# Patient Record
Sex: FEMALE | Race: WHITE | NOT HISPANIC OR LATINO | Employment: OTHER | URBAN - NONMETROPOLITAN AREA
[De-identification: names, ages, dates, MRNs, and addresses within clinical notes are randomized per-mention and may not be internally consistent; named-entity substitution may affect disease eponyms.]

---

## 2017-01-02 ENCOUNTER — APPOINTMENT (EMERGENCY)
Dept: CT IMAGING | Facility: HOSPITAL | Age: 77
DRG: 394 | End: 2017-01-02
Payer: MEDICARE

## 2017-01-02 ENCOUNTER — HOSPITAL ENCOUNTER (INPATIENT)
Facility: HOSPITAL | Age: 77
LOS: 2 days | Discharge: HOME | DRG: 394 | End: 2017-01-04
Attending: EMERGENCY MEDICINE | Admitting: INTERNAL MEDICINE
Payer: MEDICARE

## 2017-01-02 DIAGNOSIS — I25.10 CAD (CORONARY ARTERY DISEASE): ICD-10-CM

## 2017-01-02 DIAGNOSIS — K62.5 RECTAL BLEEDING: ICD-10-CM

## 2017-01-02 DIAGNOSIS — K57.90 DIVERTICULOSIS: ICD-10-CM

## 2017-01-02 DIAGNOSIS — I48.92 ATRIAL FLUTTER (HCC): ICD-10-CM

## 2017-01-02 DIAGNOSIS — K92.2 GI BLEEDING: Primary | ICD-10-CM

## 2017-01-02 DIAGNOSIS — R10.9 RIGHT FLANK PAIN: ICD-10-CM

## 2017-01-02 PROBLEM — R31.29 MICROSCOPIC HEMATURIA: Status: ACTIVE | Noted: 2017-01-02

## 2017-01-02 PROBLEM — I45.10 INCOMPLETE RIGHT BUNDLE BRANCH BLOCK: Status: ACTIVE | Noted: 2017-01-02

## 2017-01-02 PROBLEM — Z95.1 HX OF CABG: Status: ACTIVE | Noted: 2017-01-02

## 2017-01-02 PROBLEM — E78.5 HYPERLIPIDEMIA: Status: ACTIVE | Noted: 2017-01-02

## 2017-01-02 PROBLEM — I10 ESSENTIAL HYPERTENSION: Status: ACTIVE | Noted: 2017-01-02

## 2017-01-02 PROBLEM — R71.8 ELEVATED MCV: Status: ACTIVE | Noted: 2017-01-02

## 2017-01-02 PROBLEM — M54.6 THORACIC BACK PAIN: Status: ACTIVE | Noted: 2017-01-02

## 2017-01-02 PROBLEM — R82.81 PYURIA: Status: ACTIVE | Noted: 2017-01-02

## 2017-01-02 PROBLEM — M54.14 THORACIC RADICULOPATHY: Status: ACTIVE | Noted: 2017-01-02

## 2017-01-02 PROBLEM — E88.09 HYPOALBUMINEMIA: Status: ACTIVE | Noted: 2017-01-02

## 2017-01-02 PROBLEM — Z86.19 HISTORY OF SHINGLES: Status: ACTIVE | Noted: 2017-01-02

## 2017-01-02 LAB
ALBUMIN SERPL BCP-MCNC: 2.9 G/DL (ref 3.5–5)
ALP SERPL-CCNC: 85 U/L (ref 46–116)
ALT SERPL W P-5'-P-CCNC: 19 U/L (ref 12–78)
ANION GAP SERPL CALCULATED.3IONS-SCNC: 8 MMOL/L (ref 4–13)
AST SERPL W P-5'-P-CCNC: 17 U/L (ref 5–45)
BACTERIA UR QL AUTO: ABNORMAL /HPF
BASOPHILS # BLD AUTO: 0.04 THOUSANDS/ΜL (ref 0–0.1)
BASOPHILS NFR BLD AUTO: 1 % (ref 0–1)
BILIRUB SERPL-MCNC: 0.5 MG/DL (ref 0.2–1)
BILIRUB UR QL STRIP: NEGATIVE
BUN SERPL-MCNC: 20 MG/DL (ref 5–25)
CALCIUM SERPL-MCNC: 8.4 MG/DL (ref 8.3–10.1)
CHLORIDE SERPL-SCNC: 107 MMOL/L (ref 100–108)
CLARITY UR: ABNORMAL
CO2 SERPL-SCNC: 26 MMOL/L (ref 21–32)
COLOR UR: YELLOW
CREAT SERPL-MCNC: 0.94 MG/DL (ref 0.6–1.3)
EOSINOPHIL # BLD AUTO: 0.03 THOUSAND/ΜL (ref 0–0.61)
EOSINOPHIL NFR BLD AUTO: 0 % (ref 0–6)
ERYTHROCYTE [DISTWIDTH] IN BLOOD BY AUTOMATED COUNT: 15.9 % (ref 11.6–15.1)
GFR SERPL CREATININE-BSD FRML MDRD: 57.7 ML/MIN/1.73SQ M
GLUCOSE SERPL-MCNC: 103 MG/DL (ref 65–140)
GLUCOSE UR STRIP-MCNC: NEGATIVE MG/DL
HCT VFR BLD AUTO: 38.5 % (ref 34.8–46.1)
HGB BLD-MCNC: 12.4 G/DL (ref 11.5–15.4)
HGB UR QL STRIP.AUTO: ABNORMAL
HOLD SPECIMEN: NORMAL
HOLD SPECIMEN: NORMAL
INR PPP: 1.73 (ref 0.86–1.16)
KETONES UR STRIP-MCNC: NEGATIVE MG/DL
LACTATE SERPL-SCNC: 1.3 MMOL/L (ref 0.5–2)
LEUKOCYTE ESTERASE UR QL STRIP: ABNORMAL
LIPASE SERPL-CCNC: 110 U/L (ref 73–393)
LYMPHOCYTES # BLD AUTO: 1.09 THOUSANDS/ΜL (ref 0.6–4.47)
LYMPHOCYTES NFR BLD AUTO: 16 % (ref 14–44)
MCH RBC QN AUTO: 31.7 PG (ref 26.8–34.3)
MCHC RBC AUTO-ENTMCNC: 32.2 G/DL (ref 31.4–37.4)
MCV RBC AUTO: 99 FL (ref 82–98)
MONOCYTES # BLD AUTO: 0.64 THOUSAND/ΜL (ref 0.17–1.22)
MONOCYTES NFR BLD AUTO: 9 % (ref 4–12)
NEUTROPHILS # BLD AUTO: 5 THOUSANDS/ΜL (ref 1.85–7.62)
NEUTS SEG NFR BLD AUTO: 74 % (ref 43–75)
NITRITE UR QL STRIP: NEGATIVE
NON-SQ EPI CELLS URNS QL MICRO: ABNORMAL /HPF
PH UR STRIP.AUTO: 5.5 [PH] (ref 4.5–8)
PLATELET # BLD AUTO: 210 THOUSANDS/UL (ref 149–390)
PMV BLD AUTO: 8.6 FL (ref 8.9–12.7)
POTASSIUM SERPL-SCNC: 3.9 MMOL/L (ref 3.5–5.3)
PROT SERPL-MCNC: 6.1 G/DL (ref 6.4–8.2)
PROT UR STRIP-MCNC: NEGATIVE MG/DL
PROTHROMBIN TIME: 19.5 SECONDS (ref 12–14.3)
RBC # BLD AUTO: 3.91 MILLION/UL (ref 3.81–5.12)
RBC #/AREA URNS AUTO: ABNORMAL /HPF
SODIUM SERPL-SCNC: 141 MMOL/L (ref 136–145)
SP GR UR STRIP.AUTO: 1.02 (ref 1–1.03)
UROBILINOGEN UR QL STRIP.AUTO: 0.2 E.U./DL
WBC # BLD AUTO: 6.8 THOUSAND/UL (ref 4.31–10.16)
WBC #/AREA URNS AUTO: ABNORMAL /HPF

## 2017-01-02 PROCEDURE — 80053 COMPREHEN METABOLIC PANEL: CPT | Performed by: EMERGENCY MEDICINE

## 2017-01-02 PROCEDURE — 81001 URINALYSIS AUTO W/SCOPE: CPT | Performed by: EMERGENCY MEDICINE

## 2017-01-02 PROCEDURE — 36415 COLL VENOUS BLD VENIPUNCTURE: CPT | Performed by: EMERGENCY MEDICINE

## 2017-01-02 PROCEDURE — 99285 EMERGENCY DEPT VISIT HI MDM: CPT

## 2017-01-02 PROCEDURE — 85025 COMPLETE CBC W/AUTO DIFF WBC: CPT | Performed by: EMERGENCY MEDICINE

## 2017-01-02 PROCEDURE — 74178 CT ABD&PLV WO CNTR FLWD CNTR: CPT

## 2017-01-02 PROCEDURE — 96374 THER/PROPH/DIAG INJ IV PUSH: CPT

## 2017-01-02 PROCEDURE — 93005 ELECTROCARDIOGRAM TRACING: CPT | Performed by: EMERGENCY MEDICINE

## 2017-01-02 PROCEDURE — 96376 TX/PRO/DX INJ SAME DRUG ADON: CPT

## 2017-01-02 PROCEDURE — 96375 TX/PRO/DX INJ NEW DRUG ADDON: CPT

## 2017-01-02 PROCEDURE — 83605 ASSAY OF LACTIC ACID: CPT | Performed by: EMERGENCY MEDICINE

## 2017-01-02 PROCEDURE — 85610 PROTHROMBIN TIME: CPT | Performed by: EMERGENCY MEDICINE

## 2017-01-02 PROCEDURE — 83690 ASSAY OF LIPASE: CPT | Performed by: EMERGENCY MEDICINE

## 2017-01-02 RX ORDER — METOPROLOL TARTRATE 50 MG/1
50 TABLET, FILM COATED ORAL 2 TIMES DAILY
COMMUNITY
End: 2018-11-20 | Stop reason: SDUPTHER

## 2017-01-02 RX ORDER — VIT A/VIT C/VIT E/ZINC/COPPER 4296-226
1 CAPSULE ORAL DAILY
COMMUNITY

## 2017-01-02 RX ORDER — FAMOTIDINE 20 MG/1
20 TABLET, FILM COATED ORAL DAILY
Status: DISCONTINUED | OUTPATIENT
Start: 2017-01-03 | End: 2017-01-04 | Stop reason: HOSPADM

## 2017-01-02 RX ORDER — LIDOCAINE 50 MG/G
1 PATCH TOPICAL ONCE
Status: COMPLETED | OUTPATIENT
Start: 2017-01-02 | End: 2017-01-02

## 2017-01-02 RX ORDER — ACETAMINOPHEN 325 MG/1
650 TABLET ORAL EVERY 6 HOURS PRN
Status: DISCONTINUED | OUTPATIENT
Start: 2017-01-02 | End: 2017-01-04 | Stop reason: HOSPADM

## 2017-01-02 RX ORDER — ALPRAZOLAM 0.25 MG/1
0.25 TABLET ORAL 3 TIMES DAILY PRN
Status: DISCONTINUED | OUTPATIENT
Start: 2017-01-02 | End: 2017-01-04 | Stop reason: HOSPADM

## 2017-01-02 RX ORDER — OXYCODONE HYDROCHLORIDE 5 MG/1
5 TABLET ORAL EVERY 4 HOURS PRN
Status: DISCONTINUED | OUTPATIENT
Start: 2017-01-02 | End: 2017-01-04 | Stop reason: HOSPADM

## 2017-01-02 RX ORDER — FENTANYL CITRATE 50 UG/ML
25 INJECTION, SOLUTION INTRAMUSCULAR; INTRAVENOUS EVERY 4 HOURS PRN
Status: DISCONTINUED | OUTPATIENT
Start: 2017-01-02 | End: 2017-01-03

## 2017-01-02 RX ORDER — FENTANYL CITRATE 50 UG/ML
50 INJECTION, SOLUTION INTRAMUSCULAR; INTRAVENOUS ONCE
Status: COMPLETED | OUTPATIENT
Start: 2017-01-02 | End: 2017-01-02

## 2017-01-02 RX ORDER — POTASSIUM CHLORIDE 20 MEQ/1
40 TABLET, EXTENDED RELEASE ORAL ONCE
Status: COMPLETED | OUTPATIENT
Start: 2017-01-02 | End: 2017-01-02

## 2017-01-02 RX ORDER — SODIUM CHLORIDE 9 MG/ML
75 INJECTION, SOLUTION INTRAVENOUS CONTINUOUS
Status: DISCONTINUED | OUTPATIENT
Start: 2017-01-02 | End: 2017-01-04 | Stop reason: HOSPADM

## 2017-01-02 RX ORDER — ATORVASTATIN CALCIUM 40 MG/1
40 TABLET, FILM COATED ORAL
Status: DISCONTINUED | OUTPATIENT
Start: 2017-01-02 | End: 2017-01-04 | Stop reason: HOSPADM

## 2017-01-02 RX ORDER — LORAZEPAM 2 MG/ML
0.5 INJECTION INTRAMUSCULAR ONCE AS NEEDED
Status: COMPLETED | OUTPATIENT
Start: 2017-01-02 | End: 2017-01-03

## 2017-01-02 RX ORDER — LISINOPRIL 10 MG/1
10 TABLET ORAL DAILY
Status: DISCONTINUED | OUTPATIENT
Start: 2017-01-03 | End: 2017-01-04 | Stop reason: HOSPADM

## 2017-01-02 RX ORDER — METOPROLOL TARTRATE 50 MG/1
50 TABLET, FILM COATED ORAL 2 TIMES DAILY
Status: DISCONTINUED | OUTPATIENT
Start: 2017-01-02 | End: 2017-01-04 | Stop reason: HOSPADM

## 2017-01-02 RX ORDER — ONDANSETRON 2 MG/ML
4 INJECTION INTRAMUSCULAR; INTRAVENOUS EVERY 6 HOURS PRN
Status: DISCONTINUED | OUTPATIENT
Start: 2017-01-02 | End: 2017-01-04 | Stop reason: HOSPADM

## 2017-01-02 RX ORDER — CALCIUM CARBONATE 500(1250)
1 TABLET ORAL 2 TIMES DAILY WITH MEALS
Status: DISCONTINUED | OUTPATIENT
Start: 2017-01-02 | End: 2017-01-04 | Stop reason: HOSPADM

## 2017-01-02 RX ORDER — ATORVASTATIN CALCIUM 10 MG/1
20 TABLET, FILM COATED ORAL
Status: DISCONTINUED | OUTPATIENT
Start: 2017-01-02 | End: 2017-01-02

## 2017-01-02 RX ORDER — FENTANYL CITRATE 50 UG/ML
50 INJECTION, SOLUTION INTRAMUSCULAR; INTRAVENOUS ONCE
Status: DISCONTINUED | OUTPATIENT
Start: 2017-01-02 | End: 2017-01-02

## 2017-01-02 RX ORDER — KETAMINE HYDROCHLORIDE 100 MG/ML
0.2 INJECTION, SOLUTION INTRAMUSCULAR; INTRAVENOUS ONCE
Status: COMPLETED | OUTPATIENT
Start: 2017-01-02 | End: 2017-01-02

## 2017-01-02 RX ADMIN — FENTANYL CITRATE 50 MCG: 50 INJECTION, SOLUTION INTRAMUSCULAR; INTRAVENOUS at 11:07

## 2017-01-02 RX ADMIN — KETAMINE HYDROCHLORIDE 17 MG: 100 INJECTION INTRAMUSCULAR; INTRAVENOUS at 11:14

## 2017-01-02 RX ADMIN — OXYCODONE HYDROCHLORIDE 5 MG: 5 TABLET ORAL at 16:06

## 2017-01-02 RX ADMIN — FENTANYL CITRATE 25 MCG: 50 INJECTION, SOLUTION INTRAMUSCULAR; INTRAVENOUS at 18:34

## 2017-01-02 RX ADMIN — Medication 1 TABLET: at 16:06

## 2017-01-02 RX ADMIN — FENTANYL CITRATE 50 MCG: 50 INJECTION, SOLUTION INTRAMUSCULAR; INTRAVENOUS at 12:32

## 2017-01-02 RX ADMIN — FENTANYL CITRATE 25 MCG: 50 INJECTION, SOLUTION INTRAMUSCULAR; INTRAVENOUS at 22:39

## 2017-01-02 RX ADMIN — METOPROLOL TARTRATE 50 MG: 50 TABLET ORAL at 17:42

## 2017-01-02 RX ADMIN — POTASSIUM CHLORIDE 40 MEQ: 1500 TABLET, EXTENDED RELEASE ORAL at 14:42

## 2017-01-02 RX ADMIN — SODIUM CHLORIDE 75 ML/HR: 0.9 INJECTION, SOLUTION INTRAVENOUS at 14:43

## 2017-01-02 RX ADMIN — IODIXANOL 100 ML: 320 INJECTION, SOLUTION INTRAVASCULAR at 11:28

## 2017-01-02 RX ADMIN — ATORVASTATIN CALCIUM 40 MG: 40 TABLET, FILM COATED ORAL at 16:06

## 2017-01-02 RX ADMIN — LIDOCAINE 1 PATCH: 50 PATCH CUTANEOUS at 11:05

## 2017-01-03 ENCOUNTER — APPOINTMENT (INPATIENT)
Dept: MRI IMAGING | Facility: HOSPITAL | Age: 77
DRG: 394 | End: 2017-01-03
Payer: MEDICARE

## 2017-01-03 LAB
ALBUMIN SERPL BCP-MCNC: 2.8 G/DL (ref 3.5–5)
ALP SERPL-CCNC: 81 U/L (ref 46–116)
ALT SERPL W P-5'-P-CCNC: 16 U/L (ref 12–78)
ANION GAP SERPL CALCULATED.3IONS-SCNC: 7 MMOL/L (ref 4–13)
AST SERPL W P-5'-P-CCNC: 20 U/L (ref 5–45)
ATRIAL RATE: 227 BPM
BACTERIA UR QL AUTO: ABNORMAL /HPF
BASOPHILS # BLD AUTO: 0.02 THOUSANDS/ΜL (ref 0–0.1)
BASOPHILS NFR BLD AUTO: 0 % (ref 0–1)
BILIRUB SERPL-MCNC: 0.6 MG/DL (ref 0.2–1)
BILIRUB UR QL STRIP: NEGATIVE
BUN SERPL-MCNC: 12 MG/DL (ref 5–25)
CALCIUM SERPL-MCNC: 8.5 MG/DL (ref 8.3–10.1)
CHLORIDE SERPL-SCNC: 108 MMOL/L (ref 100–108)
CLARITY UR: CLEAR
CO2 SERPL-SCNC: 26 MMOL/L (ref 21–32)
COLOR UR: YELLOW
CREAT SERPL-MCNC: 0.81 MG/DL (ref 0.6–1.3)
EOSINOPHIL # BLD AUTO: 0.05 THOUSAND/ΜL (ref 0–0.61)
EOSINOPHIL NFR BLD AUTO: 1 % (ref 0–6)
ERYTHROCYTE [DISTWIDTH] IN BLOOD BY AUTOMATED COUNT: 16.3 % (ref 11.6–15.1)
EST. AVERAGE GLUCOSE BLD GHB EST-MCNC: 123 MG/DL
FOLATE SERPL-MCNC: 13.8 NG/ML (ref 3.1–17.5)
GFR SERPL CREATININE-BSD FRML MDRD: >60 ML/MIN/1.73SQ M
GLUCOSE SERPL-MCNC: 89 MG/DL (ref 65–140)
GLUCOSE UR STRIP-MCNC: NEGATIVE MG/DL
HBA1C MFR BLD: 5.9 % (ref 4.2–6.3)
HCT VFR BLD AUTO: 36.8 % (ref 34.8–46.1)
HGB BLD-MCNC: 12 G/DL (ref 11.5–15.4)
HGB UR QL STRIP.AUTO: ABNORMAL
KETONES UR STRIP-MCNC: NEGATIVE MG/DL
LACTATE SERPL-SCNC: 0.7 MMOL/L (ref 0.5–2)
LEUKOCYTE ESTERASE UR QL STRIP: ABNORMAL
LYMPHOCYTES # BLD AUTO: 1.12 THOUSANDS/ΜL (ref 0.6–4.47)
LYMPHOCYTES NFR BLD AUTO: 20 % (ref 14–44)
MAGNESIUM SERPL-MCNC: 1.8 MG/DL (ref 1.6–2.6)
MCH RBC QN AUTO: 32.2 PG (ref 26.8–34.3)
MCHC RBC AUTO-ENTMCNC: 32.6 G/DL (ref 31.4–37.4)
MCV RBC AUTO: 99 FL (ref 82–98)
MONOCYTES # BLD AUTO: 0.58 THOUSAND/ΜL (ref 0.17–1.22)
MONOCYTES NFR BLD AUTO: 10 % (ref 4–12)
MUCOUS THREADS UR QL AUTO: ABNORMAL
NEUTROPHILS # BLD AUTO: 3.97 THOUSANDS/ΜL (ref 1.85–7.62)
NEUTS SEG NFR BLD AUTO: 69 % (ref 43–75)
NITRITE UR QL STRIP: NEGATIVE
NON-SQ EPI CELLS URNS QL MICRO: ABNORMAL /HPF
PH UR STRIP.AUTO: 5.5 [PH] (ref 4.5–8)
PLATELET # BLD AUTO: 183 THOUSANDS/UL (ref 149–390)
PMV BLD AUTO: 8.7 FL (ref 8.9–12.7)
POTASSIUM SERPL-SCNC: 4.5 MMOL/L (ref 3.5–5.3)
PREALB SERPL-MCNC: 24 MG/DL (ref 18–40)
PROT SERPL-MCNC: 5.8 G/DL (ref 6.4–8.2)
PROT UR STRIP-MCNC: NEGATIVE MG/DL
QRS AXIS: 7 DEGREES
QRSD INTERVAL: 100 MS
QT INTERVAL: 396 MS
QTC INTERVAL: 392 MS
RBC # BLD AUTO: 3.73 MILLION/UL (ref 3.81–5.12)
RBC #/AREA URNS AUTO: ABNORMAL /HPF
SODIUM SERPL-SCNC: 141 MMOL/L (ref 136–145)
SP GR UR STRIP.AUTO: 1.02 (ref 1–1.03)
T WAVE AXIS: -7 DEGREES
TSH SERPL DL<=0.05 MIU/L-ACNC: 3.26 UIU/ML (ref 0.36–3.74)
UROBILINOGEN UR QL STRIP.AUTO: 0.2 E.U./DL
VENTRICULAR RATE: 59 BPM
VIT B12 SERPL-MCNC: 228 PG/ML (ref 100–900)
WBC # BLD AUTO: 5.74 THOUSAND/UL (ref 4.31–10.16)
WBC #/AREA URNS AUTO: ABNORMAL /HPF

## 2017-01-03 PROCEDURE — G8979 MOBILITY GOAL STATUS: HCPCS | Performed by: PHYSICAL THERAPIST

## 2017-01-03 PROCEDURE — 81001 URINALYSIS AUTO W/SCOPE: CPT | Performed by: FAMILY MEDICINE

## 2017-01-03 PROCEDURE — 97116 GAIT TRAINING THERAPY: CPT | Performed by: PHYSICAL THERAPIST

## 2017-01-03 PROCEDURE — 85025 COMPLETE CBC W/AUTO DIFF WBC: CPT | Performed by: INTERNAL MEDICINE

## 2017-01-03 PROCEDURE — 83036 HEMOGLOBIN GLYCOSYLATED A1C: CPT | Performed by: INTERNAL MEDICINE

## 2017-01-03 PROCEDURE — 72146 MRI CHEST SPINE W/O DYE: CPT

## 2017-01-03 PROCEDURE — 83735 ASSAY OF MAGNESIUM: CPT | Performed by: INTERNAL MEDICINE

## 2017-01-03 PROCEDURE — 84443 ASSAY THYROID STIM HORMONE: CPT | Performed by: INTERNAL MEDICINE

## 2017-01-03 PROCEDURE — 82746 ASSAY OF FOLIC ACID SERUM: CPT | Performed by: INTERNAL MEDICINE

## 2017-01-03 PROCEDURE — G8978 MOBILITY CURRENT STATUS: HCPCS | Performed by: PHYSICAL THERAPIST

## 2017-01-03 PROCEDURE — 84134 ASSAY OF PREALBUMIN: CPT | Performed by: INTERNAL MEDICINE

## 2017-01-03 PROCEDURE — 87086 URINE CULTURE/COLONY COUNT: CPT | Performed by: FAMILY MEDICINE

## 2017-01-03 PROCEDURE — 83605 ASSAY OF LACTIC ACID: CPT | Performed by: INTERNAL MEDICINE

## 2017-01-03 PROCEDURE — 82607 VITAMIN B-12: CPT | Performed by: INTERNAL MEDICINE

## 2017-01-03 PROCEDURE — 97161 PT EVAL LOW COMPLEX 20 MIN: CPT | Performed by: PHYSICAL THERAPIST

## 2017-01-03 PROCEDURE — 80053 COMPREHEN METABOLIC PANEL: CPT | Performed by: INTERNAL MEDICINE

## 2017-01-03 RX ORDER — GABAPENTIN 100 MG/1
100 CAPSULE ORAL 3 TIMES DAILY
Status: DISCONTINUED | OUTPATIENT
Start: 2017-01-03 | End: 2017-01-04 | Stop reason: HOSPADM

## 2017-01-03 RX ORDER — FENTANYL CITRATE 50 UG/ML
50 INJECTION, SOLUTION INTRAMUSCULAR; INTRAVENOUS EVERY 4 HOURS PRN
Status: DISCONTINUED | OUTPATIENT
Start: 2017-01-03 | End: 2017-01-04 | Stop reason: HOSPADM

## 2017-01-03 RX ADMIN — FENTANYL CITRATE 50 MCG: 50 INJECTION, SOLUTION INTRAMUSCULAR; INTRAVENOUS at 11:43

## 2017-01-03 RX ADMIN — Medication 1 TABLET: at 16:03

## 2017-01-03 RX ADMIN — LORAZEPAM 0.5 MG: 2 INJECTION INTRAMUSCULAR; INTRAVENOUS at 09:49

## 2017-01-03 RX ADMIN — FENTANYL CITRATE 50 MCG: 50 INJECTION, SOLUTION INTRAMUSCULAR; INTRAVENOUS at 03:40

## 2017-01-03 RX ADMIN — GABAPENTIN 100 MG: 100 CAPSULE ORAL at 20:09

## 2017-01-03 RX ADMIN — ATORVASTATIN CALCIUM 40 MG: 40 TABLET, FILM COATED ORAL at 16:03

## 2017-01-03 RX ADMIN — FAMOTIDINE 20 MG: 20 TABLET, FILM COATED ORAL at 08:36

## 2017-01-03 RX ADMIN — Medication 1 TABLET: at 08:37

## 2017-01-03 RX ADMIN — SODIUM CHLORIDE 75 ML/HR: 0.9 INJECTION, SOLUTION INTRAVENOUS at 20:06

## 2017-01-03 RX ADMIN — OXYCODONE HYDROCHLORIDE 5 MG: 5 TABLET ORAL at 08:36

## 2017-01-03 RX ADMIN — GABAPENTIN 100 MG: 100 CAPSULE ORAL at 16:03

## 2017-01-03 RX ADMIN — LISINOPRIL 10 MG: 10 TABLET ORAL at 08:36

## 2017-01-03 RX ADMIN — OXYCODONE HYDROCHLORIDE 5 MG: 5 TABLET ORAL at 16:04

## 2017-01-03 RX ADMIN — METOPROLOL TARTRATE 50 MG: 50 TABLET ORAL at 08:36

## 2017-01-03 RX ADMIN — Medication 1 TABLET: at 08:36

## 2017-01-03 RX ADMIN — METOPROLOL TARTRATE 50 MG: 50 TABLET ORAL at 18:49

## 2017-01-04 VITALS
HEART RATE: 68 BPM | BODY MASS INDEX: 30.75 KG/M2 | SYSTOLIC BLOOD PRESSURE: 132 MMHG | RESPIRATION RATE: 18 BRPM | WEIGHT: 184.57 LBS | DIASTOLIC BLOOD PRESSURE: 70 MMHG | TEMPERATURE: 97.1 F | HEIGHT: 65 IN | OXYGEN SATURATION: 94 %

## 2017-01-04 LAB
ANION GAP SERPL CALCULATED.3IONS-SCNC: 7 MMOL/L (ref 4–13)
BACTERIA UR CULT: NORMAL
BASOPHILS # BLD AUTO: 0.03 THOUSANDS/ΜL (ref 0–0.1)
BASOPHILS NFR BLD AUTO: 1 % (ref 0–1)
BUN SERPL-MCNC: 7 MG/DL (ref 5–25)
CALCIUM SERPL-MCNC: 8.9 MG/DL (ref 8.3–10.1)
CHLORIDE SERPL-SCNC: 107 MMOL/L (ref 100–108)
CO2 SERPL-SCNC: 28 MMOL/L (ref 21–32)
CREAT SERPL-MCNC: 0.85 MG/DL (ref 0.6–1.3)
EOSINOPHIL # BLD AUTO: 0.04 THOUSAND/ΜL (ref 0–0.61)
EOSINOPHIL NFR BLD AUTO: 1 % (ref 0–6)
ERYTHROCYTE [DISTWIDTH] IN BLOOD BY AUTOMATED COUNT: 16.9 % (ref 11.6–15.1)
GFR SERPL CREATININE-BSD FRML MDRD: >60 ML/MIN/1.73SQ M
GLUCOSE SERPL-MCNC: 88 MG/DL (ref 65–140)
HCT VFR BLD AUTO: 40.1 % (ref 34.8–46.1)
HGB BLD-MCNC: 12.8 G/DL (ref 11.5–15.4)
LYMPHOCYTES # BLD AUTO: 1.46 THOUSANDS/ΜL (ref 0.6–4.47)
LYMPHOCYTES NFR BLD AUTO: 22 % (ref 14–44)
MCH RBC QN AUTO: 31.8 PG (ref 26.8–34.3)
MCHC RBC AUTO-ENTMCNC: 31.9 G/DL (ref 31.4–37.4)
MCV RBC AUTO: 100 FL (ref 82–98)
MONOCYTES # BLD AUTO: 0.64 THOUSAND/ΜL (ref 0.17–1.22)
MONOCYTES NFR BLD AUTO: 10 % (ref 4–12)
NEUTROPHILS # BLD AUTO: 4.49 THOUSANDS/ΜL (ref 1.85–7.62)
NEUTS SEG NFR BLD AUTO: 66 % (ref 43–75)
PLATELET # BLD AUTO: 177 THOUSANDS/UL (ref 149–390)
PMV BLD AUTO: 9 FL (ref 8.9–12.7)
POTASSIUM SERPL-SCNC: 4.3 MMOL/L (ref 3.5–5.3)
RBC # BLD AUTO: 4.03 MILLION/UL (ref 3.81–5.12)
SODIUM SERPL-SCNC: 142 MMOL/L (ref 136–145)
WBC # BLD AUTO: 6.66 THOUSAND/UL (ref 4.31–10.16)

## 2017-01-04 PROCEDURE — 97116 GAIT TRAINING THERAPY: CPT

## 2017-01-04 PROCEDURE — 97110 THERAPEUTIC EXERCISES: CPT

## 2017-01-04 PROCEDURE — 85025 COMPLETE CBC W/AUTO DIFF WBC: CPT | Performed by: FAMILY MEDICINE

## 2017-01-04 PROCEDURE — 80048 BASIC METABOLIC PNL TOTAL CA: CPT | Performed by: FAMILY MEDICINE

## 2017-01-04 RX ORDER — GABAPENTIN 100 MG/1
100 CAPSULE ORAL 3 TIMES DAILY
Qty: 90 CAPSULE | Refills: 0 | Status: SHIPPED | OUTPATIENT
Start: 2017-01-04 | End: 2017-01-26

## 2017-01-04 RX ADMIN — OXYCODONE HYDROCHLORIDE 5 MG: 5 TABLET ORAL at 09:21

## 2017-01-04 RX ADMIN — LISINOPRIL 10 MG: 10 TABLET ORAL at 09:21

## 2017-01-04 RX ADMIN — FAMOTIDINE 20 MG: 20 TABLET, FILM COATED ORAL at 09:21

## 2017-01-04 RX ADMIN — METOPROLOL TARTRATE 50 MG: 50 TABLET ORAL at 09:21

## 2017-01-04 RX ADMIN — GABAPENTIN 100 MG: 100 CAPSULE ORAL at 09:21

## 2017-01-04 RX ADMIN — OXYCODONE HYDROCHLORIDE 5 MG: 5 TABLET ORAL at 02:00

## 2017-01-04 RX ADMIN — Medication 1 TABLET: at 09:21

## 2017-01-04 RX ADMIN — SODIUM CHLORIDE 75 ML/HR: 0.9 INJECTION, SOLUTION INTRAVENOUS at 09:30

## 2017-01-13 ENCOUNTER — ALLSCRIPTS OFFICE VISIT (OUTPATIENT)
Dept: OTHER | Facility: OTHER | Age: 77
End: 2017-01-13

## 2017-01-19 DIAGNOSIS — I48.3 TYPICAL ATRIAL FLUTTER (HCC): ICD-10-CM

## 2017-01-26 ENCOUNTER — GENERIC CONVERSION - ENCOUNTER (OUTPATIENT)
Dept: OTHER | Facility: OTHER | Age: 77
End: 2017-01-26

## 2017-01-26 ENCOUNTER — HOSPITAL ENCOUNTER (EMERGENCY)
Facility: HOSPITAL | Age: 77
Discharge: HOME | End: 2017-01-26
Attending: EMERGENCY MEDICINE
Payer: MEDICARE

## 2017-01-26 ENCOUNTER — APPOINTMENT (EMERGENCY)
Dept: RADIOLOGY | Facility: HOSPITAL | Age: 77
End: 2017-01-26
Payer: MEDICARE

## 2017-01-26 ENCOUNTER — APPOINTMENT (OUTPATIENT)
Dept: LAB | Facility: HOSPITAL | Age: 77
End: 2017-01-26
Attending: INTERNAL MEDICINE
Payer: MEDICARE

## 2017-01-26 ENCOUNTER — TRANSCRIBE ORDERS (OUTPATIENT)
Dept: ADMINISTRATIVE | Facility: HOSPITAL | Age: 77
End: 2017-01-26

## 2017-01-26 VITALS
DIASTOLIC BLOOD PRESSURE: 84 MMHG | SYSTOLIC BLOOD PRESSURE: 127 MMHG | HEART RATE: 74 BPM | WEIGHT: 186 LBS | OXYGEN SATURATION: 100 % | RESPIRATION RATE: 18 BRPM | TEMPERATURE: 98 F | BODY MASS INDEX: 30.95 KG/M2

## 2017-01-26 DIAGNOSIS — I48.3 TYPICAL ATRIAL FLUTTER (HCC): ICD-10-CM

## 2017-01-26 DIAGNOSIS — S46.911A: Primary | ICD-10-CM

## 2017-01-26 LAB
INR PPP: 1.47 (ref 0.86–1.16)
PROTHROMBIN TIME: 17.3 SECONDS (ref 12–14.3)

## 2017-01-26 PROCEDURE — 73060 X-RAY EXAM OF HUMERUS: CPT

## 2017-01-26 PROCEDURE — 85610 PROTHROMBIN TIME: CPT

## 2017-01-26 PROCEDURE — 73030 X-RAY EXAM OF SHOULDER: CPT

## 2017-01-26 PROCEDURE — 99283 EMERGENCY DEPT VISIT LOW MDM: CPT

## 2017-01-26 PROCEDURE — 36415 COLL VENOUS BLD VENIPUNCTURE: CPT

## 2017-01-26 RX ORDER — WARFARIN SODIUM 2 MG/1
2 TABLET ORAL
COMMUNITY
End: 2018-03-01 | Stop reason: SDUPTHER

## 2017-01-26 RX ORDER — ACETAMINOPHEN 325 MG/1
650 TABLET ORAL ONCE
Status: COMPLETED | OUTPATIENT
Start: 2017-01-26 | End: 2017-01-26

## 2017-01-26 RX ADMIN — ACETAMINOPHEN 650 MG: 325 TABLET, FILM COATED ORAL at 15:22

## 2017-01-28 ENCOUNTER — GENERIC CONVERSION - ENCOUNTER (OUTPATIENT)
Dept: OTHER | Facility: OTHER | Age: 77
End: 2017-01-28

## 2017-01-30 ENCOUNTER — APPOINTMENT (OUTPATIENT)
Dept: LAB | Facility: HOSPITAL | Age: 77
End: 2017-01-30
Attending: INTERNAL MEDICINE
Payer: MEDICARE

## 2017-01-30 ENCOUNTER — GENERIC CONVERSION - ENCOUNTER (OUTPATIENT)
Dept: OTHER | Facility: OTHER | Age: 77
End: 2017-01-30

## 2017-01-30 ENCOUNTER — TRANSCRIBE ORDERS (OUTPATIENT)
Dept: ADMINISTRATIVE | Facility: HOSPITAL | Age: 77
End: 2017-01-30

## 2017-01-30 DIAGNOSIS — I48.3 TYPICAL ATRIAL FLUTTER (HCC): ICD-10-CM

## 2017-01-30 LAB
INR PPP: 1.46 (ref 0.86–1.16)
PROTHROMBIN TIME: 17.2 SECONDS (ref 12–14.3)

## 2017-01-30 PROCEDURE — 36415 COLL VENOUS BLD VENIPUNCTURE: CPT

## 2017-01-30 PROCEDURE — 85610 PROTHROMBIN TIME: CPT

## 2017-01-31 ENCOUNTER — GENERIC CONVERSION - ENCOUNTER (OUTPATIENT)
Dept: OTHER | Facility: OTHER | Age: 77
End: 2017-01-31

## 2017-02-02 ENCOUNTER — APPOINTMENT (OUTPATIENT)
Dept: LAB | Facility: HOSPITAL | Age: 77
End: 2017-02-02
Attending: INTERNAL MEDICINE
Payer: MEDICARE

## 2017-02-02 ENCOUNTER — TRANSCRIBE ORDERS (OUTPATIENT)
Dept: ADMINISTRATIVE | Facility: HOSPITAL | Age: 77
End: 2017-02-02

## 2017-02-02 ENCOUNTER — GENERIC CONVERSION - ENCOUNTER (OUTPATIENT)
Dept: OTHER | Facility: OTHER | Age: 77
End: 2017-02-02

## 2017-02-02 DIAGNOSIS — I48.3 TYPICAL ATRIAL FLUTTER (HCC): ICD-10-CM

## 2017-02-02 LAB
INR PPP: 1.46 (ref 0.86–1.16)
PROTHROMBIN TIME: 17.2 SECONDS (ref 12–14.3)

## 2017-02-02 PROCEDURE — 36415 COLL VENOUS BLD VENIPUNCTURE: CPT

## 2017-02-02 PROCEDURE — 85610 PROTHROMBIN TIME: CPT

## 2017-02-03 ENCOUNTER — GENERIC CONVERSION - ENCOUNTER (OUTPATIENT)
Dept: OTHER | Facility: OTHER | Age: 77
End: 2017-02-03

## 2017-02-06 ENCOUNTER — GENERIC CONVERSION - ENCOUNTER (OUTPATIENT)
Dept: OTHER | Facility: OTHER | Age: 77
End: 2017-02-06

## 2017-02-06 ENCOUNTER — APPOINTMENT (OUTPATIENT)
Dept: LAB | Facility: HOSPITAL | Age: 77
End: 2017-02-06
Attending: INTERNAL MEDICINE
Payer: MEDICARE

## 2017-02-06 DIAGNOSIS — I48.3 TYPICAL ATRIAL FLUTTER (HCC): ICD-10-CM

## 2017-02-06 LAB
INR PPP: 2.91 (ref 0.86–1.16)
PROTHROMBIN TIME: 29 SECONDS (ref 12–14.3)

## 2017-02-06 PROCEDURE — 85610 PROTHROMBIN TIME: CPT

## 2017-02-06 PROCEDURE — 36415 COLL VENOUS BLD VENIPUNCTURE: CPT

## 2017-02-09 ENCOUNTER — GENERIC CONVERSION - ENCOUNTER (OUTPATIENT)
Dept: OTHER | Facility: OTHER | Age: 77
End: 2017-02-09

## 2017-02-13 ENCOUNTER — APPOINTMENT (OUTPATIENT)
Dept: LAB | Facility: HOSPITAL | Age: 77
End: 2017-02-13
Attending: INTERNAL MEDICINE
Payer: MEDICARE

## 2017-02-13 ENCOUNTER — GENERIC CONVERSION - ENCOUNTER (OUTPATIENT)
Dept: OTHER | Facility: OTHER | Age: 77
End: 2017-02-13

## 2017-02-13 ENCOUNTER — TRANSCRIBE ORDERS (OUTPATIENT)
Dept: ADMINISTRATIVE | Facility: HOSPITAL | Age: 77
End: 2017-02-13

## 2017-02-13 DIAGNOSIS — I48.3 TYPICAL ATRIAL FLUTTER (HCC): ICD-10-CM

## 2017-02-13 LAB
INR PPP: 2.7 (ref 0.86–1.16)
PROTHROMBIN TIME: 27.4 SECONDS (ref 12–14.3)

## 2017-02-13 PROCEDURE — 36415 COLL VENOUS BLD VENIPUNCTURE: CPT

## 2017-02-13 PROCEDURE — 85610 PROTHROMBIN TIME: CPT

## 2017-02-20 ENCOUNTER — APPOINTMENT (OUTPATIENT)
Dept: LAB | Facility: HOSPITAL | Age: 77
End: 2017-02-20
Attending: INTERNAL MEDICINE
Payer: MEDICARE

## 2017-02-20 DIAGNOSIS — I48.3 TYPICAL ATRIAL FLUTTER (HCC): ICD-10-CM

## 2017-02-20 LAB
INR PPP: 3.21 (ref 0.86–1.16)
PROTHROMBIN TIME: 31.2 SECONDS (ref 12–14.3)

## 2017-02-20 PROCEDURE — 85610 PROTHROMBIN TIME: CPT

## 2017-02-20 PROCEDURE — 36415 COLL VENOUS BLD VENIPUNCTURE: CPT

## 2017-02-21 ENCOUNTER — GENERIC CONVERSION - ENCOUNTER (OUTPATIENT)
Dept: OTHER | Facility: OTHER | Age: 77
End: 2017-02-21

## 2017-02-23 DIAGNOSIS — I48.3 TYPICAL ATRIAL FLUTTER (HCC): ICD-10-CM

## 2017-02-27 ENCOUNTER — TRANSCRIBE ORDERS (OUTPATIENT)
Dept: ADMINISTRATIVE | Facility: HOSPITAL | Age: 77
End: 2017-02-27

## 2017-02-27 DIAGNOSIS — Z12.31 VISIT FOR SCREENING MAMMOGRAM: Primary | ICD-10-CM

## 2017-03-01 ENCOUNTER — TRANSCRIBE ORDERS (OUTPATIENT)
Dept: ADMINISTRATIVE | Facility: HOSPITAL | Age: 77
End: 2017-03-01

## 2017-03-01 ENCOUNTER — HOSPITAL ENCOUNTER (OUTPATIENT)
Dept: MAMMOGRAPHY | Facility: HOSPITAL | Age: 77
Discharge: HOME | End: 2017-03-01
Payer: MEDICARE

## 2017-03-01 ENCOUNTER — APPOINTMENT (OUTPATIENT)
Dept: LAB | Facility: HOSPITAL | Age: 77
End: 2017-03-01
Attending: INTERNAL MEDICINE
Payer: MEDICARE

## 2017-03-01 ENCOUNTER — GENERIC CONVERSION - ENCOUNTER (OUTPATIENT)
Dept: OTHER | Facility: OTHER | Age: 77
End: 2017-03-01

## 2017-03-01 DIAGNOSIS — I48.3 TYPICAL ATRIAL FLUTTER (HCC): ICD-10-CM

## 2017-03-01 DIAGNOSIS — Z12.31 VISIT FOR SCREENING MAMMOGRAM: ICD-10-CM

## 2017-03-01 LAB
INR PPP: 2.27 (ref 0.86–1.16)
PROTHROMBIN TIME: 24 SECONDS (ref 12–14.3)

## 2017-03-01 PROCEDURE — 77063 BREAST TOMOSYNTHESIS BI: CPT

## 2017-03-01 PROCEDURE — 36415 COLL VENOUS BLD VENIPUNCTURE: CPT

## 2017-03-01 PROCEDURE — 85610 PROTHROMBIN TIME: CPT

## 2017-03-01 PROCEDURE — G0202 SCR MAMMO BI INCL CAD: HCPCS

## 2017-03-02 ENCOUNTER — GENERIC CONVERSION - ENCOUNTER (OUTPATIENT)
Dept: OTHER | Facility: OTHER | Age: 77
End: 2017-03-02

## 2017-03-20 ENCOUNTER — APPOINTMENT (OUTPATIENT)
Dept: LAB | Facility: HOSPITAL | Age: 77
End: 2017-03-20
Attending: INTERNAL MEDICINE
Payer: MEDICARE

## 2017-03-20 ENCOUNTER — GENERIC CONVERSION - ENCOUNTER (OUTPATIENT)
Dept: OTHER | Facility: OTHER | Age: 77
End: 2017-03-20

## 2017-03-20 ENCOUNTER — TRANSCRIBE ORDERS (OUTPATIENT)
Dept: ADMINISTRATIVE | Facility: HOSPITAL | Age: 77
End: 2017-03-20

## 2017-03-20 DIAGNOSIS — I48.3 TYPICAL ATRIAL FLUTTER (HCC): ICD-10-CM

## 2017-03-20 LAB
INR PPP: 2.07 (ref 0.86–1.16)
PROTHROMBIN TIME: 22.4 SECONDS (ref 12–14.3)

## 2017-03-20 PROCEDURE — 85610 PROTHROMBIN TIME: CPT

## 2017-03-20 PROCEDURE — 36415 COLL VENOUS BLD VENIPUNCTURE: CPT

## 2017-03-30 DIAGNOSIS — I48.3 TYPICAL ATRIAL FLUTTER (HCC): ICD-10-CM

## 2017-04-04 ENCOUNTER — APPOINTMENT (OUTPATIENT)
Dept: LAB | Facility: HOSPITAL | Age: 77
End: 2017-04-04
Attending: INTERNAL MEDICINE
Payer: MEDICARE

## 2017-04-04 ENCOUNTER — TRANSCRIBE ORDERS (OUTPATIENT)
Dept: ADMINISTRATIVE | Facility: HOSPITAL | Age: 77
End: 2017-04-04

## 2017-04-04 ENCOUNTER — GENERIC CONVERSION - ENCOUNTER (OUTPATIENT)
Dept: OTHER | Facility: OTHER | Age: 77
End: 2017-04-04

## 2017-04-04 DIAGNOSIS — I48.3 TYPICAL ATRIAL FLUTTER (HCC): ICD-10-CM

## 2017-04-04 LAB
INR PPP: 1.85 (ref 0.86–1.16)
PROTHROMBIN TIME: 20.6 SECONDS (ref 12–14.3)

## 2017-04-04 PROCEDURE — 36415 COLL VENOUS BLD VENIPUNCTURE: CPT

## 2017-04-04 PROCEDURE — 85610 PROTHROMBIN TIME: CPT

## 2017-04-05 ENCOUNTER — GENERIC CONVERSION - ENCOUNTER (OUTPATIENT)
Dept: OTHER | Facility: OTHER | Age: 77
End: 2017-04-05

## 2017-04-18 ENCOUNTER — GENERIC CONVERSION - ENCOUNTER (OUTPATIENT)
Dept: OTHER | Facility: OTHER | Age: 77
End: 2017-04-18

## 2017-04-18 ENCOUNTER — APPOINTMENT (OUTPATIENT)
Dept: LAB | Facility: HOSPITAL | Age: 77
End: 2017-04-18
Attending: INTERNAL MEDICINE
Payer: MEDICARE

## 2017-04-18 ENCOUNTER — TRANSCRIBE ORDERS (OUTPATIENT)
Dept: ADMINISTRATIVE | Facility: HOSPITAL | Age: 77
End: 2017-04-18

## 2017-04-18 DIAGNOSIS — I48.3 TYPICAL ATRIAL FLUTTER (HCC): ICD-10-CM

## 2017-04-18 LAB
INR PPP: 2.59 (ref 0.86–1.16)
PROTHROMBIN TIME: 26.5 SECONDS (ref 12–14.3)

## 2017-04-18 PROCEDURE — 36415 COLL VENOUS BLD VENIPUNCTURE: CPT

## 2017-04-18 PROCEDURE — 85610 PROTHROMBIN TIME: CPT

## 2017-04-19 ENCOUNTER — GENERIC CONVERSION - ENCOUNTER (OUTPATIENT)
Dept: OTHER | Facility: OTHER | Age: 77
End: 2017-04-19

## 2017-05-03 ENCOUNTER — ALLSCRIPTS OFFICE VISIT (OUTPATIENT)
Dept: OTHER | Facility: OTHER | Age: 77
End: 2017-05-03

## 2017-05-04 DIAGNOSIS — R06.02 SHORTNESS OF BREATH: ICD-10-CM

## 2017-05-04 DIAGNOSIS — I48.3 TYPICAL ATRIAL FLUTTER (HCC): ICD-10-CM

## 2017-05-09 ENCOUNTER — GENERIC CONVERSION - ENCOUNTER (OUTPATIENT)
Dept: OTHER | Facility: OTHER | Age: 77
End: 2017-05-09

## 2017-05-09 ENCOUNTER — APPOINTMENT (OUTPATIENT)
Dept: LAB | Facility: HOSPITAL | Age: 77
End: 2017-05-09
Attending: INTERNAL MEDICINE
Payer: MEDICARE

## 2017-05-09 DIAGNOSIS — I48.3 TYPICAL ATRIAL FLUTTER (HCC): ICD-10-CM

## 2017-05-09 LAB
INR PPP: 2.72 (ref 0.86–1.16)
PROTHROMBIN TIME: 29 SECONDS (ref 12.1–14.4)

## 2017-05-09 PROCEDURE — 36415 COLL VENOUS BLD VENIPUNCTURE: CPT

## 2017-05-09 PROCEDURE — 85610 PROTHROMBIN TIME: CPT

## 2017-05-24 ENCOUNTER — ALLSCRIPTS OFFICE VISIT (OUTPATIENT)
Dept: OTHER | Facility: OTHER | Age: 77
End: 2017-05-24

## 2017-05-30 ENCOUNTER — TRANSCRIBE ORDERS (OUTPATIENT)
Dept: ADMINISTRATIVE | Facility: HOSPITAL | Age: 77
End: 2017-05-30

## 2017-05-30 ENCOUNTER — GENERIC CONVERSION - ENCOUNTER (OUTPATIENT)
Dept: OTHER | Facility: OTHER | Age: 77
End: 2017-05-30

## 2017-05-30 ENCOUNTER — APPOINTMENT (OUTPATIENT)
Dept: LAB | Facility: HOSPITAL | Age: 77
End: 2017-05-30
Attending: INTERNAL MEDICINE
Payer: MEDICARE

## 2017-05-30 DIAGNOSIS — I48.3 TYPICAL ATRIAL FLUTTER (HCC): ICD-10-CM

## 2017-05-30 LAB
INR PPP: 1.77 (ref 0.86–1.16)
PROTHROMBIN TIME: 20.6 SECONDS (ref 12.1–14.4)

## 2017-05-30 PROCEDURE — 85610 PROTHROMBIN TIME: CPT

## 2017-05-30 PROCEDURE — 36415 COLL VENOUS BLD VENIPUNCTURE: CPT

## 2017-06-08 DIAGNOSIS — I48.3 TYPICAL ATRIAL FLUTTER (HCC): ICD-10-CM

## 2017-06-13 ENCOUNTER — APPOINTMENT (OUTPATIENT)
Dept: LAB | Facility: HOSPITAL | Age: 77
End: 2017-06-13
Attending: INTERNAL MEDICINE
Payer: MEDICARE

## 2017-06-13 ENCOUNTER — TRANSCRIBE ORDERS (OUTPATIENT)
Dept: ADMINISTRATIVE | Facility: HOSPITAL | Age: 77
End: 2017-06-13

## 2017-06-13 DIAGNOSIS — I48.3 TYPICAL ATRIAL FLUTTER (HCC): ICD-10-CM

## 2017-06-13 LAB
INR PPP: 2.12 (ref 0.86–1.16)
PROTHROMBIN TIME: 23.8 SECONDS (ref 12.1–14.4)

## 2017-06-13 PROCEDURE — 36415 COLL VENOUS BLD VENIPUNCTURE: CPT

## 2017-06-13 PROCEDURE — 85610 PROTHROMBIN TIME: CPT

## 2017-06-15 ENCOUNTER — GENERIC CONVERSION - ENCOUNTER (OUTPATIENT)
Dept: OTHER | Facility: OTHER | Age: 77
End: 2017-06-15

## 2017-06-27 ENCOUNTER — GENERIC CONVERSION - ENCOUNTER (OUTPATIENT)
Dept: OTHER | Facility: OTHER | Age: 77
End: 2017-06-27

## 2017-06-27 ENCOUNTER — TRANSCRIBE ORDERS (OUTPATIENT)
Dept: ADMINISTRATIVE | Facility: HOSPITAL | Age: 77
End: 2017-06-27

## 2017-06-27 ENCOUNTER — APPOINTMENT (OUTPATIENT)
Dept: LAB | Facility: HOSPITAL | Age: 77
End: 2017-06-27
Attending: INTERNAL MEDICINE
Payer: MEDICARE

## 2017-06-27 DIAGNOSIS — I48.3 TYPICAL ATRIAL FLUTTER (HCC): ICD-10-CM

## 2017-06-27 LAB
INR PPP: 2.35 (ref 0.86–1.16)
PROTHROMBIN TIME: 25.8 SECONDS (ref 12.1–14.4)

## 2017-06-27 PROCEDURE — 36415 COLL VENOUS BLD VENIPUNCTURE: CPT

## 2017-06-27 PROCEDURE — 85610 PROTHROMBIN TIME: CPT

## 2017-07-13 DIAGNOSIS — I48.3 TYPICAL ATRIAL FLUTTER (HCC): ICD-10-CM

## 2017-07-18 ENCOUNTER — GENERIC CONVERSION - ENCOUNTER (OUTPATIENT)
Dept: OTHER | Facility: OTHER | Age: 77
End: 2017-07-18

## 2017-07-18 ENCOUNTER — APPOINTMENT (OUTPATIENT)
Dept: LAB | Facility: HOSPITAL | Age: 77
End: 2017-07-18
Attending: INTERNAL MEDICINE
Payer: MEDICARE

## 2017-07-18 DIAGNOSIS — I48.3 TYPICAL ATRIAL FLUTTER (HCC): ICD-10-CM

## 2017-07-18 LAB
INR PPP: 2.75 (ref 0.86–1.16)
PROTHROMBIN TIME: 29.2 SECONDS (ref 12.1–14.4)

## 2017-07-18 PROCEDURE — 85610 PROTHROMBIN TIME: CPT

## 2017-07-18 PROCEDURE — 36415 COLL VENOUS BLD VENIPUNCTURE: CPT

## 2017-08-08 ENCOUNTER — ALLSCRIPTS OFFICE VISIT (OUTPATIENT)
Dept: OTHER | Facility: OTHER | Age: 77
End: 2017-08-08

## 2017-08-15 ENCOUNTER — TRANSCRIBE ORDERS (OUTPATIENT)
Dept: ADMINISTRATIVE | Facility: HOSPITAL | Age: 77
End: 2017-08-15

## 2017-08-15 ENCOUNTER — APPOINTMENT (OUTPATIENT)
Dept: LAB | Facility: HOSPITAL | Age: 77
End: 2017-08-15
Attending: INTERNAL MEDICINE
Payer: MEDICARE

## 2017-08-15 DIAGNOSIS — I48.3 TYPICAL ATRIAL FLUTTER (HCC): ICD-10-CM

## 2017-08-15 LAB
INR PPP: 2.81 (ref 0.86–1.16)
PROTHROMBIN TIME: 29.8 SECONDS (ref 12.1–14.4)

## 2017-08-15 PROCEDURE — 85610 PROTHROMBIN TIME: CPT

## 2017-08-15 PROCEDURE — 36415 COLL VENOUS BLD VENIPUNCTURE: CPT

## 2017-08-17 DIAGNOSIS — I48.3 TYPICAL ATRIAL FLUTTER (HCC): ICD-10-CM

## 2017-08-20 ENCOUNTER — GENERIC CONVERSION - ENCOUNTER (OUTPATIENT)
Dept: OTHER | Facility: OTHER | Age: 77
End: 2017-08-20

## 2017-08-23 ENCOUNTER — ALLSCRIPTS OFFICE VISIT (OUTPATIENT)
Dept: OTHER | Facility: OTHER | Age: 77
End: 2017-08-23

## 2017-09-12 ENCOUNTER — APPOINTMENT (OUTPATIENT)
Dept: LAB | Facility: HOSPITAL | Age: 77
End: 2017-09-12
Attending: INTERNAL MEDICINE
Payer: MEDICARE

## 2017-09-12 DIAGNOSIS — I48.3 TYPICAL ATRIAL FLUTTER (HCC): ICD-10-CM

## 2017-09-12 LAB
INR PPP: 3.39 (ref 0.86–1.16)
PROTHROMBIN TIME: 34.5 SECONDS (ref 12.1–14.4)

## 2017-09-12 PROCEDURE — 85610 PROTHROMBIN TIME: CPT

## 2017-09-12 PROCEDURE — 36415 COLL VENOUS BLD VENIPUNCTURE: CPT

## 2017-09-13 ENCOUNTER — GENERIC CONVERSION - ENCOUNTER (OUTPATIENT)
Dept: OTHER | Facility: OTHER | Age: 77
End: 2017-09-13

## 2017-09-21 DIAGNOSIS — I48.3 TYPICAL ATRIAL FLUTTER (HCC): ICD-10-CM

## 2017-09-26 ENCOUNTER — TRANSCRIBE ORDERS (OUTPATIENT)
Dept: ADMINISTRATIVE | Facility: HOSPITAL | Age: 77
End: 2017-09-26

## 2017-09-26 ENCOUNTER — APPOINTMENT (OUTPATIENT)
Dept: LAB | Facility: HOSPITAL | Age: 77
End: 2017-09-26
Attending: INTERNAL MEDICINE
Payer: MEDICARE

## 2017-09-26 ENCOUNTER — GENERIC CONVERSION - ENCOUNTER (OUTPATIENT)
Dept: OTHER | Facility: OTHER | Age: 77
End: 2017-09-26

## 2017-09-26 DIAGNOSIS — I48.3 TYPICAL ATRIAL FLUTTER (HCC): ICD-10-CM

## 2017-09-26 LAB
INR PPP: 2.51 (ref 0.86–1.16)
PROTHROMBIN TIME: 27.2 SECONDS (ref 12.1–14.4)

## 2017-09-26 PROCEDURE — 36415 COLL VENOUS BLD VENIPUNCTURE: CPT

## 2017-09-26 PROCEDURE — 85610 PROTHROMBIN TIME: CPT

## 2017-10-17 ENCOUNTER — GENERIC CONVERSION - ENCOUNTER (OUTPATIENT)
Dept: OTHER | Facility: OTHER | Age: 77
End: 2017-10-17

## 2017-10-17 ENCOUNTER — APPOINTMENT (OUTPATIENT)
Dept: LAB | Facility: HOSPITAL | Age: 77
End: 2017-10-17
Attending: INTERNAL MEDICINE
Payer: MEDICARE

## 2017-10-17 DIAGNOSIS — I48.3 TYPICAL ATRIAL FLUTTER (HCC): ICD-10-CM

## 2017-10-17 LAB
INR PPP: 1.89 (ref 0.86–1.16)
PROTHROMBIN TIME: 21.7 SECONDS (ref 12.1–14.4)

## 2017-10-17 PROCEDURE — 85610 PROTHROMBIN TIME: CPT

## 2017-10-17 PROCEDURE — 36415 COLL VENOUS BLD VENIPUNCTURE: CPT

## 2017-10-18 ENCOUNTER — GENERIC CONVERSION - ENCOUNTER (OUTPATIENT)
Dept: OTHER | Facility: OTHER | Age: 77
End: 2017-10-18

## 2017-10-26 DIAGNOSIS — I48.3 TYPICAL ATRIAL FLUTTER (HCC): ICD-10-CM

## 2017-10-31 ENCOUNTER — TRANSCRIBE ORDERS (OUTPATIENT)
Dept: ADMINISTRATIVE | Facility: HOSPITAL | Age: 77
End: 2017-10-31

## 2017-10-31 ENCOUNTER — APPOINTMENT (OUTPATIENT)
Dept: LAB | Facility: HOSPITAL | Age: 77
End: 2017-10-31
Attending: INTERNAL MEDICINE
Payer: MEDICARE

## 2017-10-31 DIAGNOSIS — Z13.820 SCREENING FOR OSTEOPOROSIS: Primary | ICD-10-CM

## 2017-10-31 DIAGNOSIS — I48.3 TYPICAL ATRIAL FLUTTER (HCC): ICD-10-CM

## 2017-10-31 LAB
INR PPP: 2.72 (ref 0.86–1.16)
PROTHROMBIN TIME: 29 SECONDS (ref 12.1–14.4)

## 2017-10-31 PROCEDURE — 85610 PROTHROMBIN TIME: CPT

## 2017-10-31 PROCEDURE — 36415 COLL VENOUS BLD VENIPUNCTURE: CPT

## 2017-11-01 ENCOUNTER — GENERIC CONVERSION - ENCOUNTER (OUTPATIENT)
Dept: OTHER | Facility: OTHER | Age: 77
End: 2017-11-01

## 2017-11-07 ENCOUNTER — HOSPITAL ENCOUNTER (OUTPATIENT)
Dept: BONE DENSITY | Facility: HOSPITAL | Age: 77
Discharge: HOME | End: 2017-11-07
Payer: MEDICARE

## 2017-11-07 DIAGNOSIS — Z13.820 SCREENING FOR OSTEOPOROSIS: ICD-10-CM

## 2017-11-07 PROCEDURE — 77080 DXA BONE DENSITY AXIAL: CPT

## 2017-11-14 ENCOUNTER — APPOINTMENT (OUTPATIENT)
Dept: LAB | Facility: HOSPITAL | Age: 77
End: 2017-11-14
Attending: INTERNAL MEDICINE
Payer: MEDICARE

## 2017-11-14 DIAGNOSIS — I48.3 TYPICAL ATRIAL FLUTTER (HCC): ICD-10-CM

## 2017-11-14 LAB
INR PPP: 2.23 (ref 0.86–1.16)
PROTHROMBIN TIME: 24.8 SECONDS (ref 12.1–14.4)

## 2017-11-14 PROCEDURE — 85610 PROTHROMBIN TIME: CPT

## 2017-11-14 PROCEDURE — 36415 COLL VENOUS BLD VENIPUNCTURE: CPT

## 2017-11-15 ENCOUNTER — GENERIC CONVERSION - ENCOUNTER (OUTPATIENT)
Dept: OTHER | Facility: OTHER | Age: 77
End: 2017-11-15

## 2017-11-30 DIAGNOSIS — I48.3 TYPICAL ATRIAL FLUTTER (HCC): ICD-10-CM

## 2017-12-12 ENCOUNTER — APPOINTMENT (OUTPATIENT)
Dept: LAB | Facility: HOSPITAL | Age: 77
End: 2017-12-12
Attending: INTERNAL MEDICINE
Payer: MEDICARE

## 2017-12-12 ENCOUNTER — GENERIC CONVERSION - ENCOUNTER (OUTPATIENT)
Dept: OTHER | Facility: OTHER | Age: 77
End: 2017-12-12

## 2017-12-12 ENCOUNTER — TRANSCRIBE ORDERS (OUTPATIENT)
Dept: ADMINISTRATIVE | Facility: HOSPITAL | Age: 77
End: 2017-12-12

## 2017-12-12 DIAGNOSIS — I48.3 TYPICAL ATRIAL FLUTTER (HCC): ICD-10-CM

## 2017-12-12 LAB
INR PPP: 2.59 (ref 0.86–1.16)
PROTHROMBIN TIME: 27.9 SECONDS (ref 12.1–14.4)

## 2017-12-12 PROCEDURE — 36415 COLL VENOUS BLD VENIPUNCTURE: CPT

## 2017-12-12 PROCEDURE — 85610 PROTHROMBIN TIME: CPT

## 2017-12-13 ENCOUNTER — GENERIC CONVERSION - ENCOUNTER (OUTPATIENT)
Dept: OTHER | Facility: OTHER | Age: 77
End: 2017-12-13

## 2017-12-19 ENCOUNTER — HOSPITAL ENCOUNTER (EMERGENCY)
Facility: HOSPITAL | Age: 77
Discharge: HOME | End: 2017-12-19
Admitting: EMERGENCY MEDICINE
Payer: MEDICARE

## 2017-12-19 ENCOUNTER — APPOINTMENT (EMERGENCY)
Dept: RADIOLOGY | Facility: HOSPITAL | Age: 77
End: 2017-12-19
Payer: MEDICARE

## 2017-12-19 VITALS
HEART RATE: 115 BPM | WEIGHT: 179.9 LBS | SYSTOLIC BLOOD PRESSURE: 142 MMHG | TEMPERATURE: 97.8 F | DIASTOLIC BLOOD PRESSURE: 87 MMHG | RESPIRATION RATE: 18 BRPM | HEIGHT: 65 IN | BODY MASS INDEX: 29.97 KG/M2 | OXYGEN SATURATION: 95 %

## 2017-12-19 DIAGNOSIS — J06.9 UPPER RESPIRATORY INFECTION WITH COUGH AND CONGESTION: ICD-10-CM

## 2017-12-19 DIAGNOSIS — H66.92 ACUTE OTITIS MEDIA, LEFT: ICD-10-CM

## 2017-12-19 DIAGNOSIS — H66.91 ACUTE OTITIS MEDIA WITH PERFORATED TYMPANIC MEMBRANE, RIGHT: Primary | ICD-10-CM

## 2017-12-19 DIAGNOSIS — H72.91 ACUTE OTITIS MEDIA WITH PERFORATED TYMPANIC MEMBRANE, RIGHT: Primary | ICD-10-CM

## 2017-12-19 LAB
ANION GAP SERPL CALCULATED.3IONS-SCNC: 7 MMOL/L (ref 4–13)
APTT PPP: 71 SECONDS (ref 23–35)
ATRIAL RATE: 228 BPM
BASOPHILS # BLD AUTO: 0.02 THOUSANDS/ΜL (ref 0–0.1)
BASOPHILS NFR BLD AUTO: 0 % (ref 0–1)
BUN SERPL-MCNC: 13 MG/DL (ref 5–25)
CALCIUM SERPL-MCNC: 9 MG/DL (ref 8.3–10.1)
CHLORIDE SERPL-SCNC: 102 MMOL/L (ref 100–108)
CO2 SERPL-SCNC: 28 MMOL/L (ref 21–32)
CREAT SERPL-MCNC: 0.87 MG/DL (ref 0.6–1.3)
EOSINOPHIL # BLD AUTO: 0.01 THOUSAND/ΜL (ref 0–0.61)
EOSINOPHIL NFR BLD AUTO: 0 % (ref 0–6)
ERYTHROCYTE [DISTWIDTH] IN BLOOD BY AUTOMATED COUNT: 14 % (ref 11.6–15.1)
GFR SERPL CREATININE-BSD FRML MDRD: 64 ML/MIN/1.73SQ M
GLUCOSE SERPL-MCNC: 122 MG/DL (ref 65–140)
HCT VFR BLD AUTO: 41.9 % (ref 34.8–46.1)
HGB BLD-MCNC: 13.9 G/DL (ref 11.5–15.4)
INR PPP: 3.17 (ref 0.86–1.16)
LYMPHOCYTES # BLD AUTO: 0.76 THOUSANDS/ΜL (ref 0.6–4.47)
LYMPHOCYTES NFR BLD AUTO: 7 % (ref 14–44)
MCH RBC QN AUTO: 31.7 PG (ref 26.8–34.3)
MCHC RBC AUTO-ENTMCNC: 33.2 G/DL (ref 31.4–37.4)
MCV RBC AUTO: 96 FL (ref 82–98)
MONOCYTES # BLD AUTO: 0.91 THOUSAND/ΜL (ref 0.17–1.22)
MONOCYTES NFR BLD AUTO: 8 % (ref 4–12)
NEUTROPHILS # BLD AUTO: 9.92 THOUSANDS/ΜL (ref 1.85–7.62)
NEUTS SEG NFR BLD AUTO: 85 % (ref 43–75)
P AXIS: 267 DEGREES
PLATELET # BLD AUTO: 177 THOUSANDS/UL (ref 149–390)
PMV BLD AUTO: 9.2 FL (ref 8.9–12.7)
POTASSIUM SERPL-SCNC: 3.7 MMOL/L (ref 3.5–5.3)
PROTHROMBIN TIME: 32.7 SECONDS (ref 12.1–14.4)
QRS AXIS: -2 DEGREES
QRSD INTERVAL: 108 MS
QT INTERVAL: 356 MS
QTC INTERVAL: 490 MS
RBC # BLD AUTO: 4.38 MILLION/UL (ref 3.81–5.12)
SODIUM SERPL-SCNC: 137 MMOL/L (ref 136–145)
T WAVE AXIS: 263 DEGREES
TROPONIN I SERPL-MCNC: <0.02 NG/ML
VENTRICULAR RATE: 114 BPM
WBC # BLD AUTO: 11.62 THOUSAND/UL (ref 4.31–10.16)

## 2017-12-19 PROCEDURE — 84484 ASSAY OF TROPONIN QUANT: CPT | Performed by: PHYSICIAN ASSISTANT

## 2017-12-19 PROCEDURE — 85610 PROTHROMBIN TIME: CPT | Performed by: PHYSICIAN ASSISTANT

## 2017-12-19 PROCEDURE — 99284 EMERGENCY DEPT VISIT MOD MDM: CPT

## 2017-12-19 PROCEDURE — 93005 ELECTROCARDIOGRAM TRACING: CPT | Performed by: PHYSICIAN ASSISTANT

## 2017-12-19 PROCEDURE — 36415 COLL VENOUS BLD VENIPUNCTURE: CPT | Performed by: PHYSICIAN ASSISTANT

## 2017-12-19 PROCEDURE — 80048 BASIC METABOLIC PNL TOTAL CA: CPT | Performed by: PHYSICIAN ASSISTANT

## 2017-12-19 PROCEDURE — 85025 COMPLETE CBC W/AUTO DIFF WBC: CPT | Performed by: PHYSICIAN ASSISTANT

## 2017-12-19 PROCEDURE — 85730 THROMBOPLASTIN TIME PARTIAL: CPT | Performed by: PHYSICIAN ASSISTANT

## 2017-12-19 PROCEDURE — 71020 HB CHEST X-RAY 2VW FRONTAL&LATL: CPT

## 2017-12-19 RX ORDER — ACETAMINOPHEN 325 MG/1
650 TABLET ORAL ONCE
Status: COMPLETED | OUTPATIENT
Start: 2017-12-19 | End: 2017-12-19

## 2017-12-19 RX ORDER — ALBUTEROL SULFATE 90 UG/1
2 AEROSOL, METERED RESPIRATORY (INHALATION) EVERY 6 HOURS PRN
Qty: 1 INHALER | Refills: 0 | Status: SHIPPED | OUTPATIENT
Start: 2017-12-19 | End: 2018-06-16 | Stop reason: ALTCHOICE

## 2017-12-19 RX ORDER — FLUTICASONE PROPIONATE 50 MCG
2 SPRAY, SUSPENSION (ML) NASAL DAILY
Qty: 16 G | Refills: 0 | Status: SHIPPED | OUTPATIENT
Start: 2017-12-19 | End: 2018-06-16 | Stop reason: ALTCHOICE

## 2017-12-19 RX ORDER — IPRATROPIUM BROMIDE AND ALBUTEROL SULFATE 2.5; .5 MG/3ML; MG/3ML
3 SOLUTION RESPIRATORY (INHALATION) ONCE
Status: COMPLETED | OUTPATIENT
Start: 2017-12-19 | End: 2017-12-19

## 2017-12-19 RX ORDER — CEFUROXIME AXETIL 250 MG/1
250 TABLET ORAL EVERY 12 HOURS SCHEDULED
Qty: 20 TABLET | Refills: 0 | Status: SHIPPED | OUTPATIENT
Start: 2017-12-19 | End: 2017-12-29

## 2017-12-19 RX ADMIN — IPRATROPIUM BROMIDE AND ALBUTEROL SULFATE 3 ML: .5; 3 SOLUTION RESPIRATORY (INHALATION) at 12:39

## 2017-12-19 RX ADMIN — ACETAMINOPHEN 650 MG: 325 TABLET, FILM COATED ORAL at 14:18

## 2017-12-19 NOTE — DISCHARGE INSTRUCTIONS
Otitis Media   WHAT YOU NEED TO KNOW:   Otitis media is an ear infection  DISCHARGE INSTRUCTIONS:   Medicines:  · Ibuprofen or acetaminophen  helps decrease your pain and fever  They are available without a doctor's order  Ask your healthcare provider which medicine is right for you  Ask how much to take and how often to take it  These medicines can cause stomach bleeding if not taken correctly  Ibuprofen can cause kidney damage  Do not take ibuprofen if you have kidney disease, an ulcer, or allergies to aspirin  Acetaminophen can cause liver damage  Do not drink alcohol if you take acetaminophen  · Ear drops  help treat your ear pain  · Antibiotics  help treat a bacterial infection that caused your ear infection  · Take your medicine as directed  Contact your healthcare provider if you think your medicine is not helping or if you have side effects  Tell him or her if you are allergic to any medicine  Keep a list of the medicines, vitamins, and herbs you take  Include the amounts, and when and why you take them  Bring the list or the pill bottles to follow-up visits  Carry your medicine list with you in case of an emergency  Heat or ice:   · Heat  may be used to decrease your pain  Place a warm, moist washcloth on your ear  Apply for 15 to 20 minutes, 3 to 4 times a day    · Ice  helps decrease swelling and pain  Use an ice pack or put crushed ice in a plastic bag  Cover the ice pack with a towel and place it on your ear for 15 to 20 minutes, 3 to 4 times a day for 2 days  Prevent otitis media:   · Wash your hands often  Use soap and water  Wash your hands after you use the bathroom, change a child's diapers, or sneeze  Wash your hands before you prepare or eat food  · Stay away from people who are ill  Some germs are easily and quickly spread through contact  Return to work or school: You may return to work or school when your fever is gone     Follow up with your healthcare provider as directed:  Write down your questions so you remember to ask them during your visits  Contact your healthcare provider if:   · Your ear pain gets worse or does not go away, even after treatment  · The outside of your ear is red or swollen  You have vomiting or diarrhea  · You have fluid coming from your ear  · You have questions or concerns about your condition or care  Return to the emergency department if:   · You have a seizure  · You have a fever and a stiff neck  © 2017 [employer]  Information is for End User's use only and may not be sold, redistributed or otherwise used for commercial purposes  All illustrations and images included in CareNotes® are the copyrighted property of [employer]  or [employer]  The above information is an  only  It is not intended as medical advice for individual conditions or treatments  Talk to your doctor, nurse or pharmacist before following any medical regimen to see if it is safe and effective for you  Upper Respiratory Infection   WHAT YOU NEED TO KNOW:   An upper respiratory infection is also called the common cold  It is an infection that can affect your nose, throat, ears, and sinuses  For healthy people, the common cold is usually not serious and does not need special treatment  Cold symptoms are usually worst for the first 3 to 5 days  Most people get better in 7 to 14 days  You may continue to cough for 2 to 3 weeks  Colds are caused by viruses and do not get better with antibiotics  DISCHARGE INSTRUCTIONS:   Return to the emergency department if:   · You have chest pain or trouble breathing  Contact your healthcare provider if:   · You have a fever over 102ºF (39°C)  · Your sore throat gets worse or you see white or yellow spots in your throat  · Your symptoms get worse after 3 to 5 days or your cold is not better in 14 days  · You have a rash anywhere on your skin      · You have large, tender lumps in your neck  · You have thick, green or yellow drainage from your nose  · You cough up thick yellow, green, or bloody mucus  · You have vomiting for more than 24 hours and cannot keep fluids down  · You have a bad earache  · You have questions or concerns about your condition or care  Medicines: You may need any of the following:  · Decongestants  help reduce nasal congestion and help you breathe more easily  If you take decongestant pills, they may make you feel restless or cause problems with your sleep  Do not use decongestant sprays for more than a few days  · Cough suppressants  help reduce coughing  Ask your healthcare provider which type of cough medicine is best for you  · NSAIDs , such as ibuprofen, help decrease swelling, pain, and fever  NSAIDs can cause stomach bleeding or kidney problems in certain people  If you take blood thinner medicine, always ask your healthcare provider if NSAIDs are safe for you  Always read the medicine label and follow directions  · Acetaminophen  decreases pain and fever  It is available without a doctor's order  Ask how much to take and how often to take it  Follow directions  Read the labels of all other medicines you are using to see if they also contain acetaminophen, or ask your doctor or pharmacist  Acetaminophen can cause liver damage if not taken correctly  Do not use more than 4 grams (4,000 milligrams) total of acetaminophen in one day  · Take your medicine as directed  Contact your healthcare provider if you think your medicine is not helping or if you have side effects  Tell him or her if you are allergic to any medicine  Keep a list of the medicines, vitamins, and herbs you take  Include the amounts, and when and why you take them  Bring the list or the pill bottles to follow-up visits  Carry your medicine list with you in case of an emergency    Follow up with your healthcare provider as directed:  Write down your questions so you remember to ask them during your visits  Self-care:   · Rest as much as possible  Slowly start to do more each day  · Drink more liquids as directed  Liquids will help thin and loosen mucus so you can cough it up  Liquids will also help prevent dehydration  Liquids that help prevent dehydration include water, fruit juice, and broth  Do not drink liquids that contain caffeine  Caffeine can increase your risk for dehydration  Ask your healthcare provider how much liquid to drink each day  · Soothe a sore throat  Gargle with warm salt water  This helps your sore throat feel better  Make salt water by dissolving ¼ teaspoon salt in 1 cup warm water  You may also suck on hard candy or throat lozenges  You may use a sore throat spray  · Use a humidifier or vaporizer  Use a cool mist humidifier or a vaporizer to increase air moisture in your home  This may make it easier for you to breathe and help decrease your cough  · Use saline nasal drops as directed  These help relieve congestion  · Apply petroleum-based jelly around the outside of your nostrils  This can decrease irritation from blowing your nose  · Do not smoke  Nicotine and other chemicals in cigarettes and cigars can make your symptoms worse  They can also cause infections such as bronchitis or pneumonia  Ask your healthcare provider for information if you currently smoke and need help to quit  E-cigarettes or smokeless tobacco still contain nicotine  Talk to your healthcare provider before you use these products  Prevent spreading your cold to others:   · Try to stay away from other people during the first 2 to 3 days of your cold when it is more easily spread  · Do not share food or drinks  · Do not share hand towels with household members  · Wash your hands often, especially after you blow your nose  Turn away from other people and cover your mouth and nose with a tissue when you sneeze or cough  © 2017 [address] [facility] Information is for End User's use only and may not be sold, redistributed or otherwise used for commercial purposes  All illustrations and images included in CareNotes® are the copyrighted property of [employer]  or [employer] Kend[employer]  The above information is an  only  It is not intended as medical advice for individual conditions or treatments  Talk to your doctor, nurse or pharmacist before following any medical regimen to see if it is safe and effective for you

## 2017-12-20 NOTE — ED PROVIDER NOTES
History  Chief Complaint   Patient presents with    Earache     Pt reports she had a cough, "sneezing and runny nose"  and 2 days ago she started with bilateral ear pain  Pt reports shortly after they started draining blood  65 yr female with URI sx for nearly a week, now 2 days of bilateral ear pain and bloody drainage from both ears  Sneezing, blowing her nose, and coughing a lot  Did not take any meds this morning because she was on her way here for routine fasting bloodwork  Did not take her metoprolol  Is on warfarin  History provided by:  Patient, medical records and spouse  URI   Presenting symptoms: cough, ear pain and rhinorrhea    Presenting symptoms: no congestion, no facial pain, no fatigue, no fever and no sore throat    Cough:     Cough characteristics:  Productive    Sputum characteristics:  Nondescript    Severity:  Moderate    Duration:  4 days    Timing:  Constant    Progression:  Unchanged    Chronicity:  New  Ear pain:     Location:  Bilateral    Severity:  Moderate    Onset quality:  Gradual    Duration:  2 days    Timing:  Constant    Progression:  Worsening    Chronicity:  New  Rhinorrhea:     Quality:  Clear    Duration:  4 days    Timing:  Constant    Progression:  Unchanged  Severity:  Moderate  Duration:  4 days  Timing:  Constant  Progression:  Unchanged  Chronicity:  New  Relieved by:  Nothing  Worsened by:  Nothing  Ineffective treatments:  None tried  Associated symptoms: sneezing and wheezing    Associated symptoms: no arthralgias, no headaches and no myalgias    Risk factors: chronic cardiac disease and sick contacts        Prior to Admission Medications   Prescriptions Last Dose Informant Patient Reported? Taking? ALPRAZolam (XANAX) 0 25 mg tablet   Yes No   Sig: Take 0 25 mg by mouth 3 (three) times a day as needed for anxiety     CALCIUM PO   Yes No   Sig: Take 600 mg by mouth 2 (two) times a day     Multiple Vitamins-Minerals (PRESERVISION AREDS) CAPS   Yes No Sig: Take 1 capsule by mouth 2 (two) times a day   Omega-3 Fatty Acids (FISH OIL PO)   Yes No   Sig: Take 1,000 mg by mouth daily  Potassium Chloride Maddy CR (KLOR-CON M20 PO)   Yes No   Sig: Take 20 mEq by mouth daily  atorvastatin (LIPITOR) 20 mg tablet   Yes No   Sig: Take 20 mg by mouth daily after dinner  ergocalciferol (ERGOCALCIFEROL) 71709 UNITS capsule   Yes No   Sig: Take 50,000 Units by mouth once a week  Takes on Wednesdays   furosemide (LASIX) 20 mg tablet   Yes No   Sig: Take 20 mg by mouth daily  lisinopril (ZESTRIL) 10 mg tablet   Yes No   Sig: Take 10 mg by mouth daily  metoprolol tartrate (LOPRESSOR) 50 mg tablet   Yes No   Sig: Take 50 mg by mouth 2 (two) times a day   nitroglycerin (NITROSTAT) 0 4 mg SL tablet   Yes No   Sig: Place 0 4 mg under the tongue every 5 (five) minutes as needed for chest pain  ranitidine (ZANTAC) 300 MG capsule   Yes No   Sig: Take 300 mg by mouth daily     warfarin (COUMADIN) 2 mg tablet   Yes No   Sig: Take 2 mg by mouth daily      Facility-Administered Medications: None       Past Medical History:   Diagnosis Date    Cardiac disease     GERD (gastroesophageal reflux disease)     Hyperlipidemia     Hypertension        Past Surgical History:   Procedure Laterality Date    CARDIAC SURGERY      CATARACT EXTRACTION      CHOLECYSTECTOMY      COLONOSCOPY      COLONOSCOPY W/ CONTROL OF HEMORRHAGE      CORONARY ANGIOPLASTY WITH STENT PLACEMENT      HERNIA REPAIR      HYSTERECTOMY         History reviewed  No pertinent family history  I have reviewed and agree with the history as documented  Social History   Substance Use Topics    Smoking status: Former Smoker    Smokeless tobacco: Never Used    Alcohol use No        Review of Systems   Constitutional: Negative for activity change, appetite change, chills, diaphoresis, fatigue, fever and unexpected weight change     HENT: Positive for ear discharge, ear pain, postnasal drip, rhinorrhea and sneezing  Negative for congestion, nosebleeds, sinus pressure, sore throat, tinnitus, trouble swallowing and voice change  Eyes: Negative for pain, discharge and redness  Respiratory: Positive for cough and wheezing  Negative for chest tightness and shortness of breath  Cardiovascular: Negative for chest pain, palpitations and leg swelling  Gastrointestinal: Negative for abdominal pain, constipation, diarrhea, nausea and vomiting  Genitourinary: Negative for difficulty urinating, dysuria, flank pain, frequency, hematuria and urgency  Musculoskeletal: Negative for arthralgias, back pain and myalgias  Skin: Negative for color change, rash and wound  Allergic/Immunologic: Negative for immunocompromised state  Neurological: Negative for dizziness, tremors, syncope, weakness, light-headedness, numbness and headaches  Physical Exam  ED Triage Vitals [12/19/17 1153]   Temperature Pulse Respirations Blood Pressure SpO2   97 8 °F (36 6 °C) (!) 116 18 137/66 97 %      Temp Source Heart Rate Source Patient Position - Orthostatic VS BP Location FiO2 (%)   Temporal Monitor Sitting Left arm --      Pain Score       2           Orthostatic Vital Signs  Vitals:    12/19/17 1153 12/19/17 1300 12/19/17 1345 12/19/17 1449   BP: 137/66  160/70 142/87   Pulse: (!) 116 (!) 114 (!) 117 (!) 115   Patient Position - Orthostatic VS: Sitting   Lying       Physical Exam   Constitutional: She is oriented to person, place, and time  She appears well-developed and well-nourished  No distress  HENT:   Head: Normocephalic and atraumatic  Right Ear: External ear normal  There is drainage (bloody)  No tenderness  Tympanic membrane is injected, perforated and erythematous  Decreased hearing is noted  Left Ear: External ear normal  There is drainage (bloody)  No tenderness  Tympanic membrane is injected, erythematous and bulging  Tympanic membrane is not perforated  A middle ear effusion is present   Decreased hearing is noted    Nose: Mucosal edema and rhinorrhea present  No septal deviation or nasal septal hematoma  No epistaxis  Right sinus exhibits no maxillary sinus tenderness and no frontal sinus tenderness  Left sinus exhibits no maxillary sinus tenderness and no frontal sinus tenderness  Mouth/Throat: Uvula is midline, oropharynx is clear and moist and mucous membranes are normal  No oral lesions  No trismus in the jaw  No uvula swelling  No posterior oropharyngeal erythema  No tonsillar exudate  Eyes: Conjunctivae and EOM are normal  Pupils are equal, round, and reactive to light  Neck: Normal range of motion  Neck supple  No thyromegaly present  Cardiovascular: Normal rate, regular rhythm, normal heart sounds and intact distal pulses  No murmur heard  Pulmonary/Chest: Effort normal  No stridor  No respiratory distress  She has wheezes (scant expiratory coarse wheeze bilateral)  She has no rales  She exhibits no tenderness  Abdominal: Soft  Bowel sounds are normal    Musculoskeletal: She exhibits no edema or tenderness  Lymphadenopathy:     She has no cervical adenopathy  Neurological: She is alert and oriented to person, place, and time  Skin: Skin is warm and dry  Capillary refill takes less than 2 seconds  No rash noted  She is not diaphoretic  No erythema  Psychiatric: She has a normal mood and affect  Nursing note and vitals reviewed        ED Medications  Medications   ipratropium-albuterol (DUO-NEB) 0 5-2 5 mg/mL inhalation solution 3 mL (3 mL Nebulization Given 12/19/17 1239)   acetaminophen (TYLENOL) tablet 650 mg (650 mg Oral Given 12/19/17 1418)       Diagnostic Studies  Results Reviewed     Procedure Component Value Units Date/Time    Troponin I [[id number]]  (Normal) Collected:  12/19/17 1244    Lab Status:  Final result Specimen:  Blood from Arm, Right Updated:  12/19/17 1314     Troponin I <0 02 ng/mL     Narrative:         [employer] Chemistry analyzer 99% cutoff is > 0 04 ng/mL in network labs    o cTnI 99% cutoff is useful only when applied to patients in the clinical setting of myocardial ischemia  o cTnI 99% cutoff should be interpreted in the context of clinical history, ECG findings and possibly cardiac imaging to establish correct diagnosis  o cTnI 99% cutoff may be suggestive but clearly not indicative of a coronary event without the clinical setting of myocardial ischemia  Basic metabolic panel [[id number]] Collected:  12/19/17 1244    Lab Status:  Final result Specimen:  Blood from Arm, Right Updated:  12/19/17 1308     Sodium 137 mmol/L      Potassium 3 7 mmol/L      Chloride 102 mmol/L      CO2 28 mmol/L      Anion Gap 7 mmol/L      BUN 13 mg/dL      Creatinine 0 87 mg/dL      Glucose 122 mg/dL      Calcium 9 0 mg/dL      eGFR 64 ml/min/1 73sq m     Narrative:         National Kidney Disease Education Program recommendations are as follows:  GFR calculation is accurate only with a steady state creatinine  Chronic Kidney disease less than 60 ml/min/1 73 sq  meters  Kidney failure less than 15 ml/min/1 73 sq  meters  Protime-INR [[id number]]  (Abnormal) Collected:  12/19/17 1244    Lab Status:  Final result Specimen:  Blood from Arm, Right Updated:  12/19/17 1304     Protime 32 7 (H) seconds      INR 3 17 (H)    APTT [[id number]]  (Abnormal) Collected:  12/19/17 1244    Lab Status:  Final result Specimen:  Blood from Arm, Right Updated:  12/19/17 1304     PTT 71 (H) seconds     Narrative:          Therapeutic Heparin Range = 60-90 seconds    CBC and differential [[id number]]  (Abnormal) Collected:  12/19/17 1244    Lab Status:  Final result Specimen:  Blood from Arm, Right Updated:  12/19/17 1251     WBC 11 62 (H) Thousand/uL      RBC 4 38 Million/uL      Hemoglobin 13 9 g/dL      Hematocrit 41 9 %      MCV 96 fL      MCH 31 7 pg      MCHC 33 2 g/dL      RDW 14 0 %      MPV 9 2 fL      Platelets 341 Thousands/uL      Neutrophils Relative 85 (H) %      Lymphocytes Relative 7 (L) % Monocytes Relative 8 %      Eosinophils Relative 0 %      Basophils Relative 0 %      Neutrophils Absolute 9 92 (H) Thousands/µL      Lymphocytes Absolute 0 76 Thousands/µL      Monocytes Absolute 0 91 Thousand/µL      Eosinophils Absolute 0 01 Thousand/µL      Basophils Absolute 0 02 Thousands/µL                  XR chest 2 views   Final Result by GIANNA Vincent MD (12/19 6583)      No acute pulmonary disease, status post cardiac surgery  Calcific bursitis/tendinitis calcareous, right shoulder                Workstation performed: [id number]                    Procedures  ECG 12 Lead Documentation  Date/Time: 12/19/2017 12:29 PM  Performed by: Italia Hodgson  Authorized by: Italia Hodgson     Indications / Diagnosis:  Cough  ECG reviewed by me, the ED Provider: yes    Patient location:  ED  Previous ECG:     Previous ECG:  Compared to current    Comparison ECG info: Unchanged compared to previous 2 ekgs this year  warren aflutter and tachy aflutter compared abnormal ST/T segments same as today    Similarity:  No change  Rate:     ECG rate:  114  Rhythm:     Rhythm: atrial flutter    Ectopy:     Ectopy: none    QRS:     QRS axis:  Normal  Conduction:     Conduction: abnormal      Abnormal conduction: incomplete RBBB    ST segments:     ST segments:  Abnormal    Depression:  III, aVF, V6, V5, V4 and V3  T waves:     T waves: inverted      Inverted:  V3, V4, V6, V5, III and aVF           Phone Contacts  ED Phone Contact    ED Course  ED Course as of Dec 21 2001   Tue Dec 19, 2017   1254 WBC: (!) 11 62   1254 Hemoglobin: 13 9   1254 Hematocrit: 41 9   1254 Platelets: 563   6700 Troponin I: <0 02   1333 PTT: (!) 71   1333 Protime: (!) 32 7   1333 INR: (!) 3 17   1333 Sodium: 137   1333 Potassium: 3 7   1333 Chloride: 102   1333 CO2: 28   1333 Anion Gap: 7   1333 eGFR: 64   1333 Creatinine: 0 87   1354 Reviewed with radiologist dr Marcio Isabel- ap view ?  RLL infiltrate is likely breast tissue as lateral view are clear no infiltrate  XR chest 2 views   4047 Reassess pt feeling better since duoneb no wheezing on exam now  Less cough  Ears are starting to hurt requests tylenol  MDM  Number of Diagnoses or Management Options  Acute otitis media with perforated tympanic membrane, right: new and does not require workup  Acute otitis media, left: new and does not require workup  Upper respiratory infection with cough and congestion: new and requires workup  Diagnosis management comments: 65 yr female with URI/congestion treat symptomatically, tx otitis media with perforation PO antibiotics, avoid anything into the ear canal  Pt to have redraw INR tomorrow with the rest of her labs  F/u PCP and ENT  Pt instructed to take her home meds now  Elevated HR did not take her metoprolol         Amount and/or Complexity of Data Reviewed  Clinical lab tests: ordered and reviewed  Tests in the radiology section of CPT®: ordered and reviewed    Patient Progress  Patient progress: stable    CritCare Time    Disposition  Final diagnoses:   Acute otitis media with perforated tympanic membrane, right   Acute otitis media, left   Upper respiratory infection with cough and congestion     Time reflects when diagnosis was documented in both MDM as applicable and the Disposition within this note     Time User Action Codes Description Comment    12/19/2017  2:31 PM Guzman Blair [Q92 39,  H72 91] Acute otitis media with perforated tympanic membrane, right     12/19/2017  2:32 PM Laveta Lines Add [M37 045] Acute suppur left otitis media w/o spontan rupture tympanic membrane     12/19/2017  2:32 PM Laveta Lines Remove [H66 002] Acute suppur left otitis media w/o spontan rupture tympanic membrane     12/19/2017  2:32 PM Laveta Lines Add [H66 92] Acute otitis media, left     12/19/2017  2:32 PM Laveta Lines Add [J06 9] Upper respiratory infection with cough and congestion       ED Disposition     ED Disposition Condition Comment Discharge  Francisco Jesus discharge to home/self care  Condition at discharge: Good        Follow-up Information     Follow up With Specialties Details Why Contact Info    Annalisa Byrd MD Internal Medicine Schedule an appointment as soon as possible for a visit Seen in ER need followup for illness [address] [id number] [address] [facility] [address]      Los Gardner MD Otolaryngology, Plastic Surgery Schedule an appointment as soon as possible for a visit ENT followup [address] [facility] [address]  [phone]          Discharge Medication List as of 12/19/2017  2:36 PM      START taking these medications    Details   cefuroxime (CEFTIN) 250 mg tablet Take 1 tablet by mouth every 12 (twelve) hours for 10 days, Starting Tue 12/19/2017, Until Fri 12/29/2017, Normal      fluticasone (FLONASE) 50 mcg/act nasal spray 2 sprays into each nostril daily, Starting Tue 12/19/2017, Normal         CONTINUE these medications which have NOT CHANGED    Details   ALPRAZolam (XANAX) 0 25 mg tablet Take 0 25 mg by mouth 3 (three) times a day as needed for anxiety  , Until Discontinued, Historical Med      atorvastatin (LIPITOR) 20 mg tablet Take 20 mg by mouth daily after dinner   , Until Discontinued, Historical Med      CALCIUM PO Take 600 mg by mouth 2 (two) times a day  , Until Discontinued, Historical Med      ergocalciferol (ERGOCALCIFEROL) 35617 UNITS capsule Take 50,000 Units by mouth once a week  Takes on Wednesdays, Until Discontinued, Historical Med      furosemide (LASIX) 20 mg tablet Take 20 mg by mouth daily  , Until Discontinued, Historical Med      lisinopril (ZESTRIL) 10 mg tablet Take 10 mg by mouth daily  , Until Discontinued, Historical Med      metoprolol tartrate (LOPRESSOR) 50 mg tablet Take 50 mg by mouth 2 (two) times a day, Until Discontinued, Historical Med      Multiple Vitamins-Minerals (PRESERVISION AREDS) CAPS Take 1 capsule by mouth 2 (two) times a day, Until Discontinued, Historical Med      nitroglycerin (NITROSTAT) 0 4 mg SL tablet Place 0 4 mg under the tongue every 5 (five) minutes as needed for chest pain , Until Discontinued, Historical Med      Omega-3 Fatty Acids (FISH OIL PO) Take 1,000 mg by mouth daily  , Until Discontinued, Historical Med      Potassium Chloride Maddy CR (KLOR-CON M20 PO) Take 20 mEq by mouth daily  , Until Discontinued, Historical Med      ranitidine (ZANTAC) 300 MG capsule Take 300 mg by mouth daily  , Until Discontinued, Historical Med      warfarin (COUMADIN) 2 mg tablet Take 2 mg by mouth daily, Until Discontinued, Historical Med           No discharge procedures on file      ED Provider  Electronically Signed by           Fausto Santos PA-C  12/21/17 2002

## 2017-12-26 ENCOUNTER — APPOINTMENT (OUTPATIENT)
Dept: LAB | Facility: HOSPITAL | Age: 77
End: 2017-12-26
Payer: MEDICARE

## 2017-12-26 ENCOUNTER — TRANSCRIBE ORDERS (OUTPATIENT)
Dept: ADMINISTRATIVE | Facility: HOSPITAL | Age: 77
End: 2017-12-26

## 2017-12-26 DIAGNOSIS — I11.0 BENIGN HYPERTENSIVE HEART DISEASE WITH CONGESTIVE HEART FAILURE (HCC): ICD-10-CM

## 2017-12-26 DIAGNOSIS — I10 ESSENTIAL HYPERTENSION, MALIGNANT: ICD-10-CM

## 2017-12-26 DIAGNOSIS — Z79.899 HIGH RISK MEDICATIONS (NOT ANTICOAGULANTS) LONG-TERM USE: ICD-10-CM

## 2017-12-26 DIAGNOSIS — E55.9 VITAMIN D DEFICIENCY DISEASE: ICD-10-CM

## 2017-12-26 DIAGNOSIS — R74.8 ACID PHOSPHATASE ELEVATED: ICD-10-CM

## 2017-12-26 DIAGNOSIS — I25.10 ASCVD (ARTERIOSCLEROTIC CARDIOVASCULAR DISEASE): ICD-10-CM

## 2017-12-26 DIAGNOSIS — E03.9 HYPOTHYROIDISM, UNSPECIFIED TYPE: ICD-10-CM

## 2017-12-26 DIAGNOSIS — R60.9 EDEMA, UNSPECIFIED TYPE: ICD-10-CM

## 2017-12-26 DIAGNOSIS — Z79.899 NEED FOR PROPHYLACTIC CHEMOTHERAPY: ICD-10-CM

## 2017-12-26 LAB
25(OH)D3 SERPL-MCNC: 42.8 NG/ML (ref 30–100)
ALBUMIN SERPL BCP-MCNC: 3.4 G/DL (ref 3.5–5)
ALP SERPL-CCNC: 141 U/L (ref 46–116)
ALT SERPL W P-5'-P-CCNC: 22 U/L (ref 12–78)
ANION GAP SERPL CALCULATED.3IONS-SCNC: 8 MMOL/L (ref 4–13)
AST SERPL W P-5'-P-CCNC: 22 U/L (ref 5–45)
BILIRUB SERPL-MCNC: 0.6 MG/DL (ref 0.2–1)
BUN SERPL-MCNC: 21 MG/DL (ref 5–25)
CALCIUM SERPL-MCNC: 8.9 MG/DL (ref 8.3–10.1)
CHLORIDE SERPL-SCNC: 102 MMOL/L (ref 100–108)
CHOLEST SERPL-MCNC: 125 MG/DL (ref 50–200)
CO2 SERPL-SCNC: 30 MMOL/L (ref 21–32)
CREAT SERPL-MCNC: 1.05 MG/DL (ref 0.6–1.3)
ERYTHROCYTE [DISTWIDTH] IN BLOOD BY AUTOMATED COUNT: 13.7 % (ref 11.6–15.1)
EST. AVERAGE GLUCOSE BLD GHB EST-MCNC: 126 MG/DL
GFR SERPL CREATININE-BSD FRML MDRD: 51 ML/MIN/1.73SQ M
GLUCOSE P FAST SERPL-MCNC: 113 MG/DL (ref 65–99)
HBA1C MFR BLD: 6 % (ref 4.2–6.3)
HCT VFR BLD AUTO: 41.1 % (ref 34.8–46.1)
HDLC SERPL-MCNC: 54 MG/DL (ref 40–60)
HGB BLD-MCNC: 13.5 G/DL (ref 11.5–15.4)
LDLC SERPL CALC-MCNC: 45 MG/DL (ref 0–100)
MCH RBC QN AUTO: 31.6 PG (ref 26.8–34.3)
MCHC RBC AUTO-ENTMCNC: 32.8 G/DL (ref 31.4–37.4)
MCV RBC AUTO: 96 FL (ref 82–98)
NT-PROBNP SERPL-MCNC: 2579 PG/ML
PLATELET # BLD AUTO: 208 THOUSANDS/UL (ref 149–390)
PMV BLD AUTO: 9 FL (ref 8.9–12.7)
POTASSIUM SERPL-SCNC: 4.1 MMOL/L (ref 3.5–5.3)
PROT SERPL-MCNC: 6.8 G/DL (ref 6.4–8.2)
RBC # BLD AUTO: 4.27 MILLION/UL (ref 3.81–5.12)
SODIUM SERPL-SCNC: 140 MMOL/L (ref 136–145)
TRIGL SERPL-MCNC: 130 MG/DL
TSH SERPL DL<=0.05 MIU/L-ACNC: 2.13 UIU/ML (ref 0.36–3.74)
WBC # BLD AUTO: 6.87 THOUSAND/UL (ref 4.31–10.16)

## 2017-12-26 PROCEDURE — 83880 ASSAY OF NATRIURETIC PEPTIDE: CPT

## 2017-12-26 PROCEDURE — 84443 ASSAY THYROID STIM HORMONE: CPT

## 2017-12-26 PROCEDURE — 82306 VITAMIN D 25 HYDROXY: CPT

## 2017-12-26 PROCEDURE — 85027 COMPLETE CBC AUTOMATED: CPT

## 2017-12-26 PROCEDURE — 83036 HEMOGLOBIN GLYCOSYLATED A1C: CPT

## 2017-12-26 PROCEDURE — 86803 HEPATITIS C AB TEST: CPT

## 2017-12-26 PROCEDURE — 80061 LIPID PANEL: CPT

## 2017-12-26 PROCEDURE — 36415 COLL VENOUS BLD VENIPUNCTURE: CPT

## 2017-12-26 PROCEDURE — 80053 COMPREHEN METABOLIC PANEL: CPT

## 2017-12-27 LAB — HCV AB SER QL: NORMAL

## 2018-01-04 ENCOUNTER — GENERIC CONVERSION - ENCOUNTER (OUTPATIENT)
Dept: OTHER | Facility: OTHER | Age: 78
End: 2018-01-04

## 2018-01-04 ENCOUNTER — APPOINTMENT (OUTPATIENT)
Dept: LAB | Facility: HOSPITAL | Age: 78
End: 2018-01-04
Attending: INTERNAL MEDICINE
Payer: MEDICARE

## 2018-01-04 DIAGNOSIS — I48.3 TYPICAL ATRIAL FLUTTER (HCC): ICD-10-CM

## 2018-01-04 DIAGNOSIS — I50.33 ACUTE ON CHRONIC DIASTOLIC CONGESTIVE HEART FAILURE (HCC): ICD-10-CM

## 2018-01-04 DIAGNOSIS — I25.10 ATHEROSCLEROTIC HEART DISEASE OF NATIVE CORONARY ARTERY WITHOUT ANGINA PECTORIS: ICD-10-CM

## 2018-01-04 DIAGNOSIS — E78.00 PURE HYPERCHOLESTEROLEMIA: ICD-10-CM

## 2018-01-04 LAB
INR PPP: 3.4 (ref 0.86–1.16)
PROTHROMBIN TIME: 34.6 SECONDS (ref 12.1–14.4)

## 2018-01-04 PROCEDURE — 36415 COLL VENOUS BLD VENIPUNCTURE: CPT

## 2018-01-04 PROCEDURE — 85610 PROTHROMBIN TIME: CPT

## 2018-01-05 ENCOUNTER — GENERIC CONVERSION - ENCOUNTER (OUTPATIENT)
Dept: OTHER | Facility: OTHER | Age: 78
End: 2018-01-05

## 2018-01-09 NOTE — PROGRESS NOTES
REPORT NAME: Patient Visit Summary Report   VISIT DATE: 1/30/2017  VISIT TIME: 1:41 PM EST  PATIENT NAME: Yu Nair  MEDICAL RECORD NUMBER: [id number]  SOCIAL SECURITY NUMBER:   YOB: 1940  AGE: 67  REFERRING PHYSICIAN: Eulalio Omalley  SUPERVISING CLINICIAN: Jalyn Arnett CARE PROFESSIONAL: [facility]  PAT[facility], [address] [facility]  PATIENT HOME PHONE: [phone]  DIAGNOSIS 1: Unspecified atrial flutter / [id number]  DIAGNOSIS 2:   DIAGNOSIS 3:   DIAGNOSIS 4:   INR RANGE: 2 - 3  INR GOAL: 2 5  TREATMENT START DATE:   TREATMENT END DATE:   NEXT VISIT: 2/2/2017  [facility]    VISIT RESULTS   ENCOUNTER NUMBER:   TEST LOCATION:   TEST TYPE:   VISIT TYPE:   CURRENT INR: 1 46 PROTIME:   SPECIMEN COL AND RPT DATE: 1/30/2017 1:41 PM  EST    VITAL SIGNS  PULSE:  B/P:  WEIGHT:  HEIGHT:  TEMP:     CURRENT ANTICOAGULANT DOSING SCHEDULE  DOSE SIZE: 2mg    ANTICOAGULANT TYPE: WARFARIN SOD  DOSING REGIMEN  Sun       Mon       Tues      Wed       Thurs     Fri       Sat  Total/Wk  2         4         2         2         2         2         2         16    PATIENT MEDICATION INSTRUCTION: Yes  PATIENT NUTRITIONAL COUNSELING: No  PATIENT BRUISING INSTRUCTION: No      LAST EDUCATION DATE:       PREVIOUS VISIT INFORMATION  VISITDATE   INR Goal  INR   Sun     Mon     Tues    Wed     Thurs   Fri  Sat     Total/wk  1/30/2017   2 5       1 46  2       4       2       2       2       2  2       16  1/26/2017   2 5       1 47  2       2       2       2       2       2  2       14    ADDITIONAL PREVIOUS VISIT INFORMATION  VISITDATE   PRIMARY RX               DOSE      CrCl  1/30/2017   WARFARIN SOD             2mg                 1/26/2017   WARFARIN SOD             2mg                     OTHER CURRENT MEDICATIONS: WARFARIN SOD, WARFARIN SOD      PROGRESS NOTES: gave dosage to pt   retest inr on Thursday, feb 2,    PATIENT INSTRUCTIONS:     TEST LOCATION: Electronically signed by: [facility] on 1/30/2017 at 1:41 PM EST

## 2018-01-09 NOTE — PROGRESS NOTES
REPORT NAME: Progress Notes Report  VISIT DATE: 4/4/2017  VISIT TIME: 3:23 PM EDT  PATIENT NAME: Yu Nair  MEDICAL RECORD NUMBER: [id number]  YOB: 1940  AGE: 67  REFERRING PHYSICIAN: Eulalio Omalley  SUPERVISING CLINICIAN: Jalyn Arnett CARE PROVIDER: Rachael Obregon  PATIENT HOME ADDRESS: [address], [facility] [address] [facility]  PATIENT HOME PHONE: [phone]  SOCIAL SECURITY NUMBER:   DIAGNOSIS 1: Unspecified atrial flutter / [id number]  DIAGNOSIS 2:   INR RANGE: 2 - 3  INR GOAL: 2 5  TREATMENT START DATE:   TREATMENT END DATE:   NEXT VISIT: 4/18/2017  [facility]  VISIT RESULTS  ENCOUNTER NUMBER:   TEST LOCATION:   TEST TYPE:   VISIT TYPE:   CURRENT INR: 1 85 PROTIME:   SPECIMEN COL AND RPT DATE: 4/4/2017 3:23 PM  EDT  VITAL SIGNS  PULSE:  BP: / WEIGHT:  HEIGHT:  TEMP:   CURRENT ANTICOAGULANT DOSING SCHEDULE  DOSE SIZE: 2mg    ANTICOAGULANT TYPE: WARFARIN SOD  DOSING REGIMEN  Sun       Mon Tues Wed Thurs Fri       Sat  Total/Wk  2         4         2         4         2         4         4         22  PATIENT MEDICATION INSTRUCTION: No  PATIENT NUTRITIONAL COUNSELING: No  PATIENT BRUISING INSTRUCTION: No  LAST EDUCATION DATE:   PREVIOUS VISIT INFORMATION  VISITDATE  INRGoal INR   Sun    Mon    Tues   Wed    Thurs  Fri    Sat  Total/wk  4/4/2017    2 5     1 85  2      4      2      4      2      4      4  22  3/20/2017   2 5     2 07  2      4      2      4      2      4      2  20  3/1/2017    2 5     2 27  2      4      2      4      2      4      2  20  2/20/2017   2 5     3 2   2      4      2      2      2      4      2  18  ADDITIONAL PREVIOUS VISIT INFORMATION  VISITDATE   PRIMARY RX               DOSE      CrCl  4/4/2017    WARFARIN SOD             2mg  3/20/2017   WARFARIN SOD             2mg  3/1/2017    WARFARIN SOD             2mg  2/20/2017   WARFARIN SOD             2mg  OTHER CURRENT MEDICATIONS:  WARFARIN SOD, WARFARIN SOD  PROGRESS NOTES: l/m for pt, retest inr in 2 wks    PATIENT INSTRUCTIONS:   TEST LOCATION: , , ,   INBOUND LAB DATA:  Lab       Lab Value Col Date                 Rpt Date                 Lab  Reference Range  Electronically signed byNancy Benitez on 4/4/2017 3:23 PM EDT

## 2018-01-10 NOTE — PROGRESS NOTES
REPORT NAME: Progress Notes Report  VISIT DATE: 3/20/2017  VISIT TIME: 10:52 AM EDT  PATIENT NAME: Niya Escalante  MEDICAL RECORD NUMBER: [id number]  YOB: 1940  AGE: 67  REFERRING PHYSICIAN: Juan A Bennett  SUPERVISING CLINICIAN: Jalyn Arnett CARE PROVIDER: [facility]  PATIENT HOME ADDRESS: [address] [place] [address], [address] [facility]  PATIENT HOME PHONE: [phone]  SOCIAL SECURITY NUMBER:   DIAGNOSIS 1: Unspecified atrial flutter / [id number]  DIAGNOSIS 2:   INR RANGE: 2 - 3  INR GOAL: 2 5  TREATMENT START DATE:   TREATMENT END DATE:   NEXT VISIT: 4/3/2017  [facility]  VISIT RESULTS  ENCOUNTER NUMBER:   TEST LOCATION:   TEST TYPE:   VISIT TYPE:   CURRENT INR: 2 07 PROTIME:   SPECIMEN COL AND RPT DATE: 3/20/2017 10:52 AM  EDT  VITAL SIGNS  PULSE:  BP: / WEIGHT:  HEIGHT:  TEMP:   CURRENT ANTICOAGULANT DOSING SCHEDULE  DOSE SIZE: 2mg    ANTICOAGULANT TYPE: WARFARIN SOD  DOSING REGIMEN  Sun       Mon       Tues      Wed       Thurs     Fri       Sat  Total/Wk  2         4         2         4         2         4         2         20  PATIENT MEDICATION INSTRUCTION: Yes  PATIENT NUTRITIONAL COUNSELING: No  PATIENT BRUISING INSTRUCTION: No  LAST EDUCATION DATE:   PREVIOUS VISIT INFORMATION  VISITDATE  INRGoal INR   Sun    Mon    Tues   Wed    Thurs  Fri    Sat  Total/wk  3/20/2017   2 5     2 07  2      4      2      4      2      4      2  20  3/1/2017    2 5     2 27  2      4      2      4      2      4      2  20  2/20/2017   2 5     3 2   2      4      2      2      2      4      2  18  2/13/2017   2 5     2 7   2      4      2      4      2      4      2  20  ADDITIONAL PREVIOUS VISIT INFORMATION  VISITDATE   PRIMARY RX               DOSE      CrCl  3/20/2017   WARFARIN SOD             2mg  3/1/2017    WARFARIN SOD             2mg  2/20/2017   WARFARIN SOD             2mg  2/13/2017   WARFARIN SOD             2mg  OTHER CURRENT MEDICATIONS:  WARFARIN SOD, WARFARIN SOD  PROGRESS NOTES: gave dosage to pt  retest inr in 2 wks    PATIENT INSTRUCTIONS:   TEST LOCATION: , , ,   INBOUND LAB DATA:  Lab       Lab Value Col Date                 Rpt Date                 Lab  Reference Range  Electronically signed Ema Qureshi on 3/21/2017 10:52 AM EDT

## 2018-01-10 NOTE — PROGRESS NOTES
REPORT NAME: Progress Notes Report  VISIT DATE: 10/17/2017  VISIT TIME: 3:31 PM EDT  PATIENT NAME: Horace Astudillo  MEDICAL RECORD NUMBER: [id number]  YOB: 1940  AGE: 65  REFERRING PHYSICIAN: Poncho Ferreira  SUPERVISING CLINICIAN: Jalyn Arnett CARE PROVIDER: Mikala Lemons  PATIENT HOME ADDRESS: [address], [address] [facility]  PATIENT HOME PHONE: [phone]  SOCIAL SECURITY NUMBER:   DIAGNOSIS 1: Unspecified atrial flutter / [id number]  DIAGNOSIS 2:   INR RANGE: 2 - 3  INR GOAL: 2 5  TREATMENT START DATE:   TREATMENT END DATE:   NEXT VISIT: 10/31/2017  Rai Tolentino Cardiology  VISIT RESULTS  ENCOUNTER NUMBER:   TEST LOCATION:   TEST TYPE:   VISIT TYPE:   CURRENT INR: 1 89 PROTIME:   SPECIMEN COL AND RPT DATE: 10/17/2017 3:31 PM  EDT  VITAL SIGNS  PULSE:  BP: / WEIGHT:  HEIGHT:  TEMP:   CURRENT ANTICOAGULANT DOSING SCHEDULE  DOSE SIZE: 2mg    ANTICOAGULANT TYPE: WARFARIN SOD  DOSING REGIMEN  Sun       Mon       Tues      Wed       Thurs     Fri       Sat  Total/Wk  2         4         2         4         2         4         4         22  PATIENT MEDICATION INSTRUCTION: Yes  PATIENT NUTRITIONAL COUNSELING: No  PATIENT BRUISING INSTRUCTION: No  LAST EDUCATION DATE:   PREVIOUS VISIT INFORMATION  VISITDATE  INRGoal INR   Sun    Mon    Tues   Wed    Thurs  Fri    Sat  Total/wk  10/17/2017  2 5     1 89  2      4      2      4      2      4      4  22  9/26/2017   2 5     2 5   2      4      2      4      2      4      2  20  9/12/2017   2 5     3 3   2      4      2      2      2      4      4  20  8/15/2017   2 5     2 8   2      4      4      4      2      4      4  24  ADDITIONAL PREVIOUS VISIT INFORMATION  VISITDATE   PRIMARY RX               DOSE      CrCl  10/17/2017  WARFARIN SOD             2mg  9/26/2017   WARFARIN SOD             2mg  9/12/2017   WARFARIN SOD             2mg  8/15/2017   WARFARIN SOD             2mg  OTHER CURRENT MEDICATIONS:  WARFARIN SOD, WARFARIN SOD  PROGRESS NOTES: gave dosage to pt  retest inr in 2 wks  pt denies any  missed doses    PATIENT INSTRUCTIONS:   TEST LOCATION: , , ,   INBOUND LAB DATA:  Lab       Lab Value Col Date                 Rpt Date                 Lab  Reference Range  Electronically signed Asia Avina on 10/17/2017 3:31 PM EDT

## 2018-01-10 NOTE — RESULT NOTES
Verified Results  (1) PT WITH INR [id number] 11:28AM Patti Jones Order Number: [id number]     Test Name Result Flag Reference   INR 1 47 H 0 86-1 16   - Patient Instructions: INR WEEKLY OR AS ORDERED BY PHYSICIAN, ORDERS FOR JANUARY 19, 2017 TO JANUARY 19, 2018    - Patient Instructions: INR WEEKLY OR AS ORDERED BY PHYSICIAN, ORDERS FOR JANUARY 19, 2017 TO JANUARY 19, 2018   PT 17 3 seconds H 12 0-14 3

## 2018-01-10 NOTE — RESULT NOTES
Verified Results  (1) PT WITH INR 20Mar2017 12:36PM Manpreet Sellers Order Number: [id number]     Test Name Result Flag Reference   INR 2 07 H 0 86-1 16   - Patient Instructions: INR WEEKLY OR AS ORDERED BY PHYSICIAN, ORDERS FOR JANUARY 19, 2017 TO JANUARY 19, 2018    - Patient Instructions: INR WEEKLY OR AS ORDERED BY PHYSICIAN, ORDERS FOR JANUARY 19, 2017 TO JANUARY 19, 2018   PT 22 4 seconds H 12 0-14 3

## 2018-01-10 NOTE — PROGRESS NOTES
REPORT NAME: Patient Visit Summary Report   VISIT DATE: 3/1/2017  VISIT TIME: 1:30 PM EST  PATIENT NAME: Dalton Olson  MEDICAL RECORD NUMBER: [id number]  SOCIAL SECURITY NUMBER:   YOB: 1940  AGE: 67  REFERRING PHYSICIAN: Lina Tolentino  SUPERVISING CLINICIAN: Jalyn Arnett CARE PROFESSIONAL: Bethel Schneider  PATIENT HOME ADDRESS: [address], [facility] [address] [facility]  PATIENT HOME PHONE: [phone]  DIAGNOSIS 1: Unspecified atrial flutter / I48 [id number]  DIAGNOSIS 2:   DIAGNOSIS 3:   DIAGNOSIS 4:   INR RANGE: 2 - 3  INR GOAL: 2 5  TREATMENT START DATE:   TREATMENT END DATE:   NEXT VISIT: 3/15/2017  Silvino Jimenez Cardiology    VISIT RESULTS   ENCOUNTER NUMBER:   TEST LOCATION:   TEST TYPE:   VISIT TYPE:   CURRENT INR: 2 27 PROTIME:   SPECIMEN COL AND RPT DATE: 3/1/2017 1:30 PM  EST    VITAL SIGNS  PULSE:  B/P:  WEIGHT:  HEIGHT:  TEMP:     CURRENT ANTICOAGULANT DOSING SCHEDULE  DOSE SIZE: 2mg    ANTICOAGULANT TYPE: WARFARIN SOD  DOSING REGIMEN  Sun       Mon Tues Wed Thurs Fri       Sat  Total/Wk  2         4         2         4         2         4         2         20    PATIENT MEDICATION INSTRUCTION: Yes  PATIENT NUTRITIONAL COUNSELING: No  PATIENT BRUISING INSTRUCTION: No      LAST EDUCATION DATE:       PREVIOUS VISIT INFORMATION  VISITDATE   INR Goal  INR   Sun     Mon     Tues    Wed     Thurs   Fri  Sat     Total/wk  3/1/2017    2 5       2 27  2       4       2       4       2       4  2       20  2/20/2017   2 5       3 2   2       4       2       2       2       4  2       18  2/13/2017   2 5       2 7   2       4       2       4       2       4  2       20  2/6/2017    2 5       2 91  2       4       2       4       2       4  2       20    ADDITIONAL PREVIOUS VISIT INFORMATION  VISITDATE   PRIMARY RX               DOSE      CrCl  3/1/2017    WARFARIN SOD             2mg                 2/20/2017   WARFARIN SOD             2mg 2/13/2017   WARFARIN SOD             2mg                 2/6/2017    WARFARIN SOD             2mg                     OTHER CURRENT MEDICATIONS: WARFARIN SOD, WARFARIN SOD      PROGRESS NOTES: gave dosage to pt  retest inr in 2 wks      PATIENT INSTRUCTIONS:     TEST LOCATION:     Electronically signed by: Nadine Arteaga on 3/1/2017 at 1:30 PM EST

## 2018-01-11 ENCOUNTER — ALLSCRIPTS OFFICE VISIT (OUTPATIENT)
Dept: OTHER | Facility: OTHER | Age: 78
End: 2018-01-11

## 2018-01-11 NOTE — RESULT NOTES
Verified Results  (1) PT WITH INR [id number] 11:22AM Patti oJnes Order Number: [id number]     Test Name Result Flag Reference   INR 3 39 H 0 86-1 16   PT 34 5 seconds H 12 1-14 4

## 2018-01-12 VITALS
DIASTOLIC BLOOD PRESSURE: 86 MMHG | BODY MASS INDEX: 30.99 KG/M2 | SYSTOLIC BLOOD PRESSURE: 124 MMHG | WEIGHT: 186 LBS | HEIGHT: 65 IN | HEART RATE: 80 BPM

## 2018-01-12 NOTE — RESULT NOTES
Verified Results  (1) PT WITH INR [id number] 11:36AM Rafael Rocky Order Number: [id number]     Test Name Result Flag Reference   INR 2 59 H 0 86-1 16   - Patient Instructions: INR WEEKLY OR AS ORDERED BY PHYSICIAN, ORDERS FOR JANUARY 19, 2017 TO JANUARY 19, 2018   PT 26 5 seconds H 12 0-14 3

## 2018-01-12 NOTE — RESULT NOTES
Verified Results  (1) PT WITH INR [id number] 01:30PM Kimmy Rea Order Number: [id number]     Test Name Result Flag Reference   INR 1 89 H 0 86-1 16   PT 21 7 seconds H 12 1-14 4

## 2018-01-12 NOTE — RESULT NOTES
Verified Results  (1) PT WITH INR [id number] 09:14AM Anatoliy Valente Order Number: [id number]     Test Name Result Flag Reference   INR 2 27 H 0 86-1 16   - Patient Instructions: INR WEEKLY OR AS ORDERED BY PHYSICIAN, ORDERS FOR JANUARY 19, 2017 TO JANUARY 19, 2018    - Patient Instructions: INR WEEKLY OR AS ORDERED BY PHYSICIAN, ORDERS FOR JANUARY 19, 2017 TO JANUARY 19, 2018   PT 24 0 seconds H 12 0-14 3

## 2018-01-12 NOTE — PROGRESS NOTES
REPORT NAME: Progress Notes Report  VISIT DATE: 4/18/2017  VISIT TIME: 2:50 PM EDT  PATIENT NAME: Dalton Olson  MEDICAL RECORD NUMBER: [id number]  YOB: 1940  AGE: 67  REFERRING PHYSICIAN: Lina Tolentino  SUPERVISING CLINICIAN: Jalyn Arntet CARE PROVIDER: [facility]  PATIENT HOME ADDRESS: [address] [facility] [place], [facility] [address] [facility]  PATIENT HOME PHONE: [phone]  SOCIAL SECURITY NUMBER:   DIAGNOSIS 1: Unspecified atrial flutter / I48 [id number]  DIAGNOSIS 2:   INR RANGE: 2 - 3  INR GOAL: 2 5  TREATMENT START DATE:   TREATMENT END DATE:   NEXT VISIT: 5/2/2017  Silvino Jimenez Cardiology  VISIT RESULTS  ENCOUNTER NUMBER:   TEST LOCATION:   TEST TYPE:   VISIT TYPE:   CURRENT INR: 2 59 PROTIME:   SPECIMEN COL AND RPT DATE: 4/18/2017 2:50 PM  EDT  VITAL SIGNS  PULSE:  BP: / WEIGHT:  HEIGHT:  TEMP:   CURRENT ANTICOAGULANT DOSING SCHEDULE  DOSE SIZE: 2mg    ANTICOAGULANT TYPE: WARFARIN SOD  DOSING REGIMEN  Sun       Mon Tues Wed Thurs Fri       Sat  Total/Wk  2         4         2         4         2         4         4         22  PATIENT MEDICATION INSTRUCTION: No  PATIENT NUTRITIONAL COUNSELING: No  PATIENT BRUISING INSTRUCTION: No  LAST EDUCATION DATE:   PREVIOUS VISIT INFORMATION  VISITDATE  INRGoal INR   Sun    Mon    Tues   Wed    Thurs  Fri    Sat  Total/wk  4/18/2017   2 5     2 59  2      4      2      4      2      4      4  22  4/4/2017    2 5     1 85  2      4      2      4      2      4      4  22  3/20/2017   2 5     2 07  2      4      2      4      2      4      2  20  3/1/2017    2 5     2 27  2      4      2      4      2      4      2  20  ADDITIONAL PREVIOUS VISIT INFORMATION  VISITDATE   PRIMARY RX               DOSE      CrCl  4/18/2017   WARFARIN SOD             2mg  4/4/2017    WARFARIN SOD             2mg  3/20/2017   WARFARIN SOD             2mg  3/1/2017    WARFARIN SOD             2mg  OTHER CURRENT MEDICATIONS:  WARFARIN SOD, WARFARIN SOD  PROGRESS NOTES: l/m for pt  retest inr in 2 wks    PATIENT INSTRUCTIONS:   TEST LOCATION: , , ,   INBOUND LAB DATA:  Lab       Lab Value Col Date                 Rpt Date                 Lab  Reference Range  Electronically signed byGregory Jj on 4/18/2017 2:50 PM EDT

## 2018-01-13 VITALS
HEIGHT: 65 IN | WEIGHT: 186 LBS | BODY MASS INDEX: 30.99 KG/M2 | HEART RATE: 64 BPM | DIASTOLIC BLOOD PRESSURE: 90 MMHG | SYSTOLIC BLOOD PRESSURE: 130 MMHG

## 2018-01-13 VITALS
WEIGHT: 189 LBS | BODY MASS INDEX: 31.49 KG/M2 | HEART RATE: 82 BPM | HEIGHT: 65 IN | DIASTOLIC BLOOD PRESSURE: 62 MMHG | SYSTOLIC BLOOD PRESSURE: 122 MMHG

## 2018-01-13 VITALS
SYSTOLIC BLOOD PRESSURE: 136 MMHG | DIASTOLIC BLOOD PRESSURE: 73 MMHG | HEART RATE: 97 BPM | HEIGHT: 65 IN | BODY MASS INDEX: 31.89 KG/M2 | WEIGHT: 191.38 LBS | TEMPERATURE: 97.6 F

## 2018-01-13 VITALS
DIASTOLIC BLOOD PRESSURE: 105 MMHG | HEIGHT: 65 IN | TEMPERATURE: 96.6 F | BODY MASS INDEX: 30.49 KG/M2 | WEIGHT: 183 LBS | HEART RATE: 97 BPM | SYSTOLIC BLOOD PRESSURE: 141 MMHG

## 2018-01-13 NOTE — RESULT NOTES
Verified Results  (1) PT WITH INR [id number] 10:25AM Shalini Menjivar Order Number: [id number]     Test Name Result Flag Reference   INR 2 51 H 0 86-1 16   PT 27 2 seconds H 12 1-14 4

## 2018-01-13 NOTE — RESULT NOTES
Verified Results  (1) PT WITH INR [id number] 04:50PM Luli Ibanezsola Order Number: [id number]     Test Name Result Flag Reference   INR 2 23 H 0 86-1 16   PT 24 8 seconds H 12 1-14 4

## 2018-01-13 NOTE — RESULT NOTES
Verified Results  (1) PT WITH INR [id number] 10:29AM Manpreet Verito Order Number: [id number]     Test Name Result Flag Reference   INR 2 91 H 0 86-1 16   - Patient Instructions: INR WEEKLY OR AS ORDERED BY PHYSICIAN, ORDERS FOR JANUARY 19, 2017 TO JANUARY 19, 2018    - Patient Instructions: INR WEEKLY OR AS ORDERED BY PHYSICIAN, ORDERS FOR JANUARY 19, 2017 TO JANUARY 19, 2018   PT 29 0 seconds H 12 0-14 3

## 2018-01-13 NOTE — PROGRESS NOTES
REPORT NAME: Progress Notes Report  VISIT DATE: 6/27/2017  VISIT TIME: 3:24 PM EDT  PATIENT NAME: Joshua Delgado  MEDICAL RECORD NUMBER: [id number]  YOB: 1940  AGE: 67  REFERRING PHYSICIAN: Grisel Wilson  SUPERVISING CLINICIAN: Jalyn Arnett CARE PROVIDER: [facility]  PATIENT HOME ADDRESS: [address] [facility], [address] [facility], [facility] [address] [facility]  PATIENT HOME PHONE: [phone]  SOCIAL SECURITY NUMBER:   DIAGNOSIS 1: Unspecified atrial flutter / I48 [id number]  DIAGNOSIS 2:   INR RANGE: 2 - 3  INR GOAL: 2 5  TREATMENT START DATE:   TREATMENT END DATE:   NEXT VISIT: 7/18/2017  Alex Higgins Cardiology  VISIT RESULTS  ENCOUNTER NUMBER:   TEST LOCATION:   TEST TYPE:   VISIT TYPE:   CURRENT INR: 2 35 PROTIME:   SPECIMEN COL AND RPT DATE: 6/27/2017 3:24 PM  EDT  VITAL SIGNS  PULSE:  BP: / WEIGHT:  HEIGHT:  TEMP:   CURRENT ANTICOAGULANT DOSING SCHEDULE  DOSE SIZE: 2mg    ANTICOAGULANT TYPE: WARFARIN SOD  DOSING REGIMEN  Sun       Mon Tues Wed Thurs Fri       Sat  Total/Wk  2         4         4         4         2         4         4         24  PATIENT MEDICATION INSTRUCTION: Yes  PATIENT NUTRITIONAL COUNSELING: No  PATIENT BRUISING INSTRUCTION: No  LAST EDUCATION DATE:   PREVIOUS VISIT INFORMATION  VISITDATE  INRGoal INR   Sun    Mon    Tues   Wed    Thurs  Fri    Sat  Total/wk  6/27/2017   2 5     2 35  2      4      4      4      2      4      4  24  6/13/2017   2 5     2 1   2      4      4      4      2      4      4  24  5/30/2017   2 5     1 77  2      4      4      4      2      4      4  24  5/9/2017    2 5     2 7   2      4      2      4      2      4      4  22  ADDITIONAL PREVIOUS VISIT INFORMATION  VISITDATE   PRIMARY RX               DOSE      CrCl  6/27/2017   WARFARIN SOD             2mg  6/13/2017   WARFARIN SOD             2mg  5/30/2017   WARFARIN SOD             2mg  5/9/2017    WARFARIN SOD             2mg  OTHER CURRENT MEDICATIONS:  WARFARIN SOD, WARFARIN SOD  PROGRESS NOTES: gave dosage to pt, retest inr in 3 wks    PATIENT INSTRUCTIONS:   TEST LOCATION: , , ,   INBOUND LAB DATA:  Lab       Lab Value Col Date                 Rpt Date                 Lab  Reference Range  Electronically signed byEric Canada on 6/27/2017 3:24 PM EDT

## 2018-01-15 ENCOUNTER — GENERIC CONVERSION - ENCOUNTER (OUTPATIENT)
Dept: OTHER | Facility: OTHER | Age: 78
End: 2018-01-15

## 2018-01-15 ENCOUNTER — APPOINTMENT (OUTPATIENT)
Dept: LAB | Facility: HOSPITAL | Age: 78
End: 2018-01-15
Attending: INTERNAL MEDICINE
Payer: MEDICARE

## 2018-01-15 ENCOUNTER — TRANSCRIBE ORDERS (OUTPATIENT)
Dept: ADMINISTRATIVE | Facility: HOSPITAL | Age: 78
End: 2018-01-15

## 2018-01-15 DIAGNOSIS — E78.00 PURE HYPERCHOLESTEROLEMIA: ICD-10-CM

## 2018-01-15 DIAGNOSIS — I48.3 TYPICAL ATRIAL FLUTTER (HCC): ICD-10-CM

## 2018-01-15 DIAGNOSIS — I48.3 TYPICAL ATRIAL FLUTTER (HCC): Primary | ICD-10-CM

## 2018-01-15 DIAGNOSIS — I25.10 ATHEROSCLEROTIC HEART DISEASE OF NATIVE CORONARY ARTERY WITHOUT ANGINA PECTORIS: ICD-10-CM

## 2018-01-15 DIAGNOSIS — I50.33 ACUTE ON CHRONIC DIASTOLIC CONGESTIVE HEART FAILURE (HCC): ICD-10-CM

## 2018-01-15 LAB
ANION GAP SERPL CALCULATED.3IONS-SCNC: 8 MMOL/L (ref 4–13)
BUN SERPL-MCNC: 27 MG/DL (ref 5–25)
CALCIUM SERPL-MCNC: 9.3 MG/DL (ref 8.3–10.1)
CHLORIDE SERPL-SCNC: 99 MMOL/L (ref 100–108)
CO2 SERPL-SCNC: 28 MMOL/L (ref 21–32)
CREAT SERPL-MCNC: 0.94 MG/DL (ref 0.6–1.3)
GFR SERPL CREATININE-BSD FRML MDRD: 58 ML/MIN/1.73SQ M
GLUCOSE SERPL-MCNC: 111 MG/DL (ref 65–140)
INR PPP: 2.57 (ref 0.86–1.16)
NT-PROBNP SERPL-MCNC: 1508 PG/ML
POTASSIUM SERPL-SCNC: 3.4 MMOL/L (ref 3.5–5.3)
PROTHROMBIN TIME: 27.7 SECONDS (ref 12.1–14.4)
SODIUM SERPL-SCNC: 135 MMOL/L (ref 136–145)

## 2018-01-15 PROCEDURE — 85610 PROTHROMBIN TIME: CPT

## 2018-01-15 PROCEDURE — 80048 BASIC METABOLIC PNL TOTAL CA: CPT

## 2018-01-15 PROCEDURE — 83880 ASSAY OF NATRIURETIC PEPTIDE: CPT

## 2018-01-15 PROCEDURE — 36415 COLL VENOUS BLD VENIPUNCTURE: CPT

## 2018-01-15 NOTE — RESULT NOTES
Verified Results  (1) PT WITH INR [id number] 11:16AM Tameka Harmon Order Number: [id number]     Test Name Result Flag Reference   INR 2 72 H 0 86-1 16   PT 29 0 seconds H 12 1-14 4

## 2018-01-15 NOTE — RESULT NOTES
Verified Results  (1) PT WITH INR [id number] 09:29AM Priti Quinn Order Number: [id number]     Test Name Result Flag Reference   INR 2 12 H 0 86-1 16   PT 23 8 seconds H 12 1-14 4

## 2018-01-15 NOTE — RESULT NOTES
Verified Results  (1) PT WITH INR [id number] 11:57AM Marshall Jarrett Order Number: [id number]     Test Name Result Flag Reference   INR 2 72 H 0 86-1 16   PT 29 0 seconds H 12 1-14 4

## 2018-01-15 NOTE — RESULT NOTES
Verified Results  (1) PT WITH INR [id number] 11:05AM Dav Bautistaw Order Number: [id number]     Test Name Result Flag Reference   INR 1 46 H 0 86-1 16   - Patient Instructions: INR WEEKLY OR AS ORDERED BY PHYSICIAN, ORDERS FOR JANUARY 19, 2017 TO JANUARY 19, 2018    - Patient Instructions: INR WEEKLY OR AS ORDERED BY PHYSICIAN, ORDERS FOR JANUARY 19, 2017 TO JANUARY 19, 2018   PT 17 2 seconds H 12 0-14 3

## 2018-01-15 NOTE — RESULT NOTES
Verified Results  (1) PT WITH INR [id number] 10:20AM Adry Prasad Order Number: [id number]     Test Name Result Flag Reference   INR 1 46 H 0 86-1 16   - Patient Instructions: INR WEEKLY OR AS ORDERED BY PHYSICIAN, ORDERS FOR JANUARY 19, 2017 TO JANUARY 19, 2018    - Patient Instructions: INR WEEKLY OR AS ORDERED BY PHYSICIAN, ORDERS FOR JANUARY 19, 2017 TO JANUARY 19, 2018   PT 17 2 seconds H 12 0-14 3

## 2018-01-16 NOTE — PROGRESS NOTES
REPORT NAME: Patient Visit Summary Report   VISIT DATE: 2/13/2017  VISIT TIME: 2:37 PM EST  PATIENT NAME: Clarice Wilcox  MEDICAL RECORD NUMBER: [id number]  SOCIAL SECURITY NUMBER:   YOB: 1940  AGE: 67  REFERRING PHYSICIAN: Morna Halsted  SUPERVISING CLINICIAN: Jalyn Arnett CARE PROFESSIONAL: William Rice  PATIENT HOME ADDRESS: [address]  PATIENT HOME PHONE: [phone]  DIAGNOSIS 1: Unspecified atrial flutter / I48 [id number]  DIAGNOSIS 2:   DIAGNOSIS 3:   DIAGNOSIS 4:   INR RANGE: 2 - 3  INR GOAL: 2 5  TREATMENT START DATE:   TREATMENT END DATE:   NEXT VISIT: 2/20/2017  Jennifer Hewitt Cardiology    VISIT RESULTS   ENCOUNTER NUMBER:   TEST LOCATION:   TEST TYPE:   VISIT TYPE:   CURRENT INR: 2 7 PROTIME:   SPECIMEN COL AND RPT DATE: 2/13/2017 2:37 PM  EST    VITAL SIGNS  PULSE:  B/P:  WEIGHT:  HEIGHT:  TEMP:     CURRENT ANTICOAGULANT DOSING SCHEDULE  DOSE SIZE: 2mg    ANTICOAGULANT TYPE: WARFARIN SOD  DOSING REGIMEN  Sun       Mon Tues Wed Thurs Fri       Sat  Total/Wk  2         4         2         4         2         4         2         20    PATIENT MEDICATION INSTRUCTION: Yes  PATIENT NUTRITIONAL COUNSELING: No  PATIENT BRUISING INSTRUCTION: No      LAST EDUCATION DATE:       PREVIOUS VISIT INFORMATION  VISITDATE   INR Goal  INR   Sun     Mon     Tues    Wed     Thurs   Fri  Sat     Total/wk  2/13/2017   2 5       2 7   2       4       2       4       2       4  2       20  2/6/2017    2 5       2 91  2       4       2       4       2       4  2       20  2/2/2017    2 5       1 46  4       4       2       2       4       4  4       24  1/30/2017   2 5       1 46  2       4       2       2       2       2  2       16    ADDITIONAL PREVIOUS VISIT INFORMATION  VISITDATE   PRIMARY RX               DOSE      CrCl  2/13/2017   WARFARIN SOD             2mg                 2/6/2017    WARFARIN SOD             2mg 2/2/2017    WARFARIN SOD             2mg                 1/30/2017   WARFARIN SOD             2mg                     OTHER CURRENT MEDICATIONS: WARFARIN SOD, WARFARIN SOD      PROGRESS NOTES: gave dosage to pt  retest inr in 1 wks      PATIENT INSTRUCTIONS:     TEST LOCATION:     Electronically signed by: [facility] on 2/13/2017 at 2:37 PM EST

## 2018-01-16 NOTE — PROGRESS NOTES
Assessment   Assessed    1  Acute on chronic diastolic congestive heart failure (428 33,428 0) (I50 33)   2  Arteriosclerotic coronary artery disease (414 00) (I25 10)   3  Pulmonary hypertension, secondary (416 8)   4  Shortness of breath (786 05) (R06 02)   5  Orthopnea (786 02) (R06 01)    Discussion/Summary   Cardiology Discussion Summary Free Text Note Form [facility]:    I will increase her Lasix to 40 mg BID and add Zaroxolyn 2 5 mg to be taken every Sunday  I will also check a BMP and BNP and an ECHO and then see her back in the office  If her symptoms cannot be controlled medically, I will consider an ischemic evaluation  Chief Complaint   Chief Complaint Free Text Note Form: SOB, orthopnea      History of Present Illness   Cardiology HPI Free Text Note Form  [facility]: Her weight has been slowly going up on her home scale  2 weeks ago she was 178 LBS and today she is 185 LBS  She c/o worsening BOB and orthopnea over the past few weeks  She denies CP or chest pressure  She has CAD with CABG in 2008  She is presently taking Lasix 20 mg daily  BP is controlled  She is gett ing some LE edema  Review of Systems   Cardiology Female ROS:         Cardiac: as noted in HPI  Skin: No complaints of nonhealing sores or skin rash  Genitourinary: No complaints of recurrent urinary tract infections, frequent urination at night, difficult urination, blood in urine, kidney stones, loss of bladder control, kidney problems, denies any birth control or hormone replacement, is not post menopausal, not currently pregnant  Psychological: No complaints of feeling depressed, anxiety, panic attacks, or difficulty concentrating  General: No complaints of trouble sleeping, lack of energy, fatigue, appetite changes, weight changes, fever, frequent infections, or night sweats  Respiratory: as noted in HPI        HEENT: No complaints of serious problems, hearing problems, nose problems, throat problems, or snoring  Gastrointestinal: No complaints of liver problems, nausea, vomiting, heartburn, constipation, bloody stools, diarrhea, problems swallowing, adbominal pain, or rectal bleeding  Hematologic: No complaints of bleeding disorders, anemia, blood clots, or excessive brusing  Neurological: No complaints of numbness, tingling, dizziness, weakness, seizures, headaches, syncope or fainting, AM fatigue, daytime sleepiness, no witnessed apnea episodes  Musculoskeletal: No complaints of arthritis, back pain, or painfull swelling  ROS Reviewed:    ROS reviewed  Active Problems   Problems    1  Arteriosclerotic coronary artery disease (414 00) (I25 10)   2  Arthritis (716 90) (M19 90)   3  Chest tightness or pressure (786 59) (R07 89)   4  Hemorrhoids, external (455 3) (K64 4)   5  Hemorrhoids, internal (455 0) (K64 8)   6  Hiatal hernia (553 3) (K44 9)   7  Hypercholesterolemia (272 0) (E78 00)   8  Hypertension (401 9) (I10)   9  Hypokalemia (276 8) (E87 6)   10  Pulmonary hypertension, secondary (416 8)   11  Shortness of breath (786 05) (R06 02)   12  Typical atrial flutter (427 32) (I48 3)    Past Medical History   Problems    1  History of Pulmonary Embolism (V12 55)  Active Problems And Past Medical History Reviewed: The active problems and past medical history were reviewed and updated today  Surgical History   Problems    1  History of Bladder Procedures   2  CABG   3  History of Cath Stent Placement   4  History of Cholecystectomy   5  History of Eye Surgery   6  History of Hysterectomy  Surgical History Reviewed: The surgical history was reviewed and updated today  Family History   Mother    1  Family history of Coronary Artery Disease (V17 49)  Father    2  Family history of Cancer  Sister    3  Family history of Arthritis (V17 7)  Family History    4  Family history of Hypertension (V17 49)   5   Family history of Pure Hypercholesterolemia  Family History Reviewed: The family history was reviewed and updated today  Social History   Problems    · Denied: History of Alcohol Use (History)   · Denied: History of Drug Use   · Former smoker (V15 82) (E73 256)   · Marital History - Currently    · Denied: History of Tobacco Use  Social History Reviewed: The social history was reviewed and updated today  The social history was reviewed and is unchanged  Current Meds    1  ALPRAZolam 0 25 MG Oral Tablet; TAKE 1 TABLET EVERY 12 HOURS AS NEEDED; Therapy: (Recorded:09Oct2014) to Recorded   2  Atorvastatin Calcium 20 MG Oral Tablet; TAKE 1 TABLET DAILY AS DIRECTED; Therapy: (Recorded:09Oct2014) to Recorded   3  Calcium 600 600 MG Oral Tablet; TAKE 2 TABLETS DAILY; Therapy: (KMROPHAS:38LMU7073) to Recorded   4  Furosemide 20 MG Oral Tablet; Take one tablet twice daily  Requested for: 42Cjn2370;     Last Rx:33Sjx7847 Ordered   5  Klor-Con M20 20 MEQ Oral Tablet Extended Release; take 1 tablet daily with food; Therapy: 81FCJ4889 to (Patricia Ya)  Requested for: 86Wkx7840; Last     Rx:01Sep2017 Ordered   6  Lisinopril-Hydrochlorothiazide 10-12 5 MG Oral Tablet; TAKE 1 TABLET DAILY; Therapy: (Recorded:03May2017) to Recorded   7  MetOLazone 2 5 MG Oral Tablet; take 1 tab weekly or as directed for weight gain; Therapy: 94FLZ4519 to (Evaluate:30Oct2017)  Requested for: 33SIT4773; Last     Rx:03May2017 Ordered   8  Metoprolol Tartrate 50 MG Oral Tablet; TAKE  (1)  TABLET  EVERY TWELVE HOURS      Requested for: 25GHD6225; Last Rx:02Nov2017 Ordered   9  Nitroglycerin 0 4 MG Sublingual Tablet Sublingual;     Therapy: 44WKM2825 to (Last Rx:08Mar2010)  Requested for: 45NVE4860 Ordered   10  PreserVision AREDS Oral Capsule; bid; Therapy: (450 39 173) to Recorded   11  RaNITidine HCl - 300 MG Oral Tablet; TAKE 1 TABLET DAILY; Therapy: (ELWLDGKV:45KYR4230) to Recorded   12  Vitamin D 55298 UNIT CAPS; TAKE 1 CAPSULE WEEKLY;       Therapy: (Samreen Bhandari) to Recorded   13  Warfarin Sodium 2 MG Oral Tablet; take 1 to 2  tablets daily by mouth or as ordered by      physician; Therapy: 39VCJ2893 to (Evaluate:41Rta7690)  Requested for: 62CWZ1306; Last      Rx:68Icq4893 Ordered  Medication List Reviewed: The medication list was reviewed and updated today  Allergies   Medication    1  Codeine Derivatives   2  Morphine Derivatives    Vitals   Vital Signs    Recorded: 01EMP3728 12:43PM   Heart Rate 80   Systolic 718   Diastolic 82   Height 5 ft 5 in   Weight 183 lb 6 oz   BMI Calculated 30 52   BSA Calculated 1 91     Physical Exam        Constitutional      General appearance: No acute distress, well appearing and well nourished  Eyes      Conjunctiva and Sclera examination: Conjunctiva pink, sclera anicteric  Ears, Nose, Mouth, and Throat - Oropharynx: Clear, nares are clear, mucous membranes are moist       Neck      Neck and thyroid: Normal, supple, trachea midline, no thyromegaly  Pulmonary      Respiratory effort: No increased work of breathing or signs of respiratory distress  Auscultation of lungs: Abnormal  -- rales at the bases b/l  Cardiovascular      Auscultation of heart: Normal rate and rhythm, normal S1 and S2, no murmurs  Carotid pulses: Normal, 2+ bilaterally  Peripheral vascular exam: Normal pulses throughout, no tenderness, erythema or swelling  Pedal pulses: Normal, 2+ bilaterally  Examination of extremities for edema and/or varicosities: Normal        Abdomen      Abdomen: Non-tender and no distention  Liver and spleen: No hepatomegaly or splenomegaly  Musculoskeletal Gait and station: Normal gait  -- Digits and nails: Normal without clubbing or cyanosis  -- Inspection/palpation of joints, bones, and muscles: Normal, ROM normal        Skin - Skin and subcutaneous tissue: Normal without rashes or lesions  Skin is warm and well perfused, normal turgor  Neurologic - Cranial nerves: II - XII intact  -- Speech: Normal        Psychiatric - Orientation to person, place, and time: Normal -- Mood and affect: Normal       Future Appointments      Date/Time Provider Specialty Site   02/08/2018 03:00 PM BRENIE Reno   Cardiology [address]     Signatures    Electronically signed by : BERNIE Reynoso ; Blayne 15 2018 10:00AM EST                       (Author)

## 2018-01-16 NOTE — PROGRESS NOTES
REPORT NAME: Patient Visit Summary Report   VISIT DATE: 2/6/2017  VISIT TIME: 12:31 PM EST  PATIENT NAME: Clarice Wilcox  MEDICAL RECORD NUMBER: [id number]  SOCIAL SECURITY NUMBER:   YOB: 1940  AGE: 67  REFERRING PHYSICIAN: Morna Halsted  SUPERVISING CLINICIAN: Jalyn Arnett CARE PROFESSIONAL: [facility]  PATIENT HOME ADDRESS: [place] [address], [place], [facility] [address] [facility]  PATIENT HOME PHONE: [phone]  DIAGNOSIS 1: Unspecified atrial flutter / I48 92  DIAGNOSIS 2:   DIAGNOSIS 3:   DIAGNOSIS 4:   INR RANGE: 2 - 3  INR GOAL: 2 5  TREATMENT START DATE:   TREATMENT END DATE:   NEXT VISIT: 2/13/2017  [facility]    VISIT RESULTS   ENCOUNTER NUMBER:   TEST LOCATION:   TEST TYPE:   VISIT TYPE:   CURRENT INR: 2 91 PROTIME:   SPECIMEN COL AND RPT DATE: 2/6/2017 12:31 PM  EST    VITAL SIGNS  PULSE:  B/P:  WEIGHT:  HEIGHT:  TEMP:     CURRENT ANTICOAGULANT DOSING SCHEDULE  DOSE SIZE: 2mg    ANTICOAGULANT TYPE: WARFARIN SOD  DOSING REGIMEN  Sun       Mon Tues Wed Thurs Fri       Sat  Total/Wk  2         4         2         4         2         4         2         20    PATIENT MEDICATION INSTRUCTION: Yes  PATIENT NUTRITIONAL COUNSELING: No  PATIENT BRUISING INSTRUCTION: No      LAST EDUCATION DATE:       PREVIOUS VISIT INFORMATION  VISITDATE   INR Goal  INR   Sun     Mon     Tues    Wed     Thurs   Fri  Sat     Total/wk  2/6/2017    2 5       2 91  2       4       2       4       2       4  2       20  2/2/2017    2 5       1 46  4       4       2       2       4       4  4       24  1/30/2017   2 5       1 46  2       4       2       2       2       2  2       16  1/26/2017   2 5       1 47  2       2       2       2       2       2  2       14    ADDITIONAL PREVIOUS VISIT INFORMATION  VISITDATE   PRIMARY RX               DOSE      CrCl  2/6/2017    WARFARIN SOD             2mg                 2/2/2017    WARFARIN SOD             2mg 1/30/2017   WARFARIN SOD             2mg                 1/26/2017   WARFARIN SOD             2mg                     OTHER CURRENT MEDICATIONS: WARFARIN SOD, WARFARIN SOD      PROGRESS NOTES: gave dosage to pt  retest inr in 1 wk      PATIENT INSTRUCTIONS:     TEST LOCATION:     Electronically signed by: [facility] on 2/6/2017 at 12:31 PM EST

## 2018-01-16 NOTE — PROGRESS NOTES
REPORT NAME: Patient Visit Summary Report   VISIT DATE: 2/2/2017  VISIT TIME: 4:17 PM EST  PATIENT NAME: Richard Crews  MEDICAL RECORD NUMBER: [id number]  SOCIAL SECURITY NUMBER:   YOB: 1940  AGE: 67  REFERRING PHYSICIAN: Ryan Jimenez  SUPERVISING CLINICIAN: Jalyn Arnett CARE PROFESSIONAL: [facility]  PATIENT HOME ADDRESS: [place] [address], [address] [facility], [address] [place] [address]  PATIENT HOME PHONE: [phone]  DIAGNOSIS 1: Unspecified atrial flutter / I48 92  DIAGNOSIS 2:   DIAGNOSIS 3:   DIAGNOSIS 4:   INR RANGE: 2 - 3  INR GOAL: 2 5  TREATMENT START DATE:   TREATMENT END DATE:   NEXT VISIT: 2/6/2017  [facility] Cardiology    VISIT RESULTS   ENCOUNTER NUMBER:   TEST LOCATION:   TEST TYPE:   VISIT TYPE:   CURRENT INR: 1 46 PROTIME:   SPECIMEN COL AND RPT DATE: 2/2/2017 4:17 PM  EST    VITAL SIGNS  PULSE:  B/P:  WEIGHT:  HEIGHT:  TEMP:     CURRENT ANTICOAGULANT DOSING SCHEDULE  DOSE SIZE: 2mg    ANTICOAGULANT TYPE: WARFARIN SOD  DOSING REGIMEN  Sun       Mon Tues Wed Thurs Fri       Sat  Total/Wk  4         4         2         2         4         4         4         24    PATIENT MEDICATION INSTRUCTION: Yes  PATIENT NUTRITIONAL COUNSELING: No  PATIENT BRUISING INSTRUCTION: No      LAST EDUCATION DATE:       PREVIOUS VISIT INFORMATION  VISITDATE   INR Goal  INR   Sun     Mon     Tues    Wed     Thurs   Fri  Sat     Total/wk  2/2/2017    2 5       1 46  4       4       2       2       4       4  4       24  1/30/2017   2 5       1 46  2       4       2       2       2       2  2       16  1/26/2017   2 5       1 47  2       2       2       2       2       2  2       14    ADDITIONAL PREVIOUS VISIT INFORMATION  VISITDATE   PRIMARY RX               DOSE      CrCl  2/2/2017    WARFARIN SOD             2mg                 1/30/2017   WARFARIN SOD             2mg                 1/26/2017   WARFARIN SOD             2mg                     OTHER CURRENT MEDICATIONS: WARFARIN SOD, WARFARIN SOD      PROGRESS NOTES: gave dosage to pt  retest inr on Monday      PATIENT INSTRUCTIONS:     TEST LOCATION:     Electronically signed byMarrian Leventhal on 2/2/2017 at 4:17 PM EST

## 2018-01-16 NOTE — RESULT NOTES
Verified Results  (1) PT WITH INR [id number] 11:58AM Josue Agee Order Number: [id number]     Test Name Result Flag Reference   INR 2 70 H 0 86-1 16   - Patient Instructions: INR WEEKLY OR AS ORDERED BY PHYSICIAN, ORDERS FOR JANUARY 19, 2017 TO JANUARY 19, 2018    - Patient Instructions: INR WEEKLY OR AS ORDERED BY PHYSICIAN, ORDERS FOR JANUARY 19, 2017 TO JANUARY 19, 2018   PT 27 4 seconds H 12 0-14 3

## 2018-01-16 NOTE — RESULT NOTES
Verified Results  (1) PT WITH INR [id number] 10:43AM Rayanathanael LandersIna Order Number: [id number]     Test Name Result Flag Reference   INR 3 21 H 0 86-1 16   - Patient Instructions: INR WEEKLY OR AS ORDERED BY PHYSICIAN, ORDERS FOR JANUARY 19, 2017 TO JANUARY 19, 2018    - Patient Instructions: INR WEEKLY OR AS ORDERED BY PHYSICIAN, ORDERS FOR JANUARY 19, 2017 TO JANUARY 19, 2018   PT 31 2 seconds H 12 0-14 3

## 2018-01-16 NOTE — PROGRESS NOTES
REPORT NAME: Patient Visit Summary Report   VISIT DATE: 1/26/2017  VISIT TIME: 2:31 PM EST  PATIENT NAME: Sofie Solomon  MEDICAL RECORD NUMBER: [id number]  SOCIAL SECURITY NUMBER:   YOB: 1940  AGE: 67  REFERRING PHYSICIAN: Aron Leavitt  SUPERVISING CLINICIAN: Jalyn Arnett CARE PROFESSIONAL: [facility]  PATIENT HOME ADDRESS: [place] [address], [address] [facility], [address] [place] [address]  PATIENT HOME PHONE: [phone]  DIAGNOSIS 1: Unspecified atrial flutter / I48 92  DIAGNOSIS 2:   DIAGNOSIS 3:   DIAGNOSIS 4:   INR RANGE: 2 - 3  INR GOAL: 2 5  TREATMENT START DATE:   TREATMENT END DATE:   NEXT VISIT: 1/30/2017  [facility]    VISIT RESULTS   ENCOUNTER NUMBER:   TEST LOCATION:   TEST TYPE:   VISIT TYPE:   CURRENT INR: 1 47 PROTIME:   SPECIMEN COL AND RPT DATE: 1/26/2017 2:31 PM  EST    VITAL SIGNS  PULSE:  B/P:  WEIGHT:  HEIGHT:  TEMP:     CURRENT ANTICOAGULANT DOSING SCHEDULE  DOSE SIZE: 2mg    ANTICOAGULANT TYPE: WARFARIN SOD  DOSING REGIMEN  Sun       Mon Tues Wed Thurs Fri       Sat  Total/Wk  2         2         2         2         2         2         2         14    PATIENT MEDICATION INSTRUCTION: Yes  PATIENT NUTRITIONAL COUNSELING: No  PATIENT BRUISING INSTRUCTION: No      LAST EDUCATION DATE:       PREVIOUS VISIT INFORMATION  VISITDATE   INR Goal  INR   Sun     Mon     Tues    Wed     Thurs   Fri  Sat     Total/wk  1/26/2017   2 5       1 47  2       2       2       2       2       2  2       14    ADDITIONAL PREVIOUS VISIT INFORMATION  VISITDATE   PRIMARY RX               DOSE      CrCl  1/26/2017   WARFARIN SOD             2mg                     OTHER CURRENT MEDICATIONS: WARFARIN SOD, WARFARIN SOD      PROGRESS NOTES: l/m for pt  pt to continue 2 5mg daily and retest inr on  Monday jan 30   ---- Additional Notes entered on 1/27/2017 10:21 AM EST by ROSLYN PRESLEY :  PT WILL BE USING A 2 MG TABLET    PATIENT INSTRUCTIONS:     TEST LOCATION: Electronically signed by: Isac Luciano on 1/27/2017 at 10:21 AM EST

## 2018-01-16 NOTE — RESULT NOTES
Verified Results  (1) PT WITH INR [id number] 11:31AM Magalene Smoker Order Number: [id number]     Test Name Result Flag Reference   INR 2 81 H 0 86-1 16   PT 29 8 seconds H 12 1-14 4

## 2018-01-17 NOTE — RESULT NOTES
Verified Results  (1) PT WITH INR [id number] 01:25PM [facility] Order Number: [id number]     Test Name Result Flag Reference   INR 1 85 H 0 86-1 16   - Patient Instructions: INR WEEKLY OR AS ORDERED BY PHYSICIAN, ORDERS FOR JANUARY 19, 2017 TO JANUARY 19, 2018    - Patient Instructions: INR WEEKLY OR AS ORDERED BY PHYSICIAN, ORDERS FOR JANUARY 19, 2017 TO JANUARY 19, 2018   PT 20 6 seconds H 12 0-14 3

## 2018-01-17 NOTE — RESULT NOTES
Verified Results  (1) PT WITH INR [id number] 11:46AM Nia Hernandez Order Number: [id number]     Test Name Result Flag Reference   INR 2 35 H 0 86-1 16   PT 25 8 seconds H 12 1-14 4

## 2018-01-17 NOTE — RESULT NOTES
Verified Results  (1) PT WITH INR [id number] 12:21PM Evern Mcburney Order Number: [id number]     Test Name Result Flag Reference   INR 2 75 H 0 86-1 16   PT 29 2 seconds H 12 1-14 4

## 2018-01-18 NOTE — RESULT NOTES
Verified Results  (1) PT WITH INR [id number] 11:38AM [facility] Order Number: [id number]     Test Name Result Flag Reference   INR 1 77 H 0 86-1 16   PT 20 6 seconds H 12 1-14 4

## 2018-01-19 ENCOUNTER — HOSPITAL ENCOUNTER (OUTPATIENT)
Dept: ULTRASOUND IMAGING | Facility: HOSPITAL | Age: 78
Discharge: HOME | End: 2018-01-19
Payer: MEDICARE

## 2018-01-19 ENCOUNTER — GENERIC CONVERSION - ENCOUNTER (OUTPATIENT)
Dept: OTHER | Facility: OTHER | Age: 78
End: 2018-01-19

## 2018-01-19 ENCOUNTER — HOSPITAL ENCOUNTER (OUTPATIENT)
Dept: NON INVASIVE DIAGNOSTICS | Facility: HOSPITAL | Age: 78
Discharge: HOME | End: 2018-01-19
Payer: MEDICARE

## 2018-01-19 DIAGNOSIS — E03.9 HYPOTHYROIDISM, UNSPECIFIED TYPE: ICD-10-CM

## 2018-01-19 DIAGNOSIS — Z79.899 HIGH RISK MEDICATIONS (NOT ANTICOAGULANTS) LONG-TERM USE: ICD-10-CM

## 2018-01-19 DIAGNOSIS — R74.8 ACID PHOSPHATASE ELEVATED: ICD-10-CM

## 2018-01-19 DIAGNOSIS — R60.9 EDEMA, UNSPECIFIED TYPE: ICD-10-CM

## 2018-01-19 DIAGNOSIS — E55.9 VITAMIN D DEFICIENCY DISEASE: ICD-10-CM

## 2018-01-19 DIAGNOSIS — I11.0 BENIGN HYPERTENSIVE HEART DISEASE WITH CONGESTIVE HEART FAILURE (HCC): ICD-10-CM

## 2018-01-19 DIAGNOSIS — I10 ESSENTIAL HYPERTENSION, MALIGNANT: ICD-10-CM

## 2018-01-19 DIAGNOSIS — I25.10 ASCVD (ARTERIOSCLEROTIC CARDIOVASCULAR DISEASE): ICD-10-CM

## 2018-01-19 DIAGNOSIS — Z79.899 NEED FOR PROPHYLACTIC CHEMOTHERAPY: ICD-10-CM

## 2018-01-19 PROCEDURE — 93306 TTE W/DOPPLER COMPLETE: CPT

## 2018-01-19 PROCEDURE — 93880 EXTRACRANIAL BILAT STUDY: CPT

## 2018-01-20 ENCOUNTER — ANTICOAG VISIT (OUTPATIENT)
Dept: OTHER | Facility: HOSPITAL | Age: 78
End: 2018-01-20

## 2018-01-20 DIAGNOSIS — I48.92 ATRIAL FLUTTER, UNSPECIFIED TYPE (HCC): ICD-10-CM

## 2018-01-22 ENCOUNTER — TRANSCRIBE ORDERS (OUTPATIENT)
Dept: ADMINISTRATIVE | Facility: HOSPITAL | Age: 78
End: 2018-01-22

## 2018-01-22 ENCOUNTER — APPOINTMENT (OUTPATIENT)
Dept: LAB | Facility: HOSPITAL | Age: 78
End: 2018-01-22
Attending: INTERNAL MEDICINE
Payer: MEDICARE

## 2018-01-22 DIAGNOSIS — R06.02 SHORTNESS OF BREATH: ICD-10-CM

## 2018-01-22 LAB
ANION GAP SERPL CALCULATED.3IONS-SCNC: 10 MMOL/L (ref 4–13)
BUN SERPL-MCNC: 21 MG/DL (ref 5–25)
CALCIUM SERPL-MCNC: 8.9 MG/DL (ref 8.3–10.1)
CHLORIDE SERPL-SCNC: 99 MMOL/L (ref 100–108)
CO2 SERPL-SCNC: 29 MMOL/L (ref 21–32)
CREAT SERPL-MCNC: 1.18 MG/DL (ref 0.6–1.3)
GFR SERPL CREATININE-BSD FRML MDRD: 44 ML/MIN/1.73SQ M
GLUCOSE SERPL-MCNC: 109 MG/DL (ref 65–140)
POTASSIUM SERPL-SCNC: 3.4 MMOL/L (ref 3.5–5.3)
SODIUM SERPL-SCNC: 138 MMOL/L (ref 136–145)

## 2018-01-22 PROCEDURE — 80048 BASIC METABOLIC PNL TOTAL CA: CPT

## 2018-01-22 PROCEDURE — 36415 COLL VENOUS BLD VENIPUNCTURE: CPT

## 2018-01-23 VITALS
BODY MASS INDEX: 30.55 KG/M2 | DIASTOLIC BLOOD PRESSURE: 82 MMHG | HEART RATE: 80 BPM | HEIGHT: 65 IN | SYSTOLIC BLOOD PRESSURE: 132 MMHG | WEIGHT: 183.38 LBS

## 2018-01-23 NOTE — RESULT NOTES
Verified Results  (1) PT WITH INR [id number] 10:41AM Otilia Michael Order Number: [id number]     Test Name Result Flag Reference   INR 2 57 H 0 86-1 16   PT 27 7 seconds H 12 1-14 4

## 2018-01-23 NOTE — PROGRESS NOTES
REPORT NAME: Progress Notes Report  VISIT DATE: 12/12/2017  VISIT TIME: 3:14 PM EST  PATIENT NAME: Sloan Rodgers  MEDICAL RECORD NUMBER: [id number]  YOB: 1940  AGE: 65  REFERRING PHYSICIAN: Candace Severance  SUPERVISING CLINICIAN: Jalyn Arnett CARE PROVIDER: Nataliya Sparks  PATIENT HOME ADDRESS: [address] [facility], [place], [address] [facility] [address] [facility]  PATIENT HOME PHONE: [phone]  SOCIAL SECURITY NUMBER:   DIAGNOSIS 1: Unspecified atrial flutter / I48 92  DIAGNOSIS 2:   INR RANGE: 2 - 3  INR GOAL: 2 5  TREATMENT START DATE:   TREATMENT END DATE:   NEXT VISIT: 1/9/2018  [facility]  VISIT RESULTS  ENCOUNTER NUMBER:   TEST LOCATION:   TEST TYPE:   VISIT TYPE:   CURRENT INR: 2 59 PROTIME:   SPECIMEN COL AND RPT DATE: 12/12/2017 3:14 PM  EST  VITAL SIGNS  PULSE:  BP: / WEIGHT:  HEIGHT:  TEMP:   CURRENT ANTICOAGULANT DOSING SCHEDULE  DOSE SIZE: 2mg    ANTICOAGULANT TYPE: WARFARIN SOD  DOSING REGIMEN  Sun       Mon Tues Wed Thurs Fri       Sat  Total/Wk  2         4         2         4         2         4         4         22  PATIENT MEDICATION INSTRUCTION: Yes  PATIENT NUTRITIONAL COUNSELING: No  PATIENT BRUISING INSTRUCTION: No  LAST EDUCATION DATE:   PREVIOUS VISIT INFORMATION  VISITDATE  INRGoal INR   Sun    Mon    Tues   Wed    Thurs  Fri    Sat  Total/wk  12/12/2017  2 5     2 59  2      4      2      4      2      4      4  22  11/14/2017  2 5     2 2   2      4      2      4      2      4      4  22  10/31/2017  2 5     2 7   2      4      2      4      2      4      4  22  10/17/2017  2 5     1 89  2      4      2      4      2      4      4  22  ADDITIONAL PREVIOUS VISIT INFORMATION  VISITDATE   PRIMARY RX               DOSE      CrCl  12/12/2017  WARFARIN SOD             2mg  11/14/2017  WARFARIN SOD             2mg  10/31/2017  WARFARIN SOD             2mg  10/17/2017  WARFARIN SOD             2mg  OTHER CURRENT MEDICATIONS:  WARFARIN SOD  PROGRESS NOTES: gave dosage to pt  retest inr in 4 wks    PATIENT INSTRUCTIONS:   TEST LOCATION: , , ,   INBOUND LAB DATA:  Lab       Lab Value Col Date                 Rpt Date                 Lab  Reference Range  Electronically signed Edda Yang on 12/12/2017 3:14 PM EST

## 2018-01-23 NOTE — PROGRESS NOTES
REPORT NAME: Progress Notes Report  VISIT DATE: 1/15/2018  VISIT TIME: 12:40 PM EST  PATIENT NAME: Sarabjit Crum  MEDICAL RECORD NUMBER: [id number]  YOB: 1940  AGE: 65  REFERRING PHYSICIAN: Dong Huffman  SUPERVISING CLINICIAN: Jalyn Arnett CARE PROVIDER: [facility]  PATIENT HOME ADDRESS: [address] [facility], [place], [facility] [address] [facility]  PATIENT HOME PHONE: [phone]  SOCIAL SECURITY NUMBER:   DIAGNOSIS 1: Unspecified atrial flutter / I48 92  DIAGNOSIS 2:   INR RANGE: 2 - 3  INR GOAL: 2 5  TREATMENT START DATE:   TREATMENT END DATE:   NEXT VISIT: 2/5/2018  [facility]  VISIT RESULTS  ENCOUNTER NUMBER:   TEST LOCATION:   TEST TYPE:   VISIT TYPE:   CURRENT INR: 2 57 PROTIME:   SPECIMEN COL AND RPT DATE: 1/15/2018 12:40 PM  EST  VITAL SIGNS  PULSE:  BP: / WEIGHT:  HEIGHT:  TEMP:   CURRENT ANTICOAGULANT DOSING SCHEDULE  DOSE SIZE: 2mg    ANTICOAGULANT TYPE: WARFARIN SOD  DOSING REGIMEN  Sun       Mon Tues Wed Thurs Fri       Sat  Total/Wk  2         4         2         4         2         4         4         22  PATIENT MEDICATION INSTRUCTION: Yes  PATIENT NUTRITIONAL COUNSELING: No  PATIENT BRUISING INSTRUCTION: No  LAST EDUCATION DATE:   PREVIOUS VISIT INFORMATION  VISITDATE  INRGoal INR   Sun    Mon    Tues   Wed    Thurs  Fri    Sat  Total/wk  1/15/2018   2 5     2 57  2      4      2      4      2      4      4  22  1/4/2018    2 5     3 4   2      4      2      4      HOLD   4      4  20  2      4      2      4      2      4      4  22  12/12/2017  2 5     2 59  2      4      2      4      2      4      4  22  11/14/2017  2 5     2 2   2      4      2      4      2      4      4  22  ADDITIONAL PREVIOUS VISIT INFORMATION  VISITDATE   PRIMARY RX               DOSE      CrCl  1/15/2018   WARFARIN SOD             2mg  1/4/2018    WARFARIN SOD             2mg  12/12/2017  WARFARIN SOD             2mg  11/14/2017  WARFARIN SOD 2mg  OTHER CURRENT MEDICATIONS:  WARFARIN SOD  PROGRESS NOTES: gave dosage to pt  retest inr in 3 wks    PATIENT INSTRUCTIONS:   TEST LOCATION: , , ,   INBOUND LAB DATA:  Lab       Lab Value Col Date                 Rpt Date                 Lab  Reference Range  Electronically signed byJair Leal on 1/15/2018 12:40 PM EST

## 2018-01-23 NOTE — PROGRESS NOTES
REPORT NAME: Progress Notes Report  VISIT DATE: 1/4/2018  VISIT TIME: 2:46 PM EST  PATIENT NAME: Sincere Manning  MEDICAL RECORD NUMBER: [id number]  YOB: 1940  AGE: 65  REFERRING PHYSICIAN: Daniella Mckeon  SUPERVISING CLINICIAN: Jalyn Arnett CARE PROVIDER: [facility]  PAT[place] [address] [facility] [place] [address], [facility], [address] [facility]  PATIENT HOME PHONE: [phone]  SOCIAL SECURITY NUMBER:   DIAGNOSIS 1: Unspecified atrial flutter / [id number]  DIAGNOSIS 2:   INR RANGE: 2 - 3  INR GOAL: 2 5  TREATMENT START DATE:   TREATMENT END DATE:   NEXT VISIT: 1/18/2018  [address] Cardiology  VISIT RESULTS  ENCOUNTER NUMBER:   TEST LOCATION:   TEST TYPE:   VISIT TYPE:   CURRENT INR: 3 4 PROTIME:   SPECIMEN COL AND RPT DATE: 1/4/2018 2:46 PM EST  VITAL SIGNS  PULSE:  BP: / WEIGHT:  HEIGHT:  TEMP:   CURRENT ANTICOAGULANT DOSING SCHEDULE  DOSE SIZE: 2mg    ANTICOAGULANT TYPE: WARFARIN SOD  DOSING REGIMEN  Sun       Mon       Tues      Wed       Thurs     Fri       Sat  Total/Wk  2         4         2         4         HOLD      4         4         20  2         4         2         4         2         4         4         22  PATIENT MEDICATION INSTRUCTION: Yes  PATIENT NUTRITIONAL COUNSELING: No  PATIENT BRUISING INSTRUCTION: No  LAST EDUCATION DATE:   PREVIOUS VISIT INFORMATION  VISITDATE  INRGoal INR   Sun    Mon    Tues   Wed    Thurs  Fri    Sat  Total/wk  1/4/2018    2 5     3 4   2      4      2      4      HOLD   4      4  20  2      4      2      4      2      4      4  22  12/12/2017  2 5     2 59  2      4      2      4      2      4      4  22  11/14/2017  2 5     2 2   2      4      2      4      2      4      4  22  10/31/2017  2 5     2 7   2      4      2      4      2      4      4  22  ADDITIONAL PREVIOUS VISIT INFORMATION  VISITDATE   PRIMARY RX               DOSE      CrCl  1/4/2018    WARFARIN SOD             2mg  12/12/2017  WARFARIN SOD             2mg  11/14/2017 WARFARIN SOD             2mg  10/31/2017  WARFARIN SOD             2mg  OTHER CURRENT MEDICATIONS:  WARFARIN SOD  PROGRESS NOTES: gave dosage to pt, pt had a cold virus and an ear  infection  pt to hold dose tonight and  retest inr in 2 wks    PATIENT INSTRUCTIONS:   TEST LOCATION: , , ,   INBOUND LAB DATA:  Lab       Lab Value Col Date                 Rpt Date                 Lab  Reference Range  Electronically signed byDevonte Batista on 1/4/2018 2:46 PM EST

## 2018-01-23 NOTE — RESULT NOTES
Verified Results  (1) PT WITH INR [id number] 12:25PM Otilia Michael Order Number: [id number]     Test Name Result Flag Reference   INR 2 59 H 0 86-1 16   PT 27 9 seconds H 12 1-14 4

## 2018-01-23 NOTE — RESULT NOTES
Verified Results  (1) PT WITH INR [id number] 12:30PM [facility] Order Number: [id number]     Test Name Result Flag Reference   INR 3 40 H 0 86-1 16   PT 34 6 seconds H 12 1-14 4

## 2018-01-29 ENCOUNTER — HOSPITAL ENCOUNTER (OUTPATIENT)
Dept: RADIOLOGY | Facility: HOSPITAL | Age: 78
Discharge: HOME | End: 2018-01-29
Payer: MEDICARE

## 2018-01-29 ENCOUNTER — TRANSCRIBE ORDERS (OUTPATIENT)
Dept: ADMINISTRATIVE | Facility: HOSPITAL | Age: 78
End: 2018-01-29

## 2018-01-29 DIAGNOSIS — R05.9 COUGH: Primary | ICD-10-CM

## 2018-01-29 DIAGNOSIS — R05.9 COUGH: ICD-10-CM

## 2018-01-29 PROCEDURE — 71046 X-RAY EXAM CHEST 2 VIEWS: CPT

## 2018-02-05 ENCOUNTER — ANTICOAG VISIT (OUTPATIENT)
Dept: CARDIOLOGY CLINIC | Facility: CLINIC | Age: 78
End: 2018-02-05

## 2018-02-05 ENCOUNTER — APPOINTMENT (OUTPATIENT)
Dept: LAB | Facility: HOSPITAL | Age: 78
End: 2018-02-05
Attending: INTERNAL MEDICINE
Payer: MEDICARE

## 2018-02-05 DIAGNOSIS — I48.92 ATRIAL FLUTTER, UNSPECIFIED TYPE (HCC): ICD-10-CM

## 2018-02-05 DIAGNOSIS — I48.3 TYPICAL ATRIAL FLUTTER (HCC): ICD-10-CM

## 2018-02-05 LAB
INR PPP: 3.85 (ref 0.86–1.16)
PROTHROMBIN TIME: 38.2 SECONDS (ref 12.1–14.4)

## 2018-02-05 PROCEDURE — 36415 COLL VENOUS BLD VENIPUNCTURE: CPT

## 2018-02-05 PROCEDURE — 85610 PROTHROMBIN TIME: CPT

## 2018-02-05 NOTE — PROGRESS NOTES
02/05/18   gave dosage to pt  Pt has been on antibiotics and steroids, pt will hold dose tonight, retest inr in one week  Pt has had rectal bleeding and will be seeing the dr this week  Mailed lab order to pt

## 2018-02-07 DIAGNOSIS — I48.91 ATRIAL FIBRILLATION, UNSPECIFIED TYPE (HCC): Primary | ICD-10-CM

## 2018-02-08 ENCOUNTER — OFFICE VISIT (OUTPATIENT)
Dept: CARDIOLOGY CLINIC | Facility: HOSPITAL | Age: 78
End: 2018-02-08
Payer: MEDICARE

## 2018-02-08 VITALS
DIASTOLIC BLOOD PRESSURE: 60 MMHG | BODY MASS INDEX: 29.16 KG/M2 | HEART RATE: 74 BPM | SYSTOLIC BLOOD PRESSURE: 118 MMHG | WEIGHT: 175 LBS | HEIGHT: 65 IN

## 2018-02-08 DIAGNOSIS — I50.32 CHRONIC DIASTOLIC CONGESTIVE HEART FAILURE (HCC): Primary | ICD-10-CM

## 2018-02-08 DIAGNOSIS — R06.00 DOE (DYSPNEA ON EXERTION): ICD-10-CM

## 2018-02-08 DIAGNOSIS — Z95.1 HX OF CABG: ICD-10-CM

## 2018-02-08 DIAGNOSIS — I50.33 ACUTE ON CHRONIC DIASTOLIC CHF (CONGESTIVE HEART FAILURE), NYHA CLASS 3 (HCC): ICD-10-CM

## 2018-02-08 DIAGNOSIS — I25.10 CORONARY ARTERY DISEASE INVOLVING NATIVE CORONARY ARTERY OF NATIVE HEART WITHOUT ANGINA PECTORIS: ICD-10-CM

## 2018-02-08 DIAGNOSIS — I10 ESSENTIAL HYPERTENSION: ICD-10-CM

## 2018-02-08 PROBLEM — R06.09 DOE (DYSPNEA ON EXERTION): Status: ACTIVE | Noted: 2018-02-08

## 2018-02-08 PROCEDURE — 99214 OFFICE O/P EST MOD 30 MIN: CPT | Performed by: INTERNAL MEDICINE

## 2018-02-08 RX ORDER — FUROSEMIDE 20 MG/1
20 TABLET ORAL 2 TIMES DAILY
Qty: 180 TABLET | Refills: 3 | Status: SHIPPED | OUTPATIENT
Start: 2018-02-08 | End: 2019-04-28

## 2018-02-08 RX ORDER — METOLAZONE 2.5 MG/1
2.5 TABLET ORAL DAILY
COMMUNITY
End: 2018-07-05 | Stop reason: SDUPTHER

## 2018-02-09 NOTE — PROGRESS NOTES
Cardiology Follow Up    [facility]  1940  [id number]  [address] [facility]  [address] [place] [address] [address] [place] [address]    1  Chronic diastolic congestive heart failure (HCC)  furosemide (LASIX) 20 mg tablet   2  Acute on chronic diastolic CHF (congestive heart failure), NYHA class 3 ([address] )     3  Coronary artery disease involving native coronary artery of native heart without angina pectoris     4  Essential hypertension     5  BOB (dyspnea on exertion)     6  Hx of CABG           Discussion/Summary: She was advised to take Lasix 20 mg daily in the am  If her am weight is 175 LBS or higher, she is to take an extra 20 mg of Lasix 6 hours later  She is to take the Zaroxolyn once a week only as needed  The rest of her medications remain the same  No cardiac testing is ordered  Interval History: She has lost about 10 LBS since her last OV and her breathing is much better  She denies any CP, LE edema  She is presently taking Lasix 20 mg BID and Zaroxolyn every Sunday  ECHO showed preserved function      Patient Active Problem List   Diagnosis    Diverticulosis    Rectal bleeding    Hypoalbuminemia    CAD (coronary artery disease)    Essential hypertension    Hyperlipidemia    Elevated MCV    Atrial flutter (HCC)    Right-sided thoracic back pain    History of shingles    Incomplete right bundle branch block    Hx of CABG    Thoracic radiculopathy    Pyuria    Microscopic hematuria    Acute on chronic diastolic CHF (congestive heart failure), NYHA class 3 (HCC)    BOB (dyspnea on exertion)     Past Medical History:   Diagnosis Date    Cardiac disease     GERD (gastroesophageal reflux disease)     Hyperlipidemia     Hypertension      Social History     Social History    Marital status: /Civil Union     Spouse name: N/A    Number of children: N/A    Years of education: N/A     Occupational History    Not on file  Social History Main Topics    Smoking status: Former Smoker    Smokeless tobacco: Never Used    Alcohol use No    Drug use: No    Sexual activity: Not on file     Other Topics Concern    Not on file     Social History Narrative    No narrative on file      History reviewed  No pertinent family history  Past Surgical History:   Procedure Laterality Date    CARDIAC SURGERY      CATARACT EXTRACTION      CHOLECYSTECTOMY      COLONOSCOPY      COLONOSCOPY W/ CONTROL OF HEMORRHAGE      CORONARY ANGIOPLASTY WITH STENT PLACEMENT      HERNIA REPAIR      HYSTERECTOMY         Current Outpatient Prescriptions:     albuterol (PROVENTIL HFA,VENTOLIN HFA) 90 mcg/act inhaler, Inhale 2 puffs every 6 (six) hours as needed for wheezing, Disp: 1 Inhaler, Rfl: 0    ALPRAZolam (XANAX) 0 25 mg tablet, Take 0 25 mg by mouth 3 (three) times a day as needed for anxiety  , Disp: , Rfl:     atorvastatin (LIPITOR) 20 mg tablet, Take 20 mg by mouth daily after dinner   , Disp: , Rfl:     CALCIUM PO, Take 600 mg by mouth 2 (two) times a day  , Disp: , Rfl:     ergocalciferol (ERGOCALCIFEROL) 77287 UNITS capsule, Take 50,000 Units by mouth once a week  Takes on Wednesdays, Disp: , Rfl:     fluticasone (FLONASE) 50 mcg/act nasal spray, 2 sprays into each nostril daily, Disp: 16 g, Rfl: 0    furosemide (LASIX) 20 mg tablet, Take 1 tablet (20 mg total) by mouth 2 (two) times a day for 90 days, Disp: 180 tablet, Rfl: 3    lisinopril (ZESTRIL) 10 mg tablet, Take 10 mg by mouth daily  , Disp: , Rfl:     metolazone (ZAROXOLYN) 2 5 mg tablet, Take 2 5 mg by mouth daily, Disp: , Rfl:     metoprolol tartrate (LOPRESSOR) 50 mg tablet, Take 50 mg by mouth 2 (two) times a day, Disp: , Rfl:     Multiple Vitamins-Minerals (PRESERVISION AREDS) CAPS, Take 1 capsule by mouth 2 (two) times a day, Disp: , Rfl:     nitroglycerin (NITROSTAT) 0 4 mg SL tablet, Place 0 4 mg under the tongue every 5 (five) minutes as needed for chest pain , Disp: , Rfl:     Omega-3 Fatty Acids (FISH OIL PO), Take 1,000 mg by mouth daily  , Disp: , Rfl:     Potassium Chloride Maddy CR (KLOR-CON M20 PO), Take 20 mEq by mouth daily  , Disp: , Rfl:     ranitidine (ZANTAC) 300 MG capsule, Take 300 mg by mouth daily  , Disp: , Rfl:     warfarin (COUMADIN) 2 mg tablet, Take 2 mg by mouth daily, Disp: , Rfl:   Allergies   Allergen Reactions    Codeine GI Intolerance    Morphine And Related GI Intolerance       Labs:  Appointment on 02/05/2018   Component Date Value    Protime 02/05/2018 38 2*    INR 02/05/2018 3 85*   Appointment on 01/22/2018   Component Date Value    Sodium 01/22/2018 138     Potassium 01/22/2018 3 4*    Chloride 01/22/2018 99*    CO2 01/22/2018 29     Anion Gap 01/22/2018 10     BUN 01/22/2018 21     Creatinine 01/22/2018 1 18     Glucose 01/22/2018 109     Calcium 01/22/2018 8 9     eGFR 01/22/2018 44    Appointment on 01/15/2018   Component Date Value    Sodium 01/15/2018 135*    Potassium 01/15/2018 3 4*    Chloride 01/15/2018 99*    CO2 01/15/2018 28     Anion Gap 01/15/2018 8     BUN 01/15/2018 27*    Creatinine 01/15/2018 0 94     Glucose 01/15/2018 111     Calcium 01/15/2018 9 3     eGFR 01/15/2018 58     NT-proBNP 01/15/2018 1508*   Appointment on 01/15/2018   Component Date Value    Protime 01/15/2018 27 7*    INR 01/15/2018 2 57*   Appointment on 01/04/2018   Component Date Value    Protime 01/04/2018 34 6*    INR 01/04/2018 3 40*   Appointment on 12/26/2017   Component Date Value    Sodium 12/26/2017 140     Potassium 12/26/2017 4 1     Chloride 12/26/2017 102     CO2 12/26/2017 30     Anion Gap 12/26/2017 8     BUN 12/26/2017 21     Creatinine 12/26/2017 1 05     Glucose, Fasting 12/26/2017 113*    Calcium 12/26/2017 8 9     AST 12/26/2017 22     ALT 12/26/2017 22     Alkaline Phosphatase 12/26/2017 141*    Total Protein 12/26/2017 6 8     Albumin 12/26/2017 3 4*    Total Bilirubin 12/26/2017 0 60     eGFR 12/26/2017 51     Cholesterol 12/26/2017 125     Triglycerides 12/26/2017 130     HDL, Direct 12/26/2017 54     LDL Calculated 12/26/2017 45     TSH 3RD GENERATON 12/26/2017 2 128     Hemoglobin A1C 12/26/2017 6 0     EAG 12/26/2017 126     Vit D, 25-Hydroxy 12/26/2017 42 8     WBC 12/26/2017 6 87     RBC 12/26/2017 4 27     Hemoglobin 12/26/2017 13 5     Hematocrit 12/26/2017 41 1     MCV 12/26/2017 96     MCH 12/26/2017 31 6     MCHC 12/26/2017 32 8     RDW 12/26/2017 13 7     Platelets 51/25/3956 208     MPV 12/26/2017 9 0     Hepatitis C Ab 12/26/2017 Non-reactive     NT-proBNP 12/26/2017 2579*   Admission on 12/19/2017, Discharged on 12/19/2017   Component Date Value    WBC 12/19/2017 11 62*    RBC 12/19/2017 4 38     Hemoglobin 12/19/2017 13 9     Hematocrit 12/19/2017 41 9     MCV 12/19/2017 96     MCH 12/19/2017 31 7     MCHC 12/19/2017 33 2     RDW 12/19/2017 14 0     MPV 12/19/2017 9 2     Platelets 09/36/5898 177     Neutrophils Relative 12/19/2017 85*    Lymphocytes Relative 12/19/2017 7*    Monocytes Relative 12/19/2017 8     Eosinophils Relative 12/19/2017 0     Basophils Relative 12/19/2017 0     Neutrophils Absolute 12/19/2017 9 92*    Lymphocytes Absolute 12/19/2017 0 76     Monocytes Absolute 12/19/2017 0 91     Eosinophils Absolute 12/19/2017 0 01     Basophils Absolute 12/19/2017 0 02     Protime 12/19/2017 32 7*    INR 12/19/2017 3 17*    PTT 12/19/2017 71*    Sodium 12/19/2017 137     Potassium 12/19/2017 3 7     Chloride 12/19/2017 102     CO2 12/19/2017 28     Anion Gap 12/19/2017 7     BUN 12/19/2017 13     Creatinine 12/19/2017 0 87     Glucose 12/19/2017 122     Calcium 12/19/2017 9 0     eGFR 12/19/2017 64     Ventricular Rate 12/19/2017 114     Atrial Rate 12/19/2017 228     QRSD Interval 12/19/2017 108     QT Interval 12/19/2017 356     QTC Interval 12/19/2017 490     P Axis 12/19/2017 267     QRS Axis 12/19/2017 -2     T Wave Axis 12/19/2017 263     Troponin I 12/19/2017 <0 02    Appointment on 12/12/2017   Component Date Value    Protime 12/12/2017 27 9*    INR 12/12/2017 2 59*     Imaging: Xr Chest Pa & Lateral    Result Date: 1/29/2018  Narrative: CHEST INDICATION: R05: Cough  History taken directly from the electronic ordering system  COMPARISON: December 19, 2017 VIEWS:  PA and lateral; IMAGES:  2 FINDINGS: The patient is slightly rotated to the left  Midline sternotomy wires are present from previous cardiac surgery  The cardiomediastinal silhouette is unremarkable  The lungs are clear  No pleural effusions  Bony thorax is otherwise unremarkable  Calcific bursitis/tendinitis calcareous of the right shoulder, unchanged  Impression: No acute pulmonary disease, status post cardiac surgery     Workstation performed: [id number]     Vas Carotid Complete Study    Result Date: 1/19/2018  Narrative:  [facility] REPORT CLINICAL: Indications:  Left carotid bruit  Risk Factors The patient has history of HTN and hyperlipidemia  Clinical Right Brachial Pressure:  110/70 mmHg, Left Brachial Pressure:  107/70 mmHg  FINDINGS:  Right        Impression  PSV  EDV (cm/s)  Direction of Flow  Ratio  Dist  ICA                 55          17                      0 78  Mid  ICA                  74          14                      1 06  Prox   ICA    1 - 49%      74          14                      1 06  Dist CCA                  87          25                            Mid CCA                   70          21                      0 79  Prox CCA                  89          21                            Ext Carotid               56          13                      0 80  Prox Vert                 31          12  Antegrade                 Subclavian                92           0                             Left         Impression  PSV  EDV (cm/s)  Direction of Flow  Ratio  Dist  ICA                 48          19                      0 59  Mid  ICA                  66          20                      0 80  Prox  ICA    1 - 49%      58          12                      0 71  Dist CCA                  78          19                            Mid CCA                   82          27                      1 08  Prox CCA                  76          16                            Ext Carotid               84          14                      1 02  Prox Vert                 37          10  Antegrade                 Subclavian               118           0                               CONCLUSION:  Impression RIGHT: There is <50% stenosis noted in the internal carotid artery  Plaque is heterogenous  Vertebral artery flow is antegrade  There is no significant subclavian artery disease  LEFT: There is <50% stenosis noted in the internal carotid artery  Plaque is heterogenous  Vertebral artery flow is antegrade  There is no significant subclavian artery disease  Compared to previous study on 6/4/2008   , there is  no significant change  Internal carotid artery stenosis determination by consensus criteria from: Pillo Espino et al  Carotid Artery Stenosis: Gray-Scale and Doppler US Diagnosis - Society of Radiologists in [address] [place] [address], Radiology 2003; [phone]  SIGNATURE: Electronically Signed by: Enmanuel Goff on 2018-01-19 05:27:01 PM      Review of Systems:  Review of Systems   Constitutional: Negative for diaphoresis, fatigue, fever and unexpected weight change  HENT: Negative  Respiratory: Negative for cough, shortness of breath and wheezing  Cardiovascular: Negative for chest pain, palpitations and leg swelling  Gastrointestinal: Negative for abdominal pain, diarrhea and nausea  Musculoskeletal: Negative for gait problem and myalgias  Skin: Negative for rash  Neurological: Negative for dizziness and numbness  Psychiatric/Behavioral: Negative  Physical Exam:  Physical Exam   Constitutional: She is oriented to person, place, and time  She appears well-developed and well-nourished  HENT:   Head: Normocephalic and atraumatic  Eyes: Pupils are equal, round, and reactive to light  Neck: Normal range of motion  Neck supple  No JVD present  Cardiovascular: Regular rhythm, S1 normal, S2 normal and normal pulses  Pulses:       Carotid pulses are 2+ on the right side, and 2+ on the left side  Pulmonary/Chest: Effort normal and breath sounds normal  She has no wheezes  She has no rales  Abdominal: Soft  Bowel sounds are normal  There is no tenderness  Musculoskeletal: Normal range of motion  She exhibits no edema or tenderness  Neurological: She is alert and oriented to person, place, and time  She has normal reflexes  No cranial nerve deficit  Skin: Skin is warm  Psychiatric: She has a normal mood and affect

## 2018-02-13 ENCOUNTER — TRANSCRIBE ORDERS (OUTPATIENT)
Dept: ADMINISTRATIVE | Facility: HOSPITAL | Age: 78
End: 2018-02-13

## 2018-02-13 ENCOUNTER — APPOINTMENT (OUTPATIENT)
Dept: LAB | Facility: HOSPITAL | Age: 78
End: 2018-02-13
Attending: INTERNAL MEDICINE
Payer: MEDICARE

## 2018-02-13 ENCOUNTER — ANTICOAG VISIT (OUTPATIENT)
Dept: CARDIOLOGY CLINIC | Facility: CLINIC | Age: 78
End: 2018-02-13

## 2018-02-13 DIAGNOSIS — I48.92 ATRIAL FLUTTER, UNSPECIFIED TYPE (HCC): ICD-10-CM

## 2018-02-13 DIAGNOSIS — I48.91 ATRIAL FIBRILLATION, UNSPECIFIED TYPE (HCC): ICD-10-CM

## 2018-02-13 DIAGNOSIS — I48.91 ATRIAL FIBRILLATION, UNSPECIFIED TYPE (HCC): Primary | ICD-10-CM

## 2018-02-13 LAB
INR PPP: 2.91 (ref 0.86–1.16)
PROTHROMBIN TIME: 30.6 SECONDS (ref 12.1–14.4)

## 2018-02-13 PROCEDURE — 85610 PROTHROMBIN TIME: CPT

## 2018-02-13 PROCEDURE — 36415 COLL VENOUS BLD VENIPUNCTURE: CPT

## 2018-02-27 ENCOUNTER — ANTICOAG VISIT (OUTPATIENT)
Dept: CARDIOLOGY CLINIC | Facility: CLINIC | Age: 78
End: 2018-02-27

## 2018-02-27 ENCOUNTER — APPOINTMENT (OUTPATIENT)
Dept: LAB | Facility: HOSPITAL | Age: 78
End: 2018-02-27
Attending: INTERNAL MEDICINE
Payer: MEDICARE

## 2018-02-27 DIAGNOSIS — I48.92 ATRIAL FLUTTER, UNSPECIFIED TYPE (HCC): ICD-10-CM

## 2018-02-27 DIAGNOSIS — I48.91 ATRIAL FIBRILLATION, UNSPECIFIED TYPE (HCC): ICD-10-CM

## 2018-02-27 LAB
INR PPP: 3.25 (ref 0.86–1.16)
PROTHROMBIN TIME: 33.4 SECONDS (ref 12.1–14.4)

## 2018-02-27 PROCEDURE — 85610 PROTHROMBIN TIME: CPT

## 2018-02-27 PROCEDURE — 36415 COLL VENOUS BLD VENIPUNCTURE: CPT

## 2018-03-01 DIAGNOSIS — I27.82 OTHER CHRONIC PULMONARY EMBOLISM WITHOUT ACUTE COR PULMONALE (HCC): Primary | ICD-10-CM

## 2018-03-02 RX ORDER — WARFARIN SODIUM 2 MG/1
TABLET ORAL
Qty: 60 TABLET | Refills: 11 | Status: SHIPPED | OUTPATIENT
Start: 2018-03-02 | End: 2019-03-24 | Stop reason: SDUPTHER

## 2018-03-13 ENCOUNTER — ANTICOAG VISIT (OUTPATIENT)
Dept: CARDIOLOGY CLINIC | Facility: CLINIC | Age: 78
End: 2018-03-13

## 2018-03-13 ENCOUNTER — APPOINTMENT (OUTPATIENT)
Dept: LAB | Facility: HOSPITAL | Age: 78
End: 2018-03-13
Attending: INTERNAL MEDICINE
Payer: MEDICARE

## 2018-03-13 DIAGNOSIS — I48.92 ATRIAL FLUTTER, UNSPECIFIED TYPE (HCC): ICD-10-CM

## 2018-03-13 PROCEDURE — 85610 PROTHROMBIN TIME: CPT

## 2018-03-13 PROCEDURE — 36415 COLL VENOUS BLD VENIPUNCTURE: CPT

## 2018-03-26 ENCOUNTER — ANTICOAG VISIT (OUTPATIENT)
Dept: CARDIOLOGY CLINIC | Facility: CLINIC | Age: 78
End: 2018-03-26

## 2018-03-26 ENCOUNTER — APPOINTMENT (OUTPATIENT)
Dept: LAB | Facility: HOSPITAL | Age: 78
End: 2018-03-26
Attending: INTERNAL MEDICINE
Payer: MEDICARE

## 2018-03-26 DIAGNOSIS — I48.92 ATRIAL FLUTTER, UNSPECIFIED TYPE (HCC): ICD-10-CM

## 2018-03-26 PROCEDURE — 36415 COLL VENOUS BLD VENIPUNCTURE: CPT

## 2018-03-26 PROCEDURE — 85610 PROTHROMBIN TIME: CPT

## 2018-04-17 ENCOUNTER — TRANSCRIBE ORDERS (OUTPATIENT)
Dept: ADMINISTRATIVE | Facility: HOSPITAL | Age: 78
End: 2018-04-17

## 2018-04-17 ENCOUNTER — ANTICOAG VISIT (OUTPATIENT)
Dept: CARDIOLOGY CLINIC | Facility: CLINIC | Age: 78
End: 2018-04-17

## 2018-04-17 ENCOUNTER — APPOINTMENT (OUTPATIENT)
Dept: LAB | Facility: HOSPITAL | Age: 78
End: 2018-04-17
Payer: MEDICARE

## 2018-04-17 ENCOUNTER — APPOINTMENT (OUTPATIENT)
Dept: LAB | Facility: HOSPITAL | Age: 78
End: 2018-04-17
Attending: INTERNAL MEDICINE
Payer: MEDICARE

## 2018-04-17 DIAGNOSIS — Z79.899 ENCOUNTER FOR LONG-TERM (CURRENT) USE OF HIGH-RISK MEDICATION: ICD-10-CM

## 2018-04-17 DIAGNOSIS — R60.9 EDEMA, UNSPECIFIED TYPE: Primary | ICD-10-CM

## 2018-04-17 DIAGNOSIS — R60.9 EDEMA, UNSPECIFIED TYPE: ICD-10-CM

## 2018-04-17 DIAGNOSIS — Z79.82 ENCOUNTER FOR LONG-TERM (CURRENT) USE OF ASPIRIN: ICD-10-CM

## 2018-04-17 DIAGNOSIS — I48.92 ATRIAL FLUTTER, UNSPECIFIED TYPE (HCC): ICD-10-CM

## 2018-04-17 LAB
ALBUMIN SERPL BCP-MCNC: 3.6 G/DL (ref 3.5–5)
ALP SERPL-CCNC: 123 U/L (ref 46–116)
ALT SERPL W P-5'-P-CCNC: 23 U/L (ref 12–78)
ANION GAP SERPL CALCULATED.3IONS-SCNC: 8 MMOL/L (ref 4–13)
AST SERPL W P-5'-P-CCNC: 20 U/L (ref 5–45)
BILIRUB SERPL-MCNC: 0.8 MG/DL (ref 0.2–1)
BUN SERPL-MCNC: 20 MG/DL (ref 5–25)
CALCIUM SERPL-MCNC: 9.2 MG/DL (ref 8.3–10.1)
CHLORIDE SERPL-SCNC: 104 MMOL/L (ref 100–108)
CO2 SERPL-SCNC: 30 MMOL/L (ref 21–32)
CREAT SERPL-MCNC: 0.95 MG/DL (ref 0.6–1.3)
GFR SERPL CREATININE-BSD FRML MDRD: 58 ML/MIN/1.73SQ M
GLUCOSE P FAST SERPL-MCNC: 97 MG/DL (ref 65–99)
NT-PROBNP SERPL-MCNC: 1866 PG/ML
POTASSIUM SERPL-SCNC: 3.8 MMOL/L (ref 3.5–5.3)
PROT SERPL-MCNC: 6.8 G/DL (ref 6.4–8.2)
SODIUM SERPL-SCNC: 142 MMOL/L (ref 136–145)

## 2018-04-17 PROCEDURE — 85610 PROTHROMBIN TIME: CPT

## 2018-04-17 PROCEDURE — 80053 COMPREHEN METABOLIC PANEL: CPT

## 2018-04-17 PROCEDURE — 83880 ASSAY OF NATRIURETIC PEPTIDE: CPT

## 2018-04-17 PROCEDURE — 36415 COLL VENOUS BLD VENIPUNCTURE: CPT

## 2018-05-07 ENCOUNTER — APPOINTMENT (OUTPATIENT)
Dept: LAB | Facility: HOSPITAL | Age: 78
End: 2018-05-07
Attending: INTERNAL MEDICINE
Payer: MEDICARE

## 2018-05-07 ENCOUNTER — ANTICOAG VISIT (OUTPATIENT)
Dept: CARDIOLOGY CLINIC | Facility: CLINIC | Age: 78
End: 2018-05-07

## 2018-05-07 DIAGNOSIS — I48.92 ATRIAL FLUTTER, UNSPECIFIED TYPE (HCC): ICD-10-CM

## 2018-05-10 ENCOUNTER — TRANSCRIBE ORDERS (OUTPATIENT)
Dept: ADMINISTRATIVE | Facility: HOSPITAL | Age: 78
End: 2018-05-10

## 2018-05-10 DIAGNOSIS — Z12.39 SCREENING BREAST EXAMINATION: Primary | ICD-10-CM

## 2018-05-15 ENCOUNTER — HOSPITAL ENCOUNTER (OUTPATIENT)
Dept: MAMMOGRAPHY | Facility: HOSPITAL | Age: 78
Discharge: HOME | End: 2018-05-15
Payer: MEDICARE

## 2018-05-15 DIAGNOSIS — Z12.39 SCREENING BREAST EXAMINATION: ICD-10-CM

## 2018-05-15 PROCEDURE — 77067 SCR MAMMO BI INCL CAD: CPT

## 2018-05-15 PROCEDURE — 77063 BREAST TOMOSYNTHESIS BI: CPT

## 2018-05-24 ENCOUNTER — OFFICE VISIT (OUTPATIENT)
Dept: CARDIOLOGY CLINIC | Facility: HOSPITAL | Age: 78
End: 2018-05-24
Payer: MEDICARE

## 2018-05-24 VITALS
SYSTOLIC BLOOD PRESSURE: 121 MMHG | DIASTOLIC BLOOD PRESSURE: 63 MMHG | WEIGHT: 179.2 LBS | HEIGHT: 65 IN | HEART RATE: 86 BPM | BODY MASS INDEX: 29.85 KG/M2

## 2018-05-24 DIAGNOSIS — I25.10 CORONARY ARTERY DISEASE INVOLVING NATIVE CORONARY ARTERY OF NATIVE HEART WITHOUT ANGINA PECTORIS: Primary | ICD-10-CM

## 2018-05-24 DIAGNOSIS — I50.33 ACUTE ON CHRONIC DIASTOLIC CHF (CONGESTIVE HEART FAILURE), NYHA CLASS 3 (HCC): ICD-10-CM

## 2018-05-24 DIAGNOSIS — I10 ESSENTIAL HYPERTENSION: ICD-10-CM

## 2018-05-24 DIAGNOSIS — Z95.1 HX OF CABG: ICD-10-CM

## 2018-05-24 DIAGNOSIS — R06.00 DOE (DYSPNEA ON EXERTION): ICD-10-CM

## 2018-05-24 PROBLEM — I50.32 CHRONIC DIASTOLIC HEART FAILURE (HCC): Status: ACTIVE | Noted: 2018-05-24

## 2018-05-24 PROCEDURE — 99214 OFFICE O/P EST MOD 30 MIN: CPT | Performed by: INTERNAL MEDICINE

## 2018-05-27 NOTE — PROGRESS NOTES
Cardiology Follow Up    Heide Love  1940  [id number]  [address] [facility]  6 [address] [place] [address] [address]    1  Coronary artery disease involving native coronary artery of native heart without angina pectoris     2  Essential hypertension     3  BOB (dyspnea on exertion)     4  Hx of CABG     5  Acute on chronic diastolic CHF (congestive heart failure), NYHA class 3 ([address] )           Discussion/Summary:  I will continue with the same diuretic regimen but told her that she can take Zaroxolyn on Wednesdays in addition to Sunday as needed  No cardiac testing is ordered  Interval History: She has not had any major cardiac problems since her last OV  She denies CP  She is taking Lasix 20 mg BID and Zaroxolyn 2 5 mg every Sunday  Her weight at home is running around 175 LBs  She says that a few days prior to taking her Zaroxolyn on Sundays, she does notice her weight going up and increased SOB and some LE edema  Her BP is controlled  She has CAD with remote CABG    ECHO 1/2018  -EF 60 %    Patient Active Problem List   Diagnosis    Diverticulosis    Rectal bleeding    Hypoalbuminemia    CAD (coronary artery disease)    Essential hypertension    Hyperlipidemia    Elevated MCV    Atrial flutter (HCC)    Right-sided thoracic back pain    History of shingles    Incomplete right bundle branch block    Hx of CABG    Thoracic radiculopathy    Pyuria    Microscopic hematuria    Acute on chronic diastolic CHF (congestive heart failure), NYHA class 3 ([address] )    BOB (dyspnea on exertion)    Chronic diastolic heart failure (HCC)     Past Medical History:   Diagnosis Date    Cardiac disease     GERD (gastroesophageal reflux disease)     Hyperlipidemia     Hypertension      Social History     Social History    Marital status: /Civil Union     Spouse name: N/A    Number of children: N/A    Years of education: N/A     Occupational History    Not on file  Social History Main Topics    Smoking status: Former Smoker    Smokeless tobacco: Never Used    Alcohol use No    Drug use: No    Sexual activity: Not on file     Other Topics Concern    Not on file     Social History Narrative    No narrative on file      History reviewed  No pertinent family history  Past Surgical History:   Procedure Laterality Date    CARDIAC SURGERY      CATARACT EXTRACTION      CHOLECYSTECTOMY      COLONOSCOPY      COLONOSCOPY W/ CONTROL OF HEMORRHAGE      CORONARY ANGIOPLASTY WITH STENT PLACEMENT      HERNIA REPAIR      HYSTERECTOMY         Current Outpatient Prescriptions:     ALPRAZolam (XANAX) 0 25 mg tablet, Take 0 25 mg by mouth 3 (three) times a day as needed for anxiety  , Disp: , Rfl:     atorvastatin (LIPITOR) 20 mg tablet, Take 20 mg by mouth daily after dinner   , Disp: , Rfl:     CALCIUM PO, Take 600 mg by mouth 2 (two) times a day  , Disp: , Rfl:     ergocalciferol (ERGOCALCIFEROL) 63191 UNITS capsule, Take 50,000 Units by mouth once a week  Takes on Wednesdays, Disp: , Rfl:     fluticasone (FLONASE) 50 mcg/act nasal spray, 2 sprays into each nostril daily, Disp: 16 g, Rfl: 0    furosemide (LASIX) 20 mg tablet, Take 1 tablet (20 mg total) by mouth 2 (two) times a day for 90 days, Disp: 180 tablet, Rfl: 3    lisinopril (ZESTRIL) 10 mg tablet, Take 10 mg by mouth daily  , Disp: , Rfl:     metoprolol tartrate (LOPRESSOR) 50 mg tablet, Take 50 mg by mouth 2 (two) times a day, Disp: , Rfl:     nitroglycerin (NITROSTAT) 0 4 mg SL tablet, Place 0 4 mg under the tongue every 5 (five) minutes as needed for chest pain , Disp: , Rfl:     Omega-3 Fatty Acids (FISH OIL PO), Take 1,000 mg by mouth daily  , Disp: , Rfl:     Potassium Chloride Maddy CR (KLOR-CON M20 PO), Take 20 mEq by mouth daily  , Disp: , Rfl:     warfarin (COUMADIN) 2 mg tablet, TAKE ONE TO TWO TABLETS BY MOUTH ONCE DAILY OR  AS ORDERED  BY  PHYSICIAN, Disp: 60 tablet, Rfl: 11    albuterol (PROVENTIL HFA,VENTOLIN HFA) 90 mcg/act inhaler, Inhale 2 puffs every 6 (six) hours as needed for wheezing, Disp: 1 Inhaler, Rfl: 0    metolazone (ZAROXOLYN) 2 5 mg tablet, Take 2 5 mg by mouth daily, Disp: , Rfl:     Multiple Vitamins-Minerals (PRESERVISION AREDS) CAPS, Take 1 capsule by mouth 2 (two) times a day, Disp: , Rfl:     ranitidine (ZANTAC) 300 MG capsule, Take 300 mg by mouth daily  , Disp: , Rfl:   Allergies   Allergen Reactions    Codeine GI Intolerance    Lansoprazole Other (See Comments)     GI Upset, dania protonix    Morphine Nausea Only    Morphine And Related GI Intolerance     Vitals:    05/24/18 1242   BP: 121/63   BP Location: Left arm   Patient Position: Sitting   Cuff Size: Large   Pulse: 86   Weight: 81 3 kg (179 lb 3 2 oz)   Height: 5' 5" (1 651 m)     Weight (last 2 days)     Date/Time   Weight    05/24/18 1242  81 3 (179 2)             Blood pressure 121/63, pulse 86, height 5' 5" (1 651 m), weight 81 3 kg (179 lb 3 2 oz)  , Body mass index is 29 82 kg/m²  Labs:   Ancillary Orders on 05/04/2018   Component Date Value    Protime 05/07/2018 26 4*    INR 05/07/2018 2 42*   Appointment on 04/17/2018   Component Date Value    Sodium 04/17/2018 142     Potassium 04/17/2018 3 8     Chloride 04/17/2018 104     CO2 04/17/2018 30     Anion Gap 04/17/2018 8     BUN 04/17/2018 20     Creatinine 04/17/2018 0 95     Glucose, Fasting 04/17/2018 97     Calcium 04/17/2018 9 2     AST 04/17/2018 20     ALT 04/17/2018 23     Alkaline Phosphatase 04/17/2018 123*    Total Protein 04/17/2018 6 8     Albumin 04/17/2018 3 6     Total Bilirubin 04/17/2018 0 80     eGFR 04/17/2018 58     NT-proBNP 04/17/2018 1866*   Ancillary Orders on 04/13/2018   Component Date Value    Protime 04/17/2018 25 7*    INR 04/17/2018 2 34*   Ancillary Orders on 03/16/2018   Component Date Value    Protime 03/26/2018 24 6*    INR 03/26/2018 2 21*   Ancillary Orders on 03/09/2018   Component Date Value    Protime 03/13/2018 23 7*    INR 03/13/2018 2 11*   Appointment on 02/27/2018   Component Date Value    Protime 02/27/2018 33 4*    INR 02/27/2018 3 25*   Appointment on 02/13/2018   Component Date Value    Protime 02/13/2018 30 6*    INR 02/13/2018 2 91*   Appointment on 02/05/2018   Component Date Value    Protime 02/05/2018 38 2*    INR 02/05/2018 3 85*   Appointment on 01/22/2018   Component Date Value    Sodium 01/22/2018 138     Potassium 01/22/2018 3 4*    Chloride 01/22/2018 99*    CO2 01/22/2018 29     Anion Gap 01/22/2018 10     BUN 01/22/2018 21     Creatinine 01/22/2018 1 18     Glucose 01/22/2018 109     Calcium 01/22/2018 8 9     eGFR 01/22/2018 44    Appointment on 01/15/2018   Component Date Value    Sodium 01/15/2018 135*    Potassium 01/15/2018 3 4*    Chloride 01/15/2018 99*    CO2 01/15/2018 28     Anion Gap 01/15/2018 8     BUN 01/15/2018 27*    Creatinine 01/15/2018 0 94     Glucose 01/15/2018 111     Calcium 01/15/2018 9 3     eGFR 01/15/2018 58     NT-proBNP 01/15/2018 1508*   There may be more visits with results that are not included  Imaging: Mammo Screening Bilateral W 3d & Cad    Result Date: 5/15/2018  Narrative: Patient History: Family history of throat cancer at age 61 in father, unknown cancer at age 48 in brother  Took estrogen for 20 years  Patient has never smoked  Patient's BMI is 30 4  Reason for exam: screening, asymptomatic  Mammo Screening Bilateral W DBT and CAD: May 15, 2018 - Check In #: [de-identified] 2D/3D Procedure 3D views: Bilateral MLO view(s) were taken  2D views: Bilateral CC view(s) were taken  Technologists: RT Ruthy (R)(M); TENZIN Betancourt (R)(M) Prior study comparison: March 1, 2017, mammo screening bilateral W DBT and CAD performed at [address] [place] [address]    February 3, 2016, mammo screening bilateral W CAD performed at [facility] [place]  January 12, 2015, bilateral digital screening mammogram performed at [address] [place] [address]  January 6, 2014, bilateral digital screening mammogram performed at [address] [place] [address]  December 13, 2012, bilateral digital screening mammogram performed at [address] [place] [address]  There are scattered fibroglandular densities  A combination of mediolateral oblique 3-D tomographic slices as well as standard two-dimensional orthogonal images were obtained  No dominant soft tissue mass, architectural distortion or suspicious calcifications are noted in either breast    The skin and nipple structures are within normal limits  Scattered benign appearing calcifications are noted  IMPRESSION: No evidence of malignancy No significant changes when compared with prior studies  ACR BI-RADS® Assessments: BiRad:2 - Benign Recommendation: Routine screening mammogram of both breasts in 1 year  A reminder letter will be scheduled  The patient is scheduled in a reminder system for screening mammography  8-10% of cancers will be missed on mammography  Management of a palpable abnormality must be based on clinical grounds  Patients will be notified of their results via letter from our facility  Accredited by [employer] of [employer] and FDA  Transcription Location: [facility] 143: [id number] Risk Value(s): Tyrer-Cuzick 10 Year: 1 900%, Tyrer-Cuzick Lifetime: 1 900%, Myriad Table: 1 5%, JIM 5 Year: 1 1%, NCI Lifetime: 2 0%      Review of Systems:  Review of Systems   Constitutional: Negative for diaphoresis, fatigue, fever and unexpected weight change  HENT: Negative  Respiratory: Positive for shortness of breath  Negative for cough and wheezing  Cardiovascular: Negative for chest pain, palpitations and leg swelling  Gastrointestinal: Negative for abdominal pain, diarrhea and nausea  Musculoskeletal: Negative for gait problem and myalgias  Skin: Negative for rash  Neurological: Negative for dizziness and numbness  Psychiatric/Behavioral: Negative  Physical Exam:  Physical Exam   Constitutional: She is oriented to person, place, and time  She appears well-developed and well-nourished  HENT:   Head: Normocephalic and atraumatic  Eyes: Pupils are equal, round, and reactive to light  Neck: Normal range of motion  Neck supple  No JVD present  Cardiovascular: Regular rhythm, S1 normal, S2 normal and normal pulses  Pulses:       Carotid pulses are 2+ on the right side, and 2+ on the left side  Pulmonary/Chest: Effort normal and breath sounds normal  She has no wheezes  She has no rales  Abdominal: Soft  Bowel sounds are normal  There is no tenderness  Musculoskeletal: Normal range of motion  She exhibits no edema or tenderness  Neurological: She is alert and oriented to person, place, and time  She has normal reflexes  No cranial nerve deficit  Skin: Skin is warm  Psychiatric: She has a normal mood and affect

## 2018-06-06 ENCOUNTER — HOSPITAL ENCOUNTER (OUTPATIENT)
Dept: NON INVASIVE DIAGNOSTICS | Facility: HOSPITAL | Age: 78
Discharge: HOME | End: 2018-06-06
Payer: MEDICARE

## 2018-06-06 ENCOUNTER — APPOINTMENT (OUTPATIENT)
Dept: LAB | Facility: HOSPITAL | Age: 78
End: 2018-06-06
Attending: INTERNAL MEDICINE
Payer: MEDICARE

## 2018-06-06 ENCOUNTER — HOSPITAL ENCOUNTER (OUTPATIENT)
Dept: RADIOLOGY | Facility: HOSPITAL | Age: 78
Discharge: HOME | End: 2018-06-06
Payer: MEDICARE

## 2018-06-06 ENCOUNTER — ANTICOAG VISIT (OUTPATIENT)
Dept: CARDIOLOGY CLINIC | Facility: CLINIC | Age: 78
End: 2018-06-06

## 2018-06-06 ENCOUNTER — TRANSCRIBE ORDERS (OUTPATIENT)
Dept: ADMINISTRATIVE | Facility: HOSPITAL | Age: 78
End: 2018-06-06

## 2018-06-06 DIAGNOSIS — I48.92 ATRIAL FLUTTER, UNSPECIFIED TYPE (HCC): ICD-10-CM

## 2018-06-06 DIAGNOSIS — Z01.812 PRE-OPERATIVE LABORATORY EXAMINATION: ICD-10-CM

## 2018-06-06 DIAGNOSIS — Z01.812 PRE-OPERATIVE LABORATORY EXAMINATION: Primary | ICD-10-CM

## 2018-06-06 LAB
ANION GAP SERPL CALCULATED.3IONS-SCNC: 9 MMOL/L (ref 4–13)
BUN SERPL-MCNC: 18 MG/DL (ref 5–25)
CALCIUM SERPL-MCNC: 8.4 MG/DL (ref 8.3–10.1)
CHLORIDE SERPL-SCNC: 105 MMOL/L (ref 100–108)
CO2 SERPL-SCNC: 27 MMOL/L (ref 21–32)
CREAT SERPL-MCNC: 0.88 MG/DL (ref 0.6–1.3)
ERYTHROCYTE [DISTWIDTH] IN BLOOD BY AUTOMATED COUNT: 13.5 % (ref 11.6–15.1)
GFR SERPL CREATININE-BSD FRML MDRD: 63 ML/MIN/1.73SQ M
GLUCOSE SERPL-MCNC: 103 MG/DL (ref 65–140)
HCT VFR BLD AUTO: 37.3 % (ref 34.8–46.1)
HGB BLD-MCNC: 12.1 G/DL (ref 11.5–15.4)
MCH RBC QN AUTO: 32 PG (ref 26.8–34.3)
MCHC RBC AUTO-ENTMCNC: 32.4 G/DL (ref 31.4–37.4)
MCV RBC AUTO: 99 FL (ref 82–98)
PLATELET # BLD AUTO: 140 THOUSANDS/UL (ref 149–390)
PMV BLD AUTO: 9.9 FL (ref 8.9–12.7)
POTASSIUM SERPL-SCNC: 4.2 MMOL/L (ref 3.5–5.3)
RBC # BLD AUTO: 3.78 MILLION/UL (ref 3.81–5.12)
SODIUM SERPL-SCNC: 141 MMOL/L (ref 136–145)
WBC # BLD AUTO: 6.8 THOUSAND/UL (ref 4.31–10.16)

## 2018-06-06 PROCEDURE — 85027 COMPLETE CBC AUTOMATED: CPT

## 2018-06-06 PROCEDURE — 93005 ELECTROCARDIOGRAM TRACING: CPT

## 2018-06-06 PROCEDURE — 71046 X-RAY EXAM CHEST 2 VIEWS: CPT

## 2018-06-06 PROCEDURE — 80048 BASIC METABOLIC PNL TOTAL CA: CPT

## 2018-06-07 LAB
ATRIAL RATE: 214 BPM
P AXIS: -84 DEGREES
QRS AXIS: 53 DEGREES
QRSD INTERVAL: 118 MS
QT INTERVAL: 342 MS
QTC INTERVAL: 441 MS
T WAVE AXIS: 110 DEGREES
VENTRICULAR RATE: 100 BPM

## 2018-06-07 PROCEDURE — 93010 ELECTROCARDIOGRAM REPORT: CPT | Performed by: INTERNAL MEDICINE

## 2018-06-16 ENCOUNTER — HOSPITAL ENCOUNTER (INPATIENT)
Facility: HOSPITAL | Age: 78
LOS: 6 days | Discharge: HOME | DRG: 377 | End: 2018-06-22
Attending: EMERGENCY MEDICINE | Admitting: INTERNAL MEDICINE
Payer: MEDICARE

## 2018-06-16 ENCOUNTER — APPOINTMENT (INPATIENT)
Dept: RADIOLOGY | Facility: HOSPITAL | Age: 78
DRG: 377 | End: 2018-06-16
Payer: MEDICARE

## 2018-06-16 ENCOUNTER — APPOINTMENT (EMERGENCY)
Dept: CT IMAGING | Facility: HOSPITAL | Age: 78
End: 2018-06-16
Payer: MEDICARE

## 2018-06-16 ENCOUNTER — APPOINTMENT (EMERGENCY)
Dept: RADIOLOGY | Facility: HOSPITAL | Age: 78
End: 2018-06-16
Payer: MEDICARE

## 2018-06-16 ENCOUNTER — HOSPITAL ENCOUNTER (EMERGENCY)
Facility: HOSPITAL | Age: 78
End: 2018-06-16
Attending: EMERGENCY MEDICINE | Admitting: EMERGENCY MEDICINE
Payer: MEDICARE

## 2018-06-16 VITALS
HEIGHT: 65 IN | WEIGHT: 179.23 LBS | TEMPERATURE: 97.8 F | BODY MASS INDEX: 29.86 KG/M2 | RESPIRATION RATE: 18 BRPM | OXYGEN SATURATION: 96 % | HEART RATE: 109 BPM | SYSTOLIC BLOOD PRESSURE: 183 MMHG | DIASTOLIC BLOOD PRESSURE: 96 MMHG

## 2018-06-16 DIAGNOSIS — K92.2 ACUTE LOWER GI BLEEDING: Primary | ICD-10-CM

## 2018-06-16 DIAGNOSIS — I48.92 ATRIAL FLUTTER, UNSPECIFIED TYPE (HCC): ICD-10-CM

## 2018-06-16 DIAGNOSIS — K57.31 DIVERTICULOSIS OF LARGE INTESTINE WITH HEMORRHAGE: ICD-10-CM

## 2018-06-16 DIAGNOSIS — K63.89 COLONIC MASS: ICD-10-CM

## 2018-06-16 DIAGNOSIS — K62.5 RECTAL BLEEDING: Primary | ICD-10-CM

## 2018-06-16 DIAGNOSIS — R56.9 SEIZURE (HCC): ICD-10-CM

## 2018-06-16 PROBLEM — R55 VASOVAGAL SYNCOPE: Status: ACTIVE | Noted: 2018-06-16

## 2018-06-16 LAB
ABO GROUP BLD BPU: NORMAL
ABO GROUP BLD: NORMAL
ABO GROUP BLD: NORMAL
ALBUMIN SERPL BCP-MCNC: 3.4 G/DL (ref 3.5–5)
ALP SERPL-CCNC: 141 U/L (ref 46–116)
ALT SERPL W P-5'-P-CCNC: 22 U/L (ref 12–78)
ANION GAP SERPL CALCULATED.3IONS-SCNC: 7 MMOL/L (ref 4–13)
ANION GAP SERPL CALCULATED.3IONS-SCNC: 7 MMOL/L (ref 4–13)
AST SERPL W P-5'-P-CCNC: 21 U/L (ref 5–45)
ATRIAL RATE: 220 BPM
BACTERIA UR QL AUTO: ABNORMAL /HPF
BASE EXCESS BLDA CALC-SCNC: -2 MMOL/L (ref -2–3)
BASOPHILS # BLD AUTO: 0.04 THOUSANDS/ΜL (ref 0–0.1)
BASOPHILS NFR BLD AUTO: 1 % (ref 0–1)
BILIRUB SERPL-MCNC: 0.6 MG/DL (ref 0.2–1)
BILIRUB UR QL STRIP: NEGATIVE
BLD GP AB SCN SERPL QL: NEGATIVE
BLD GP AB SCN SERPL QL: NEGATIVE
BPU ID: NORMAL
BUN SERPL-MCNC: 20 MG/DL (ref 5–25)
BUN SERPL-MCNC: 23 MG/DL (ref 5–25)
CA-I BLD-SCNC: 1.11 MMOL/L (ref 1.12–1.32)
CALCIUM SERPL-MCNC: 7.7 MG/DL (ref 8.3–10.1)
CALCIUM SERPL-MCNC: 8.8 MG/DL (ref 8.3–10.1)
CHLORIDE SERPL-SCNC: 106 MMOL/L (ref 100–108)
CHLORIDE SERPL-SCNC: 112 MMOL/L (ref 100–108)
CLARITY UR: CLEAR
CO2 SERPL-SCNC: 25 MMOL/L (ref 21–32)
CO2 SERPL-SCNC: 28 MMOL/L (ref 21–32)
COLOR UR: YELLOW
CREAT SERPL-MCNC: 0.95 MG/DL (ref 0.6–1.3)
CREAT SERPL-MCNC: 0.99 MG/DL (ref 0.6–1.3)
CROSSMATCH: NORMAL
EOSINOPHIL # BLD AUTO: 0.09 THOUSAND/ΜL (ref 0–0.61)
EOSINOPHIL NFR BLD AUTO: 1 % (ref 0–6)
ERYTHROCYTE [DISTWIDTH] IN BLOOD BY AUTOMATED COUNT: 13.7 % (ref 11.6–15.1)
ERYTHROCYTE [DISTWIDTH] IN BLOOD BY AUTOMATED COUNT: 13.8 % (ref 11.6–15.1)
GFR SERPL CREATININE-BSD FRML MDRD: 55 ML/MIN/1.73SQ M
GFR SERPL CREATININE-BSD FRML MDRD: 58 ML/MIN/1.73SQ M
GLUCOSE SERPL-MCNC: 112 MG/DL (ref 65–140)
GLUCOSE SERPL-MCNC: 145 MG/DL (ref 65–140)
GLUCOSE SERPL-MCNC: 157 MG/DL (ref 65–140)
GLUCOSE SERPL-MCNC: 179 MG/DL (ref 65–140)
GLUCOSE UR STRIP-MCNC: NEGATIVE MG/DL
HCO3 BLDA-SCNC: 23.5 MMOL/L (ref 24–30)
HCT VFR BLD AUTO: 34.2 % (ref 34.8–46.1)
HCT VFR BLD AUTO: 37.1 % (ref 34.8–46.1)
HCT VFR BLD CALC: 28 % (ref 34.8–46.1)
HGB BLD-MCNC: 10.2 G/DL (ref 11.5–15.4)
HGB BLD-MCNC: 10.7 G/DL (ref 11.5–15.4)
HGB BLD-MCNC: 12 G/DL (ref 11.5–15.4)
HGB BLD-MCNC: 9.3 G/DL (ref 11.5–15.4)
HGB BLDA-MCNC: 9.5 G/DL (ref 11.5–15.4)
HGB UR QL STRIP.AUTO: NEGATIVE
HOLD SPECIMEN: NORMAL
INR PPP: 1.21 (ref 0.86–1.17)
INR PPP: 2.22 (ref 0.86–1.17)
KETONES UR STRIP-MCNC: NEGATIVE MG/DL
LACTATE SERPL-SCNC: 2 MMOL/L (ref 0.5–2)
LEUKOCYTE ESTERASE UR QL STRIP: NEGATIVE
LIPASE SERPL-CCNC: 120 U/L (ref 73–393)
LYMPHOCYTES # BLD AUTO: 2.01 THOUSANDS/ΜL (ref 0.6–4.47)
LYMPHOCYTES NFR BLD AUTO: 24 % (ref 14–44)
MAGNESIUM SERPL-MCNC: 2.2 MG/DL (ref 1.6–2.6)
MCH RBC QN AUTO: 31.7 PG (ref 26.8–34.3)
MCH RBC QN AUTO: 32 PG (ref 26.8–34.3)
MCHC RBC AUTO-ENTMCNC: 31.3 G/DL (ref 31.4–37.4)
MCHC RBC AUTO-ENTMCNC: 32.3 G/DL (ref 31.4–37.4)
MCV RBC AUTO: 101 FL (ref 82–98)
MCV RBC AUTO: 99 FL (ref 82–98)
MONOCYTES # BLD AUTO: 0.82 THOUSAND/ΜL (ref 0.17–1.22)
MONOCYTES NFR BLD AUTO: 10 % (ref 4–12)
NEUTROPHILS # BLD AUTO: 5.61 THOUSANDS/ΜL (ref 1.85–7.62)
NEUTS SEG NFR BLD AUTO: 65 % (ref 43–75)
NITRITE UR QL STRIP: NEGATIVE
NON-SQ EPI CELLS URNS QL MICRO: ABNORMAL /HPF
P AXIS: -86 DEGREES
PCO2 BLD: 25 MMOL/L (ref 21–32)
PCO2 BLD: 44.4 MM HG (ref 42–50)
PH BLD: 7.33 [PH] (ref 7.3–7.4)
PH UR STRIP.AUTO: 6.5 [PH] (ref 4.5–8)
PLATELET # BLD AUTO: 145 THOUSANDS/UL (ref 149–390)
PLATELET # BLD AUTO: 172 THOUSANDS/UL (ref 149–390)
PMV BLD AUTO: 9.4 FL (ref 8.9–12.7)
PMV BLD AUTO: 9.8 FL (ref 8.9–12.7)
PO2 BLD: 71 MM HG (ref 35–45)
POTASSIUM BLD-SCNC: 3.4 MMOL/L (ref 3.5–5.3)
POTASSIUM SERPL-SCNC: 3.6 MMOL/L (ref 3.5–5.3)
POTASSIUM SERPL-SCNC: 3.7 MMOL/L (ref 3.5–5.3)
PROT SERPL-MCNC: 6.4 G/DL (ref 6.4–8.2)
PROT UR STRIP-MCNC: ABNORMAL MG/DL
PROTHROMBIN TIME: 15.4 SECONDS (ref 11.8–14.2)
PROTHROMBIN TIME: 23.6 SECONDS (ref 11.8–14.2)
QRS AXIS: -11 DEGREES
QRSD INTERVAL: 116 MS
QT INTERVAL: 382 MS
QTC INTERVAL: 426 MS
RBC # BLD AUTO: 3.38 MILLION/UL (ref 3.81–5.12)
RBC # BLD AUTO: 3.75 MILLION/UL (ref 3.81–5.12)
RBC #/AREA URNS AUTO: ABNORMAL /HPF
RH BLD: NEGATIVE
RH BLD: NEGATIVE
SAO2 % BLD FROM PO2: 93 % (ref 95–98)
SODIUM BLD-SCNC: 141 MMOL/L (ref 136–145)
SODIUM SERPL-SCNC: 141 MMOL/L (ref 136–145)
SODIUM SERPL-SCNC: 144 MMOL/L (ref 136–145)
SP GR UR STRIP.AUTO: 1.02 (ref 1–1.03)
SPECIMEN EXPIRATION DATE: NORMAL
SPECIMEN EXPIRATION DATE: NORMAL
SPECIMEN SOURCE: ABNORMAL
T WAVE AXIS: -51 DEGREES
TROPONIN I SERPL-MCNC: 0.19 NG/ML
TROPONIN I SERPL-MCNC: 0.42 NG/ML
TROPONIN I SERPL-MCNC: 0.44 NG/ML
UNIT DISPENSE STATUS: NORMAL
UNIT PRODUCT CODE: NORMAL
UNIT RH: NORMAL
UROBILINOGEN UR QL STRIP.AUTO: 0.2 E.U./DL
VENTRICULAR RATE: 75 BPM
WBC # BLD AUTO: 12.91 THOUSAND/UL (ref 4.31–10.16)
WBC # BLD AUTO: 8.57 THOUSAND/UL (ref 4.31–10.16)
WBC #/AREA URNS AUTO: ABNORMAL /HPF

## 2018-06-16 PROCEDURE — C9113 INJ PANTOPRAZOLE SODIUM, VIA: HCPCS | Performed by: NURSE PRACTITIONER

## 2018-06-16 PROCEDURE — P9016 RBC LEUKOCYTES REDUCED: HCPCS

## 2018-06-16 PROCEDURE — 85018 HEMOGLOBIN: CPT | Performed by: NURSE PRACTITIONER

## 2018-06-16 PROCEDURE — 71045 X-RAY EXAM CHEST 1 VIEW: CPT

## 2018-06-16 PROCEDURE — 82948 REAGENT STRIP/BLOOD GLUCOSE: CPT

## 2018-06-16 PROCEDURE — 86901 BLOOD TYPING SEROLOGIC RH(D): CPT | Performed by: NURSE PRACTITIONER

## 2018-06-16 PROCEDURE — 96366 THER/PROPH/DIAG IV INF ADDON: CPT

## 2018-06-16 PROCEDURE — 86900 BLOOD TYPING SEROLOGIC ABO: CPT | Performed by: NURSE PRACTITIONER

## 2018-06-16 PROCEDURE — 86920 COMPATIBILITY TEST SPIN: CPT

## 2018-06-16 PROCEDURE — 86901 BLOOD TYPING SEROLOGIC RH(D): CPT | Performed by: EMERGENCY MEDICINE

## 2018-06-16 PROCEDURE — 85025 COMPLETE CBC W/AUTO DIFF WBC: CPT | Performed by: EMERGENCY MEDICINE

## 2018-06-16 PROCEDURE — 99221 1ST HOSP IP/OBS SF/LOW 40: CPT | Performed by: INTERNAL MEDICINE

## 2018-06-16 PROCEDURE — 80048 BASIC METABOLIC PNL TOTAL CA: CPT | Performed by: NURSE PRACTITIONER

## 2018-06-16 PROCEDURE — 82947 ASSAY GLUCOSE BLOOD QUANT: CPT

## 2018-06-16 PROCEDURE — 31500 INSERT EMERGENCY AIRWAY: CPT

## 2018-06-16 PROCEDURE — 83690 ASSAY OF LIPASE: CPT | Performed by: EMERGENCY MEDICINE

## 2018-06-16 PROCEDURE — 99285 EMERGENCY DEPT VISIT HI MDM: CPT

## 2018-06-16 PROCEDURE — 86923 COMPATIBILITY TEST ELECTRIC: CPT

## 2018-06-16 PROCEDURE — 82803 BLOOD GASES ANY COMBINATION: CPT

## 2018-06-16 PROCEDURE — 83735 ASSAY OF MAGNESIUM: CPT | Performed by: EMERGENCY MEDICINE

## 2018-06-16 PROCEDURE — 86900 BLOOD TYPING SEROLOGIC ABO: CPT | Performed by: EMERGENCY MEDICINE

## 2018-06-16 PROCEDURE — 81001 URINALYSIS AUTO W/SCOPE: CPT | Performed by: EMERGENCY MEDICINE

## 2018-06-16 PROCEDURE — C9132 KCENTRA, PER I.U.: HCPCS | Performed by: EMERGENCY MEDICINE

## 2018-06-16 PROCEDURE — 94760 N-INVAS EAR/PLS OXIMETRY 1: CPT

## 2018-06-16 PROCEDURE — 70450 CT HEAD/BRAIN W/O DYE: CPT

## 2018-06-16 PROCEDURE — 36415 COLL VENOUS BLD VENIPUNCTURE: CPT

## 2018-06-16 PROCEDURE — 96367 TX/PROPH/DG ADDL SEQ IV INF: CPT

## 2018-06-16 PROCEDURE — 80053 COMPREHEN METABOLIC PANEL: CPT | Performed by: EMERGENCY MEDICINE

## 2018-06-16 PROCEDURE — 5A1935Z RESPIRATORY VENTILATION, LESS THAN 24 CONSECUTIVE HOURS: ICD-10-PCS | Performed by: INTERNAL MEDICINE

## 2018-06-16 PROCEDURE — 86850 RBC ANTIBODY SCREEN: CPT | Performed by: NURSE PRACTITIONER

## 2018-06-16 PROCEDURE — 94002 VENT MGMT INPAT INIT DAY: CPT

## 2018-06-16 PROCEDURE — 93010 ELECTROCARDIOGRAM REPORT: CPT | Performed by: INTERNAL MEDICINE

## 2018-06-16 PROCEDURE — 96375 TX/PRO/DX INJ NEW DRUG ADDON: CPT

## 2018-06-16 PROCEDURE — 36600 WITHDRAWAL OF ARTERIAL BLOOD: CPT

## 2018-06-16 PROCEDURE — 83605 ASSAY OF LACTIC ACID: CPT | Performed by: PHYSICIAN ASSISTANT

## 2018-06-16 PROCEDURE — 84484 ASSAY OF TROPONIN QUANT: CPT | Performed by: NURSE PRACTITIONER

## 2018-06-16 PROCEDURE — 99254 IP/OBS CNSLTJ NEW/EST MOD 60: CPT | Performed by: INTERNAL MEDICINE

## 2018-06-16 PROCEDURE — 93005 ELECTROCARDIOGRAM TRACING: CPT

## 2018-06-16 PROCEDURE — 86850 RBC ANTIBODY SCREEN: CPT | Performed by: EMERGENCY MEDICINE

## 2018-06-16 PROCEDURE — 82330 ASSAY OF CALCIUM: CPT

## 2018-06-16 PROCEDURE — 85610 PROTHROMBIN TIME: CPT | Performed by: INTERNAL MEDICINE

## 2018-06-16 PROCEDURE — 96365 THER/PROPH/DIAG IV INF INIT: CPT

## 2018-06-16 PROCEDURE — 85014 HEMATOCRIT: CPT

## 2018-06-16 PROCEDURE — 84295 ASSAY OF SERUM SODIUM: CPT

## 2018-06-16 PROCEDURE — 30233N1 TRANSFUSION OF NONAUTOLOGOUS RED BLOOD CELLS INTO PERIPHERAL VEIN, PERCUTANEOUS APPROACH: ICD-10-PCS | Performed by: INTERNAL MEDICINE

## 2018-06-16 PROCEDURE — 85027 COMPLETE CBC AUTOMATED: CPT | Performed by: NURSE PRACTITIONER

## 2018-06-16 PROCEDURE — 96374 THER/PROPH/DIAG INJ IV PUSH: CPT

## 2018-06-16 PROCEDURE — 85610 PROTHROMBIN TIME: CPT | Performed by: EMERGENCY MEDICINE

## 2018-06-16 PROCEDURE — 84132 ASSAY OF SERUM POTASSIUM: CPT

## 2018-06-16 PROCEDURE — 74178 CT ABD&PLV WO CNTR FLWD CNTR: CPT

## 2018-06-16 RX ORDER — PHYTONADIONE 10 MG/ML
10 INJECTION, EMULSION INTRAMUSCULAR; INTRAVENOUS; SUBCUTANEOUS ONCE
Status: DISCONTINUED | OUTPATIENT
Start: 2018-06-16 | End: 2018-06-16 | Stop reason: HOSPADM

## 2018-06-16 RX ORDER — ACETAMINOPHEN 325 MG/1
650 TABLET ORAL EVERY 8 HOURS PRN
Status: DISCONTINUED | OUTPATIENT
Start: 2018-06-16 | End: 2018-06-22 | Stop reason: HOSPADM

## 2018-06-16 RX ORDER — POTASSIUM CHLORIDE 14.9 MG/ML
20 INJECTION INTRAVENOUS ONCE
Status: COMPLETED | OUTPATIENT
Start: 2018-06-16 | End: 2018-06-16

## 2018-06-16 RX ORDER — ERGOCALCIFEROL 1.25 MG/1
50000 CAPSULE ORAL WEEKLY
Status: DISCONTINUED | OUTPATIENT
Start: 2018-06-20 | End: 2018-06-22 | Stop reason: HOSPADM

## 2018-06-16 RX ORDER — SODIUM CHLORIDE 9 MG/ML
1000 INJECTION, SOLUTION INTRAVENOUS CONTINUOUS
Status: DISCONTINUED | OUTPATIENT
Start: 2018-06-16 | End: 2018-06-16 | Stop reason: HOSPADM

## 2018-06-16 RX ORDER — VECURONIUM BROMIDE 1 MG/ML
8 INJECTION, POWDER, LYOPHILIZED, FOR SOLUTION INTRAVENOUS ONCE
Status: COMPLETED | OUTPATIENT
Start: 2018-06-16 | End: 2018-06-16

## 2018-06-16 RX ORDER — PANTOPRAZOLE SODIUM 40 MG/1
40 INJECTION, POWDER, FOR SOLUTION INTRAVENOUS EVERY 12 HOURS SCHEDULED
Status: DISCONTINUED | OUTPATIENT
Start: 2018-06-16 | End: 2018-06-22 | Stop reason: HOSPADM

## 2018-06-16 RX ORDER — ATORVASTATIN CALCIUM 20 MG/1
20 TABLET, FILM COATED ORAL
Status: DISCONTINUED | OUTPATIENT
Start: 2018-06-16 | End: 2018-06-22 | Stop reason: HOSPADM

## 2018-06-16 RX ORDER — ALPRAZOLAM 0.25 MG/1
0.25 TABLET ORAL 3 TIMES DAILY PRN
Status: DISCONTINUED | OUTPATIENT
Start: 2018-06-16 | End: 2018-06-22 | Stop reason: HOSPADM

## 2018-06-16 RX ORDER — CHLORHEXIDINE GLUCONATE 0.12 MG/ML
15 RINSE ORAL EVERY 12 HOURS SCHEDULED
Status: DISCONTINUED | OUTPATIENT
Start: 2018-06-16 | End: 2018-06-16

## 2018-06-16 RX ORDER — METOPROLOL TARTRATE 5 MG/5ML
5 INJECTION INTRAVENOUS ONCE
Status: COMPLETED | OUTPATIENT
Start: 2018-06-16 | End: 2018-06-16

## 2018-06-16 RX ORDER — CHLORHEXIDINE GLUCONATE 0.12 MG/ML
15 RINSE ORAL EVERY 12 HOURS SCHEDULED
Status: DISCONTINUED | OUTPATIENT
Start: 2018-06-16 | End: 2018-06-18

## 2018-06-16 RX ORDER — MIDAZOLAM HYDROCHLORIDE 1 MG/ML
2.5 INJECTION INTRAMUSCULAR; INTRAVENOUS ONCE
Status: COMPLETED | OUTPATIENT
Start: 2018-06-16 | End: 2018-06-16

## 2018-06-16 RX ORDER — ONDANSETRON 2 MG/ML
4 INJECTION INTRAMUSCULAR; INTRAVENOUS ONCE
Status: DISCONTINUED | OUTPATIENT
Start: 2018-06-16 | End: 2018-06-16 | Stop reason: HOSPADM

## 2018-06-16 RX ORDER — SODIUM CHLORIDE, SODIUM GLUCONATE, SODIUM ACETATE, POTASSIUM CHLORIDE, MAGNESIUM CHLORIDE, SODIUM PHOSPHATE, DIBASIC, AND POTASSIUM PHOSPHATE .53; .5; .37; .037; .03; .012; .00082 G/100ML; G/100ML; G/100ML; G/100ML; G/100ML; G/100ML; G/100ML
125 INJECTION, SOLUTION INTRAVENOUS CONTINUOUS
Status: DISCONTINUED | OUTPATIENT
Start: 2018-06-16 | End: 2018-06-16

## 2018-06-16 RX ORDER — ETOMIDATE 2 MG/ML
24 INJECTION INTRAVENOUS ONCE
Status: COMPLETED | OUTPATIENT
Start: 2018-06-16 | End: 2018-06-16

## 2018-06-16 RX ORDER — CHLORHEXIDINE GLUCONATE 0.12 MG/ML
15 RINSE ORAL EVERY 12 HOURS SCHEDULED
Status: DISCONTINUED | OUTPATIENT
Start: 2018-06-16 | End: 2018-06-16 | Stop reason: HOSPADM

## 2018-06-16 RX ORDER — METOPROLOL TARTRATE 5 MG/5ML
5 INJECTION INTRAVENOUS ONCE
Status: DISCONTINUED | OUTPATIENT
Start: 2018-06-16 | End: 2018-06-16

## 2018-06-16 RX ORDER — CALCIUM CARBONATE 500(1250)
1 TABLET ORAL 2 TIMES DAILY WITH MEALS
Status: DISCONTINUED | OUTPATIENT
Start: 2018-06-16 | End: 2018-06-22 | Stop reason: HOSPADM

## 2018-06-16 RX ORDER — NITROGLYCERIN 0.4 MG/1
0.4 TABLET SUBLINGUAL
Status: DISCONTINUED | OUTPATIENT
Start: 2018-06-16 | End: 2018-06-22 | Stop reason: HOSPADM

## 2018-06-16 RX ADMIN — SODIUM CHLORIDE 1000 ML/HR: 0.9 INJECTION, SOLUTION INTRAVENOUS at 06:54

## 2018-06-16 RX ADMIN — POTASSIUM CHLORIDE 20 MEQ: 200 INJECTION, SOLUTION INTRAVENOUS at 12:55

## 2018-06-16 RX ADMIN — CALCIUM 1 TABLET: 500 TABLET ORAL at 18:05

## 2018-06-16 RX ADMIN — MIDAZOLAM HYDROCHLORIDE 2.5 MG: 1 INJECTION, SOLUTION INTRAMUSCULAR; INTRAVENOUS at 08:52

## 2018-06-16 RX ADMIN — FENTANYL CITRATE 85 MCG/HR: 50 INJECTION, SOLUTION INTRAMUSCULAR; INTRAVENOUS at 07:20

## 2018-06-16 RX ADMIN — IODIXANOL 100 ML: 320 INJECTION, SOLUTION INTRAVASCULAR at 12:46

## 2018-06-16 RX ADMIN — ETOMIDATE 24 MG: 2 INJECTION INTRAVENOUS at 06:41

## 2018-06-16 RX ADMIN — METOPROLOL TARTRATE 5 MG: 5 INJECTION, SOLUTION INTRAVENOUS at 15:22

## 2018-06-16 RX ADMIN — ACETAMINOPHEN 650 MG: 325 TABLET ORAL at 23:12

## 2018-06-16 RX ADMIN — PANTOPRAZOLE SODIUM 40 MG: 40 INJECTION, POWDER, FOR SOLUTION INTRAVENOUS at 20:43

## 2018-06-16 RX ADMIN — CALCIUM CHLORIDE 1 G: 100 INJECTION INTRAVENOUS; INTRAVENTRICULAR at 12:53

## 2018-06-16 RX ADMIN — ATORVASTATIN CALCIUM 20 MG: 20 TABLET, FILM COATED ORAL at 18:05

## 2018-06-16 RX ADMIN — CHLORHEXIDINE GLUCONATE 15 ML: 1.2 RINSE ORAL at 12:55

## 2018-06-16 RX ADMIN — SODIUM CHLORIDE, SODIUM GLUCONATE, SODIUM ACETATE, POTASSIUM CHLORIDE, MAGNESIUM CHLORIDE, SODIUM PHOSPHATE, DIBASIC, AND POTASSIUM PHOSPHATE 125 ML/HR: .53; .5; .37; .037; .03; .012; .00082 INJECTION, SOLUTION INTRAVENOUS at 11:31

## 2018-06-16 RX ADMIN — VECURONIUM BROMIDE 8 MG: 1 INJECTION, POWDER, LYOPHILIZED, FOR SOLUTION INTRAVENOUS at 06:42

## 2018-06-16 RX ADMIN — POTASSIUM CHLORIDE 20 MEQ: 200 INJECTION, SOLUTION INTRAVENOUS at 15:02

## 2018-06-16 RX ADMIN — PROTHROMBIN, COAGULATION FACTOR VII HUMAN, COAGULATION FACTOR IX HUMAN, COAGULATION FACTOR X HUMAN, PROTEIN C, PROTEIN S HUMAN, AND WATER 2000 UNITS: KIT at 06:46

## 2018-06-16 RX ADMIN — CHLORHEXIDINE GLUCONATE 15 ML: 1.2 RINSE ORAL at 20:42

## 2018-06-16 RX ADMIN — Medication 10 MG: at 06:35

## 2018-06-16 RX ADMIN — NOREPINEPHRINE BITARTRATE 5 MCG/MIN: 1 INJECTION INTRAVENOUS at 09:11

## 2018-06-16 RX ADMIN — PANTOPRAZOLE SODIUM 40 MG: 40 INJECTION, POWDER, FOR SOLUTION INTRAVENOUS at 12:55

## 2018-06-16 NOTE — CONSULTS
Consultation -  [facility]  Shabana Conley 65 y o  female MRN: [id number]  [facility]: [id number]         Reason for Consult / Principal Problem: Bright red bleeding per rectum     HPI: Toan Colbert is a 65year old female with history of atrial flutter on Coumadin, status post CABG in 2008, hypertension, hyperlipidemia and rectal bleeding from known hemorrhoids  Portions of the patient's history was obtained from the EMR and MICU staff  Patient was transferred from [address] [place] [address] early this morning for multiple episodes of bright red blood per rectum that began at about 9 o'clock last night  It was reported that the patient also had mild diffuse abdominal cramping  At [facility], her hemoglobin was 12 0 on initial presentation  INR therapeutic at 2 2  Patient was reported to have suffered a seizure, which then prompted intubation  As she continued to pass bright red blood with clots, she was transferred to our [facility]  Repeat hemoglobin 10 7  Repeat INR 1 21  Per ER note from [facility], she may have had vitamin K administered  Imaging was deferred until she was transferred to [place]  She denies abdominal pain currently  Her vitals are stable  Prior to entering the room, she passed bright red blood with a large amount of dark clots  It is unknown when her last colonoscopy was  She follows with Dr Anisha George for eventual hemorrhoidectomy  She does have history of diverticulosis  Review of Systems:  As noted in HPI, limited secondary to intubation and mental status        Historical Information   Past Medical History:   Diagnosis Date    Cardiac disease     GERD (gastroesophageal reflux disease)     Hyperlipidemia     Hypertension      Past Surgical History:   Procedure Laterality Date    CARDIAC SURGERY      CARDIAC SURGERY      CATARACT EXTRACTION      CHOLECYSTECTOMY      COLONOSCOPY      COLONOSCOPY W/ CONTROL OF HEMORRHAGE      CORONARY ANGIOPLASTY WITH STENT PLACEMENT      HERNIA REPAIR      HYSTERECTOMY       Social History   History   Alcohol Use No     History   Drug Use No     History   Smoking Status    Former Smoker   Smokeless Tobacco    Never Used     No family history on file  Meds/Allergies     Current Facility-Administered Medications   Medication Dose Route Frequency    chlorhexidine (PERIDEX) 0 12 % oral rinse 15 mL  15 mL Swish & Spit Q12H [address]    fentaNYL 1250 mcg in sodium chloride 0 9% 125mL drip  85 mcg/hr Intravenous Continuous    multi-electrolyte (ISOLYTE-S PH 7 4 equivalent) IV solution  125 mL/hr Intravenous Continuous    norepinephrine (LEVOPHED) 4 mg (STANDARD CONCENTRATION) IV in sodium chloride 0 9% 250 mL  1-30 mcg/min Intravenous Titrated    pantoprazole (PROTONIX) injection 40 mg  40 mg Intravenous Q12H [address]       Allergies   Allergen Reactions    Codeine GI Intolerance    Lansoprazole Other (See Comments)     GI Upset, dania protonix    Morphine Nausea Only    Morphine And Related GI Intolerance         Objective     Blood pressure 143/84, pulse 86, temperature 97 8 °F (36 6 °C), temperature source Oral, resp  rate 20, SpO2 100 %  No intake or output data in the 24 hours ending 06/16/18 1106      PHYSICAL EXAM:      General Appearance:   In no respiratory distress  Alert  Intubated     HEENT:   Normocephalic, atraumatic, anicteric      Neck:  Supple, symmetrical, trachea midline   Lungs:   Clear to auscultation bilaterally   Heart[de-identified]   Regular rate and rhythm   Abdomen:   Soft, non-tender, non-distended; normal bowel sounds; no masses, no organomegaly    Genitalia:   Deferred    Rectal:   Deferred    Extremities:  No cyanosis, clubbing or edema    Pulses:  2+ and symmetric all extremities    Skin:  Skin color, texture, turgor normal, no rashes or lesions    Lymph nodes:  No palpable cervical or supraclavicular lymphadenopathy        Lab Results:     Results from last 7 days  Lab Units 06/16/18  0815 06/16/18  0453   WBC Thousand/uL  --  8 57   HEMOGLOBIN g/dL  --  12 0   I STAT HEMOGLOBIN g/dl 9 5*  --    HEMATOCRIT %  --  37 1   PLATELETS Thousands/uL  --  172   NEUTROS PCT %  --  65   LYMPHS PCT %  --  24   MONOS PCT %  --  10   EOS PCT %  --  1       Results from last 7 days  Lab Units 06/16/18  0815 06/16/18  0453   SODIUM mmol/L  --  141   POTASSIUM mmol/L  --  3 7   CHLORIDE mmol/L  --  106   CO2 mmol/L  --  28   BUN mg/dL  --  23   CREATININE mg/dL  --  0 99   CALCIUM mg/dL  --  8 8   TOTAL PROTEIN g/dL  --  6 4   BILIRUBIN TOTAL mg/dL  --  0 60   ALK PHOS U/L  --  141*   ALT U/L  --  22   AST U/L  --  21   GLUCOSE RANDOM mg/dL  --  112   GLUCOSE, ISTAT mg/dl 179*  --        Results from last 7 days  Lab Units 06/16/18  0453   INR  2 22*       Results from last 7 days  Lab Units 06/16/18  0453   LIPASE u/L 120       Imaging Studies: I have personally reviewed pertinent imaging studies  Xr Chest 1 View Portable    Result Date: 6/16/2018  Impression: ET tube in satisfactory position  Pulmonary vascular congestion  Workstation performed: [id number]     Ct Head Without Contrast    Result Date: 6/16/2018  Impression: No acute intracranial abnormality  Workstation performed: [id number]       ASSESSMENT and PLAN:      1) BRBPR with clotting -  Patient's hemoglobin went from 12 to 10 7  Patient had episode of bright red bleeding with clotting here in the MICU  Her INR is now 1 21  On exam, the abdomen is soft  Differentials include ischemic colitis, diverticular bleed, very less likely hemorrhoidal bleeding  Less likely upper GI bleeding   - Fortunately, patient's creatinine is within normal limits  After discussing with the MICU team, we will go forward with CT high-volume for GI bleed     - Continue to monitor hemoglobin Q4 hours and transfuse as necessary with goal hemoglobin above 7  - Follow-up lactic acid  - Continue to hold anticoagulation  - Pending imaging results, would plan for colonoscopy likely unprepped      The patient was seen and examined by Dr Lizette Downs, all nicole medical decisions were made with Dr Lizette Downs  Thank you for allowing us to participate in the care of this pleasant patient  We will follow up with you closely

## 2018-06-16 NOTE — RESPIRATORY THERAPY NOTE
RT Ventilator Management Note  Francisco Jesus 65 y o  female MRN: [id number]  Unit/Bed#: RM07 Encounter: [id number]    Called to ED for emergent intubation due to need for airway protection  Pt preoxygenated w/ NC and NRB  Suction and BVM w/ PEEP at bedside  Vitals stable  Pt intubated at 0644 w/ 8 0 hi-lo ETT in 1 attempt  Cuff inflated  Placement confirmed via colorimetric detector, ascultation w/ equal bilateral BS, equal bilateral chest rise, and CXR  Tube secured w/ commercial tube solis at the 23cm coco at the lip  Pt placed on  in AC/VC mode, w/ the following settings: 400mL/14bpm/50%/+5cmH2O  Pt resting comfortably  Transport to CT to follow

## 2018-06-16 NOTE — RESPIRATORY THERAPY NOTE
RT Ventilator Management Note  Dixie Cutting 65 y o  female MRN: [id number]  Unit/Bed#: RM07 Encounter: [id number]    Pt transported to CT via ambu w/ PEEP on 15L  Mask and syringe taken on transport  Vitals remained stable  Pt returned to bed 7 and placed back on  w/ previous settings  Transport and return w/o incident
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
## 2018-06-16 NOTE — ED NOTES
#18 orogastric tube inserted to left side of mouth by dolores haji rn   Positive placement check noted     Deep Hendrickson RN  06/16/18 9625

## 2018-06-16 NOTE — ED NOTES
Ist unit of prbc's [id number] initiated per dr stevens's orders   Checked by Fallon Sanches and Paulino Sparks and released to same [facility] ambulance     Guzman Seay RN  06/16/18 9320

## 2018-06-16 NOTE — ED NOTES
Telephone reort called to lakisha at [facility] at [address], RN  06/16/18 9271

## 2018-06-16 NOTE — ED NOTES
[facility] ambulance here report given to same care transferred     Toño Gamino RN  06/16/18 3812

## 2018-06-16 NOTE — EMTALA/ACUTE CARE TRANSFER
[address]  [address] [facility] [address]  Dept: [address] [place] TRANSFER CONSENT    NAME Aurea Ortez                                         1940                              MRN [id number]    I have been informed of my rights regarding examination, treatment, and transfer   by Dr Krista Tabares DO    Benefits: Specialized equipment and/or services available at the receiving facility (Include comment)________________________ (ICU; Surgery; GI)    Risks: Potential for delay in receiving treatment, Other: (Include comment)__________________________, Potential deterioration of medical condition, Loss of IV, Increased discomfort during transfer, Possible worsening of condition or death during transfer (motor vehicle collision)    Consent for Transfer:  I acknowledge that my medical condition has been evaluated and explained to me by the emergency department physician or other qualified medical person and/or my attending physician, who has recommended that I be transferred to the service of  Accepting Physician: Dr Cheryl De La Torre at [facility] Name, [address] : [address] [facility]  The above potential benefits of such transfer, the potential risks associated with such transfer, and the probable risks of not being transferred have been explained to me, and I fully understand them  The doctor has explained that, in my case, the benefits of transfer outweigh the risks  I agree to be transferred  I authorize the performance of emergency medical procedures and treatments upon me in both transit and upon arrival at the receiving facility  Additionally, I authorize the release of any and all medical records to the receiving facility and request they be transported with me, if possible    I understand that the safest mode of transportation during a medical emergency is an ambulance and that the Hospital advocates the use of this mode of transport  Risks of traveling to the receiving facility by car, including absence of medical control, life sustaining equipment, such as oxygen, and medical personnel has been explained to me and I fully understand them  (PAULO CORRECT BOX BELOW)  [  ]  I consent to the stated transfer and to be transported by ambulance/helicopter  [  ]  I consent to the stated transfer, but refuse transportation by ambulance and accept full responsibility for my transportation by car  I understand the risks of non-ambulance transfers and I exonerate the Hospital and its staff from any deterioration in my condition that results from this refusal     X___________________________________________    DATE  18  TIME________  Signature of patient or legally responsible individual signing on patient behalf           RELATIONSHIP TO PATIENT_________________________          Provider Certification    NAME Jeane De Los Santos                                        ANUSHA 1940                              MRN [id number]    A medical screening exam was performed on the above named patient  Based on the examination:    Condition Necessitating Transfer The primary encounter diagnosis was Acute lower GI bleeding  A diagnosis of Seizure [facility]) was also pertinent to this visit      Patient Condition: The patient has been stabilized such that within reasonable medical probability, no material deterioration of the patient condition or the condition of the unborn child(sherley) is likely to result from the transfer    Reason for Transfer: Level of Care needed not available at this facility (ICU; Surgery; GI)    Transfer Requirements: [address]   · Space available and qualified personnel available for treatment as acknowledged by    · Agreed to accept transfer and to provide appropriate medical treatment as acknowledged by       Dr Ana Maria Caal  · Appropriate medical records of the examination and treatment of the patient are provided at the time of transfer   [address] _______  · Transfer will be performed by qualified personnel from    and appropriate transfer equipment as required, including the use of necessary and appropriate life support measures  Provider Certification: I have examined the patient and explained the following risks and benefits of being transferred/refusing transfer to the patient/family:  General risk, such as traffic hazards, adverse weather conditions, rough terrain or turbulence, possible failure of equipment (including vehicle or aircraft), or consequences of actions of persons outside the control of the transport personnel      Based on these reasonable risks and benefits to the patient and/or the unborn child(sherley), and based upon the information available at the time of the patients examination, I certify that the medical benefits reasonably to be expected from the provision of appropriate medical treatments at another medical facility outweigh the increasing risks, if any, to the individuals medical condition, and in the case of labor to the unborn child, from effecting the transfer      X____________________________________________ DATE 06/16/18        TIME_______      ORIGINAL - SEND TO MEDICAL RECORDS   COPY - SEND WITH PATIENT DURING TRANSFER

## 2018-06-16 NOTE — ED NOTES
Pt on bed side commode, felt like she was going to pass out per family, went into room, pt was having a near syncopal episode, removed from bsc, placed back in bed, dr Nicole Lawton alerted, decision to intubate pt for airway securement, and deterioration in pt condition       Sancho Escoto RN  06/16/18 8074

## 2018-06-16 NOTE — H&P
History and Physical - Critical Care  Kiet Jones 65 y o  female MRN: [id number]  [facility]: [id number]     Reason for Admission / Chief Complaint: GIB     History of Present Illness:  Kiet Jones is a 65 y o  female with PMH of HTN/HLD, GERD, CAD s/p CABG, Diverticulosis, Internal Hemerrhoids Aflutter (on warfarin), Chronic Diastolic CHF, Right Ventricular Dysfunction who presents after being transferred from [facility] after being found to have significant, symptomatic GIB with syncope and seizure-like activity  Per chart review, pt presented to [facility] with [facility]  She stated it began at 21:30 yesterday and was associated with mild, diffuse abdominal cramping and nausea  Denies any other source of bleeding, chest pain or pre-syncopal sx's at initial presentation  Last Colonscopy in 2015, per Dr Nelia Pulido note on 01/17, with no significant pathology  Does have hx of LGIB's 2/2 internal hemorrhoids and post-polypectomies that have required transfusions  Of note, patients Zaroxolyn was recently increased from 2 5mgm once/week to twice/week  At [facility], she was hemodynamically stable and had Hb of 12 0  She was given K-Centra and Vit K  S/p 2L NS bolus  She was on bedside commode and had a syncopal episode with brief episode (15s) of tonic-clonic seizures  Post-ictal period of 5 mins with mild confusion  Intubated for airway control  CTH wo contrast negative  CXR revealed pulmonary congestion and appropriate ET tube possession  History obtained from chart review       Past Medical History:  Past Medical History:   Diagnosis Date    Cardiac disease     GERD (gastroesophageal reflux disease)     Hyperlipidemia     Hypertension         Past Surgical History:  Past Surgical History:   Procedure Laterality Date    CARDIAC SURGERY      CARDIAC SURGERY      CATARACT EXTRACTION      CHOLECYSTECTOMY      COLONOSCOPY      COLONOSCOPY W/ CONTROL OF HEMORRHAGE      CORONARY ANGIOPLASTY WITH STENT PLACEMENT      HERNIA REPAIR      HYSTERECTOMY          Past Family History:  No family history on file  Social History:  History   Smoking Status    Former Smoker   Smokeless Tobacco    Never Used     History   Alcohol Use No     History   Drug Use No     Marital Status: /Civil Union       Medications:  No current facility-administered medications for this encounter  Home medications:  Prior to Admission medications    Medication Sig Start Date End Date Taking? Authorizing Provider   ALPRAZolam Velta Sears) 0 25 mg tablet Take 0 25 mg by mouth 3 (three) times a day as needed for anxiety  Historical Provider, MD   atorvastatin (LIPITOR) 20 mg tablet Take 20 mg by mouth daily after dinner  Historical Provider, MD   CALCIUM PO Take 600 mg by mouth 2 (two) times a day      Historical Provider, MD   ergocalciferol (ERGOCALCIFEROL) 56994 UNITS capsule Take 50,000 Units by mouth once a week  Takes on Wednesdays    Historical Provider, MD   furosemide (LASIX) 20 mg tablet Take 1 tablet (20 mg total) by mouth 2 (two) times a day for 90 days 2/8/18 6/16/18  Will Melgoza MD   lisinopril (ZESTRIL) 10 mg tablet Take 10 mg by mouth daily  Historical Provider, MD   metolazone (ZAROXOLYN) 2 5 mg tablet Take 2 5 mg by mouth daily    Historical Provider, MD   metoprolol tartrate (LOPRESSOR) 50 mg tablet Take 50 mg by mouth 2 (two) times a day    Historical Provider, MD   Multiple Vitamins-Minerals (PRESERVISION AREDS) CAPS Take 1 capsule by mouth 2 (two) times a day    Historical Provider, MD   nitroglycerin (NITROSTAT) 0 4 mg SL tablet Place 0 4 mg under the tongue every 5 (five) minutes as needed for chest pain  Historical Provider, MD   Omega-3 Fatty Acids (FISH OIL PO) Take 1,000 mg by mouth daily  Historical Provider, MD   Potassium Chloride Maddy CR (KLOR-CON M20 PO) Take 20 mEq by mouth daily      Historical Provider, MD   ranitidine (ZANTAC) 300 MG capsule Take 300 mg by mouth daily      Historical Provider, MD   warfarin (COUMADIN) 2 mg tablet TAKE ONE TO TWO TABLETS BY MOUTH ONCE DAILY OR  AS  ORDERED  BY  PHYSICIAN 3/2/18   Mayela Marlow MD   albuterol (PROVENTIL HFA,VENTOLIN HFA) 90 mcg/act inhaler Inhale 2 puffs every 6 (six) hours as needed for wheezing 17  Khris Linares DO   fluticasone [facility]) 50 mcg/act nasal spray 2 sprays into each nostril daily 17  Khris Linares DO     Allergies: Allergies   Allergen Reactions    Codeine GI Intolerance    Lansoprazole Other (See Comments)     GI Upset, dania protonix    Morphine Nausea Only    Morphine And Related GI Intolerance        ROS:   Review of Systems   All other systems reviewed and are negative  Vitals: There were no vitals filed for this visit  Temperature:   Temp (24hrs), Av 8 °F (36 6 °C), Min:97 8 °F (36 6 °C), Max:97 8 °F (36 6 °C)    Current:       Weights: There is no height or weight on file to calculate BMI  Hemodynamic Monitoring:  N/A     Non-Invasive/Invasive Ventilation Settings:  Respiratory    Lab Data (Last 4 hours)    None         O2/Vent Data (Last 4 hours)       0654  0720         Vent Mode AC/VC AC/VC      Resp Rate (BPM) (BPM) 14 14      Vt (mL) (mL) 400 400      FIO2 (%) (%) 50 50      PEEP (cmH2O) (cmH2O) 5 5      Patient safety screen outcome: Failed Failed      MV 9 06 6 12                No results found for: PHART, GHP2GRU, PO2ART, KGB9ZLL, Q7JZZSSG, BEART, SOURCE  SpO2: SpO2: 100 %     Physical Exam:  Physical Exam   Constitutional: She appears well-developed and well-nourished  HENT:   Head: Normocephalic and atraumatic  Neck: Normal range of motion  Neck supple  Cardiovascular: Normal rate, regular rhythm and normal heart sounds  Pulmonary/Chest: Effort normal and breath sounds normal  No respiratory distress  She has no wheezes  She has no rales  Abdominal: Soft   Bowel sounds are normal  She exhibits no distension  There is no tenderness  There is no rebound  Musculoskeletal: Normal range of motion  Skin: Skin is warm and dry  Labs:    Results from last 7 days  Lab Units 06/16/18  0815 06/16/18  0453   WBC Thousand/uL  --  8 57   HEMOGLOBIN g/dL  --  12 0   I STAT HEMOGLOBIN g/dl 9 5*  --    HEMATOCRIT %  --  37 1   PLATELETS Thousands/uL  --  172   NEUTROS PCT %  --  65   MONOS PCT %  --  10      Results from last 7 days  Lab Units 06/16/18  0815 06/16/18  0453   SODIUM mmol/L  --  141   POTASSIUM mmol/L  --  3 7   CHLORIDE mmol/L  --  106   CO2 mmol/L  --  28   BUN mg/dL  --  23   CREATININE mg/dL  --  0 99   CALCIUM mg/dL  --  8 8   TOTAL PROTEIN g/dL  --  6 4   BILIRUBIN TOTAL mg/dL  --  0 60   ALK PHOS U/L  --  141*   ALT U/L  --  22   AST U/L  --  21   GLUCOSE RANDOM mg/dL  --  112   GLUCOSE, ISTAT mg/dl 179*  --        Results from last 7 days  Lab Units 06/16/18  0453   MAGNESIUM mg/dL 2 2            Results from last 7 days  Lab Units 06/16/18  0453   INR  2 22*           0  Lab Value Date/Time   TROPONINI <0 02 12/19/2017 1244   Azell Kind <0 02 12/12/2016 0747   TROPONINI <0 02 09/18/2016 1404   TROPONINI <0 02 09/18/2016 1120   TROPONINI <0 02 09/18/2016 0538        Imaging:  I have personally reviewed pertinent reports  EKG: This was personally reviewed by myself  Micro:  Lab Results   Component Value Date    BLOODCX No Growth After 5 Days  09/18/2016    BLOODCX No Growth After 5 Days  09/18/2016    URINECX No Growth <1000 cfu/mL 01/03/2017    URINECX 40,000-49,000 cfu/ml Mixed Contaminants X3 12/12/2016       Assessment:   Aurea Ortez is a 65 y o  female with PMH of HTN/HLD, GERD, CAD s/p CABG, Diverticulosis, Internal Hemerrhoids, Aflutter (on warfarin), Chronic Diastolic CHF, Right Ventricular Dysfunction who presents after being transferred from [facility] after being found to have significant, symptomatic GIB with syncope and seizure-like activity        Plan: Neuro:   · Sedation: Fentanyl gtt  · CAM-ICU, delirium precautions                 CV:   · MAP >65  Heomodynamically stable at this time  Surprisingly not tachy or hypotensive  Lung:  · Previous intubated in setting of syncope and seizures  · Currently intubated on VC 14/400/50/5  ·                GI:  · Symptomatic GIB (syncope) Hb 12 0 at [facility] (suspect falsely normal in 1st 24 hours)  S/p 1u pRBC's  Follow up CBC  · Hx of GERD, internal hemorrhoids, diverticulosis  · INR 2 22 s/p 2000u K-Centra and Vit K  F/u repeat INT  · 3-4 cups of Dark Red Blood clots upon arrival  · Hx of internal hemorrhoids with plan for resection in future  · On warfarin at home, obviously hold warfarin  Not on ASA/NSAIDs or other blood thinner  · Protonix BID  · NPO, Isolyte 125 cc/hr  · H+H q8hrs  · Monitor BM's  · GI consulted, possible EGD/Colonoscopy  · Last Colonscopy in 2015, per Dr Gilma Addison note on 01/17, with no significant pathology  FEN:   · NPO, 125 cc/hr Isolyte                   :  · No active issues  ID:   · No active issues                 Heme:  · Hb 12 0 at [facility] (suspect falsely normal in 1st 24 hours)  S/p 1u pRBC's  Follow up CBC  · INR 2 22 s/p 2000u K-Centra and Vit K  F/u repeat INT  · 3-4 cups of Dark Red Blood clots upon arrival  · Hx of internal hemorrhoids with plan for resection in future  · On warfarin at home, obviously hold warfarin  Not on ASA/NSAIDs or other blood thinner  · H+H q8hrs  · Hold Pharmacologic DVT ppx  · Type and Screen                 Endo:  · No active issues                 Msk/Skin:  · Turn and reposition frequentyl  Disposition: Cont ICU care  VTE Pharmacologic Prophylaxis: Reason for no pharmacologic prophylaxis GIB  VTE Mechanical Prophylaxis: sequential compression device     Invasive lines and devices:   Invasive Devices     Peripheral Intravenous Line            Peripheral IV 06/16/18 Right Antecubital less than 1 day    Peripheral IV 06/16/18 Right Arm less than 1 day          Drain            NG/OG/Enteral Tube Orogastric 18 Fr Left mouth less than 1 day    Urethral Catheter Latex 16 Fr  less than 1 day          Airway            ETT  Hi-Lo; Cuffed;Oral 8 mm less than 1 day                 Code Status: Prior  POA:    POLST:       Given critical illness, patient length of stay will require greater than two midnights  Portions of the record may have been created with voice recognition software  Occasional wrong word or "sound a like" substitutions may have occurred due to the inherent limitations of voice recognition software  Read the chart carefully and recognize, using context, where substitutions have occurred          Katlin Patino MD

## 2018-06-16 NOTE — ED NOTES
Pt intubated with a #8 ett by dr Adalid Little, 23cm lip line coco, position confirmed by auscultation, and change on end exp  Device       Murray Castellanos RN  06/16/18 9033

## 2018-06-17 ENCOUNTER — ANESTHESIA EVENT (INPATIENT)
Dept: GASTROENTEROLOGY | Facility: HOSPITAL | Age: 78
DRG: 377 | End: 2018-06-17
Payer: MEDICARE

## 2018-06-17 PROBLEM — R11.10 VOMITING: Status: ACTIVE | Noted: 2018-06-17

## 2018-06-17 PROBLEM — K57.31 DIVERTICULOSIS OF LARGE INTESTINE WITH HEMORRHAGE: Status: ACTIVE | Noted: 2018-06-16

## 2018-06-17 PROBLEM — R55 VASOVAGAL SYNCOPE: Status: RESOLVED | Noted: 2018-06-16 | Resolved: 2018-06-17

## 2018-06-17 LAB
ABO GROUP BLD BPU: NORMAL
ABO GROUP BLD BPU: NORMAL
ANION GAP SERPL CALCULATED.3IONS-SCNC: 5 MMOL/L (ref 4–13)
ANION GAP SERPL CALCULATED.3IONS-SCNC: 6 MMOL/L (ref 4–13)
ANION GAP SERPL CALCULATED.3IONS-SCNC: 7 MMOL/L (ref 4–13)
BPU ID: NORMAL
BPU ID: NORMAL
BUN SERPL-MCNC: 11 MG/DL (ref 5–25)
BUN SERPL-MCNC: 13 MG/DL (ref 5–25)
BUN SERPL-MCNC: 14 MG/DL (ref 5–25)
CA-I BLD-SCNC: 1.13 MMOL/L (ref 1.12–1.32)
CALCIUM SERPL-MCNC: 8.1 MG/DL (ref 8.3–10.1)
CALCIUM SERPL-MCNC: 8.3 MG/DL (ref 8.3–10.1)
CALCIUM SERPL-MCNC: 8.4 MG/DL (ref 8.3–10.1)
CHLORIDE SERPL-SCNC: 109 MMOL/L (ref 100–108)
CHLORIDE SERPL-SCNC: 109 MMOL/L (ref 100–108)
CHLORIDE SERPL-SCNC: 110 MMOL/L (ref 100–108)
CO2 SERPL-SCNC: 25 MMOL/L (ref 21–32)
CO2 SERPL-SCNC: 26 MMOL/L (ref 21–32)
CO2 SERPL-SCNC: 28 MMOL/L (ref 21–32)
CREAT SERPL-MCNC: 0.74 MG/DL (ref 0.6–1.3)
CREAT SERPL-MCNC: 0.75 MG/DL (ref 0.6–1.3)
CREAT SERPL-MCNC: 0.85 MG/DL (ref 0.6–1.3)
ERYTHROCYTE [DISTWIDTH] IN BLOOD BY AUTOMATED COUNT: 14.5 % (ref 11.6–15.1)
GFR SERPL CREATININE-BSD FRML MDRD: 66 ML/MIN/1.73SQ M
GFR SERPL CREATININE-BSD FRML MDRD: 77 ML/MIN/1.73SQ M
GFR SERPL CREATININE-BSD FRML MDRD: 78 ML/MIN/1.73SQ M
GLUCOSE SERPL-MCNC: 100 MG/DL (ref 65–140)
GLUCOSE SERPL-MCNC: 100 MG/DL (ref 65–140)
GLUCOSE SERPL-MCNC: 132 MG/DL (ref 65–140)
HCT VFR BLD AUTO: 34.1 % (ref 34.8–46.1)
HGB BLD-MCNC: 10.7 G/DL (ref 11.5–15.4)
HGB BLD-MCNC: 10.7 G/DL (ref 11.5–15.4)
INR PPP: 1.12 (ref 0.86–1.17)
MAGNESIUM SERPL-MCNC: 2 MG/DL (ref 1.6–2.6)
MAGNESIUM SERPL-MCNC: 2 MG/DL (ref 1.6–2.6)
MCH RBC QN AUTO: 30.6 PG (ref 26.8–34.3)
MCHC RBC AUTO-ENTMCNC: 31.4 G/DL (ref 31.4–37.4)
MCV RBC AUTO: 97 FL (ref 82–98)
PHOSPHATE SERPL-MCNC: 2.4 MG/DL (ref 2.3–4.1)
PLATELET # BLD AUTO: 106 THOUSANDS/UL (ref 149–390)
PMV BLD AUTO: 9.7 FL (ref 8.9–12.7)
POTASSIUM SERPL-SCNC: 4.1 MMOL/L (ref 3.5–5.3)
POTASSIUM SERPL-SCNC: 4.1 MMOL/L (ref 3.5–5.3)
POTASSIUM SERPL-SCNC: 4.6 MMOL/L (ref 3.5–5.3)
PROTHROMBIN TIME: 14.5 SECONDS (ref 11.8–14.2)
RBC # BLD AUTO: 3.5 MILLION/UL (ref 3.81–5.12)
SODIUM SERPL-SCNC: 141 MMOL/L (ref 136–145)
SODIUM SERPL-SCNC: 142 MMOL/L (ref 136–145)
SODIUM SERPL-SCNC: 142 MMOL/L (ref 136–145)
TROPONIN I SERPL-MCNC: 0.28 NG/ML
UNIT DISPENSE STATUS: NORMAL
UNIT DISPENSE STATUS: NORMAL
UNIT PRODUCT CODE: NORMAL
UNIT PRODUCT CODE: NORMAL
UNIT RH: NORMAL
UNIT RH: NORMAL
WBC # BLD AUTO: 8.34 THOUSAND/UL (ref 4.31–10.16)

## 2018-06-17 PROCEDURE — 85018 HEMOGLOBIN: CPT | Performed by: NURSE PRACTITIONER

## 2018-06-17 PROCEDURE — P9016 RBC LEUKOCYTES REDUCED: HCPCS

## 2018-06-17 PROCEDURE — 85027 COMPLETE CBC AUTOMATED: CPT | Performed by: NURSE PRACTITIONER

## 2018-06-17 PROCEDURE — 83735 ASSAY OF MAGNESIUM: CPT | Performed by: NURSE PRACTITIONER

## 2018-06-17 PROCEDURE — 84100 ASSAY OF PHOSPHORUS: CPT | Performed by: EMERGENCY MEDICINE

## 2018-06-17 PROCEDURE — 99232 SBSQ HOSP IP/OBS MODERATE 35: CPT | Performed by: INTERNAL MEDICINE

## 2018-06-17 PROCEDURE — 80048 BASIC METABOLIC PNL TOTAL CA: CPT | Performed by: NURSE PRACTITIONER

## 2018-06-17 PROCEDURE — C9113 INJ PANTOPRAZOLE SODIUM, VIA: HCPCS | Performed by: NURSE PRACTITIONER

## 2018-06-17 PROCEDURE — 83735 ASSAY OF MAGNESIUM: CPT | Performed by: EMERGENCY MEDICINE

## 2018-06-17 PROCEDURE — 99233 SBSQ HOSP IP/OBS HIGH 50: CPT | Performed by: INTERNAL MEDICINE

## 2018-06-17 PROCEDURE — 80048 BASIC METABOLIC PNL TOTAL CA: CPT | Performed by: PHYSICIAN ASSISTANT

## 2018-06-17 PROCEDURE — 85610 PROTHROMBIN TIME: CPT | Performed by: NURSE PRACTITIONER

## 2018-06-17 PROCEDURE — 80048 BASIC METABOLIC PNL TOTAL CA: CPT | Performed by: EMERGENCY MEDICINE

## 2018-06-17 PROCEDURE — 82330 ASSAY OF CALCIUM: CPT | Performed by: EMERGENCY MEDICINE

## 2018-06-17 PROCEDURE — 84484 ASSAY OF TROPONIN QUANT: CPT | Performed by: EMERGENCY MEDICINE

## 2018-06-17 RX ORDER — ONDANSETRON 2 MG/ML
4 INJECTION INTRAMUSCULAR; INTRAVENOUS EVERY 4 HOURS PRN
Status: DISCONTINUED | OUTPATIENT
Start: 2018-06-17 | End: 2018-06-22 | Stop reason: HOSPADM

## 2018-06-17 RX ORDER — METOPROLOL TARTRATE 5 MG/5ML
5 INJECTION INTRAVENOUS EVERY 6 HOURS SCHEDULED
Status: DISCONTINUED | OUTPATIENT
Start: 2018-06-17 | End: 2018-06-18

## 2018-06-17 RX ORDER — METOPROLOL TARTRATE 5 MG/5ML
5 INJECTION INTRAVENOUS ONCE
Status: COMPLETED | OUTPATIENT
Start: 2018-06-17 | End: 2018-06-17

## 2018-06-17 RX ORDER — POTASSIUM CHLORIDE 20 MEQ/1
40 TABLET, EXTENDED RELEASE ORAL ONCE
Status: COMPLETED | OUTPATIENT
Start: 2018-06-17 | End: 2018-06-17

## 2018-06-17 RX ORDER — ONDANSETRON 2 MG/ML
INJECTION INTRAMUSCULAR; INTRAVENOUS
Status: COMPLETED
Start: 2018-06-17 | End: 2018-06-17

## 2018-06-17 RX ADMIN — ATORVASTATIN CALCIUM 20 MG: 20 TABLET, FILM COATED ORAL at 17:28

## 2018-06-17 RX ADMIN — CHLORHEXIDINE GLUCONATE 15 ML: 1.2 RINSE ORAL at 20:05

## 2018-06-17 RX ADMIN — PANTOPRAZOLE SODIUM 40 MG: 40 INJECTION, POWDER, FOR SOLUTION INTRAVENOUS at 09:41

## 2018-06-17 RX ADMIN — ONDANSETRON 4 MG: 2 INJECTION, SOLUTION INTRAMUSCULAR; INTRAVENOUS at 23:43

## 2018-06-17 RX ADMIN — POTASSIUM CHLORIDE 40 MEQ: 1500 TABLET, EXTENDED RELEASE ORAL at 04:48

## 2018-06-17 RX ADMIN — POLYETHYLENE GLYCOL 3350, SODIUM SULFATE ANHYDROUS, SODIUM BICARBONATE, SODIUM CHLORIDE, POTASSIUM CHLORIDE 4000 ML: 236; 22.74; 6.74; 5.86; 2.97 POWDER, FOR SOLUTION ORAL at 16:47

## 2018-06-17 RX ADMIN — CALCIUM 1 TABLET: 500 TABLET ORAL at 17:28

## 2018-06-17 RX ADMIN — METOPROLOL TARTRATE 5 MG: 1 INJECTION, SOLUTION INTRAVENOUS at 18:30

## 2018-06-17 RX ADMIN — PANTOPRAZOLE SODIUM 40 MG: 40 INJECTION, POWDER, FOR SOLUTION INTRAVENOUS at 20:05

## 2018-06-17 RX ADMIN — METOPROLOL TARTRATE 5 MG: 1 INJECTION, SOLUTION INTRAVENOUS at 23:39

## 2018-06-17 RX ADMIN — METOPROLOL TARTRATE 5 MG: 1 INJECTION, SOLUTION INTRAVENOUS at 13:28

## 2018-06-17 RX ADMIN — CALCIUM 1 TABLET: 500 TABLET ORAL at 07:42

## 2018-06-17 RX ADMIN — ONDANSETRON 4 MG: 2 INJECTION, SOLUTION INTRAMUSCULAR; INTRAVENOUS at 07:51

## 2018-06-17 RX ADMIN — Medication 1 TABLET: at 09:41

## 2018-06-17 RX ADMIN — METOPROLOL TARTRATE 5 MG: 5 INJECTION, SOLUTION INTRAVENOUS at 09:42

## 2018-06-17 NOTE — PROGRESS NOTES
Progress Note - Critical Care   Camargo Labor 65 y o  female MRN: [id number]  Unit/Bed#: MICU 02 Encounter: [id number]      Assessment: 65year old female hx of CABG, aflutter, diastolic CHF    Plan:     Neuro:   · Off sedation  · CAM-ICU, delirium precautions  · Patient alert oriented x3  · Continue Xanax 0 25 mg TID PRN      CV:   · MAP >65  Hemodynamically stable at this time  · /60, - persistently tachycardic overnight, likely due to aflutter  · Continue rate control metoprolol 12 5 mg, atorvastatin 20 mg  · Troponins 0 44 -> 0 28-  EKG ST depressions - asymptomatic       Lung:  · Previous intubated in setting of syncope and seizures  · S/P extubation, sat 97& on room air       GI:  · Symptomatic GIB (syncope) Hb 12 0 at [facility] (suspect falsely normal in 1st 24 hours)  S/p 1u pRBC's at [facility]  · Hx of GERD, internal hemorrhoids, diverticulosis  · INR 2 22 s/p 2000u K-Centra and Vit K --> INR today 1 12  · Hx of internal hemorrhoids with plan for resection in future  · On warfarin at home,hold warfarin  Not on ASA/NSAIDs or other blood thinner  · IV Protonix BID  · H+H q8hrs - last Hgb 10/7 after 2 units PRBC at night 6/17  · Monitor BM's- none reported  · GI consulted, possible EGD/Colonoscopy on Monday 6/12  Maintain NPO after midnight Start bowel prep today  · Last Colonscopy in 2015, per Dr Milagro Saldivar note on 01/17, with no significant pathology        FEN:   · Continue clear liquids   · NPO afternight midnight  · I/O +371 2  · Potassium repleted overnight 4 1, Mag 2 0, Phos 2 4  · IV zofran as needed for nausea      :  · No active issues      ID:   · No active issues       Heme:  · Hb 12 0 at [facility] (suspect falsely normal in 1st 24 hours)  S/p 1u pRBC's at [facility] 120  Currently 10 7, stable after 2 units PRBCs overnight   · INR 2 22 s/p 2000u K-Centra and Vit K  Repeat INR 1 12  · Hold home dose of warfarin   Not on ASA/NSAIDs or other blood thinner  · H+H q8hrs  · Hold Pharmacologic DVT ppx  · Type and Screen performed       Endo:  · No active issues       Msk/Skin:  · Out of bed as tolerated       Disposition: ICU       Chief Complaint:  I feel fine/ Slept bad      HPI/24hr events:   Patient reported headache which resolved with tylenol  Last bloody movement was day of admission, denies any currently  Hemoglobin trended down to 9 3 s/p 2 units of PRBCs over night  Patient is asymptomatic  Denies weakness, dizziness, CP, palpitations, SO, distension  Patient reports good appetite  All other ROS was negative  Physical Exam:   Physical Exam   Constitutional: She is oriented to person, place, and time  She appears well-developed and well-nourished  No distress  Eyes: Conjunctivae are normal  Pupils are equal, round, and reactive to light  Neck: Normal range of motion  Neck supple  Cardiovascular: Intact distal pulses  No murmur heard  irregulary irregular, normal heart sounds   Pulmonary/Chest: No respiratory distress  Bibasilar crackles   Abdominal: Soft  Bowel sounds are normal  She exhibits no distension  Musculoskeletal: She exhibits no edema  Neurological: She is alert and oriented to person, place, and time  No cranial nerve deficit  Skin: Skin is warm and dry  She is not diaphoretic  No erythema  Psychiatric: She has a normal mood and affect  Her behavior is normal          Vitals:    18 0207 18 0222 18 0300 18 0400   BP: 96/58  105/51 109/60   Pulse: (!) 109  (!) 108 (!) 106   Resp:    Temp: 98 8 °F (37 1 °C) 99 1 °F (37 3 °C)  99 °F (37 2 °C)   TempSrc:    Oral   SpO2:   98% 97%   Weight:       Height:                 Temperature:   Temp (24hrs), Av 9 °F (37 2 °C), Min:97 8 °F (36 6 °C), Max:99 6 °F (37 6 °C)    Current: Temperature: 99 °F (37 2 °C)    Weights:   IBW: -92 5 kg    Body mass index is 30 56 kg/m²    Weight (last 2 days)     Date/Time   Weight    18 1052  83 3 (183 64)    18 1033  83 3 (183 64)              Hemodynamic Monitoring: None     Non-Invasive/Invasive Ventilation Settings:  Respiratory    Lab Data (Last 4 hours)    None         O2/Vent Data (Last 4 hours)    None              No results found for: PHART, QCP9FUP, PO2ART, XEZ6DLL, C7EMMOCD, BEART, SOURCE  SpO2: SpO2: 98 %    Intake and Outputs:  I/O       06/15 0701 - 06/16 0700 06/16 0701 - 06/17 0700    P  O   120    I V  (mL/kg)  851 2 (10 2)    Blood  600    IV Piggyback  200    Total Intake(mL/kg)  1771 2 (21 3)    Urine (mL/kg/hr)  1100    Emesis/NG output  300    Total Output   1400    Net   +371 2              UOP: Total 1 4L --> I/O +371/2     Nutrition:        Diet Orders            Start     Ordered    06/16/18 1830  Diet Clear Liquid  Diet effective now     Question Answer Comment   Diet Type Clear Liquid    RD to adjust diet per protocol? Yes        06/16/18 1829            Labs:     Results from last 7 days  Lab Units 06/17/18  0438 06/16/18  2220 06/16/18  1350 06/16/18  1107  06/16/18  0453   WBC Thousand/uL 8 34  --   --  12 91*  --  8 57   HEMOGLOBIN g/dL 10 7* 9 3* 10 2* 10 7*  --  12 0   I STAT HEMOGLOBIN   --   --   --   --   < >  --    HEMATOCRIT % 34 1*  --   --  34 2*  --  37 1   PLATELETS Thousands/uL 106*  --   --  145*  --  172   NEUTROS PCT %  --   --   --   --   --  65   MONOS PCT %  --   --   --   --   --  10   < > = values in this interval not displayed     Results from last 7 days  Lab Units 06/17/18  0438 06/17/18  0155 06/16/18  1128  06/16/18  0453   SODIUM mmol/L 142 141 144  --  141   POTASSIUM mmol/L 4 1 4 1 3 6  --  3 7   CHLORIDE mmol/L 110* 109* 112*  --  106   CO2 mmol/L 26 25 25  --  28   BUN mg/dL 13 14 20  --  23   CREATININE mg/dL 0 75 0 74 0 95  --  0 99   CALCIUM mg/dL 8 3 8 4 7 7*  --  8 8   TOTAL PROTEIN g/dL  --   --   --   --  6 4   BILIRUBIN TOTAL mg/dL  --   --   --   --  0 60   ALK PHOS U/L  --   --   --   --  141*   ALT U/L  --   --   --   --  22   AST U/L  --   --   --   --  21 GLUCOSE RANDOM mg/dL 100 100 145*  --  112   GLUCOSE, ISTAT   --   --   --   < >  --    < > = values in this interval not displayed  Results from last 7 days  Lab Units 06/17/18  0438 06/17/18  0155 06/16/18  0453   MAGNESIUM mg/dL 2 0 2 0 2 2       Results from last 7 days  Lab Units 06/17/18  0155   PHOSPHORUS mg/dL 2 4        Results from last 7 days  Lab Units 06/17/18  0438 06/16/18  1110 06/16/18  0453   INR  1 12 1 21* 2 22*       Results from last 7 days  Lab Units 06/16/18  1107   LACTIC ACID mmol/L 2 0       0  Lab Value Date/Time   TROPONINI 0 28 (H) 06/17/2018 0155   TROPONINI 0 44 (H) 06/16/2018 1801   TROPONINI 0 42 (H) 06/16/2018 1350   TROPONINI 0 19 (H) 06/16/2018 1107   TROPONINI <0 02 12/19/2017 1244   TROPONINI <0 02 12/12/2016 0747   TROPONINI <0 02 09/18/2016 1404   TROPONINI <0 02 09/18/2016 1120   TROPONINI <0 02 09/18/2016 0538       Imaging: I have personally reviewed pertinent reports  EKG: Overnight- ST segment depression AVR     Micro:  Lab Results   Component Value Date    BLOODCX No Growth After 5 Days  09/18/2016    BLOODCX No Growth After 5 Days  09/18/2016    URINECX No Growth <1000 cfu/mL 01/03/2017    URINECX 40,000-49,000 cfu/ml Mixed Contaminants X3 12/12/2016       Allergies:    Allergies   Allergen Reactions    Codeine GI Intolerance    Lansoprazole Other (See Comments)     GI Upset, dania protonix    Morphine Nausea Only    Morphine And Related GI Intolerance       Medications:   Scheduled Meds:  Current Facility-Administered Medications:  acetaminophen 650 mg Oral Q8H PRN Yenny Wilson DO   ALPRAZolam 0 25 mg Oral TID PRN ANTHONY Nina   atorvastatin 20 mg Oral After [employer], ANTHONY   calcium carbonate 1 tablet Oral BID With Meals ANTHONY Nina   chlorhexidine 15 mL Swish & Spit Q12H [facility] Swapna Drew [address]   [START ON 6/20/2018] ergocalciferol 50,000 Units Oral Weekly ANTHONY Nina   metoprolol tartrate 12 5 mg Oral BID ANTHONY Duarte   multivitamin-minerals 1 tablet Oral Daily ANTHONY Duarte   nitroglycerin 0 4 mg Sublingual Q5 Min PRN ANTHONY Duarte   pantoprazole 40 mg Intravenous Q12H [address] ANTHONY Shafer     Continuous Infusions:   PRN Meds:    acetaminophen 650 mg Q8H PRN   ALPRAZolam 0 25 mg TID PRN   nitroglycerin 0 4 mg Q5 Min PRN       VTE Pharmacologic Prophylaxis: Reason for no pharmacologic prophylaxis LGI  VTE Mechanical Prophylaxis: sequential compression device    Invasive lines and devices: Invasive Devices     Peripheral Intravenous Line            Peripheral IV 06/16/18 Right Antecubital 1 day    Peripheral IV 06/16/18 Left Antecubital less than 1 day                        Code Status: Level 1 - Full Code     Portions of the record may have been created with voice recognition software  Occasional wrong word or "sound a like" substitutions may have occurred due to the inherent limitations of voice recognition software  Read the chart carefully and recognize, using context, where substitutions have occurred       Vikki Abarca MD

## 2018-06-18 ENCOUNTER — ANESTHESIA (INPATIENT)
Dept: GASTROENTEROLOGY | Facility: HOSPITAL | Age: 78
DRG: 377 | End: 2018-06-18
Payer: MEDICARE

## 2018-06-18 LAB
ANION GAP SERPL CALCULATED.3IONS-SCNC: 6 MMOL/L (ref 4–13)
ATRIAL RATE: 110 BPM
ATRIAL RATE: 172 BPM
BASOPHILS # BLD AUTO: 0.02 THOUSANDS/ΜL (ref 0–0.1)
BASOPHILS NFR BLD AUTO: 0 % (ref 0–1)
BUN SERPL-MCNC: 9 MG/DL (ref 5–25)
CA-I BLD-SCNC: 1.11 MMOL/L (ref 1.12–1.32)
CALCIUM SERPL-MCNC: 8.2 MG/DL (ref 8.3–10.1)
CEA SERPL-MCNC: 1.7 NG/ML (ref 0–3)
CHLORIDE SERPL-SCNC: 111 MMOL/L (ref 100–108)
CO2 SERPL-SCNC: 29 MMOL/L (ref 21–32)
CREAT SERPL-MCNC: 0.73 MG/DL (ref 0.6–1.3)
EOSINOPHIL # BLD AUTO: 0.05 THOUSAND/ΜL (ref 0–0.61)
EOSINOPHIL NFR BLD AUTO: 1 % (ref 0–6)
ERYTHROCYTE [DISTWIDTH] IN BLOOD BY AUTOMATED COUNT: 15.1 % (ref 11.6–15.1)
GFR SERPL CREATININE-BSD FRML MDRD: 79 ML/MIN/1.73SQ M
GLUCOSE SERPL-MCNC: 95 MG/DL (ref 65–140)
HCT VFR BLD AUTO: 33.2 % (ref 34.8–46.1)
HGB BLD-MCNC: 10.8 G/DL (ref 11.5–15.4)
IMM GRANULOCYTES # BLD AUTO: 0.02 THOUSAND/UL (ref 0–0.2)
IMM GRANULOCYTES NFR BLD AUTO: 0 % (ref 0–2)
LYMPHOCYTES # BLD AUTO: 1.17 THOUSANDS/ΜL (ref 0.6–4.47)
LYMPHOCYTES NFR BLD AUTO: 18 % (ref 14–44)
MAGNESIUM SERPL-MCNC: 2.1 MG/DL (ref 1.6–2.6)
MCH RBC QN AUTO: 31.3 PG (ref 26.8–34.3)
MCHC RBC AUTO-ENTMCNC: 32.5 G/DL (ref 31.4–37.4)
MCV RBC AUTO: 96 FL (ref 82–98)
MONOCYTES # BLD AUTO: 0.58 THOUSAND/ΜL (ref 0.17–1.22)
MONOCYTES NFR BLD AUTO: 9 % (ref 4–12)
NEUTROPHILS # BLD AUTO: 4.75 THOUSANDS/ΜL (ref 1.85–7.62)
NEUTS SEG NFR BLD AUTO: 72 % (ref 43–75)
NRBC BLD AUTO-RTO: 0 /100 WBCS
P AXIS: 267 DEGREES
PHOSPHATE SERPL-MCNC: 2 MG/DL (ref 2.3–4.1)
PLATELET # BLD AUTO: 108 THOUSANDS/UL (ref 149–390)
PMV BLD AUTO: 9.4 FL (ref 8.9–12.7)
POTASSIUM SERPL-SCNC: 3.5 MMOL/L (ref 3.5–5.3)
PR INTERVAL: 217 MS
QRS AXIS: -1 DEGREES
QRS AXIS: -46 DEGREES
QRSD INTERVAL: 113 MS
QRSD INTERVAL: 150 MS
QT INTERVAL: 267 MS
QT INTERVAL: 321 MS
QTC INTERVAL: 435 MS
QTC INTERVAL: 452 MS
RBC # BLD AUTO: 3.45 MILLION/UL (ref 3.81–5.12)
SODIUM SERPL-SCNC: 146 MMOL/L (ref 136–145)
T WAVE AXIS: 170 DEGREES
T WAVE AXIS: 198 DEGREES
VENTRICULAR RATE: 110 BPM
VENTRICULAR RATE: 172 BPM
WBC # BLD AUTO: 6.59 THOUSAND/UL (ref 4.31–10.16)

## 2018-06-18 PROCEDURE — 88305 TISSUE EXAM BY PATHOLOGIST: CPT | Performed by: PATHOLOGY

## 2018-06-18 PROCEDURE — 84100 ASSAY OF PHOSPHORUS: CPT | Performed by: PHYSICIAN ASSISTANT

## 2018-06-18 PROCEDURE — 0DBK8ZX EXCISION OF ASCENDING COLON, VIA NATURAL OR ARTIFICIAL OPENING ENDOSCOPIC, DIAGNOSTIC: ICD-10-PCS | Performed by: INTERNAL MEDICINE

## 2018-06-18 PROCEDURE — 93010 ELECTROCARDIOGRAM REPORT: CPT | Performed by: INTERNAL MEDICINE

## 2018-06-18 PROCEDURE — 99232 SBSQ HOSP IP/OBS MODERATE 35: CPT | Performed by: INTERNAL MEDICINE

## 2018-06-18 PROCEDURE — 45380 COLONOSCOPY AND BIOPSY: CPT | Performed by: INTERNAL MEDICINE

## 2018-06-18 PROCEDURE — 82330 ASSAY OF CALCIUM: CPT | Performed by: PHYSICIAN ASSISTANT

## 2018-06-18 PROCEDURE — 0DBH8ZX EXCISION OF CECUM, VIA NATURAL OR ARTIFICIAL OPENING ENDOSCOPIC, DIAGNOSTIC: ICD-10-PCS | Performed by: INTERNAL MEDICINE

## 2018-06-18 PROCEDURE — 85025 COMPLETE CBC W/AUTO DIFF WBC: CPT | Performed by: PHYSICIAN ASSISTANT

## 2018-06-18 PROCEDURE — C9113 INJ PANTOPRAZOLE SODIUM, VIA: HCPCS | Performed by: NURSE PRACTITIONER

## 2018-06-18 PROCEDURE — 45335 SIGMOIDOSCOPY W/SUBMUC INJ: CPT | Performed by: INTERNAL MEDICINE

## 2018-06-18 PROCEDURE — 45385 COLONOSCOPY W/LESION REMOVAL: CPT | Performed by: INTERNAL MEDICINE

## 2018-06-18 PROCEDURE — 83735 ASSAY OF MAGNESIUM: CPT | Performed by: PHYSICIAN ASSISTANT

## 2018-06-18 PROCEDURE — 80048 BASIC METABOLIC PNL TOTAL CA: CPT | Performed by: PHYSICIAN ASSISTANT

## 2018-06-18 PROCEDURE — 0DBN8ZX EXCISION OF SIGMOID COLON, VIA NATURAL OR ARTIFICIAL OPENING ENDOSCOPIC, DIAGNOSTIC: ICD-10-PCS | Performed by: INTERNAL MEDICINE

## 2018-06-18 PROCEDURE — 82378 CARCINOEMBRYONIC ANTIGEN: CPT | Performed by: INTERNAL MEDICINE

## 2018-06-18 RX ORDER — SODIUM CHLORIDE 9 MG/ML
INJECTION, SOLUTION INTRAVENOUS CONTINUOUS PRN
Status: DISCONTINUED | OUTPATIENT
Start: 2018-06-18 | End: 2018-06-18 | Stop reason: SURG

## 2018-06-18 RX ORDER — PROPOFOL 10 MG/ML
INJECTION, EMULSION INTRAVENOUS AS NEEDED
Status: DISCONTINUED | OUTPATIENT
Start: 2018-06-18 | End: 2018-06-18 | Stop reason: SURG

## 2018-06-18 RX ORDER — WARFARIN SODIUM 2 MG/1
2 TABLET ORAL
Status: DISCONTINUED | OUTPATIENT
Start: 2018-06-18 | End: 2018-06-19

## 2018-06-18 RX ORDER — FUROSEMIDE 40 MG/1
20 TABLET ORAL 2 TIMES DAILY
Status: DISCONTINUED | OUTPATIENT
Start: 2018-06-18 | End: 2018-06-22 | Stop reason: HOSPADM

## 2018-06-18 RX ORDER — METOPROLOL TARTRATE 50 MG/1
50 TABLET, FILM COATED ORAL EVERY 12 HOURS SCHEDULED
Status: DISCONTINUED | OUTPATIENT
Start: 2018-06-18 | End: 2018-06-22 | Stop reason: HOSPADM

## 2018-06-18 RX ADMIN — ATORVASTATIN CALCIUM 20 MG: 20 TABLET, FILM COATED ORAL at 21:07

## 2018-06-18 RX ADMIN — PROPOFOL 50 MG: 10 INJECTION, EMULSION INTRAVENOUS at 14:33

## 2018-06-18 RX ADMIN — PROPOFOL 50 MG: 10 INJECTION, EMULSION INTRAVENOUS at 14:24

## 2018-06-18 RX ADMIN — WARFARIN SODIUM 2 MG: 2 TABLET ORAL at 18:14

## 2018-06-18 RX ADMIN — PANTOPRAZOLE SODIUM 40 MG: 40 INJECTION, POWDER, FOR SOLUTION INTRAVENOUS at 08:59

## 2018-06-18 RX ADMIN — METOPROLOL TARTRATE 5 MG: 1 INJECTION, SOLUTION INTRAVENOUS at 11:24

## 2018-06-18 RX ADMIN — PANTOPRAZOLE SODIUM 40 MG: 40 INJECTION, POWDER, FOR SOLUTION INTRAVENOUS at 21:07

## 2018-06-18 RX ADMIN — METOPROLOL TARTRATE 50 MG: 50 TABLET ORAL at 21:07

## 2018-06-18 RX ADMIN — PROPOFOL 50 MG: 10 INJECTION, EMULSION INTRAVENOUS at 14:09

## 2018-06-18 RX ADMIN — FUROSEMIDE 20 MG: 20 TABLET ORAL at 21:07

## 2018-06-18 RX ADMIN — METOPROLOL TARTRATE 5 MG: 1 INJECTION, SOLUTION INTRAVENOUS at 05:27

## 2018-06-18 RX ADMIN — PROPOFOL 80 MG: 10 INJECTION, EMULSION INTRAVENOUS at 14:04

## 2018-06-18 RX ADMIN — SODIUM CHLORIDE: 0.9 INJECTION, SOLUTION INTRAVENOUS at 13:51

## 2018-06-18 RX ADMIN — CALCIUM 1 TABLET: 500 TABLET ORAL at 16:59

## 2018-06-18 RX ADMIN — PROPOFOL 20 MG: 10 INJECTION, EMULSION INTRAVENOUS at 14:13

## 2018-06-18 RX ADMIN — PROPOFOL 50 MG: 10 INJECTION, EMULSION INTRAVENOUS at 14:18

## 2018-06-18 NOTE — PROGRESS NOTES
Progress Note - Critical Care   Shabnam Fan 65 y o  female MRN: [id number]  [facility]: [id number]    Assessment:     Plan:        Neuro:   · CAM-ICU, delirium precautions  · Patient alert oriented x3  · Continue Xanax 0 25 mg TID PRN     CV:   · MAP >65  Hemodynamically stable at this time  · /78, -106- persistently tachycardic overnight, likely due to aflutter  Currently sinus  · Continue rate control metoprolol 5 mg IV Q6Hr, atorvastatin 20 mg  · Plans to restart Lisinopril 10 mg, metolazone 2 5 mg and Lasix 20 mg today        Lung:  · Previous intubated in setting of syncope and seizures  · Sat 97& on room air         GI:  · Symptomatic GIB (syncope) Hb 12 0 at [facility] (suspect falsely normal in 1st 24 hours)  S/p 1u [facility]RBC's at [facility]  · Hx of GERD, internal hemorrhoids, diverticulosis  · INR 2 22 s/p 2000u K-Centra and Vit K   · Hx of internal hemorrhoids with plan for resection in future- June 26th by surgeon Dr Roz Arreola  · On warfarin at home, holding warfarin  Not on ASA/NSAIDs or other blood thinner  · IV Protonix BID  · Last Hgb 10 7 after 2 units PRBC at night 6/17  · Last Colonscopy in 2015, per Dr Saeid Hay note on 01/17, with no significant pathology  · Colonoscopy today        FEN:   · NPO today for colonoscopy  · I/O +3 73  · IV Zofran as needed for nausea      :  · No active issues      ID:   · No active issues        Heme:  · Hb 12 0 at [facility] (suspect falsely normal in 1st 24 hours)  S/p 1u [facility]BC's at [facility]  S/P 2 units PRBCs 6/17  Hgb stable today 10 8  · INR 2 22 s/p 2000u K-Centra and Vit K    · Hold home dose of warfarin  Not on ASA/NSAIDs or other blood thinner  Could restart later today     · Hold Pharmacologic DVT ppx  · Type and Screen performed         Endo:  · No active issues        Msk/Skin:  · Out of bed as tolerated        Disposition: Will likely be stable to transfer to [facility] after colonoscopy today      Chief Complaint: I slept ok    HPI/24hr events:   No overnight events  Patient tolerated the prep well for colonoscopy today  Physical Exam:   Physical Exam   Constitutional: She is oriented to person, place, and time  She appears well-nourished  No distress  HENT:   Head: Normocephalic and atraumatic  Neck: Normal range of motion  Neck supple  No JVD present  Cardiovascular: Normal rate, regular rhythm and intact distal pulses  Murmur heard  Pulmonary/Chest: Effort normal and breath sounds normal  No respiratory distress  Abdominal: Soft  Bowel sounds are normal  She exhibits no distension  There is no tenderness  Musculoskeletal: Normal range of motion  She exhibits no edema  Lymphadenopathy:     She has no cervical adenopathy  Neurological: She is alert and oriented to person, place, and time  Skin: Skin is warm and dry  No erythema  Vitals:    18 2004 18 2104 18 2335 18 0509   BP: 139/79  136/66 126/50   BP Location: Right arm  Left arm    Pulse: (!) 106 98 (!) 110 (!) 106   Resp: (!) 29 20 (!) 31 18   Temp: 98 7 °F (37 1 °C)  98 4 °F (36 9 °C)    TempSrc: Oral  Oral    SpO2: 96% 95% 96% 92%   Weight:    84 5 kg (186 lb 4 6 oz)   Height:                 Temperature:   Temp (24hrs), Av 4 °F (36 9 °C), Min:98 2 °F (36 8 °C), Max:98 7 °F (37 1 °C)    Current: Temperature: 98 4 °F (36 9 °C)    Weights:   IBW: 57 kg    Body mass index is 31 kg/m²  Weight (last 2 days)     Date/Time   Weight    18 0509  84 5 (186 29)    18 1052  83 3 (183 64)    18 1033  83 3 (183 64)              Hemodynamic Monitoring:  N/A     Non-Invasive/Invasive Ventilation Settings:  Respiratory    Lab Data (Last 4 hours)    None         O2/Vent Data (Last 4 hours)    None              No results found for: PHART, NER0FJI, PO2ART, RYG5GDQ, G5AZZRPE, BEART, SOURCE  SpO2: SpO2: 92 %    Intake and Outputs:  I/O        07 -  0700  07 -  0700    P  O  120 4236 I V  (mL/kg) 851 2 (10 2)     Blood 600     IV Piggyback 200     Total Intake(mL/kg) 1771 2 (21 3) 4236 (50 1)    Urine (mL/kg/hr) 1100 825 (0 4)    Emesis/NG output 300 50    Total Output 1400 875    Net +371 2 +3361              UOP: 0 8/hour Total Output 875      Nutrition:        Diet Orders            Start     Ordered    06/18/18 0001  Diet NPO; Sips with meds  Diet effective midnight     Question Answer Comment   Diet Type NPO    NPO Except: Sips with meds    RD to adjust diet per protocol? Yes        06/17/18 1214          Labs:     Results from last 7 days  Lab Units 06/18/18  0515 06/17/18  1439 06/17/18  0438  06/16/18  1107  06/16/18  0453   WBC Thousand/uL 6 59  --  8 34  --  12 91*  --  8 57   HEMOGLOBIN g/dL 10 8* 10 7* 10 7*  < > 10 7*  --  12 0   I STAT HEMOGLOBIN   --   --   --   --   --   < >  --    HEMATOCRIT % 33 2*  --  34 1*  --  34 2*  --  37 1   PLATELETS Thousands/uL 108*  --  106*  --  145*  --  172   NEUTROS PCT % 72  --   --   --   --   --  65   MONOS PCT % 9  --   --   --   --   --  10   < > = values in this interval not displayed  Results from last 7 days  Lab Units 06/18/18  0515 06/17/18  1439 06/17/18  0438  06/16/18  0453   SODIUM mmol/L 146* 142 142  < > 141   POTASSIUM mmol/L 3 5 4 6 4 1  < > 3 7   CHLORIDE mmol/L 111* 109* 110*  < > 106   CO2 mmol/L 29 28 26  < > 28   BUN mg/dL 9 11 13  < > 23   CREATININE mg/dL 0 73 0 85 0 75  < > 0 99   CALCIUM mg/dL 8 2* 8 1* 8 3  < > 8 8   TOTAL PROTEIN g/dL  --   --   --   --  6 4   BILIRUBIN TOTAL mg/dL  --   --   --   --  0 60   ALK PHOS U/L  --   --   --   --  141*   ALT U/L  --   --   --   --  22   AST U/L  --   --   --   --  21   GLUCOSE RANDOM mg/dL 95 132 100  < > 112   GLUCOSE, ISTAT   --   --   --   < >  --    < > = values in this interval not displayed      Results from last 7 days  Lab Units 06/18/18  0515 06/17/18  0438 06/17/18  0155   MAGNESIUM mg/dL 2 1 2 0 2 0       Results from last 7 days  Lab Units 06/18/18  0515 06/17/18  0155   PHOSPHORUS mg/dL 2 0* 2 4        Results from last 7 days  Lab Units 06/17/18  0438 06/16/18  1110 06/16/18  0453   INR  1 12 1 21* 2 22*       Results from last 7 days  Lab Units 06/16/18  1107   LACTIC ACID mmol/L 2 0       0  Lab Value Date/Time   TROPONINI 0 28 (H) 06/17/2018 0155   TROPONINI 0 44 (H) 06/16/2018 1801   TROPONINI 0 42 (H) 06/16/2018 1350   TROPONINI 0 19 (H) 06/16/2018 1107   TROPONINI <0 02 12/19/2017 1244   TROPONINI <0 02 12/12/2016 0747   TROPONINI <0 02 09/18/2016 1404   TROPONINI <0 02 09/18/2016 1120   TROPONINI <0 02 09/18/2016 0538       Imaging: I have personally reviewed pertinent reports  EKG: N/A    Micro:  Lab Results   Component Value Date    BLOODCX No Growth After 5 Days  09/18/2016    BLOODCX No Growth After 5 Days  09/18/2016    URINECX No Growth <1000 cfu/mL 01/03/2017    URINECX 40,000-49,000 cfu/ml Mixed Contaminants X3 12/12/2016       Allergies:    Allergies   Allergen Reactions    Codeine GI Intolerance    Lansoprazole Other (See Comments)     GI Upset, dania protonix    Morphine Nausea Only    Morphine And Related GI Intolerance       Medications:   Scheduled Meds:  Current Facility-Administered Medications:  acetaminophen 650 mg Oral Q8H PRN Yenny Wilson DO   ALPRAZolam 0 25 mg Oral TID PRN ANTHONY Clemons   atorvastatin 20 mg Oral After [employer], CRNP   calcium carbonate 1 tablet Oral BID With Meals ANTHONY Clemons   chlorhexidine 15 mL Swish & Spit Q12H [address] Mieshaald Aimee, [address]   [START ON 6/20/2018] ergocalciferol 50,000 Units Oral Weekly ANTHONY Clemons   metoprolol 5 mg Intravenous Q6H [address] Donelda Husbands, PA-C   multivitamin-minerals 1 tablet Oral Daily ANTHONY Clemons   nitroglycerin 0 4 mg Sublingual Q5 Min PRN Terald Bon[place], CRNP   ondansetron 4 mg Intravenous Q4H PRN Natalia Lopez MD   pantoprazole 40 mg Intravenous Q12H [address] ANTHONY Shafer     Continuous Infusions:   PRN Meds:    acetaminophen 650 mg Q8H PRN   ALPRAZolam 0 25 mg TID PRN   nitroglycerin 0 4 mg Q5 Min PRN   ondansetron 4 mg Q4H PRN       VTE Pharmacologic Prophylaxis: Reason for no pharmacologic prophylaxis LGIB  VTE Mechanical Prophylaxis: sequential compression device    Invasive lines and devices: Invasive Devices     Peripheral Intravenous Line            Peripheral IV 06/16/18 Right Antecubital 2 days    Peripheral IV 06/16/18 Left Antecubital 1 day                        Code Status: Level 1 - Full Code     Portions of the record may have been created with voice recognition software  Occasional wrong word or "sound a like" substitutions may have occurred due to the inherent limitations of voice recognition software  Read the chart carefully and recognize, using context, where substitutions have occurred       Jeannette Herrera MD

## 2018-06-18 NOTE — OP NOTE
**** GI/ENDOSCOPY REPORT ****     PATIENT NAME: Antonella Fisher ------ VISIT ID:  Patient ID:   [id number] YOB: 1940     INTRODUCTION: Colonoscopy - A 65 female patient presents for an inpatient   Colonoscopy at [address] [place] [address]  PREVIOUS COLONOSCOPY: This colonoscopy is being performed for diagnostic   indication; last colonoscopy was 5 years ago     INDICATIONS: Recent bleeding  Anemia  CONSENT:  The benefits, risks, and alternatives to the procedure were   discussed and informed consent was obtained from the patient  PREPARATION: EKG, pulse, pulse oximetry and blood pressure were monitored   throughout the procedure  The patient was identified by myself both   verbally and by visual inspection of ID band  Airway Assessment   Classification: Airway class 2 - Visualization of the soft palate, fauces   and uvula  ASA Classification: Class 2 - Patient has mild to moderate   systemic disturbance that may or may not be related to the disorder   requiring surgery  MEDICATIONS: Anesthesia-check records     PROCEDURE:  The endoscope was passed without difficulty through the anus   under direct visualization and advanced to the cecum, confirmed by   appendiceal orifice and ileocecal valve  The scope was withdrawn and the   mucosa was carefully examined  The quality of the preparation was good  RECTAL EXAM: Normal rectal exam      FINDINGS:   A single sessile polyp, measuring 5 mm in size, was found in   the cecum  The polyp was removed by cold snare polypectomy  A single   sessile polyp, measuring 8 mm in size, was found in the cecum  The polyp   was removed by snare cautery polypectomy  A single sessile polyp,   measuring 6 mm in size, was found in the ascending colon  The polyp was   removed by cold snare polypectomy    There was an ulcerated polyp like mass   with central decompression which occupied 25 % of the circumference of the   sigmoid colon  Multiple cold forceps biopsies were obtained  The site was   then tattooed  It was at 25 cm from the anus  There was evidence of   severe diverticulosis in the sigmoid colon  COMPLICATIONS: There were no complications  IMPRESSIONS: A single sessile polyp found in the cecum; removed by cold   snare polypectomy  A single sessile polyp found in the cecum; removed by   snare cautery polypectomy  A single sessile polyp found in the ascending   colon; removed by cold snare polypectomy  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  Biopsied  Site tattooed  Severe diverticulosis found in the sigmoid   colon  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  RECOMMENDATIONS: Await biopsy results  Obtain CEA  CT abd/pelvis done   already  ESTIMATED BLOOD LOSS:     PATHOLOGY SPECIMENS: Removed by cold snare polypectomy  Removed by snare   cautery polypectomy  Removed by cold snare polypectomy  Cold forceps   biopsy biopsy taken  Associated finding: Tumor  PROCEDURE CODES:     ICD-9 Codes: 578 9 Hemorrhage of gastrointestinal tract, unspecified    211 3 Benign neoplasm of colon 211 3 Benign neoplasm of colon 211 3 Benign   neoplasm of colon 239 0 Neoplasm of unspecified nature of digestive system   562 10 Diverticulosis of colon (without mention of hemorrhage)     ICD-10 Codes: K92 2 Gastrointestinal hemorrhage, unspecified D64 9 Anemia,   unspecified K63 5 Polyp of colon K63 5 Polyp of colon K63 5 Polyp of colon   T83 7 Neoplasm of uncertain behavior of colon K57 Diverticular disease of   intestine     PERFORMED BY: JUAN Hatfiedl  on 06/18/2018  Version 2, modified and electronically signed by JUAN Robbins    on 06/18/2018 at 15:03, superseding version 1 signed on 06/18/2018 at   14:48

## 2018-06-18 NOTE — ANESTHESIA PREPROCEDURE EVALUATION
Review of Systems/Medical History  Patient summary reviewed    No history of anesthetic complications     Cardiovascular  Hyperlipidemia, Hypertension , CAD , CAD status: 3VD, History of CABG, Dysrhythmias , atrial flutter, CHF , BOB,   Comment: CABG     Study date:  2018     Patient: Conner Cavazos  MR number: [id number]  Account number: [de-identified]  : 1940  Age: 65 years  Gender: Female  Status: Outpatient  Location: Echo lab  Height: 65 in  Weight: 184 6 lb  BP: 126/ 72 mmHg     Indications: HYPERTENSION, CONGESTIVE HEART FAILURE, DYSPNEA ON EXERTION     Diagnoses: 428 0 - CONGESTIVE HEART FAILURE     Sonographer:  Cosme Casarez RDCS  Primary Physician:  Yazan Whitehead MD  Referring Physician:  Yazan Whitehead MD  Group:  [facility]  Interpreting Physician:  DO JYOTI Pitts     LEFT VENTRICLE:  Systolic function was normal  Ejection fraction was estimated to be 60 %  There were no regional wall motion abnormalities  Wall thickness was mildly increased  The changes were consistent with concentric remodeling (increased wall thickness with normal wall mass)      VENTRICULAR SEPTUM:  There was systolic and diastolic flattening  These changes are consistent with RV volume and pressure overload      RIGHT VENTRICLE:  The ventricle was markedly dilated  Systolic function was moderately reduced  Wall thickness was increased      LEFT ATRIUM:  The atrium was mildly dilated      RIGHT ATRIUM:  The atrium was markedly dilated      MITRAL VALVE:  There was mild to moderate regurgitation      TRICUSPID VALVE:  There was severe annular dilatation  There was severe regurgitation  Estimated peak PA pressure was 48 mmHg    The findings suggest mild pulmonary hypertension      PULMONIC VALVE:  There was mild regurgitation      HISTORY: PRIOR HISTORY: CORONARY ARTERY DISEASE, S/P CABG, EDEMA, HYPOTHYROID, ASCVD, SOB     PROCEDURE: The procedure was performed in the echo lab  This was a routine study  The transthoracic approach was used  The study included complete 2D imaging, M-mode, complete spectral Doppler, and color Doppler  Images were obtained from  the parasternal, apical, subcostal, and suprasternal notch acoustic windows  Echocardiographic views were limited due to poor acoustic window availability, decreased penetration, and lung interference  This was a technically difficult  study      LEFT VENTRICLE: Size was normal  Systolic function was normal  Ejection fraction was estimated to be 60 %  There were no regional wall motion abnormalities  Wall thickness was mildly increased  The changes were consistent with concentric  remodeling (increased wall thickness with normal wall mass)  DOPPLER: Normal sinus rhythm was absent  The study was not technically sufficient to allow evaluation of LV diastolic function      VENTRICULAR SEPTUM: There was systolic and diastolic flattening  These changes are consistent with RV volume and pressure overload      RIGHT VENTRICLE: The ventricle was markedly dilated  Systolic function was moderately reduced  Wall thickness was increased      LEFT ATRIUM: The atrium was mildly dilated      RIGHT ATRIUM: The atrium was markedly dilated      MITRAL VALVE: Valve structure was normal  There was normal leaflet separation  DOPPLER: The transmitral velocity was within the normal range  There was no evidence for stenosis  There was mild to moderate regurgitation      AORTIC VALVE: The valve was trileaflet  Leaflets exhibited normal cuspal separation and sclerosis  DOPPLER: Transaortic velocity was within the normal range  There was no evidence for stenosis  There was no regurgitation      TRICUSPID VALVE: There was severe annular dilatation  There was normal leaflet separation  DOPPLER: The transtricuspid velocity was within the normal range  There was no evidence for stenosis  There was severe regurgitation   Pulmonary  artery systolic pressure was mildly increased  Estimated peak PA pressure was 48 mmHg  The findings suggest mild pulmonary hypertension      PULMONIC VALVE: Leaflets exhibited normal thickness, no calcification, and normal cuspal separation  DOPPLER: The transpulmonic velocity was within the normal range  There was mild regurgitation  , Pulmonary hypertension Pulmonary  Shortness of breath,        GI/Hepatic    GI bleeding , GERD well controlled,   Comment: Rectal bleeding     Negative  ROS        Endo/Other  Negative endo/other ROS      GYN    Hysterectomy,        Hematology  Anemia acute blood loss anemia,    Comment: 3 units of PRBCs Musculoskeletal       Neurology      Comment: ? Seizure in ED--Intubated Psychology           Physical Exam    Airway    Mallampati score: II  TM Distance: >3 FB  Neck ROM: full     Dental   upper dentures and lower dentures,     Cardiovascular      Pulmonary      Other Findings        Anesthesia Plan  ASA Score- 3     Anesthesia Type- IV sedation with anesthesia with ASA Monitors  Additional Monitors:   Airway Plan:         Plan Factors-    Induction- intravenous  Postoperative Plan-     Informed Consent- Anesthetic plan and risks discussed with patient

## 2018-06-18 NOTE — PROGRESS NOTES
Progress Note- Kalpana Pelletier 65 y o  female MRN: [id number]    Unit/Bed#: SISSY POOL Encounter: [id number]      Assessment and Plan:  65year old female with rectal bleeding but stable Hb  For colonoscopy today  Diff includes diverticular vs outlet vs tumor       ______________________________________________________________________    Subjective:     Pt with rectal bleeding        Medication Administration - last 24 hours from 06/17/2018 1329 to 06/18/2018 1329       Date/Time Order Dose Route Action Action by     06/18/2018 0849 chlorhexidine (PERIDEX) 0 12 % oral rinse 15 mL 15 mL Swish & Spit Not Given Myrene Million, RN     06/17/2018 2005 chlorhexidine (PERIDEX) 0 12 % oral rinse 15 mL 15 mL Swish & Spit Given Washington, RN     06/18/2018 0859 pantoprazole (PROTONIX) injection 40 mg 40 mg Intravenous Given Myrene Million, RN     06/17/2018 2005 pantoprazole (PROTONIX) injection 40 mg 40 mg Intravenous Given Washington, RN     06/17/2018 1728 atorvastatin (LIPITOR) tablet 20 mg 20 mg Oral Given Jordyn Addison RN     06/18/2018 0830 calcium carbonate (OYSTER SHELL,OSCAL) 500 mg tablet 1 tablet 1 tablet Oral Not Given Myrene Million, RN     06/17/2018 1728 calcium carbonate (OYSTER SHELL,OSCAL) 500 mg tablet 1 tablet 1 tablet Oral Given Jordyn Addison RN     06/18/2018 0849 multivitamin-minerals (CENTRUM) tablet 1 tablet 1 tablet Oral Not Given Myrene Million, RN     06/17/2018 2343 ondansetron (ZOFRAN) injection 4 mg 4 mg Intravenous Given Washington, RN     06/18/2018 1124 metoprolol (LOPRESSOR) injection 5 mg 5 mg Intravenous Given Myrene Million, RN     06/18/2018 0527 metoprolol (LOPRESSOR) injection 5 mg 5 mg Intravenous Given Washington, RN     06/17/2018 2339 metoprolol (LOPRESSOR) injection 5 mg 5 mg Intravenous Given Washington, RN     06/17/2018 1830 metoprolol (LOPRESSOR) injection 5 mg 5 mg Intravenous Given Jordyn Addison RN     06/17/2018 7345 polyethylene glycol (GOLYTELY) bowel prep 4,000 mL 4,000 mL Oral Given Macy Davila RN          Objective:     Vitals: Blood pressure 135/75, pulse 98, temperature 98 1 °F (36 7 °C), temperature source Oral, resp  rate 16, height 5' 5" (1 651 m), weight 82 6 kg (182 lb), SpO2 96 %  ,Body mass index is 30 29 kg/m²        Intake/Output Summary (Last 24 hours) at 06/18/18 1329  Last data filed at 06/18/18 1205   Gross per 24 hour   Intake             4236 ml   Output              625 ml   Net             3611 ml       Physical Exam:   General Appearance: Awake and alert, in no acute distress  Abdomen: Soft, non-tender, non-distended; bowel sounds normal; no masses or no organomegaly    Invasive Devices     Peripheral Intravenous Line            Peripheral IV 06/16/18 Left Antecubital 2 days    Peripheral IV 06/16/18 Right Antecubital 2 days                Lab Results:  Admission on 06/16/2018   Component Date Value    WBC 06/16/2018 12 91*    RBC 06/16/2018 3 38*    Hemoglobin 06/16/2018 10 7*    Hematocrit 06/16/2018 34 2*    MCV 06/16/2018 101*    MCH 06/16/2018 31 7     MCHC 06/16/2018 31 3*    RDW 06/16/2018 13 8     Platelets 38/20/7335 145*    MPV 06/16/2018 9 4     Troponin I 06/16/2018 0 19*    LACTIC ACID 06/16/2018 2 0     ABO Grouping 06/16/2018 A     Rh Factor 06/16/2018 Negative     Antibody Screen 06/16/2018 Negative     Specimen Expiration Date 06/16/2018 [id number]     Protime 06/16/2018 15 4*    INR 06/16/2018 1 21*    Sodium 06/16/2018 144     Potassium 06/16/2018 3 6     Chloride 06/16/2018 112*    CO2 06/16/2018 25     Anion Gap 06/16/2018 7     BUN 06/16/2018 20     Creatinine 06/16/2018 0 95     Glucose 06/16/2018 145*    Calcium 06/16/2018 7 7*    eGFR 06/16/2018 58     POC Glucose 06/16/2018 157*    Troponin I 06/16/2018 0 42*    Hemoglobin 06/16/2018 10 2*    Troponin I 06/16/2018 0 44*    Hemoglobin 06/16/2018 9 3*    Unit Product Code 06/17/2018 L0064D26  Unit Number 06/17/2018 [id number]     Unit ABO 06/17/2018 A     Unit [facility] 06/17/2018 NEG     Unit Dispense Status 06/17/2018 Presumed Trans     Unit Product Code 06/17/2018 L6170V55     Unit Number 06/17/2018 [id number]     Unit ABO 06/17/2018 A     Unit RH 06/17/2018 NEG     Unit Dispense Status 06/17/2018 Presumed Trans     Troponin I 06/17/2018 0 28*    Sodium 06/17/2018 141     Potassium 06/17/2018 4 1     Chloride 06/17/2018 109*    CO2 06/17/2018 25     Anion Gap 06/17/2018 7     BUN 06/17/2018 14     Creatinine 06/17/2018 0 74     Glucose 06/17/2018 100     Calcium 06/17/2018 8 4     eGFR 06/17/2018 78     Magnesium 06/17/2018 2 0     Phosphorus 06/17/2018 2 4     Calcium, Ionized 06/17/2018 1 13     WBC 06/17/2018 8 34     RBC 06/17/2018 3 50*    Hemoglobin 06/17/2018 10 7*    Hematocrit 06/17/2018 34 1*    MCV 06/17/2018 97     MCH 06/17/2018 30 6     MCHC 06/17/2018 31 4     RDW 06/17/2018 14 5     Platelets 23/67/5481 106*    MPV 06/17/2018 9 7     Sodium 06/17/2018 142     Potassium 06/17/2018 4 1     Chloride 06/17/2018 110*    CO2 06/17/2018 26     Anion Gap 06/17/2018 6     BUN 06/17/2018 13     Creatinine 06/17/2018 0 75     Glucose 06/17/2018 100     Calcium 06/17/2018 8 3     eGFR 06/17/2018 77     Protime 06/17/2018 14 5*    INR 06/17/2018 1 12     Magnesium 06/17/2018 2 0     Hemoglobin 06/17/2018 10 7*    Sodium 06/17/2018 142     Potassium 06/17/2018 4 6     Chloride 06/17/2018 109*    CO2 06/17/2018 28     Anion Gap 06/17/2018 5     BUN 06/17/2018 11     Creatinine 06/17/2018 0 85     Glucose 06/17/2018 132     Calcium 06/17/2018 8 1*    eGFR 06/17/2018 66     Sodium 06/18/2018 146*    Potassium 06/18/2018 3 5     Chloride 06/18/2018 111*    CO2 06/18/2018 29     Anion Gap 06/18/2018 6     BUN 06/18/2018 9     Creatinine 06/18/2018 0 73     Glucose 06/18/2018 95     Calcium 06/18/2018 8 2*    eGFR 06/18/2018 79     WBC 06/18/2018 6 59     RBC 06/18/2018 3 45*    Hemoglobin 06/18/2018 10 8*    Hematocrit 06/18/2018 33 2*    MCV 06/18/2018 96     MCH 06/18/2018 31 3     MCHC 06/18/2018 32 5     RDW 06/18/2018 15 1     MPV 06/18/2018 9 4     Platelets 39/46/3768 108*    nRBC 06/18/2018 0     Neutrophils Relative 06/18/2018 72     Immat GRANS % 06/18/2018 0     Lymphocytes Relative 06/18/2018 18     Monocytes Relative 06/18/2018 9     Eosinophils Relative 06/18/2018 1     Basophils Relative 06/18/2018 0     Neutrophils Absolute 06/18/2018 4 75     Immature Grans Absolute 06/18/2018 0 02     Lymphocytes Absolute 06/18/2018 1 17     Monocytes Absolute 06/18/2018 0 58     Eosinophils Absolute 06/18/2018 0 05     Basophils Absolute 06/18/2018 0 02     Calcium, Ionized 06/18/2018 1 11*    Magnesium 06/18/2018 2 1     Phosphorus 06/18/2018 2 0*    Ventricular Rate 06/16/2018 110     Atrial Rate 06/16/2018 110     HI Interval 06/16/2018 217     QRSD Interval 06/16/2018 113     QT Interval 06/16/2018 321     QTC Interval 06/16/2018 435     P Axis 06/16/2018 267     QRS Axis 06/16/2018 -1     T Wave [place] 06/16/2018 198     Ventricular Rate 06/16/2018 172     Atrial Rate 06/16/2018 172     QRSD Interval 06/16/2018 150     QT Interval 06/16/2018 267     QTC Interval 06/16/2018 452     QRS Axis 06/16/2018 -55     T Wave Axis 06/16/2018 170        Imaging Studies: I have personally reviewed pertinent imaging studies

## 2018-06-18 NOTE — PROGRESS NOTES
Progress Note - ICU Transfer to Step down/med  surg  - Loc Dodd 65 y o  female MRN: [id number]    Unit/Bed#: MICU 02 Encounter: [id number]      Code Status: Level 1 - Full Code    Date of ICU admission: 6/16/2018    Reason for ICU adm:  Hemodynamic instability    Active problems:   Patient Active Problem List   Diagnosis    Diverticulosis    Rectal bleeding    Hypoalbuminemia    CAD (coronary artery disease)    Essential hypertension    Hyperlipidemia    Elevated MCV    Atrial flutter (HCC)    Right-sided thoracic back pain    History of shingles    Incomplete right bundle branch block    Hx of CABG    Thoracic radiculopathy    Pyuria    Microscopic hematuria    Acute on chronic diastolic CHF (congestive heart failure), NYHA class 3 ([address] )    BOB (dyspnea on exertion)    Chronic diastolic heart failure (HCC)    Diverticulosis of large intestine with hemorrhage    Vomiting       Summary of clinical course:  56-year-old female with past medical history of coronary disease status post CABG, hypertension, hyperlipidemia, aflutter on warfarin, internal and external hemorrhoids, diverticulosis presented for rectal bleeding  While at [facility] she had a syncopal episode and was intubated for airway protection  Patient was transferred from [facility] to [facility] for GI evaluation and critical care  Gastroenterologist was consulted due to no obvious source of bleeding seen on CTA, lactic acid within normal limits and for possible colonoscopy  Patient was given [facility] to normalize INR  Patient was hypotensive on arrival however did not require any pressors and improved with intravenous fluids  Her home blood pressure medications were placed on hold and warfarin was placed on hold  Due to blood loss anemia patient's hemoglobin was followed every 4 hours  Patient was successfully extubated the same day  Post extubation patient felt very nauseous and had bilious vomiting  She then subsequently went into AFib with RVR during episodes of vomiting and metoprolol was switched to intravenous to control rate  Patient's was transfused 2 units of packed RBCs in [facility] with a post transfusion hemoglobin of 10 7  Troponins and EKG were drawn  Troponins were elevated at 0 48 and trended down to 0 28  EKG showed no changes and similar to priors  Patient tolerated well the bowel prep  Colonoscopy results showed a tumor in the sigmoid colon with several polyps removed by cold snare polypectomy  CEA was obtained  Patient is medically stable to be transferred to [facility] with telemetry monitoring  Recent or scheduled procedures:   Colonoscopy    Outstanding/pending diagnostics:   CEA  Colonoscopy biopsies    Hospital discharge planning:    Follow-up result on CEA  Follow-up with gastroenterologist as outpatient for results from biopsy

## 2018-06-18 NOTE — SOCIAL WORK
CM met pt at bedside and made aware of CM role at d/c  Pt reported that she has a LW and POA  Pt reported that her POA is son Shabana Traore  Pt's primary contact is  [phone]  Pt lives with her  in a 2 story house with 1 JAMIA and 14 steps to the 2nd floor bedroom and bathroom  Pt was IPTA with all ADL's, does not drive and retired  Pt stated that she is legally blind on her L eye  Pt does not use any DME but has the ff at home: walker, cane and wheelchair  Pt has hx with [facility]  Pt denies hx with STR, alc, drug and IP psych tx  Pt reported she was dx with depression with medication and ff her PCP Dr Eneida Meadows  Pharmacy is [facility] in [place]  Pt has transportation when dc  CM reviewed d/c planning process including the following: identifying help at home, patient preference for d/c planning needs, Discharge Lounge, [facility] Meds to Bed program, availability of treatment team to discuss questions or concerns patient and/or family may have regarding understanding medications and recognizing signs and symptoms once discharged  CM also encouraged patient to follow up with all recommended appointments after discharge  Patient advised of importance for patient and family to participate in managing patients medical well being

## 2018-06-18 NOTE — OP NOTE
**** GI/ENDOSCOPY REPORT ****     PATIENT NAME: Paul Vazquez ------ VISIT ID:  Patient ID:   [id number] YOB: 1940     INTRODUCTION: Colonoscopy - A 65 female patient presents for an inpatient   Colonoscopy at [facility]  PREVIOUS COLONOSCOPY: This colonoscopy is being performed for diagnostic   indication     INDICATIONS: Recent bleeding  Anemia  CONSENT:  The benefits, risks, and alternatives to the procedure were   discussed and informed consent was obtained from the patient  PREPARATION: EKG, pulse, pulse oximetry and blood pressure were monitored   throughout the procedure  The patient was identified by myself both   verbally and by visual inspection of ID band  Airway Assessment   Classification: Airway class 2 - Visualization of the soft palate, fauces   and uvula  ASA Classification: Class 2 - Patient has mild to moderate   systemic disturbance that may or may not be related to the disorder   requiring surgery  MEDICATIONS: Anesthesia-check records     PROCEDURE:  The endoscope was passed without difficulty through the anus   under direct visualization and advanced to the cecum, confirmed by   appendiceal orifice and ileocecal valve  The scope was withdrawn and the   mucosa was carefully examined  The quality of the preparation was good  RECTAL EXAM: Normal rectal exam      FINDINGS:   A single sessile polyp, measuring 5 mm in size, was found in   the cecum  The polyp was removed by cold snare polypectomy  A single   sessile polyp, measuring 8 mm in size, was found in the cecum  The polyp   was removed by snare cautery polypectomy  A single sessile polyp,   measuring 6 mm in size, was found in the ascending colon  The polyp was   removed by cold snare polypectomy  There was an ulcerated polyp like mass   with central decompression which occupied 25 to 49% of the circumference   of the sigmoid colon   Multiple cold forceps biopsies were obtained  There   was evidence of severe diverticulosis in the sigmoid colon  COMPLICATIONS: There were no complications  IMPRESSIONS: A single sessile polyp found in the cecum; removed by cold   snare polypectomy  A single sessile polyp found in the cecum; removed by   snare cautery polypectomy  A single sessile polyp found in the ascending   colon; removed by cold snare polypectomy  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  Biopsied  Severe diverticulosis found in the sigmoid colon  RECOMMENDATIONS: Await biopsy results  Obtain CEA  CT abd/pelvis done   already  ESTIMATED BLOOD LOSS:     PATHOLOGY SPECIMENS: Removed by cold snare polypectomy  Removed by snare   cautery polypectomy  Removed by cold snare polypectomy  Cold forceps   biopsy biopsy taken  Associated finding: Tumor  PROCEDURE CODES:     ICD-9 Codes: 578 9 Hemorrhage of gastrointestinal tract, unspecified    211 3 Benign neoplasm of colon 211 3 Benign neoplasm of colon 211 3 Benign   neoplasm of colon 239 0 Neoplasm of unspecified nature of digestive system   562 10 Diverticulosis of colon (without mention of hemorrhage)     ICD-10 Codes: K92 2 Gastrointestinal hemorrhage, unspecified D64 9 Anemia,   unspecified K63 5 Polyp of colon K63 5 Polyp of colon K63 5 Polyp of colon   S77 7 Neoplasm of uncertain behavior of colon K57 Diverticular disease of   intestine     PERFORMED BY: JUAN Contreras  on 06/18/2018  Version 1, electronically signed by JUAN Barker  on   06/18/2018 at 14:48

## 2018-06-18 NOTE — PLAN OF CARE
Problem: DISCHARGE PLANNING - CARE MANAGEMENT  Goal: Discharge to post-acute care or home with appropriate resources  INTERVENTIONS:  - Conduct assessment to determine patient/family and health care team treatment goals, and need for post-acute services based on payer coverage, community resources, and patient preferences, and barriers to discharge  - Address psychosocial, clinical, and financial barriers to discharge as identified in assessment in conjunction with the patient/family and health care team  - Arrange appropriate level of post-acute services according to patient's   needs and preference and payer coverage in collaboration with the physician and health care team  - Communicate with and update the patient/family, physician, and health care team regarding progress on the discharge plan  - Arrange appropriate transportation to post-acute venues   INTERVENTIONS:  - Conduct assessment to determine patient/family and health care team treatment goals, and need for post-acute services based on payer coverage, community resources, and patient preferences, and barriers to discharge  - Address psychosocial, clinical, and financial barriers to discharge as identified in assessment in conjunction with the patient/family and health care team  - Arrange appropriate level of post-acute services according to patient's   needs and preference and payer coverage in collaboration with the physician and health care team  - Communicate with and update the patient/family, physician, and health care team regarding progress on the discharge plan  - Arrange appropriate transportation to post-acute venues  Outcome: Progressing

## 2018-06-18 NOTE — CASE MANAGEMENT
Initial Clinical Review    Admission: Date/Time/Statement: 6/16/18 @ 1027    TRANSFER FROM [facility] TO [facility]     Orders Placed This Encounter   Procedures    Inpatient Admission     Standing Status:   Standing     Number of Occurrences:   1     Order Specific Question:   Admitting Physician     Answer:   Guilherme Davis     Order Specific Question:   Level of Care     Answer:   Critical Care [15]     Order Specific Question:   Estimated length of stay     Answer:   More than 2 Midnights     Order Specific Question:   Certification     Answer:   I certify that inpatient services are medically necessary for this patient for a duration of greater than two midnights  See H&P and MD Progress Notes for additional information about the patient's course of treatment  History of Illness: Cristela Corral is a 65 y o  female with PMH of HTN/HLD, GERD, CAD s/p CABG, Diverticulosis, Internal Hemerrhoids Aflutter (on warfarin), Chronic Diastolic CHF, Right Ventricular Dysfunction who presents after being transferred from [facility] after being found to have significant, symptomatic GIB with syncope and seizure-like activity  Per chart review, pt presented to [facility] with BRBPR  She stated it began at 21:30 yesterday and was associated with mild, diffuse abdominal cramping and nausea  Denies any other source of bleeding, chest pain or pre-syncopal sx's at initial presentation  Last Colonscopy in 2015, per Dr Meet Membreno note on 01/17, with no significant pathology  Does have hx of LGIB's 2/2 internal hemorrhoids and post-polypectomies that have required transfusions  Of note, patients Zaroxolyn was recently increased from 2 5mgm once/week to twice/week  At [facility], she was hemodynamically stable and had Hb of 12 0  She was given K-Centra and Vit K  S/p 2L NS bolus  She was on bedside commode and had a syncopal episode with brief episode (15s) of tonic-clonic seizures   Post-ictal period of 5 mins with mild confusion  Intubated for airway control  CTH wo contrast negative  CXR revealed pulmonary congestion and appropriate ET tube possession      ED Vital Signs:   ED Triage Vitals   Temperature Pulse Respirations Blood Pressure SpO2   06/16/18 1033 06/16/18 1033 06/16/18 1033 06/16/18 1033 06/16/18 1033   97 8 °F (36 6 °C) 86 20 143/84 100 %      Temp Source Heart Rate Source Patient Position - Orthostatic VS BP Location FiO2 (%)   06/16/18 1033 06/16/18 1033 06/16/18 1033 06/16/18 1033 06/16/18 1203   Oral Monitor Lying Left arm 80      Pain Score       06/16/18 1934       No Pain        Wt Readings from Last 1 Encounters:   06/18/18 84 5 kg (186 lb 4 6 oz)     Vital Signs (abnormal):   06/18/18 1125  98 1 °F (36 7 °C)   114   45  135/75  101  97 %  --  --   06/18/18 0509  98 5 °F (36 9 °C)   106  18  126/50  74  92 %  None (Room air)  Lying   06/17/18 2335  98 4 °F (36 9 °C)   110   31  136/66  95  96 %  None (Room air)  Lying   06/17/18 2004  98 7 °F (37 1 °C)   106   29  139/79  111  96 %  None (Room air)  Lying   06/17/18 1833  --   110   25  141/77  101  96 %  --  --   06/17/18 1551  --   110   23   129/105  124  96 %  --  --   06/17/18 1351  --   110   28  110/58  73  95 %  --  --   06/17/18 1151  --   106  18  102/57  74  95 %  --  --   06/17/18 1051  --   106  20  103/65  78  96 %  --  --   06/17/18 0951  --  78  16   74/41  54  94 %  --  --   06/17/18 0940  --   110  --  116/53  --  --  --  --   06/17/18 0800  --   110   30  --  --  96 %  None (Room air)  --   06/17/18 0751  --   120   32  134/66  95  96 %  --  --   06/17/18 0744  --   112  20  114/56  73  95 %  --  --   06/17/18 0714  98 2 °F (36 8 °C)   112   23  127/64  92  98 %  None (Room air)  --   06/17/18 0553  --   106   28  128/64  88  98 %  --  --   06/17/18 0505  --   106   23  134/66  90  98 %  --  --   06/17/18 0400  99 °F (37 2 °C)   106  18  109/60  87  97 %  --  --   06/17/18 0300  --   108  16  105/51  69  98 %  --  --   06/17/18 0222  99 1 °F (37 3 °C)  --  --  --  --  --  --  --   06/17/18 0207  98 8 °F (37 1 °C)   109  19  96/58  --  --  --  --   06/17/18 0051  --   110  17  99/53  72  98 %  --  --   06/17/18 0006  --   110  18   95/46  61  99 %  --  --   06/17/18 0000  99 6 °F (37 6 °C)   110  20  96/58  70  98 %  --  --   06/16/18 2336  --   108  22   106/47  65  94 %  --  --   06/16/18 2317  --   166   31  165/78  117  93 %  --  --   06/16/18 2309  99 6 °F (37 6 °C)   108  20  109/53  72  93 %  --  --   06/16/18 2234  --  94  19   99/47  61  94 %  --  --   06/16/18 2134  --  94  18   81/49  59  93 %  --  --   06/16/18 1934  --  88  17   104/46  60  98 %  --  --   06/16/18 1603  --  100   23  121/62  84  100 %  Nasal cannula  --   06/16/18 1524  --   106  12  --  --  100 %  --  --   06/16/18 1522  --   186  17  --  --  100 %  --  --   06/16/18 1520  --   166   27  139/83  --  100 %  --  --   06/16/18 1333  --   106   11  112/60  84  100 %  --  --     Abnormal Labs:    Na 146  Cl 112, 109, 110,109, 111  Glucose 145  Slick 7 7, 8 1, 8 2  Ionized Slick 1 11  Phos 2 0  Trop 0 19, 0 42, 0 44, 0 28  PT/INR 15 4/1 21, 14 5/1 12  POC glucose 157  Urine protein 30 (1+)  Urine RBC 0-1  Urine WBC 0-1     6/16 6/17 6/18   WBC 12 91   8 34     6 59      RBC 3 38   3 50  3 45   Hemoglobin 10 7 10 2 9 3 10 7 10 7 10 8   Hematocrit 34 2   34 1  33 2      97  96   MCHC 31 3   31 4  32 5   Platelets 443   559  108     Diagnostic Test Results:     6/17 EKG - Atrial flutter with 2 to 1 block  Left axis deviation  Left ventricular hypertrophy with QRS widening  Abnormal ECG  When compared with ECG of 16-JUN-2018 05:16,  Atrial flutter is now with 2:1 A-V conduction    Past Medical/Surgical History:    Active Ambulatory Problems     Diagnosis Date Noted    Diverticulosis 01/02/2017    Rectal bleeding 01/02/2017    Hypoalbuminemia 01/02/2017    CAD (coronary artery disease) 01/02/2017    Essential hypertension 01/02/2017    Hyperlipidemia 01/02/2017    Elevated MCV 01/02/2017    Atrial flutter ([facility] ) 01/02/2017    Right-sided thoracic back pain 01/02/2017    History of shingles 01/02/2017    Incomplete right bundle branch block 01/02/2017    Hx of CABG 01/02/2017    Thoracic radiculopathy 01/02/2017    Pyuria 01/02/2017    Microscopic hematuria 01/02/2017    Acute on chronic diastolic CHF (congestive heart failure), NYHA class 3 ([facility] [facility] ) 02/08/2018    BOB (dyspnea on exertion) 02/08/2018    [facility] diastolic [facility] failure [facility] ) 05/24/2018     Resolved Ambulatory Problems     Diagnosis Date Noted    Left upper lobe pneumonia ([facility] ) 09/18/2016     Past Medical History:   Diagnosis Date    Cardiac disease     GERD (gastroesophageal reflux disease)     Hyperlipidemia     Hypertension      Admitting Diagnosis: GI bleed [K92 2]    Age/Sex: 65 y o  female    Assessment/Plan:   · Bright red blood per rectum, suspect lower GI bleeding source  Has known internal hemorrhoids and diverticular disease   - no active extravasation on CTA  Ongoing discussion with GI to determine next appropriate course of action and timing of colonoscopy     · Acute hypoxic respiratory failure - continue assist-control ventilator settings  Can likely be wean and extubate later today or tomorrow, depending on decision regarding procedures     · Coumadin coagulopathy  - given K Centra now with normalization of INR     · Encephalopathy with questionable seizure activity  - suspect vasovagal event rather than true seizure  She is now awake and able to follow commands  No seizure history      · Hypotension secondary to GI blood loss  - patient currently not requiring vasopressors  Continue IV fluids  Keep home BP meds on hold     · Blood loss anemia  - follow hemoglobin every 4 hours and transfuse to keep hemoglobin above 7      Admission Orders:  Scheduled Meds:   Current Facility-Administered Medications:  acetaminophen 650 mg Oral Q8H PRN Yenny Wilson,    ALPRAZolam 0 25 mg Oral TID PRN ANTHONY Wooten   atorvastatin 20 mg Oral After Lisabevicente Haus, CRNP   calcium carbonate 1 tablet Oral BID With Meals ANTHONY Wooten   chlorhexidine 15 mL Swish & Spit Q12H [address] [place], [address]   [START ON 6/20/2018] ergocalciferol 50,000 Units Oral Weekly ANTHONY Wooten   metoprolol 5 mg Intravenous Q6H [address] Alix Monique PA-C   multivitamin-minerals 1 tablet Oral Daily ANTHONY Wooten   nitroglycerin 0 4 mg Sublingual Q5 Min PRN ANTHONY Wooten   ondansetron 4 mg Intravenous Q4H PRN Klaus Zamarripa MD   pantoprazole 40 mg Intravenous Q12H [address] ANTHONY Wooten     PRN Meds:   Acetaminophen x1 6/16    ALPRAZolam    nitroglycerin    Ondansetron IV x 2 6/17    MICU  Step down level 1 6/17   SCDs  Neuro checks q 4 hr   Daily wt   Diet cl liq   6/18 diet NPO w/ sips   Cons GI   Transfused with 2 units PRBCs since admission   ______________________  6/16 GI Consult    1) BRBPR with clotting -  Patient's hemoglobin went from 12 to 10 7  Patient had episode of bright red bleeding with clotting here in the MICU  Her INR is now 1 21  On exam, the abdomen is soft  Differentials include ischemic colitis, diverticular bleed, very less likely hemorrhoidal bleeding  Less likely upper GI bleeding   - Fortunately, patient's creatinine is within normal limits  After discussing with the MICU team, we will go forward with CT high-volume for GI bleed  - Continue to monitor hemoglobin Q4 hours and transfuse as necessary with goal hemoglobin above 7  - Follow-up lactic acid  - Continue to hold anticoagulation  - Pending imaging results, would plan for colonoscopy likely unprepped  _______________  6/17 Critical Care Progress Note  · Bright red blood per rectum/lower GI bleed -suspected due to hemorrhoids versus diverticular bleed  CTA did not show bleeding site  There has been no further bloody bowel movement   -plan to prep for colonoscopy tomorrow  Would transfuse only if hemoglobin drops and/or there is additional evidence of blood loss     · Acute hypoxic respiratory failure  - resolved  Extubated yesterday     · Coumadin coagulopathy  -corrected with KCentra     · Encephalopathy with questionable seizure activity, likely vasovagal in the setting of GI bleed  - no further episodes  Mental status back to baseline     · Blood loss anemia  -due to GI bleed  Hemoglobin stable     · Atrial flutter with episodes of RVR mostly associated with vomiting  - continue home dose of metoprolol  Coumadin on hold due to GI bleed  _______________________  6/17 GI Progress Note  1) BRBPR with clotting - Patient's hemoglobin appears to be stable  Last night she was transfused 2 units  Her hemoglobin was 9 3  This morning 10 7  Fortunately, she has not had further episodes of bleeding  Patient might be getting moved to a step-down unit  Based on patient's CT scan findings, likely she experienced a diverticular bleed  - Plan for colonoscopy tomorrow to investigate  - GoLYTELY prep today; because she did have some nausea and vomiting, patient may require NG tube for prep administration  MICU team is aware  Otherwise, if she is able to tolerate the prep, she may need tap water enemas in the AM instead prior to procedure     - Clear liquid diet, NPO midnight

## 2018-06-18 NOTE — PROGRESS NOTES
06/18/18 [address] [place] Beliefs/Perceptions   Concept of God Accepting   God's Role in Disease Natural   Relationship with God Close   Support Systems Spouse/significant other;Children   Stress Factors   Patient Stress Factors Health changes   Coping Responses   Patient Coping Open/discussion   Plan of Care   Comments Reduced PT distress with debrief of medical history and normalization of care  Distress reduced  Cultivated relationship of care and support      Assessment Completed by: Unit visit

## 2018-06-19 PROBLEM — K63.89 COLONIC MASS: Status: ACTIVE | Noted: 2018-06-19

## 2018-06-19 LAB
ANION GAP SERPL CALCULATED.3IONS-SCNC: 7 MMOL/L (ref 4–13)
BASOPHILS # BLD AUTO: 0.02 THOUSANDS/ΜL (ref 0–0.1)
BASOPHILS NFR BLD AUTO: 0 % (ref 0–1)
BUN SERPL-MCNC: 11 MG/DL (ref 5–25)
CALCIUM SERPL-MCNC: 7.9 MG/DL (ref 8.3–10.1)
CHLORIDE SERPL-SCNC: 107 MMOL/L (ref 100–108)
CO2 SERPL-SCNC: 28 MMOL/L (ref 21–32)
CREAT SERPL-MCNC: 0.93 MG/DL (ref 0.6–1.3)
EOSINOPHIL # BLD AUTO: 0.06 THOUSAND/ΜL (ref 0–0.61)
EOSINOPHIL NFR BLD AUTO: 1 % (ref 0–6)
ERYTHROCYTE [DISTWIDTH] IN BLOOD BY AUTOMATED COUNT: 14.7 % (ref 11.6–15.1)
GFR SERPL CREATININE-BSD FRML MDRD: 59 ML/MIN/1.73SQ M
GLUCOSE SERPL-MCNC: 112 MG/DL (ref 65–140)
HCT VFR BLD AUTO: 31.7 % (ref 34.8–46.1)
HGB BLD-MCNC: 10.3 G/DL (ref 11.5–15.4)
IMM GRANULOCYTES # BLD AUTO: 0.03 THOUSAND/UL (ref 0–0.2)
IMM GRANULOCYTES NFR BLD AUTO: 0 % (ref 0–2)
LYMPHOCYTES # BLD AUTO: 0.85 THOUSANDS/ΜL (ref 0.6–4.47)
LYMPHOCYTES NFR BLD AUTO: 9 % (ref 14–44)
MCH RBC QN AUTO: 31.2 PG (ref 26.8–34.3)
MCHC RBC AUTO-ENTMCNC: 32.5 G/DL (ref 31.4–37.4)
MCV RBC AUTO: 96 FL (ref 82–98)
MONOCYTES # BLD AUTO: 0.62 THOUSAND/ΜL (ref 0.17–1.22)
MONOCYTES NFR BLD AUTO: 6 % (ref 4–12)
NEUTROPHILS # BLD AUTO: 8.1 THOUSANDS/ΜL (ref 1.85–7.62)
NEUTS SEG NFR BLD AUTO: 84 % (ref 43–75)
NRBC BLD AUTO-RTO: 0 /100 WBCS
PLATELET # BLD AUTO: 121 THOUSANDS/UL (ref 149–390)
PMV BLD AUTO: 9.6 FL (ref 8.9–12.7)
POTASSIUM SERPL-SCNC: 3.5 MMOL/L (ref 3.5–5.3)
RBC # BLD AUTO: 3.3 MILLION/UL (ref 3.81–5.12)
SODIUM SERPL-SCNC: 142 MMOL/L (ref 136–145)
WBC # BLD AUTO: 9.68 THOUSAND/UL (ref 4.31–10.16)

## 2018-06-19 PROCEDURE — 99232 SBSQ HOSP IP/OBS MODERATE 35: CPT | Performed by: INTERNAL MEDICINE

## 2018-06-19 PROCEDURE — 99222 1ST HOSP IP/OBS MODERATE 55: CPT | Performed by: INTERNAL MEDICINE

## 2018-06-19 PROCEDURE — 80048 BASIC METABOLIC PNL TOTAL CA: CPT | Performed by: PHYSICIAN ASSISTANT

## 2018-06-19 PROCEDURE — 85025 COMPLETE CBC W/AUTO DIFF WBC: CPT | Performed by: PHYSICIAN ASSISTANT

## 2018-06-19 PROCEDURE — C9113 INJ PANTOPRAZOLE SODIUM, VIA: HCPCS | Performed by: NURSE PRACTITIONER

## 2018-06-19 RX ORDER — METOLAZONE 2.5 MG/1
2.5 TABLET ORAL 2 TIMES WEEKLY
Status: DISCONTINUED | OUTPATIENT
Start: 2018-06-20 | End: 2018-06-22 | Stop reason: HOSPADM

## 2018-06-19 RX ORDER — LISINOPRIL 2.5 MG/1
10 TABLET ORAL DAILY
Status: DISCONTINUED | OUTPATIENT
Start: 2018-06-20 | End: 2018-06-22 | Stop reason: HOSPADM

## 2018-06-19 RX ADMIN — FUROSEMIDE 20 MG: 20 TABLET ORAL at 08:32

## 2018-06-19 RX ADMIN — WARFARIN SODIUM 2 MG: 2 TABLET ORAL at 17:26

## 2018-06-19 RX ADMIN — PANTOPRAZOLE SODIUM 40 MG: 40 INJECTION, POWDER, FOR SOLUTION INTRAVENOUS at 08:32

## 2018-06-19 RX ADMIN — Medication 1 TABLET: at 08:32

## 2018-06-19 RX ADMIN — FUROSEMIDE 20 MG: 20 TABLET ORAL at 17:26

## 2018-06-19 RX ADMIN — METOPROLOL TARTRATE 50 MG: 50 TABLET ORAL at 21:27

## 2018-06-19 RX ADMIN — METOPROLOL TARTRATE 50 MG: 50 TABLET ORAL at 08:32

## 2018-06-19 RX ADMIN — PANTOPRAZOLE SODIUM 40 MG: 40 INJECTION, POWDER, FOR SOLUTION INTRAVENOUS at 21:27

## 2018-06-19 RX ADMIN — CALCIUM 1 TABLET: 500 TABLET ORAL at 17:26

## 2018-06-19 RX ADMIN — ATORVASTATIN CALCIUM 20 MG: 20 TABLET, FILM COATED ORAL at 17:26

## 2018-06-19 RX ADMIN — CALCIUM 1 TABLET: 500 TABLET ORAL at 07:40

## 2018-06-19 NOTE — CONSULTS
Consultation - General Surgery   Vickey Bird 65 y o  female MRN: [id number]  Unit/Bed#: [facility] 807-01 Encounter: [id number]    Assessment/Plan     Assessment:  66 yo F with history of rectal bleeding found to have sigmoid colon mass     Plan:  1  Sigmoid colon mass   Currently non obstructed   Will attempt to get pathology resulted expedited   Hold coumadin for now, will discuss with SLIM  Patient would likely benefit from resection if necessary this admission as opposed to being restarted on coumadin and sent to follow up as outpatient     2  A flutter  Hold coumadin, will discuss with SLIM  Could consider heparin gtt if patient is agreeable to surgery this admission while surgical planning persists, tentative OR surgery date couold be as early as Friday or Saturday       History of Present Illness     HPI:  Vickey Bird is a 65 y o  female who presents as a transfer from [place] [address] secondary to rectal bleeding and possible seizure activity  She was intubated, given K centra and 1 unit packed cells and sent to [place]  High volume CT angiogram did not reveal any active bleed  Patient takes Coumadin as an outaptietn for a flutter  She has a hsitory of BRBPR, and has been seen by a colorectal surgeon for hemorrhoids  She states that her last colonoscopy was 5 years ago, and she was told that she would not need any further colonoscopies  Upon direct questioning, patient has lost 5lbs, she does state that she has felt bloated and "blocked up" she does report + soft stools and +flatus  No nausea or vomiting  No more bloody bowel movements in last 24 hours  C scope was completed yesterday which demonstrated an ulcerated polyp like mass   with central decompression which occupied 25 % of the circumference of the   sigmoid colon  Multiple cold forceps biopsies were obtained  The site was   then tattooed  It was at 25 cm from the anus    There was evidence of   severe diverticulosis in the sigmoid colon  Past surgical history: CABG, Hysterectomy, Open cholecystectomy, hernia repair, bladder surgery       Consults    Review of Systems   Constitutional: Positive for unexpected weight change  Negative for chills and fever  HENT: Negative for congestion  Respiratory: Negative for cough and shortness of breath  Cardiovascular: Negative for chest pain and leg swelling  Gastrointestinal: Positive for abdominal distention and blood in stool  Negative for constipation and vomiting  Genitourinary: Negative for dysuria and enuresis  Musculoskeletal: Negative for back pain  Neurological: Negative for dizziness  Psychiatric/Behavioral: Negative for confusion  Historical Information   Past Medical History:   Diagnosis Date    Cardiac disease     GERD (gastroesophageal reflux disease)     Hyperlipidemia     Hypertension      Past Surgical History:   Procedure Laterality Date    CARDIAC SURGERY      CARDIAC SURGERY      CATARACT EXTRACTION      CHOLECYSTECTOMY      COLONOSCOPY      COLONOSCOPY N/A 6/18/2018    Procedure: COLONOSCOPY;  Surgeon: Sita Gallegos DO;  Location: [facility];   Service: Gastroenterology    COLONOSCOPY W/ CONTROL OF HEMORRHAGE      CORONARY ANGIOPLASTY WITH STENT PLACEMENT      CORONARY ARTERY BYPASS GRAFT  2008    HERNIA REPAIR      HYSTERECTOMY       Social History   History   Alcohol Use No     History   Drug Use No     History   Smoking Status    Former Smoker   Smokeless Tobacco    Never Used     Family History: non-contributory    Meds/Allergies   all current active meds have been reviewed  Allergies   Allergen Reactions    Codeine GI Intolerance    Lansoprazole Other (See Comments)     GI Upset, dania protonix    Morphine Nausea Only    Morphine And Related GI Intolerance       Objective   First Vitals:   Blood Pressure: 143/84 (06/16/18 1033)  Pulse: 86 (06/16/18 1033)  Temperature: 97 8 °F (36 6 °C) (06/16/18 1033)  Temp Source: Oral (06/16/18 1033)  Respirations: 20 (06/16/18 1033)  Height: 5' 5" (165 1 cm) (06/16/18 1033)  Weight - Scale: 83 3 kg (183 lb 10 3 oz) (06/16/18 1033)  SpO2: 100 % (06/16/18 1033)    Current Vitals:   Blood Pressure: 136/71 (06/19/18 1126)  Pulse: (!) 128 (06/19/18 1126)  Temperature: 98 6 °F (37 °C) (06/19/18 1126)  Temp Source: Oral (06/19/18 1126)  Respirations: 18 (06/19/18 1126)  Height: 5' 2" (157 5 cm) (06/18/18 1855)  Weight - Scale: 85 9 kg (189 lb 4 8 oz) (06/18/18 1855)  SpO2: 96 % (06/19/18 1126)      Intake/Output Summary (Last 24 hours) at 06/19/18 1606  Last data filed at 06/19/18 1424   Gross per 24 hour   Intake              520 ml   Output             1300 ml   Net             -780 ml       Invasive Devices     Peripheral Intravenous Line            Peripheral IV 06/16/18 Left Antecubital 3 days    Peripheral IV 06/16/18 Right Antecubital 3 days                Physical Exam   Constitutional: She is oriented to person, place, and time  She appears well-developed and well-nourished  HENT:   Head: Normocephalic and atraumatic  Eyes: Pupils are equal, round, and reactive to light  Neck: Normal range of motion  Cardiovascular: Normal rate and regular rhythm  Pulmonary/Chest: Effort normal    Abdominal: Soft  Bowel sounds are normal  She exhibits no distension  There is no tenderness  Neurological: She is alert and oriented to person, place, and time  Lab Results:   I have personally reviewed pertinent lab results    , CBC:   Lab Results   Component Value Date    WBC 9 68 06/19/2018    HGB 10 3 (L) 06/19/2018    HCT 31 7 (L) 06/19/2018    MCV 96 06/19/2018     (L) 06/19/2018    MCH 31 2 06/19/2018    MCHC 32 5 06/19/2018    RDW 14 7 06/19/2018    MPV 9 6 06/19/2018    NRBC 0 06/19/2018   , CMP:   Lab Results   Component Value Date     06/19/2018    K 3 5 06/19/2018     06/19/2018    CO2 28 06/19/2018    ANIONGAP 7 06/19/2018    BUN 11 06/19/2018    CREATININE 0 93 06/19/2018    GLUCOSE 112 06/19/2018    CALCIUM 7 9 (L) 06/19/2018    EGFR 59 06/19/2018     Imaging: I have personally reviewed pertinent reports  EKG, Pathology, and Other Studies: I have personally reviewed pertinent reports  Counseling / Coordination of Care  Total floor / unit time spent today 30 minutes  Greater than 50% of total time was spent with the patient and / or family counseling and / or coordination of care  A description of the counseling / coordination of care: 30

## 2018-06-19 NOTE — PROGRESS NOTES
Progress Note - Cabrera Justice 65 y o  female MRN: [id number]    Unit/Bed#: [facility] 807-01 Encounter: [id number]         Assessment/ Plan:  Hematochezia    S/p colonoscopy yesterday showing a sigmoid mass w/ ulceration  Concerning for cancer but CEA was (-)  No further episodes of bleeding  Biopsies pending  Treatment depends on biopsy results  She can potentially be discharged & these can be followed up as an outpt  Discussed w/ SLIM regarding anticoagulation  She can have it if necessary from cardiac standpoint  Subjective:   Pt denies abdominal pain  Tolerating diet  No rectal bleeding  Objective:     Vitals: Blood pressure 136/71, pulse (!) 128, temperature 98 6 °F (37 °C), temperature source Oral, resp  rate 18, height 5' 2" (1 575 m), weight 85 9 kg (189 lb 4 8 oz), SpO2 96 %  ,Body mass index is 34 62 kg/m²  Physical Exam: General appearance: alert and oriented, in no acute distress  Lungs: clear to auscultation bilaterally  Heart: regular rate and rhythm  Abdomen: soft, non-tender; bowel sounds normal; no masses,  no organomegaly  Skin: Skin color, texture, turgor normal  No rashes or lesions     Invasive Devices     Peripheral Intravenous Line            Peripheral IV 06/16/18 Left Antecubital 3 days    Peripheral IV 06/16/18 Right Antecubital 3 days                Lab, Imaging and other studies: I have personally reviewed pertinent reports       CBC:   Lab Results   Component Value Date    WBC 9 68 06/19/2018    HGB 10 3 (L) 06/19/2018    HCT 31 7 (L) 06/19/2018    MCV 96 06/19/2018     (L) 06/19/2018    MCH 31 2 06/19/2018    MCHC 32 5 06/19/2018    RDW 14 7 06/19/2018    MPV 9 6 06/19/2018    NRBC 0 06/19/2018   ,   CMP:   Lab Results   Component Value Date     06/19/2018    K 3 5 06/19/2018     06/19/2018    CO2 28 06/19/2018    ANIONGAP 7 06/19/2018    BUN 11 06/19/2018    CREATININE 0 93 06/19/2018    GLUCOSE 112 06/19/2018    CALCIUM 7 9 (L) 06/19/2018    EGFR 59 06/19/2018   ,

## 2018-06-19 NOTE — CONSULTS
Consultation - Cardiology Team One  Aurea Ortez 65 y o  female MRN: [id number]  Unit/Bed#: [facility] 807-01 Encounter: [id number]    Inpatient consult to Cardiology  Consult performed by: Mell Horton ordered by: Chasity Finney        Chief Complaint   Patient presents with    Rectal Bleeding       Patient states that she has hemoriods, she has spot bleeding, however this time she is bleeding alot and is unsure if its from the hemoriods  Patient is on coumadin     Physician Requesting Consult: Marifer May MD  Reason for Consult / Principal Problem: Anticoagulation in the setting of chronic aflutter    History of Present Illness   HPI: Aurea Ortez is a 65y o  year old female who has a history of a flutter on chronic anticoagulation with Coumadin, chronic diastolic heart failure [place] HA class 3 with preserved EF of 60%, CAD status post CABG in 2008, mild pulmonary hypertension with chronic RV dysfunction, hypertension, hyperlipidemia, and lower GI bleeding in the past for approximately 6 months per the patient report secondary to internal/external hemorrhoids and polypectomies that have required transfusions  Patient has seen Dr Pati Tucker and Dr Vania Borden with GI in the past for her internal/external  hemorrhoids and has most recently undergone banding of her hemorrhoids by Dr Vania Borden  Patient has been maintained on oral anticoagulation with Coumadin since January of 2017 which was ordered by Dr Gerardo Allison whom the patient routinely follows with  The patient was last seen in the [facility] office on 5/24/18 for routine follow-up  At the time of the patient's last visit, patient did her concerns of weight gain and fluid retention; her Coumadin schedule remained the same, however per Dr Gerardo Allison order the patient was to take her oral furosemide at the same dose daily, but increase her oral Zaroxolyn to twice per week, making the schedule every Wednesday and Sunday per week  During this most recent admission the patient initially presented to [facility] for rectal bleeding which had began the night prior to her admission to the hospital   While at [facility] the patient was on the bedside commode had a syncopal episode with reported brief tonic colonic seizure  There was a postictal period of approximately 5 minutes with some mild confusion  The patient was intubated for airway control at that time  Patient did remain hemodynamically stable at the [facility] ED her hemoglobin was 12 0  The patient was given K-centra and vitamin K given that she is on chronic anticoagulation for history of a flutter  Patient's oral anticoagulation was held at this time  The patient was transferred over to [place] [address]  for further evaluation of her lower GI bleeding, and the potential for procedural intervention by the gastroenterology team   Patient was transferred to the ICU for further management  Patient was extubated the same day she arrived to the [facility]  Patient did have subsequent atrial fibrillation with RVR, which was controlled with IV metoprolol  Patient was transfused with 2 units of packed red blood cells in the ICU for hemoglobin of 9 3 with a post transfusion hemoglobin of 10 7  Troponins and EKGs were drawn at that time, the troponin levels were elevated and peaked at 0 48 and have trended down to 0 28  EKGs were not significant for ST changes  The patient's INR level was adequate enough for gastroenterology to perform a colonoscopy procedure on 6/18/18, 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, 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, suspicious for malignancy, and is now pending pathology results; in addition to this colonic has several polyps were removed during the procedure  Patient was medically stable after the procedure and was transferred to [facility] with telemetry monitoring            Most recent imaging includes an echocardiogram from 1/19/18, demonstrated that a EF 60%, without regional wall motion abnormalities, the LV wall thickness was mildly increased consistent with concentric remodeling a (increased wall thickness with normal wall mass and), the RV was markedly dilated, systolic function was moderately reduced, wall thickness was increased; the aortic valve was trileaflet in nature, there was no evidence for stenosis or regurgitation, the mitral valve demonstrated mild-to-moderate regurgitation, the tricuspid valve had severe annular dilatation, there is severe regurgitation, the estimated peak PA pressure was 48 mm of Hg suggesting mild pulmonary hypertension      Review of Systems   Constitution: Positive for malaise/fatigue  Cardiovascular: Positive for leg swelling  Negative for chest pain, claudication, cyanosis, dyspnea on exertion, irregular heartbeat, near-syncope, orthopnea, palpitations, paroxysmal nocturnal dyspnea and syncope  Respiratory: Negative for cough and shortness of breath  All other systems reviewed and are negative  Historical Information   Past Medical History:   Diagnosis Date    Cardiac disease     GERD (gastroesophageal reflux disease)     Hyperlipidemia     Hypertension      Past Surgical History:   Procedure Laterality Date    CARDIAC SURGERY      CARDIAC SURGERY      CATARACT EXTRACTION      CHOLECYSTECTOMY      COLONOSCOPY      COLONOSCOPY N/A 6/18/2018    Procedure: COLONOSCOPY;  Surgeon: [employer], DO;  Location: [facility]; Service: Gastroenterology    COLONOSCOPY W/ CONTROL OF HEMORRHAGE      CORONARY ANGIOPLASTY WITH STENT PLACEMENT      CORONARY ARTERY BYPASS GRAFT  2008    HERNIA REPAIR      HYSTERECTOMY       History   Alcohol Use No     History   Drug Use No     History   Smoking Status    Former Smoker   Smokeless Tobacco    Never Used     Family History: History reviewed  No pertinent family history      Meds/Allergies   all current active meds have been reviewed, current meds:   Current Facility-Administered Medications   Medication Dose Route Frequency    acetaminophen (TYLENOL) tablet 650 mg  650 mg Oral Q8H PRN    ALPRAZolam (XANAX) tablet 0 25 mg  0 25 mg Oral TID PRN    atorvastatin (LIPITOR) tablet 20 mg  20 mg Oral After Dinner    calcium carbonate (OYSTER SHELL,OSCAL) 500 mg tablet 1 tablet  1 tablet Oral BID With Meals    [START ON 6/20/2018] ergocalciferol (VITAMIN D2) capsule 50,000 Units  50,000 Units Oral Weekly    furosemide (LASIX) tablet 20 mg  20 mg Oral BID    metoprolol tartrate (LOPRESSOR) tablet 50 mg  50 mg Oral Q12H [facility]    multivitamin-minerals (CENTRUM) tablet 1 tablet  1 tablet Oral Daily    nitroglycerin (NITROSTAT) SL tablet 0 4 mg  0 4 mg Sublingual Q5 Min PRN    ondansetron (ZOFRAN) injection 4 mg  4 mg Intravenous Q4H PRN    pantoprazole (PROTONIX) injection 40 mg  40 mg Intravenous Q12H [facility]    warfarin (COUMADIN) tablet 2 mg  2 mg Oral Daily (warfarin)    and PTA meds:   Prior to Admission Medications   Prescriptions Last Dose Informant Patient Reported? Taking? ALPRAZolam (XANAX) 0 25 mg tablet   Yes No   Sig: Take 0 25 mg by mouth 3 (three) times a day as needed for anxiety  CALCIUM PO   Yes No   Sig: Take 600 mg by mouth 2 (two) times a day     Multiple Vitamins-Minerals (PRESERVISION AREDS) CAPS   Yes No   Sig: Take 1 capsule by mouth 2 (two) times a day   Omega-3 Fatty Acids (FISH OIL PO)   Yes No   Sig: Take 1,000 mg by mouth daily  Potassium Chloride Maddy CR (KLOR-CON M20 PO)   Yes No   Sig: Take 20 mEq by mouth daily  atorvastatin (LIPITOR) 20 mg tablet 6/15/2018 at Unknown time  Yes Yes   Sig: Take 20 mg by mouth daily after dinner  ergocalciferol (ERGOCALCIFEROL) 30914 UNITS capsule   Yes No   Sig: Take 50,000 Units by mouth once a week   Takes on Wednesdays   furosemide (LASIX) 20 mg tablet   No No   Sig: Take 1 tablet (20 mg total) by mouth 2 (two) times a day for 90 days   lisinopril (ZESTRIL) 10 mg tablet   Yes No   Sig: Take 10 mg by mouth daily  metolazone (ZAROXOLYN) 2 5 mg tablet  Self Yes No   Sig: Take 2 5 mg by mouth daily   metoprolol tartrate (LOPRESSOR) 50 mg tablet   Yes No   Sig: Take 50 mg by mouth 2 (two) times a day   nitroglycerin (NITROSTAT) 0 4 mg SL tablet   Yes No   Sig: Place 0 4 mg under the tongue every 5 (five) minutes as needed for chest pain  ranitidine (ZANTAC) 300 MG capsule   Yes No   Sig: Take 300 mg by mouth daily     warfarin (COUMADIN) 2 mg tablet   No No   Sig: TAKE ONE TO TWO TABLETS BY MOUTH ONCE DAILY OR  AS  ORDERED  BY  PHYSICIAN      Facility-Administered Medications: None          Allergies   Allergen Reactions    Codeine GI Intolerance    Lansoprazole Other (See Comments)     GI Upset, dania protonix    Morphine Nausea Only    Morphine And Related GI Intolerance       Objective   Vitals: Blood pressure 136/71, pulse (!) 128, temperature 98 6 °F (37 °C), temperature source Oral, resp  rate 18, height 5' 2" (1 575 m), weight 85 9 kg (189 lb 4 8 oz), SpO2 96 %  ,     Body mass index is 34 62 kg/m²  ,     Systolic (35XZU), AOA:699 , Min:127 , DTT:031     Diastolic (81JWQ), LEV:94, Min:63, Max:88            Intake/Output Summary (Last 24 hours) at 06/19/18 1219  Last data filed at 06/19/18 0910   Gross per 24 hour   Intake              600 ml   Output              600 ml   Net                0 ml     Weight (last 2 days)     Date/Time   Weight    06/18/18 1855  85 9 (189 3)    06/18/18 1242  82 6 (182)    06/18/18 0509  84 5 (186 29)            Invasive Devices     Peripheral Intravenous Line            Peripheral IV 06/16/18 Left Antecubital 3 days    Peripheral IV 06/16/18 Right Antecubital 3 days                  Physical Exam   Constitutional: She is oriented to person, place, and time  She appears well-developed and well-nourished  No distress  HENT:   Head: Normocephalic and atraumatic     Eyes: Conjunctivae and EOM are normal    Neck: Normal range of motion  Neck supple  Cardiovascular: Intact distal pulses  Murmur heard  A flutter on the cardiac monitor rate controlled   Pulmonary/Chest: Effort normal and breath sounds normal  No respiratory distress  Abdominal: Soft  Bowel sounds are normal    Musculoskeletal: Normal range of motion  She exhibits edema  Chronic moderate nonpitting bilateral lower extremity swelling   Neurological: She is alert and oriented to person, place, and time  Skin: Skin is warm and dry  She is not diaphoretic  Psychiatric: She has a normal mood and affect  Nursing note and vitals reviewed  LABORATORY RESULTS:    Results from last 7 days  Lab Units 06/17/18  0155 06/16/18  1801 06/16/18  1350   TROPONIN I ng/mL 0 28* 0 44* 0 42*     CBC with diff:   Results from last 7 days  Lab Units 06/19/18  0539 06/18/18  0515 06/17/18  1439 06/17/18  0438 06/16/18  2220 06/16/18  1350 06/16/18  1107  06/16/18  0453   WBC Thousand/uL 9 68 6 59  --  8 34  --   --  12 91*  --  8 57   HEMOGLOBIN g/dL 10 3* 10 8* 10 7* 10 7* 9 3* 10 2* 10 7*  --  12 0   I STAT HEMOGLOBIN   --   --   --   --   --   --   --   < >  --    HEMATOCRIT % 31 7* 33 2*  --  34 1*  --   --  34 2*  --  37 1   MCV fL 96 96  --  97  --   --  101*  --  99*   PLATELETS Thousands/uL 121* 108*  --  106*  --   --  145*  --  172   MCH pg 31 2 31 3  --  30 6  --   --  31 7  --  32 0   MCHC g/dL 32 5 32 5  --  31 4  --   --  31 3*  --  32 3   RDW % 14 7 15 1  --  14 5  --   --  13 8  --  13 7   MPV fL 9 6 9 4  --  9 7  --   --  9 4  --  9 8   NRBC AUTO /100 WBCs 0 0  --   --   --   --   --   --   --    < > = values in this interval not displayed      CMP:  Results from last 7 days  Lab Units 06/19/18  0610 06/18/18  0515 06/17/18  1439 06/17/18  0438 06/17/18  0155 06/16/18  1128 06/16/18  0815 06/16/18  0453   SODIUM mmol/L 142 146* 142 142 141 144  --  141   POTASSIUM mmol/L 3 5 3 5 4 6 4 1 4 1 3 6  --  3 7   CHLORIDE mmol/L 107 111* 109* 110* 109* 112*  --  106 CO2 mmol/L 28 29 28 26 25 25  --  28   ANION GAP mmol/L 7 6 5 6 7 7  --  7   BUN mg/dL 11 9 11 13 14 20  --  23   CREATININE mg/dL 0 93 0 73 0 85 0 75 0 74 0 95  --  0 99   GLUCOSE RANDOM mg/dL 112 95 132 100 100 145*  --  112   GLUCOSE, ISTAT mg/dl  --   --   --   --   --   --  179*  --    CALCIUM mg/dL 7 9* 8 2* 8 1* 8 3 8 4 7 7*  --  8 8   AST U/L  --   --   --   --   --   --   --  21   ALT U/L  --   --   --   --   --   --   --  22   ALK PHOS U/L  --   --   --   --   --   --   --  141*   TOTAL PROTEIN g/dL  --   --   --   --   --   --   --  6 4   BILIRUBIN TOTAL mg/dL  --   --   --   --   --   --   --  0 60   EGFR ml/min/1 73sq m 59 79 66 77 78 58  --  55       BMP:  Results from last 7 days  Lab Units 06/19/18  0610 06/18/18  0515 06/17/18  1439 06/17/18  0438 06/17/18  0155 06/16/18  1128  06/16/18  0453   SODIUM mmol/L 142 146* 142 142 141 144  --  141   POTASSIUM mmol/L 3 5 3 5 4 6 4 1 4 1 3 6  --  3 7   CHLORIDE mmol/L 107 111* 109* 110* 109* 112*  --  106   CO2 mmol/L 28 29 28 26 25 25  --  28   BUN mg/dL 11 9 11 13 14 20  --  23   CREATININE mg/dL 0 93 0 73 0 85 0 75 0 74 0 95  --  0 99   GLUCOSE RANDOM mg/dL 112 95 132 100 100 145*  --  112   GLUCOSE, ISTAT   --   --   --   --   --   --   < >  --    CALCIUM mg/dL 7 9* 8 2* 8 1* 8 3 8 4 7 7*  --  8 8   < > = values in this interval not displayed         Lab Results   Component Value Date    NTBN 1,866 (H) 04/17/2018    NTBN 1,508 (H) 01/15/2018    NTBNP 2,579 (H) 12/26/2017            Results from last 7 days  Lab Units 06/18/18  0515 06/17/18  0438 06/17/18  0155 06/16/18  0453   MAGNESIUM mg/dL 2 1 2 0 2 0 2 2                       Results from last 7 days  Lab Units 06/17/18  0438 06/16/18  1110 06/16/18  0453   INR  1 12 1 21* 2 22*     Lipid Profile:   Lab Results   Component Value Date    CHOL 125 12/26/2017    CHOL 146 07/19/2016    CHOL 153 04/08/2014     Lab Results   Component Value Date    HDL 54 12/26/2017    HDL 67 (H) 07/19/2016    HDL 63 2014     Lab Results   Component Value Date    LDLCALC 45 2017    LDLCALC 58 2016    LDLCALC 62 2014     Lab Results   Component Value Date    TRIG 130 2017    TRIG 104 2016    TRIG 171 2015         Cardiac testing:   Results for orders placed in visit on 18   Echo complete with contrast if indicated    Narrative [address] [facility] [address] [facility]   [phone]     Transthoracic Echocardiogram   2D, M-mode, Doppler, and Color Doppler     Study date:  2018     Patient: Curtis Baxter   MR number: [id number]   Account number: [de-identified]   : 1940   Age: 65 years   Gender: Female   Status: Outpatient   Location: Echo lab   Height: 65 in   Weight: 184 6 lb   BP: 126/ 72 mmHg     Indications: HYPERTENSION, CONGESTIVE HEART FAILURE, DYSPNEA ON EXERTION     Diagnoses: 428 0 - CONGESTIVE HEART FAILURE     Sonographer:  Malena Sanders RDCS   Primary Physician:  Adonis Reyna MD   Referring Physician:  Adonis Reyna MD   Group:  [facility]   Interpreting Physician:  Gamaliel Negrete DO     SUMMARY     LEFT VENTRICLE:   Systolic function was normal  Ejection fraction was estimated to be 60 %  There were no regional wall motion abnormalities  Wall thickness was mildly increased  The changes were consistent with concentric remodeling (increased wall thickness with normal wall mass)  VENTRICULAR SEPTUM:   There was systolic and diastolic flattening  These changes are consistent with RV volume and pressure overload  RIGHT VENTRICLE:   The ventricle was markedly dilated  Systolic function was moderately reduced  Wall thickness was increased  LEFT ATRIUM:   The atrium was mildly dilated  RIGHT ATRIUM:   The atrium was markedly dilated  MITRAL VALVE:   There was mild to moderate regurgitation  TRICUSPID VALVE:   There was severe annular dilatation     There was severe regurgitation  Estimated peak PA pressure was 48 mmHg  The findings suggest mild pulmonary hypertension  PULMONIC VALVE:   There was mild regurgitation  HISTORY: PRIOR HISTORY: CORONARY ARTERY DISEASE, S/P CABG, EDEMA, HYPOTHYROID, ASCVD, SOB     PROCEDURE: The procedure was performed in the echo lab  This was a routine study  The transthoracic approach was used  The study included complete 2D imaging, M-mode, complete spectral Doppler, and color Doppler  Images were obtained from   the parasternal, apical, subcostal, and suprasternal notch acoustic windows  Echocardiographic views were limited due to poor acoustic window availability, decreased penetration, and lung interference  This was a technically difficult   study  LEFT VENTRICLE: Size was normal  Systolic function was normal  Ejection fraction was estimated to be 60 %  There were no regional wall motion abnormalities  Wall thickness was mildly increased  The changes were consistent with concentric   remodeling (increased wall thickness with normal wall mass)  DOPPLER: Normal sinus rhythm was absent  The study was not technically sufficient to allow evaluation of LV diastolic function  VENTRICULAR SEPTUM: There was systolic and diastolic flattening  These changes are consistent with RV volume and pressure overload  RIGHT VENTRICLE: The ventricle was markedly dilated  Systolic function was moderately reduced  Wall thickness was increased  LEFT ATRIUM: The atrium was mildly dilated  RIGHT ATRIUM: The atrium was markedly dilated  MITRAL VALVE: Valve structure was normal  There was normal leaflet separation  DOPPLER: The transmitral velocity was within the normal range  There was no evidence for stenosis  There was mild to moderate regurgitation  AORTIC VALVE: The valve was trileaflet  Leaflets exhibited normal cuspal separation and sclerosis  DOPPLER: Transaortic velocity was within the normal range   There was no evidence for stenosis  There was no regurgitation  TRICUSPID VALVE: There was severe annular dilatation  There was normal leaflet separation  DOPPLER: The transtricuspid velocity was within the normal range  There was no evidence for stenosis  There was severe regurgitation  Pulmonary   artery systolic pressure was mildly increased  Estimated peak PA pressure was 48 mmHg  The findings suggest mild pulmonary hypertension  PULMONIC VALVE: Leaflets exhibited normal thickness, no calcification, and normal cuspal separation  DOPPLER: The transpulmonic velocity was within the normal range  There was mild regurgitation  PERICARDIUM: There was no pericardial effusion  The pericardium was normal in appearance  AORTA: The root exhibited normal size  SYSTEMIC VEINS: IVC: The inferior vena cava was dilated  IVC DOPPLER: The flow pattern showed systolic flow reversal suggestive of severe tricuspid regurgitation       SYSTEM MEASUREMENT TABLES     2D   %FS: 27 27 %   AV Diam: 2 65 cm   EDV(Teich): 57 83 ml   EF(Teich): 53 91 %   ESV(Teich): 26 65 ml   IVSd: 1 cm   LA Area: 21 79 cm2   LA Diam: 4 21 cm   LVEDV MOD A4C: 49 01 ml   LVEF MOD A4C: 62 04 %   LVESV MOD A4C: 18 61 ml   LVIDd: 3 69 cm   LVIDs: 2 69 cm   LVLd A4C: 6 41 cm   LVLs A4C: 5 7 cm   LVPWd: 0 98 cm   RA Area: 36 33 cm2   RV DIAM: 5 49 cm   SV MOD A4C: 30 4 ml   SV(Teich): 31 18 ml     CW   AV Vmax: 1 09 m/s   AV maxP 79 mmHg   PV Vmax: 0 94 m/s   PV maxPG: 3 51 mmHg   TR Vmax: 2 66 m/s   TR maxP 24 mmHg     MM   TAPSE: 1 82 cm     PW   E': 0 18 m/s   E/E': 6 64   LVOT Vmax: 0 6 m/s   LVOT maxP 46 mmHg   MV A Jose: 0 67 m/s   MV Dec [place]: 7 27 m/s2   MV DecT: 163 78 ms   MV E Jose: 1 19 m/s   MV E/A Ratio: 1 78   RVOT Vmax: 0 49 m/s   RVOT maxP 95 mmHg     [employer] Accredited Echocardiography Laboratory     Prepared and electronically signed by     Amor Cordero DO   Signed 2018 16:22:21     No results found for this or any previous visit  No procedure found  Results for orders placed in visit on 04/16/15   NM myocardial perfusion spect (stress and/or rest)    Narrative This stress test was performed by a cardiologist and the    cardiologist will render the interpretation  Darcy [id number] Coordinator        Reading Radiologist- JOSIANE Yeung MD        Electronically Signed- JOSIANE Yeung MD        Released Date Time- 04/20/15 0380      ------------------------------------------------------------------------------   Velia Shepard          Imaging: I have personally reviewed pertinent reports  and I have personally reviewed pertinent films in PACS  Xr Chest 1 View Portable    Result Date: 6/16/2018  Narrative: CHEST INDICATION:   post-intubation cxr  COMPARISON:  June 6, 2018 and prior EXAM PERFORMED/VIEWS:  XR CHEST PORTABLE FINDINGS: The heart mediastinum are stable given differences in patient positioning  There is an ET tube in place terminating 2 cm from the asmita  No other support lines and tubes were evident  There is cephalization of the pulmonary vasculature  No evidence of pneumothorax focal consolidation or significant pleural effusion  Osseous structures appear within normal limits for patient age  Impression: ET tube in satisfactory position  Pulmonary vascular congestion  Workstation performed: [id number]     Xr Chest Pa & Lateral    Result Date: 6/6/2018  Narrative: CHEST INDICATION: Preop hemorrhoidectomy  Remote tobacco abuse  COMPARISON: January 29, 2018 VIEWS:  PA and lateral; IMAGES:  2 FINDINGS: Midline sternotomy wires are present from previous cardiac surgery  The cardiac silhouette is enlarged (cardiothoracic ratio 15 8/26 4)  The lungs are clear  No pleural effusions  Bony thorax is otherwise unremarkable  Surgical clips are noted in the right upper quadrant of the abdomen  Impression: Cardiomegaly status post CABG  No acute pulmonary disease      Workstation performed: [id number]     Ct Head Without Contrast    Result Date: 6/16/2018  Narrative: CT BRAIN - WITHOUT CONTRAST INDICATION:   new-onset seizure; mild confusion  COMPARISON:  None  TECHNIQUE:  CT examination of the brain was performed  In addition to axial images, coronal 2D reformatted images were created and submitted for interpretation  Radiation dose length product (DLP) for this visit:  988 mGy-cm   This examination, like all CT scans performed in the [facility], was performed utilizing techniques to minimize radiation dose exposure, including the use of iterative reconstruction and automated exposure control  IMAGE QUALITY:  Diagnostic  FINDINGS: PARENCHYMA:  No intracranial mass, mass effect or midline shift  No CT signs of acute infarction  No acute parenchymal hemorrhage  VENTRICLES AND EXTRA-AXIAL SPACES:  Ventricles and extra-axial CSF spaces are prominent commensurate with the degree of volume loss  No hydrocephalus  No acute extra-axial hemorrhage  VISUALIZED ORBITS AND PARANASAL SINUSES:  Partial opacification of mastoid air cells  CALVARIUM AND EXTRACRANIAL SOFT TISSUES:  Normal      Impression: No acute intracranial abnormality  Workstation performed: [id number]     Ct High Volume Lower Gi Bleed    Result Date: 6/16/2018  Narrative: CT ABDOMEN AND PELVIS - WITHOUT AND WITH IV CONTRAST INDICATION:   Gastrointestinal bleeding  COMPARISON: CT abdomen/pelvis from 1/2/2017  TECHNIQUE:  CT examination of the abdomen and pelvis was performed both prior to and after the administration of intravenous contrast  Axial, sagittal, and coronal 2D reformatted images were created from the source data and submitted for interpretation  Radiation dose length product (DLP) for this visit:  3150 64 mGy-cm     This examination, like all CT scans performed in the [facility], was performed utilizing techniques to minimize radiation dose exposure, including the use of iterative reconstruction and automated exposure control  IV Contrast:  100 mL of iodixanol (VISIPAQUE) Enteric Contrast:  Enteric contrast was not administered  FINDINGS: ABDOMEN  BOWEL:  There is no active extravasation of intravenous contrast into the lumen of stomach, small bowel, or large bowel loops  There is no abnormal bowel wall thickening or pathologic bowel wall enhancement  Colonic diverticulosis unchanged  No bowel obstruction  NG tube in the stomach  LOWER CHEST:  Small bilateral pleural effusions  Minimal bibasilar atelectasis  Stable cardiomegaly  LIVER/BILIARY TREE:  Unremarkable  GALLBLADDER:  Gallbladder is surgically absent  SPLEEN:  Unremarkable  PANCREAS:  Unremarkable  ADRENAL GLANDS:  Unremarkable  KIDNEYS/URETERS:  Left lower pole cortical cyst measures 8 mm    No hydronephrosis  PELVIS REPRODUCTIVE ORGANS:  Status post hysterectomy  No adnexal mass  URINARY BLADDER:  Justice catheter in place  APPENDIX: No findings to suggest appendicitis  ADDITIONAL ABDOMINAL AND PELVIC STRUCTURES ABDOMINOPELVIC CAVITY:  No ascites or free intraperitoneal air  No lymphadenopathy  ABDOMINAL WALL/INGUINAL REGIONS:  Unremarkable  OSSEOUS STRUCTURES:  No acute fracture or destructive osseous lesion  Impression: No CT evidence of active high volume gastrointestinal hemorrhage  Stable colonic diverticulosis  Small bilateral pleural effusions  Stable cardiomegaly   Workstation performed: [id number]    Assessment  Principal Problem:    Rectal bleeding  Active Problems:    CAD (coronary artery disease)    Diverticulosis    Atrial flutter (HCC)    Diverticulosis of large intestine with hemorrhage    Vomiting    Colonic mass    Assessment/ Plan  Atrial Flutter on chronic anticoagulation with Coumadin  -Anticoagulation has been on hold since 6/16/2018 in the setting of acute blood loss anemia secondary to acute LGIB; patient was given K-centra on admission to the ED  -Reported Atrial Fibrillation with RVR this admission during ICU stay; oral metoprolol at that time switched to IV, now rate controlled on oral metoprolol 50 mg q12h  -Most recent PT/INR 1 12  -Restart oral warfarin 2 mg to be doses tonight; check PT/INR in the a m; monitor for signs of bleeding  -F/u with Dr Edy Membreno as an outpatient in the [facility] cardiology office    Elevated Troponin's  -Likely Type II, in the setting of acute blood loss anemia  -Troponin levels 0 19/0 42/0 44/0 28  -patient without chest pain, EKG without significant ST abnormalities, no further ischemic workup is warranted at this time    Acute blood loss anemia in the setting of diverticulosis, and sigmoid colonic mass  -GI following closely; hemoglobin is stable  -heme/on consult has been ordered and is pending    Chronic Diastolic HF NYHA class 3  -current weight 85 9 kg; dry weight 81 3 kg  -patient is positive 3 1 L this admission; was on appear clinically volume overloaded, she does exhibit chronic lower extremity swelling  -continue Lasix 20 mg b i d   -Can resume Zaroxolyn 2 5 mg x2/week  -Continue metoprolol 50 mg b i d , and reorder lisinopril 10 mg daily  -Will have the patient f/u as an outpatient with Dr Edy Membreno    CAD s/p CABG in 2008  -continue metoprolol 50 mg b i d   -on oral anticoagulation, not-on oral aspirin  -continue atorvastatin 20 mg daily    HTN  -Continue lisinopril 10 mg daily  -continue metoprolol 50 mg b i d  HLD  -continue atorvastatin 20 mg daily    Thank you for allowing us to participate in this patient's care  This pt will follow up with Edy Mahan once discharged  Counseling / Coordination of Care  Total floor / unit time spent today 45 minutes  Greater than 50% of total time was spent with the patient and / or family counseling and / or coordination of care  A description of the counseling / coordination of care: Review of history, current assessment, development of a plan        Code Status: Level 1 - Full Code    ** Please Note: Dragon 360 Dictation voice to text software may have been used in the creation of this document   **

## 2018-06-19 NOTE — PROGRESS NOTES
Progress Note - Juanito Cleveland 1940, 65 y o  female MRN: [id number]    Unit/Bed#: [facility] 807-01 Encounter: [id number]    Primary Care Provider: Chace Gr MD   Date and time admitted to hospital: 2018 10:27 AM        Colonic mass   Assessment & Plan    Will discuss with GI patient's plan for further workup  Atrial flutter (HCC)   Assessment & Plan    Metoprolol, warfarin  Follow up on INR  CAD (coronary artery disease)   Assessment & Plan    Lipitor, Lasix, Lopressor,        Diverticulosis   Assessment & Plan    Monitor for bleeding  Type II NSTEMI in setting of ac GIB a/e/b   · Troponins 0 19   0 42   0 44   0 28 treated with admission to ICU with continuous cardiac monitoring,   · serial ECGs  ·  and IV Lopressor on initial presentation       VTE Pharmacologic Prophylaxis:   Pharmacologic: Warfarin (Coumadin)  Mechanical VTE Prophylaxis in Place: No    Patient Centered Rounds: I have performed bedside rounds with nursing staff today  Time Spent for Care: 15 minutes  More than 50% of total time spent on counseling and coordination of care as described above  Current Length of Stay: 3 day(s)    Current Patient Status: Inpatient   Certification Statement: The patient will continue to require additional inpatient hospital stay due to Need to monitor symptoms        Code Status: Level 1 - Full Code      Subjective:   Patient comfortable    Objective:     Vitals:   Temp (24hrs), Av 4 °F (36 9 °C), Min:98 1 °F (36 7 °C), Max:98 7 °F (37 1 °C)    HR:  [] 116  Resp:  [16-45] 18  BP: (127-154)/(63-88) 127/63  SpO2:  [95 %-98 %] 96 %  Body mass index is 34 62 kg/m²  Input and Output Summary (last 24 hours):        Intake/Output Summary (Last 24 hours) at 18 0954  Last data filed at 18 0910   Gross per 24 hour   Intake              600 ml   Output              650 ml   Net              -50 ml       Physical Exam:     Physical Exam Constitutional: She is oriented to person, place, and time  She appears well-developed and well-nourished  HENT:   Head: Normocephalic and atraumatic  Eyes: Conjunctivae are normal  Pupils are equal, round, and reactive to light  Neck: Normal range of motion  Neck supple  No tracheal deviation present  No thyromegaly present  Cardiovascular: Normal rate and regular rhythm  No murmur heard  Pulmonary/Chest: Effort normal and breath sounds normal  No respiratory distress  She has no wheezes  Abdominal: Soft  Bowel sounds are normal  She exhibits no distension  There is no tenderness  Neurological: She is alert and oriented to person, place, and time  No cranial nerve deficit  Skin: Skin is warm and dry  No erythema  Psychiatric: She has a normal mood and affect  Additional Data:     Labs:      Results from last 7 days  Lab Units 06/19/18  0539   WBC Thousand/uL 9 68   HEMOGLOBIN g/dL 10 3*   HEMATOCRIT % 31 7*   PLATELETS Thousands/uL 121*   NEUTROS PCT % 84*   LYMPHS PCT % 9*   MONOS PCT % 6   EOS PCT % 1       Results from last 7 days  Lab Units 06/19/18  0610  06/16/18  0453   SODIUM mmol/L 142  < > 141   POTASSIUM mmol/L 3 5  < > 3 7   CHLORIDE mmol/L 107  < > 106   CO2 mmol/L 28  < > 28   BUN mg/dL 11  < > 23   CREATININE mg/dL 0 93  < > 0 99   CALCIUM mg/dL 7 9*  < > 8 8   TOTAL PROTEIN g/dL  --   --  6 4   BILIRUBIN TOTAL mg/dL  --   --  0 60   ALK PHOS U/L  --   --  141*   ALT U/L  --   --  22   AST U/L  --   --  21   GLUCOSE RANDOM mg/dL 112  < > 112   GLUCOSE, ISTAT   --   < >  --    < > = values in this interval not displayed  Results from last 7 days  Lab Units 06/17/18  0438   INR  1 12       * I Have Reviewed All Lab Data Listed Above  * Additional Pertinent Lab Tests Reviewed:  All Labs Within Last 24 Hours Reviewed      Recent Cultures (last 7 days):           Last 24 Hours Medication List:     Current Facility-Administered Medications:  acetaminophen 650 mg Oral Q8H PRN Yenny Wilson DO   ALPRAZolam 0 25 mg Oral TID PRN ANTHONY Wallace   atorvastatin 20 mg Oral After Esaujulia Hickman, ANTHONY   calcium carbonate 1 tablet Oral BID With Meals ANTHONY Wallace   [START ON 6/20/2018] ergocalciferol 50,000 Units Oral Weekly ANTHONY Wallace   furosemide 20 mg Oral BID Neela Will MD   metoprolol tartrate 50 mg Oral Q12H [facility] Neela Will MD   multivitamin-minerals 1 tablet Oral Daily ANTHONY Wallace   nitroglycerin 0 4 mg Sublingual Q5 Min PRN ANTHONY Wallace   ondansetron 4 mg Intravenous Q4H PRN Neela Will MD   pantoprazole 40 mg Intravenous Q12H [facility] ANTHONY Shafer   warfarin 2 mg Oral Daily (warfarin) Neela Will MD        Today, Patient Was Seen By: Suleman Duarte DO    ** Please Note: Dictation voice to text software may have been used in the creation of this document   **

## 2018-06-19 NOTE — MEDICAL STUDENT
Progress Note - Danny Botello 65 y o  female MRN: [id number]    Unit/Bed#: [facility] 807-01 Encounter: [id number]      Assessment:  Danny Botello is a 64 y/o F admitted to [facility] on 6/16/18 with rectal bleeding  Patient was subsequently transferred to [facility] that same day due to GI bleeding and possible seizure activity  Patient has PMH of AFlutter on warfarin, CABG in 6863, Diastolic HF, HTN,, HLD, GERD, Diverticulosis, and Internal Hemorrhoids  Plan:  1  GI Bleed: Likely due to lower GI source  Colonoscopy performed yesterday for recent bleeding and anemia revealed a single sessile 5mm polyp in the cecum, single sessile 8mm polyp in the cecum, single sessile 6mm polyp in the ascending colon, and ulcerated polyp like mass with central decompression which occupied 25-49% of the circumference of the sigmoid colon  Due to appearance of lesion in the sigmoid colon, biopsy samples sent to pathology, CEA level performed, and oncology consult placed  - F/U Biopsy results  - CEA level of 1 7   - F/U Oncology recommendations  - Most recent Hemoglobin of 10 3    - Patient resumed on Warfarin 2mg PO  INR on 6/17/17 of 1 1  If continue Warfarin, order PT/INR for tomorrow  2  Diastolic HF: Patient follows outpatient with Dr Vida Brewer of Cardiology  Most recent TTE ECHO on 1/19/18 revealed EF of 60%  No regional wall motion abnormalities  - Continue Lasix 20mg BID    - Monitor I&Os  3  Hx of Aflutter: Cardiology consult placed  Awaiting workup of colonic mass, potential malignancy  Concern for continued anticoagulation in the setting of mass, and continued GI bleeding    - Continue Metoprolol Tartrate 50mg PO Q12H for rate control  4  Hx of CAD: Currently stable, patient denies any chest pain at this time  Most recent lipid panel on 12/26/17 revealed Cholesterol of 125, Triglycerides of 130, HDL of 54, and LDL of 45    - Continue Atorvastatin 20mg PO after dinner       5  GERD: Currently stable  - Continue pantoprazole 40mg IV Q12H  Subjective:   Patient seen and examined this morning  Patient currently denies any complaints  She states she has not had a bowel movement since her EGD/Colonoscopy yesterday  Patient denies any lightheadedness or dizziness  Patient denies any fever, chills, chest pain, or SOB  Objective:     Vitals: Blood pressure 127/63, pulse (!) 116, temperature 98 7 °F (37 1 °C), temperature source Oral, resp  rate 18, height 5' 2" (1 575 m), weight 85 9 kg (189 lb 4 8 oz), SpO2 96 %  ,Body mass index is 34 62 kg/m²  Intake/Output Summary (Last 24 hours) at 06/19/18 1102  Last data filed at 06/19/18 0910   Gross per 24 hour   Intake              600 ml   Output              650 ml   Net              -50 ml       Physical Exam: Physical Exam   Constitutional: She is oriented to person, place, and time  She is cooperative  Neck: No JVD present  Cardiovascular: S1 normal and S2 normal   Tachycardia present  No LE edema present bilaterally  Pulmonary/Chest: Effort normal and breath sounds normal    Abdominal: Soft  Bowel sounds are normal  She exhibits no distension  There is tenderness in the right lower quadrant  Neurological: She is alert and oriented to person, place, and time  Invasive Devices     Peripheral Intravenous Line            Peripheral IV 06/16/18 Right Antecubital 3 days    Peripheral IV 06/16/18 Left Antecubital 2 days                Lab, Imaging and other studies: I have personally reviewed pertinent reports      VTE Pharmacologic Prophylaxis: Warfarin (Coumadin)  VTE Mechanical Prophylaxis: sequential compression device

## 2018-06-20 LAB
ABO GROUP BLD: NORMAL
ANION GAP SERPL CALCULATED.3IONS-SCNC: 6 MMOL/L (ref 4–13)
BASOPHILS # BLD AUTO: 0.02 THOUSANDS/ΜL (ref 0–0.1)
BASOPHILS NFR BLD AUTO: 0 % (ref 0–1)
BLD GP AB SCN SERPL QL: NEGATIVE
BUN SERPL-MCNC: 10 MG/DL (ref 5–25)
CALCIUM SERPL-MCNC: 8.6 MG/DL (ref 8.3–10.1)
CHLORIDE SERPL-SCNC: 106 MMOL/L (ref 100–108)
CO2 SERPL-SCNC: 28 MMOL/L (ref 21–32)
CREAT SERPL-MCNC: 1.02 MG/DL (ref 0.6–1.3)
EOSINOPHIL # BLD AUTO: 0.1 THOUSAND/ΜL (ref 0–0.61)
EOSINOPHIL NFR BLD AUTO: 1 % (ref 0–6)
ERYTHROCYTE [DISTWIDTH] IN BLOOD BY AUTOMATED COUNT: 14.5 % (ref 11.6–15.1)
GFR SERPL CREATININE-BSD FRML MDRD: 53 ML/MIN/1.73SQ M
GLUCOSE SERPL-MCNC: 113 MG/DL (ref 65–140)
HCT VFR BLD AUTO: 34.7 % (ref 34.8–46.1)
HGB BLD-MCNC: 11.1 G/DL (ref 11.5–15.4)
IMM GRANULOCYTES # BLD AUTO: 0.02 THOUSAND/UL (ref 0–0.2)
IMM GRANULOCYTES NFR BLD AUTO: 0 % (ref 0–2)
INR PPP: 1.07 (ref 0.86–1.17)
LYMPHOCYTES # BLD AUTO: 1.18 THOUSANDS/ΜL (ref 0.6–4.47)
LYMPHOCYTES NFR BLD AUTO: 16 % (ref 14–44)
MCH RBC QN AUTO: 31.1 PG (ref 26.8–34.3)
MCHC RBC AUTO-ENTMCNC: 32 G/DL (ref 31.4–37.4)
MCV RBC AUTO: 97 FL (ref 82–98)
MONOCYTES # BLD AUTO: 0.56 THOUSAND/ΜL (ref 0.17–1.22)
MONOCYTES NFR BLD AUTO: 8 % (ref 4–12)
NEUTROPHILS # BLD AUTO: 5.41 THOUSANDS/ΜL (ref 1.85–7.62)
NEUTS SEG NFR BLD AUTO: 75 % (ref 43–75)
NRBC BLD AUTO-RTO: 0 /100 WBCS
PLATELET # BLD AUTO: 137 THOUSANDS/UL (ref 149–390)
PMV BLD AUTO: 10.3 FL (ref 8.9–12.7)
POTASSIUM SERPL-SCNC: 3.5 MMOL/L (ref 3.5–5.3)
PROTHROMBIN TIME: 14 SECONDS (ref 11.8–14.2)
RBC # BLD AUTO: 3.57 MILLION/UL (ref 3.81–5.12)
RH BLD: NEGATIVE
SODIUM SERPL-SCNC: 140 MMOL/L (ref 136–145)
SPECIMEN EXPIRATION DATE: NORMAL
WBC # BLD AUTO: 7.29 THOUSAND/UL (ref 4.31–10.16)

## 2018-06-20 PROCEDURE — 86901 BLOOD TYPING SEROLOGIC RH(D): CPT | Performed by: SURGERY

## 2018-06-20 PROCEDURE — 86900 BLOOD TYPING SEROLOGIC ABO: CPT | Performed by: SURGERY

## 2018-06-20 PROCEDURE — 86850 RBC ANTIBODY SCREEN: CPT | Performed by: SURGERY

## 2018-06-20 PROCEDURE — 85610 PROTHROMBIN TIME: CPT | Performed by: SURGERY

## 2018-06-20 PROCEDURE — C9113 INJ PANTOPRAZOLE SODIUM, VIA: HCPCS | Performed by: NURSE PRACTITIONER

## 2018-06-20 PROCEDURE — 99232 SBSQ HOSP IP/OBS MODERATE 35: CPT | Performed by: INTERNAL MEDICINE

## 2018-06-20 PROCEDURE — 80048 BASIC METABOLIC PNL TOTAL CA: CPT | Performed by: SURGERY

## 2018-06-20 PROCEDURE — 85025 COMPLETE CBC W/AUTO DIFF WBC: CPT | Performed by: SURGERY

## 2018-06-20 RX ADMIN — FUROSEMIDE 20 MG: 20 TABLET ORAL at 09:04

## 2018-06-20 RX ADMIN — PANTOPRAZOLE SODIUM 40 MG: 40 INJECTION, POWDER, FOR SOLUTION INTRAVENOUS at 22:08

## 2018-06-20 RX ADMIN — CALCIUM 1 TABLET: 500 TABLET ORAL at 07:37

## 2018-06-20 RX ADMIN — ATORVASTATIN CALCIUM 20 MG: 20 TABLET, FILM COATED ORAL at 17:08

## 2018-06-20 RX ADMIN — METOPROLOL TARTRATE 50 MG: 50 TABLET ORAL at 09:04

## 2018-06-20 RX ADMIN — Medication 1 TABLET: at 09:04

## 2018-06-20 RX ADMIN — PANTOPRAZOLE SODIUM 40 MG: 40 INJECTION, POWDER, FOR SOLUTION INTRAVENOUS at 09:05

## 2018-06-20 RX ADMIN — LISINOPRIL 10 MG: 10 TABLET ORAL at 09:04

## 2018-06-20 RX ADMIN — ERGOCALCIFEROL 50000 UNITS: 1.25 CAPSULE ORAL at 09:04

## 2018-06-20 RX ADMIN — METOPROLOL TARTRATE 50 MG: 50 TABLET ORAL at 22:07

## 2018-06-20 RX ADMIN — METOLAZONE 2.5 MG: 2.5 TABLET ORAL at 09:04

## 2018-06-20 RX ADMIN — CALCIUM 1 TABLET: 500 TABLET ORAL at 17:08

## 2018-06-20 NOTE — ASSESSMENT & PLAN NOTE
Continue with metoprolol therapy  Discussed with Colorectal surgery  They will evaluate sigmoid mass while in the hospital   Will need to hold anticoagulation for the time being  Will discuss with Cardiology options for anticoagulation given high risk for bleed  For now will start on DVT prophylaxis and hold Coumadin

## 2018-06-20 NOTE — PROGRESS NOTES
Progress Note - Colorectal   Shabana Conley 65 y o  female MRN: [id number]  Unit/Bed#: [facility] 807-01 Encounter: [id number]      Objective: Doing well this am, passing flatus, no nausea, no emesis, no abdominal pain or discomfort  Still tachycardic, Coumadin held at the present time  Colonic pathology pending  2050 UA    Blood pressure 111/64, pulse (!) 115, temperature 98 4 °F (36 9 °C), temperature source Oral, resp  rate (!) 24, height 5' 2" (1 575 m), weight 85 9 kg (189 lb 4 8 oz), SpO2 96 %  ,Body mass index is 34 62 kg/m²  Intake/Output Summary (Last 24 hours) at 06/20/18 0548  Last data filed at 06/20/18 0300   Gross per 24 hour   Intake              520 ml   Output             2050 ml   Net            -1530 ml       Invasive Devices     Peripheral Intravenous Line            Peripheral IV 06/19/18 Left Forearm less than 1 day                Physical Exam:   Abdomen: soft, non distended, no guarding, non tender  Extremities: no calf tenderness    Lab, Imaging and other studies:  pending  VTE Pharmacologic Prophylaxis: Sequential compression device (Venodyne)   VTE Mechanical Prophylaxis: sequential compression device    Assessment:  1  Sigmoid colon ulcerated polyp taking up 25-50% of circumference  2  A-flutter  3  S/P CABG  4  HTN  5  Hyperlipidemia    Plan:  1  Would place on SQH 5,000 units q8 hours for DVT prophylaxis  2  Colon pathology pending  3   Possible sigmoid colon vs left hemicolectomy this admission if pathology malignant

## 2018-06-20 NOTE — MEDICAL STUDENT
Progress Note - Azalea Castano 65 y o  female MRN: [id number]    Unit/Bed#: [facility] 807-01 Encounter: [id number]      Assessment:  Azalea Castano is a 66 y/o F admitted to [facility] on 6/16/18 with rectal bleeding  Patient was subsequently transferred to [facility] that same day due to GI bleeding and possible seizure activity  Patient has PMH of AFlutter on warfarin, CABG in 4065, Diastolic HF, HTN,, HLD, GERD, Diverticulosis, and Internal Hemorrhoids  Plan:  1  GI Bleed: Likely due to lower GI source  Colonoscopy performed on 6/18/18 for recent bleeding and anemia revealed a single sessile 5mm polyp in the cecum, single sessile 8mm polyp in the cecum, single sessile 6mm polyp in the ascending colon, and ulcerated polyp like mass with central decompression which occupied 25-49% of the circumference of the sigmoid colon  Due to appearance of lesion in the sigmoid colon, biopsy samples sent to pathology, CEA level performed       - Biopsy results of sigmoid colonic mass revealed fragments of tubular adenoma  Additional tissue sampling is recommended to rule out more advanced neoplasm  Biopsy results of cecal polyps and ascending colonic polyp revealed no evidence of high grade dysplasia or malignancy  - CEA level of 1 7   - INR level of 1 07    - Most recent Hemoglobin of 11 1    - Anticoagulation held for the patient at this time given possibility of surgical intervention for colonic mass as soon as Friday or Saturday  Patient to discuss her family       2  Diastolic HF: Patient follows outpatient with Dr Liss Zamora of Cardiology  Most recent TTECHO on 1/19/18 revealed EF of 60%  No regional wall motion abnormalities  - Continue Lasix 20mg BID, Metolazone 2 5mg PO 2x Weekly  - Monitor I&Os  3  HTN: Currently stable  - Continue Lisinopril 10mg PO       4  Hx of Aflutter: Cardiology consult placed  Awaiting workup of colonic mass, potential malignancy   Concern for continued anticoagulation in the setting of mass, and continued GI bleeding    - Continue Metoprolol Tartrate 50mg PO Q12H for rate control       5  Hx of CAD: Currently stable, patient denies any chest pain at this time  Most recent lipid panel on 12/26/17 revealed Cholesterol of 125, Triglycerides of 130, HDL of 54, and LDL of 45    - Continue Atorvastatin 20mg PO after dinner       6  GERD: Currently stable  - Continue pantoprazole 40mg IV Q12H  Subjective:   Patient seen and examined this morning  Patient denies any acute complaints at this time  Patient states her family is coming this afternoon to discuss surgical options for her colonic mass  Patient denies any fever, chills, chest pain, SOB, or abdominal pain  Objective:     Vitals: Blood pressure 136/73, pulse 103, temperature 98 8 °F (37 1 °C), temperature source Oral, resp  rate 21, height 5' 2" (1 575 m), weight 85 9 kg (189 lb 4 8 oz), SpO2 97 %  ,Body mass index is 34 62 kg/m²  Intake/Output Summary (Last 24 hours) at 06/20/18 0946  Last data filed at 06/20/18 2768   Gross per 24 hour   Intake              400 ml   Output             2450 ml   Net            -2050 ml       Physical Exam: Physical Exam   Constitutional: She is oriented to person, place, and time  She is cooperative  Eyes: Conjunctivae and EOM are normal    Neck: No JVD present  Cardiovascular: Regular rhythm, S1 normal and S2 normal   Tachycardia present  No BL LE edema present  Pulmonary/Chest: Effort normal and breath sounds normal    Abdominal: Soft  Bowel sounds are normal  She exhibits no distension  There is no tenderness  Neurological: She is alert and oriented to person, place, and time  Skin: Skin is warm and dry  Invasive Devices     Peripheral Intravenous Line            Peripheral IV 06/19/18 Left Forearm less than 1 day                Lab, Imaging and other studies: I have personally reviewed pertinent reports      VTE Pharmacologic Prophylaxis: Reason for no pharmacologic prophylaxis GI bleed     VTE Mechanical Prophylaxis: sequential compression device

## 2018-06-20 NOTE — SOCIAL WORK
MCG Guide Used for Initial Round: gi bleed   Optimal GLOS: 2  Hospital Day: 4 days  DC Readiness: Goal Length of Stay: Ambulatory or 2 days   Note: Goal Length of Stay assumes optimal recovery, decision making, and care  Patients may be discharged to a lower level of care (either later than or sooner than the goal) when it is appropriate for their clinical status and care needs  Discharge Readiness  Return to top of Gastrointestinal Bleeding, Lower RRG - ISC  · Discharge readiness is indicated by patient meeting Recovery Milestones, including ALL of the following:  ? Hypotension absent  ? Surgical and other acute interventions not needed  ? Bleeding absent or minimal  ? Stable Hgb/Hct  ? Pain absent or managed  ? Ambulatory  ? Oral hydration, medications, and diet  ? Discharge plans and education understood  See Gastrointestinal Bleeding, Lower Optimal Recovery CourseISC for full optimal recovery management information  Extended Stay  Return to top of Gastrointestinal Bleeding, Lower RRG - ISC  Minimal (a few hours to 1 day), Brief (1 to 3 days), Moderate (4 to 7 days), and Prolonged (more than 7 days)  [Expand All / Collapse All ]  · Extended stay beyond goal length of stay may be needed for:  ? Emergency surgery  § Emergency surgery may require extended stay for postoperative recovery  § Expect brief to moderate stay extension  § See Bowel Surgery: Colectomy, Partial, with or without OstomyISC, Bowel Surgery: Colectomy, Partial, with or without Ostomy, by LaparoscopyISC, or Bowel Surgery: Small Intestine ResectionISC guidelines as appropriate  ? Coagulation abnormalities  § Anticipate administration of vitamin K, fresh-frozen plasma, platelets, or reversal agents (eg, protamine, desmopressin, activated factor VII, prothrombin complex concentrate; dabigatran may be reversed by dialysis or idarucizumab)  § Expect brief stay extension  ?  Recurrent or persistent bleeding, or continued Hemodynamic instability  § Anticipate continued transfusion support and repeat endoscopy, with control of bleeding by endoscopic or surgical intervention, or by catheter-directed embolization  § Conventional or CT angiography, CT enterography, hybrid SPECT/CT scan, radionuclide-labeled red blood cell scan, Meckel scan, small bowel enteroscopy, or capsule endoscopy may be required  § Expect brief to moderate stay extension  § See Hemodynamic Instability: Common Complications and ConditionsISC guideline as appropriate  ? Active comorbidities (eg, renal insufficiency, heart failure, pre-existing liver disease)  § Anticipate treatment based on condition  § Expect brief stay extension  ?   Other complication or condition    Identified Barriers: sigmoid mass pending or Friday   Discussion Date (Time): 06/20/18 with Dr Sheryle Lily

## 2018-06-20 NOTE — PROGRESS NOTES
Progress Note - Yifan London 1940, 65 y o  female MRN: [id number]    Unit/Bed#: [facility] 807-01 Encounter: [id number]    Primary Care Provider: Beverly Riley MD   Date and time admitted to hospital: 2018 10:27 AM        Colonic mass   Assessment & Plan    Continue with workup as per Colorectal surgery  They will proceed for further evaluation for sigmoid mass  Continue with holding Coumadin therapy  Atrial flutter ([address] )   Assessment & Plan    Continue with metoprolol therapy  Discussed with Colorectal surgery  They will evaluate sigmoid mass while in the hospital   Will need to hold anticoagulation for the time being  Will discuss with Cardiology options for anticoagulation given high risk for bleed  For now will start on DVT prophylaxis and hold Coumadin  CAD (coronary artery disease)   Assessment & Plan    Lipitor, Lasix, Lopressor,        * Rectal bleeding   Assessment & Plan    As above  Bleeding  suspected secondary to sigmoid colonic mass  VTE Pharmacologic Prophylaxis:   Pharmacologic: GI bleeding with colonic mass  Will hold pending Colorectal surgery evaluation  Mechanical VTE Prophylaxis in Place: No    Patient Centered Rounds: I have performed bedside rounds with nursing staff today  Time Spent for Care: 15 minutes  More than 50% of total time spent on counseling and coordination of care as described above  Current Length of Stay: 4 day(s)    Current Patient Status: Inpatient   Certification Statement: The patient will continue to require additional inpatient hospital stay due to Need to monitor symptoms        Code Status: Level 1 - Full Code      Subjective:   Patient comfortable    Objective:     Vitals:   Temp (24hrs), Av 6 °F (37 °C), Min:98 4 °F (36 9 °C), Max:98 8 °F (37 1 °C)    HR:  [100-128] 103  Resp:  [16-24] 21  BP: (111-136)/(64-73) 136/73  SpO2:  [96 %-97 %] 97 %  Body mass index is 34 62 kg/m²       Input and Output Summary (last 24 hours): Intake/Output Summary (Last 24 hours) at 06/20/18 0830  Last data filed at 06/20/18 1806   Gross per 24 hour   Intake              220 ml   Output             2850 ml   Net            -2630 ml       Physical Exam:     Physical Exam   Constitutional: She is oriented to person, place, and time  She appears well-developed and well-nourished  HENT:   Head: Normocephalic and atraumatic  Eyes: Conjunctivae and EOM are normal  Pupils are equal, round, and reactive to light  Neck: Normal range of motion  Neck supple  No tracheal deviation present  No thyromegaly present  Cardiovascular: Normal rate and regular rhythm  No murmur heard  Pulmonary/Chest: Effort normal and breath sounds normal  No respiratory distress  She has no wheezes  Abdominal: Soft  Bowel sounds are normal  She exhibits no distension  There is no tenderness  Musculoskeletal: Normal range of motion  Neurological: She is alert and oriented to person, place, and time  No cranial nerve deficit  Skin: Skin is warm and dry  No erythema  Psychiatric: She has a normal mood and affect  Additional Data:     Labs:      Results from last 7 days  Lab Units 06/20/18  0715   WBC Thousand/uL 7 29   HEMOGLOBIN g/dL 11 1*   HEMATOCRIT % 34 7*   PLATELETS Thousands/uL 137*   NEUTROS PCT % 75   LYMPHS PCT % 16   MONOS PCT % 8   EOS PCT % 1       Results from last 7 days  Lab Units 06/20/18  0715  06/16/18  0453   SODIUM mmol/L 140  < > 141   POTASSIUM mmol/L 3 5  < > 3 7   CHLORIDE mmol/L 106  < > 106   CO2 mmol/L 28  < > 28   BUN mg/dL 10  < > 23   CREATININE mg/dL 1 02  < > 0 99   CALCIUM mg/dL 8 6  < > 8 8   TOTAL PROTEIN g/dL  --   --  6 4   BILIRUBIN TOTAL mg/dL  --   --  0 60   ALK PHOS U/L  --   --  141*   ALT U/L  --   --  22   AST U/L  --   --  21   GLUCOSE RANDOM mg/dL 113  < > 112   GLUCOSE, ISTAT   --   < >  --    < > = values in this interval not displayed      Results from last 7 days  Lab Units 06/20/18  0715   INR  1 07       * I Have Reviewed All Lab Data Listed Above  * Additional Pertinent Lab Tests Reviewed: All Labs Within Last 24 Hours Reviewed      Recent Cultures (last 7 days):           Last 24 Hours Medication List:     Current Facility-Administered Medications:  acetaminophen 650 mg Oral Q8H PRN Yenny Wilson DO   ALPRAZolam 0 25 mg Oral TID PRN ANTHONY Healy   atorvastatin 20 mg Oral After [employer], ANTHONY   calcium carbonate 1 tablet Oral BID With Meals Campos AmANTHONY watkins   ergocalciferol 50,000 Units Oral Weekly Pecola Amas, ANTHONY   furosemide 20 mg Oral BID Linda Berman MD   lisinopril 10 mg Oral Daily ANTHONY Rodriguez   metolazone 2 5 mg Oral Once per day on Sun Wed ANTHONY Carrasco   metoprolol tartrate 50 mg Oral Q12H [address] Linda Berman MD   multivitamin-minerals 1 tablet Oral Daily [employer] AmANTHONY watkins   nitroglycerin 0 4 mg Sublingual Q5 Min PRN ANTHONY Healy   ondansetron 4 mg Intravenous Q4H PRN Linda Berman MD   pantoprazole 40 mg Intravenous Q12H [address] ANTHONY Treviño        Today, Patient Was Seen By: Davon Barber DO    ** Please Note: Dictation voice to text software may have been used in the creation of this document   **

## 2018-06-20 NOTE — ASSESSMENT & PLAN NOTE
Continue with workup as per Colorectal surgery  They will proceed for further evaluation for sigmoid mass  Continue with holding Coumadin therapy

## 2018-06-20 NOTE — SOCIAL WORK
Cm reviewed patient during care coordination rounds with Dr Anival Vásquez  Patient not stable for discharge today  Patient experiencing rectal bleeding  Patient being followed by colorectal surgery to evaluate sigmoid mass  Possible OR for surgery pending patient's and family's decision  Coumadin on hold  Cm following

## 2018-06-20 NOTE — PROGRESS NOTES
Progress Note- Kiet Jones 65 y o  female MRN: [id number]    Unit/Bed#: [facility] 807-01 Encounter: [id number]      Assessment and Plan:    65year old female with multiple polyps removed with path revealing tubular and TVA with a large polypoid mass in the sigmoid     1  Polypoid mass in the sigmoid - path resulted back as only an adenoma  -discussed with surgeon and will reattempt a look with flexible sigmoidoscopy and plan to assess further if able to be removed endoscopically and if not then will plan multiple rebiopsies  Given that pt ate this am, will plan flex sig tomorrow after prepping with enemas in the am tomorrow morning  Can eat lunch and then will place on full liquid diet for dinner and then NPO after midnight tonight  Pt will need sedation for flex sig due to potential for it taking a while to remove     -discussed with pt who is agreeable to this plan    2  Anticoagulation for A flutter- seen by Cardiology who recommend long term anticoagulation but this is currently being held in prep for procedure tomorrow  ______________________________________________________________________    Subjective:     Patient's past resulted as adenoma    She ate [employer] toast for breakfast     Medication Administration - last 24 hours from 06/19/2018 1215 to 06/20/2018 1215       Date/Time Order Dose Route Action Action by     06/20/2018 0905 pantoprazole (PROTONIX) injection 40 mg 40 mg Intravenous Given Mercy Mortensen RN     06/19/2018 2127 pantoprazole (PROTONIX) injection 40 mg 40 mg Intravenous Given Julia Awad RN     06/19/2018 1726 atorvastatin (LIPITOR) tablet 20 mg 20 mg Oral Given Jessica Hill RN     06/20/2018 0737 calcium carbonate (OYSTER SHELL,OSCAL) 500 mg tablet 1 tablet 1 tablet Oral Given Mercy Mortensen RN     06/19/2018 1726 calcium carbonate (OYSTER SHELL,OSCAL) 500 mg tablet 1 tablet 1 tablet Oral Given Jessica Hill RN     06/20/2018 6353 ergocalciferol (VITAMIN D2) capsule 50,000 Units 50,000 Units Oral Given Gelacio Alicea RN     06/20/2018 4661 multivitamin-minerals (CENTRUM) tablet 1 tablet 1 tablet Oral Given Gelacio Alicea, RN     06/20/2018 3913 metoprolol tartrate (LOPRESSOR) tablet 50 mg 50 mg Oral Given Gelacio Alicea RN     06/19/2018 2127 metoprolol tartrate (LOPRESSOR) tablet 50 mg 50 mg Oral Given Yudy Mccoy, RN     06/20/2018 5814 furosemide (LASIX) tablet 20 mg 20 mg Oral Given Gelacio Alicea, RN     06/19/2018 1726 furosemide (LASIX) tablet 20 mg 20 mg Oral Given Sarah Hoyos, RN     06/19/2018 1726 warfarin (COUMADIN) tablet 2 mg 2 mg Oral Given Sarah Hoyos, RN     06/20/2018 4643 lisinopril (ZESTRIL) tablet 10 mg 10 mg Oral Given Gelacio Alicea RN     06/20/2018 9408 metolazone (ZAROXOLYN) tablet 2 5 mg 2 5 mg Oral Given Gelacio Alicea RN          Objective:     Vitals: Blood pressure 136/73, pulse 103, temperature 98 8 °F (37 1 °C), temperature source Oral, resp  rate 21, height 5' 2" (1 575 m), weight 85 9 kg (189 lb 4 8 oz), SpO2 97 %  ,Body mass index is 34 62 kg/m²  Intake/Output Summary (Last 24 hours) at 06/20/18 1215  Last data filed at 06/20/18 7344   Gross per 24 hour   Intake              400 ml   Output             2450 ml   Net            -2050 ml       Physical Exam:   General Appearance: Awake and alert, in no acute distress  Abdomen: Soft, non-tender, non-distended; bowel sounds normal; no masses or no organomegaly    Invasive Devices     Peripheral Intravenous Line            Peripheral IV 06/19/18 Left Forearm less than 1 day              Lab Results:  Admission on 06/16/2018   No results displayed because visit has over 200 results  Imaging Studies: I have personally reviewed pertinent imaging studies

## 2018-06-20 NOTE — CASE MANAGEMENT
Continued Stay Review    Date: 6/20/18      Vital Signs: /73 (BP Location: Left arm)   Pulse 103   Temp 98 8 °F (37 1 °C) (Oral)   Resp 21   Ht 5' 2" (1 575 m)   Wt 85 9 kg (189 lb 4 8 oz)   SpO2 97%   BMI 34 62 kg/m²     Medications:   Scheduled Meds:   Current Facility-Administered Medications:  acetaminophen 650 mg Oral Q8H PRN   ALPRAZolam 0 25 mg Oral TID PRN   atorvastatin 20 mg Oral After Dinner   calcium carbonate 1 tablet Oral BID With Meals   ergocalciferol 50,000 Units Oral Weekly   furosemide 20 mg Oral BID   lisinopril 10 mg Oral Daily   metolazone 2 5 mg Oral Once per day on Sun Wed   metoprolol tartrate 50 mg Oral Q12H [address]   multivitamin-minerals 1 tablet Oral Daily   nitroglycerin 0 4 mg Sublingual Q5 Min PRN   ondansetron 4 mg Intravenous Q4H PRN   pantoprazole 40 mg Intravenous Q12H [address]     PRN Meds:   acetaminophen    ALPRAZolam    nitroglycerin    ondansetron    Abnormal Labs/Diagnostic Results:   H/H 11 1/34 7  Platelets 676      Age/Sex: 65 y o  female     Assessment/Plan:   6/20 Colorectal Surgery Progress Note  Assessment:  1  Sigmoid colon ulcerated polyp taking up 25-50% of circumference  2  A-flutter  3  S/P CABG  4  HTN  5  Hyperlipidemia     Plan:  1  Would place on SQH 5,000 units q8 hours for DVT prophylaxis  2  Colon pathology pending  3  Possible sigmoid colon vs left hemicolectomy this admission if pathology malignant  ______________________  6/20 Medicine Progress Note  Colonic mass   Assessment & Plan     Continue with workup as per Colorectal surgery  They will proceed for further evaluation for sigmoid mass  Continue with holding Coumadin therapy           Atrial flutter (HCC)   Assessment & Plan     Continue with metoprolol therapy  Discussed with Colorectal surgery  They will evaluate sigmoid mass while in the hospital   Will need to hold anticoagulation for the time being    Will discuss with Cardiology options for anticoagulation given high risk for bleed   For now will start on DVT prophylaxis and hold Coumadin            CAD (coronary artery disease)   Assessment & Plan     Lipitor, Lasix, Lopressor,          * Rectal bleeding   Assessment & Plan     As above  Bleeding  suspected secondary to sigmoid colonic mass           VTE Pharmacologic Prophylaxis:   Pharmacologic: GI bleeding with colonic mass  Will hold pending Colorectal surgery evaluation  Mechanical VTE Prophylaxis in Place: No  Certification Statement: The patient will continue to require additional inpatient hospital stay due to Need to monitor symptoms  __________________  6/20 GI Progress Note  65year old female with multiple polyps removed with path revealing tubular and TVA with a large polypoid mass in the sigmoid      1  Polypoid mass in the sigmoid - path resulted back as only an adenoma  -discussed with surgeon and will reattempt a look with flexible sigmoidoscopy and plan to assess further if able to be removed endoscopically and if not then will plan multiple rebiopsies  Given that pt ate this am, will plan flex sig tomorrow after prepping with enemas in the am tomorrow morning  Can eat lunch and then will place on full liquid diet for dinner and then NPO after midnight tonight  Pt will need sedation for flex sig due to potential for it taking a while to remove     -discussed with pt who is agreeable to this plan     2  Anticoagulation for A flutter- seen by Cardiology who recommend long term anticoagulation but this is currently being held in prep for procedure tomorrow        Discharge Plan: [facility]

## 2018-06-21 ENCOUNTER — ANESTHESIA (INPATIENT)
Dept: GASTROENTEROLOGY | Facility: HOSPITAL | Age: 78
DRG: 377 | End: 2018-06-21
Payer: MEDICARE

## 2018-06-21 ENCOUNTER — ANESTHESIA EVENT (INPATIENT)
Dept: GASTROENTEROLOGY | Facility: HOSPITAL | Age: 78
DRG: 377 | End: 2018-06-21
Payer: MEDICARE

## 2018-06-21 PROCEDURE — 88305 TISSUE EXAM BY PATHOLOGIST: CPT | Performed by: PATHOLOGY

## 2018-06-21 PROCEDURE — C9113 INJ PANTOPRAZOLE SODIUM, VIA: HCPCS | Performed by: NURSE PRACTITIONER

## 2018-06-21 PROCEDURE — 45331 SIGMOIDOSCOPY AND BIOPSY: CPT | Performed by: INTERNAL MEDICINE

## 2018-06-21 PROCEDURE — 99232 SBSQ HOSP IP/OBS MODERATE 35: CPT | Performed by: INTERNAL MEDICINE

## 2018-06-21 PROCEDURE — 0DBN8ZZ EXCISION OF SIGMOID COLON, VIA NATURAL OR ARTIFICIAL OPENING ENDOSCOPIC: ICD-10-PCS | Performed by: INTERNAL MEDICINE

## 2018-06-21 RX ORDER — SODIUM CHLORIDE 9 MG/ML
INJECTION, SOLUTION INTRAVENOUS CONTINUOUS PRN
Status: DISCONTINUED | OUTPATIENT
Start: 2018-06-21 | End: 2018-06-21 | Stop reason: SURG

## 2018-06-21 RX ORDER — LIDOCAINE HYDROCHLORIDE 10 MG/ML
INJECTION, SOLUTION INFILTRATION; PERINEURAL AS NEEDED
Status: DISCONTINUED | OUTPATIENT
Start: 2018-06-21 | End: 2018-06-21 | Stop reason: SURG

## 2018-06-21 RX ORDER — PROPOFOL 10 MG/ML
INJECTION, EMULSION INTRAVENOUS AS NEEDED
Status: DISCONTINUED | OUTPATIENT
Start: 2018-06-21 | End: 2018-06-21 | Stop reason: SURG

## 2018-06-21 RX ORDER — PROPOFOL 10 MG/ML
INJECTION, EMULSION INTRAVENOUS CONTINUOUS PRN
Status: DISCONTINUED | OUTPATIENT
Start: 2018-06-21 | End: 2018-06-21 | Stop reason: SURG

## 2018-06-21 RX ADMIN — Medication 1 TABLET: at 17:33

## 2018-06-21 RX ADMIN — PROPOFOL 120 MCG/KG/MIN: 10 INJECTION, EMULSION INTRAVENOUS at 12:19

## 2018-06-21 RX ADMIN — PANTOPRAZOLE SODIUM 40 MG: 40 INJECTION, POWDER, FOR SOLUTION INTRAVENOUS at 08:12

## 2018-06-21 RX ADMIN — CALCIUM 1 TABLET: 500 TABLET ORAL at 17:33

## 2018-06-21 RX ADMIN — PROPOFOL 50 MG: 10 INJECTION, EMULSION INTRAVENOUS at 12:56

## 2018-06-21 RX ADMIN — ATORVASTATIN CALCIUM 20 MG: 20 TABLET, FILM COATED ORAL at 17:34

## 2018-06-21 RX ADMIN — FUROSEMIDE 20 MG: 20 TABLET ORAL at 08:10

## 2018-06-21 RX ADMIN — METOPROLOL TARTRATE 50 MG: 50 TABLET ORAL at 08:10

## 2018-06-21 RX ADMIN — SODIUM CHLORIDE: 9 INJECTION, SOLUTION INTRAVENOUS at 11:53

## 2018-06-21 RX ADMIN — PANTOPRAZOLE SODIUM 40 MG: 40 INJECTION, POWDER, FOR SOLUTION INTRAVENOUS at 21:47

## 2018-06-21 RX ADMIN — METOPROLOL TARTRATE 50 MG: 50 TABLET ORAL at 21:47

## 2018-06-21 RX ADMIN — PROPOFOL 70 MG: 10 INJECTION, EMULSION INTRAVENOUS at 12:19

## 2018-06-21 RX ADMIN — LIDOCAINE HYDROCHLORIDE 50 MG: 10 INJECTION, SOLUTION INFILTRATION; PERINEURAL at 12:19

## 2018-06-21 RX ADMIN — LISINOPRIL 10 MG: 10 TABLET ORAL at 08:10

## 2018-06-21 NOTE — PROGRESS NOTES
Progress Note - Colorectal   Marya Childress 65 y o  female MRN: [id number]  Unit/Bed#: [facility] 807-01 Encounter: [id number]      Objective: Doing well, NPO since midnight for flexible sigmoidoscopy and attempt complete removal of sigmoid colon lesion  No bowel movements, passing flatus, tolerated a house diet for breakfast yesterday  No abdominal pain  Coumadin held still  3025 UA    Blood pressure 118/57, pulse 96, temperature 98 6 °F (37 °C), temperature source Oral, resp  rate 16, height 5' 2" (1 575 m), weight 85 9 kg (189 lb 4 8 oz), SpO2 95 %  ,Body mass index is 34 62 kg/m²  Intake/Output Summary (Last 24 hours) at 06/21/18 0535  Last data filed at 06/21/18 0359   Gross per 24 hour   Intake              820 ml   Output             3025 ml   Net            -2205 ml       Invasive Devices     Peripheral Intravenous Line            Peripheral IV 06/19/18 Left Forearm 1 day                Physical Exam:   Abdomen: soft, non distended, non tender, no guarding  Extremities: no calf tenderness    Lab, Imaging and other studies:  pending  VTE Pharmacologic Prophylaxis: Coumadin held  VTE Mechanical Prophylaxis: sequential compression device    Assessment:  1  Sigmoid colon ulcerated polyp taking up 25-50% of circumference  2  A-flutter  3  S/P CABG  4  HTN  5  Hyperlipidemia    Plan:  1  GI lab with gastroenterology for hopefully complete removal sigmoid colon mass  2   Surgical standby if needed for assistance

## 2018-06-21 NOTE — MEDICAL STUDENT
Progress Note - Dixie Hollingsworth 65 y o  female MRN: [id number]    Unit/Bed#: [facility] 807-01 Encounter: [id number]      Assessment:  Dixie Hollingsworth is a 64 y/o F admitted to [facility] on 6/16/18 with rectal bleeding  Patient was subsequently [facility] that same day due to GI bleeding and possible seizure activity  Patient has PMH of AFlutter on warfarin, CABG in 2537, Diastolic HF, HTN,, HLD, GERD, Diverticulosis, and Internal Hemorrhoids  Plan:  1  GI Bleed: Likely due to lower GI source  Colonoscopy performed on 6/18/18 for recent bleeding and anemia revealed a single sessile 5mm polyp in the cecum, single sessile 8mm polyp in the cecum, single sessile 6mm polyp in the ascending colon, and ulcerated polyp like mass with central decompression which occupied 25-49% of the circumference of the sigmoid colon  Due to appearance of lesion in the sigmoid colon, biopsy samples sent to pathology, CEA level performed       - Biopsy results of sigmoid colonic mass revealed fragments of tubular adenoma  Additional tissue sampling is recommended to rule out more advanced neoplasm  Biopsy results of cecal polyps and ascending colonic polyp revealed no evidence of high grade dysplasia or malignancy  - Patient going for flexible sigmoidoscopy later this morning  If mass still remaining following today's procedure, plan for surgery tomorrow  - CEA level of 1 7   - INR level of 1 07 on 6/20/18    - Most recent Hemoglobin of 11 1 on 6/20/18  - Anticoagulation held for the patient at this time given possibility of surgical intervention for colonic mass as soon as Friday or Saturday  Patient to discuss her family       2  Diastolic HF: Patient follows outpatient with Dr Liset Solis of Cardiology  [facility] recent [facility] on 1/19/18 revealed EF of 60%  No regional wall motion abnormalities  - Continue Lasix 20mg BID, Metolazone 2 5mg PO 2x Weekly  - Monitor I&Os  Net -2 2L yesterday, -652mL since admission     3  HTN: Currently stable  - Continue Lisinopril 10mg PO       4  Hx of Aflutter: Cardiology consult placed  Awaiting workup of colonic mass, potential malignancy  Concern for continued anticoagulation in the setting of mass, and continued GI bleeding    - Continue Metoprolol Tartrate 50mg PO Q12H for rate control       5  Hx of CAD: Currently stable, patient denies any chest pain at this time  Most recent lipid panel on 12/26/17 revealed Cholesterol of 125, Triglycerides of 130, HDL of 54, and LDL of 45    - Continue Atorvastatin 20mg PO after dinner       6  GERD: Currently stable  - Continue pantoprazole 40mg IV Q12H  Dispo: Pending  Subjective:   Patient seen and examined this morning  Patient currently denies any complaints  Patient planned for flexible sigmoidoscopy later this morning, if not able to remove majority of mass, planned for surgery tomorrow  Patient denies any fever, chills, chest pain, or SOB  Objective:     Vitals: Blood pressure 120/67, pulse 95, temperature 98 °F (36 7 °C), temperature source Oral, resp  rate 17, height 5' 2" (1 575 m), weight 85 9 kg (189 lb 4 8 oz), SpO2 95 %  ,Body mass index is 34 62 kg/m²  Intake/Output Summary (Last 24 hours) at 06/21/18 0921  Last data filed at 06/21/18 0846   Gross per 24 hour   Intake              640 ml   Output             2225 ml   Net            -1585 ml       Physical Exam: Physical Exam   Constitutional: She is oriented to person, place, and time  She is cooperative  Eyes: Conjunctivae and EOM are normal    Neck: No JVD present  Cardiovascular: Normal rate and regular rhythm  Murmur heard  No BL LE edema present  Pulmonary/Chest: Effort normal and breath sounds normal    Abdominal: Soft  Bowel sounds are normal  She exhibits no distension  There is tenderness in the periumbilical area  Neurological: She is alert and oriented to person, place, and time  Skin: Skin is warm and dry         Invasive Devices Peripheral Intravenous Line            Peripheral IV 06/19/18 Left Forearm 1 day                Lab, Imaging and other studies: I have personally reviewed pertinent reports      VTE Pharmacologic Prophylaxis: Sequential compression device (Venodyne)   VTE Mechanical Prophylaxis: sequential compression device

## 2018-06-21 NOTE — ASSESSMENT & PLAN NOTE
Continue with metoprolol therapy  Discussed with Colorectal surgery  They will evaluate sigmoid mass while in the hospital   Will need to hold anticoagulation for the time being  Cardiology following  Aware of holding of anticoagulation pending surgical intervention

## 2018-06-21 NOTE — PROGRESS NOTES
Progress Note - Cabrera Justice 1940, 65 y o  female MRN: [id number]    Unit/Bed#: [facility] 807-01 Encounter: [id number]    Primary Care Provider: Yazan Whitehead MD   Date and time admitted to hospital: 2018 10:27 AM        Colonic mass   Assessment & Plan    Coumadin on hold  Note Colorectal input:  GI lab with gastroenterology for hopefully complete removal sigmoid colon mass  Surgical standby if needed for assistance        Atrial flutter [facility])   Assessment & Plan    Continue with metoprolol therapy  Discussed with Colorectal surgery  They will evaluate sigmoid mass while in the hospital   Will need to hold anticoagulation for the time being  Cardiology following  Aware of holding of anticoagulation pending surgical intervention  CAD (coronary artery disease)   Assessment & Plan    Lipitor, Lasix, Lopressor,        * Rectal bleeding   Assessment & Plan    As above  Bleeding  suspected secondary to sigmoid colonic mass  VTE Pharmacologic Prophylaxis:   Pharmacologic:  Bleed risk  Mechanical VTE Prophylaxis in Place: No    Patient Centered Rounds: I have performed bedside rounds with nursing staff today  Time Spent for Care: 15 minutes  More than 50% of total time spent on counseling and coordination of care as described above  Current Length of Stay: 5 day(s)    Current Patient Status: Inpatient   Certification Statement: The patient will continue to require additional inpatient hospital stay due to Need to monitor symptoms    Discharge Plan:  Not ready for discharge at    Code Status: Level 1 - Full Code      Subjective:   Patient comfortable    Objective:     Vitals:   Temp (24hrs), Av 3 °F (36 8 °C), Min:98 °F (36 7 °C), Max:98 6 °F (37 °C)    HR:  [] 95  Resp:  [16-17] 17  BP: (118-126)/(57-67) 120/67  SpO2:  [95 %] 95 %  Body mass index is 34 62 kg/m²  Input and Output Summary (last 24 hours):        Intake/Output Summary (Last 24 hours) at 06/21/18 0824  Last data filed at 06/21/18 0359   Gross per 24 hour   Intake              820 ml   Output             3025 ml   Net            -2205 ml       Physical Exam:     Physical Exam   Constitutional: She is oriented to person, place, and time  HENT:   Head: Normocephalic and atraumatic  Eyes: Conjunctivae are normal  Pupils are equal, round, and reactive to light  Neck: Normal range of motion  Neck supple  No tracheal deviation present  No thyromegaly present  Cardiovascular: Normal rate and regular rhythm  Pulmonary/Chest: No respiratory distress  She has no wheezes  Abdominal: Soft  Bowel sounds are normal  She exhibits no distension  There is no tenderness  Musculoskeletal: Normal range of motion  Neurological: She is alert and oriented to person, place, and time  No cranial nerve deficit  Skin: Skin is warm and dry  No erythema  Psychiatric: She has a normal mood and affect  Additional Data:     Labs:      Results from last 7 days  Lab Units 06/20/18  0715   WBC Thousand/uL 7 29   HEMOGLOBIN g/dL 11 1*   HEMATOCRIT % 34 7*   PLATELETS Thousands/uL 137*   NEUTROS PCT % 75   LYMPHS PCT % 16   MONOS PCT % 8   EOS PCT % 1       Results from last 7 days  Lab Units 06/20/18  0715  06/16/18  0453   SODIUM mmol/L 140  < > 141   POTASSIUM mmol/L 3 5  < > 3 7   CHLORIDE mmol/L 106  < > 106   CO2 mmol/L 28  < > 28   BUN mg/dL 10  < > 23   CREATININE mg/dL 1 02  < > 0 99   CALCIUM mg/dL 8 6  < > 8 8   TOTAL PROTEIN g/dL  --   --  6 4   BILIRUBIN TOTAL mg/dL  --   --  0 60   ALK PHOS U/L  --   --  141*   ALT U/L  --   --  22   AST U/L  --   --  21   GLUCOSE RANDOM mg/dL 113  < > 112   GLUCOSE, ISTAT   --   < >  --    < > = values in this interval not displayed  Results from last 7 days  Lab Units 06/20/18  0715   INR  1 07       * I Have Reviewed All Lab Data Listed Above  * Additional Pertinent Lab Tests Reviewed:  All Labs Within Last 24 Hours Reviewed      Recent Cultures (last 7 days): Last 24 Hours Medication List:     Current Facility-Administered Medications:  acetaminophen 650 mg Oral Q8H PRN Yenny Wilson DO   ALPRAZolam 0 25 mg Oral TID PRN Lian Crystal, ANTHONY   atorvastatin 20 mg Oral After [employer], CRNP   calcium carbonate 1 tablet Oral BID With Meals Lian Crystal, CRKECIA   ergocalciferol 50,000 Units Oral Weekly Lassen Crystal, CRNP   furosemide 20 mg Oral BID Cindi Olivo MD   lisinopril 10 mg Oral Daily Ezell Fridge, ANTHONY   metolazone 2 5 mg Oral Once per day on Sun Wed Isaias Fridge, ANTHONY   metoprolol tartrate 50 mg Oral Q12H [address] Cindi Olivo MD   multivitamin-minerals 1 tablet Oral Daily Lassen Crystal, ANTHONY   nitroglycerin 0 4 mg Sublingual Q5 Min PRN Lassen Crystal, ANTHONY   ondansetron 4 mg Intravenous Q4H PRN iCndi Olivo MD   pantoprazole 40 mg Intravenous Q12H [address] ANTHONY Treviño        Today, Patient Was Seen By: Theresa Choi DO    ** Please Note: Dictation voice to text software may have been used in the creation of this document   **

## 2018-06-21 NOTE — ASSESSMENT & PLAN NOTE
Coumadin on hold  Note Colorectal input:  GI lab with gastroenterology for hopefully complete removal sigmoid colon mass  Surgical standby if needed for assistance

## 2018-06-21 NOTE — ANESTHESIA PREPROCEDURE EVALUATION
Review of Systems/Medical History  Patient summary reviewed  Chart reviewed  No history of anesthetic complications     Cardiovascular  Hyperlipidemia, Hypertension , CAD , History of CABG (2008, x3), Cardiac stents (2009,x1)  History of percutaneous transluminal coronary angioplasty, Dysrhythmias ( A flutter-rapid 110) , CHF (Chronic diastolic HF) , BOB,   Comment: EF 60%,  Pulmonary  Smoker ex-smoker  , Shortness of breath,        GI/Hepatic    GERD ,   Comment: Colonic mass/Diverticulosis  Rectal bleeding     Negative  ROS        Endo/Other  Negative endo/other ROS   Obesity (BMI 34)    GYN  Negative gynecology ROS          Hematology  Negative hematology ROS      Musculoskeletal  Osteoarthritis,   Comment: Thoracic radiculopathy      Neurology  Negative neurology ROS      Psychology   Negative psychology ROS              Physical Exam    Airway    Mallampati score: II  TM Distance: >3 FB       Dental   lower dentures and upper dentures,     Cardiovascular  Rhythm: irregular,     Pulmonary  Breath sounds clear to auscultation,     Other Findings        Anesthesia Plan  ASA Score- 3     Anesthesia Type- IV sedation with anesthesia with ASA Monitors  Additional Monitors:   Airway Plan:         Plan Factors-    Induction- intravenous  Postoperative Plan-     Informed Consent- Anesthetic plan and risks discussed with patient  I personally reviewed this patient with the CRNA  Discussed and agreed on the Anesthesia Plan with the CRNA  Arturo Quiñones

## 2018-06-21 NOTE — OP NOTE
**** GI/ENDOSCOPY REPORT ****     PATIENT NAME: Jimenez Marquez - VISIT ID:  Patient ID:   [id number] YOB: 1940     INTRODUCTION: Sigmoidoscopy - A 65 female patient presents for an   inpatient Sigmoidoscopy at [address] [place] [address]  PREVIOUS COLONOSCOPY: This week     INDICATIONS: Known lesion in the sigmoid colon- path revealing tubular   adenoma     CONSENT: The benefits, risks, and alternatives to the procedure were   discussed and informed consent was obtained from the patient  PREPARATION:  EKG, pulse, pulse oximetry and blood pressure were monitored   throughout the procedure  MEDICATIONS: Anesthesia-check records     RECTAL EXAM: Normal rectal exam      PROCEDURE:  The endoscope was passed with ease through the anus under   direct visualization and advanced to the splenic flexure  The scope was   withdrawn and the mucosa was carefully examined  The prep was poor  FINDINGS:  The prior tattoo site was seen  The large polypoid lesion was   again identified  It was difficult to put into position for endoscopic   removal but I was able to get it in position in able to  take it out in   piecemeal resection with the aide of a saline lift  There was a small   amount of possible residual polyp tissue left but given the poor prep and   piecemeal resection, unable to fully view  finish removing today  COMPLICATIONS: There were no complications  IMPRESSIONS: A large polypoid lesion found in the sigmoid colon  Site was   prior tattooed  Even with the tattoo it was difficult to find and orient   for resection  Mucosal resection was then performed with a piecemeal   resection  Small amount of possible polypoid tissue remained  RECOMMENDATIONS:  Options include repeat colonoscopy in 3-6 months to   ensure complete endoscopic removal vs surgical resection  Will discuss   with patient and family  Discussed with primary team as well  ESTIMATED BLOOD LOSS:     PATHOLOGY SPECIMENS:     PROCEDURE CODES:     ICD-9 Codes: 211 3 Benign neoplasm of colon     ICD-10 Codes: K63 5 Polyp of colon     PERFORMED BY: JUAN Alcantara  on 06/21/2018  Version 1, electronically signed by JUAN Chavez  on   06/21/2018 at 14:01

## 2018-06-22 VITALS
DIASTOLIC BLOOD PRESSURE: 80 MMHG | WEIGHT: 189.3 LBS | BODY MASS INDEX: 34.83 KG/M2 | TEMPERATURE: 98.5 F | HEART RATE: 107 BPM | RESPIRATION RATE: 18 BRPM | SYSTOLIC BLOOD PRESSURE: 114 MMHG | HEIGHT: 62 IN | OXYGEN SATURATION: 94 %

## 2018-06-22 PROCEDURE — C9113 INJ PANTOPRAZOLE SODIUM, VIA: HCPCS | Performed by: NURSE PRACTITIONER

## 2018-06-22 PROCEDURE — 99238 HOSP IP/OBS DSCHRG MGMT 30/<: CPT | Performed by: INTERNAL MEDICINE

## 2018-06-22 RX ADMIN — METOPROLOL TARTRATE 50 MG: 50 TABLET ORAL at 08:55

## 2018-06-22 RX ADMIN — PANTOPRAZOLE SODIUM 40 MG: 40 INJECTION, POWDER, FOR SOLUTION INTRAVENOUS at 08:55

## 2018-06-22 RX ADMIN — LISINOPRIL 10 MG: 10 TABLET ORAL at 08:55

## 2018-06-22 RX ADMIN — Medication 1 TABLET: at 08:55

## 2018-06-22 RX ADMIN — CALCIUM 1 TABLET: 500 TABLET ORAL at 07:47

## 2018-06-22 NOTE — PROGRESS NOTES
Progress Note - Colorectal   Francisco Edin 65 y o  female MRN: [id number]  Unit/Bed#: [facility] 807-01 Encounter: [id number]      Objective: Doing well, having multiple bowel movements now, no blood seen, passing lots of flatus  No nausea, no emesis, no abdominal pain  Anxious to go home later today if allowed  Pathology pending on sigmoid fragments removed yesterday  CEA level of 1 7    225 + 2 UA's  Multiple BM's    Blood pressure 104/56, pulse 98, temperature 98 8 °F (37 1 °C), temperature source Oral, resp  rate 18, height 5' 2" (1 575 m), weight 85 9 kg (189 lb 4 8 oz), SpO2 94 %  ,Body mass index is 34 62 kg/m²  Intake/Output Summary (Last 24 hours) at 06/22/18 0541  Last data filed at 06/22/18 0209   Gross per 24 hour   Intake              400 ml   Output              225 ml   Net              175 ml       Invasive Devices     Peripheral Intravenous Line            Peripheral IV 06/19/18 Left Forearm 2 days                Physical Exam:   Abdomen: soft, non distended, no guarding, non tender  Extremities: no calf tenderness    Lab, Imaging and other studies:  pending  VTE Pharmacologic Prophylaxis: coumadin held  VTE Mechanical Prophylaxis: sequential compression device    Assessment:  1  Sigmoid colon ulcerated polyp taking up 25-50% of circumference  2  A-flutter  3  S/P CABG  4  HTN  5  Hyperlipidemia    Plan:   Will be available if surgical intervention required  Await pathology of 6/21 sigmoid colon mass

## 2018-06-22 NOTE — ASSESSMENT & PLAN NOTE
Continue with metoprolol therapy  Discussed with Colorectal surgery  They will evaluate sigmoid mass while in the hospital   Will need to hold anticoagulation for the time being  Will restart Coumadin without bridging  Patient to follow up with Dr Ulises Sanchez as an outpatient    Will need to follow-up regarding INR

## 2018-06-22 NOTE — ASSESSMENT & PLAN NOTE
Coumadin to be restarted  Discussed with Colorectal surgery  Patient is status post repeat endoscopy with GI  Patient is status post sigmoidoscopy on June 21st, 2018  It was noted to have a  large polypoid lesion found in the sigmoid colon  Mucosal resection was then performed with a piecemeal   resection  Small amount of possible polypoid tissue remained  RECOMMENDATIONS from GI as follows:  Options include repeat colonoscopy in 3-6 months to   ensure complete endoscopic removal vs surgical resection  Patient is to follow up with GI as an outpatient  Appears to be leaning more towards repeat  colonoscopy

## 2018-06-22 NOTE — MEDICAL STUDENT
Progress Note - Kyle Bhandari 65 y o  female MRN: [id number]    Unit/Bed#: [facility] 807-01 Encounter: [id number]      Assessment:  Kyle Bhandari is a 64 y/o F admitted to [facility] on 6/16/18 with rectal bleeding  Patient was subsequently transferred to [facility] that same day due to GI bleeding and possible seizure activity  Patient has PMH of AFlutter on warfarin, CABG in 5775, Diastolic HF, HTN,, HLD, GERD, Diverticulosis, and Internal Hemorrhoids  Plan:  1  GI Bleed: Likely due to lower GI source  Colonoscopy performed on 6/18/18 for recent bleeding and anemia revealed a single sessile 5mm polyp in the cecum, single sessile 8mm polyp in the cecum, single sessile 6mm polyp in the ascending colon, and ulcerated polyp like mass with central decompression which occupied 25-49% of the circumference of the sigmoid colon  Due to appearance of lesion in the sigmoid colon, biopsy samples sent to pathology, CEA level performed       - Biopsy results of sigmoid colonic mass on 6/18/18 revealed fragments of tubular adenoma  Additional tissue sampling is recommended to rule out more advanced neoplasm  Biopsy results of cecal polyps and ascending colonic polyp revealed no evidence of high grade dysplasia or malignancy     - Flexible sigmoidoscopy performed 6/21/18  Large polypoid lesion in the sigmoid colon was identified  Mucosal resection was performed with a piecemeal resection  Small amount of possible polypoid tissue remained  Recommendations include repeat colonoscopy in 3-6 months to ensure complete endoscopic removal versus surgical resection  Prep was poor  - F/U Biopsy results from flexible sigmoidoscopy on 6/21/18   - CEA level of 1 7   - INR level of 1 07 on 6/20/18    - Most recent Hemoglobin of 11 1 on 6/20/18        2  Diastolic HF: Patient follows outpatient with Dr Julian Lo of Cardiology  [facility] recent [facility] on 1/19/18 revealed EF of 60%  No regional wall motion abnormalities     - Continue Lasix 20mg BID, Metolazone 2 5mg PO 2x Weekly  - Monitor I&Os  Net -375mL yesterday, -627mL since admission       3  HTN: Currently stable  - Continue Lisinopril 10mg PO       4  Hx of Aflutter: Cardiology consult placed  Awaiting workup of colonic mass, potential malignancy  Concern for continued anticoagulation in the setting of mass, and continued GI bleeding    - Continue Metoprolol Tartrate 50mg PO Q12H for rate control       5  Hx of CAD: Currently stable, patient denies any chest pain at this time  Most recent lipid panel on 12/26/17 revealed Cholesterol of 125, Triglycerides of 130, HDL of 54, and LDL of 45    - Continue Atorvastatin 20mg PO after dinner       6  GERD: Currently stable  - Continue pantoprazole 40mg IV Q12H       Dispo: Dispo later this afternoon  Subjective:   Patient seen and examined this morning  Patient denies any acute events overnight  Patient denies any complaints at this time  Patient denies any fever, chills, chest pain, SOB, or abdominal pain  Objective:     Vitals: Blood pressure 114/80, pulse (!) 107, temperature 98 5 °F (36 9 °C), temperature source Oral, resp  rate 18, height 5' 2" (1 575 m), weight 85 9 kg (189 lb 4 8 oz), SpO2 94 %  ,Body mass index is 34 62 kg/m²  Intake/Output Summary (Last 24 hours) at 06/22/18 0934  Last data filed at 06/22/18 0501   Gross per 24 hour   Intake              400 ml   Output              375 ml   Net               25 ml       Physical Exam: Physical Exam   Constitutional: She is oriented to person, place, and time  She is cooperative  Neck: No JVD present  Cardiovascular: Normal rate and regular rhythm  Murmur heard  No BL LE edema present  Pulmonary/Chest: Effort normal and breath sounds normal    Abdominal: Soft  Bowel sounds are normal  She exhibits no distension  There is no tenderness  Neurological: She is alert and oriented to person, place, and time  Skin: Skin is warm and dry           Invasive Devices Peripheral Intravenous Line            Peripheral IV 06/19/18 Left Forearm 2 days                Lab, Imaging and other studies: I have personally reviewed pertinent reports      VTE Pharmacologic Prophylaxis: Sequential compression device (Venodyne)   VTE Mechanical Prophylaxis: sequential compression device

## 2018-06-22 NOTE — DISCHARGE SUMMARY
Discharge- Don Kos 1940, 65 y o  female MRN: [id number]    Unit/Bed#: [facility] 807-01 Encounter: [id number]    Primary Care Provider: Koko Shepard MD   Date and time admitted to hospital: 6/16/2018 10:27 AM        Colonic mass   Assessment & Plan    Coumadin to be restarted  Discussed with Colorectal surgery  Patient is status post repeat endoscopy with GI  Patient is status post sigmoidoscopy on June 21st, 2018  It was noted to have a  large polypoid lesion found in the sigmoid colon  Mucosal resection was then performed with a piecemeal   resection  Small amount of possible polypoid tissue remained  RECOMMENDATIONS from GI as follows:  Options include repeat colonoscopy in 3-6 months to   ensure complete endoscopic removal vs surgical resection  Patient is to follow up with GI as an outpatient  Appears to be leaning more towards repeat  colonoscopy  Atrial flutter ([address] )   Assessment & Plan    Continue with metoprolol therapy  Discussed with Colorectal surgery  They will evaluate sigmoid mass while in the hospital   Will need to hold anticoagulation for the time being  Will restart Coumadin without bridging  Patient to follow up with Dr Franky Galan as an outpatient  Will need to follow-up regarding INR        CAD (coronary artery disease)   Assessment & Plan    Lipitor, Lasix, Lopressor,        * Rectal bleeding   Assessment & Plan    As above  Bleeding  suspected secondary to sigmoid colonic mass  Resolved Problems  Date Reviewed: 6/18/2018          Resolved    Vasovagal syncope 6/17/2018     Resolved by  Natalia Lopez MD          Admission Date:   Admission Orders     Ordered        06/16/18 1043  Inpatient Admission  Once               Admitting Diagnosis: GI bleed [K92 2]        Procedures Performed: No orders of the defined types were placed in this encounter  Summary of Hospital Course:   This is a 54-year-old female past with history hypertension hyperlipidemia GERD coronary artery disease with prior bypass surgery presents to the hospital on chronic anticoagulation with warfarin for atrial fibrillation  The patient presented from [address] [facility] after having significant GI blood loss  The patient was admitted with a symptomatic GI bleed associated today syncopal the leg episode with possible global hypoperfusion associated with convulsive syncopal episode  The patient required multiple transfusions   She initially was  transferred to [facility] for further evaluation of bright red blood per rectum bleeding  The patient had a upper and lower GI and pill endoscopy  At [address] [facility] she was given [place] [employer] and vitamin K  she initially was intubated for airway control , transferred to the intensive care unit in [place] and subsequently transferred to the medical-surgical floor for further workup evaluation  Workup had illustrated sigmoid mass which was attributed to be the source of the bleeding  In her hospital workup the patient had a colonoscopy and sigmoidoscopy  Initial biopsy appears to be benign on colonoscopy  However repeat sigmoidoscopy was performed on June 21st, 2018 due to the size of the lesion     Biopsies were again attain after piecemeal removal of the mass  Plan would be to follow up with repeat colonoscopy in 3 months versus surgical removal if biopsy illustrates a more malignant process  If repeat biopsy illustrates a benign process than observation with follow-up endoscopy would be reasonable  If patient has recurring bleeding may need to consider surgical intervention as well  Significant Findings, Care, Treatment and Services Provided:   Patient is status post colonoscopy and sigmoidoscopy  Sigmoidoscopy performed on June 21st, 2018  Results as follows:    :FINDINGS:   The prior tattoo site was seen  The large polypoid lesion was   again identified    It was difficult to put into position for endoscopic   removal but I was able to get it in position in able to  take it out in   piecemeal resection with the aide of a saline lift  There was a small   amount of possible residual polyp tissue left but given the poor prep and   piecemeal resection, unable to fully view  finish removing today  COMPLICATIONS: There were no complications  IMPRESSIONS: A large polypoid lesion found in the sigmoid colon  Site was   prior tattooed  Even with the tattoo it was difficult to find and orient   for resection  Mucosal resection was then performed with a piecemeal   resection  Small amount of possible polypoid tissue remained  RECOMMENDATIONS:  Options include repeat colonoscopy in 3-6 months to   ensure complete endoscopic removal vs surgical resection  Will discuss   with patient and family  Discussed with primary team as well    Complications:  No    Condition at Discharge: fair         Discharge instructions/Information to patient and family:   See after visit summary for information provided to patient and family  Provisions for Follow-Up Care:  See after visit summary for information related to follow-up care and any pertinent home health orders  PCP: Yazan Whitehead MD    Disposition: Home    Planned Readmission: No    Discharge Statement   I spent 35 minutes discharging the patient  This time was spent on the day of discharge  I had direct contact with the patient on the day of discharge  Additional documentation is required if more than 30 minutes were spent on discharge  Discharge Medications:  See after visit summary for reconciled discharge medications provided to patient and family

## 2018-06-25 ENCOUNTER — TELEPHONE (OUTPATIENT)
Dept: GASTROENTEROLOGY | Facility: AMBULARY SURGERY CENTER | Age: 78
End: 2018-06-25

## 2018-06-25 NOTE — TELEPHONE ENCOUNTER
DR ARZOLA'S PT    Pt daughter called to schedule a 2 weeks hosp fu in the [facility] location  They also requested the [facility] location but no earlier appt  Please assist thank you

## 2018-07-03 ENCOUNTER — ANTICOAG VISIT (OUTPATIENT)
Dept: CARDIOLOGY CLINIC | Facility: CLINIC | Age: 78
End: 2018-07-03

## 2018-07-03 ENCOUNTER — APPOINTMENT (OUTPATIENT)
Dept: LAB | Facility: HOSPITAL | Age: 78
End: 2018-07-03
Payer: MEDICARE

## 2018-07-03 ENCOUNTER — TRANSCRIBE ORDERS (OUTPATIENT)
Dept: ADMINISTRATIVE | Facility: HOSPITAL | Age: 78
End: 2018-07-03

## 2018-07-03 DIAGNOSIS — Z79.02 ENCOUNTER FOR LONG-TERM (CURRENT) USE OF ANTIPLATELETS/ANTITHROMBOTICS: ICD-10-CM

## 2018-07-03 DIAGNOSIS — D64.9 ANEMIA, UNSPECIFIED TYPE: ICD-10-CM

## 2018-07-03 DIAGNOSIS — Z79.82 ENCOUNTER FOR LONG-TERM (CURRENT) USE OF ASPIRIN: ICD-10-CM

## 2018-07-03 DIAGNOSIS — I48.91 ATRIAL FIBRILLATION, UNSPECIFIED TYPE (HCC): ICD-10-CM

## 2018-07-03 DIAGNOSIS — I48.91 ATRIAL FIBRILLATION, UNSPECIFIED TYPE (HCC): Primary | ICD-10-CM

## 2018-07-03 DIAGNOSIS — Z79.899 PHARMACOLOGIC THERAPY: ICD-10-CM

## 2018-07-03 DIAGNOSIS — I48.92 ATRIAL FLUTTER, UNSPECIFIED TYPE (HCC): ICD-10-CM

## 2018-07-03 LAB
ALBUMIN SERPL BCP-MCNC: 3.4 G/DL (ref 3.5–5)
ALP SERPL-CCNC: 131 U/L (ref 46–116)
ALT SERPL W P-5'-P-CCNC: 30 U/L (ref 12–78)
ANION GAP SERPL CALCULATED.3IONS-SCNC: 11 MMOL/L (ref 4–13)
AST SERPL W P-5'-P-CCNC: 26 U/L (ref 5–45)
BILIRUB SERPL-MCNC: 0.7 MG/DL (ref 0.2–1)
BUN SERPL-MCNC: 24 MG/DL (ref 5–25)
CALCIUM SERPL-MCNC: 9.2 MG/DL (ref 8.3–10.1)
CHLORIDE SERPL-SCNC: 106 MMOL/L (ref 100–108)
CO2 SERPL-SCNC: 26 MMOL/L (ref 21–32)
CREAT SERPL-MCNC: 0.89 MG/DL (ref 0.6–1.3)
ERYTHROCYTE [DISTWIDTH] IN BLOOD BY AUTOMATED COUNT: 14.6 % (ref 11.6–15.1)
FERRITIN SERPL-MCNC: 34 NG/ML (ref 8–388)
FOLATE SERPL-MCNC: 14.8 NG/ML (ref 3.1–17.5)
GFR SERPL CREATININE-BSD FRML MDRD: 62 ML/MIN/1.73SQ M
GLUCOSE P FAST SERPL-MCNC: 101 MG/DL (ref 65–99)
HCT VFR BLD AUTO: 36.5 % (ref 34.8–46.1)
HGB BLD-MCNC: 11.8 G/DL (ref 11.5–15.4)
INR PPP: 1.57 (ref 0.86–1.17)
IRON SATN MFR SERPL: 16 %
IRON SERPL-MCNC: 60 UG/DL (ref 50–170)
MCH RBC QN AUTO: 31 PG (ref 26.8–34.3)
MCHC RBC AUTO-ENTMCNC: 32.3 G/DL (ref 31.4–37.4)
MCV RBC AUTO: 96 FL (ref 82–98)
PLATELET # BLD AUTO: 189 THOUSANDS/UL (ref 149–390)
PMV BLD AUTO: 9.6 FL (ref 8.9–12.7)
POTASSIUM SERPL-SCNC: 3.5 MMOL/L (ref 3.5–5.3)
PROT SERPL-MCNC: 6.6 G/DL (ref 6.4–8.2)
PROTHROMBIN TIME: 18 SECONDS (ref 11.8–14.2)
RBC # BLD AUTO: 3.81 MILLION/UL (ref 3.81–5.12)
RETICS # AUTO: NORMAL 10*3/UL (ref 14097–95744)
RETICS # CALC: 1.82 % (ref 0.37–1.87)
SODIUM SERPL-SCNC: 143 MMOL/L (ref 136–145)
TIBC SERPL-MCNC: 385 UG/DL (ref 250–450)
VIT B12 SERPL-MCNC: 347 PG/ML (ref 100–900)
WBC # BLD AUTO: 6.73 THOUSAND/UL (ref 4.31–10.16)

## 2018-07-03 PROCEDURE — 85027 COMPLETE CBC AUTOMATED: CPT

## 2018-07-03 PROCEDURE — 80053 COMPREHEN METABOLIC PANEL: CPT

## 2018-07-03 PROCEDURE — 82728 ASSAY OF FERRITIN: CPT

## 2018-07-03 PROCEDURE — 83550 IRON BINDING TEST: CPT

## 2018-07-03 PROCEDURE — 36415 COLL VENOUS BLD VENIPUNCTURE: CPT

## 2018-07-03 PROCEDURE — 82746 ASSAY OF FOLIC ACID SERUM: CPT

## 2018-07-03 PROCEDURE — 85045 AUTOMATED RETICULOCYTE COUNT: CPT

## 2018-07-03 PROCEDURE — 82607 VITAMIN B-12: CPT

## 2018-07-03 PROCEDURE — 85610 PROTHROMBIN TIME: CPT

## 2018-07-03 PROCEDURE — 83540 ASSAY OF IRON: CPT

## 2018-07-03 PROCEDURE — 82747 ASSAY OF FOLIC ACID RBC: CPT

## 2018-07-04 DIAGNOSIS — I50.32 CHRONIC DIASTOLIC HEART FAILURE (HCC): Primary | ICD-10-CM

## 2018-07-05 DIAGNOSIS — I50.33 ACUTE ON CHRONIC DIASTOLIC (CONGESTIVE) HEART FAILURE (HCC): Primary | ICD-10-CM

## 2018-07-06 LAB
FOLATE BLD-MCNC: 540.9 NG/ML
FOLATE RBC-MCNC: 1572 NG/ML
HCT VFR BLD AUTO: 34.4 % (ref 34–46.6)

## 2018-07-08 RX ORDER — METOLAZONE 2.5 MG/1
TABLET ORAL
Qty: 20 TABLET | Refills: 5 | Status: SHIPPED | OUTPATIENT
Start: 2018-07-08

## 2018-07-08 RX ORDER — METOLAZONE 2.5 MG/1
2.5 TABLET ORAL DAILY
Qty: 15 TABLET | Refills: 2 | Status: ON HOLD | OUTPATIENT
Start: 2018-07-08 | End: 2018-10-03 | Stop reason: ALTCHOICE

## 2018-07-13 ENCOUNTER — OFFICE VISIT (OUTPATIENT)
Dept: GASTROENTEROLOGY | Facility: HOSPITAL | Age: 78
End: 2018-07-13
Payer: MEDICARE

## 2018-07-13 VITALS
SYSTOLIC BLOOD PRESSURE: 115 MMHG | WEIGHT: 180 LBS | DIASTOLIC BLOOD PRESSURE: 62 MMHG | TEMPERATURE: 95.9 F | HEART RATE: 69 BPM | BODY MASS INDEX: 32.92 KG/M2

## 2018-07-13 DIAGNOSIS — D12.5 ADENOMATOUS POLYP OF SIGMOID COLON: Primary | ICD-10-CM

## 2018-07-13 PROCEDURE — 99213 OFFICE O/P EST LOW 20 MIN: CPT | Performed by: PHYSICIAN ASSISTANT

## 2018-07-13 NOTE — PROGRESS NOTES
Gracy Kaur [facility] - Outpatient Follow-up Note  Cabrera Justice 77 y o  female MRN: [id number]  Encounter: [id number]          ASSESSMENT AND PLAN:      1  Sigmoid colon polyp/polypoid lesion:  She underwent a colonoscopy June 18, 2018, which revealed multiple polyps, and a tumor found in the sigmoid colon with severe diverticulosis in the sigmoid colon  She had a CEA tumor marker, which was negative  She also had a CT abdomen and pelvis negative for malignancy or Mets  Mass in the sigmoid colon resulted back only as an adenoma  Flexible sigmoidoscopy was performed on June 21, 2018, again large polypoid lesion found in the sigmoid colon, site was tattooed prior  Even with tattoo was difficult to find and orient for resection  Mucosal resection was performed with a piecemeal resection  Small amount of possible polypoid tissue remained  Biopsies again revealed tubular adenoma without high-grade dysplasia or malignancy  Plan to repeat colonoscopy for a re-look at site of polypectomy in 3-6 months  - Case request operating room: COLONOSCOPY  - Na Sulfate-K Sulfate-Mg Sulf (SUPREP BOWEL PREP KIT) 17 5-3 13-1 6 GM/180ML SOLN; Take 177 mL by mouth once for 1 dose  Dispense: 2 Bottle; Refill: 0  -risks and benefits of colonoscopy were discussed with patient and her daughter including but not limited to anesthesia complications, infection, bleeding, perforation  They both understand the risks and agree to proceed with procedure   -check with cardiology regarding holding coumadin prior to procedure     ______________________________________________________________________    SUBJECTIVE:   Found is a 76 yo female past medical history CAD, GERD, hyperlipidemia, hypertension, history of colon polyps, hemorrhoids who is on Coumadin who is here for hospital follow-up of GI bleeding    She recently She recently presented to [place] [address] and was transferred to [place] for further management  She underwent a colonoscopy On June 18, 2018, which revealed multiple polyps, and a tumor found in the sigmoid colon with severe diverticulosis in the sigmoid colon  She had a CEA tumor marker, which was negative  She also had a CT abdomen and pelvis negative for malignancy or Mets  Mass in the sigmoid colon resulted back only as an adenoma  The pathology for the polypoidAfter discussion with surgery if it was decided to repeat a flexible sigmoidoscopy in plan to further remove endoscopically otherwise consider surgical resection  Flexible sigmoidoscopy was performed on June 21, 2018, again large polypoid lesion found in the sigmoid colon, site was tattooed prior  Even with tattoo was difficult to find and orient for resection  Mucosal resection was performed with a piecemeal resection  Small amount of possible polypoid tissue remained  Biopsies again revealed tubular adenoma without high-grade dysplasia or malignancy      REVIEW OF SYSTEMS IS OTHERWISE NEGATIVE  Historical Information   Past Medical History:   Diagnosis Date    Cardiac disease     GERD (gastroesophageal reflux disease)     Hyperlipidemia     Hypertension      Past Surgical History:   Procedure Laterality Date    CARDIAC SURGERY      CARDIAC SURGERY      CATARACT EXTRACTION      CHOLECYSTECTOMY      COLONOSCOPY      COLONOSCOPY N/A 6/18/2018    Procedure: COLONOSCOPY;  Surgeon: Kris Manuel DO;  Location: [facility]; Service: Gastroenterology    COLONOSCOPY W/ CONTROL OF HEMORRHAGE      CORONARY ANGIOPLASTY WITH STENT PLACEMENT      CORONARY ARTERY BYPASS GRAFT  2008    HERNIA REPAIR      HYSTERECTOMY      MS SIGMOIDOSCOPY FLX DX W/COLLJ SPEC BR/WA IF PFRMD N/A 6/21/2018    Procedure: SIGMOIDOSCOPY FLEXIBLE;  Surgeon: Krsi Manuel DO;  Location: [facility];   Service: Gastroenterology     Social History   History   Alcohol Use No     History   Drug Use No     History   Smoking Status    Former Smoker   Smokeless Tobacco    Never Used     History reviewed  No pertinent family history  Meds/Allergies       Current Outpatient Prescriptions:     ALPRAZolam (XANAX) 0 25 mg tablet    atorvastatin (LIPITOR) 20 mg tablet    ergocalciferol (ERGOCALCIFEROL) 89435 UNITS capsule    lisinopril (ZESTRIL) 10 mg tablet    metolazone (ZAROXOLYN) 2 5 mg tablet    metolazone (ZAROXOLYN) 2 5 mg tablet    metoprolol tartrate (LOPRESSOR) 50 mg tablet    Multiple Vitamins-Minerals (PRESERVISION AREDS) CAPS    nitroglycerin (NITROSTAT) 0 4 mg SL tablet    Omega-3 Fatty Acids (FISH OIL PO)    Potassium Chloride Amddy CR (KLOR-CON M20 PO)    ranitidine (ZANTAC) 300 MG capsule    warfarin (COUMADIN) 2 mg tablet    furosemide (LASIX) 20 mg tablet    Na Sulfate-K Sulfate-Mg Sulf (SUPREP BOWEL PREP KIT) 17 5-3 13-1 6 GM/180ML SOLN    Allergies   Allergen Reactions    Codeine GI Intolerance    Lansoprazole Other (See Comments)     GI Upset, dania protonix    Morphine Nausea Only    Morphine And Related GI Intolerance           Objective     Blood pressure 115/62, pulse 69, temperature (!) 95 9 °F (35 5 °C), temperature source Tympanic Core, weight 81 6 kg (180 lb)  Body mass index is 32 92 kg/m²  PHYSICAL EXAM:      General Appearance:   Alert, cooperative, no distress   HEENT:   Normocephalic, atraumatic, anicteric      Neck:  Supple, symmetrical, trachea midline   Lungs:   Clear to auscultation bilaterally; no rales, rhonchi or wheezing; respirations unlabored    Heart[de-identified]   Regular rate and rhythm; no murmur, rub, or gallop  Abdomen:   Soft, non-tender, non-distended; normal bowel sounds; no masses, no organomegaly    Genitalia:   Deferred    Rectal:   Deferred    Extremities:  No cyanosis, clubbing or edema        Skin:  No jaundice, rashes, or lesions          Lab Results:   No visits with results within 1 Day(s) from this visit     Latest known visit with results is:   Appointment on 07/03/2018 Component Date Value    Sodium 07/03/2018 143     Potassium 07/03/2018 3 5     Chloride 07/03/2018 106     CO2 07/03/2018 26     Anion Gap 07/03/2018 11     BUN 07/03/2018 24     Creatinine 07/03/2018 0 89     Glucose, Fasting 07/03/2018 101*    Calcium 07/03/2018 9 2     AST 07/03/2018 26     ALT 07/03/2018 30     Alkaline Phosphatase 07/03/2018 131*    Total Protein 07/03/2018 6 6     Albumin 07/03/2018 3 4*    Total Bilirubin 07/03/2018 0 70     eGFR 07/03/2018 62     WBC 07/03/2018 6 73     RBC 07/03/2018 3 81     Hemoglobin 07/03/2018 11 8     Hematocrit 07/03/2018 36 5     MCV 07/03/2018 96     MCH 07/03/2018 31 0     MCHC 07/03/2018 32 3     RDW 07/03/2018 14 6     Platelets 92/19/4949 189     MPV 07/03/2018 9 6     Vitamin B-12 07/03/2018 347     Folate 07/03/2018 14 8     Retic Ct Abs 07/03/2018 14580     Retic Ct Pct 07/03/2018 1 82     RBC Folate 07/03/2018 1572     Folate, Hemolysate 07/03/2018 540 9     Hematocrit 07/03/2018 34 4     Protime 07/03/2018 18 0*    INR 07/03/2018 1 57*    Iron Saturation 07/03/2018 16     TIBC 07/03/2018 385     Iron 07/03/2018 60     Ferritin 07/03/2018 34          Radiology Results:   Xr Chest 1 View Portable    Result Date: 6/16/2018  Narrative: CHEST INDICATION:   post-intubation cxr  COMPARISON:  June 6, 2018 and prior EXAM PERFORMED/VIEWS:  XR CHEST PORTABLE FINDINGS: The heart mediastinum are stable given differences in patient positioning  There is an ET tube in place terminating 2 cm from the asmita  No other support lines and tubes were evident  There is cephalization of the pulmonary vasculature  No evidence of pneumothorax focal consolidation or significant pleural effusion  Osseous structures appear within normal limits for patient age  Impression: ET tube in satisfactory position  Pulmonary vascular congestion       Ct Head Without Contrast    Result Date: 6/16/2018  Narrative: CT BRAIN - WITHOUT CONTRAST INDICATION:   new-onset seizure; mild confusion  COMPARISON:  None  TECHNIQUE:  CT examination of the brain was performed  In addition to axial images, coronal 2D reformatted images were created and submitted for interpretation  Radiation dose length product (DLP) for this visit:  988 mGy-cm   This examination, like all CT scans performed in the [facility], was performed utilizing techniques to minimize radiation dose exposure, including the use of iterative reconstruction and automated exposure control  IMAGE QUALITY:  Diagnostic  FINDINGS: PARENCHYMA:  No intracranial mass, mass effect or midline shift  No CT signs of acute infarction  No acute parenchymal hemorrhage  VENTRICLES AND EXTRA-AXIAL SPACES:  Ventricles and extra-axial CSF spaces are prominent commensurate with the degree of volume loss  No hydrocephalus  No acute extra-axial hemorrhage  VISUALIZED ORBITS AND PARANASAL SINUSES:  Partial opacification of mastoid air cells  CALVARIUM AND EXTRACRANIAL SOFT TISSUES:  Normal      Impression: No acute intracranial abnormality  Ct High Volume Lower Gi Bleed    Result Date: 6/16/2018  Narrative: CT ABDOMEN AND PELVIS - WITHOUT AND WITH IV CONTRAST INDICATION:   Gastrointestinal bleeding  COMPARISON: CT abdomen/pelvis from 1/2/2017  TECHNIQUE:  CT examination of the abdomen and pelvis was performed both prior to and after the administration of intravenous contrast  Axial, sagittal, and coronal 2D reformatted images were created from the source data and submitted for interpretation  Radiation dose length product (DLP) for this visit:  3150 64 mGy-cm   This examination, like all CT scans performed in the [facility], was performed utilizing techniques to minimize radiation dose exposure, including the use of iterative reconstruction and automated exposure control  IV Contrast:  100 mL of iodixanol (VISIPAQUE) Enteric Contrast:  Enteric contrast was not administered  FINDINGS: ABDOMEN  BOWEL:  There is no active extravasation of intravenous contrast into the lumen of stomach, small bowel, or large bowel loops  There is no abnormal bowel wall thickening or pathologic bowel wall enhancement  Colonic diverticulosis unchanged  No bowel obstruction  NG tube in the stomach  LOWER CHEST:  Small bilateral pleural effusions  Minimal bibasilar atelectasis  Stable cardiomegaly  LIVER/BILIARY TREE:  Unremarkable  GALLBLADDER:  Gallbladder is surgically absent  SPLEEN:  Unremarkable  PANCREAS:  Unremarkable  ADRENAL GLANDS:  Unremarkable  KIDNEYS/URETERS:  Left lower pole cortical cyst measures 8 mm    No hydronephrosis  PELVIS REPRODUCTIVE ORGANS:  Status post hysterectomy  No adnexal mass  URINARY BLADDER:  Justice catheter in place  APPENDIX: No findings to suggest appendicitis  ADDITIONAL ABDOMINAL AND PELVIC STRUCTURES ABDOMINOPELVIC CAVITY:  No ascites or free intraperitoneal air  No lymphadenopathy  ABDOMINAL WALL/INGUINAL REGIONS:  Unremarkable  OSSEOUS STRUCTURES:  No acute fracture or destructive osseous lesion  Impression: No CT evidence of active high volume gastrointestinal hemorrhage  Stable colonic diverticulosis  Small bilateral pleural effusions  Stable cardiomegaly

## 2018-07-17 ENCOUNTER — ANTICOAG VISIT (OUTPATIENT)
Dept: CARDIOLOGY CLINIC | Facility: CLINIC | Age: 78
End: 2018-07-17

## 2018-07-17 ENCOUNTER — APPOINTMENT (OUTPATIENT)
Dept: LAB | Facility: HOSPITAL | Age: 78
End: 2018-07-17
Attending: INTERNAL MEDICINE
Payer: MEDICARE

## 2018-07-17 DIAGNOSIS — I48.92 ATRIAL FLUTTER, UNSPECIFIED TYPE (HCC): ICD-10-CM

## 2018-07-19 ENCOUNTER — OFFICE VISIT (OUTPATIENT)
Dept: CARDIOLOGY CLINIC | Facility: HOSPITAL | Age: 78
End: 2018-07-19
Payer: MEDICARE

## 2018-07-19 VITALS
BODY MASS INDEX: 29.82 KG/M2 | HEART RATE: 70 BPM | HEIGHT: 65 IN | DIASTOLIC BLOOD PRESSURE: 62 MMHG | SYSTOLIC BLOOD PRESSURE: 110 MMHG | WEIGHT: 179 LBS

## 2018-07-19 DIAGNOSIS — I10 ESSENTIAL HYPERTENSION: ICD-10-CM

## 2018-07-19 DIAGNOSIS — K57.31 DIVERTICULOSIS OF LARGE INTESTINE WITH HEMORRHAGE: ICD-10-CM

## 2018-07-19 DIAGNOSIS — I25.10 CORONARY ARTERY DISEASE INVOLVING NATIVE CORONARY ARTERY OF NATIVE HEART WITHOUT ANGINA PECTORIS: ICD-10-CM

## 2018-07-19 DIAGNOSIS — I48.3 TYPICAL ATRIAL FLUTTER (HCC): ICD-10-CM

## 2018-07-19 DIAGNOSIS — I50.32 CHRONIC DIASTOLIC HEART FAILURE (HCC): Primary | ICD-10-CM

## 2018-07-19 PROCEDURE — 99214 OFFICE O/P EST MOD 30 MIN: CPT | Performed by: INTERNAL MEDICINE

## 2018-07-21 NOTE — PROGRESS NOTES
Cardiology Follow Up    Vickey Bird  1940  [id number]  [address] [facility] [address] [id number] [phone]    1  Chronic diastolic heart failure (HCC)  CBC and Platelet    CBC and Platelet   2  Diverticulosis of large intestine with hemorrhage     3  Coronary artery disease involving native coronary artery of native heart without angina pectoris     4  Essential hypertension     5  Typical atrial flutter (HCC)           Discussion/Summary: Her cardiac status appears to be stable despite her anemia  I will have her get CBCs every 2 weeks to monitor for recurrent bleeding  I still feel that the benefits of continuing Coumadin outweigh the risks  I will d/c her Lisinopril with her SBP on the lower normal side  RTO 3 months  Interval History: She was recently hospitalized with a GI bleed requiring blood transfusion  She presently with severe anemia  / syncope  She is back on Coumadin which is followed closely by the Coumadin clinic  She denies any CP or chest pressure  She continues to get SOB at low levels of activity  Her weight is unchanged  EF by ECHO in 1/2018   -   60%  She has CAD with CABG in 2008        Patient Active Problem List   Diagnosis    Diverticulosis    Rectal bleeding    Hypoalbuminemia    CAD (coronary artery disease)    Essential hypertension    Hyperlipidemia    Elevated MCV    Atrial flutter (HCC)    Right-sided thoracic back pain    History of shingles    Incomplete right bundle branch block    Hx of CABG    Thoracic radiculopathy    Pyuria    Microscopic hematuria    Acute on chronic diastolic CHF (congestive heart failure), NYHA class 3 (HCC)    BOB (dyspnea on exertion)    Chronic diastolic heart failure (HCC)    Diverticulosis of large intestine with hemorrhage    Vomiting    Colonic mass     Past Medical History:   Diagnosis Date  Cardiac disease     GERD (gastroesophageal reflux disease)     Hyperlipidemia     Hypertension      Social History     Social History    Marital status: /Civil Union     Spouse name: N/A    Number of children: N/A    Years of education: N/A     Occupational History    Not on file  Social History Main Topics    Smoking status: Former Smoker    Smokeless tobacco: Never Used    Alcohol use No    Drug use: No    Sexual activity: Not on file     Other Topics Concern    Not on file     Social History Narrative    No narrative on file      No family history on file  Past Surgical History:   Procedure Laterality Date    CARDIAC SURGERY      CARDIAC SURGERY      CATARACT EXTRACTION      CHOLECYSTECTOMY      COLONOSCOPY      COLONOSCOPY N/A 6/18/2018    Procedure: COLONOSCOPY;  Surgeon: Sita Gallegos DO;  Location: [facility]; Service: Gastroenterology    COLONOSCOPY W/ CONTROL OF HEMORRHAGE      CORONARY ANGIOPLASTY WITH STENT PLACEMENT      CORONARY ARTERY BYPASS GRAFT  2008    HERNIA REPAIR      HYSTERECTOMY      CT SIGMOIDOSCOPY FLX DX W/COLLJ SPEC BR/WA IF PFRMD N/A 6/21/2018    Procedure: SIGMOIDOSCOPY FLEXIBLE;  Surgeon: Sita Gallegos DO;  Location[facility]; Service: Gastroenterology       Current Outpatient Prescriptions:     ALPRAZolam (XANAX) 0 25 mg tablet, Take 0 25 mg by mouth 3 (three) times a day as needed for anxiety  , Disp: , Rfl:     atorvastatin (LIPITOR) 20 mg tablet, Take 20 mg by mouth daily after dinner   , Disp: , Rfl:     ergocalciferol (ERGOCALCIFEROL) 05055 UNITS capsule, Take 50,000 Units by mouth once a week  Takes on Wednesdays, Disp: , Rfl:     furosemide (LASIX) 20 mg tablet, Take 1 tablet (20 mg total) by mouth 2 (two) times a day for 90 days, Disp: 180 tablet, Rfl: 3    lisinopril (ZESTRIL) 10 mg tablet, Take 10 mg by mouth daily  , Disp: , Rfl:     metolazone (ZAROXOLYN) 2 5 mg tablet, TAKE ONE TABLET BY MOUTH ONCE A WEEK OR  AS DIRECTED  FOR  WEIGHT  GAIN, Disp: 20 tablet, Rfl: 5    metoprolol tartrate (LOPRESSOR) 50 mg tablet, Take 50 mg by mouth 2 (two) times a day, Disp: , Rfl:     Multiple Vitamins-Minerals (PRESERVISION AREDS) CAPS, Take 1 capsule by mouth 2 (two) times a day, Disp: , Rfl:     Na Sulfate-K Sulfate-Mg Sulf (SUPREP BOWEL PREP KIT) 17 5-3 13-1 6 GM/180ML SOLN, Take 177 mL by mouth once for 1 dose, Disp: 2 Bottle, Rfl: 0    nitroglycerin (NITROSTAT) 0 4 mg SL tablet, Place 0 4 mg under the tongue every 5 (five) minutes as needed for chest pain , Disp: , Rfl:     Omega-3 Fatty Acids (FISH OIL PO), Take 1,000 mg by mouth daily  , Disp: , Rfl:     Potassium Chloride Maddy CR (KLOR-CON M20 PO), Take 20 mEq by mouth daily  , Disp: , Rfl:     ranitidine (ZANTAC) 300 MG capsule, Take 300 mg by mouth daily  , Disp: , Rfl:     warfarin (COUMADIN) 2 mg tablet, TAKE ONE TO TWO TABLETS BY MOUTH ONCE DAILY OR  AS  ORDERED  BY  PHYSICIAN (Patient taking differently: monday, wednesday, friday, and saturday take 4mg and tuesdays, thursdays, and sundays 1mg), Disp: 60 tablet, Rfl: 11    metolazone (ZAROXOLYN) 2 5 mg tablet, Take 1 tablet (2 5 mg total) by mouth daily, Disp: 15 tablet, Rfl: 2  Allergies   Allergen Reactions    Codeine GI Intolerance    Lansoprazole Other (See Comments)     GI Upset, dania protonix    Morphine Nausea Only    Morphine And Related GI Intolerance     Vitals:    07/19/18 1239   BP: 110/62   BP Location: Left arm   Patient Position: Sitting   Cuff Size: Standard   Pulse: 70   Weight: 81 2 kg (179 lb)   Height: 5' 5" (1 651 m)     Weight (last 2 days)     Date/Time   Weight    07/19/18 1239  81 2 (179)             Blood pressure 110/62, pulse 70, height 5' 5" (1 651 m), weight 81 2 kg (179 lb)  , Body mass index is 29 79 kg/m²  Labs:   Ancillary Orders on 07/13/2018   Component Date Value    Protime 07/17/2018 20 3*    INR 07/17/2018 1 83*   Appointment on 07/03/2018   Component Date Value    Sodium 07/03/2018 143     Potassium 07/03/2018 3 5     Chloride 07/03/2018 106     CO2 07/03/2018 26     Anion Gap 07/03/2018 11     BUN 07/03/2018 24     Creatinine 07/03/2018 0 89     Glucose, Fasting 07/03/2018 101*    Calcium 07/03/2018 9 2     AST 07/03/2018 26     ALT 07/03/2018 30     Alkaline Phosphatase 07/03/2018 131*    Total Protein 07/03/2018 6 6     Albumin 07/03/2018 3 4*    Total Bilirubin 07/03/2018 0 70     eGFR 07/03/2018 62     WBC 07/03/2018 6 73     RBC 07/03/2018 3 81     Hemoglobin 07/03/2018 11 8     Hematocrit 07/03/2018 36 5     MCV 07/03/2018 96     MCH 07/03/2018 31 0     MCHC 07/03/2018 32 3     RDW 07/03/2018 14 6     Platelets 77/20/6525 189     MPV 07/03/2018 9 6     Vitamin B-12 07/03/2018 347     Folate 07/03/2018 14 8     Retic Ct Abs 07/03/2018 45946     Retic Ct Pct 07/03/2018 1 82     RBC Folate 07/03/2018 1572     Folate, Hemolysate 07/03/2018 540 9     Hematocrit 07/03/2018 34 4     Protime 07/03/2018 18 0*    INR 07/03/2018 1 57*    Iron Saturation 07/03/2018 16     TIBC 07/03/2018 385     Iron 07/03/2018 60     Ferritin 07/03/2018 34    Admission on 06/16/2018, Discharged on 06/22/2018   No results displayed because visit has over 200 results  Admission on 06/16/2018, Discharged on 06/16/2018   Component Date Value    Color, UA 06/16/2018 Yellow     Clarity, UA 06/16/2018 Clear     Specific Gravity, UA 06/16/2018 1 020     pH, UA 06/16/2018 6 5     Leukocytes, UA 06/16/2018 Negative     Nitrite, UA 06/16/2018 Negative     Protein, UA 06/16/2018 30 (1+)*    Glucose, UA 06/16/2018 Negative     Ketones, UA 06/16/2018 Negative     Urobilinogen, UA 06/16/2018 0 2     Bilirubin, UA 06/16/2018 Negative     Blood, UA 06/16/2018 Negative     Extra Tube 06/16/2018 Hold for add-ons   Extra Tube 06/16/2018 Hold for add-ons   Extra Tube 06/16/2018 Hold for add-ons       WBC 06/16/2018 8 57     RBC 06/16/2018 3 75*    Hemoglobin 06/16/2018 12 0     Hematocrit 06/16/2018 37 1     MCV 06/16/2018 99*    MCH 06/16/2018 32 0     MCHC 06/16/2018 32 3     RDW 06/16/2018 13 7     MPV 06/16/2018 9 8     Platelets 23/57/2007 172     Neutrophils Relative 06/16/2018 65     Lymphocytes Relative 06/16/2018 24     Monocytes Relative 06/16/2018 10     Eosinophils Relative 06/16/2018 1     Basophils Relative 06/16/2018 1     Neutrophils Absolute 06/16/2018 5 61     Lymphocytes Absolute 06/16/2018 2 01     Monocytes Absolute 06/16/2018 0 82     Eosinophils Absolute 06/16/2018 0 09     Basophils Absolute 06/16/2018 0 04     Sodium 06/16/2018 141     Potassium 06/16/2018 3 7     Chloride 06/16/2018 106     CO2 06/16/2018 28     Anion Gap 06/16/2018 7     BUN 06/16/2018 23     Creatinine 06/16/2018 0 99     Glucose 06/16/2018 112     Calcium 06/16/2018 8 8     AST 06/16/2018 21     ALT 06/16/2018 22     Alkaline Phosphatase 06/16/2018 141*    Total Protein 06/16/2018 6 4     Albumin 06/16/2018 3 4*    Total Bilirubin 06/16/2018 0 60     eGFR 06/16/2018 55     Lipase 06/16/2018 120     Protime 06/16/2018 23 6*    INR 06/16/2018 2 22*    Magnesium 06/16/2018 2 2     ABO Grouping 06/16/2018 A     Rh Factor 06/16/2018 Negative     Antibody Screen 06/16/2018 Negative     Specimen Expiration Date 06/16/2018 [id number]     ph, Lopez ISTAT 06/16/2018 7 331     pCO2, Lopez i-STAT 06/16/2018 44 4     pO2, Lopez i-STAT 06/16/2018 71 0*    BE, i-STAT 06/16/2018 -2     HCO3, Lopez i-STAT 06/16/2018 23 5*    CO2, i-STAT 06/16/2018 25     O2 Sat, i-STAT 06/16/2018 93*    SODIUM, I-STAT 06/16/2018 141     Potassium, i-STAT 06/16/2018 3 4*    Calcium, Ionized i-STAT 06/16/2018 1 11*    Hct, i-STAT 06/16/2018 28*    Hgb, i-STAT 06/16/2018 9 5*    Glucose, i-STAT 06/16/2018 179*    Specimen Type 06/16/2018 VENOUS     Unit Product Code 06/16/2018 [id number]     Unit Number 06/16/2018 [id number]     Unit ABO 06/16/2018 A     Unit RH 06/16/2018 NEG     Crossmatch 06/16/2018 Compatible     Unit Dispense Status 06/16/2018 Presumed Trans     RBC, UA 06/16/2018 0-1*    WBC, UA 06/16/2018 0-1*    Epithelial Cells 06/16/2018 Occasional     Bacteria, UA 06/16/2018 Occasional     Ventricular Rate 06/16/2018 75     Atrial Rate 06/16/2018 220     QRSD Interval 06/16/2018 116     QT Interval 06/16/2018 382     QTC Interval 06/16/2018 426     P Axis 06/16/2018 -86     QRS [place] 06/16/2018 -11     T Wave Axis 06/16/2018 -[address] Outpatient Visit on 06/06/2018   Component Date Value    Ventricular Rate 06/06/2018 100     Atrial Rate 06/06/2018 214     QRSD Interval 06/06/2018 118     QT Interval 06/06/2018 342     QTC Interval 06/06/2018 441     P Axis 06/06/2018 -84     QRS Axis 06/06/2018 53     T Wave Axis 06/06/2018 110    Appointment on 06/06/2018   Component Date Value    WBC 06/06/2018 6 80     RBC 06/06/2018 3 78*    Hemoglobin 06/06/2018 12 1     Hematocrit 06/06/2018 37 3     MCV 06/06/2018 99*    MCH 06/06/2018 32 0     MCHC 06/06/2018 32 4     RDW 06/06/2018 13 5     Platelets 85/17/3534 140*    MPV 06/06/2018 9 9     Sodium 06/06/2018 141     Potassium 06/06/2018 4 2     Chloride 06/06/2018 105     CO2 06/06/2018 27     Anion Gap 06/06/2018 9     BUN 06/06/2018 18     Creatinine 06/06/2018 0 88     Glucose 06/06/2018 103     Calcium 06/06/2018 8 4     eGFR 06/06/2018 63    Ancillary Orders on 06/01/2018   Component Date Value    Protime 06/06/2018 26 5*    INR 06/06/2018 2 58*   Ancillary Orders on 05/04/2018   Component Date Value    Protime 05/07/2018 26 4*    INR 05/07/2018 2 42*   Appointment on 04/17/2018   Component Date Value    Sodium 04/17/2018 142     Potassium 04/17/2018 3 8     Chloride 04/17/2018 104     CO2 04/17/2018 30     Anion Gap 04/17/2018 8     BUN 04/17/2018 20     Creatinine 04/17/2018 0 95     Glucose, Fasting 04/17/2018 97     Calcium 04/17/2018 9 2     AST 04/17/2018 20     ALT 04/17/2018 23     Alkaline Phosphatase 04/17/2018 123*    Total Protein 04/17/2018 6 8     Albumin 04/17/2018 3 6     Total Bilirubin 04/17/2018 0 80     eGFR 04/17/2018 58     NT-proBNP 04/17/2018 1866*   Ancillary Orders on 04/13/2018   Component Date Value    Protime 04/17/2018 25 7*    INR 04/17/2018 2 34*   There may be more visits with results that are not included  Imaging: No results found  Review of Systems:  Review of Systems   Constitutional: Negative for diaphoresis, fatigue, fever and unexpected weight change  HENT: Negative  Respiratory: Positive for shortness of breath  Negative for cough and wheezing  Cardiovascular: Negative for chest pain, palpitations and leg swelling  Gastrointestinal: Negative for abdominal pain, diarrhea and nausea  Musculoskeletal: Negative for gait problem and myalgias  Skin: Negative for rash  Neurological: Negative for dizziness and numbness  Psychiatric/Behavioral: Negative  Physical Exam:  Physical Exam   Constitutional: She is oriented to person, place, and time  She appears well-developed and well-nourished  HENT:   Head: Normocephalic and atraumatic  Eyes: Pupils are equal, round, and reactive to light  Neck: Normal range of motion  Neck supple  No JVD present  Cardiovascular: Regular rhythm, S1 normal, S2 normal and normal pulses  Pulses:       Carotid pulses are 2+ on the right side, and 2+ on the left side  Pulmonary/Chest: Effort normal and breath sounds normal  She has no wheezes  She has no rales  Abdominal: Soft  Bowel sounds are normal  There is no tenderness  Musculoskeletal: Normal range of motion  She exhibits no edema or tenderness  Neurological: She is alert and oriented to person, place, and time  She has normal reflexes  No cranial nerve deficit  Skin: Skin is warm  Psychiatric: She has a normal mood and affect

## 2018-07-25 ENCOUNTER — TELEPHONE (OUTPATIENT)
Dept: GASTROENTEROLOGY | Facility: MEDICAL CENTER | Age: 78
End: 2018-07-25

## 2018-07-25 PROBLEM — D12.6 TUBULOVILLOUS ADENOMA OF COLON: Status: ACTIVE | Noted: 2018-07-25

## 2018-07-25 NOTE — TELEPHONE ENCOUNTER
Pt is scheduled at [address] [facility] with Dr Bill Garcia on 9/26/18  I will send her out Supr instructions and she will call with any questions   I am sending out a medical clearance sheet to Dr Lupillo Esparza her cardiologist

## 2018-07-31 ENCOUNTER — TELEPHONE (OUTPATIENT)
Dept: GASTROENTEROLOGY | Facility: CLINIC | Age: 78
End: 2018-07-31

## 2018-07-31 ENCOUNTER — ANTICOAG VISIT (OUTPATIENT)
Dept: CARDIOLOGY CLINIC | Facility: CLINIC | Age: 78
End: 2018-07-31

## 2018-07-31 ENCOUNTER — APPOINTMENT (OUTPATIENT)
Dept: LAB | Facility: HOSPITAL | Age: 78
End: 2018-07-31
Attending: INTERNAL MEDICINE
Payer: MEDICARE

## 2018-07-31 DIAGNOSIS — I48.3 TYPICAL ATRIAL FLUTTER (HCC): ICD-10-CM

## 2018-08-14 ENCOUNTER — APPOINTMENT (OUTPATIENT)
Dept: LAB | Facility: HOSPITAL | Age: 78
End: 2018-08-14
Attending: INTERNAL MEDICINE
Payer: MEDICARE

## 2018-08-14 ENCOUNTER — ANTICOAG VISIT (OUTPATIENT)
Dept: CARDIOLOGY CLINIC | Facility: CLINIC | Age: 78
End: 2018-08-14

## 2018-08-14 DIAGNOSIS — I48.3 TYPICAL ATRIAL FLUTTER (HCC): ICD-10-CM

## 2018-08-27 ENCOUNTER — ANTICOAG VISIT (OUTPATIENT)
Dept: CARDIOLOGY CLINIC | Facility: CLINIC | Age: 78
End: 2018-08-27

## 2018-08-27 ENCOUNTER — TRANSCRIBE ORDERS (OUTPATIENT)
Dept: ADMINISTRATIVE | Facility: HOSPITAL | Age: 78
End: 2018-08-27

## 2018-08-27 ENCOUNTER — APPOINTMENT (OUTPATIENT)
Dept: LAB | Facility: HOSPITAL | Age: 78
End: 2018-08-27
Payer: MEDICARE

## 2018-08-27 DIAGNOSIS — Z79.899 LONG TERM USE OF DRUG: ICD-10-CM

## 2018-08-27 DIAGNOSIS — I50.9 CONGESTIVE HEART FAILURE, UNSPECIFIED HF CHRONICITY, UNSPECIFIED HEART FAILURE TYPE (HCC): Primary | ICD-10-CM

## 2018-08-27 DIAGNOSIS — R60.9 EDEMA, UNSPECIFIED TYPE: ICD-10-CM

## 2018-08-27 DIAGNOSIS — I50.9 CONGESTIVE HEART FAILURE, UNSPECIFIED HF CHRONICITY, UNSPECIFIED HEART FAILURE TYPE (HCC): ICD-10-CM

## 2018-08-27 DIAGNOSIS — I48.3 TYPICAL ATRIAL FLUTTER (HCC): ICD-10-CM

## 2018-08-27 LAB
ALBUMIN SERPL BCP-MCNC: 3.4 G/DL (ref 3.5–5)
ALP SERPL-CCNC: 181 U/L (ref 46–116)
ALT SERPL W P-5'-P-CCNC: 26 U/L (ref 12–78)
ANION GAP SERPL CALCULATED.3IONS-SCNC: 6 MMOL/L (ref 4–13)
AST SERPL W P-5'-P-CCNC: 25 U/L (ref 5–45)
BILIRUB SERPL-MCNC: 0.8 MG/DL (ref 0.2–1)
BUN SERPL-MCNC: 19 MG/DL (ref 5–25)
CALCIUM SERPL-MCNC: 8.8 MG/DL (ref 8.3–10.1)
CHLORIDE SERPL-SCNC: 105 MMOL/L (ref 100–108)
CO2 SERPL-SCNC: 31 MMOL/L (ref 21–32)
CREAT SERPL-MCNC: 0.92 MG/DL (ref 0.6–1.3)
GFR SERPL CREATININE-BSD FRML MDRD: 59 ML/MIN/1.73SQ M
GLUCOSE P FAST SERPL-MCNC: 107 MG/DL (ref 65–99)
NT-PROBNP SERPL-MCNC: 2789 PG/ML
POTASSIUM SERPL-SCNC: 3.4 MMOL/L (ref 3.5–5.3)
PROT SERPL-MCNC: 6.7 G/DL (ref 6.4–8.2)
SODIUM SERPL-SCNC: 142 MMOL/L (ref 136–145)

## 2018-08-27 PROCEDURE — 80053 COMPREHEN METABOLIC PANEL: CPT

## 2018-08-27 PROCEDURE — 83880 ASSAY OF NATRIURETIC PEPTIDE: CPT

## 2018-09-11 ENCOUNTER — APPOINTMENT (OUTPATIENT)
Dept: LAB | Facility: HOSPITAL | Age: 78
End: 2018-09-11
Attending: INTERNAL MEDICINE
Payer: MEDICARE

## 2018-09-12 DIAGNOSIS — I25.10 CORONARY ARTERY DISEASE INVOLVING NATIVE CORONARY ARTERY OF NATIVE HEART WITHOUT ANGINA PECTORIS: Primary | ICD-10-CM

## 2018-09-12 RX ORDER — POTASSIUM CHLORIDE 20 MEQ/1
20 TABLET, EXTENDED RELEASE ORAL DAILY
Qty: 90 TABLET | Refills: 3 | Status: SHIPPED | OUTPATIENT
Start: 2018-09-12 | End: 2019-04-15 | Stop reason: SDUPTHER

## 2018-09-21 ENCOUNTER — APPOINTMENT (OUTPATIENT)
Dept: LAB | Facility: HOSPITAL | Age: 78
End: 2018-09-21
Attending: INTERNAL MEDICINE
Payer: MEDICARE

## 2018-09-21 ENCOUNTER — TRANSCRIBE ORDERS (OUTPATIENT)
Dept: ADMINISTRATIVE | Facility: HOSPITAL | Age: 78
End: 2018-09-21

## 2018-09-21 ENCOUNTER — ANTICOAG VISIT (OUTPATIENT)
Dept: CARDIOLOGY CLINIC | Facility: CLINIC | Age: 78
End: 2018-09-21

## 2018-09-21 DIAGNOSIS — I48.3 TYPICAL ATRIAL FLUTTER (HCC): ICD-10-CM

## 2018-09-21 DIAGNOSIS — I50.32 CHRONIC DIASTOLIC HEART FAILURE (HCC): ICD-10-CM

## 2018-09-21 DIAGNOSIS — I50.32 CHRONIC DIASTOLIC HEART FAILURE (HCC): Primary | ICD-10-CM

## 2018-09-21 LAB
ERYTHROCYTE [DISTWIDTH] IN BLOOD BY AUTOMATED COUNT: 14.3 % (ref 11.6–15.1)
HCT VFR BLD AUTO: 38.5 % (ref 34.8–46.1)
HGB BLD-MCNC: 12.2 G/DL (ref 11.5–15.4)
MCH RBC QN AUTO: 30.1 PG (ref 26.8–34.3)
MCHC RBC AUTO-ENTMCNC: 31.7 G/DL (ref 31.4–37.4)
MCV RBC AUTO: 95 FL (ref 82–98)
PLATELET # BLD AUTO: 157 THOUSANDS/UL (ref 149–390)
PMV BLD AUTO: 10 FL (ref 8.9–12.7)
RBC # BLD AUTO: 4.05 MILLION/UL (ref 3.81–5.12)
WBC # BLD AUTO: 6.68 THOUSAND/UL (ref 4.31–10.16)

## 2018-09-21 PROCEDURE — 85027 COMPLETE CBC AUTOMATED: CPT

## 2018-09-25 ENCOUNTER — ANESTHESIA EVENT (OUTPATIENT)
Dept: PERIOP | Facility: HOSPITAL | Age: 78
DRG: 907 | End: 2018-09-25
Payer: MEDICARE

## 2018-09-26 ENCOUNTER — APPOINTMENT (OUTPATIENT)
Dept: RADIOLOGY | Facility: HOSPITAL | Age: 78
DRG: 907 | End: 2018-09-26
Payer: MEDICARE

## 2018-09-26 ENCOUNTER — ANESTHESIA (OUTPATIENT)
Dept: PERIOP | Facility: HOSPITAL | Age: 78
DRG: 907 | End: 2018-09-26
Payer: MEDICARE

## 2018-09-26 ENCOUNTER — HOSPITAL ENCOUNTER (INPATIENT)
Facility: HOSPITAL | Age: 78
LOS: 5 days | Discharge: HOME HEALTH | DRG: 907 | End: 2018-10-01
Attending: INTERNAL MEDICINE | Admitting: FAMILY MEDICINE
Payer: MEDICARE

## 2018-09-26 DIAGNOSIS — I48.3 TYPICAL ATRIAL FLUTTER (HCC): ICD-10-CM

## 2018-09-26 DIAGNOSIS — D12.6 TUBULOVILLOUS ADENOMA OF COLON: ICD-10-CM

## 2018-09-26 DIAGNOSIS — R19.8 PERFORATED VISCUS: Primary | ICD-10-CM

## 2018-09-26 PROBLEM — I50.32 CHRONIC DIASTOLIC (CONGESTIVE) HEART FAILURE (HCC): Status: ACTIVE | Noted: 2018-02-08

## 2018-09-26 PROBLEM — D75.0 ESSENTIAL HYPERERYTHROPOIETINEMIA: Status: RESOLVED | Noted: 2018-09-26 | Resolved: 2018-09-26

## 2018-09-26 PROBLEM — D75.0 ESSENTIAL HYPERERYTHROPOIETINEMIA: Status: ACTIVE | Noted: 2018-09-26

## 2018-09-26 LAB
ABO GROUP BLD BPU: NORMAL
ABO GROUP BLD BPU: NORMAL
ALBUMIN SERPL BCP-MCNC: 2.7 G/DL (ref 3.5–5)
ALP SERPL-CCNC: 154 U/L (ref 46–116)
ALT SERPL W P-5'-P-CCNC: 28 U/L (ref 12–78)
ANION GAP SERPL CALCULATED.3IONS-SCNC: 10 MMOL/L (ref 4–13)
AST SERPL W P-5'-P-CCNC: 31 U/L (ref 5–45)
BASE EXCESS BLDA CALC-SCNC: -4 MMOL/L (ref -2–3)
BASOPHILS # BLD MANUAL: 0 THOUSAND/UL (ref 0–0.1)
BASOPHILS NFR MAR MANUAL: 0 % (ref 0–1)
BILIRUB SERPL-MCNC: 1.2 MG/DL (ref 0.2–1)
BPU ID: NORMAL
BPU ID: NORMAL
BUN SERPL-MCNC: 14 MG/DL (ref 5–25)
CA-I BLD-SCNC: 1.11 MMOL/L (ref 1.12–1.32)
CALCIUM SERPL-MCNC: 7.6 MG/DL (ref 8.3–10.1)
CHLORIDE SERPL-SCNC: 108 MMOL/L (ref 100–108)
CO2 SERPL-SCNC: 26 MMOL/L (ref 21–32)
CREAT SERPL-MCNC: 0.9 MG/DL (ref 0.6–1.3)
EOSINOPHIL # BLD MANUAL: 0 THOUSAND/UL (ref 0–0.4)
EOSINOPHIL NFR BLD MANUAL: 0 % (ref 0–6)
ERYTHROCYTE [DISTWIDTH] IN BLOOD BY AUTOMATED COUNT: 14.7 % (ref 11.6–15.1)
GFR SERPL CREATININE-BSD FRML MDRD: 61 ML/MIN/1.73SQ M
GIANT PLATELETS BLD QL SMEAR: PRESENT
GLUCOSE P FAST SERPL-MCNC: 140 MG/DL (ref 65–99)
GLUCOSE SERPL-MCNC: 140 MG/DL (ref 65–140)
GLUCOSE SERPL-MCNC: 235 MG/DL (ref 65–140)
HCO3 BLDA-SCNC: 23.9 MMOL/L (ref 22–28)
HCT VFR BLD AUTO: 34.2 % (ref 34.8–46.1)
HCT VFR BLD CALC: 38 % (ref 34.8–46.1)
HGB BLD-MCNC: 10.8 G/DL (ref 11.5–15.4)
HGB BLDA-MCNC: 12.9 G/DL (ref 11.5–15.4)
INR PPP: 1.31 (ref 0.86–1.17)
LYMPHOCYTES # BLD AUTO: 0.22 THOUSAND/UL (ref 0.6–4.47)
LYMPHOCYTES # BLD AUTO: 2 % (ref 14–44)
MAGNESIUM SERPL-MCNC: 1.5 MG/DL (ref 1.6–2.6)
MCH RBC QN AUTO: 29.9 PG (ref 26.8–34.3)
MCHC RBC AUTO-ENTMCNC: 31.6 G/DL (ref 31.4–37.4)
MCV RBC AUTO: 95 FL (ref 82–98)
METAMYELOCYTES NFR BLD MANUAL: 2 % (ref 0–1)
MONOCYTES # BLD AUTO: 0.44 THOUSAND/UL (ref 0–1.22)
MONOCYTES NFR BLD: 4 % (ref 4–12)
NEUTROPHILS # BLD MANUAL: 10.1 THOUSAND/UL (ref 1.85–7.62)
NEUTS BAND NFR BLD MANUAL: 4 % (ref 0–8)
NEUTS SEG NFR BLD AUTO: 88 % (ref 43–75)
NRBC BLD AUTO-RTO: 0 /100 WBCS
PCO2 BLD: 26 MMOL/L (ref 21–32)
PCO2 BLD: 54.3 MM HG (ref 36–44)
PH BLD: 7.25 [PH] (ref 7.35–7.45)
PLATELET # BLD AUTO: 163 THOUSANDS/UL (ref 149–390)
PLATELET BLD QL SMEAR: ADEQUATE
PMV BLD AUTO: 9.3 FL (ref 8.9–12.7)
PO2 BLD: 125 MM HG (ref 75–129)
POTASSIUM BLD-SCNC: 2.8 MMOL/L (ref 3.5–5.3)
POTASSIUM SERPL-SCNC: 2.8 MMOL/L (ref 3.5–5.3)
PROT SERPL-MCNC: 6.1 G/DL (ref 6.4–8.2)
PROTHROMBIN TIME: 15.7 SECONDS (ref 11.8–14.2)
RBC # BLD AUTO: 3.61 MILLION/UL (ref 3.81–5.12)
RBC MORPH BLD: NORMAL
SAO2 % BLD FROM PO2: 98 % (ref 95–98)
SODIUM BLD-SCNC: 142 MMOL/L (ref 136–145)
SODIUM SERPL-SCNC: 144 MMOL/L (ref 136–145)
SPECIMEN SOURCE: ABNORMAL
TOTAL CELLS COUNTED SPEC: 100
UNIT DISPENSE STATUS: NORMAL
UNIT DISPENSE STATUS: NORMAL
UNIT PRODUCT CODE: NORMAL
UNIT PRODUCT CODE: NORMAL
UNIT RH: NORMAL
UNIT RH: NORMAL
WBC # BLD AUTO: 10.98 THOUSAND/UL (ref 4.31–10.16)

## 2018-09-26 PROCEDURE — 85027 COMPLETE CBC AUTOMATED: CPT | Performed by: FAMILY MEDICINE

## 2018-09-26 PROCEDURE — 82803 BLOOD GASES ANY COMBINATION: CPT

## 2018-09-26 PROCEDURE — 94760 N-INVAS EAR/PLS OXIMETRY 1: CPT

## 2018-09-26 PROCEDURE — 94762 N-INVAS EAR/PLS OXIMTRY CONT: CPT

## 2018-09-26 PROCEDURE — 85007 BL SMEAR W/DIFF WBC COUNT: CPT | Performed by: FAMILY MEDICINE

## 2018-09-26 PROCEDURE — 0W3P8ZZ CONTROL BLEEDING IN GASTROINTESTINAL TRACT, VIA NATURAL OR ARTIFICIAL OPENING ENDOSCOPIC: ICD-10-PCS | Performed by: INTERNAL MEDICINE

## 2018-09-26 PROCEDURE — 99222 1ST HOSP IP/OBS MODERATE 55: CPT | Performed by: SURGERY

## 2018-09-26 PROCEDURE — 44140 PARTIAL REMOVAL OF COLON: CPT | Performed by: SURGERY

## 2018-09-26 PROCEDURE — 71045 X-RAY EXAM CHEST 1 VIEW: CPT

## 2018-09-26 PROCEDURE — 44140 PARTIAL REMOVAL OF COLON: CPT

## 2018-09-26 PROCEDURE — 88305 TISSUE EXAM BY PATHOLOGIST: CPT | Performed by: PATHOLOGY

## 2018-09-26 PROCEDURE — 84132 ASSAY OF SERUM POTASSIUM: CPT

## 2018-09-26 PROCEDURE — 88307 TISSUE EXAM BY PATHOLOGIST: CPT | Performed by: PATHOLOGY

## 2018-09-26 PROCEDURE — 0DBK8ZX EXCISION OF ASCENDING COLON, VIA NATURAL OR ARTIFICIAL OPENING ENDOSCOPIC, DIAGNOSTIC: ICD-10-PCS | Performed by: INTERNAL MEDICINE

## 2018-09-26 PROCEDURE — 82330 ASSAY OF CALCIUM: CPT

## 2018-09-26 PROCEDURE — 45385 COLONOSCOPY W/LESION REMOVAL: CPT | Performed by: INTERNAL MEDICINE

## 2018-09-26 PROCEDURE — 85610 PROTHROMBIN TIME: CPT | Performed by: FAMILY MEDICINE

## 2018-09-26 PROCEDURE — 82947 ASSAY GLUCOSE BLOOD QUANT: CPT

## 2018-09-26 PROCEDURE — 99291 CRITICAL CARE FIRST HOUR: CPT | Performed by: FAMILY MEDICINE

## 2018-09-26 PROCEDURE — P9017 PLASMA 1 DONOR FRZ W/IN 8 HR: HCPCS

## 2018-09-26 PROCEDURE — 86927 PLASMA FRESH FROZEN: CPT

## 2018-09-26 PROCEDURE — 83735 ASSAY OF MAGNESIUM: CPT | Performed by: FAMILY MEDICINE

## 2018-09-26 PROCEDURE — 84295 ASSAY OF SERUM SODIUM: CPT

## 2018-09-26 PROCEDURE — 0DBM8ZX EXCISION OF DESCENDING COLON, VIA NATURAL OR ARTIFICIAL OPENING ENDOSCOPIC, DIAGNOSTIC: ICD-10-PCS | Performed by: INTERNAL MEDICINE

## 2018-09-26 PROCEDURE — 85014 HEMATOCRIT: CPT

## 2018-09-26 PROCEDURE — 80053 COMPREHEN METABOLIC PANEL: CPT | Performed by: FAMILY MEDICINE

## 2018-09-26 PROCEDURE — 0DTF0ZZ RESECTION OF RIGHT LARGE INTESTINE, OPEN APPROACH: ICD-10-PCS | Performed by: SURGERY

## 2018-09-26 RX ORDER — POTASSIUM CHLORIDE 14.9 MG/ML
20 INJECTION INTRAVENOUS
Status: COMPLETED | OUTPATIENT
Start: 2018-09-26 | End: 2018-09-27

## 2018-09-26 RX ORDER — FENTANYL CITRATE 50 UG/ML
INJECTION, SOLUTION INTRAMUSCULAR; INTRAVENOUS AS NEEDED
Status: DISCONTINUED | OUTPATIENT
Start: 2018-09-26 | End: 2018-09-26 | Stop reason: SURG

## 2018-09-26 RX ORDER — MAGNESIUM SULFATE HEPTAHYDRATE 40 MG/ML
2 INJECTION, SOLUTION INTRAVENOUS ONCE
Status: COMPLETED | OUTPATIENT
Start: 2018-09-26 | End: 2018-09-26

## 2018-09-26 RX ORDER — SODIUM CHLORIDE, SODIUM LACTATE, POTASSIUM CHLORIDE, CALCIUM CHLORIDE 600; 310; 30; 20 MG/100ML; MG/100ML; MG/100ML; MG/100ML
125 INJECTION, SOLUTION INTRAVENOUS CONTINUOUS
Status: DISCONTINUED | OUTPATIENT
Start: 2018-09-26 | End: 2018-09-27

## 2018-09-26 RX ORDER — SUCCINYLCHOLINE CHLORIDE 20 MG/ML
INJECTION INTRAMUSCULAR; INTRAVENOUS AS NEEDED
Status: DISCONTINUED | OUTPATIENT
Start: 2018-09-26 | End: 2018-09-26 | Stop reason: SURG

## 2018-09-26 RX ORDER — METOPROLOL TARTRATE 5 MG/5ML
2.5 INJECTION INTRAVENOUS EVERY 6 HOURS PRN
Status: DISCONTINUED | OUTPATIENT
Start: 2018-09-26 | End: 2018-09-26

## 2018-09-26 RX ORDER — ROCURONIUM BROMIDE 10 MG/ML
INJECTION, SOLUTION INTRAVENOUS AS NEEDED
Status: DISCONTINUED | OUTPATIENT
Start: 2018-09-26 | End: 2018-09-26 | Stop reason: SURG

## 2018-09-26 RX ORDER — DIPHENHYDRAMINE HYDROCHLORIDE 50 MG/ML
25 INJECTION INTRAMUSCULAR; INTRAVENOUS EVERY 6 HOURS PRN
Status: DISCONTINUED | OUTPATIENT
Start: 2018-09-26 | End: 2018-09-28

## 2018-09-26 RX ORDER — ONDANSETRON 2 MG/ML
4 INJECTION INTRAMUSCULAR; INTRAVENOUS EVERY 6 HOURS PRN
Status: DISCONTINUED | OUTPATIENT
Start: 2018-09-26 | End: 2018-10-01 | Stop reason: HOSPADM

## 2018-09-26 RX ORDER — MAGNESIUM HYDROXIDE 1200 MG/15ML
LIQUID ORAL AS NEEDED
Status: DISCONTINUED | OUTPATIENT
Start: 2018-09-26 | End: 2018-09-26 | Stop reason: HOSPADM

## 2018-09-26 RX ORDER — FENTANYL CITRATE/PF 50 MCG/ML
25 SYRINGE (ML) INJECTION
Status: DISCONTINUED | OUTPATIENT
Start: 2018-09-26 | End: 2018-09-26 | Stop reason: HOSPADM

## 2018-09-26 RX ORDER — ONDANSETRON 2 MG/ML
INJECTION INTRAMUSCULAR; INTRAVENOUS AS NEEDED
Status: DISCONTINUED | OUTPATIENT
Start: 2018-09-26 | End: 2018-09-26 | Stop reason: SURG

## 2018-09-26 RX ORDER — PROPOFOL 10 MG/ML
INJECTION, EMULSION INTRAVENOUS AS NEEDED
Status: DISCONTINUED | OUTPATIENT
Start: 2018-09-26 | End: 2018-09-26 | Stop reason: SURG

## 2018-09-26 RX ORDER — SODIUM CHLORIDE, SODIUM LACTATE, POTASSIUM CHLORIDE, CALCIUM CHLORIDE 600; 310; 30; 20 MG/100ML; MG/100ML; MG/100ML; MG/100ML
INJECTION, SOLUTION INTRAVENOUS CONTINUOUS PRN
Status: DISCONTINUED | OUTPATIENT
Start: 2018-09-26 | End: 2018-09-26 | Stop reason: SURG

## 2018-09-26 RX ORDER — SODIUM CHLORIDE, SODIUM LACTATE, POTASSIUM CHLORIDE, CALCIUM CHLORIDE 600; 310; 30; 20 MG/100ML; MG/100ML; MG/100ML; MG/100ML
75 INJECTION, SOLUTION INTRAVENOUS CONTINUOUS
Status: DISCONTINUED | OUTPATIENT
Start: 2018-09-26 | End: 2018-09-26

## 2018-09-26 RX ORDER — POTASSIUM CHLORIDE 14.9 MG/ML
INJECTION INTRAVENOUS CONTINUOUS PRN
Status: DISCONTINUED | OUTPATIENT
Start: 2018-09-26 | End: 2018-09-26 | Stop reason: SURG

## 2018-09-26 RX ORDER — METOPROLOL TARTRATE 5 MG/5ML
2.5 INJECTION INTRAVENOUS EVERY 6 HOURS
Status: DISCONTINUED | OUTPATIENT
Start: 2018-09-26 | End: 2018-09-27

## 2018-09-26 RX ORDER — SODIUM CHLORIDE 9 MG/ML
INJECTION, SOLUTION INTRAVENOUS CONTINUOUS PRN
Status: DISCONTINUED | OUTPATIENT
Start: 2018-09-26 | End: 2018-09-26 | Stop reason: SURG

## 2018-09-26 RX ADMIN — METOPROLOL TARTRATE 2.5 MG: 5 INJECTION, SOLUTION INTRAVENOUS at 19:13

## 2018-09-26 RX ADMIN — POTASSIUM CHLORIDE 20 MEQ: 200 INJECTION, SOLUTION INTRAVENOUS at 19:46

## 2018-09-26 RX ADMIN — PROPOFOL 20 MG: 10 INJECTION, EMULSION INTRAVENOUS at 09:04

## 2018-09-26 RX ADMIN — DEXAMETHASONE SODIUM PHOSPHATE 10 MG: 10 INJECTION INTRAMUSCULAR; INTRAVENOUS at 10:59

## 2018-09-26 RX ADMIN — PROPOFOL 20 MG: 10 INJECTION, EMULSION INTRAVENOUS at 08:53

## 2018-09-26 RX ADMIN — PROPOFOL 20 MG: 10 INJECTION, EMULSION INTRAVENOUS at 08:58

## 2018-09-26 RX ADMIN — SODIUM CHLORIDE: 0.9 INJECTION, SOLUTION INTRAVENOUS at 10:00

## 2018-09-26 RX ADMIN — SUCCINYLCHOLINE CHLORIDE 160 MG: 20 INJECTION, SOLUTION INTRAMUSCULAR; INTRAVENOUS at 09:25

## 2018-09-26 RX ADMIN — PROPOFOL 20 MG: 10 INJECTION, EMULSION INTRAVENOUS at 08:56

## 2018-09-26 RX ADMIN — ENOXAPARIN SODIUM 40 MG: 40 INJECTION, SOLUTION INTRAVENOUS; SUBCUTANEOUS at 20:53

## 2018-09-26 RX ADMIN — SODIUM CHLORIDE, SODIUM LACTATE, POTASSIUM CHLORIDE, AND CALCIUM CHLORIDE 125 ML/HR: .6; .31; .03; .02 INJECTION, SOLUTION INTRAVENOUS at 21:51

## 2018-09-26 RX ADMIN — SODIUM CHLORIDE, SODIUM LACTATE, POTASSIUM CHLORIDE, AND CALCIUM CHLORIDE 125 ML/HR: .6; .31; .03; .02 INJECTION, SOLUTION INTRAVENOUS at 07:38

## 2018-09-26 RX ADMIN — PROPOFOL 20 MG: 10 INJECTION, EMULSION INTRAVENOUS at 08:52

## 2018-09-26 RX ADMIN — PROPOFOL 60 MG: 10 INJECTION, EMULSION INTRAVENOUS at 08:51

## 2018-09-26 RX ADMIN — POTASSIUM CHLORIDE 20 MEQ: 200 INJECTION, SOLUTION INTRAVENOUS at 21:50

## 2018-09-26 RX ADMIN — SODIUM CHLORIDE, SODIUM LACTATE, POTASSIUM CHLORIDE, AND CALCIUM CHLORIDE: .6; .31; .03; .02 INJECTION, SOLUTION INTRAVENOUS at 08:16

## 2018-09-26 RX ADMIN — MAGNESIUM SULFATE HEPTAHYDRATE 2 G: 40 INJECTION, SOLUTION INTRAVENOUS at 20:45

## 2018-09-26 RX ADMIN — FAMOTIDINE 20 MG: 10 INJECTION INTRAVENOUS at 20:54

## 2018-09-26 RX ADMIN — PIPERACILLIN SODIUM,TAZOBACTAM SODIUM 3.38 G: 3; .375 INJECTION, POWDER, FOR SOLUTION INTRAVENOUS at 18:45

## 2018-09-26 RX ADMIN — Medication 3.38 G: at 09:50

## 2018-09-26 RX ADMIN — ONDANSETRON HYDROCHLORIDE 4 MG: 2 INJECTION, SOLUTION INTRAVENOUS at 10:59

## 2018-09-26 RX ADMIN — SUGAMMADEX 200 MG: 100 INJECTION, SOLUTION INTRAVENOUS at 12:18

## 2018-09-26 RX ADMIN — HYDROMORPHONE HYDROCHLORIDE 0.5 MG: 1 INJECTION, SOLUTION INTRAMUSCULAR; INTRAVENOUS; SUBCUTANEOUS at 10:42

## 2018-09-26 RX ADMIN — FENTANYL CITRATE 50 MCG: 50 INJECTION, SOLUTION INTRAMUSCULAR; INTRAVENOUS at 12:44

## 2018-09-26 RX ADMIN — SODIUM CHLORIDE, SODIUM LACTATE, POTASSIUM CHLORIDE, AND CALCIUM CHLORIDE 125 ML/HR: .6; .31; .03; .02 INJECTION, SOLUTION INTRAVENOUS at 12:48

## 2018-09-26 RX ADMIN — HYDROMORPHONE HYDROCHLORIDE: 10 INJECTION, SOLUTION INTRAMUSCULAR; INTRAVENOUS; SUBCUTANEOUS at 16:55

## 2018-09-26 RX ADMIN — PROPOFOL 20 MG: 10 INJECTION, EMULSION INTRAVENOUS at 09:06

## 2018-09-26 RX ADMIN — FENTANYL CITRATE 50 MCG: 50 INJECTION, SOLUTION INTRAMUSCULAR; INTRAVENOUS at 11:26

## 2018-09-26 RX ADMIN — PROPOFOL 50 MG: 10 INJECTION, EMULSION INTRAVENOUS at 09:25

## 2018-09-26 RX ADMIN — POTASSIUM CHLORIDE: 200 INJECTION, SOLUTION INTRAVENOUS at 10:30

## 2018-09-26 RX ADMIN — FENTANYL CITRATE 50 MCG: 50 INJECTION, SOLUTION INTRAMUSCULAR; INTRAVENOUS at 10:42

## 2018-09-26 RX ADMIN — FENTANYL CITRATE 50 MCG: 50 INJECTION, SOLUTION INTRAMUSCULAR; INTRAVENOUS at 11:56

## 2018-09-26 RX ADMIN — FENTANYL CITRATE 25 MCG: 50 INJECTION, SOLUTION INTRAMUSCULAR; INTRAVENOUS at 14:22

## 2018-09-26 RX ADMIN — ROCURONIUM BROMIDE 50 MG: 10 INJECTION INTRAVENOUS at 09:28

## 2018-09-26 RX ADMIN — SODIUM CHLORIDE: 0.9 INJECTION, SOLUTION INTRAVENOUS at 11:50

## 2018-09-26 RX ADMIN — PROPOFOL 20 MG: 10 INJECTION, EMULSION INTRAVENOUS at 09:01

## 2018-09-26 NOTE — OP NOTE
OPERATIVE REPORT  PATIENT NAME: Konstantin Osorio    :  1940  MRN: [id number]  Pt Location: [facility] OR ROOM 03    SURGERY DATE: 2018    Surgeon(s) and Role:     * Lieutenant Kelly DO - Primary    Preop Diagnosis:  Tubulovillous adenoma of colon [D12 6]    Post-Op Diagnosis Codes:     * Tubulovillous adenoma of colon [D12 6]    Procedure(s) (LRB):  COLONOSCOPY (N/A)    Specimen(s):  ID Type Source Tests Collected by Time Destination   1 : polyp retrieved by cold snare & forcep Tissue Large Intestine, Right/Ascending Colon TISSUE EXAM Lieutenant Mendoza,  2018 0902    2 : polyp retrieved by cold snare Tissue Large Intestine, Left/Descending Colon TISSUE EXAM Lieutenant Mendoza,  2018 0911        Estimated Blood Loss:   Minimal    Drains:       Anesthesia Type:   Choice    Operative Indications:  Tubulovillous adenoma of colon [D12 6]      Colonoscopy Procedure Note    Procedure: Colonoscopy    Sedation: Monitored anesthesia care, check anesthesia records      ASA Class: 3, pt on coumadin but held it prior to colonoscopy today     INDICATIONS: prior colonoscopy with tubulovillous adenoma removed in piecemeal resection    POST-OP DIAGNOSIS: See the impression below    Procedure Details     Prior colonoscopy: Less than 3 years ago  It is being repeated at an interval of less than 3 years because: This colonoscopy is being performed for a diagnostic indication    Informed consent was obtained for the procedure, including sedation  Risks of perforation, hemorrhage, adverse drug reaction and aspiration were discussed  The patient was placed in the left lateral decubitus position  Based on the pre-procedure assessment, including review of the patient's medical history, medications, allergies, and review of systems, she had been deemed to be an appropriate candidate for conscious sedation; she was therefore sedated with the medications listed below     The patient was monitored continuously with telemetry, pulse oximetry, blood pressure monitoring, and direct observations  A rectal examination was performed  The colonoscope was inserted into the rectum and advanced under direct vision to the cecum, which was identified by the ileocecal valve and appendiceal orifice  The quality of the colonic preparation was good  A careful inspection was made as the colonoscope was withdrawn, including a retroflexed view of the rectum; findings and interventions are described below  Findings:  Severe sigmoid diverticulosis  The prior placed tattoo site was seen and there was no residual polyp seen  There was one 4 mm sessile polyp in the ascending colon  This was removed with cold snare polypectomy  There was one 4 mm sessile polyp in the descending colon  This was removed with cold snare polypectomy  In the sigmoid colon in the sea of multiple diverticuli there appeared to be a mucosal injury and so three clips were placed with success  Complications: None; patient tolerated the procedure well  Impression:     Severe sigmoid diverticulosis  The prior placed tattoo site was seen and there was no residual polyp seen  There was one 4 mm sessile polyp in the ascending colon  This was removed with cold snare polypectomy  There was one 4 mm sessile polyp in the descending colon  This was removed with cold snare polypectomy  In the sigmoid colon in the sea of multiple diverticuli there appeared to be a mucosal injury and so three clips were placed with success  Recommendations:  Await pathology results  Repeat colonoscopy per polyp guidelines would be 5 years but given below will consider this her last elective colonoscopy  See anesthesia notes, pt desatted intraprocedure  I would consider this her last colonoscopy given her age and comorbidities        SIGNATURE: Jeremias Garcia DO  DATE: September 26, 2018  TIME: 9:26 AM

## 2018-09-26 NOTE — CONSULTS
Consultation - General Surgery   Susann Severance 77 y o  female MRN: [id number]  Unit/Bed#: OR POOL Encounter: [id number]    Assessment/Plan     Assessment:  50-year-old female with history of multiple medical problems including diverticular disease, underwent colonoscopy with subsequent perforated colon and required emergent laparotomy with right hemicolectomy, now postop day 0  Did well  Patient tolerated procedure well  Will be transferred to the ICU for further care  Plan:  - IV fluids  - Dilaudid PCA for pain control  - strict I&Os  - NG tube to low intermittent wall suction  - continue full a catheter for now  - NPO    Remainder of comorbidities to be managed by primary medical team   My evaluation recommendations have been discussed with the covering hospitalist       History of Present Illness     HPI:  Susann Severance is a 77 y o  female with history of multiple medical problems including CHF, AFib, hypertension, diverticulosis, underwent a colonoscopy and suffered a perforated viscus  Patient was taking emergently to the OR for laparotomy  Prior to laparotomy anesthesia was having difficulty with oxygenating the patient due to the substantial amount of intra-abdominal pressure  A patient underwent exploratory laparotomy and was noted to have a significant amount of air within the mesentery and surrounding tissues on the right side  Thus it was right colonic perforation  No discrete hole was identified other was some areas of splaying of the tinea coli  It was noted that after dunking the right colon underneath saline there was some noted bubbles to indicate a perforation  Right hemicolectomy was performed without complication  Patient will be monitored in the ICU postoperatively      Consults    Review of Systems     Review of systems unable to be obtained patient was subsequently sedated and intubated prior to emergent operation    Historical Information   Past Medical History:   Diagnosis Date    Cardiac disease     GERD (gastroesophageal reflux disease)     Hyperlipidemia     Hypertension      Past Surgical History:   Procedure Laterality Date    CARDIAC SURGERY      CARDIAC SURGERY      CATARACT EXTRACTION      CHOLECYSTECTOMY      COLONOSCOPY      COLONOSCOPY N/A 6/18/2018    Procedure: COLONOSCOPY;  Surgeon: Martin Lyle DO;  Location: [facility]; Service: Gastroenterology    COLONOSCOPY W/ CONTROL OF HEMORRHAGE      CORONARY ANGIOPLASTY WITH STENT PLACEMENT      CORONARY ARTERY BYPASS GRAFT  2008    HERNIA REPAIR      HYSTERECTOMY      NM SIGMOIDOSCOPY FLX DX W/COLLJ SPEC BR/WA IF PFRMD N/A 6/21/2018    Procedure: SIGMOIDOSCOPY FLEXIBLE;  Surgeon: Martin Lyle DO;  Location: [facility];   Service: Gastroenterology     Social History   History   Alcohol Use No     History   Drug Use No     History   Smoking Status    Former Smoker   Smokeless Tobacco    Never Used     Comment: quit 15 yrs ago     Family History: non-contributory    Meds/Allergies   all current active meds have been reviewed  Allergies   Allergen Reactions    Codeine GI Intolerance    Lansoprazole Other (See Comments)     GI Upset, dania protonix    Morphine Nausea Only    Morphine And Related GI Intolerance       Objective   First Vitals:   Blood Pressure: 124/79 (09/26/18 0727)  Pulse: 73 (09/26/18 0727)  Temperature: 97 6 °F (36 4 °C) (09/26/18 0727)  Temp Source: Tympanic (09/26/18 0727)  Respirations: 18 (09/26/18 0727)  Height: 5' 5" (165 1 cm) (09/26/18 0727)  Weight - Scale: 79 8 kg (176 lb) (09/26/18 0727)  SpO2: 94 % (09/26/18 0727)    Current Vitals:   Blood Pressure: 124/79 (09/26/18 0727)  Pulse: 73 (09/26/18 0727)  Temperature: 97 6 °F (36 4 °C) (09/26/18 0727)  Temp Source: Tympanic (09/26/18 0727)  Respirations: 18 (09/26/18 0727)  Height: 5' 5" (165 1 cm) (09/26/18 0727)  Weight - Scale: 79 8 kg (176 lb) (09/26/18 0727)  SpO2: 94 % (09/26/18 0727)      Intake/Output Summary (Last 24 hours) at 09/26/18 1259  Last data filed at 09/26/18 1240   Gross per 24 hour   Intake             3300 ml   Output              550 ml   Net             2750 ml       Invasive Devices     Peripheral Intravenous Line            Peripheral IV 09/26/18 Left Hand less than 1 day    Peripheral IV 09/26/18 Right Arm less than 1 day          Arterial Line            Arterial Line 09/26/18 Left Radial less than 1 day          Drain            Urethral Catheter 16 Fr  less than 1 day                Physical Exam   General:  No acute distress, resting comfortably in recovery  HEENT:  Normocephalic, atraumatic, nasogastric tube in place with bilious drainage  CV:  S1-S2 audible, regular rhythm noted  Pulmonary:  Decreased breath sounds at bases no audible wheezes  GI:  Abdomen is soft appropriately tender, midline incision intact with staples  MSK:  No clubbing or cyanosis  Neurologic:  The patient is awake instilled wound recovering from anesthesia  Skin:  Intact and warm    Lab Results: I have personally reviewed pertinent lab results  Imaging: I have personally reviewed pertinent films in PACS  EKG, Pathology, and Other Studies: I have personally reviewed pertinent reports

## 2018-09-26 NOTE — H&P
H&P Exam - aNni Leon 77 y o  female MRN: [id number]    Unit/Bed#: OR [place] Encounter: [id number]      * Perforated viscus   Assessment & Plan    NPO  NG Tube  Zosyn  Pepcid IV q12  Surgical consult  Pain control  IVF   Justice Catheter  Supportive care         Chronic diastolic (congestive) heart failure (HCC)   Assessment & Plan    Hold diuretics  Monitor volume status closely  Strict I&O  LR @ 75cc/hr         Atrial flutter (HCC)   Assessment & Plan    NPO  Hold PO metoprolol  Continue IV lopressor 2 5mg IV q6           Essential hypertension   Assessment & Plan    Hold oral antihypertensives  IV lopressor 2 5mg IV q6        Essential hypererythropoietinemiaresolved as of 9/26/2018   Assessment & Plan    Hold oral antihypertensives  Tele monitoring              History of Present Illness      Patient is a pleasant 48-year-old female past medical history significant for diverticular disease was undergoing an outpatient colonoscopy  After colonoscopy patient was noted to have a distended abdomen, chest x-ray revealed free air under the diaphragm  Surgical team medially contacted and the patient required emergent laparotomy with right hemicolectomy  Patient seen and examined in PACU  She is in no acute distress  Pain is well controlled  Vitals are stable  Laboratory values are currently pending but CBC/CMP/protime/Mag has been ordered  Review of Systems   Unable to perform ROS: Acuity of condition       Historical Information   Past Medical History:   Diagnosis Date    Cardiac disease     GERD (gastroesophageal reflux disease)     Hyperlipidemia     Hypertension      Past Surgical History:   Procedure Laterality Date    CARDIAC SURGERY      CARDIAC SURGERY      CATARACT EXTRACTION      CHOLECYSTECTOMY      COLONOSCOPY      COLONOSCOPY N/A 6/18/2018    Procedure: COLONOSCOPY;  Surgeon: Vincent Fortune DO;  Location: [facility];   Service: Gastroenterology    COLONOSCOPY W/ CONTROL OF HEMORRHAGE      CORONARY ANGIOPLASTY WITH STENT PLACEMENT      CORONARY ARTERY BYPASS GRAFT  2008    HERNIA REPAIR      HYSTERECTOMY      NM SIGMOIDOSCOPY FLX DX W/COLLJ SPEC BR/WA IF PFRMD N/A 6/21/2018    Procedure: SIGMOIDOSCOPY FLEXIBLE;  Surgeon: Camilla Friedman DO;  Location: [facility];   Service: Gastroenterology     Social History   History   Alcohol Use No     History   Drug Use No     History   Smoking Status    Former Smoker   Smokeless Tobacco    Never Used     Comment: quit 15 yrs ago     Family History: non-contributory    Meds/Allergies   all medications and allergies reviewed  Allergies   Allergen Reactions    Codeine GI Intolerance    Lansoprazole Other (See Comments)     GI Upset, dania protonix    Morphine Nausea Only    Morphine And Related GI Intolerance       Objective   First Vitals:   Blood Pressure: 124/79 (09/26/18 0727)  Pulse: 73 (09/26/18 0727)  Temperature: 97 6 °F (36 4 °C) (09/26/18 0727)  Temp Source: Tympanic (09/26/18 0727)  Respirations: 18 (09/26/18 0727)  Height: 5' 5" (165 1 cm) (09/26/18 0727)  Weight - Scale: 79 8 kg (176 lb) (09/26/18 0727)  SpO2: 94 % (09/26/18 0727)    Current Vitals:   Blood Pressure: 135/71 (09/26/18 1408)  Pulse: 75 (09/26/18 1410)  Temperature: 97 6 °F (36 4 °C) (09/26/18 1400)  Temp Source: Tympanic (09/26/18 0727)  Respirations: 19 (09/26/18 1410)  Height: 5' 5" (165 1 cm) (09/26/18 0727)  Weight - Scale: 79 8 kg (176 lb) (09/26/18 0727)  SpO2: 96 % (09/26/18 1410)      Intake/Output Summary (Last 24 hours) at 09/26/18 1429  Last data filed at 09/26/18 1248   Gross per 24 hour   Intake             3300 ml   Output              600 ml   Net             2700 ml       Invasive Devices     Peripheral Intravenous Line            Peripheral IV 09/26/18 Left Hand less than 1 day    Peripheral IV 09/26/18 Right Arm less than 1 day          Arterial Line            Arterial Line 09/26/18 Left Radial less than 1 day          Drain            Urethral Catheter 16 Fr  less than 1 day                Physical Exam   Constitutional: She appears well-developed  HENT:   Head: Normocephalic  Mouth/Throat: No oropharyngeal exudate  Eyes: Pupils are equal, round, and reactive to light  No scleral icterus  Neck: Neck supple  No JVD present  Abdominal: Soft  Midline incision  No drainage/erythema     Skin: She is not diaphoretic  Lab Results:     Results from last 7 days  Lab Units 09/26/18  1357   WBC Thousand/uL 10 98*   HEMOGLOBIN g/dL 10 8*   HEMATOCRIT % 34 2*   PLATELETS Thousands/uL 163       Results from last 7 days  Lab Units 09/26/18  1358 09/26/18  1014   SODIUM mmol/L 144  --    POTASSIUM mmol/L 2 8*  --    CHLORIDE mmol/L 108  --    CO2 mmol/L 26  --    BUN mg/dL 14  --    CREATININE mg/dL 0 90  --    CALCIUM mg/dL 7 6*  --    ALK PHOS U/L 154*  --    ALT U/L 28  --    AST U/L 31  --    GLUCOSE, ISTAT mg/dl  --  235*         Imaging:   XR chest portable   Final Result      1  Large amount of free air in the retroperitoneum and peritoneal cavity  Findings raise concern for perforation  2   Free air causes medialization of the abdominal and retroperitoneal viscera and elevation of the hemidiaphragms resulting in bibasilar compressive atelectasis  3   Subcutaneous emphysema abdominal wall and tracking upwards into the lower neck            I personally discussed this study with OR staff (Wu Poe) by telephone on 9/26/2018 at 9:53 AM                      Workstation performed: [id number]             EKG, Pathology, and Other Studies: EKG ordered     Code Status: Level 1 - Full Code  Advance Directive and Living Will:      Power of :    POLST:      Counseling / Coordination of Care:

## 2018-09-26 NOTE — ASSESSMENT & PLAN NOTE
Continue to hold diuretics  Lactated Ringer's has been discontinued and normal saline initiated  500cc normal saline bolus due to decreased urine output over the last 6 hr

## 2018-09-26 NOTE — PROGRESS NOTES
After procedure, patient's abdomen distended, stat portable chest x ray showed free air under the diaphragm  Pt intubated urgently in the procedure room and general surgeon called  Addendum: Discussed with pt's daughter and  about need for emergency ex lap and they gave their verbal consent  Pt immediately went to OR for ex lap

## 2018-09-26 NOTE — ANESTHESIA PROCEDURE NOTES
Arterial Line Insertion  Date/Time: 9/26/2018 10:05 AM  Performed by: Laya Feliciano  Authorized by: Laya Feliciano   Consent: The procedure was performed in an emergent situation  Required items: required blood products, implants, devices, and special equipment available  Patient identity confirmed: arm band  Time out: Immediately prior to procedure a "time out" was called to verify the correct patient, procedure, equipment, support staff and site/side marked as required  Preparation: Patient was prepped and draped in the usual sterile fashion  Indications: multiple ABGs and hemodynamic monitoring  Orientation:  Left  Location: radial artery  Local anesthetic: ga      Procedure Details:  Needle gauge: 20  Seldinger technique: Seldinger technique used  Number of attempts: 1    Post-procedure:  Post-procedure: chlorhexidine patch applied  Waveform: good waveform and waveform confirmed  Patient tolerance: Patient tolerated the procedure well with no immediate complications  Comments: Post-induction

## 2018-09-26 NOTE — H&P
History and Physical - [facility]  Bety Cobb 77 y o  female MRN: [id number]        HPI: Bety Cobb is a 77y o  year old female who presents for colonoscopy for removal of sigmoid resection  REVIEW OF SYSTEMS: Per the HPI, and otherwise unremarkable  Historical Information   Past Medical History:   Diagnosis Date    Cardiac disease     GERD (gastroesophageal reflux disease)     Hyperlipidemia     Hypertension      Past Surgical History:   Procedure Laterality Date    CARDIAC SURGERY      CARDIAC SURGERY      CATARACT EXTRACTION      CHOLECYSTECTOMY      COLONOSCOPY      COLONOSCOPY N/A 6/18/2018    Procedure: COLONOSCOPY;  Surgeon: Rock Mosquera DO;  Location: [facility]; Service: Gastroenterology    COLONOSCOPY W/ CONTROL OF HEMORRHAGE      CORONARY ANGIOPLASTY WITH STENT PLACEMENT      CORONARY ARTERY BYPASS GRAFT  2008    HERNIA REPAIR      HYSTERECTOMY      VT SIGMOIDOSCOPY FLX DX W/COLLJ SPEC BR/WA IF PFRMD N/A 6/21/2018    Procedure: SIGMOIDOSCOPY FLEXIBLE;  Surgeon: Rock Mosquera DO;  [facility]; Service: Gastroenterology     Social History   History   Alcohol Use No     History   Drug Use No     History   Smoking Status    Former Smoker   Smokeless Tobacco    Never Used     Comment: quit 15 yrs ago     History reviewed  No pertinent family history      Meds/Allergies     Prescriptions Prior to Admission   Medication    ALPRAZolam (XANAX) 0 25 mg tablet    atorvastatin (LIPITOR) 20 mg tablet    furosemide (LASIX) 20 mg tablet    metoprolol tartrate (LOPRESSOR) 50 mg tablet    Omega-3 Fatty Acids (FISH OIL PO)    ranitidine (ZANTAC) 300 MG capsule    ergocalciferol (ERGOCALCIFEROL) 75023 UNITS capsule    lisinopril (ZESTRIL) 10 mg tablet    metolazone (ZAROXOLYN) 2 5 mg tablet    metolazone (ZAROXOLYN) 2 5 mg tablet    Multiple Vitamins-Minerals (PRESERVISION AREDS) CAPS    Na Sulfate-K Sulfate-Mg Sulf (SUPREP BOWEL PREP KIT) 17 5-3 13-1 6 GM/180ML SOLN    nitroglycerin (NITROSTAT) 0 4 mg SL tablet    potassium chloride (K-DUR,KLOR-CON) 20 mEq tablet    warfarin (COUMADIN) 2 mg tablet       Allergies   Allergen Reactions    Codeine GI Intolerance    Lansoprazole Other (See Comments)     GI Upset, dania protonix    Morphine Nausea Only    Morphine And Related GI Intolerance       Objective     Blood pressure 124/79, pulse 73, temperature 97 6 °F (36 4 °C), temperature source Tympanic, resp  rate 18, height 5' 5" (1 651 m), weight 79 8 kg (176 lb), SpO2 94 %  PHYSICAL EXAM    Gen: NAD  CV: RRR  CHEST: Clear  ABD: soft, NT/ND  EXT: no edema      ASSESSMENT/PLAN:  This is a 77y o  year old female here for colonoscopy, and she is stable and optimized for her procedure

## 2018-09-26 NOTE — ASSESSMENT & PLAN NOTE
Transitioned to p o   Metoprolol  DC IV metoprolol  Will discuss with surgery when it is safe to resume Coumadin

## 2018-09-26 NOTE — ANESTHESIA POSTPROCEDURE EVALUATION
Post-Op Assessment Note      CV Status:  Stable    Mental Status:  Somnolent    Hydration Status:  Stable    PONV Controlled:  None    Airway Patency:  Patent    Post Op Vitals Reviewed: Yes          Staff: Anesthesiologist       Comments: vss, report to rn          BP      Temp      Pulse     Resp      SpO2

## 2018-09-26 NOTE — ADDENDUM NOTE
Addendum  created 09/26/18 1301 by Anabella Stuart MD    Anesthesia Intra Flowsheets edited, Order list changed, Order sets accessed

## 2018-09-26 NOTE — ASSESSMENT & PLAN NOTE
Status post right hemicolectomy  Management per primary surgical  NG tube has been discontinued on 09/27/2018  Clear liquid diet 09/27/2018  Continue PCA pump for an additional 24 hr

## 2018-09-26 NOTE — ASSESSMENT & PLAN NOTE
Discontinue IV Lopressor and start patient's home dose of metoprolol 50 mg p o  B i d   Continue to hold diuretics

## 2018-09-26 NOTE — INTERIM OP NOTE
COLONOSCOPY, LAPAROTOMY EXPLORATORY WITH  RIGHT HEMICOLECTOMY  Postoperative Note  PATIENT NAME: Merissa Ya  : 1940  MRN: [id number]  [facility] OR ROOM 03    Surgery Date: 2018    Preop Diagnosis:  Tubulovillous adenoma of colon [D12 6]    Post-Op Diagnosis Codes:     * Tubulovillous adenoma of colon [D12 6]    Procedure(s) (LRB):  COLONOSCOPY (N/A)  LAPAROTOMY EXPLORATORY WITH  RIGHT HEMICOLECTOMY (N/A)    Surgeon(s) and Role:     * Rajan Hicks DO - Primary     * Jerad Santo DO - Co-surgeon     * Dorothea Gore PA-C - Assisting    Specimens:  ID Type Source Tests Collected by Time Destination   1 : polyp retrieved by cold snare & forcep Tissue Large Intestine, Right/Ascending Colon TISSUE EXAM Rajan Hicks DO 2018 0902    2 : polyp retrieved by cold snare Tissue Large Intestine, Left/Descending Colon TISSUE EXAM Rajan Hicks DO 2018 0911    3 : right colon and terminal ileum Tissue Large Intestine, Right/Ascending Colon TISSUE EXAM Jerad Santo DO 2018 1115        Estimated Blood Loss:   300 mL    Anesthesia Type:   Choice     Findings:    Right colonic perforation    Complications:   None    SIGNATURE: Jerad Santo DO   DATE: 2018   TIME: 12:39 PM

## 2018-09-26 NOTE — ANESTHESIA PREPROCEDURE EVALUATION
Review of Systems/Medical History  Patient summary reviewed  Chart reviewed  No history of anesthetic complications     Cardiovascular  Hyperlipidemia, Hypertension , CAD , History of CABG (2008, x3), Cardiac stents (2009,x1)  History of percutaneous transluminal coronary angioplasty, Dysrhythmias ( A flutter-rapid 110) , CHF (Chronic diastolic HF) , BOB,   Comment: EF 60%; RV mild-mod depressed (mod dilation); sev TR, PASP 48 mmHg,  Pulmonary  Smoker ex-smoker  , Shortness of breath,        GI/Hepatic    GERD ,   Comment: Colonic mass/Diverticulosis  Rectal bleeding     Negative  ROS        Endo/Other  Negative endo/other ROS   Obesity (BMI 34)    GYN  Negative gynecology ROS          Hematology  Negative hematology ROS      Musculoskeletal  Osteoarthritis,   Comment: Thoracic radiculopathy      Neurology  Negative neurology ROS      Psychology   Negative psychology ROS              Physical Exam    Airway    Mallampati score: II  TM Distance: >3 FB       Dental   lower dentures and upper dentures,     Cardiovascular  Rhythm: irregular,     Pulmonary  Breath sounds clear to auscultation,     Other Findings        Anesthesia Plan  ASA Score- 3 Emergent    Anesthesia Type- general with ASA Monitors  Additional Monitors: arterial line  Airway Plan:     Comment: IV sedation,  standard ASA monitors  Risks and benefits discussed with patient; patient consented and agrees to proceed  I saw and evaluated the patient  If seen with CRNA, we have discussed the anesthetic plan and I am in agreement that the plan is appropriate for the patient  Addendum after colonoscopy: pt taken to OR emergently for ex-lap, diagnosis of free air  Converted to GETA leonela for monitoring  Unable to consent patient, did not attempt to consent family given emergent nature of procedure        Plan Factors-    Induction- intravenous      Postoperative Plan-     Informed Consent- Anesthetic plan and risks discussed with patient

## 2018-09-27 ENCOUNTER — APPOINTMENT (INPATIENT)
Dept: RADIOLOGY | Facility: HOSPITAL | Age: 78
DRG: 907 | End: 2018-09-27
Payer: MEDICARE

## 2018-09-27 LAB
ALBUMIN SERPL BCP-MCNC: 2.6 G/DL (ref 3.5–5)
ALP SERPL-CCNC: 141 U/L (ref 46–116)
ALT SERPL W P-5'-P-CCNC: 26 U/L (ref 12–78)
ANION GAP SERPL CALCULATED.3IONS-SCNC: 12 MMOL/L (ref 4–13)
AST SERPL W P-5'-P-CCNC: 33 U/L (ref 5–45)
BASOPHILS # BLD AUTO: 0.01 THOUSANDS/ΜL (ref 0–0.1)
BASOPHILS NFR BLD AUTO: 0 % (ref 0–1)
BILIRUB SERPL-MCNC: 1.1 MG/DL (ref 0.2–1)
BUN SERPL-MCNC: 17 MG/DL (ref 5–25)
CALCIUM SERPL-MCNC: 8.3 MG/DL (ref 8.3–10.1)
CHLORIDE SERPL-SCNC: 108 MMOL/L (ref 100–108)
CO2 SERPL-SCNC: 25 MMOL/L (ref 21–32)
CREAT SERPL-MCNC: 1.02 MG/DL (ref 0.6–1.3)
EOSINOPHIL # BLD AUTO: 0 THOUSAND/ΜL (ref 0–0.61)
EOSINOPHIL NFR BLD AUTO: 0 % (ref 0–6)
ERYTHROCYTE [DISTWIDTH] IN BLOOD BY AUTOMATED COUNT: 15 % (ref 11.6–15.1)
GFR SERPL CREATININE-BSD FRML MDRD: 52 ML/MIN/1.73SQ M
GLUCOSE SERPL-MCNC: 135 MG/DL (ref 65–140)
HCT VFR BLD AUTO: 31.4 % (ref 34.8–46.1)
HGB BLD-MCNC: 9.9 G/DL (ref 11.5–15.4)
IMM GRANULOCYTES # BLD AUTO: 0.04 THOUSAND/UL (ref 0–0.2)
IMM GRANULOCYTES NFR BLD AUTO: 0 % (ref 0–2)
LYMPHOCYTES # BLD AUTO: 0.47 THOUSANDS/ΜL (ref 0.6–4.47)
LYMPHOCYTES NFR BLD AUTO: 4 % (ref 14–44)
MCH RBC QN AUTO: 30.2 PG (ref 26.8–34.3)
MCHC RBC AUTO-ENTMCNC: 31.5 G/DL (ref 31.4–37.4)
MCV RBC AUTO: 96 FL (ref 82–98)
MONOCYTES # BLD AUTO: 0.79 THOUSAND/ΜL (ref 0.17–1.22)
MONOCYTES NFR BLD AUTO: 6 % (ref 4–12)
NEUTROPHILS # BLD AUTO: 12.05 THOUSANDS/ΜL (ref 1.85–7.62)
NEUTS SEG NFR BLD AUTO: 90 % (ref 43–75)
NRBC BLD AUTO-RTO: 0 /100 WBCS
PLATELET # BLD AUTO: 132 THOUSANDS/UL (ref 149–390)
PMV BLD AUTO: 9.9 FL (ref 8.9–12.7)
POTASSIUM SERPL-SCNC: 3.2 MMOL/L (ref 3.5–5.3)
PROT SERPL-MCNC: 6.2 G/DL (ref 6.4–8.2)
RBC # BLD AUTO: 3.28 MILLION/UL (ref 3.81–5.12)
SODIUM SERPL-SCNC: 145 MMOL/L (ref 136–145)
WBC # BLD AUTO: 13.36 THOUSAND/UL (ref 4.31–10.16)

## 2018-09-27 PROCEDURE — 97116 GAIT TRAINING THERAPY: CPT | Performed by: PHYSICAL THERAPIST

## 2018-09-27 PROCEDURE — G8979 MOBILITY GOAL STATUS: HCPCS | Performed by: PHYSICAL THERAPIST

## 2018-09-27 PROCEDURE — 94760 N-INVAS EAR/PLS OXIMETRY 1: CPT

## 2018-09-27 PROCEDURE — 99232 SBSQ HOSP IP/OBS MODERATE 35: CPT | Performed by: FAMILY MEDICINE

## 2018-09-27 PROCEDURE — 97530 THERAPEUTIC ACTIVITIES: CPT | Performed by: PHYSICAL THERAPIST

## 2018-09-27 PROCEDURE — 85025 COMPLETE CBC W/AUTO DIFF WBC: CPT | Performed by: SURGERY

## 2018-09-27 PROCEDURE — 71045 X-RAY EXAM CHEST 1 VIEW: CPT

## 2018-09-27 PROCEDURE — 94640 AIRWAY INHALATION TREATMENT: CPT

## 2018-09-27 PROCEDURE — 94762 N-INVAS EAR/PLS OXIMTRY CONT: CPT

## 2018-09-27 PROCEDURE — 94664 DEMO&/EVAL PT USE INHALER: CPT

## 2018-09-27 PROCEDURE — 97163 PT EVAL HIGH COMPLEX 45 MIN: CPT | Performed by: PHYSICAL THERAPIST

## 2018-09-27 PROCEDURE — 99024 POSTOP FOLLOW-UP VISIT: CPT | Performed by: SURGERY

## 2018-09-27 PROCEDURE — G8978 MOBILITY CURRENT STATUS: HCPCS | Performed by: PHYSICAL THERAPIST

## 2018-09-27 PROCEDURE — 80053 COMPREHEN METABOLIC PANEL: CPT | Performed by: FAMILY MEDICINE

## 2018-09-27 RX ORDER — SODIUM CHLORIDE 9 MG/ML
100 INJECTION, SOLUTION INTRAVENOUS CONTINUOUS
Status: DISCONTINUED | OUTPATIENT
Start: 2018-09-27 | End: 2018-09-28

## 2018-09-27 RX ORDER — FUROSEMIDE 10 MG/ML
40 INJECTION INTRAMUSCULAR; INTRAVENOUS ONCE
Status: COMPLETED | OUTPATIENT
Start: 2018-09-27 | End: 2018-09-27

## 2018-09-27 RX ORDER — METOPROLOL TARTRATE 50 MG/1
50 TABLET, FILM COATED ORAL EVERY 12 HOURS SCHEDULED
Status: DISCONTINUED | OUTPATIENT
Start: 2018-09-27 | End: 2018-10-01 | Stop reason: HOSPADM

## 2018-09-27 RX ORDER — POTASSIUM CHLORIDE 20MEQ/15ML
40 LIQUID (ML) ORAL ONCE
Status: COMPLETED | OUTPATIENT
Start: 2018-09-27 | End: 2018-09-27

## 2018-09-27 RX ORDER — ALBUTEROL SULFATE 2.5 MG/3ML
2.5 SOLUTION RESPIRATORY (INHALATION) EVERY 4 HOURS PRN
Status: DISCONTINUED | OUTPATIENT
Start: 2018-09-27 | End: 2018-10-01 | Stop reason: HOSPADM

## 2018-09-27 RX ADMIN — ALBUTEROL SULFATE 2.5 MG: 2.5 SOLUTION RESPIRATORY (INHALATION) at 19:28

## 2018-09-27 RX ADMIN — FAMOTIDINE 20 MG: 10 INJECTION INTRAVENOUS at 21:51

## 2018-09-27 RX ADMIN — PIPERACILLIN SODIUM,TAZOBACTAM SODIUM 3.38 G: 3; .375 INJECTION, POWDER, FOR SOLUTION INTRAVENOUS at 00:12

## 2018-09-27 RX ADMIN — ENOXAPARIN SODIUM 40 MG: 40 INJECTION, SOLUTION INTRAVENOUS; SUBCUTANEOUS at 18:37

## 2018-09-27 RX ADMIN — METOPROLOL TARTRATE 50 MG: 50 TABLET ORAL at 12:48

## 2018-09-27 RX ADMIN — PIPERACILLIN SODIUM,TAZOBACTAM SODIUM 3.38 G: 3; .375 INJECTION, POWDER, FOR SOLUTION INTRAVENOUS at 13:00

## 2018-09-27 RX ADMIN — METOPROLOL TARTRATE 50 MG: 50 TABLET ORAL at 21:51

## 2018-09-27 RX ADMIN — SODIUM CHLORIDE 100 ML/HR: 0.9 INJECTION, SOLUTION INTRAVENOUS at 11:45

## 2018-09-27 RX ADMIN — FUROSEMIDE 40 MG: 10 INJECTION, SOLUTION INTRAVENOUS at 20:04

## 2018-09-27 RX ADMIN — SODIUM CHLORIDE 100 ML/HR: 0.9 INJECTION, SOLUTION INTRAVENOUS at 21:54

## 2018-09-27 RX ADMIN — PIPERACILLIN SODIUM,TAZOBACTAM SODIUM 3.38 G: 3; .375 INJECTION, POWDER, FOR SOLUTION INTRAVENOUS at 19:39

## 2018-09-27 RX ADMIN — PIPERACILLIN SODIUM,TAZOBACTAM SODIUM 3.38 G: 3; .375 INJECTION, POWDER, FOR SOLUTION INTRAVENOUS at 05:30

## 2018-09-27 RX ADMIN — METOPROLOL TARTRATE 2.5 MG: 5 INJECTION, SOLUTION INTRAVENOUS at 05:27

## 2018-09-27 RX ADMIN — FAMOTIDINE 20 MG: 10 INJECTION INTRAVENOUS at 09:30

## 2018-09-27 RX ADMIN — POTASSIUM CHLORIDE 40 MEQ: 20 SOLUTION ORAL at 12:30

## 2018-09-27 RX ADMIN — METOPROLOL TARTRATE 2.5 MG: 5 INJECTION, SOLUTION INTRAVENOUS at 00:10

## 2018-09-27 RX ADMIN — SODIUM CHLORIDE, SODIUM LACTATE, POTASSIUM CHLORIDE, AND CALCIUM CHLORIDE 125 ML/HR: .6; .31; .03; .02 INJECTION, SOLUTION INTRAVENOUS at 05:37

## 2018-09-27 RX ADMIN — SODIUM CHLORIDE 500 ML: 0.9 INJECTION, SOLUTION INTRAVENOUS at 09:00

## 2018-09-27 RX ADMIN — ONDANSETRON 4 MG: 2 INJECTION INTRAMUSCULAR; INTRAVENOUS at 19:00

## 2018-09-27 NOTE — PHYSICAL THERAPY NOTE
PT Evaluation   09/27/18 1116   Note Type   Note type Eval/Treat   Pain Assessment   Pain Score 4   Pain Type Surgical pain   Pain Location Abdomen   Home Living   Type of [address] Multi-level;Bed/bath upstairs;Stairs to enter without rails  (1 JAMIA no HR, 14 steps to 2nd with HR, 15 to basement)   Bathroom Shower/Tub Tub/shower unit   Bathroom Toilet Standard   Bathroom Equipment Commode; Shower chair   Bathroom Accessibility Accessible   Home Equipment Walker;Cane;Wheelchair-manual   Prior Function   Level of [place] Independent with ADLs and functional mobility  (occassional use of SPC otherwise ambulates no A  D )   Lives With Spouse   Receives Help From Family   ADL Assistance Independent   IADLs Independent   Comments pt visually impaired therefore spouse drives   Restrictions/Precautions   Other Precautions Multiple lines;Telemetry; Fall Risk;Pain;O2  (arterial line, catheter, abdominal incision)   General   Family/Caregiver Present No   Cognition   Arousal/Participation Alert   Orientation Level Oriented to person;Oriented to place;Oriented to situation   Following Commands Follows one step commands without difficulty   RLE Assessment   RLE Assessment WFL  (hip flex at least 3+, knee and ankle 4/5)   LLE Assessment   LLE Assessment WFL  (hip flex at least 3+, knee and ankle 4/5)   Coordination   Sensation [facility]   Light Touch   RLE Light Touch Grossly intact   LLE Light Touch Grossly intact   Bed Mobility   Rolling L 4  Minimal assistance   Additional items Assist x 1;Bedrails;Verbal cues   Supine to Sit 4  Minimal assistance   Additional items Assist x 1;Verbal cues; Bedrails   Sit to Supine (seated in chair at bedside with call bell in reach)   Additional Comments Pt with multiple lines and equipment including A-line L wrist, O2 at 3L's with CO2 monitor, PCA multiple IV and catheter   Pt instructed in rolling technique for bed mobility to minimize stress on abdominal incision which she verbalized and demonstrated understanding  SpO2 at rest 95% HR 77 /76  Respiratory and nursing in room to assist with mobility and line management  pt requiring increase in O2 to 4 then to 6L's due to drop in spO2 during ambulation to 85-88% with mild SOB and increased wheezing  Transfers   Sit to Stand 4  Minimal assistance   Additional items Assist x 1;Verbal cues; Bedrails  (pt performed sit to stand x3 during evaluation)   Stand to Sit 4  Minimal assistance   Additional items Assist x 1;Verbal cues;Armrests   Stand pivot 4  Minimal assistance   Additional items Assist x 1;Verbal cues  (with RW, SPT x2 once no A D  once with RW)   Additional Comments Pt requiring assistance due to weakness decreased endurance and abdominal discomfort with multiple lines  Frequent rest breaks required due to fatigue and SOB with drop in SpO2  Limited ambulation tolerance due to drop in spO2 wheezing and fatigue requiring min(A) for safety and assistance from nursing and respiratory for line management  HR increased to 96 and BP post ambulation 153/72  Ambulation/Elevation   Gait pattern Narrow BALJINDER; Short stride; Excessively slow   Gait Assistance 4  Minimal assist   Additional items Assist x 1   Assistive Device Rolling walker   Distance 20ft with RW min(A)x1 with decreased endurance fatigue and abdominal discomfort 4/10   Balance   Static Sitting Good   Dynamic Sitting Good   Static Standing Fair  (with and without RW)   Dynamic Standing Fair  (with RW)   Ambulatory Fair  (with RW)   Endurance Deficit   Endurance Deficit Yes   Endurance Deficit Description limited ambulation and activity tolerance due to fatigue weakness and pain   Activity Tolerance   Activity Tolerance Patient limited by fatigue;Patient limited by pain   Assessment   Prognosis Good   Problem List Decreased strength;Decreased endurance; Impaired balance;Decreased mobility;Pain; Impaired vision   Assessment Pt is a 77year old female s/p exploratory laparotomy with hemicolectomy due to perforated colon  Pt presents with complex medical history and complex medical condition with multiple lines including arterial line in L wrist  Pt presents with pain decreased strength balance endurance mobility transfers ambulation and ability to negotiate stairs  Pt requiring 3L's O2 at rest and 4-6L's to ambulate to maintain SpO2 greater than 90% with increased wheezing  Pt with limited activity tolerance and requiring assistance and verbal cues for all mobility for safety and direction  Pt is in need of continued activity in PT to improve pain strength balance safety endurance mobility transfers ambulation and stair negotiation with return to (I) LOF and to ensure safe return home  Goals   Patient Goals To feel better and return home   LTG Expiration Date 10/11/18   Long Term Goal #1 Improve strength bilateral LE's by 1/2 muscle grade to improve bed mobility and transfers to (I) no A D  Long Term Goal #2 Decrease abdominal pain to 2/10 with mobility and improve balance to fair+ standing and ambulatory to improve ambulation to 300ft with least restrictive A D  (S) and to improve stair negotiation to 11 steps with HR (S) no drop in SpO2 below 90% needed to return home  Plan   Treatment/Interventions Functional transfer training;LE strengthening/ROM; Elevations; Therapeutic exercise; Endurance training;Patient/family training;Bed mobility;Gait training   PT Frequency 5x/wk   Recommendation   Recommendation Home PT; Short-term skilled PT  (based on progress in PT and clinical progress)   PT - OK to Discharge No  (when medically stable for discharge)   Pt with SCD's on when PT entered room  SCD's reapplied and turned on with pt seated in chair at bedside with call bell in reach and chair alarm on

## 2018-09-27 NOTE — PROGRESS NOTES
I was notified by the nursing staff that the patient was experiencing increasing shortness of breath and was noted to have bibasilar crackles with lung auscultation  I will give the patient Lasix 40 mg IV x1 dose now, and check a STAT portable chest x-ray

## 2018-09-27 NOTE — PROGRESS NOTES
Progress Note - General Surgery   Nani Leon 77 y o  female MRN: [id number]  Unit/Bed#:  Encounter: [id number]    Assessment:  Postop day 1  Status post exploratory laparotomy, right hemicolectomy  History of perforated viscus  Acute blood loss anemia, H&H 9 9 and 31 4  Leukocytosis 13 36  Hypokalemia, potassium 3 2    Plan:  The patient still does not have bowel sounds  Maintain nothing by mouth status, will advance ice chips  IV fluids for hydration  Continue PCA pump for analgesics this morning  Consider DC later and transition to IV pain medications  Ambulate in the hallway and out of bed to chair once PCA pump has been discontinued  Continue NG tube to intermittent low wall suction  IV Zosyn 3 375 g q 6 hours  DVT prophylaxis, ongoing  Incentive spirometry 10 repetitions hourly while awake  Repeat a m  labs including CBC, no indication for transfusion protocol at this time  Hospitalist provider to adjust IV fluids and potassium supplementation, recommend switching IV fluids from lactated Ringer's to normal saline with potassium  Personally discussed with Dr Stanton  Subjective/Objective      Chief Complaint:  Patient is seen in CCU  She is awake at present  Her pain has been controlled with PCA pump  Subjective:  69-year-old white female was taken to the operating room for emergency surgery yesterday afternoon  She was found to have a perforated viscus after undergoing a colonoscopy  Patient underwent exploratory laparotomy with right hemicolectomy by Dr Jo Go  Estimated blood loss was perhaps 100 mL  There were no adverse overnight events noted by nursing  Her pain right now is rated as a 1 on a 10 point pain scale on the PCA pump  She denies any nausea or vomiting  She is currently being maintain NPO  She has no bowel sounds  She is not passing any gas  Discussion with the patient's RN finds that she has been having low O2 sats ranging between 91-95%    She remains on nasal O2  Also her urine output has been darker in color  She put out 400 mL of urine since 11:00 p m  last night  NG tube is currently attached to intermittent low wall suction  The patient remains on IV Zosyn at this time  Scheduled Meds:  Current Facility-Administered Medications:  diphenhydrAMINE 25 mg Intravenous Q6H PRN [place] Buffy, DO    enoxaparin 40 mg Subcutaneous Daily [place] Buffy, DO    famotidine 20 mg Intravenous Q12H [address] Krystal Villa MD    HYDROmorphone  Intravenous Continuous [place] Buffy, DO    metoprolol 2 5 mg Intravenous Q6H Tila Stanton MD    naloxone 0 04 mg Intravenous Q1MIN PRN [place] Buffy, DO    ondansetron 4 mg Intravenous Q6H PRN [place] Buffy, DO    piperacillin-tazobactam 3 375 g Intravenous Q6H Tila Stanton MD Last Rate: Stopped (09/27/18 0600)   sodium chloride 500 mL Intravenous Once Krystal Villa MD    sodium chloride 100 mL/hr Intravenous Continuous Tila Stanton MD      Continuous Infusions:  HYDROmorphone    sodium chloride 100 mL/hr     PRN Meds:   diphenhydrAMINE    naloxone    ondansetron    Objective:     Blood pressure 125/59, pulse 73, temperature (!) 97 2 °F (36 2 °C), temperature source Temporal, resp  rate (!) 24, height 5' 5" (1 651 m), weight 83 2 kg (183 lb 6 8 oz), SpO2 95 %  ,Body mass index is 30 52 kg/m²  Intake/Output Summary (Last 24 hours) at 09/27/18 0953  Last data filed at 09/27/18 0800   Gross per 24 hour   Intake          5921 25 ml   Output             1255 ml   Net          4666 25 ml       Invasive Devices     Peripheral Intravenous Line            Peripheral IV 09/26/18 Left Hand 1 day    Peripheral IV 09/26/18 Right Arm 1 day          Arterial Line            Arterial Line 09/26/18 Left Radial less than 1 day          Drain            Urethral Catheter 16 Fr  1 day                Physical Exam:  Patient is awake but appears groggy  Skin slightly pale  No rashes or jaundice  Head normocephalic    PERRLA, EOM I   Sclerae anicteric  Hearing acuity is normal conversation  She is fully oriented  Neck supple  Oral mucosa is dry  NG tube noted draining about 150 mL of bilious colored emesis  Chest symmetric  Heart regular rate and rhythm  Lungs essentially clear to auscultation  Back no CVA tenderness  Abdomen examination shows absence of bowel sounds  Abdomen is moderately distended  Midline abdominal scar  No tympany to percussion  Mepilex dressing in place was lifted for inspection of the midline incision  Staples are all clean and dry  No wound disruption, bleeding or evidence of any drainage  The abdomen is soft  There is very minimal tenderness to touch around the midline incision  No calf tenderness or peripheral edema  Lab, Imaging and other studies:  I have personally reviewed pertinent lab results  , CBC:   Lab Results   Component Value Date    WBC 13 36 (H) 09/27/2018    HGB 9 9 (L) 09/27/2018    HCT 31 4 (L) 09/27/2018    MCV 96 09/27/2018     (L) 09/27/2018    MCH 30 2 09/27/2018    MCHC 31 5 09/27/2018    RDW 15 0 09/27/2018    MPV 9 9 09/27/2018    NRBC 0 09/27/2018   , CMP:   Lab Results   Component Value Date     09/27/2018    K 3 2 (L) 09/27/2018     09/27/2018    CO2 25 09/27/2018    BUN 17 09/27/2018    CREATININE 1 02 09/27/2018    GLUCOSE 235 (H) 09/26/2018    CALCIUM 8 3 09/27/2018    AST 33 09/27/2018    ALT 26 09/27/2018    ALKPHOS 141 (H) 09/27/2018    EGFR 52 09/27/2018   , Coagulation:   Lab Results   Component Value Date    INR 1 31 (H) 09/26/2018     VTE Pharmacologic Prophylaxis: Enoxaparin (Lovenox)  VTE Mechanical Prophylaxis: sequential compression device    Time spent in critical care was 50 min including examination patient, review of diagnostic studies laboratory values, personal discussion with the consulting general surgeon via telephone about surgical management the patient at this time was also conducted      Tabatha Borges PA-C

## 2018-09-27 NOTE — PLAN OF CARE
Problem: PHYSICAL THERAPY ADULT  Goal: Performs mobility at highest level of function for planned discharge setting  See evaluation for individualized goals  Outcome: Progressing  Prognosis: Good  Problem List: Decreased strength, Decreased endurance, Impaired balance, Decreased mobility, Pain, Impaired vision  Assessment: Pt is a 77year old female s/p exploratory laparotomy with hemicolectomy due to perforated colon  Pt presents with complex medical history and complex medical condition with multiple lines including arterial line in L wrist  Pt presents with pain decreased strength balance endurance mobility transfers ambulation and ability to negotiate stairs  Pt requiring 3L's O2 at rest and 4-6L's to ambulate to maintain SpO2 greater than 90% with increased wheezing  Pt with limited activity tolerance and requiring assistance and verbal cues for all mobility for safety and direction  Pt is in need of continued activity in PT to improve pain strength balance safety endurance mobility transfers ambulation and stair negotiation with return to (I) LOF and to ensure safe return home  Recommendation: Home PT, Short-term skilled PT (based on progress in PT and clinical progress)     PT - OK to Discharge: No (when medically stable for discharge)    See flowsheet documentation for full assessment

## 2018-09-27 NOTE — PLAN OF CARE
Problem: DISCHARGE PLANNING - CARE MANAGEMENT  Goal: Discharge to post-acute care or home with appropriate resources  INTERVENTIONS:  - Conduct assessment to determine patient/family and health care team treatment goals, and need for post-acute services based on payer coverage, community resources, and patient preferences, and barriers to discharge  - Address psychosocial, clinical, and financial barriers to discharge as identified in assessment in conjunction with the patient/family and health care team  - Arrange appropriate level of post-acute services according to patient's   needs and preference and payer coverage in collaboration with the physician and health care team  - Communicate with and update the patient/family, physician, and health care team regarding progress on the discharge plan  - Arrange appropriate transportation to post-acute venues  Pt might need STR or homecare on discharge  Pt's  will give the pt a ride home     Outcome: Progressing

## 2018-09-27 NOTE — CASE MANAGEMENT
Initial Clinical Review    9/26 -    77y o  year old female presented for elective colonoscopy    Findings:   Severe sigmoid diverticulosis  The prior placed tattoo site was seen and there was no residual polyp seen  There was one 4 mm sessile polyp in the ascending colon  This was removed with cold snare polypectomy  There was one 4 mm sessile polyp in the descending colon  This was removed with cold snare polypectomy  In the sigmoid colon in the sea of multiple diverticuli there appeared to be a mucosal injury and so three clips were placed with success    Tubulovillous adenoma of colon     After procedure, patient's abdomen distended, stat portable chest x ray showed free air under the diaphragm  Pt intubated urgently in the procedure room and general surgeon called    Pt immediately returned to OR for  URGENT EXPLORATORY  LAP  (with Rt hemicolectomy)  Secondary to  Right colonic perforation  ADMIT INPT on  9/26  @  1556  Orders Placed This Encounter   Procedures    Inpatient Admission     Standing Status:   Standing     Number of Occurrences:   1     Order Specific Question:   Admitting Physician     Answer:   Jenna Randhawa     Order Specific Question:   Level of Care     Answer:   Critical Care [15]     Order Specific Question:   Estimated length of stay     Answer:   More than 2 Midnights     Order Specific Question:   Certification     Answer:   I certify that inpatient services are medically necessary for this patient for a duration of greater than two midnights  See H&P and MD Progress Notes for additional information about the patient's course of treatment         09/27/18 83 2 kg (183 lb 6 8 oz)     09/26/18 1725  --  76   25  138/80  --  --  --  95 %  Nasal cannula  --  Lying   09/26/18 1700  --  78  17  --  --  149/76  105 mmHg  95 %  --  --  --   09/26/18 1655  --  77  20  127/80  --  --  --  98 %  Nasal cannula  --  --   09/26/18 1600   96 4 °F (35 8 °C)  77   31  143/80  104 148/77  105 mmHg  94 %  --  --  --     Abnormal Labs/Diagnostic Test Results:   @ 1014  ABG  7 252/  54 3/  125 0/    23 9/   -4    K  2 8   3 2  Total bili  1 2   1 10  Mg  1 5  Wbc  10 98    13 36  hgb  10 8    9 9  hct  34 2   31 4  PT  15 7  INR  1 31      Past Medical/Surgical History:    Active Ambulatory Problems     Diagnosis Date Noted    Diverticulosis 01/02/2017    Rectal bleeding 01/02/2017    Hypoalbuminemia 01/02/2017    CAD (coronary artery disease) 01/02/2017    Essential hypertension 01/02/2017    Hyperlipidemia 01/02/2017    Elevated MCV 01/02/2017    Atrial flutter ([facility] [address] ) 01/02/2017    Right-sided thoracic back pain 01/02/2017    History of shingles 01/02/2017    Incomplete right bundle branch block 01/02/2017    Hx of CABG 01/02/2017    Thoracic radiculopathy 01/02/2017    Pyuria 01/02/2017    Microscopic hematuria 01/02/2017    Chronic diastolic (congestive) heart failure ([facility] [address] ) 02/08/2018    BOB (dyspnea on exertion) 02/08/2018    Chronic diastolic heart failure ([facility] [address] ) 05/24/2018    Diverticulosis of large intestine with hemorrhage 06/16/2018    Vomiting 06/17/2018    Colonic mass 06/19/2018     Resolved Ambulatory Problems     Diagnosis Date Noted    Left upper lobe pneumonia ([facility] [address] ) 09/18/2016    Vasovagal syncope 06/16/2018     Past Medical History:   Diagnosis Date    Cardiac disease     GERD (gastroesophageal reflux disease)     Hyperlipidemia     Hypertension        Admitting Diagnosis: Tubulovillous adenoma of colon [D12 6]    Assessment/Plan:  79-year-old female with history of multiple medical problems including diverticular disease, underwent colonoscopy with subsequent perforated colon and required emergent laparotomy with right hemicolectomy    * Perforated viscus   Assessment & Plan     NPO  NG Tube  Zosyn  Pepcid IV q12  Surgical consult  Pain control  IVF   Justice Catheter     Admission Orders:  Admit step down/telemetry  NPO/ NGTube to LIS  IV PCA dilaudid  I/O q shift  IVF   Sequential compression device to b/l LE    9/27/2018  97 2   73     24    125/59     Sat 92 - 95%  on 3L NC   Tele =  SR     SURGERY NOTE  Acute blood loss anemia, H&H 9 9 and 31 4  Leukocytosis 13 36  Hypokalemia, potassium 3 2  Still no bowel sounds   Plan:  Maintain nothing by mouth status, will advance ice chips  IV fluids for hydration  Continue PCA pump for analgesics this morning  Consider DC later and transition to IV pain medications  Ambulate in the hallway and out of bed to chair once PCA pump has been discontinued    Continue NG tube to intermittent low wall suction  IV Zosyn 3 375 g q 6 hours  Repeat a m  labs including CBC, no indication for transfusion protocol at this time  recommend switching IV fluids from lactated Ringer's to normal saline with potassium    Scheduled Meds:   Current Facility-Administered Medications:  diphenhydrAMINE 25 mg Intravenous Q6H PRN Kyle Casas, DO    enoxaparin 40 mg Subcutaneous Daily Kyle Casas DO    famotidine 20 mg Intravenous Q12H [address] Soto Zarco MD    HYDROmorphone  Intravenous Continuous Kyle Casas DO    metoprolol tartrate 50 mg Oral Q12H [address] Tila Stanton MD    naloxone 0 04 mg Intravenous Q1MIN PRN Kyle Casas, DO    ondansetron 4 mg Intravenous Q6H PRN Kyle Casas DO    piperacillin-tazobactam 3 375 g Intravenous Q6H Soto Zarco MD Last Rate: Stopped (09/27/18 0600)   potassium chloride 40 mEq Oral Once Soto Zarco MD    sodium chloride 500 mL Intravenous Once Soto Zarco MD Last Rate: 500 mL (09/27/18 0900)   sodium chloride 100 mL/hr Intravenous Continuous Soto Zarco MD

## 2018-09-27 NOTE — SOCIAL WORK
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

## 2018-09-27 NOTE — PROGRESS NOTES
Progress Note - Grecia Men 1940, 77 y o  female MRN: [id number]    Unit/Bed#:  Encounter: [id number]    Primary Care Provider: Elidia Escoto MD   Date and time admitted to hospital: 9/26/2018  7:05 AM        * Perforated viscus   Assessment & Plan    Status post right hemicolectomy  Management per primary surgical  NG tube has been discontinued on 09/27/2018  Clear liquid diet 09/27/2018  Continue PCA pump for an additional 24 hr     Chronic diastolic (congestive) heart failure ([address] )   Assessment & Plan    Continue to hold diuretics  Lactated Ringer's has been discontinued and normal saline initiated  500cc normal saline bolus due to decreased urine output over the last 6 hr     Atrial flutter ([address] )   Assessment & Plan    Transitioned to p o  Metoprolol  DC IV metoprolol  Will discuss with surgery when it is safe to resume Coumadin     Essential hypertension   Assessment & Plan    Discontinue IV Lopressor and start patient's home dose of metoprolol 50 mg p o  B i d   Continue to hold diuretics     Essential hypererythropoietinemiaresolved as of 9/26/2018   Assessment & Plan    Hold oral antihypertensives  Tele monitoring           Progress Note - Grecia Boss 77 y o  female MRN: [id number]    Unit/Bed#:  Encounter: [id number]        Subjective:   Seen and examined at bedside  Surgical team has evaluated the patient, discontinued and she 2, advance to clear liquid diet  Pain is well controlled    Objective:     Vitals:   Vitals:    09/27/18 0724   BP: 125/59   Pulse: 73   Resp: (!) 24   Temp: (!) 97 2 °F (36 2 °C)   SpO2: 95%     Body mass index is 30 52 kg/m²      Intake/Output Summary (Last 24 hours) at 09/27/18 1035  Last data filed at 09/27/18 0915   Gross per 24 hour   Intake          5646 25 ml   Output             1255 ml   Net          4391 25 ml       Physical Exam:   /59 (BP Location: Right arm)   Pulse 73   Temp (!) 97 2 °F (36 2 °C) (Temporal) Resp (!) 24   Ht 5' 5" (1 651 m)   Wt 83 2 kg (183 lb 6 8 oz)   SpO2 95%   BMI 30 52 kg/m²   General appearance: alert and oriented, in no acute distress  Lungs: clear to auscultation bilaterally  Heart: regular rate and rhythm, S1, S2 normal, no murmur, click, rub or gallop  Abdomen:  Soft, nontender, nondistended, minimal bowel sounds  Skin:  Incision site is clean dry and intact  Extremities: extremities normal, warm and well-perfused; no cyanosis, clubbing, or edema  Pulses: 2+ and symmetric  Neurologic: Grossly normal     Invasive Devices     Peripheral Intravenous Line            Peripheral IV 09/26/18 Left Hand 1 day    Peripheral IV 09/26/18 Right Arm 1 day          Arterial Line            Arterial Line 09/26/18 Left Radial 1 day          Drain            Urethral Catheter 16 Fr  1 day                  Results from last 7 days  Lab Units 09/27/18  0438 09/26/18  1357 09/26/18  1014 09/21/18  1126   WBC Thousand/uL 13 36* 10 98*  --  6 68   HEMOGLOBIN g/dL 9 9* 10 8*  --  12 2   I STAT HEMOGLOBIN g/dl  --   --  12 9  --    HEMATOCRIT % 31 4* 34 2* 38 38 5   PLATELETS Thousands/uL 132* 163  --  157         Results from last 7 days  Lab Units 09/27/18  0438 09/26/18  1358 09/26/18  1014   SODIUM mmol/L 145 144  --    POTASSIUM mmol/L 3 2* 2 8*  --    CHLORIDE mmol/L 108 108  --    CO2 mmol/L 25 26  --    BUN mg/dL 17 14  --    CREATININE mg/dL 1 02 0 90  --    CALCIUM mg/dL 8 3 7 6*  --    ALK PHOS U/L 141* 154*  --    ALT U/L 26 28  --    AST U/L 33 31  --    GLUCOSE, ISTAT mg/dl  --   --  235*       Medication Administration - last 24 hours from 09/26/2018 1035 to 09/27/2018 1035       Date/Time Order Dose Route Action Action by     09/26/2018 1422 lactated ringers infusion 0 mL/hr Intravenous Stopped Jimy Adorno RN     09/26/2018 1248 lactated ringers infusion 125 mL/hr Intravenous New 128Mallory Valdes, [address] [place] [address]     09/26/2018 2053 enoxaparin (LOVENOX) subcutaneous injection 40 mg 40 mg Subcutaneous Given Rehan Gooden, RN     09/27/2018 6498 lactated ringers infusion 125 mL/hr Intravenous New [address] Rehan Gooden, [facility]     09/26/2018 2151 lactated ringers infusion 125 mL/hr Intravenous [address] [facility] Rehan Gooden, [facility]     09/26/2018 1422 lactated ringers infusion 125 mL/hr Intravenous Rate/Dose Change Erik Child, RN     09/26/2018 1655 HYDROmorphone (DILAUDID) 1 mg/mL 50 mL PCA   Intravenous New [address] Brenda Cooperjosh, RN     09/26/2018 1500 HYDROmorphone (DILAUDID) 1 mg/mL 50 mL PCA   Intravenous Not Given Brenda Thacker, RN     09/26/2018 1422 fentaNYL (SUBLIMAZE) injection 25 mcg 25 mcg Intravenous Given Erik Child, RN     09/26/2018 2054 famotidine (PEPCID) injection 20 mg 20 mg Intravenous Given Rehan Rodrigueze, RN     09/27/2018 0030 potassium chloride 20 mEq IVPB (premix) 0 mEq Intravenous Stopped Rehan Gooden, RN     09/26/2018 2150 potassium chloride 20 mEq IVPB (premix) 20 mEq Intravenous [address] Rehan Gooden, RN     09/26/2018 1946 potassium chloride 20 mEq IVPB (premix) 20 mEq Intravenous [address] Rehan Gooden, [facility]     09/26/2018 2245 magnesium sulfate 2 g/50 mL IVPB (premix) 2 g 0 g Intravenous Stopped Rehan Gooden, RN     09/26/2018 2045 magnesium sulfate 2 g/50 mL IVPB (premix) 2 g 2 g Intravenous [address] Rehan Gooden, RN     09/27/2018 0600 piperacillin-tazobactam (ZOSYN) 3 375 g in sodium chloride 0 9 % 50 mL IVPB 0 g Intravenous Stopped Rehan Gooden, RN     09/27/2018 0530 piperacillin-tazobactam (ZOSYN) 3 375 g in sodium chloride 0 9 % 50 mL IVPB 3 375 g Intravenous [address] Rehan Rodrigueze, RN     09/27/2018 0110 piperacillin-tazobactam (ZOSYN) 3 375 g in sodium chloride 0 9 % 50 mL IVPB 0 g Intravenous Stopped Rehan Gooden RN     09/27/2018 0012 piperacillin-tazobactam (ZOSYN) 3 375 g in sodium chloride 0 9 % 50 mL IVPB 3 375 g Intravenous [address] Rehan Gooden RN     09/26/2018 1845 piperacillin-tazobactam (ZOSYN) 3 375 g in sodium chloride 0 9 % 50 mL IVPB 3 375 g Intravenous [address] Evi Branch, [facility]     09/27/2018 5838 metoprolol (LOPRESSOR) injection 2 5 mg 2 5 mg Intravenous Given Herman Bernard RN     09/27/2018 0010 metoprolol (LOPRESSOR) injection 2 5 mg 2 5 mg Intravenous Given Herman Bernard RN     09/26/2018 1913 metoprolol (LOPRESSOR) injection 2 5 mg 2 5 mg Intravenous Given Evi Branch RN     09/27/2018 0900 sodium chloride 0 9 % bolus 500 mL 500 mL Intravenous New Bag Evi Branch RN            Lab, Imaging and other studies: I have personally reviewed pertinent reports      VTE Pharmacologic Prophylaxis: Enoxaparin (Lovenox)  VTE Mechanical Prophylaxis: sequential compression device     Tramaine Hanley MD  9/27/2018,10:35 AM

## 2018-09-27 NOTE — TREATMENT PLAN
Patient was reexamined in CCU with Dr Sourav Mary, hospitalist physician  She now has very faint bowel sounds heard  She is not passing any gas  Patient's O2 sats are 95% on oxygen  Currently she rates her abdominal pain as a 1 on a 10 point pain scale with PCA pump  Dr Sourav Mary will transition the patient to step-down unit and she will remain in CCU for another day  Keep PCA pump today, out of bed to chair  Ice chips, maintain nothing by mouth  IV fluids for hydration with potassium supplementation    Dr Stacey Schmitt will examine the patient later today      Hallie Roberson PA-C

## 2018-09-27 NOTE — NURSING NOTE
Patient adm to room 206 from PACU  Monitor applied  Lower midline abd dressing dry and intact  NG tube intact connected to lower intermittent suction  F/C patent   Patient instructed on use of call bell, fall prevention, hourly rounding and pain managenent

## 2018-09-28 PROBLEM — R06.02 SHORTNESS OF BREATH: Status: ACTIVE | Noted: 2018-02-08

## 2018-09-28 LAB
ALBUMIN SERPL BCP-MCNC: 2.4 G/DL (ref 3.5–5)
ALP SERPL-CCNC: 130 U/L (ref 46–116)
ALT SERPL W P-5'-P-CCNC: 24 U/L (ref 12–78)
ANION GAP SERPL CALCULATED.3IONS-SCNC: 8 MMOL/L (ref 4–13)
AST SERPL W P-5'-P-CCNC: 30 U/L (ref 5–45)
BASOPHILS # BLD AUTO: 0.04 THOUSANDS/ΜL (ref 0–0.1)
BASOPHILS NFR BLD AUTO: 0 % (ref 0–1)
BILIRUB SERPL-MCNC: 1.1 MG/DL (ref 0.2–1)
BUN SERPL-MCNC: 19 MG/DL (ref 5–25)
CALCIUM SERPL-MCNC: 8.3 MG/DL (ref 8.3–10.1)
CHLORIDE SERPL-SCNC: 107 MMOL/L (ref 100–108)
CO2 SERPL-SCNC: 25 MMOL/L (ref 21–32)
CREAT SERPL-MCNC: 1.06 MG/DL (ref 0.6–1.3)
EOSINOPHIL # BLD AUTO: 0.01 THOUSAND/ΜL (ref 0–0.61)
EOSINOPHIL NFR BLD AUTO: 0 % (ref 0–6)
ERYTHROCYTE [DISTWIDTH] IN BLOOD BY AUTOMATED COUNT: 15.3 % (ref 11.6–15.1)
GFR SERPL CREATININE-BSD FRML MDRD: 50 ML/MIN/1.73SQ M
GLUCOSE SERPL-MCNC: 126 MG/DL (ref 65–140)
HCT VFR BLD AUTO: 29.6 % (ref 34.8–46.1)
HGB BLD-MCNC: 9.2 G/DL (ref 11.5–15.4)
IMM GRANULOCYTES # BLD AUTO: 0.05 THOUSAND/UL (ref 0–0.2)
IMM GRANULOCYTES NFR BLD AUTO: 1 % (ref 0–2)
LYMPHOCYTES # BLD AUTO: 0.94 THOUSANDS/ΜL (ref 0.6–4.47)
LYMPHOCYTES NFR BLD AUTO: 9 % (ref 14–44)
MAGNESIUM SERPL-MCNC: 2 MG/DL (ref 1.6–2.6)
MCH RBC QN AUTO: 29.8 PG (ref 26.8–34.3)
MCHC RBC AUTO-ENTMCNC: 31.1 G/DL (ref 31.4–37.4)
MCV RBC AUTO: 96 FL (ref 82–98)
MONOCYTES # BLD AUTO: 0.59 THOUSAND/ΜL (ref 0.17–1.22)
MONOCYTES NFR BLD AUTO: 5 % (ref 4–12)
NEUTROPHILS # BLD AUTO: 9.39 THOUSANDS/ΜL (ref 1.85–7.62)
NEUTS SEG NFR BLD AUTO: 85 % (ref 43–75)
NRBC BLD AUTO-RTO: 0 /100 WBCS
PHOSPHATE SERPL-MCNC: 2 MG/DL (ref 2.3–4.1)
PLATELET # BLD AUTO: 136 THOUSANDS/UL (ref 149–390)
PMV BLD AUTO: 10.3 FL (ref 8.9–12.7)
POTASSIUM SERPL-SCNC: 3.3 MMOL/L (ref 3.5–5.3)
PROT SERPL-MCNC: 6 G/DL (ref 6.4–8.2)
RBC # BLD AUTO: 3.09 MILLION/UL (ref 3.81–5.12)
SODIUM SERPL-SCNC: 140 MMOL/L (ref 136–145)
WBC # BLD AUTO: 11.02 THOUSAND/UL (ref 4.31–10.16)

## 2018-09-28 PROCEDURE — 94762 N-INVAS EAR/PLS OXIMTRY CONT: CPT

## 2018-09-28 PROCEDURE — 97167 OT EVAL HIGH COMPLEX 60 MIN: CPT

## 2018-09-28 PROCEDURE — G8988 SELF CARE GOAL STATUS: HCPCS

## 2018-09-28 PROCEDURE — 85025 COMPLETE CBC W/AUTO DIFF WBC: CPT | Performed by: SURGERY

## 2018-09-28 PROCEDURE — 97535 SELF CARE MNGMENT TRAINING: CPT

## 2018-09-28 PROCEDURE — 97530 THERAPEUTIC ACTIVITIES: CPT

## 2018-09-28 PROCEDURE — 94640 AIRWAY INHALATION TREATMENT: CPT

## 2018-09-28 PROCEDURE — 99232 SBSQ HOSP IP/OBS MODERATE 35: CPT | Performed by: INTERNAL MEDICINE

## 2018-09-28 PROCEDURE — 83735 ASSAY OF MAGNESIUM: CPT | Performed by: FAMILY MEDICINE

## 2018-09-28 PROCEDURE — 80053 COMPREHEN METABOLIC PANEL: CPT | Performed by: FAMILY MEDICINE

## 2018-09-28 PROCEDURE — 94664 DEMO&/EVAL PT USE INHALER: CPT

## 2018-09-28 PROCEDURE — G8987 SELF CARE CURRENT STATUS: HCPCS

## 2018-09-28 PROCEDURE — 84100 ASSAY OF PHOSPHORUS: CPT | Performed by: INTERNAL MEDICINE

## 2018-09-28 PROCEDURE — 97116 GAIT TRAINING THERAPY: CPT

## 2018-09-28 PROCEDURE — 99024 POSTOP FOLLOW-UP VISIT: CPT | Performed by: SURGERY

## 2018-09-28 PROCEDURE — 94760 N-INVAS EAR/PLS OXIMETRY 1: CPT

## 2018-09-28 RX ORDER — LEVALBUTEROL 1.25 MG/.5ML
1.25 SOLUTION, CONCENTRATE RESPIRATORY (INHALATION)
Status: DISCONTINUED | OUTPATIENT
Start: 2018-09-29 | End: 2018-09-30

## 2018-09-28 RX ORDER — FUROSEMIDE 20 MG/1
20 TABLET ORAL
Status: DISCONTINUED | OUTPATIENT
Start: 2018-09-28 | End: 2018-09-28

## 2018-09-28 RX ORDER — ACETAMINOPHEN 325 MG/1
325 TABLET ORAL ONCE
Status: COMPLETED | OUTPATIENT
Start: 2018-09-28 | End: 2018-09-28

## 2018-09-28 RX ORDER — SACCHAROMYCES BOULARDII 250 MG
250 CAPSULE ORAL 2 TIMES DAILY
Status: DISCONTINUED | OUTPATIENT
Start: 2018-09-28 | End: 2018-10-01 | Stop reason: HOSPADM

## 2018-09-28 RX ORDER — OXYCODONE HYDROCHLORIDE AND ACETAMINOPHEN 5; 325 MG/1; MG/1
1 TABLET ORAL EVERY 6 HOURS PRN
Status: DISCONTINUED | OUTPATIENT
Start: 2018-09-28 | End: 2018-10-01 | Stop reason: HOSPADM

## 2018-09-28 RX ORDER — FUROSEMIDE 10 MG/ML
20 INJECTION INTRAMUSCULAR; INTRAVENOUS
Status: DISCONTINUED | OUTPATIENT
Start: 2018-09-28 | End: 2018-10-01 | Stop reason: HOSPADM

## 2018-09-28 RX ADMIN — Medication 250 MG: at 09:19

## 2018-09-28 RX ADMIN — FUROSEMIDE 20 MG: 10 INJECTION, SOLUTION INTRAVENOUS at 08:12

## 2018-09-28 RX ADMIN — FAMOTIDINE 20 MG: 10 INJECTION INTRAVENOUS at 21:09

## 2018-09-28 RX ADMIN — ALBUTEROL SULFATE 2.5 MG: 2.5 SOLUTION RESPIRATORY (INHALATION) at 08:14

## 2018-09-28 RX ADMIN — ACETAMINOPHEN 325 MG: 325 TABLET, FILM COATED ORAL at 22:08

## 2018-09-28 RX ADMIN — ENOXAPARIN SODIUM 40 MG: 40 INJECTION, SOLUTION INTRAVENOUS; SUBCUTANEOUS at 17:48

## 2018-09-28 RX ADMIN — PIPERACILLIN SODIUM,TAZOBACTAM SODIUM 3.38 G: 3; .375 INJECTION, POWDER, FOR SOLUTION INTRAVENOUS at 12:42

## 2018-09-28 RX ADMIN — VANCOMYCIN 125 MG: KIT at 21:59

## 2018-09-28 RX ADMIN — FUROSEMIDE 20 MG: 10 INJECTION, SOLUTION INTRAVENOUS at 17:45

## 2018-09-28 RX ADMIN — FAMOTIDINE 20 MG: 10 INJECTION INTRAVENOUS at 09:16

## 2018-09-28 RX ADMIN — ONDANSETRON 4 MG: 2 INJECTION INTRAMUSCULAR; INTRAVENOUS at 15:48

## 2018-09-28 RX ADMIN — PIPERACILLIN SODIUM,TAZOBACTAM SODIUM 3.38 G: 3; .375 INJECTION, POWDER, FOR SOLUTION INTRAVENOUS at 05:35

## 2018-09-28 RX ADMIN — METOPROLOL TARTRATE 50 MG: 50 TABLET ORAL at 09:16

## 2018-09-28 RX ADMIN — PIPERACILLIN SODIUM,TAZOBACTAM SODIUM 3.38 G: 3; .375 INJECTION, POWDER, FOR SOLUTION INTRAVENOUS at 00:58

## 2018-09-28 RX ADMIN — METOPROLOL TARTRATE 50 MG: 50 TABLET ORAL at 21:10

## 2018-09-28 RX ADMIN — PIPERACILLIN SODIUM,TAZOBACTAM SODIUM 3.38 G: 3; .375 INJECTION, POWDER, FOR SOLUTION INTRAVENOUS at 17:59

## 2018-09-28 RX ADMIN — ALBUTEROL SULFATE 2.5 MG: 2.5 SOLUTION RESPIRATORY (INHALATION) at 22:23

## 2018-09-28 RX ADMIN — POTASSIUM PHOSPHATE, MONOBASIC AND POTASSIUM PHOSPHATE, DIBASIC 21 MMOL: 224; 236 INJECTION, SOLUTION INTRAVENOUS at 09:16

## 2018-09-28 RX ADMIN — Medication 250 MG: at 17:48

## 2018-09-28 RX ADMIN — VANCOMYCIN 125 MG: KIT at 09:16

## 2018-09-28 RX ADMIN — ALBUTEROL SULFATE 2.5 MG: 2.5 SOLUTION RESPIRATORY (INHALATION) at 18:19

## 2018-09-28 NOTE — ASSESSMENT & PLAN NOTE
She received 40mg IV lasix last evening  Start 20mg IV lasix bid (home dose is 20mg PO BID)   Monitor I&O   Continue webster for 24 hours for I&O accuracy

## 2018-09-28 NOTE — PROGRESS NOTES
Progress Note - Kelby Ochoa 1940, 77 y o  female MRN: [id number]    Unit/Bed#:  Encounter: [id number]    Primary Care Provider: Ahmet Penn MD   Date and time admitted to hospital: 9/26/2018  7:05 AM        * Perforated viscus   Assessment & Plan    Status post right hemicolectomy  Management per primary surgical  NG tube has been discontinued on 09/27/2018  Clear liquid diet 09/27/2018  Likely DC dilaudid PCA today  Out of bed, incentive spirometry, PT/OT  Replace K phos 21mmol      Chronic diastolic (congestive) heart failure ([facility] [address] )   Assessment & Plan    She received 40mg IV lasix last evening  Start 20mg IV lasix bid (home dose is 20mg PO BID)   Monitor I&O   Continue webster for 24 hours for I&O accuracy     Atrial flutter ([facility] [address] )   Assessment & Plan    Transitioned to p o  Metoprolol  DC IV metoprolol  Will discuss with surgery when it is safe to resume Coumadin later today      Essential hypertension   Assessment & Plan    P o  Metoprolol was resumed yesterday  Resume Lasix today  If blood pressure tolerates will restart ACE-inhibitor later in the day         SIRS due to non infectious etiology     Thrombocytopenia : PLT stable over past 24 hours       Progress Note - Kelby Ochoa 77 y o  female MRN: [id number]    Unit/Bed#:  Encounter: [id number]        Subjective:   Seen and examined @ bedside  She is passing stool and having diarrhea   She felt SOB and received iv lasix yesterday  She is coughing and has diffuse wheezing today     Objective:     Vitals:   Vitals:    09/28/18 0806   BP: 141/70   Pulse: 79   Resp: 22   Temp: 98 2 °F (36 8 °C)   SpO2: 97%     Body mass index is 30 52 kg/m²      Intake/Output Summary (Last 24 hours) at 09/28/18 0812  Last data filed at 09/28/18 0657   Gross per 24 hour   Intake          4436 67 ml   Output             1500 ml   Net          2936 67 ml       Physical Exam:   /70 (BP Location: Right arm)   Pulse 79   Temp 98 2 °F (36 8 °C) (Temporal)   Resp 22   Ht 5' 5" (1 651 m)   Wt 83 2 kg (183 lb 6 8 oz)   SpO2 97%   BMI 30 52 kg/m²   General appearance: alert and oriented, in no acute distress  Head: Normocephalic, without obvious abnormality, atraumatic  Lungs: diffuse wheezing and bibasilar rales   Heart: regular rate and rhythm, S1, S2 normal, no murmur, click, rub or gallop  Abdomen: soft, non-tender; bowel sounds normal; no masses,  no organomegaly  Extremities: extremities normal, warm and well-perfused; no cyanosis, clubbing, or edema   Skin : Incision C/D/I   Pulses: 2+ and symmetric  Neurologic: Grossly normal     Invasive Devices     Peripheral Intravenous Line            Peripheral IV 09/26/18 Left Hand 2 days    Peripheral IV 09/26/18 Right Hand 1 day          Arterial Line            Arterial Line 09/26/18 Left Radial 1 day          Drain            Urethral Catheter 16 Fr  1 day                  Results from last 7 days  Lab Units 09/28/18 0441 09/27/18  0438 09/26/18  1357   WBC Thousand/uL 11 02* 13 36* 10 98*   HEMOGLOBIN g/dL 9 2* 9 9* 10 8*   HEMATOCRIT % 29 6* 31 4* 34 2*   PLATELETS Thousands/uL 136* 132* 163         Results from last 7 days  Lab Units 09/28/18  0441 09/27/18  0438 09/26/18  1358 09/26/18  1014   SODIUM mmol/L 140 145 144  --    POTASSIUM mmol/L 3 3* 3 2* 2 8*  --    CHLORIDE mmol/L 107 108 108  --    CO2 mmol/L 25 25 26  --    BUN mg/dL 19 17 14  --    CREATININE mg/dL 1 06 1 02 0 90  --    CALCIUM mg/dL 8 3 8 3 7 6*  --    ALK PHOS U/L 130* 141* 154*  --    ALT U/L 24 26 28  --    AST U/L 30 33 31  --    GLUCOSE, ISTAT mg/dl  --   --   --  235*       Medication Administration - last 24 hours from 09/27/2018 0812 to 09/28/2018 5777       Date/Time Order Dose Route Action Action by     09/27/2018 1837 enoxaparin (LOVENOX) subcutaneous injection 40 mg 40 mg Subcutaneous Given Jazmin Abbott RN     09/27/2018 0845 lactated ringers infusion 0 mL/hr Intravenous Stopped Silvia Judd Magda Garza, RN     09/27/2018 1900 ondansetron (ZOFRAN) injection 4 mg 4 mg Intravenous Given Briana Given, RN     09/27/2018 2151 famotidine (PEPCID) injection 20 mg 20 mg Intravenous Given Briana Given, RN     09/27/2018 0930 famotidine (PEPCID) injection 20 mg 20 mg Intravenous Given Margarito Cutlernehemias, RN     09/28/2018 0535 piperacillin-tazobactam (ZOSYN) 3 375 g in sodium chloride 0 9 % 50 mL IVPB 3 375 g Intravenous [address] Briana Given, RN     09/28/2018 0128 piperacillin-tazobactam (ZOSYN) 3 375 g in sodium chloride 0 9 % 50 mL IVPB 0 g Intravenous Stopped Briana Given, RN     09/28/2018 0058 piperacillin-tazobactam (ZOSYN) 3 375 g in sodium chloride 0 9 % 50 mL IVPB 3 375 g Intravenous [address] Briana Given, RN     09/27/2018 2009 piperacillin-tazobactam (ZOSYN) 3 375 g in sodium chloride 0 9 % 50 mL IVPB 0 g Intravenous Stopped Briana Given, RN     09/27/2018 1939 piperacillin-tazobactam (ZOSYN) 3 375 g in sodium chloride 0 9 % 50 mL IVPB 3 375 g Intravenous [address] Briana Given, RN     09/27/2018 1300 piperacillin-tazobactam (ZOSYN) 3 375 g in sodium chloride 0 9 % 50 mL IVPB 3 375 g Intravenous [address] Margaritosera Cutlernehemias, RN     09/28/2018 0128 sodium chloride 0 9 % infusion 100 mL/hr Intravenous Restarted Briana Given, RN     09/28/2018 0058 sodium chloride 0 9 % infusion 0 mL/hr Intravenous Stopped Briana Given, RN     09/27/2018 2154 sodium chloride 0 9 % infusion 100 mL/hr Intravenous [address] Briana Given, RN     09/27/2018 2009 sodium chloride 0 9 % infusion 100 mL/hr Intravenous Restarted Briana Given, RN     09/27/2018 1939 sodium chloride 0 9 % infusion 0 mL/hr Intravenous Stopped Briana Given, RN     09/27/2018 1145 sodium chloride 0 9 % infusion 100 mL/hr Intravenous [address] Margarito Garcia, MARIA DE JESUS     09/27/2018 1130 sodium chloride 0 9 % bolus 500 mL 0 mL Intravenous Stopped Margarito Garcia, MARIA DE JESUS     09/27/2018 0900 sodium chloride 0 9 % bolus 500 mL 500 mL Intravenous [address] Cachorro Crowder [facility]     09/27/2018 2151 metoprolol tartrate (LOPRESSOR) tablet 50 mg 50 mg Oral Given Aurora Foy RN     09/27/2018 1248 metoprolol tartrate (LOPRESSOR) tablet 50 mg 50 mg Oral Given Cachorro Crowder RN     09/27/2018 1230 potassium chloride 10 % oral solution 40 mEq 40 mEq Oral Given Cachorro Crowder RN     09/27/2018 1928 albuterol inhalation solution 2 5 mg 2 5 mg Nebulization Given Jono Hoffman, RT     09/27/2018 2004 furosemide (LASIX) injection 40 mg 40 mg Intravenous Given Aurora Foy RN            Lab, Imaging and other studies: I have personally reviewed pertinent reports      VTE Pharmacologic Prophylaxis: Enoxaparin (Lovenox)  VTE Mechanical Prophylaxis: sequential compression device     Aleja Yee MD  9/28/2018,8:12 AM

## 2018-09-28 NOTE — NURSING NOTE
During shift change, pt was c/o SOB  Pt was dyspneic and audibly wheezing  Pt's son stated the pt became SOB and started wheezing after doing incentive spirometer  New order was obtained for respiratory protocol and prn breathing tx was administered by respiratory  After breathing tx, wheezing had subsided, pt was still dyspneic and now fine crackles were present on auscultation  Dr Mundo Bhatia notified and new order obtained for 40mg IV lasix, and a chest x-ray  Pt currently resting comfortably, no resp distress noted, and pt stated her breathing feels a lot better

## 2018-09-28 NOTE — PLAN OF CARE
Problem: OCCUPATIONAL THERAPY ADULT  Goal: Performs self-care activities at highest level of function for planned discharge setting  See evaluation for individualized goals  Treatment Interventions: ADL retraining, Functional transfer training, UE strengthening/ROM, Endurance training, Patient/family training, Activityengagement          See flowsheet documentation for full assessment, interventions and recommendations  Limitation: Decreased ADL status, Decreased UE strength, Decreased Safe judgement during ADL, Decreased endurance, Decreased self-care trans, Decreased high-level ADLs     Assessment: Pt is a 77 y o  female seen for OT evaluation s/p admit to [facility] on 9/26/2018 w/ Perforated viscus  Comorbidities affecting pt's functional performance at time of assessment include: limited vision and previous surgery  Personal factors affecting pt at time of IE include:steps to enter environment, difficulty performing ADLS, difficulty performing IADLS , decreased initiation and engagement  and environment  Prior to admission, pt was (I) with no device use  Upon evaluation: Pt requires min-max (A) at this time with increased (A) for LB ADL tasks at this time and use of RW during functional mobility 2* the following deficits impacting occupational performance: weakness, decreased strength, decreased balance, decreased tolerance, impaired initiation, decreased safety awareness and increased pain  Pt to benefit from continued skilled OT tx while in the hospital to address deficits as defined above and maximize level of functional independence w ADL's and functional mobility  Occupational Performance areas to address include: grooming, bathing/shower, toilet hygiene, dressing, functional mobility, community mobility and clothing management  From OT standpoint, recommendation at time of d/c would be home health services and family support PRN       OT Discharge Recommendation: Home with family support

## 2018-09-28 NOTE — RESPIRATORY THERAPY NOTE
RT Protocol Note  Hernán Starch 77 y o  female MRN: [id number]  Unit/Bed#:  Encounter: [id number]    Assessment    Principal Problem:    Perforated viscus  Active Problems:    Essential hypertension    Atrial flutter (HCC)    Chronic diastolic (congestive) heart failure (HCC)    Tubulovillous adenoma of colon      Home Pulmonary Medications:  N/A       Past Medical History:   Diagnosis Date    Cardiac disease     GERD (gastroesophageal reflux disease)     Hyperlipidemia     Hypertension      Social History     Social History    Marital status: /Civil Union     Spouse name: N/A    Number of children: N/A    Years of education: N/A     Social History Main Topics    Smoking status: Former Smoker    Smokeless tobacco: Never Used      Comment: quit 15 yrs ago    Alcohol use No    Drug use: No    Sexual activity: Not Asked     Other Topics Concern    None     Social History Narrative    None       Subjective         Objective    Physical Exam:   Assessment Type: (P) Assess only  General Appearance: (P) Alert, Awake  Respiratory Pattern: (P) Labored  Chest Assessment: (P) Chest expansion symmetrical  Bilateral Breath Sounds: (P) Crackles, Expiratory wheezes    Vitals:  Blood pressure 160/81, pulse 82, temperature (!) 97 3 °F (36 3 °C), temperature source Temporal, resp  rate (!) 28, height 5' 5" (1 651 m), weight 83 2 kg (183 lb 6 8 oz), SpO2 94 %  Imaging and other studies: I have personally reviewed pertinent reports  Plan    Respiratory Plan: (P) No distress/Pulmonary history        Resp Comments: (P) pt takes no home resp meds or home O2

## 2018-09-28 NOTE — PROGRESS NOTES
[facility]  Progress Note - Susu Larsen 77 y o  female MRN: [id number]    Unit/Bed#:  Encounter: [id number]    Assessment/Plan:  1  Sigmoid colon polyp  2  Colon perforation  3  Severe diverticulosis   -  She underwent a colonoscopy in June which revealed multiple polyps and a mass in the sigmoid colon, pathology revealed an adenoma, she had a mucosal resection performed in a piecemeal fashion and she had a repeat colonoscopy on 09/26/2018 to re-evaluate the polypectomy site as a small amount of polypoid tissue could have remained  -  During her colonoscopy yesterday she had 2 polyps removed however no residual polyp was seen at the prior placed tattoo site  Severe sigmoid diverticulosis was also noted with what appeared to be a mucosal injury, 3 clips were placed  She had an episode of desaturation during the procedure    -Post-procedure she was noted to have a distended abdomen and stat portable chest x-ray showed free air under the diaphragm, she went to the OR immediately for ex lap and had a right hemicolectomy with primary anastomosis  -  She had an NG tube after the procedure that this is been discontinued and she is currently tolerating a clear liquid diet well  She states she is passing gas and having liquid bowel movements  - Continue clear liquid diet as per the surgical team, continue supportive care  Subjective:     She reports that she is feeling somewhat improved today, her NG tube has been discontinued and she is tolerating a clear liquid diet well  She reports she is passing some gas and has been out of bed  She states she does not have abdominal pain unless she is rolling  She denies any nausea or vomiting  She states she has been having watery dark bowel movements  Objective:     Vitals: Blood pressure 141/70, pulse 79, temperature 98 2 °F (36 8 °C), temperature source Temporal, resp   rate 22, height 5' 5" (1 651 m), weight 83 2 kg (183 lb 6 8 oz), SpO2 97 %  ,Body mass index is 30 52 kg/m²  Intake/Output Summary (Last 24 hours) at 09/28/18 1127  Last data filed at 09/28/18 0815   Gross per 24 hour   Intake             3600 ml   Output             1500 ml   Net             2100 ml       Review of Systems: as per HPI  Review of Systems   Constitutional: Negative for activity change, appetite change, chills, fatigue, fever and unexpected weight change  HENT: Negative for mouth sores, sore throat and trouble swallowing  Respiratory: Negative for shortness of breath  Cardiovascular: Negative for chest pain  Gastrointestinal: Positive for abdominal pain (with movement) and diarrhea  Negative for abdominal distention, blood in stool, constipation, nausea and vomiting  Skin: Negative for color change, pallor, rash and wound  Neurological: Negative for tremors and syncope  All other systems reviewed and are negative  Physical Exam:     Physical Exam   Constitutional: She is oriented to person, place, and time  No distress  Nasal cannula in place  HENT:   Head: Normocephalic and atraumatic  Eyes: Right eye exhibits no discharge  Left eye exhibits no discharge  No scleral icterus  Neck: Neck supple  No tracheal deviation present  Cardiovascular: Normal rate, regular rhythm and normal heart sounds  Exam reveals no gallop and no friction rub  No murmur heard  Pulmonary/Chest: Effort normal  No respiratory distress  She has wheezes (diffuse)  Abdominal: Soft  Bowel sounds are normal  She exhibits no distension and no mass  There is tenderness (mild)  There is no rebound and no guarding  Midline incision with dressing clean/dry/intact   Neurological: She is alert and oriented to person, place, and time  Skin: Skin is warm and dry  Psychiatric: She has a normal mood and affect           Invasive Devices     Peripheral Intravenous Line            Peripheral IV 09/26/18 Right Hand 2 days          Arterial Line Arterial Line 09/26/18 Left Radial 2 days          Drain            Urethral Catheter 16 Fr  2 days                        CBC: Lab Results   Component Value Date    WBC 11 02 (H) 09/28/2018    HGB 9 2 (L) 09/28/2018    HCT 29 6 (L) 09/28/2018    MCV 96 09/28/2018     (L) 09/28/2018    MCH 29 8 09/28/2018    MCHC 31 1 (L) 09/28/2018    RDW 15 3 (H) 09/28/2018    MPV 10 3 09/28/2018    NRBC 0 09/28/2018   ,   CMP: Lab Results   Component Value Date     09/28/2018    K 3 3 (L) 09/28/2018     09/28/2018    CO2 25 09/28/2018    BUN 19 09/28/2018    CREATININE 1 06 09/28/2018    CALCIUM 8 3 09/28/2018    AST 30 09/28/2018    ALT 24 09/28/2018    ALKPHOS 130 (H) 09/28/2018    EGFR 50 09/28/2018   ,     Phosphorous:   Lab Results   Component Value Date    PHOS 2 0 (L) 09/28/2018

## 2018-09-28 NOTE — ASSESSMENT & PLAN NOTE
Status post right hemicolectomy  Management per primary surgical  NG tube has been discontinued on 09/27/2018  Clear liquid diet 09/27/2018  Likely DC dilaudid PCA today  Out of bed, incentive spirometry, PT/OT

## 2018-09-28 NOTE — NURSING NOTE
Patient with noticeable wheezing post incentive spirometer use   Dr Reuben Feliz notified of the same

## 2018-09-28 NOTE — OCCUPATIONAL THERAPY NOTE
Occupational Therapy Evaluation     Patient Name: Luis Fernando Noe  Today's Date: 9/28/2018  Problem List  Patient Active Problem List   Diagnosis    Diverticulosis    Rectal bleeding    Hypoalbuminemia    CAD (coronary artery disease)    Essential hypertension    Hyperlipidemia    Elevated MCV    Atrial flutter (HCC)    Right-sided thoracic back pain    History of shingles    Incomplete right bundle branch block    Hx of CABG    Thoracic radiculopathy    Pyuria    Microscopic hematuria    Chronic diastolic (congestive) heart failure (HCC)    Shortness of breath    Chronic diastolic heart failure (HCC)    Diverticulosis of large intestine with hemorrhage    Vomiting    Colonic mass    Tubulovillous adenoma of colon    Perforated viscus     Past Medical History  Past Medical History:   Diagnosis Date    Cardiac disease     GERD (gastroesophageal reflux disease)     Hyperlipidemia     Hypertension      Past Surgical History  Past Surgical History:   Procedure Laterality Date    CARDIAC SURGERY      CARDIAC SURGERY      CATARACT EXTRACTION      CHOLECYSTECTOMY      COLONOSCOPY      COLONOSCOPY N/A 6/18/2018    Procedure: COLONOSCOPY;  Surgeon: Ja Powers DO;  Location: [facility]; Service: Gastroenterology    COLONOSCOPY W/ CONTROL OF HEMORRHAGE      CORONARY ANGIOPLASTY WITH STENT PLACEMENT      CORONARY ARTERY BYPASS GRAFT  2008    HERNIA REPAIR      HYSTERECTOMY      DE SIGMOIDOSCOPY FLX DX W/COLLJ SPEC BR/WA IF PFRMD N/A 6/21/2018    Procedure: SIGMOIDOSCOPY FLEXIBLE;  Surgeon: Ja Powers DO;  Location: [facility]; Service: Gastroenterology           09/28/18 0936   Note Type   Note type Eval/Treat   Restrictions/Precautions   Weight Bearing Precautions Per Order No   Other Precautions Chair Alarm; Bed Alarm;Multiple lines;Telemetry;O2;Fall Risk   Pain Assessment   Pain Assessment No/denies pain   Home Living   Additional Comments see PT evaluation for details    Prior Function   Level of [place] Independent with ADLs and functional mobility   Lives With Spouse   ADL Assistance Independent   IADLs Independent   Falls in the last 6 months 0   Comments pt reports no central vision and requires (A) with cues at times for direction; pt's family completes driving   Psychosocial   Psychosocial (WDL) WDL   Subjective   Subjective "now, what are we going to do with all of this stuff"   ADL   Where Assessed Edge of bed   LB Dressing Assistance 2  Maximal Assistance   LB Dressing Deficit Don/doff R sock; Don/doff L sock   Toileting Assistance  2  Maximal Assistance   Toileting Deficit Increased time to complete;Supervison/safety;Verbal cueing;Clothing management up;Clothing management down;Perineal hygiene   Additional Comments pt incontinent of BM while supine in bed; required (A) with michael hygiene as well as LB dressing; pt seated EOB for LB dressing and unable to complete sock donning due to increased pain in abdomen when attempting to flex forward   Bed Mobility   Rolling R 4  Minimal assistance   Additional items Assist x 1   Rolling L 4  Minimal assistance   Additional items Assist x 1   Supine to Sit 4  Minimal assistance   Additional items Assist x 1;Verbal cues;LE management; Increased time required; Bedrails   Additional Comments pt seated EOB with no dizziness noted; good sitting balance; increased time required during session in order to organize multiple lines and IV poles   Transfers   Sit to Stand 4  Minimal assistance   Additional items Assist x 1;Verbal cues  (RW)   Stand to Sit 4  Minimal assistance   Additional items Assist x 1;Verbal cues  (RW)   Stand pivot 4  Minimal assistance   Additional items Assist x 1;Verbal cues  (RW)   Additional Comments pt on 3L O2 throughout session; SpO2 ranged 90-95% throughout session with minimal SOB   Functional Mobility   Functional Mobility 5  Supervision   Additional Comments pt performed functional mobility with use of RW in room with 3L O2; pt with limited endurance and distance this session   Additional items Rolling walker   Balance   Static Sitting Good   Dynamic Sitting Good   Static Standing Fair +   Dynamic Standing Fair   Ambulatory Fair   Activity Tolerance   Activity Tolerance Patient limited by fatigue   RUE Assessment   RUE Assessment WFL   LUE Assessment   LUE Assessment WFL   Hand Function   Gross Motor Coordination Functional   Fine Motor Coordination Functional   Sensation   Light Touch No apparent deficits   Sharp/Dull No apparent deficits   Cognition   Overall Cognitive Status WFL   Arousal/Participation Alert   Attention Within functional limits   Orientation Level Oriented X4   Memory Within functional limits   Following Commands Follows all commands and directions without difficulty   Assessment   Limitation Decreased ADL status; Decreased UE strength;Decreased Safe judgement during ADL;Decreased endurance;Decreased self-care trans;Decreased high-level ADLs   Assessment Pt is a 77 y o  female seen for OT evaluation s/p admit to [facility] on 9/26/2018 w/ Perforated viscus  Comorbidities affecting pt's functional performance at time of assessment include: limited vision and previous surgery  Personal factors affecting pt at time of IE include:steps to enter environment, difficulty performing ADLS, difficulty performing IADLS , decreased initiation and engagement  and environment  Prior to admission, pt was (I) with no device use  Upon evaluation: Pt requires min-max (A) at this time with increased (A) for LB ADL tasks at this time and use of RW during functional mobility 2* the following deficits impacting occupational performance: weakness, decreased strength, decreased balance, decreased tolerance, impaired initiation, decreased safety awareness and increased pain   Pt to benefit from continued skilled OT tx while in the hospital to address deficits as defined above and maximize level of functional independence w ADL's and functional mobility  Occupational Performance areas to address include: grooming, bathing/shower, toilet hygiene, dressing, functional mobility, community mobility and clothing management  From OT standpoint, recommendation at time of d/c would be home health services and family support PRN  Goals   Patient Goals to go home    Short Term Goal #2 ;   Short Term Goal  pt will increase independence with functional mobility to mod (I) level with RW use and increased endurance    Long Term Goal #1 pt will demonstrate UB/LB bathing and grooming tasks while seated EOB or in chair at min (A) level    Long Term Goal #2 pt will demonstrate functional transfers at mod (I) level with RW and decreased cues   Long Term Goal pt will demonstrate toilet transfers and toilet hygeine at (I) level    Plan   Treatment Interventions ADL retraining;Functional transfer training;UE strengthening/ROM; Endurance training;Patient/family training; Activityengagement   Goal Expiration Date 10/12/18   OT Frequency 3-5x/wk   Recommendation   OT Discharge Recommendation Home with family support   Barthel Index   Feeding 10   Bathing 0   Grooming Score 0   Dressing Score 5   Bladder Score 0   Bowels Score 5   Toilet Use Score 5   Transfers (Bed/Chair) Score 10   Mobility (Level Surface) Score 10   Stairs Score 0   Barthel Index Score 45     Pt will benefit from continued OT services in order to maximize (I) c ADL performance, FM c RW, and improve overall endurance/strength required to complete functional tasks in preparation for d/c  Pt left seated in chair at end of session; all needs within reach; all lines intact; scds connected and turned on

## 2018-09-28 NOTE — PHYSICAL THERAPY NOTE
PT treatment Note      09/28/18 0179   Restrictions/Precautions   Other Precautions (Multiple lines;Telemetry; Fall Risk;Pain;O2 (arterial line, c)   General   Family/Caregiver Present No   Subjective   Subjective Agreeable to therapy  Bed Mobility   Supine to Sit 4  Minimal assistance   Additional items Assist x 1;HOB elevated; Bedrails;Verbal cues   Transfers   Sit to Stand 4  Minimal assistance   Additional items Bedrails;Verbal cues   Stand to Sit 4  Minimal assistance   Additional items Armrests; Verbal cues   Stand pivot 4  Minimal assistance   Additional items Assist x 1   Ambulation/Elevation   Gait pattern Short stride   Gait Assistance 4  Minimal assist  (CGA)   Additional items Assist x 1  (assist for multiple lines)   Assistive Device Rolling walker   Distance 20' x 2 with standing rest break   Balance   Static Sitting Good   Dynamic Sitting Good   Static Standing Fair   Dynamic Standing Fair   Ambulatory Fair  (with RW)   Endurance Deficit   Endurance Deficit Yes   Endurance Deficit Description SpO2 mid 90's with 4 L  reurned to 3 L at rest  Mild wheezing after ambulation   Activity Tolerance   Activity Tolerance Patient limited by fatigue   Assessment   Prognosis Good   Problem List Decreased strength;Decreased endurance; Impaired balance;Decreased mobility; Impaired vision   Assessment Pt  performing functional mobility at (min) A level of function  Multiple lines requiring assist to [facility] in room  SpO2 mid 90's with 4 L to ambulate and 3 L at rest  Fair stability with RW with no LOB  Pt is in need of continued activity in PT to improve pain strength balance safety endurance mobility transfers ambulation and stair negotiation with return to (I) LO and to ensure safe return home  Plan   Treatment/Interventions Functional transfer training;LE strengthening/ROM; Therapeutic exercise; Endurance training;Bed mobility;Gait training   Progress Progressing toward goals   Recommendation   Recommendation Home PT   Pt  OOB in chair with call bell within reach, scd's connected and turned on and  all lines intact at end of PT session  Discussed with Kailey Salguero PT today's treatment and patient's current level of function for care coordination

## 2018-09-28 NOTE — ASSESSMENT & PLAN NOTE
P o  Metoprolol was resumed yesterday  Resume Lasix today  If blood pressure tolerates will restart ACE-inhibitor later in the day

## 2018-09-28 NOTE — PROGRESS NOTES
Progress Note - General Surgery   Ginny Gooden 77 y o  female MRN: [id number]  Unit/Bed#:  Encounter: [id number]    Assessment:     A/P  colonoscopy complicated by colonic perforation, now postop day 2 status post exploratory laparotomy with right hemicolectomy and primary anastomosis  Decreased UOP overnight and noted shortness of breath  Given lasix with good response  + liquid BM and + flatus  On exam noted crackles and wheezing  Kerrie clear    Plan:  - decreased IVFs  - continue with lasix as patient is on at home BID and likely is somewhat dependent  Monitor Cr    - strict Is and Os, continue webster for now  - OOB/ PT  - IS  - continue clear diet, patient having some bowel function but still with a lot of beltching, mild nausea, no heartburn  Potential advancement of diet later this evening  - continue with resp protocol and breathing treatments  - Leukocytosis trending down  - D/C PCA and switch to Percocet and IV morphine for breakthrough  - remainder of comorbidities to be managed by primary team     Subjective/Objective   Chief Complaint: Mild abdominal pain    Subjective: Doing well  No f/c  Minimal nausea  +beltching, + Liquid bms and she states she passed flatus twice, no sob of cp, minimal abdominal pain    Objective:     Blood pressure 141/70, pulse 79, temperature (!) 96 5 °F (35 8 °C), temperature source Temporal, resp  rate 22, height 5' 5" (1 651 m), weight 83 2 kg (183 lb 6 8 oz), SpO2 97 %  ,Body mass index is 30 52 kg/m²        Intake/Output Summary (Last 24 hours) at 09/28/18 1349  Last data filed at 09/28/18 1312   Gross per 24 hour   Intake             2670 ml   Output             2400 ml   Net              270 ml       Invasive Devices     Peripheral Intravenous Line            Peripheral IV 09/26/18 Right Hand 2 days          Arterial Line            Arterial Line 09/26/18 Left Radial 2 days          Drain            Urethral Catheter 16 Fr  2 days                Physical Exam:   Gen: NAD  HEENT: NCAT, trachea midline  CV: S1S2 audilble, no m/r/g  Pul: b/l crackles and expiratory wheezes  GI: soft, ND, appropriately tender, dressing in place and is c/d/i  MSK: no clubbing or cyanosis  Neuro: AO x 3, CN 2-12 grossly intact  Psych: mood and affect appropriate              Lab, Imaging and other studies:  I have personally reviewed pertinent lab results    , CBC:   Lab Results   Component Value Date    WBC 11 02 (H) 09/28/2018    HGB 9 2 (L) 09/28/2018    HCT 29 6 (L) 09/28/2018    MCV 96 09/28/2018     (L) 09/28/2018    MCH 29 8 09/28/2018    MCHC 31 1 (L) 09/28/2018    RDW 15 3 (H) 09/28/2018    MPV 10 3 09/28/2018    NRBC 0 09/28/2018   , CMP:   Lab Results   Component Value Date     09/28/2018    K 3 3 (L) 09/28/2018     09/28/2018    CO2 25 09/28/2018    BUN 19 09/28/2018    CREATININE 1 06 09/28/2018    CALCIUM 8 3 09/28/2018    AST 30 09/28/2018    ALT 24 09/28/2018    ALKPHOS 130 (H) 09/28/2018    EGFR 50 09/28/2018   , Coagulation: No results found for: PT, INR, APTT, Urinalysis: No results found for: COLORU, CLARITYU, SPECGRAV, PHUR, LEUKOCYTESUR, NITRITE, PROTEINUA, GLUCOSEU, KETONESU, BILIRUBINUR, BLOODU, Amylase: No results found for: AMYLASE, Lipase: No results found for: LIPASE,   VTE Pharmacologic Prophylaxis: Enoxaparin (Lovenox)  VTE Mechanical Prophylaxis: sequential compression device

## 2018-09-28 NOTE — ASSESSMENT & PLAN NOTE
Transitioned to p o   Metoprolol  DC IV metoprolol  Will discuss with surgery when it is safe to resume Coumadin later today

## 2018-09-29 LAB
ALBUMIN SERPL BCP-MCNC: 2.2 G/DL (ref 3.5–5)
ALP SERPL-CCNC: 117 U/L (ref 46–116)
ALT SERPL W P-5'-P-CCNC: 23 U/L (ref 12–78)
ANION GAP SERPL CALCULATED.3IONS-SCNC: 4 MMOL/L (ref 4–13)
ANION GAP SERPL CALCULATED.3IONS-SCNC: 7 MMOL/L (ref 4–13)
AST SERPL W P-5'-P-CCNC: 24 U/L (ref 5–45)
BASOPHILS # BLD AUTO: 0.02 THOUSANDS/ΜL (ref 0–0.1)
BASOPHILS # BLD AUTO: 0.02 THOUSANDS/ΜL (ref 0–0.1)
BASOPHILS NFR BLD AUTO: 0 % (ref 0–1)
BASOPHILS NFR BLD AUTO: 0 % (ref 0–1)
BILIRUB SERPL-MCNC: 1.1 MG/DL (ref 0.2–1)
BUN SERPL-MCNC: 17 MG/DL (ref 5–25)
BUN SERPL-MCNC: 18 MG/DL (ref 5–25)
CALCIUM SERPL-MCNC: 8.1 MG/DL (ref 8.3–10.1)
CALCIUM SERPL-MCNC: 8.5 MG/DL (ref 8.3–10.1)
CHLORIDE SERPL-SCNC: 105 MMOL/L (ref 100–108)
CHLORIDE SERPL-SCNC: 106 MMOL/L (ref 100–108)
CO2 SERPL-SCNC: 28 MMOL/L (ref 21–32)
CO2 SERPL-SCNC: 32 MMOL/L (ref 21–32)
CREAT SERPL-MCNC: 1 MG/DL (ref 0.6–1.3)
CREAT SERPL-MCNC: 1.3 MG/DL (ref 0.6–1.3)
EOSINOPHIL # BLD AUTO: 0.03 THOUSAND/ΜL (ref 0–0.61)
EOSINOPHIL # BLD AUTO: 0.04 THOUSAND/ΜL (ref 0–0.61)
EOSINOPHIL NFR BLD AUTO: 0 % (ref 0–6)
EOSINOPHIL NFR BLD AUTO: 0 % (ref 0–6)
ERYTHROCYTE [DISTWIDTH] IN BLOOD BY AUTOMATED COUNT: 15.2 % (ref 11.6–15.1)
ERYTHROCYTE [DISTWIDTH] IN BLOOD BY AUTOMATED COUNT: 15.3 % (ref 11.6–15.1)
GFR SERPL CREATININE-BSD FRML MDRD: 39 ML/MIN/1.73SQ M
GFR SERPL CREATININE-BSD FRML MDRD: 54 ML/MIN/1.73SQ M
GLUCOSE SERPL-MCNC: 102 MG/DL (ref 65–140)
GLUCOSE SERPL-MCNC: 129 MG/DL (ref 65–140)
HCT VFR BLD AUTO: 25.9 % (ref 34.8–46.1)
HCT VFR BLD AUTO: 29.6 % (ref 34.8–46.1)
HGB BLD-MCNC: 8.2 G/DL (ref 11.5–15.4)
HGB BLD-MCNC: 9.2 G/DL (ref 11.5–15.4)
IMM GRANULOCYTES # BLD AUTO: 0.03 THOUSAND/UL (ref 0–0.2)
IMM GRANULOCYTES # BLD AUTO: 0.04 THOUSAND/UL (ref 0–0.2)
IMM GRANULOCYTES NFR BLD AUTO: 0 % (ref 0–2)
IMM GRANULOCYTES NFR BLD AUTO: 0 % (ref 0–2)
INR PPP: 1.84 (ref 0.86–1.17)
LYMPHOCYTES # BLD AUTO: 0.71 THOUSANDS/ΜL (ref 0.6–4.47)
LYMPHOCYTES # BLD AUTO: 0.75 THOUSANDS/ΜL (ref 0.6–4.47)
LYMPHOCYTES NFR BLD AUTO: 7 % (ref 14–44)
LYMPHOCYTES NFR BLD AUTO: 9 % (ref 14–44)
MAGNESIUM SERPL-MCNC: 1.8 MG/DL (ref 1.6–2.6)
MCH RBC QN AUTO: 29.7 PG (ref 26.8–34.3)
MCH RBC QN AUTO: 29.9 PG (ref 26.8–34.3)
MCHC RBC AUTO-ENTMCNC: 31.1 G/DL (ref 31.4–37.4)
MCHC RBC AUTO-ENTMCNC: 31.7 G/DL (ref 31.4–37.4)
MCV RBC AUTO: 95 FL (ref 82–98)
MCV RBC AUTO: 96 FL (ref 82–98)
MONOCYTES # BLD AUTO: 0.39 THOUSAND/ΜL (ref 0.17–1.22)
MONOCYTES # BLD AUTO: 0.49 THOUSAND/ΜL (ref 0.17–1.22)
MONOCYTES NFR BLD AUTO: 5 % (ref 4–12)
MONOCYTES NFR BLD AUTO: 5 % (ref 4–12)
NEUTROPHILS # BLD AUTO: 7.1 THOUSANDS/ΜL (ref 1.85–7.62)
NEUTROPHILS # BLD AUTO: 8.6 THOUSANDS/ΜL (ref 1.85–7.62)
NEUTS SEG NFR BLD AUTO: 86 % (ref 43–75)
NEUTS SEG NFR BLD AUTO: 88 % (ref 43–75)
NRBC BLD AUTO-RTO: 0 /100 WBCS
NRBC BLD AUTO-RTO: 0 /100 WBCS
PLATELET # BLD AUTO: 120 THOUSANDS/UL (ref 149–390)
PLATELET # BLD AUTO: 159 THOUSANDS/UL (ref 149–390)
PMV BLD AUTO: 9.4 FL (ref 8.9–12.7)
PMV BLD AUTO: 9.5 FL (ref 8.9–12.7)
POTASSIUM SERPL-SCNC: 2.6 MMOL/L (ref 3.5–5.3)
POTASSIUM SERPL-SCNC: 2.9 MMOL/L (ref 3.5–5.3)
PROT SERPL-MCNC: 5.7 G/DL (ref 6.4–8.2)
PROTHROMBIN TIME: 20.4 SECONDS (ref 11.8–14.2)
RBC # BLD AUTO: 2.74 MILLION/UL (ref 3.81–5.12)
RBC # BLD AUTO: 3.1 MILLION/UL (ref 3.81–5.12)
SODIUM SERPL-SCNC: 141 MMOL/L (ref 136–145)
SODIUM SERPL-SCNC: 141 MMOL/L (ref 136–145)
WBC # BLD AUTO: 8.32 THOUSAND/UL (ref 4.31–10.16)
WBC # BLD AUTO: 9.9 THOUSAND/UL (ref 4.31–10.16)

## 2018-09-29 PROCEDURE — 85025 COMPLETE CBC W/AUTO DIFF WBC: CPT | Performed by: FAMILY MEDICINE

## 2018-09-29 PROCEDURE — 97530 THERAPEUTIC ACTIVITIES: CPT | Performed by: DEVELOPMENTAL THERAPIST

## 2018-09-29 PROCEDURE — 94760 N-INVAS EAR/PLS OXIMETRY 1: CPT

## 2018-09-29 PROCEDURE — 85610 PROTHROMBIN TIME: CPT | Performed by: FAMILY MEDICINE

## 2018-09-29 PROCEDURE — 99232 SBSQ HOSP IP/OBS MODERATE 35: CPT | Performed by: FAMILY MEDICINE

## 2018-09-29 PROCEDURE — 80053 COMPREHEN METABOLIC PANEL: CPT | Performed by: FAMILY MEDICINE

## 2018-09-29 PROCEDURE — 83735 ASSAY OF MAGNESIUM: CPT | Performed by: FAMILY MEDICINE

## 2018-09-29 PROCEDURE — 85025 COMPLETE CBC W/AUTO DIFF WBC: CPT | Performed by: SURGERY

## 2018-09-29 PROCEDURE — 94640 AIRWAY INHALATION TREATMENT: CPT

## 2018-09-29 PROCEDURE — 80048 BASIC METABOLIC PNL TOTAL CA: CPT | Performed by: SURGERY

## 2018-09-29 RX ORDER — POTASSIUM CHLORIDE 14.9 MG/ML
20 INJECTION INTRAVENOUS
Status: COMPLETED | OUTPATIENT
Start: 2018-09-29 | End: 2018-09-29

## 2018-09-29 RX ORDER — POTASSIUM CHLORIDE 20MEQ/15ML
40 LIQUID (ML) ORAL 2 TIMES DAILY
Status: COMPLETED | OUTPATIENT
Start: 2018-09-29 | End: 2018-09-29

## 2018-09-29 RX ADMIN — PIPERACILLIN SODIUM,TAZOBACTAM SODIUM 3.38 G: 3; .375 INJECTION, POWDER, FOR SOLUTION INTRAVENOUS at 06:39

## 2018-09-29 RX ADMIN — ONDANSETRON 4 MG: 2 INJECTION INTRAMUSCULAR; INTRAVENOUS at 15:26

## 2018-09-29 RX ADMIN — PIPERACILLIN SODIUM,TAZOBACTAM SODIUM 3.38 G: 3; .375 INJECTION, POWDER, FOR SOLUTION INTRAVENOUS at 23:54

## 2018-09-29 RX ADMIN — Medication 250 MG: at 08:23

## 2018-09-29 RX ADMIN — POTASSIUM CHLORIDE 20 MEQ: 200 INJECTION, SOLUTION INTRAVENOUS at 16:27

## 2018-09-29 RX ADMIN — METOPROLOL TARTRATE 50 MG: 50 TABLET ORAL at 08:23

## 2018-09-29 RX ADMIN — FAMOTIDINE 20 MG: 10 INJECTION INTRAVENOUS at 21:21

## 2018-09-29 RX ADMIN — OXYCODONE HYDROCHLORIDE AND ACETAMINOPHEN 1 TABLET: 5; 325 TABLET ORAL at 15:26

## 2018-09-29 RX ADMIN — PIPERACILLIN SODIUM,TAZOBACTAM SODIUM 3.38 G: 3; .375 INJECTION, POWDER, FOR SOLUTION INTRAVENOUS at 00:52

## 2018-09-29 RX ADMIN — LEVALBUTEROL 1.25 MG: 1.25 SOLUTION, CONCENTRATE RESPIRATORY (INHALATION) at 07:36

## 2018-09-29 RX ADMIN — FUROSEMIDE 20 MG: 10 INJECTION, SOLUTION INTRAVENOUS at 08:22

## 2018-09-29 RX ADMIN — POTASSIUM CHLORIDE 20 MEQ: 200 INJECTION, SOLUTION INTRAVENOUS at 18:51

## 2018-09-29 RX ADMIN — VANCOMYCIN 125 MG: KIT at 21:21

## 2018-09-29 RX ADMIN — POTASSIUM CHLORIDE 40 MEQ: 20 SOLUTION ORAL at 09:43

## 2018-09-29 RX ADMIN — FAMOTIDINE 20 MG: 10 INJECTION INTRAVENOUS at 08:23

## 2018-09-29 RX ADMIN — IPRATROPIUM BROMIDE 0.5 MG: 0.5 SOLUTION RESPIRATORY (INHALATION) at 13:22

## 2018-09-29 RX ADMIN — PIPERACILLIN SODIUM,TAZOBACTAM SODIUM 3.38 G: 3; .375 INJECTION, POWDER, FOR SOLUTION INTRAVENOUS at 13:33

## 2018-09-29 RX ADMIN — PIPERACILLIN SODIUM,TAZOBACTAM SODIUM 3.38 G: 3; .375 INJECTION, POWDER, FOR SOLUTION INTRAVENOUS at 18:51

## 2018-09-29 RX ADMIN — LEVALBUTEROL 1.25 MG: 1.25 SOLUTION, CONCENTRATE RESPIRATORY (INHALATION) at 13:22

## 2018-09-29 RX ADMIN — METOPROLOL TARTRATE 50 MG: 50 TABLET ORAL at 21:21

## 2018-09-29 RX ADMIN — FUROSEMIDE 20 MG: 10 INJECTION, SOLUTION INTRAVENOUS at 16:27

## 2018-09-29 RX ADMIN — IPRATROPIUM BROMIDE 0.5 MG: 0.5 SOLUTION RESPIRATORY (INHALATION) at 21:27

## 2018-09-29 RX ADMIN — VANCOMYCIN 125 MG: KIT at 08:23

## 2018-09-29 RX ADMIN — LEVALBUTEROL 1.25 MG: 1.25 SOLUTION, CONCENTRATE RESPIRATORY (INHALATION) at 21:27

## 2018-09-29 RX ADMIN — POTASSIUM CHLORIDE 40 MEQ: 20 SOLUTION ORAL at 18:09

## 2018-09-29 RX ADMIN — IPRATROPIUM BROMIDE 0.5 MG: 0.5 SOLUTION RESPIRATORY (INHALATION) at 07:36

## 2018-09-29 RX ADMIN — ENOXAPARIN SODIUM 40 MG: 40 INJECTION, SOLUTION INTRAVENOUS; SUBCUTANEOUS at 18:09

## 2018-09-29 RX ADMIN — Medication 250 MG: at 18:09

## 2018-09-29 NOTE — PROGRESS NOTES
Progress Note - General Surgery   Lucio Homans 77 y o  female MRN: [id number]  Unit/Bed#:  Encounter: [id number]    Assessment:     A/P  colonoscopy complicated by colonic perforation, now postop day 2 status post exploratory laparotomy with right hemicolectomy and primary anastomosis  + liquid BM and + flatus  Noted decrease in hemoglobin from morning labs  Plan:  - repeat H&H late this afternoon  - strict Is and Os, continue webster for now  - OOB/ PT  - IS  - advance diet as tolerated  - continue with resp protocol and breathing treatments  - Leukocytosis resolved  - pain controlled  - remainder of comorbidities to be managed by primary team     Subjective/Objective   Chief Complaint: Mild abdominal pain    Subjective: Doing well  No f/c  Minimal nausea  Still having some liquid BMs  Positive flatus  Tolerating clears    Objective:     Blood pressure 126/59, pulse 76, temperature (!) 97 1 °F (36 2 °C), temperature source Temporal, resp  rate (!) 36, height 5' 5" (1 651 m), weight 85 4 kg (188 lb 4 4 oz), SpO2 96 %  ,Body mass index is 31 33 kg/m²  Intake/Output Summary (Last 24 hours) at 09/29/18 1643  Last data filed at 09/29/18 1348   Gross per 24 hour   Intake              740 ml   Output             2025 ml   Net            -1285 ml       Invasive Devices     Peripheral Intravenous Line            Peripheral IV 09/28/18 Left Antecubital less than 1 day                Physical Exam:   Gen: NAD  HEENT: NCAT, trachea midline  CV: S1S2 audilble, no m/r/g  Pul: b/l crackles and expiratory wheezes  GI: soft, ND, appropriately tender, dressing in place and is c/d/i  MSK: no clubbing or cyanosis  Neuro: AO x 3, CN 2-12 grossly intact  Psych: mood and affect appropriate              Lab, Imaging and other studies:  I have personally reviewed pertinent lab results    , CBC:   Lab Results   Component Value Date    WBC 9 90 09/29/2018    HGB 9 2 (L) 09/29/2018    HCT 29 6 (L) 09/29/2018 MCV 96 09/29/2018     09/29/2018    MCH 29 7 09/29/2018    MCHC 31 1 (L) 09/29/2018    RDW 15 3 (H) 09/29/2018    MPV 9 4 09/29/2018    NRBC 0 09/29/2018   , CMP:   Lab Results   Component Value Date     09/29/2018    K 2 9 (L) 09/29/2018     09/29/2018    CO2 32 09/29/2018    BUN 17 09/29/2018    CREATININE 1 30 09/29/2018    CALCIUM 8 5 09/29/2018    AST 24 09/29/2018    ALT 23 09/29/2018    ALKPHOS 117 (H) 09/29/2018    EGFR 39 09/29/2018   , Coagulation:   Lab Results   Component Value Date    INR 1 84 (H) 09/29/2018   , Urinalysis: No results found for: COLORU, CLARITYU, SPECGRAV, PHUR, LEUKOCYTESUR, NITRITE, PROTEINUA, GLUCOSEU, KETONESU, BILIRUBINUR, BLOODU, Amylase: No results found for: AMYLASE, Lipase: No results found for: LIPASE,   VTE Pharmacologic Prophylaxis: Enoxaparin (Lovenox)  VTE Mechanical Prophylaxis: sequential compression device

## 2018-09-29 NOTE — PROGRESS NOTES
Progress Note - Lina White 1940, 77 y o  female MRN: [id number]    Unit/Bed#:  Encounter: [id number]    Primary Care Provider: Florecita Melton MD   Date and time admitted to hospital: 9/26/2018  7:05 AM        * Perforated viscus   Assessment & Plan    Status post right hemicolectomy  Management per primary surgical  NG tube has been discontinued on 09/27/2018  Clear liquid diet 09/27/2018  Likely DC dilaudid PCA today  Out of bed, incentive spirometry, PT/OT     Chronic diastolic (congestive) heart failure ([address] )   Assessment & Plan    She received 40mg IV lasix last evening  Start 20mg IV lasix bid (home dose is 20mg PO BID)   Monitor I&O   DC Justice     Atrial flutter ([address] )   Assessment & Plan    Transitioned to p o  Metoprolol  DC IV metoprolol  Will defer to surgery when to restart warfarin       Continue to monitor hemoglobin and platelet counts closely  Replete potassium  Check magnesium, potassium, phosphorus in the morning  Continue probiotics and prophylactic p o  Vancomycin    Progress Note - Lina White 77 y o  female MRN: [id number]    Unit/Bed#:  Encounter: [id number]        Subjective:   Seen and examined at bedside, she is hungry, no acute complaints  Objective:     Vitals:   Vitals:    09/29/18 0736   BP:    Pulse:    Resp:    Temp:    SpO2: 98%     Body mass index is 31 33 kg/m²      Intake/Output Summary (Last 24 hours) at 09/29/18 0828  Last data filed at 09/29/18 0630   Gross per 24 hour   Intake              950 ml   Output             2300 ml   Net            -1350 ml       Physical Exam:   /64 (BP Location: Right arm)   Pulse 70   Temp 97 5 °F (36 4 °C) (Temporal)   Resp 19   Ht 5' 5" (1 651 m)   Wt 85 4 kg (188 lb 4 4 oz)   SpO2 98%   BMI 31 33 kg/m²   General appearance: alert and oriented, in no acute distress  Lungs: clear to auscultation bilaterally  Heart: regular rate and rhythm, S1, S2 normal, no murmur, click, rub or gallop  Abdomen:  Soft, nontender, nondistended, decreased bowel sounds, is no rebound guarding or rigidity  Extremities: extremities normal, warm and well-perfused; no cyanosis, clubbing, or edema  Skin:  Incision site is clean dry and intact  Neurologic: Grossly normal     Invasive Devices     Peripheral Intravenous Line            Peripheral IV 09/28/18 Left Antecubital less than 1 day          Drain            Urethral Catheter 16 Fr  2 days                  Results from last 7 days  Lab Units 09/29/18  0638 09/28/18 0441 09/27/18  0438   WBC Thousand/uL 8 32 11 02* 13 36*   HEMOGLOBIN g/dL 8 2* 9 2* 9 9*   HEMATOCRIT % 25 9* 29 6* 31 4*   PLATELETS Thousands/uL 120* 136* 132*         Results from last 7 days  Lab Units 09/29/18  0638 09/28/18 0441 09/27/18  0438  09/26/18  1014   SODIUM mmol/L 141 140 145  < >  --    POTASSIUM mmol/L 2 6* 3 3* 3 2*  < >  --    CHLORIDE mmol/L 106 107 108  < >  --    CO2 mmol/L 28 25 25  < >  --    BUN mg/dL 18 19 17  < >  --    CREATININE mg/dL 1 00 1 06 1 02  < >  --    CALCIUM mg/dL 8 1* 8 3 8 3  < >  --    ALK PHOS U/L 117* 130* 141*  < >  --    ALT U/L 23 24 26  < >  --    AST U/L 24 30 33  < >  --    GLUCOSE, ISTAT mg/dl  --   --   --   --  235*   < > = values in this interval not displayed      Medication Administration - last 24 hours from 09/28/2018 0828 to 09/29/2018 4792       Date/Time Order Dose Route Action Action by     09/28/2018 1748 enoxaparin (LOVENOX) subcutaneous injection 40 mg 40 mg Subcutaneous Given Constanza Rodriguez RN     09/28/2018 1548 ondansetron (ZOFRAN) injection 4 mg 4 mg Intravenous Given Constanza Rodriguez RN     09/28/2018 0959 HYDROmorphone (DILAUDID) 1 mg/mL 50 mL PCA   Intravenous Stopped Constanza Rodriguez RN     09/29/2018 0823 famotidine (PEPCID) injection 20 mg 20 mg Intravenous Given Constanza Rodriguez RN     09/28/2018 2109 famotidine (PEPCID) injection 20 mg 20 mg Intravenous Given Daisha Hurst RN     09/28/2018 6031 famotidine (PEPCID) injection 20 mg 20 mg Intravenous Given [facility], RN     09/29/2018 0639 piperacillin-tazobactam (ZOSYN) 3 375 g in sodium chloride 0 9 % 50 mL IVPB 3 375 g Intravenous [address] Deborah Beal, RN     09/29/2018 0052 piperacillin-tazobactam (ZOSYN) 3 375 g in sodium chloride 0 9 % 50 mL IVPB 3 375 g Intravenous [address] Deborah Beal, RN     09/28/2018 1759 piperacillin-tazobactam (ZOSYN) 3 375 g in sodium chloride 0 9 % 50 mL IVPB 3 375 g Intravenous [place] [facility], RN     09/28/2018 1312 piperacillin-tazobactam (ZOSYN) 3 375 g in sodium chloride 0 9 % 50 mL IVPB 0 g Intravenous Stopped [facility], RN     09/28/2018 1242 piperacillin-tazobactam (ZOSYN) 3 375 g in sodium chloride 0 9 % 50 mL IVPB 3 375 g Intravenous [address] [facility], RN     09/29/2018 1164 metoprolol tartrate (LOPRESSOR) tablet 50 mg 50 mg Oral Given [facility], RN     09/28/2018 2110 metoprolol tartrate (LOPRESSOR) tablet 50 mg 50 mg Oral Given Deborah Beal, RN     09/28/2018 0916 metoprolol tartrate (LOPRESSOR) tablet 50 mg 50 mg Oral Given [facility], RN     09/28/2018 2223 albuterol inhalation solution 2 5 mg 2 5 mg Nebulization Given Gayatri Hazy, RT     09/28/2018 1819 albuterol inhalation solution 2 5 mg 2 5 mg Nebulization Given Gayatri Hazy, RT     09/29/2018 9880 furosemide (LASIX) injection 20 mg 20 mg Intravenous Given [facility], RN     09/28/2018 1745 furosemide (LASIX) injection 20 mg 20 mg Intravenous Given [facility], RN     09/29/2018 4498 saccharomyces boulardii (FLORASTOR) capsule 250 mg 250 mg Oral Given [facility] Come, RN     09/28/2018 1748 saccharomyces boulardii (FLORASTOR) capsule 250 mg 250 mg Oral Given Charisaias Rai RN     09/28/2018 0919 saccharomyces boulardii (FLORASTOR) capsule 250 mg 250 mg Oral Given Julian Rai RN     09/29/2018 0823 vancomycin (VANCOCIN) oral solution 125 mg 125 mg Oral Given Gali Cohen RN     09/28/2018 2159 vancomycin (VANCOCIN) oral solution 125 mg 125 mg Oral Given Magali Torres RN     09/28/2018 0916 vancomycin (VANCOCIN) oral solution 125 mg 125 mg Oral Given Gali Cohen RN     09/28/2018 0916 potassium phosphate 21 mmol in sodium chloride 0 9 % 250 mL infusion 21 mmol Intravenous [address] Gali Cohen RN     09/28/2018 2159 oxyCODONE-acetaminophen (PERCOCET) 5-325 mg per tablet 1 tablet 0 tablet Oral [facility] to [employer], RN     09/29/2018 0736 levalbuterol (XOPENEX) inhalation solution 1 25 mg 1 25 mg Nebulization Given Femi Ivory, RT     09/29/2018 0736 ipratropium (ATROVENT) 0 02 % inhalation solution 0 5 mg 0 5 mg Nebulization Given Femi Ivory, RT     09/28/2018 2208 acetaminophen (TYLENOL) tablet 325 mg 325 mg Oral Given Magali Torres RN            Lab, Imaging and other studies: I have personally reviewed pertinent reports      VTE Pharmacologic Prophylaxis: Enoxaparin (Lovenox)  VTE Mechanical Prophylaxis: sequential compression device     Garry Blakely MD  9/29/2018,8:28 AM

## 2018-09-29 NOTE — PROGRESS NOTES
· The patient's repeat blood work showed persistent hypokalemia with the following results:  Results for Radha Cabrera (MRN [id number]) as of 9/29/2018 15:46   Ref  Range 9/29/2018 15:07   Sodium Latest Ref Range: 136 - 145 mmol/L 141   Potassium Latest Ref Range: 3 5 - 5 3 mmol/L 2 9 (L)   Chloride Latest Ref Range: 100 - 108 mmol/L 105   CO2 Latest Ref Range: 21 - 32 mmol/L 32   Anion Gap Latest Ref Range: 4 - 13 mmol/L 4   BUN Latest Ref Range: 5 - 25 mg/dL 17   Creatinine Latest Ref Range: 0 60 - 1 30 mg/dL 1 30   Glucose Latest Ref Range: 65 - 140 mg/dL 129   Calcium Latest Ref Range: 8 3 - 10 1 mg/dL 8 5   eGFR Latest Units: ml/min/1 73sq m 39     · The patient will be given 20 mEq IV potassium chloride x 2 doses in addition to 40 mEq of p o  potassium chloride this evening

## 2018-09-29 NOTE — OCCUPATIONAL THERAPY NOTE
OT NOTE       09/29/18 0831   Restrictions/Precautions   Other Precautions Multiple lines;Telemetry;O2;Fall Risk   Pain Assessment   Pain Assessment No/denies pain   Pain Score No Pain   ADL   Eating Assistance 5  Supervision/Setup   Eating Deficit Setup  (OOB in chair to eat breakfast with set up )   LB Dressing Assistance 2  Maximal Assistance   LB Dressing Deficit Don/doff R sock; Don/doff L sock; Supervision/safety;Verbal cueing   Bed Mobility   Rolling L 4  Minimal assistance   Supine to Sit 4  Minimal assistance   Additional Comments cues needed for hand placement to grab bedside rail    Transfers   Sit to Stand 5  Supervision  (with rw)   Stand to Sit 5  Supervision  (with rw)   Cognition   Overall Cognitive Status WFL   Arousal/Participation Alert   Attention Within functional limits   Orientation Level Oriented X4   Memory Within functional limits   Following Commands Follows all commands and directions without difficulty   Activity Tolerance   Activity Tolerance Patient limited by fatigue;Patient tolerated treatment well   Assessment   Assessment Patient presenting with decreased strength and endurance limiting her indpendence with functional tasks and mobility  Pt  tolerated session well with no complaints of pain and completed transfers with supervision/min a with rw and cues for safety  Pt  continues to need assistance (max a) to complete LB ADLs  Continue with OT treatment to furthe maximize independence  Plan   Treatment Interventions ADL retraining;Functional transfer training; Endurance training;Patient/family training;UE strengthening/ROM   OT Frequency 2-3x/wk     Pt  OOB in bedside chair with call bell/phone in reach, lines intact and nursing aware  Nursing present in room at end of session       Chase Cagle, OT

## 2018-09-29 NOTE — ASSESSMENT & PLAN NOTE
She received 40mg IV lasix last evening  Start 20mg IV lasix bid (home dose is 20mg PO BID)   Monitor I&O   DC Justice

## 2018-09-30 LAB
ALBUMIN SERPL BCP-MCNC: 2.4 G/DL (ref 3.5–5)
ALP SERPL-CCNC: 133 U/L (ref 46–116)
ALT SERPL W P-5'-P-CCNC: 24 U/L (ref 12–78)
ANION GAP SERPL CALCULATED.3IONS-SCNC: 8 MMOL/L (ref 4–13)
AST SERPL W P-5'-P-CCNC: 22 U/L (ref 5–45)
BASOPHILS # BLD AUTO: 0.02 THOUSANDS/ΜL (ref 0–0.1)
BASOPHILS NFR BLD AUTO: 0 % (ref 0–1)
BILIRUB SERPL-MCNC: 1.2 MG/DL (ref 0.2–1)
BUN SERPL-MCNC: 18 MG/DL (ref 5–25)
CALCIUM SERPL-MCNC: 8.6 MG/DL (ref 8.3–10.1)
CHLORIDE SERPL-SCNC: 106 MMOL/L (ref 100–108)
CO2 SERPL-SCNC: 27 MMOL/L (ref 21–32)
CREAT SERPL-MCNC: 1.22 MG/DL (ref 0.6–1.3)
EOSINOPHIL # BLD AUTO: 0.1 THOUSAND/ΜL (ref 0–0.61)
EOSINOPHIL NFR BLD AUTO: 1 % (ref 0–6)
ERYTHROCYTE [DISTWIDTH] IN BLOOD BY AUTOMATED COUNT: 15.3 % (ref 11.6–15.1)
GFR SERPL CREATININE-BSD FRML MDRD: 42 ML/MIN/1.73SQ M
GLUCOSE SERPL-MCNC: 109 MG/DL (ref 65–140)
HCT VFR BLD AUTO: 29.7 % (ref 34.8–46.1)
HGB BLD-MCNC: 9.1 G/DL (ref 11.5–15.4)
IMM GRANULOCYTES # BLD AUTO: 0.04 THOUSAND/UL (ref 0–0.2)
IMM GRANULOCYTES NFR BLD AUTO: 0 % (ref 0–2)
LYMPHOCYTES # BLD AUTO: 0.89 THOUSANDS/ΜL (ref 0.6–4.47)
LYMPHOCYTES NFR BLD AUTO: 10 % (ref 14–44)
MAGNESIUM SERPL-MCNC: 1.8 MG/DL (ref 1.6–2.6)
MCH RBC QN AUTO: 29.7 PG (ref 26.8–34.3)
MCHC RBC AUTO-ENTMCNC: 30.6 G/DL (ref 31.4–37.4)
MCV RBC AUTO: 97 FL (ref 82–98)
MONOCYTES # BLD AUTO: 0.7 THOUSAND/ΜL (ref 0.17–1.22)
MONOCYTES NFR BLD AUTO: 8 % (ref 4–12)
NEUTROPHILS # BLD AUTO: 7.33 THOUSANDS/ΜL (ref 1.85–7.62)
NEUTS SEG NFR BLD AUTO: 81 % (ref 43–75)
NRBC BLD AUTO-RTO: 1 /100 WBCS
PHOSPHATE SERPL-MCNC: 1.8 MG/DL (ref 2.3–4.1)
PLATELET # BLD AUTO: 159 THOUSANDS/UL (ref 149–390)
PMV BLD AUTO: 10 FL (ref 8.9–12.7)
POTASSIUM SERPL-SCNC: 3.5 MMOL/L (ref 3.5–5.3)
PROT SERPL-MCNC: 6.2 G/DL (ref 6.4–8.2)
RBC # BLD AUTO: 3.06 MILLION/UL (ref 3.81–5.12)
SODIUM SERPL-SCNC: 141 MMOL/L (ref 136–145)
WBC # BLD AUTO: 9.08 THOUSAND/UL (ref 4.31–10.16)

## 2018-09-30 PROCEDURE — 85025 COMPLETE CBC W/AUTO DIFF WBC: CPT | Performed by: FAMILY MEDICINE

## 2018-09-30 PROCEDURE — 83735 ASSAY OF MAGNESIUM: CPT | Performed by: FAMILY MEDICINE

## 2018-09-30 PROCEDURE — 80053 COMPREHEN METABOLIC PANEL: CPT | Performed by: FAMILY MEDICINE

## 2018-09-30 PROCEDURE — 94760 N-INVAS EAR/PLS OXIMETRY 1: CPT

## 2018-09-30 PROCEDURE — 99232 SBSQ HOSP IP/OBS MODERATE 35: CPT | Performed by: FAMILY MEDICINE

## 2018-09-30 PROCEDURE — 84100 ASSAY OF PHOSPHORUS: CPT | Performed by: FAMILY MEDICINE

## 2018-09-30 PROCEDURE — 94640 AIRWAY INHALATION TREATMENT: CPT

## 2018-09-30 PROCEDURE — 97116 GAIT TRAINING THERAPY: CPT | Performed by: PHYSICAL THERAPIST

## 2018-09-30 RX ORDER — WARFARIN SODIUM 10 MG/1
10 TABLET ORAL
Status: DISCONTINUED | OUTPATIENT
Start: 2018-09-30 | End: 2018-10-01 | Stop reason: HOSPADM

## 2018-09-30 RX ORDER — LEVALBUTEROL 1.25 MG/.5ML
1.25 SOLUTION, CONCENTRATE RESPIRATORY (INHALATION)
Status: DISCONTINUED | OUTPATIENT
Start: 2018-09-30 | End: 2018-10-01 | Stop reason: HOSPADM

## 2018-09-30 RX ADMIN — LEVALBUTEROL 1.25 MG: 1.25 SOLUTION, CONCENTRATE RESPIRATORY (INHALATION) at 09:02

## 2018-09-30 RX ADMIN — VANCOMYCIN 125 MG: KIT at 08:52

## 2018-09-30 RX ADMIN — FUROSEMIDE 20 MG: 10 INJECTION, SOLUTION INTRAVENOUS at 08:45

## 2018-09-30 RX ADMIN — PIPERACILLIN SODIUM,TAZOBACTAM SODIUM 3.38 G: 3; .375 INJECTION, POWDER, FOR SOLUTION INTRAVENOUS at 12:04

## 2018-09-30 RX ADMIN — IPRATROPIUM BROMIDE 0.5 MG: 0.5 SOLUTION RESPIRATORY (INHALATION) at 09:02

## 2018-09-30 RX ADMIN — FUROSEMIDE 20 MG: 10 INJECTION, SOLUTION INTRAVENOUS at 16:52

## 2018-09-30 RX ADMIN — OXYCODONE HYDROCHLORIDE AND ACETAMINOPHEN 1 TABLET: 5; 325 TABLET ORAL at 16:51

## 2018-09-30 RX ADMIN — DIBASIC SODIUM PHOSPHATE, MONOBASIC POTASSIUM PHOSPHATE AND MONOBASIC SODIUM PHOSPHATE 1 TABLET: 852; 155; 130 TABLET ORAL at 12:06

## 2018-09-30 RX ADMIN — FAMOTIDINE 20 MG: 10 INJECTION INTRAVENOUS at 08:52

## 2018-09-30 RX ADMIN — IPRATROPIUM BROMIDE 0.5 MG: 0.5 SOLUTION RESPIRATORY (INHALATION) at 21:50

## 2018-09-30 RX ADMIN — ENOXAPARIN SODIUM 40 MG: 40 INJECTION, SOLUTION INTRAVENOUS; SUBCUTANEOUS at 17:03

## 2018-09-30 RX ADMIN — METOPROLOL TARTRATE 50 MG: 50 TABLET ORAL at 08:52

## 2018-09-30 RX ADMIN — LEVALBUTEROL 1.25 MG: 1.25 SOLUTION, CONCENTRATE RESPIRATORY (INHALATION) at 21:50

## 2018-09-30 RX ADMIN — METOPROLOL TARTRATE 50 MG: 50 TABLET ORAL at 21:49

## 2018-09-30 RX ADMIN — WARFARIN SODIUM 10 MG: 10 TABLET ORAL at 17:02

## 2018-09-30 RX ADMIN — ALBUTEROL SULFATE 2.5 MG: 2.5 SOLUTION RESPIRATORY (INHALATION) at 03:54

## 2018-09-30 RX ADMIN — Medication 250 MG: at 08:52

## 2018-09-30 RX ADMIN — VANCOMYCIN 125 MG: KIT at 22:06

## 2018-09-30 RX ADMIN — DIBASIC SODIUM PHOSPHATE, MONOBASIC POTASSIUM PHOSPHATE AND MONOBASIC SODIUM PHOSPHATE 1 TABLET: 852; 155; 130 TABLET ORAL at 16:52

## 2018-09-30 RX ADMIN — PIPERACILLIN SODIUM,TAZOBACTAM SODIUM 3.38 G: 3; .375 INJECTION, POWDER, FOR SOLUTION INTRAVENOUS at 23:56

## 2018-09-30 RX ADMIN — PIPERACILLIN SODIUM,TAZOBACTAM SODIUM 3.38 G: 3; .375 INJECTION, POWDER, FOR SOLUTION INTRAVENOUS at 17:56

## 2018-09-30 RX ADMIN — FAMOTIDINE 20 MG: 10 INJECTION INTRAVENOUS at 21:50

## 2018-09-30 RX ADMIN — PIPERACILLIN SODIUM,TAZOBACTAM SODIUM 3.38 G: 3; .375 INJECTION, POWDER, FOR SOLUTION INTRAVENOUS at 05:07

## 2018-09-30 RX ADMIN — Medication 250 MG: at 17:03

## 2018-09-30 NOTE — OP NOTE
OPERATIVE REPORT  PATIENT NAME: Mindy Varner    :  1940  MRN: [id number]  Pt Location: [facility] OR ROOM 02    SURGERY DATE: 2018    Surgeon(s) and Role: Ursula Casey,  - Primary     * Juany Da Silva PA-C - Assisting    Preop Diagnosis:  * No pre-op diagnosis entered *    * No Diagnosis Codes entered *    Procedure(s) (LRB):  LAPAROTOMY EXPLORATORY WITH HEMICOLECTOMY (N/A)    Specimen(s):  * No specimens in log *    Estimated Blood Loss:   100 mL    Drains:       Anesthesia Type:   * No anesthesia type entered *    Operative Indications:  * No pre-op diagnosis entered *  Free intra-abdominal air  Perforated viscus    Operative Findings:  Significant amount intra abdominal free air  Normal appearing rectum, sigmoid, descending, splenic flexure, transverse colon  Noted retroperitoneal and also mesenteric air involving  the right colon  Noted distended right colon with some noted areas of mild inflammation splaying of the serosa/tinea    Complications:   None    Procedure and Technique:  Patient was brought emergently into the operating arena and placed supine on operating table  All monitoring devices secured  Patient was emergently intubated in the GI suite after undergoing colonoscopy  Free air and perforation was suspected and then patient became unstable from a pulmonary perspective and there were issues with oxygenating the patient due to massive intra-abdominal free air  Once the patient was secured to the operating table in the ET tube was confirmed that it was still in place, the patient was then prepped and draped in usual sterile fashion  A very quick time-out verifying correct patient and procedure was then initiated  Using a 10 blade scalpel infraumbilically midline incision was then made down through the dermis and subcutaneous fat  Using 2 laps the subcutaneous fat was then dissected and pulled apart until the fascia became clearly visible    A nick was made in the fascia using Bovie electrocautery and then the peritoneum was then opened quickly with a finger fracture technique  It was clear that time that the peritoneum was bulging due to the massive amount of free air  Once the peritoneum was then entered a significant amount of free air was then evacuated  At that time the patient became hemodynamically stable and was easily able to be oxygenated  The remainder of the fascia and peritoneum were then taken down sharply  Hemostasis was achieved  Care was taken not to injure any underlying viscera  The incision was extended down to just above the pubic tubercle and then cephalad just above the umbilicus  The Bookwalter was then placed allowing for exposure  And the small intestine was then packed cephalad using a moist towel  Inspection again at the rectum  The rectum appeared normal   There was some tattooing noted at the rectal sigmoid junction  This was then followed proximally and the sigmoid colon appeared normal   The; was then dissected free in the white line of Toldt was taken down along the left lateral portion of the abdominal wall we have to the splenic flexure  The colon through the sigmoid and descending appeared normal  No evidence of inflammation or obvious perforation  This blood collection was then taken down  The the gastrocolic ligament was also then taken down  The transverse colon and splenic flexure appeared viable and healthy without perforation  Attention was then turned to the right pericolic gutter  That time the ascending colon look somewhat inflamed in addition to significant amount of air within the mesentery and retroperitoneal tissues  The white line of Toldt was then taken down on the right side and this was done easily due to the dissection recreated by the free air  This was a carried distally in the a pack flexure was then taken down  There is some areas at the hepatic flexure would look somewhat erythematous and inflamed    There appeared to be some splaying of the serosal in teniae a  Dissection was then carried down proximally towards the cecum  At that point the cecum was noted to be adhesed in the pelvis and overlying the sigmoid  It appears that this is likely secondary to the previous tattoo of the rectosigmoid junction  Adhesions were taken down with a combination of blunt and sharp technique  Any bleeding points were stopped  Once the cecum was freely mobile it was then brought medial cephalad  The pelvis was then filled with saline  In the sigmoid was then clamped and squeezed however there was no obvious air  Additional saline was then placed along the right pericolic gutter  The right colon was then submerged and there appeared to be a few droplets of air bubbles noted along the ascending/hepatic flexure  Given these findings was decided to perform right hemicolectomy  The right colon was then elevated and moved medially  The TI was freely mobile  Transection points were then determined at the distal terminal ileum in addition to the proximal transverse colon  A handheld Doppler was then used to confirm adequate blood flow to the transverse colon  The middle colic artery was then identified  Initial transection was made using a CELI 100 with a blue load at the proximal transverse colon  Care was taken spread middle colic artery and its associated branches  Second transection point at the distal TI was then performed using the same CELI stapler  Mesentery was then taken down using the EnSeal energy device  Was performed the TI and right colon were then passed off the field  Portion the mesentery that was transected was also noted be continued bleeding  Multiple attempts to control bleeding using Vicryl suture in addition to the EnSeal were attempted  Once the bleeding was under control, however the mesentery looked somewhat congested    It was decided take 5 more centimeters of small bowel and associated mesentery  Once this was done both transection points were evaluated and appeared healthy and viable  A side-to-side functional and end anastomosis was then performed with the transverse colon and the terminal ileum  Prior to doing this the small bowel was noted to be in correct orientation  The anastomosis was done using CELI 100 stapler  The common channel was then closed with a TIA 60 stapler with a blue load  Two crotch stitches were placed using 3 0 Vicryl to take pressure off the staple line  Of note prior to closing the common channel the bowel was evaluated and there is no intraluminal bleeding  The abdominal cavity was then washed out with at least 3 liters of normal saline  Any small bleeding points were controlled  There was good hemostasis at the end of the case  Midline abdominal incision fascia was then closed with two #1 Looped PDS sutures  The dermis was then aligned with interrupted 3 0 Vicryl sutures  The skin was then approximated with staples  A Mepilex dressing was then placed  The patient tolerated procedure well and was taken to the postanesthesia care unit stable condition  All lap, needle, and sponge counts were correct  I was present for the entire procedure and A qualified resident physician was not , HAI Morocho was required for adequate exposure retraction of the case      Patient Disposition:  PACU     SIGNATURE: Dejuan Nieves DO  DATE: September 30, 2018  TIME: 1:31 PM

## 2018-09-30 NOTE — ASSESSMENT & PLAN NOTE
Status post right hemicolectomy  Management per primary surgical  NG tube has been discontinued on 09/27/2018  Clear liquid diet 09/27/2018  Regular diet 09/29/2018  DC dilaudid PCA today  Out of bed, incentive spirometry, PT/OT

## 2018-09-30 NOTE — ASSESSMENT & PLAN NOTE
She received 40mg IV lasix last evening  Start 20mg IV lasix bid (home dose is 20mg PO BID) 9/28/18  Monitor I&O   WILMA Justice

## 2018-09-30 NOTE — PROGRESS NOTES
Progress Note - Grecia Boss 1940, 77 y o  female MRN: [id number]    Unit/Bed#: 426-01 Encounter: [id number]    Primary Care Provider: Elidia Escoto MD   Date and time admitted to hospital: 9/26/2018  7:05 AM        * Perforated viscus   Assessment & Plan    Status post right hemicolectomy  Management per primary surgical  NG tube has been discontinued on 09/27/2018  Clear liquid diet 09/27/2018  Regular diet 09/29/2018  DC dilaudid PCA today  Out of bed, incentive spirometry, PT/OT     Acute on chronic diastolic (congestive) heart failure ([facility] [address] )   Assessment & Plan    She received 40mg IV lasix last evening  Start 20mg IV lasix bid (home dose is 20mg PO BID) 9/28/18  Monitor I&O   DC Justice     Atrial flutter ([facility] [address] )   Assessment & Plan    Transitioned to p o  Metoprolol  DC IV metoprolol  Will give a 1 time dose of 10 mg of Coumadin tonight and then resume normal Coumadin dosing  Check INR tomorrow           Progress Note - Grecia Boss 77 y o  female MRN: [id number]    Unit/Bed#: 426-01 Encounter: [id number]        Subjective:   Seen and examined @ bedside  Pain controlled  Passing gas and moving bowels     Objective:     Vitals:   Vitals:    09/30/18 0913   BP:    Pulse:    Resp:    Temp:    SpO2: 97%     Body mass index is 31 66 kg/m²      Intake/Output Summary (Last 24 hours) at 09/30/18 1028  Last data filed at 09/30/18 0901   Gross per 24 hour   Intake              500 ml   Output              350 ml   Net              150 ml       Physical Exam:   /64 (BP Location: Left arm)   Pulse 81   Temp (!) 97 4 °F (36 3 °C) (Temporal)   Resp 18   Ht 5' 5" (1 651 m)   Wt 86 3 kg (190 lb 4 1 oz)   SpO2 97%   BMI 31 66 kg/m²   General appearance: alert and oriented, in no acute distress  Lungs: clear to auscultation bilaterally  Heart: regular rate and rhythm, S1, S2 normal, no murmur, click, rub or gallop  Abdomen: soft, non-tender; bowel sounds normal; no masses,  no organomegaly  Extremities: extremities normal, warm and well-perfused; no cyanosis, clubbing, or edema  Neurologic: Grossly normal     Invasive Devices     Peripheral Intravenous Line            Peripheral IV 09/28/18 Left Antecubital 1 day                  Results from last 7 days  Lab Units 09/30/18  0428 09/29/18  1507 09/29/18  0638   WBC Thousand/uL 9 08 9 90 8 32   HEMOGLOBIN g/dL 9 1* 9 2* 8 2*   HEMATOCRIT % 29 7* 29 6* 25 9*   PLATELETS Thousands/uL 159 159 120*         Results from last 7 days  Lab Units 09/30/18  0428 09/29/18  1507 09/29/18  0638 09/28/18  0441  09/26/18  1014   SODIUM mmol/L 141 141 141 140  < >  --    POTASSIUM mmol/L 3 5 2 9* 2 6* 3 3*  < >  --    CHLORIDE mmol/L 106 105 106 107  < >  --    CO2 mmol/L 27 32 28 25  < >  --    BUN mg/dL 18 17 18 19  < >  --    CREATININE mg/dL 1 22 1 30 1 00 1 06  < >  --    CALCIUM mg/dL 8 6 8 5 8 1* 8 3  < >  --    ALK PHOS U/L 133*  --  117* 130*  < >  --    ALT U/L 24  --  23 24  < >  --    AST U/L 22  --  24 30  < >  --    GLUCOSE, ISTAT mg/dl  --   --   --   --   --  235*   < > = values in this interval not displayed      Medication Administration - last 24 hours from 09/29/2018 1028 to 09/30/2018 1028       Date/Time Order Dose Route Action Action by     09/29/2018 1839 enoxaparin (LOVENOX) subcutaneous injection 40 mg   Subcutaneous MAR Unhold Zorita Hoguet, DO     09/29/2018 1824 enoxaparin (LOVENOX) subcutaneous injection 40 mg   Subcutaneous MAR Hold Automatic Transfer Provider     09/29/2018 1809 enoxaparin (LOVENOX) subcutaneous injection 40 mg 40 mg Subcutaneous Given Boby Washington RN     09/29/2018 1839 ondansetron (ZOFRAN) injection 4 mg   Intravenous MAR Unhold Zorita Hoguet, DO     09/29/2018 1824 ondansetron (ZOFRAN) injection 4 mg   Intravenous MAR Hold Automatic Transfer Provider     09/29/2018 1526 ondansetron (ZOFRAN) injection 4 mg 4 mg Intravenous Given Boby Washington RN     09/29/2018 1837 naloxone (NARCAN) 0 04 mg/mL syringe 0 04 mg   Intravenous MAR Unhold River Kimberlee, DO     09/29/2018 1824 naloxone (NARCAN) 0 04 mg/mL syringe 0 04 mg   Intravenous MAR Hold Automatic Transfer Provider     09/30/2018 0852 famotidine (PEPCID) injection 20 mg 20 mg Intravenous Given Lindsey Husbands, RN     09/29/2018 2121 famotidine (PEPCID) injection 20 mg 20 mg Intravenous Given Dewey Sellers, MARIA DE JESUS     09/29/2018 1839 famotidine (PEPCID) injection 20 mg   Intravenous MAR Unhold Pillo Miranda, DO     09/29/2018 1824 famotidine (PEPCID) injection 20 mg   Intravenous MAR Hold Automatic Transfer Provider     09/30/2018 0507 piperacillin-tazobactam (ZOSYN) 3 375 g in sodium chloride 0 9 % 50 mL IVPB 3 375 g Intravenous 909 [facility], RN     09/29/2018 2354 piperacillin-tazobactam (ZOSYN) 3 375 g in sodium chloride 0 9 % 50 mL IVPB 3 375 g Intravenous 909 [facility], RN     09/29/2018 1851 piperacillin-tazobactam (ZOSYN) 3 375 g in sodium chloride 0 9 % 50 mL IVPB 3 375 g Intravenous [address] Dewey Sellers, MARIA DE JESUS     09/29/2018 1839 piperacillin-tazobactam (ZOSYN) 3 375 g in sodium chloride 0 9 % 50 mL IVPB   Intravenous MAR Unhold River Kimberlee, DO     09/29/2018 1824 piperacillin-tazobactam (ZOSYN) 3 375 g in sodium chloride 0 9 % 50 mL IVPB   Intravenous MAR Hold Automatic Transfer Provider     09/29/2018 1333 piperacillin-tazobactam (ZOSYN) 3 375 g in sodium chloride 0 9 % 50 mL IVPB 3 375 g Intravenous [address] Melissa Zuleta, MARIA DE JESUS     09/30/2018 7412 metoprolol tartrate (LOPRESSOR) tablet 50 mg 50 mg Oral Given Tiffany Husbands, RN     09/29/2018 2121 metoprolol tartrate (LOPRESSOR) tablet 50 mg 50 mg Oral Given Dewey Sellers, MARIA DE JESUS     09/29/2018 1839 metoprolol tartrate (LOPRESSOR) tablet 50 mg   Oral MAR Unhold Pillo Miranda,      09/29/2018 1824 metoprolol tartrate (LOPRESSOR) tablet 50 mg   Oral MAR Hold Automatic Transfer Provider     09/30/2018 0354 albuterol inhalation solution 2 5 mg 2 5 mg Nebulization Given Adrien Issa, RT     09/29/2018 1839 albuterol inhalation solution 2 5 mg   Nebulization MAR Unhold Alma Rosa Ruiz, DO     09/29/2018 1824 albuterol inhalation solution 2 5 mg   Nebulization MAR Hold Automatic Transfer Provider     09/30/2018 0845 furosemide (LASIX) injection 20 mg 20 mg Intravenous Given Jacinta Una, RN     09/29/2018 1839 furosemide (LASIX) injection 20 mg   Intravenous MAR Unhold Alma Rosa Ruiz, DO     09/29/2018 1824 furosemide (LASIX) injection 20 mg   Intravenous MAR Hold Automatic Transfer Provider     09/29/2018 1627 furosemide (LASIX) injection 20 mg 20 mg Intravenous Given Cr Inches, RN     09/30/2018 0805 saccharomyces boulardii (FLORASTOR) capsule 250 mg 250 mg Oral Given Jacinta Schteressar, RN     09/29/2018 1839 saccharomyces boulardii (FLORASTOR) capsule 250 mg   Oral MAR Unhold [facility], DO     09/29/2018 1824 saccharomyces boulardii (FLORASTOR) capsule 250 mg   Oral [facility] Hold Automatic Transfer Provider     09/29/2018 1809 saccharomyces boulardii (FLORASTOR) capsule 250 mg 250 mg Oral Given Creighton Inches, RN     09/30/2018 0852 vancomycin (VANCOCIN) oral solution 125 mg 125 mg Oral Given Jacinta Schiller, RN     09/29/2018 2121 vancomycin (VANCOCIN) oral solution 125 mg 125 mg Oral Given Felicia Garciaoswald, RN     09/29/2018 1839 vancomycin (VANCOCIN) oral solution 125 mg   Oral MAR Unhold Alma Rosa Ruiz, DO     09/29/2018 1824 vancomycin (VANCOCIN) oral solution 125 mg   Oral MAR Hold Automatic Transfer Provider     09/29/2018 1839 oxyCODONE-acetaminophen (PERCOCET) 5-325 mg per tablet 1 tablet   Oral MAR Unhold Alma Rosa Ruiz, DO     09/29/2018 1824 oxyCODONE-acetaminophen (PERCOCET) 5-325 mg per tablet 1 tablet   Oral MAR Hold Automatic Transfer Provider     09/29/2018 1526 oxyCODONE-acetaminophen (PERCOCET) 5-325 mg per tablet 1 tablet 1 tablet Oral Given Cr Inches, RN     09/29/2018 1839 morphine injection 2 mg   Intravenous MAR Kaylie Bingham [place], DO     09/29/2018 1824 morphine injection 2 mg   Intravenous MAR Hold Automatic Transfer Provider     09/30/2018 0902 levalbuterol (XOPENEX) inhalation solution 1 25 mg 1 25 mg Nebulization Given Celeste A Monika, RT     09/29/2018 2127 levalbuterol (Cinthya Luke) inhalation solution 1 25 mg 1 25 mg Nebulization Given Rafael Langley, MARIA DE JESUS     09/29/2018 1839 levalbuterol (XOPENEX) inhalation solution 1 25 mg   Nebulization MAR Unhold River Mohan, DO     09/29/2018 1824 levalbuterol (XOPENEX) inhalation solution 1 25 mg   Nebulization MAR Hold Automatic Transfer Provider     09/29/2018 1322 levalbuterol (XOPENEX) inhalation solution 1 25 mg 1 25 mg Nebulization Given Celeste A Monika, RT     09/30/2018 0902 ipratropium (ATROVENT) 0 02 % inhalation solution 0 5 mg 0 5 mg Nebulization Given Celeste A Monika, RT     09/29/2018 2127 ipratropium (ATROVENT) 0 02 % inhalation solution 0 5 mg 0 5 mg Nebulization Given Rafael Langley, RN     09/29/2018 1839 ipratropium (ATROVENT) 0 02 % inhalation solution 0 5 mg   Nebulization MAR Unhold River Mohan, DO     09/29/2018 1824 ipratropium (ATROVENT) 0 02 % inhalation solution 0 5 mg   Nebulization MAR Hold Automatic Transfer Provider     09/29/2018 1322 ipratropium (ATROVENT) 0 02 % inhalation solution 0 5 mg 0 5 mg Nebulization Given Celeste A Monika, RT     09/29/2018 1809 potassium chloride 10 % oral solution 40 mEq 40 mEq Oral Given Jessica Thomas RN     09/29/2018 1851 potassium chloride 20 mEq IVPB (premix) 20 mEq Intravenous [address] Rafael Langley RN     09/29/2018 1627 potassium chloride 20 mEq IVPB (premix) 20 mEq Intravenous New Bag Jessica Thomas RN            Lab, Imaging and other studies: I have personally reviewed pertinent reports      VTE Pharmacologic Prophylaxis: Warfarin (Coumadin)  VTE Mechanical Prophylaxis: sequential compression device     Guzman Cruz MD  9/30/2018,10:28 AM

## 2018-09-30 NOTE — PHYSICAL THERAPY NOTE
PHYSICAL THERAPY NOTE          Patient Name: Marizol Nguyen  Today's Date: 9/30/2018 09/30/18 1230   Note Type   Note type Progress   Pain Assessment   Pain Assessment No/denies pain   Pain Score No Pain   Transfers   Sit to Stand 5  Supervision   Additional items Armrests; Increased time required   Stand to Sit 5  Supervision   Additional items Armrests   Ambulation/Elevation   Gait pattern Short stride;Redundant gait at times; Inconsistent karlos   Gait Assistance 5  Supervision   Additional items Verbal cues; Tactile cues  (cues for directional and safety awareness)   Assistive Device Straight cane   Distance 50ft x1   Stair Management Assistance Not tested   Balance   Static Sitting Good   Dynamic Sitting Good   Static Standing Fair   Ambulatory Fair   Endurance Deficit   Endurance Deficit Yes   Endurance Deficit Description SpO2 89-90 while ambulating on RA, 95% on RA prior to activity  Returned to 2L O2 per nursing request at end of treatment   Activity Tolerance   Activity Tolerance Patient limited by fatigue   Nurse Made Aware yes   Assessment   Prognosis Good   Problem List Decreased strength;Decreased endurance; Impaired balance;Decreased safety awareness   Assessment Arun Boyle was able to perform all mobility on room air today  She was found between 89-90%  Dtr present t o treament who reports she does not require supplemental O2 at home  Patient able to ambulate with [facility] today, but requires cuing to maintain safe use of cane while ambulating  Ambulation complicqated by impaired eyesight  She will continue to benefit from skilled PT to address remaining impairments  Barriers to Discharge None   Plan   Treatment/Interventions Functional transfer training;LE strengthening/ROM; Therapeutic exercise; Endurance training;Bed mobility;Gait training   PT Frequency 5x/wk   Recommendation   Recommendation Home PT   PT - OK to Discharge No

## 2018-10-01 VITALS
WEIGHT: 186.95 LBS | OXYGEN SATURATION: 95 % | TEMPERATURE: 96.6 F | SYSTOLIC BLOOD PRESSURE: 138 MMHG | RESPIRATION RATE: 20 BRPM | HEIGHT: 65 IN | HEART RATE: 82 BPM | DIASTOLIC BLOOD PRESSURE: 81 MMHG | BODY MASS INDEX: 31.15 KG/M2

## 2018-10-01 LAB
INR PPP: 1.49 (ref 0.86–1.17)
PROTHROMBIN TIME: 17.3 SECONDS (ref 11.8–14.2)

## 2018-10-01 PROCEDURE — 94760 N-INVAS EAR/PLS OXIMETRY 1: CPT

## 2018-10-01 PROCEDURE — 85610 PROTHROMBIN TIME: CPT | Performed by: FAMILY MEDICINE

## 2018-10-01 PROCEDURE — 99024 POSTOP FOLLOW-UP VISIT: CPT | Performed by: SURGERY

## 2018-10-01 PROCEDURE — 99239 HOSP IP/OBS DSCHRG MGMT >30: CPT | Performed by: FAMILY MEDICINE

## 2018-10-01 PROCEDURE — 97110 THERAPEUTIC EXERCISES: CPT

## 2018-10-01 PROCEDURE — 97116 GAIT TRAINING THERAPY: CPT

## 2018-10-01 PROCEDURE — 97530 THERAPEUTIC ACTIVITIES: CPT

## 2018-10-01 PROCEDURE — 94761 N-INVAS EAR/PLS OXIMETRY MLT: CPT

## 2018-10-01 RX ORDER — OXYCODONE HYDROCHLORIDE AND ACETAMINOPHEN 5; 325 MG/1; MG/1
1 TABLET ORAL EVERY 4 HOURS PRN
Qty: 20 TABLET | Refills: 0 | Status: SHIPPED | OUTPATIENT
Start: 2018-10-01 | End: 2018-10-11

## 2018-10-01 RX ADMIN — PIPERACILLIN SODIUM,TAZOBACTAM SODIUM 3.38 G: 3; .375 INJECTION, POWDER, FOR SOLUTION INTRAVENOUS at 05:07

## 2018-10-01 RX ADMIN — FAMOTIDINE 20 MG: 10 INJECTION INTRAVENOUS at 08:40

## 2018-10-01 RX ADMIN — Medication 250 MG: at 08:40

## 2018-10-01 RX ADMIN — METOPROLOL TARTRATE 50 MG: 50 TABLET ORAL at 08:39

## 2018-10-01 RX ADMIN — VANCOMYCIN 125 MG: KIT at 08:56

## 2018-10-01 RX ADMIN — DIBASIC SODIUM PHOSPHATE, MONOBASIC POTASSIUM PHOSPHATE AND MONOBASIC SODIUM PHOSPHATE 1 TABLET: 852; 155; 130 TABLET ORAL at 08:39

## 2018-10-01 RX ADMIN — OXYCODONE HYDROCHLORIDE AND ACETAMINOPHEN 1 TABLET: 5; 325 TABLET ORAL at 13:44

## 2018-10-01 RX ADMIN — PIPERACILLIN SODIUM,TAZOBACTAM SODIUM 3.38 G: 3; .375 INJECTION, POWDER, FOR SOLUTION INTRAVENOUS at 12:29

## 2018-10-01 RX ADMIN — FUROSEMIDE 20 MG: 10 INJECTION, SOLUTION INTRAVENOUS at 08:40

## 2018-10-01 RX ADMIN — DIBASIC SODIUM PHOSPHATE, MONOBASIC POTASSIUM PHOSPHATE AND MONOBASIC SODIUM PHOSPHATE 1 TABLET: 852; 155; 130 TABLET ORAL at 12:27

## 2018-10-01 NOTE — PROGRESS NOTES
Progress Note - General Surgery   Broxton Christopher 77 y o  female MRN: [id number]  Unit/Bed#: 426-01 Encounter: [id number]    Assessment:  Postop day 5  Status post exploratory laparotomy with right hemicolectomy and primary anastomosis    Plan:  Patient is doing well  Can be discharged home  Follow-up in the office with Dr Marc Kurtz has been scheduled for October 11th at 1:45 p m  Surgical staples to remain in place will be removed in the office next week  May shower at home  Avoid tub bath  Resume diet as tolerated  No heavy lifting more than 10 lb  Incentive spirometry to be continued at home after discharge  Patient has been restarted back on warfarin  Follow-up with the primary care provider for management  Subjective/Objective   Chief Complaint:  Patient is doing well  She had a bowel movement this morning  She offers no new complaints at this time  Subjective:  Patient has minimal abdominal discomfort around her incision  She had a bowel movement this morning  She continues to pass gas  She offers no new complaints this time  Nausea has improved  She is tolerating diet without difficulty      Scheduled Meds:  Current Facility-Administered Medications:  albuterol 2 5 mg Nebulization Q4H PRN Italia Bunn MD    enoxaparin 40 mg Subcutaneous Daily Frederick Lee, DO    famotidine 20 mg Intravenous Q12H [facility] Tila Stanton MD    furosemide 20 mg Intravenous BID (diuretic) Tila Stanton MD    ipratropium 0 5 mg Nebulization BID Tila Stanton MD    levalbuterol 1 25 mg Nebulization BID Tila Stanton MD    metoprolol tartrate 50 mg Oral Q12H [facility] Tila Stanton MD    morphine injection 2 mg Intravenous Q3H PRN Frederick Lee, DO    naloxone 0 04 mg Intravenous Q1MIN PRN Frederick Lee, DO    ondansetron 4 mg Intravenous Q6H PRN Frederick Lee, DO    oxyCODONE-acetaminophen 1 tablet Oral Q6H PRN Eric Owens DO    piperacillin-tazobactam 3 375 g Intravenous Q6H Tila Stanton MD Last Rate: 3 375 g (10/01/18 1763)   potassium-sodium phosphateS 1 tablet Oral TID With Meals Tila Don MD    saccharomyces boulardii 250 mg Oral BID Salma Blake MD    vancomycin 125 mg Oral Q12H [address] Salma Blake MD    warfarin 10 mg Oral Daily (warfarin) Salma Blake MD      Continuous Infusions:   PRN Meds:   albuterol    morphine injection    naloxone    ondansetron    oxyCODONE-acetaminophen      Objective:     Blood pressure 138/81, pulse 82, temperature (!) 96 6 °F (35 9 °C), temperature source Temporal, resp  rate 20, height 5' 5" (1 651 m), weight 84 8 kg (186 lb 15 2 oz), SpO2 96 %  ,Body mass index is 31 11 kg/m²  Intake/Output Summary (Last 24 hours) at 10/01/18 1131  Last data filed at 10/01/18 0948   Gross per 24 hour   Intake              530 ml   Output             1450 ml   Net             -920 ml       Invasive Devices     Peripheral Intravenous Line            Peripheral IV 10/01/18 Right Wrist less than 1 day                Physical Exam:  Patient is awake sitting up in bedside chair  Her  is present with her  Skin warm and dry to touch  No pallor or jaundice  HEENT unremarkable  Heart regular rate and rhythm  Lungs clear to auscultation  Back no CVA tenderness  Abdomen is obese in configuration  Positive bowel sounds are heard 4 quadrants  Abdomen is soft  Surgical staples are clean and dry, intact across the mid abdominal incision  The abdomen is round and distended  Minimal incisional tenderness to touch  No wound disruption or drainage seen  No calf tenderness or peripheral edema  Ambulation was not observed  Lab, Imaging and other studies:  I have personally reviewed pertinent lab results    , CBC: No results found for: WBC, HGB, HCT, MCV, PLT, ADJUSTEDWBC, MCH, MCHC, RDW, MPV, NRBC, CMP: No results found for: NA, K, CL, CO2, ANIONGAP, BUN, CREATININE, GLUCOSE, CALCIUM, AST, ALT, ALKPHOS, PROT, ALBUMIN, BILITOT, EGFR, Coagulation:   Lab Results   Component Value Date    INR 1 49 (H) 10/01/2018     VTE Pharmacologic Prophylaxis: Enoxaparin (Lovenox) and Warfarin (Coumadin)  VTE Mechanical Prophylaxis: sequential compression device     Time spent by this provider was 25 minutes this morning including preparation of discharge instruction common examination patient  Personal discussion the patient management with Dr Sue Beaulieu  Review of diagnostic imaging laboratory studies was also conducted      Branden Mcgowan PA-C

## 2018-10-01 NOTE — PHYSICAL THERAPY NOTE
PT treatment Note      10/01/18 1127   Restrictions/Precautions   Other Precautions Telemetry;O2   Subjective   Subjective Reports she is feeling better  Would like to ambulate and use the bathroom  States she has one JAMIA her home and then plans to stay on one floor and use the [facility]  Bed Mobility   Additional Comments OOB in chair at start and end of pt session   Transfers   Sit to Stand 5  Supervision   Additional items Armrests   Stand to Sit 5  Supervision   Additional items Armrests   Stand pivot 5  Supervision   Toilet transfer 5  Supervision   Additional Comments assist with hygiene  Able to manage clothing   Ambulation/Elevation   Gait pattern Short stride   Gait Assistance 5  Supervision   Additional items Assist x 1   Assistive Device Straight cane   Distance 85' x 2 with seated rest break   Balance   Static Sitting Good   Dynamic Sitting Good   Static Standing Fair   Dynamic Standing Fair   Ambulatory Fair   Endurance Deficit   Endurance Deficit Yes   Endurance Deficit Description SpO2 98-94% with 1 L, HR 81-92 with no SOB   Activity Tolerance   Activity Tolerance Patient limited by fatigue   Exercises   Hip Flexion 20 reps   Hip Abduction 20 reps   Hip Adduction 20 reps   Knee AROM Long Arc Quad 20 reps   Ankle Pumps 20 reps   Assessment   Prognosis Good   Problem List Decreased strength;Decreased endurance; Impaired balance;Decreased mobility   Assessment Pt  ambulating with SPC  fair stability with no LOB  Improving strength and endurance  Pt  would benefit from home care services when d/c    Plan   Treatment/Interventions Functional transfer training;LE strengthening/ROM; Therapeutic exercise;Elevations; Bed mobility;Gait training   Progress Progressing toward goals   Recommendation   Recommendation Home PT   Pt  OOB in chair  with call bell within reach, scd's connected and turned on and alarm on at end of PT session   Discussed with rehab team  today's treatment and patient's current level of function for care coordination

## 2018-10-01 NOTE — PLAN OF CARE
Problem: PHYSICAL THERAPY ADULT  Goal: Performs mobility at highest level of function for planned discharge setting  See evaluation for individualized goals  Outcome: Progressing  Prognosis: Good  Problem List: Decreased strength, Decreased endurance, Impaired balance, Decreased mobility  Assessment: Pt  ambulating with SPC  fair stability with no LOB  Improving strength and endurance  Pt  would benefit from home care services when d/c   Barriers to Discharge: None     Recommendation: Home PT     PT - OK to Discharge: No    See flowsheet documentation for full assessment

## 2018-10-01 NOTE — DISCHARGE SUMMARY
Discharge Summary - Ginny Gooden 77 y o  female MRN: [id number]    Unit/Bed#: 426-01 Encounter: [id number]    Admission Date:   Admission Orders     Ordered        09/26/18 1556  Inpatient Admission  Once               Admitting Diagnosis: Tubulovillous adenoma of colon [D12 6]    HPI: Patient is a pleasant 79-year-old female past medical history significant for diverticular disease was undergoing an outpatient colonoscopy  After colonoscopy patient was noted to have a distended abdomen, chest x-ray revealed free air under the diaphragm  Surgical team medially contacted and the patient required emergent laparotomy with right hemicolectomy  Patient seen and examined in PACU  She is in no acute distress  Pain is well controlled  Vitals are stable  Laboratory values are currently pending but CBC/CMP/protime/Mag has been ordered  Procedures Performed: No orders of the defined types were placed in this encounter  Summary of Hospital Course:     Perforated viscus:  Patient was emergently taken to the operating room for exploratory laparotomy  She underwent a right hemicolectomy  She was monitored in ICU  Diet was slowly advanced  IV fluids /pain control/antiemetics were administered  Patient had mild blood loss  Ultimately she was transitioned out of ICU to the medical floor where her diet was advanced and tolerated, pain was well controlled and patient was ambulating well  She will be discharged home with outpatient follow-up with GI and surgery  Acute respiratory failure with hypoxia secondary to acute on chronic diastolic congestive heart failure  Patient was started on Lasix 20 mg IV b i d   Close monitoring of intake and output along with daily weights    Clinical symptoms improved however patient will be discharged on home oxygen with outpatient follow up with cardiology (later in the week)    Significant Findings, Care, Treatment and Services Provided:     Complications: Perforated viscus during colonoscopy    Discharge Diagnosis:  Perforated viscus  Acute on chronic diastolic congestive heart failure  Acute respiratory failure with hypoxia    Resolved Problems  Date Reviewed: 7/19/2018          Resolved    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     Resolved by  Tamela Alexander MD          Condition at Discharge: fair         Discharge instructions/Information to patient and family:   See after visit summary for information provided to patient and family  Provisions for Follow-Up Care:  See after visit summary for information related to follow-up care and any pertinent home health orders  PCP: Pierre Worrell MD    Disposition: Home    Planned Readmission: No    Discharge Statement   I spent 60 minutes discharging the patient  This time was spent on the day of discharge  I had direct contact with the patient on the day of discharge  Additional documentation is required if more than 30 minutes were spent on discharge  Discharge Medications:  See after visit summary for reconciled discharge medications provided to patient and family

## 2018-10-01 NOTE — RESPIRATORY THERAPY NOTE
Home Oxygen Qualifying Test       Patient name: Lolly Friedman        : 1940   Date of Test:  2018  Diagnosis: Perforated Viscus     Home Oxygen Test:    **Medicare Guidelines require item(s) 1-5 on all ambulatory patients or 1 and 2 on non-ambulatory patients  1   Baseline SPO2 on Room Air at rest 91 %  2   SPO2 during exercise on Room Air 88 %  During exercise monitor SpO2  If SPO2 increases >=89% with ambulation do not add supplemental             oxygen  If <= 88% on room air add O2 via NC and titrate patient  Patient must be ambulated with O2 and titrated to > 88% with exertion  3   SPO2 on Oxygen at rest 95  %  On 1  lpm     4   SPO2 during exercise on Oxygen  95% a liter flow of 2 lpm     5   Exercise performed:          walking, duration 10 (min), distance 200 (feet)          [x]  Supplemental Home Oxygen is indicated  []  Client does not qualify for home oxygen        Respiratory Additional Notes- None    Daivd Salvador, RT

## 2018-10-01 NOTE — PLAN OF CARE
DISCHARGE PLANNING     Discharge to home or other facility with appropriate resources Completed        DISCHARGE PLANNING - CARE MANAGEMENT     Discharge to post-acute care or home with appropriate resources Completed        INFECTION - ADULT     Absence or prevention of progression during hospitalization Completed     Absence of fever/infection during neutropenic period Completed        Knowledge Deficit     Patient/family/caregiver demonstrates understanding of disease process, treatment plan, medications, and discharge instructions Completed        PAIN - ADULT     Verbalizes/displays adequate comfort level or baseline comfort level Completed        Potential for Falls     Patient will remain free of falls Completed        Prexisting or High Potential for Compromised Skin Integrity     Skin integrity is maintained or improved Completed        SAFETY ADULT     Maintain or return to baseline ADL function Completed     Maintain or return mobility status to optimal level Completed

## 2018-10-01 NOTE — SOCIAL WORK
Pt is being discharged today  Pt's  will give the patient ride home  Pt qualifies for 02 continuously  Referral was made to [address]  Pt will need home health care  Pt would like me to make a referral to [facility]  Referral was made as requested  Follow up appts made for patient and reviewed with patient  Case Management reviewed discharge planning process including the following: identifying help that is needed at home, pt's preference for discharge needs and Meds at [facility]  Reviewed with Pt that any member of the healthcare team can answer questions regarding : medications, jmportance of recognizing  Signs and symptoms of any  medical problems  Case Management also encouraged pt to follow up with all recommended appointments after discharge  Rad Moreira

## 2018-10-01 NOTE — SOCIAL WORK
[facility]  [facility] completed paperwork for 02, Patient was given a POC  How to use POC and equipment reviewed with pt and her  with good understanding

## 2018-10-01 NOTE — DISCHARGE INSTRUCTIONS
Follow-up appointment has been scheduled with Dr Sunday Nicholson for Thursday October 11th at 1:45 p m  His office is located in the medical office building behind the hospital   Office telephone number is [phone]  Resume regular diet at home as before  You will be sent home with incentive spirometer to be used hourly while awake 10 repetitions for the next week  Avoid heavy lifting more than 10 lb  No driving  Avoid any alcohol  You will be discharged with surgical staples closing your incision  Keep incision clean and dry  Staples were removed in the office next week  Avoid tub bathing  May shower, pat incisions dry      Makayla Loyola PA-C

## 2018-10-03 ENCOUNTER — APPOINTMENT (EMERGENCY)
Dept: RADIOLOGY | Facility: HOSPITAL | Age: 78
End: 2018-10-03
Payer: MEDICARE

## 2018-10-03 ENCOUNTER — HOSPITAL ENCOUNTER (OUTPATIENT)
Facility: HOSPITAL | Age: 78
Setting detail: OBSERVATION
Discharge: HOME HEALTH | End: 2018-10-04
Attending: EMERGENCY MEDICINE | Admitting: INTERNAL MEDICINE
Payer: MEDICARE

## 2018-10-03 ENCOUNTER — TELEPHONE (OUTPATIENT)
Dept: SURGERY | Facility: HOSPITAL | Age: 78
End: 2018-10-03

## 2018-10-03 ENCOUNTER — APPOINTMENT (EMERGENCY)
Dept: CT IMAGING | Facility: HOSPITAL | Age: 78
End: 2018-10-03
Payer: MEDICARE

## 2018-10-03 DIAGNOSIS — E83.42 HYPOMAGNESEMIA: ICD-10-CM

## 2018-10-03 DIAGNOSIS — K62.5 RECTAL BLEEDING: ICD-10-CM

## 2018-10-03 DIAGNOSIS — J90 PLEURAL EFFUSION: ICD-10-CM

## 2018-10-03 DIAGNOSIS — I50.33 ACUTE ON CHRONIC DIASTOLIC (CONGESTIVE) HEART FAILURE (HCC): ICD-10-CM

## 2018-10-03 DIAGNOSIS — E87.6 HYPOKALEMIA: ICD-10-CM

## 2018-10-03 DIAGNOSIS — K92.2 GI BLEEDING: Primary | ICD-10-CM

## 2018-10-03 DIAGNOSIS — R09.02 HYPOXIA: ICD-10-CM

## 2018-10-03 DIAGNOSIS — T79.7XXA SUBCUTANEOUS EMPHYSEMA (HCC): ICD-10-CM

## 2018-10-03 PROBLEM — D64.9 ANEMIA: Status: ACTIVE | Noted: 2018-10-03

## 2018-10-03 LAB
ABO GROUP BLD: NORMAL
ALBUMIN SERPL BCP-MCNC: 2.6 G/DL (ref 3.5–5)
ALP SERPL-CCNC: 134 U/L (ref 46–116)
ALT SERPL W P-5'-P-CCNC: 30 U/L (ref 12–78)
ANION GAP SERPL CALCULATED.3IONS-SCNC: 4 MMOL/L (ref 4–13)
ANION GAP SERPL CALCULATED.3IONS-SCNC: 6 MMOL/L (ref 4–13)
AST SERPL W P-5'-P-CCNC: 29 U/L (ref 5–45)
ATRIAL RATE: 82 BPM
BASOPHILS # BLD AUTO: 0.01 THOUSANDS/ΜL (ref 0–0.1)
BASOPHILS NFR BLD AUTO: 0 % (ref 0–1)
BILIRUB SERPL-MCNC: 0.9 MG/DL (ref 0.2–1)
BLD GP AB SCN SERPL QL: NEGATIVE
BUN SERPL-MCNC: 10 MG/DL (ref 5–25)
BUN SERPL-MCNC: 8 MG/DL (ref 5–25)
CALCIUM SERPL-MCNC: 8.8 MG/DL (ref 8.3–10.1)
CALCIUM SERPL-MCNC: 8.8 MG/DL (ref 8.3–10.1)
CHLORIDE SERPL-SCNC: 104 MMOL/L (ref 100–108)
CHLORIDE SERPL-SCNC: 105 MMOL/L (ref 100–108)
CO2 SERPL-SCNC: 32 MMOL/L (ref 21–32)
CO2 SERPL-SCNC: 33 MMOL/L (ref 21–32)
CREAT SERPL-MCNC: 1.01 MG/DL (ref 0.6–1.3)
CREAT SERPL-MCNC: 1.01 MG/DL (ref 0.6–1.3)
EOSINOPHIL # BLD AUTO: 0.14 THOUSAND/ΜL (ref 0–0.61)
EOSINOPHIL NFR BLD AUTO: 2 % (ref 0–6)
ERYTHROCYTE [DISTWIDTH] IN BLOOD BY AUTOMATED COUNT: 15.7 % (ref 11.6–15.1)
GFR SERPL CREATININE-BSD FRML MDRD: 53 ML/MIN/1.73SQ M
GFR SERPL CREATININE-BSD FRML MDRD: 53 ML/MIN/1.73SQ M
GLUCOSE SERPL-MCNC: 118 MG/DL (ref 65–140)
GLUCOSE SERPL-MCNC: 99 MG/DL (ref 65–140)
HCT VFR BLD AUTO: 28.5 % (ref 34.8–46.1)
HCT VFR BLD AUTO: 30.2 % (ref 34.8–46.1)
HGB BLD-MCNC: 8.9 G/DL (ref 11.5–15.4)
HGB BLD-MCNC: 9.2 G/DL (ref 11.5–15.4)
HOLD SPECIMEN: NORMAL
IMM GRANULOCYTES # BLD AUTO: 0.06 THOUSAND/UL (ref 0–0.2)
IMM GRANULOCYTES NFR BLD AUTO: 1 % (ref 0–2)
INR PPP: 2.72 (ref 0.86–1.17)
LACTATE SERPL-SCNC: 1.4 MMOL/L (ref 0.5–2)
LIPASE SERPL-CCNC: 119 U/L (ref 73–393)
LYMPHOCYTES # BLD AUTO: 0.8 THOUSANDS/ΜL (ref 0.6–4.47)
LYMPHOCYTES NFR BLD AUTO: 11 % (ref 14–44)
MAGNESIUM SERPL-MCNC: 1.5 MG/DL (ref 1.6–2.6)
MAGNESIUM SERPL-MCNC: 2 MG/DL (ref 1.6–2.6)
MCH RBC QN AUTO: 29.7 PG (ref 26.8–34.3)
MCHC RBC AUTO-ENTMCNC: 31.2 G/DL (ref 31.4–37.4)
MCV RBC AUTO: 95 FL (ref 82–98)
MONOCYTES # BLD AUTO: 0.48 THOUSAND/ΜL (ref 0.17–1.22)
MONOCYTES NFR BLD AUTO: 7 % (ref 4–12)
NEUTROPHILS # BLD AUTO: 5.62 THOUSANDS/ΜL (ref 1.85–7.62)
NEUTS SEG NFR BLD AUTO: 79 % (ref 43–75)
NRBC BLD AUTO-RTO: 0 /100 WBCS
NT-PROBNP SERPL-MCNC: 5295 PG/ML
P AXIS: 52 DEGREES
PHOSPHATE SERPL-MCNC: 2.4 MG/DL (ref 2.3–4.1)
PLATELET # BLD AUTO: 180 THOUSANDS/UL (ref 149–390)
PMV BLD AUTO: 9.5 FL (ref 8.9–12.7)
POTASSIUM SERPL-SCNC: 2.6 MMOL/L (ref 3.5–5.3)
POTASSIUM SERPL-SCNC: 3.1 MMOL/L (ref 3.5–5.3)
PR INTERVAL: 154 MS
PROT SERPL-MCNC: 6.4 G/DL (ref 6.4–8.2)
PROTHROMBIN TIME: 27.6 SECONDS (ref 11.8–14.2)
QRS AXIS: 10 DEGREES
QRSD INTERVAL: 114 MS
QT INTERVAL: 406 MS
QTC INTERVAL: 474 MS
RBC # BLD AUTO: 3 MILLION/UL (ref 3.81–5.12)
RH BLD: NEGATIVE
SODIUM SERPL-SCNC: 141 MMOL/L (ref 136–145)
SODIUM SERPL-SCNC: 143 MMOL/L (ref 136–145)
SPECIMEN EXPIRATION DATE: NORMAL
T WAVE AXIS: -7 DEGREES
VENTRICULAR RATE: 82 BPM
WBC # BLD AUTO: 7.11 THOUSAND/UL (ref 4.31–10.16)

## 2018-10-03 PROCEDURE — 96366 THER/PROPH/DIAG IV INF ADDON: CPT

## 2018-10-03 PROCEDURE — 85025 COMPLETE CBC W/AUTO DIFF WBC: CPT | Performed by: EMERGENCY MEDICINE

## 2018-10-03 PROCEDURE — 94762 N-INVAS EAR/PLS OXIMTRY CONT: CPT

## 2018-10-03 PROCEDURE — 83605 ASSAY OF LACTIC ACID: CPT | Performed by: EMERGENCY MEDICINE

## 2018-10-03 PROCEDURE — 94760 N-INVAS EAR/PLS OXIMETRY 1: CPT

## 2018-10-03 PROCEDURE — 83735 ASSAY OF MAGNESIUM: CPT | Performed by: INTERNAL MEDICINE

## 2018-10-03 PROCEDURE — 99220 PR INITIAL OBSERVATION CARE/DAY 70 MINUTES: CPT | Performed by: NURSE PRACTITIONER

## 2018-10-03 PROCEDURE — 80053 COMPREHEN METABOLIC PANEL: CPT | Performed by: EMERGENCY MEDICINE

## 2018-10-03 PROCEDURE — 74177 CT ABD & PELVIS W/CONTRAST: CPT

## 2018-10-03 PROCEDURE — 82272 OCCULT BLD FECES 1-3 TESTS: CPT | Performed by: NURSE PRACTITIONER

## 2018-10-03 PROCEDURE — 99285 EMERGENCY DEPT VISIT HI MDM: CPT

## 2018-10-03 PROCEDURE — 94640 AIRWAY INHALATION TREATMENT: CPT

## 2018-10-03 PROCEDURE — 96365 THER/PROPH/DIAG IV INF INIT: CPT

## 2018-10-03 PROCEDURE — 85018 HEMOGLOBIN: CPT | Performed by: NURSE PRACTITIONER

## 2018-10-03 PROCEDURE — 71045 X-RAY EXAM CHEST 1 VIEW: CPT

## 2018-10-03 PROCEDURE — 84100 ASSAY OF PHOSPHORUS: CPT | Performed by: INTERNAL MEDICINE

## 2018-10-03 PROCEDURE — 86900 BLOOD TYPING SEROLOGIC ABO: CPT | Performed by: EMERGENCY MEDICINE

## 2018-10-03 PROCEDURE — 1123F ACP DISCUSS/DSCN MKR DOCD: CPT | Performed by: INTERNAL MEDICINE

## 2018-10-03 PROCEDURE — 85014 HEMATOCRIT: CPT | Performed by: NURSE PRACTITIONER

## 2018-10-03 PROCEDURE — 93010 ELECTROCARDIOGRAM REPORT: CPT | Performed by: INTERNAL MEDICINE

## 2018-10-03 PROCEDURE — 85610 PROTHROMBIN TIME: CPT | Performed by: EMERGENCY MEDICINE

## 2018-10-03 PROCEDURE — 86901 BLOOD TYPING SEROLOGIC RH(D): CPT | Performed by: EMERGENCY MEDICINE

## 2018-10-03 PROCEDURE — 96375 TX/PRO/DX INJ NEW DRUG ADDON: CPT

## 2018-10-03 PROCEDURE — 80048 BASIC METABOLIC PNL TOTAL CA: CPT | Performed by: INTERNAL MEDICINE

## 2018-10-03 PROCEDURE — 83735 ASSAY OF MAGNESIUM: CPT | Performed by: EMERGENCY MEDICINE

## 2018-10-03 PROCEDURE — 83880 ASSAY OF NATRIURETIC PEPTIDE: CPT | Performed by: NURSE PRACTITIONER

## 2018-10-03 PROCEDURE — 94668 MNPJ CHEST WALL SBSQ: CPT

## 2018-10-03 PROCEDURE — 36415 COLL VENOUS BLD VENIPUNCTURE: CPT | Performed by: EMERGENCY MEDICINE

## 2018-10-03 PROCEDURE — 93005 ELECTROCARDIOGRAM TRACING: CPT

## 2018-10-03 PROCEDURE — 96368 THER/DIAG CONCURRENT INF: CPT

## 2018-10-03 PROCEDURE — 83690 ASSAY OF LIPASE: CPT | Performed by: EMERGENCY MEDICINE

## 2018-10-03 PROCEDURE — 86850 RBC ANTIBODY SCREEN: CPT | Performed by: EMERGENCY MEDICINE

## 2018-10-03 RX ORDER — FUROSEMIDE 40 MG/1
40 TABLET ORAL DAILY
Status: DISCONTINUED | OUTPATIENT
Start: 2018-10-03 | End: 2018-10-03

## 2018-10-03 RX ORDER — METOPROLOL TARTRATE 50 MG/1
50 TABLET, FILM COATED ORAL 2 TIMES DAILY
Status: DISCONTINUED | OUTPATIENT
Start: 2018-10-03 | End: 2018-10-04 | Stop reason: HOSPADM

## 2018-10-03 RX ORDER — FUROSEMIDE 10 MG/ML
40 INJECTION INTRAMUSCULAR; INTRAVENOUS ONCE
Status: COMPLETED | OUTPATIENT
Start: 2018-10-03 | End: 2018-10-03

## 2018-10-03 RX ORDER — MAGNESIUM SULFATE HEPTAHYDRATE 40 MG/ML
2 INJECTION, SOLUTION INTRAVENOUS
Status: DISPENSED | OUTPATIENT
Start: 2018-10-03 | End: 2018-10-03

## 2018-10-03 RX ORDER — ALPRAZOLAM 0.25 MG/1
0.25 TABLET ORAL 3 TIMES DAILY PRN
Status: DISCONTINUED | OUTPATIENT
Start: 2018-10-03 | End: 2018-10-04 | Stop reason: HOSPADM

## 2018-10-03 RX ORDER — POTASSIUM CHLORIDE 14.9 MG/ML
20 INJECTION INTRAVENOUS ONCE
Status: COMPLETED | OUTPATIENT
Start: 2018-10-03 | End: 2018-10-03

## 2018-10-03 RX ORDER — SODIUM CHLORIDE 9 MG/ML
125 INJECTION, SOLUTION INTRAVENOUS CONTINUOUS
Status: DISCONTINUED | OUTPATIENT
Start: 2018-10-03 | End: 2018-10-03

## 2018-10-03 RX ORDER — SODIUM CHLORIDE FOR INHALATION 0.9 %
3 VIAL, NEBULIZER (ML) INHALATION
Status: DISCONTINUED | OUTPATIENT
Start: 2018-10-03 | End: 2018-10-04 | Stop reason: HOSPADM

## 2018-10-03 RX ORDER — ONDANSETRON 2 MG/ML
4 INJECTION INTRAMUSCULAR; INTRAVENOUS ONCE
Status: COMPLETED | OUTPATIENT
Start: 2018-10-03 | End: 2018-10-03

## 2018-10-03 RX ORDER — ATORVASTATIN CALCIUM 10 MG/1
20 TABLET, FILM COATED ORAL
Status: DISCONTINUED | OUTPATIENT
Start: 2018-10-03 | End: 2018-10-04 | Stop reason: HOSPADM

## 2018-10-03 RX ORDER — ERGOCALCIFEROL 1.25 MG/1
50000 CAPSULE ORAL WEEKLY
Status: DISCONTINUED | OUTPATIENT
Start: 2018-10-03 | End: 2018-10-04 | Stop reason: HOSPADM

## 2018-10-03 RX ORDER — POTASSIUM CHLORIDE 20 MEQ/1
40 TABLET, EXTENDED RELEASE ORAL ONCE
Status: COMPLETED | OUTPATIENT
Start: 2018-10-03 | End: 2018-10-03

## 2018-10-03 RX ORDER — LEVALBUTEROL 1.25 MG/.5ML
1.25 SOLUTION, CONCENTRATE RESPIRATORY (INHALATION)
Status: DISCONTINUED | OUTPATIENT
Start: 2018-10-03 | End: 2018-10-04 | Stop reason: HOSPADM

## 2018-10-03 RX ORDER — LISINOPRIL 10 MG/1
10 TABLET ORAL DAILY
Status: DISCONTINUED | OUTPATIENT
Start: 2018-10-03 | End: 2018-10-03

## 2018-10-03 RX ORDER — FAMOTIDINE 20 MG/1
40 TABLET, FILM COATED ORAL
Status: DISCONTINUED | OUTPATIENT
Start: 2018-10-03 | End: 2018-10-04 | Stop reason: HOSPADM

## 2018-10-03 RX ORDER — ALBUTEROL SULFATE 2.5 MG/3ML
2.5 SOLUTION RESPIRATORY (INHALATION) EVERY 6 HOURS PRN
Status: DISCONTINUED | OUTPATIENT
Start: 2018-10-03 | End: 2018-10-04 | Stop reason: HOSPADM

## 2018-10-03 RX ORDER — WARFARIN SODIUM 4 MG/1
4 TABLET ORAL
Status: COMPLETED | OUTPATIENT
Start: 2018-10-03 | End: 2018-10-03

## 2018-10-03 RX ORDER — POTASSIUM CHLORIDE 14.9 MG/ML
40 INJECTION INTRAVENOUS ONCE
Status: COMPLETED | OUTPATIENT
Start: 2018-10-03 | End: 2018-10-03

## 2018-10-03 RX ADMIN — ERGOCALCIFEROL 50000 UNITS: 1.25 CAPSULE ORAL at 16:41

## 2018-10-03 RX ADMIN — MAGNESIUM SULFATE HEPTAHYDRATE 2 G: 40 INJECTION, SOLUTION INTRAVENOUS at 12:20

## 2018-10-03 RX ADMIN — POTASSIUM CHLORIDE 20 MEQ: 200 INJECTION, SOLUTION INTRAVENOUS at 13:28

## 2018-10-03 RX ADMIN — SODIUM CHLORIDE 125 ML/HR: 0.9 INJECTION, SOLUTION INTRAVENOUS at 13:30

## 2018-10-03 RX ADMIN — LEVALBUTEROL 1.25 MG: 1.25 SOLUTION, CONCENTRATE RESPIRATORY (INHALATION) at 16:02

## 2018-10-03 RX ADMIN — POTASSIUM CHLORIDE 40 MEQ: 200 INJECTION, SOLUTION INTRAVENOUS at 11:37

## 2018-10-03 RX ADMIN — WARFARIN SODIUM 4 MG: 4 TABLET ORAL at 16:41

## 2018-10-03 RX ADMIN — ONDANSETRON 4 MG: 2 INJECTION INTRAMUSCULAR; INTRAVENOUS at 10:52

## 2018-10-03 RX ADMIN — SODIUM CHLORIDE, SODIUM LACTATE, POTASSIUM CHLORIDE, AND CALCIUM CHLORIDE 1000 ML: .6; .31; .03; .02 INJECTION, SOLUTION INTRAVENOUS at 11:36

## 2018-10-03 RX ADMIN — ISODIUM CHLORIDE 3 ML: 0.03 SOLUTION RESPIRATORY (INHALATION) at 16:02

## 2018-10-03 RX ADMIN — ATORVASTATIN CALCIUM 20 MG: 10 TABLET, FILM COATED ORAL at 17:47

## 2018-10-03 RX ADMIN — ALPRAZOLAM 0.25 MG: 0.25 TABLET ORAL at 21:28

## 2018-10-03 RX ADMIN — IOHEXOL 100 ML: 350 INJECTION, SOLUTION INTRAVENOUS at 12:38

## 2018-10-03 RX ADMIN — FUROSEMIDE 40 MG: 10 INJECTION, SOLUTION INTRAVENOUS at 16:42

## 2018-10-03 RX ADMIN — FAMOTIDINE 40 MG: 20 TABLET ORAL at 21:26

## 2018-10-03 RX ADMIN — POTASSIUM CHLORIDE 40 MEQ: 1500 TABLET, EXTENDED RELEASE ORAL at 21:26

## 2018-10-03 RX ADMIN — METOPROLOL TARTRATE 50 MG: 50 TABLET ORAL at 17:47

## 2018-10-03 RX ADMIN — LEVALBUTEROL 1.25 MG: 1.25 SOLUTION, CONCENTRATE RESPIRATORY (INHALATION) at 20:50

## 2018-10-03 RX ADMIN — ISODIUM CHLORIDE 3 ML: 0.03 SOLUTION RESPIRATORY (INHALATION) at 20:50

## 2018-10-03 NOTE — PLAN OF CARE
Problem: Potential for Falls  Goal: Patient will remain free of falls  INTERVENTIONS:  - Assess patient frequently for physical needs  -  Identify cognitive and physical deficits and behaviors that affect risk of falls  -  [place] fall precautions as indicated by assessment   - Educate patient/family on patient safety including physical limitations  - Instruct patient to call for assistance with activity based on assessment  - Modify environment to reduce risk of injury  - Consider OT/PT consult to assist with strengthening/mobility   Outcome: Progressing      Problem: Prexisting or High Potential for Compromised Skin Integrity  Goal: Skin integrity is maintained or improved  INTERVENTIONS:  - Identify patients at risk for skin breakdown  - Assess and monitor skin integrity  - Assess and monitor nutrition and hydration status  - Monitor labs (i e  albumin)  - Assess for incontinence   - Turn and reposition patient  - Assist with mobility/ambulation  - Relieve pressure over bony prominences  - Avoid friction and shearing  - Provide appropriate hygiene as needed including keeping skin clean and dry  - Evaluate need for skin moisturizer/barrier cream  - Collaborate with interdisciplinary team (i e  Nutrition, Rehabilitation, etc )   - Patient/family teaching   Outcome: Progressing      Problem: Nutrition/Hydration-ADULT  Goal: Nutrient/Hydration intake appropriate for improving, restoring or maintaining nutritional needs  Monitor and assess patient's nutrition/hydration status for malnutrition (ex- brittle hair, bruises, dry skin, pale skin and conjunctiva, muscle wasting, smooth red tongue, and disorientation)  Collaborate with interdisciplinary team and initiate plan and interventions as ordered  Monitor patient's weight and dietary intake as ordered or per policy  Utilize nutrition screening tool and intervene per policy   Determine patient's food preferences and provide high-protein, high-caloric foods as appropriate       INTERVENTIONS:  - Monitor oral intake, urinary output, labs, and treatment plans  - Assess nutrition and hydration status and recommend course of action  - Evaluate amount of meals eaten  - Assist patient with eating if necessary   - Allow adequate time for meals  - Recommend/ encourage appropriate diets, oral nutritional supplements, and vitamin/mineral supplements  - Order, calculate, and assess calorie counts as needed  - Recommend, monitor, and adjust tube feedings and TPN/PPN based on assessed needs  - Assess need for intravenous fluids  - Provide specific nutrition/hydration education as appropriate  - Include patient/family/caregiver in decisions related to nutrition   Outcome: Not Progressing

## 2018-10-03 NOTE — ASSESSMENT & PLAN NOTE
New home O@ requirement since D/C on 10/1/18  Patient reports that she only uses when she feels like she needs it - while climbing stairs    Continual SPO2 monitoring  Respiratory protocol  Continue diureisis

## 2018-10-03 NOTE — ED PROVIDER NOTES
History  Chief Complaint   Patient presents with    Post-op Problem     Colon resection last Wednesday, rectal bleeding since, and having drainage from incision     This is a very pleasant 40-year-old woman with the noted past medical history who presents to the emergency department for evaluation of rectal bleeding that has been present since 1 October 2018; patient was hospitalized in this facility from 26 September-1 October after a perforation of the large bowel during a colonoscopy in this facility that required emergent ex lap and partial hemicolectomy by Dr Arya Valencia  Did have postoperative issues related to oxygenation/volume overload but was ultimately discharged home in good condition where she has resided since 1 October 2018  She reports daily episodes of gastrointestinal bleeding described as small volume passage of dark red blood without significant amounts of stool  There is mild abdominal cramping associated with the passage of stool  In between the episodes of bleeding, she does not have any ongoing active or continued abdominal pain  There has also been daily serous yellow discharge from the laparotomy incision; she reports no pain or discomfort around the incision site  Patient has history of severe lower gastrointestinal bleeding earlier this year when she was seen in this emergency department  She is anticoagulated with warfarin which was restarted prior to hospital discharge  She denies bleeding elsewhere: denies hemoptysis/hematemesis/epistaxis  Does have significant nausea but no active vomiting any time since discharge; has had overall general decreased appetite since hospital discharge home  Denies associated fever/chills/hematuria/dysuria/urgency/frequency/syncope/chest pain/dyspnea/palpitations  Has not had subsequent follow-up with Dr Arya Valencia yet--scheduled for 11 October      A/P:  Gastrointestinal bleeding with recent major intra-abdominal procedure and previous history of hemodynamically significant lower gastrointestinal bleeding  Differential diagnosis includes but not limited to lower GI bleeding related to hemorrhoidal disease/diverticular disease/angiodysplasia/bowel ischemia; concern also includes perforation/suture line dehisence  Labs/EKG  Chest x-ray for any free air  CT abdomen/pelvis with IV contrast         History provided by:  Patient, relative, medical records and spouse      Prior to Admission Medications   Prescriptions Last Dose Informant Patient Reported? Taking? ALPRAZolam (XANAX) 0 25 mg tablet  Self Yes No   Sig: Take 0 25 mg by mouth 3 (three) times a day as needed for anxiety  Multiple Vitamins-Minerals (PRESERVISION AREDS) CAPS  Self Yes No   Sig: Take 1 capsule by mouth 2 (two) times a day   Na Sulfate-K Sulfate-Mg Sulf (SUPREP BOWEL PREP KIT) 17 5-3 13-1 6 GM/180ML SOLN   No No   Sig: Take 177 mL by mouth once for 1 dose   Omega-3 Fatty Acids (FISH OIL PO)  Self Yes No   Sig: Take 1,000 mg by mouth daily  atorvastatin (LIPITOR) 20 mg tablet  Self Yes No   Sig: Take 20 mg by mouth daily after dinner  ergocalciferol (ERGOCALCIFEROL) 85860 UNITS capsule  Self Yes No   Sig: Take 50,000 Units by mouth once a week  Takes on Wednesdays   ergocalciferol (ERGOCALCIFEROL) 77467 units capsule   Yes No   Sig: Take 1 capsule every week by oral route for 90 days  furosemide (LASIX) 20 mg tablet   No No   Sig: Take 1 tablet (20 mg total) by mouth 2 (two) times a day for 90 days   Patient taking differently: Take 40 mg by mouth daily     lisinopril (ZESTRIL) 10 mg tablet  Self Yes No   Sig: Take 10 mg by mouth daily     metolazone (ZAROXOLYN) 2 5 mg tablet  Self No No   Sig: TAKE ONE TABLET BY MOUTH ONCE A WEEK OR  AS  DIRECTED  FOR  WEIGHT  GAIN   metolazone (ZAROXOLYN) 2 5 mg tablet  Self No No   Sig: Take 1 tablet (2 5 mg total) by mouth daily   metoprolol tartrate (LOPRESSOR) 50 mg tablet  Self Yes No   Sig: Take 50 mg by mouth 2 (two) times a day nitroglycerin (NITROSTAT) 0 4 mg SL tablet  Self Yes No   Sig: Place 0 4 mg under the tongue every 5 (five) minutes as needed for chest pain  oxyCODONE-acetaminophen (PERCOCET) 5-325 mg per tablet   No No   Sig: Take 1 tablet by mouth every 4 (four) hours as needed for moderate pain for up to 10 days Max Daily Amount: 6 tablets   potassium chloride (K-DUR,KLOR-CON) 20 mEq tablet   No No   Sig: Take 1 tablet (20 mEq total) by mouth daily   ranitidine (ZANTAC) 300 MG capsule  Self Yes No   Sig: Take 300 mg by mouth daily     warfarin (COUMADIN) 2 mg tablet  Self No No   Sig: TAKE ONE TO TWO TABLETS BY MOUTH ONCE DAILY OR  AS  ORDERED  BY  PHYSICIAN   Patient taking differently: monday, wednesday, friday, and saturday take 4mg and tuesdays, thursdays, and sundays 2mg      Facility-Administered Medications: None       Past Medical History:   Diagnosis Date    Cardiac disease     GERD (gastroesophageal reflux disease)     Hyperlipidemia     Hypertension        Past Surgical History:   Procedure Laterality Date    CARDIAC SURGERY      CARDIAC SURGERY      CATARACT EXTRACTION      CHOLECYSTECTOMY      COLON SURGERY      COLONOSCOPY      COLONOSCOPY N/A 6/18/2018    Procedure: COLONOSCOPY;  Surgeon: Celeste Griffin DO;  Location: [facility];   Service: Gastroenterology    COLONOSCOPY W/ CONTROL OF HEMORRHAGE      CORONARY ANGIOPLASTY WITH STENT PLACEMENT      CORONARY ARTERY BYPASS GRAFT  2008    HERNIA REPAIR      HYSTERECTOMY      LAPAROTOMY N/A 9/26/2018    Procedure: LAPAROTOMY EXPLORATORY WITH  RIGHT HEMICOLECTOMY;  Surgeon: Celeste Griffin DO;  Location: [facility];  Service: Gastroenterology    LAPAROTOMY N/A 9/26/2018    Procedure: LAPAROTOMY EXPLORATORY WITH HEMICOLECTOMY;  Surgeon: Flex Pedraza DO;  Location: [facility];  Service: General    ND COLONOSCOPY FLX DX W/COLLJ SPEC WHEN PFRMD N/A 9/26/2018    Procedure: COLONOSCOPY;  Surgeon: Celeste Griffin DO;  [facility];  Service: Gastroenterology    MA SIGMOIDOSCOPY FLX DX W/COLLJ SPEC BR/WA IF PFRMD N/A 6/21/2018    Procedure: SIGMOIDOSCOPY FLEXIBLE;  Surgeon: Hussein Hill DO;  Location: [facility]; Service: Gastroenterology       History reviewed  No pertinent family history  I have reviewed and agree with the history as documented  Social History   Substance Use Topics    Smoking status: Former Smoker    Smokeless tobacco: Never Used      Comment: quit 15 yrs ago    Alcohol use No        Review of Systems   Constitutional: Positive for appetite change (Decreased PO intake since discharge home) and fatigue  Negative for chills and fever  Respiratory: Negative for cough and shortness of breath  Cardiovascular: Negative for chest pain and palpitations  Gastrointestinal: Positive for abdominal distention, blood in stool and nausea  Negative for abdominal pain, diarrhea and vomiting  Genitourinary: Negative for difficulty urinating, dysuria and flank pain  Skin: Negative for color change, pallor, rash and wound  Neurological: Negative for syncope, weakness, light-headedness and numbness  Hematological: Negative for adenopathy  Does not bruise/bleed easily  All other systems reviewed and are negative  Physical Exam  Physical Exam   Constitutional: She is oriented to person, place, and time  She appears well-developed and well-nourished  She is cooperative  No distress  HENT:   Head: Normocephalic and atraumatic  Right Ear: Hearing, tympanic membrane, external ear and ear canal normal    Left Ear: Hearing, tympanic membrane, external ear and ear canal normal    Nose: Nose normal    Mouth/Throat: Uvula is midline, oropharynx is clear and moist and mucous membranes are normal  No oropharyngeal exudate  Neck: Trachea normal, normal range of motion and phonation normal  Neck supple  No JVD present  No tracheal tenderness, no spinous process tenderness and no muscular tenderness present   No tracheal deviation present  No thyroid mass and no thyromegaly present  Cardiovascular: Normal rate, regular rhythm, S1 normal, S2 normal, normal heart sounds and intact distal pulses  Exam reveals no gallop and no friction rub  No murmur heard  Pulses:       Radial pulses are 2+ on the right side, and 2+ on the left side  Dorsalis pedis pulses are 2+ on the right side, and 2+ on the left side  Posterior tibial pulses are 2+ on the right side, and 2+ on the left side  Pulmonary/Chest: Effort normal and breath sounds normal  No stridor  No respiratory distress  She has no decreased breath sounds  She has no wheezes  She has no rhonchi  She has no rales  She exhibits no tenderness  Abdominal: Soft  She exhibits no distension (mildly tympanitic to percussion in upper abdomen without emma distention) and no mass  There is tenderness (very mild RLQ ttp) in the right lower quadrant  There is no rigidity, no rebound, no guarding and no CVA tenderness  Rectal:  Normal tone  Multiple nonthrombosed nontender external hemorrhoids  1-2 easily palpable small internal hemorrhoids  There is scant brown stool in the rectal vault; guaiac positive  Exam assisted by Rosaura Huffman RN   Musculoskeletal: Normal range of motion  She exhibits no edema, tenderness or deformity  Lymphadenopathy:     She has no cervical adenopathy  Neurological: She is alert and oriented to person, place, and time  GCS eye subscore is 4  GCS verbal subscore is 5  GCS motor subscore is 6  Skin: Skin is warm, dry and intact  No rash noted  She is not diaphoretic  No erythema  Nursing note and vitals reviewed        Vital Signs  ED Triage Vitals [10/03/18 1011]   Temperature Pulse Respirations Blood Pressure SpO2   97 8 °F (36 6 °C) 91 20 140/65 (!) 86 %      Temp Source Heart Rate Source Patient Position - Orthostatic VS BP Location FiO2 (%)   Temporal Monitor Lying Left arm --      Pain Score       4           Vitals:    10/03/18 1011 10/03/18 1130 10/03/18 1300   BP: 140/65 144/65 142/65   Pulse: 91 78 93   Patient Position - Orthostatic VS: Lying Sitting Sitting       Visual Acuity      ED Medications  Medications   magnesium sulfate 2 g/50 mL IVPB (premix) 2 g (0 g Intravenous Stopped 10/3/18 1341)   potassium chloride 20 mEq IVPB (premix) (20 mEq Intravenous New Bag 10/3/18 1328)   sodium chloride 0 9 % infusion (125 mL/hr Intravenous New Bag 10/3/18 1330)   ondansetron (ZOFRAN) injection 4 mg (4 mg Intravenous Given 10/3/18 1052)   lactated ringers bolus 1,000 mL (0 mL Intravenous Stopped 10/3/18 1328)   potassium chloride 20 mEq IVPB (premix) (0 mEq Intravenous Stopped 10/3/18 1302)   iohexol (OMNIPAQUE) 350 MG/ML injection (SINGLE-DOSE) 100 mL (100 mL Intravenous Given 10/3/18 1238)       Diagnostic Studies  Results Reviewed     Procedure Component Value Units Date/Time    [place] draw [[id number]] Collected:  10/03/18 1045    Lab Status:  Final result Specimen:  Blood Updated:  10/03/18 1201    Narrative: The following orders were created for panel order [place] draw  Procedure                               Abnormality         Status                     ---------                               -----------         ------                     Denver Hakim top on CUJL[[id number]]                                  Final result               Green / Black tube on JXUM[[id number]]                        Final result                 Please view results for these tests on the individual orders      [facility] [[id number]]  (Abnormal) Collected:  10/03/18 1045    Lab Status:  Final result Specimen:  Blood from Arm, Right Updated:  10/03/18 1123     Sodium 143 mmol/L      Potassium 2 6 (LL) mmol/L      Chloride 104 mmol/L      CO2 33 (H) mmol/L      ANION GAP 6 mmol/L      BUN 10 mg/dL      Creatinine 1 01 mg/dL      Glucose 99 mg/dL      Calcium 8 8 mg/dL      AST 29 U/L      ALT 30 U/L      Alkaline Phosphatase 134 (H) U/L      Total Protein 6 4 g/dL Albumin 2 6 (L) g/dL      Total Bilirubin 0 90 mg/dL      eGFR 53 ml/min/1 73sq m     Narrative:         National Kidney Disease Education Program recommendations are as follows:  GFR calculation is accurate only with a steady state creatinine  Chronic Kidney disease less than 60 ml/min/1 73 sq  meters  Kidney failure less than 15 ml/min/1 73 sq  meters  Lipase [[id number]]  (Normal) Collected:  10/03/18 1045    Lab Status:  Final result Specimen:  Blood from Arm, Right Updated:  10/03/18 1120     Lipase 119 u/L     Magnesium [[id number]]  (Abnormal) Collected:  10/03/18 1045    Lab Status:  Final result Specimen:  Blood from Arm, Right Updated:  10/03/18 1120     Magnesium 1 5 (L) mg/dL     Lactic acid, plasma [[id number]]  (Normal) Collected:  10/03/18 1045    Lab Status:  Final result Specimen:  Blood from Arm, Right Updated:  10/03/18 1110     LACTIC ACID 1 4 mmol/L     Narrative:         Result may be elevated if tourniquet was used during collection      Protime-INR [[id number]]  (Abnormal) Collected:  10/03/18 1045    Lab Status:  Final result Specimen:  Blood from Arm, Right Updated:  10/03/18 1100     Protime 27 6 (H) seconds      INR 2 72 (H)    CBC and differential [[id number]]  (Abnormal) Collected:  10/03/18 1045    Lab Status:  Final result Specimen:  Blood from Arm, Right Updated:  10/03/18 1052     WBC 7 11 Thousand/uL      RBC 3 00 (L) Million/uL      Hemoglobin 8 9 (L) g/dL      Hematocrit 28 5 (L) %      MCV 95 fL      MCH 29 7 pg      MCHC 31 2 (L) g/dL      RDW 15 7 (H) %      MPV 9 5 fL      Platelets 144 Thousands/uL      nRBC 0 /100 WBCs      Neutrophils Relative 79 (H) %      Immat GRANS % 1 %      Lymphocytes Relative 11 (L) %      Monocytes Relative 7 %      Eosinophils Relative 2 %      Basophils Relative 0 %      Neutrophils Absolute 5 62 Thousands/µL      Immature Grans Absolute 0 06 Thousand/uL      Lymphocytes Absolute 0 80 Thousands/µL      Monocytes Absolute 0 48 Thousand/µL Eosinophils Absolute 0 14 Thousand/µL      Basophils Absolute 0 01 Thousands/µL                  CT abdomen pelvis with contrast   Final Result by GIANNA Cintron MD (10/03 1320)      Extensive subcutaneous emphysema of the torso with intraperitoneal, retroperitoneal and scrotal extension  Negative for abscess detection  Small amount of ascites and right pleural effusion with right basilar atelectatic changes  Left colonic diverticular disease without evidence of acute diverticulitis  Status post right hemicolectomy  Mild hepatomegaly and hepatic steatosis  See above additional nonacute findings  I personally discussed this study with Randeen Flax on 10/3/2018 at 1:19 PM                Workstation performed: [id number]         XR chest 1 view portable   Final Result by GIANNA Cintron MD (10/03 1113)      Small right pleural effusion  Status post CABG                Workstation performed: [id number]                    Procedures  ECG 12 Lead Documentation  Date/Time: 10/3/2018 10:44 AM  Performed by: To Elizabeth  Authorized by: To Elizabeth     Indications / Diagnosis:  GI bleeding  ECG reviewed by me, the ED Provider: yes    Patient location:  ED  Previous ECG:     Previous ECG:  Compared to current    Comparison ECG info:  16 June 2018    Similarity:  No change    Comparison to cardiac monitor: Yes    Interpretation:     Interpretation: abnormal    Rate:     ECG rate:  82    ECG rate assessment: normal    Rhythm:     Rhythm: sinus rhythm    Ectopy:     Ectopy: PAC    QRS:     QRS axis:  Normal    QRS intervals:  Normal  Conduction:     Conduction: abnormal      Abnormal conduction: incomplete RBBB    ST segments:     ST segments:  Abnormal    Depression:  I, III, II, V2, V3, V4, V5 and V6  Comments:      Pr 154 qrs 114 qtc 474           Phone Contacts  ED Phone Contact    ED Course  ED Course as of Oct 03 1349   Wed Oct 03, 2018   1051 D/w Dr Gian Herrera who is in [address] [place] [address] to apprise him of patient's presentation  He recommends labs/CT ap at present  1121 1  WBC wnl   2  Hg/Hct anemic but similar to previous values prior to hospital discharge (approx 9 g/dl)  3  Plt wnl   4  Lactic acid wnl   5  INR therapeutic c/w active warfarin use  6  Marked hypokalemia with mildly elevated CO2 c/w compensated chronic respiratory acidosis  Hypomagnesemia  Mild elevation in alk phos; otherwise wnl         1259 CT completed and awaiting interpretation at this time  46 D/w radiologist Dr Colette José: extensive subcutaneous/retroperitoneal and a small amount of intraperitoneal air  No definitive leak identified  No intraperitoneal abscess  Page placed to Dr Suzy Bhatt  1327 D/w Dr Suzy Bhatt  Patient case reviewed  He recommends admission to medicine service with surgical consultation  [place]  Patient case discussed at length: accepts in observation admission to service of Dr Yuval See            MDM  Number of Diagnoses or Management Options  GI bleeding: established and worsening  Hypokalemia: new and does not require workup  Hypomagnesemia: new and does not require workup  Subcutaneous emphysema ([address] ): established and improving     Amount and/or Complexity of Data Reviewed  Clinical lab tests: ordered and reviewed  Tests in the radiology section of CPT®: reviewed and ordered  Decide to obtain previous medical records or to obtain history from someone other than the patient: yes  Obtain history from someone other than the patient: yes  Review and summarize past medical records: yes  Discuss the patient with other providers: yes  Independent visualization of images, tracings, or specimens: yes    Risk of Complications, Morbidity, and/or Mortality  Presenting problems: high  Diagnostic procedures: high  Management options: high    Patient Progress  Patient progress: improved    CritCare Time    Disposition  Final diagnoses:   GI bleeding   Hypokalemia   Hypomagnesemia   Subcutaneous emphysema ([facility] [address] )     Time reflects when diagnosis was documented in both MDM as applicable and the Disposition within this note     Time User Action Codes Description Comment    10/3/2018  1:40 PM Jose Mace Add [K92 2] GI bleeding     10/3/2018  1:40 PM Jose Mace Add [E87 6] Hypokalemia     10/3/2018  1:40 PM Rudolpaul Basevangelina Add [E83 42] Hypomagnesemia     10/3/2018  1:40 PM [id number]  7XXA] Subcutaneous emphysema [facility])       ED Disposition     ED Disposition Condition Comment    Admit  Case was discussed with SLIM PA and the patient's admission status was agreed to be Admission Status: observation status to the service of Dr Jasmin Cherry  Follow-up Information    None         Patient's Medications   Discharge Prescriptions    No medications on file     No discharge procedures on file      ED Provider  Electronically Signed by           Lay Gardner DO  10/03/18 5725

## 2018-10-03 NOTE — H&P
H&P- Nani Leon 1940, 77 y o  female MRN: [id number]    Unit/Bed#: VEE Encounter: [id number]    Primary Care Provider: Robbie Gan MD   Date and time admitted to hospital: 10/3/2018 10:07 AM        Pleural effusion   Assessment & Plan    Present on admission   Xr Chest 1 View Portable"Impression: Small right pleural effusion  Status post CABG "  +2 B/L LE edema present  Patient denies taking home lasix today  Lasix 40 mg IVP now  Continue SPO2 monitoring   Respiratory protocol       Hypoxia   Assessment & Plan    New home O@ requirement since D/C on 10/1/18  Patient reports that she only uses when she feels like she needs it - while climbing stairs  Continual SPO2 monitoring  Respiratory protocol  Continue diureisis       Shortness of breath   Assessment & Plan    Most likely secondary to CHF exacerbation   BNP 5295  Patient expresses fluid excess concern  Did not take PTA lasix today  Xr Chest 1 View Portable"Impression: Small right pleural effusion  Status post CABG "  ECHO from 1/18 shows EF 60 %- ordered new ECHO  Lasix 40 mg IVP - now  Accurate I & O   Daily weight   Respiratory protocol         Acute on chronic diastolic (congestive) heart failure (HCC)   Assessment & Plan    BNP 5295  Patient expresses fluid excess concern  Did not take PTA lasix today  Xr Chest 1 View Portable"Impression: Small right pleural effusion   Status post CABG "  ECHO from 1/18 shows EF 60 %- ordered new ECHO  Lasix 40 mg IVP - now  Accurate I & O   Daily weight   Respiratory protocol           * Rectal bleeding   Assessment & Plan    -S/P emergent ex lap with partial hemicolectomy- D/C 10/1/18  -reports bleeding scant amounts since D/C   - Dr Jo Go consulted  -Continual cardiac monitoring  -Trend H&H-- HGB stable 8 9 was 9 1 on 10/1/18  -hemoccult all stools       Hypokalemia   Assessment & Plan    K 2 6 upon admission-replaced in ER  Trend BMP  Continual cardiac monitoring      Anemia   Assessment & Plan    Currently stable   Continue to trend H & H  Continual cardiac monitoring   VS per protocol  -Heme positive stools         Hyperlipidemia   Assessment & Plan    Continue PTA medications     Essential hypertension   Assessment & Plan    Blood pressure currently stable  Continue to monitor per protocol  Continue PTA medications               VTE Prophylaxis: Warfarin (Coumadin)  / sequential compression device   Code Status: FULL  POLST: POLST is not applicable to this patient    Anticipated Length of Stay:  Patient will be admitted on an Observation basis with an anticipated length of stay of  Less than 2 midnights  Justification for Hospital Stay: serial H &H and continual cardiac monitoring     Total Time for Visit, including Counseling / Coordination of Care: 1 hour  Greater than 50% of this total time spent on direct patient counseling and coordination of care  Chief Complaint:   I had bleeding with my bowel movement    History of Present Illness:    Candice Etienne is a 77 y o  female who presented to the emergency room for evaluation of rectal bleed  Chart review indicates the patient was discharged October 1, 2018 from a September 26th 2018 admission for perforated bowel during:  Colonoscopy  Dr Per washington did emergent ex lap with partial hemicolectomy  Chart also indicates postoperative issues related to oxygenation and volume overload  Patient was discharged home on home O2 2 L 24 hours  Additionally patient states that she has had some rectal bleeding since discharge on the October 1st, but this morning was more pronounced  Chart review also indicates that the patient has a GI history of lower GI bleeding this earlier this year and diverticulitis  Patient denies any fevers chills nausea vomiting or diarrhea denies hematuria  In the ED labs and images were collected (see below)    Hemoglobin stable compared to discharge on October 1st   Patient was hypokalemic, and had low magnesium, additionally SPO2 86%  upon and a in the emergency room was 86%  Patient states she was discharged on home oxygen 2 L to be worn 24 hours a day  She states that she uses it when she feels necessary mostly when she is climbing stairs  Patient denies any ambulatory issues and states that she has been walking with a straight cane  Patient has a midline abdominal incision approximated staples intact  She does admit to some serosanguineous drainage-mild  Patient will be admitted for observation to monitor hemoglobin, continue cardiac monitoring and SPO2  Review of Systems:    Review of Systems   Respiratory: Positive for shortness of breath  Cardiovascular: Positive for leg swelling  Gastrointestinal: Positive for abdominal distention, abdominal pain and blood in stool  Neurological: Positive for weakness  Past Medical and Surgical History:     Past Medical History:   Diagnosis Date    Cardiac disease     GERD (gastroesophageal reflux disease)     Hyperlipidemia     Hypertension        Past Surgical History:   Procedure Laterality Date    CARDIAC SURGERY      CARDIAC SURGERY      CATARACT EXTRACTION      CHOLECYSTECTOMY      COLON SURGERY      COLONOSCOPY      COLONOSCOPY N/A 6/18/2018    Procedure: COLONOSCOPY;  Surgeon: Leslie Benavides DO;  Location: [facility];   Service: Gastroenterology    COLONOSCOPY W/ CONTROL OF HEMORRHAGE      CORONARY ANGIOPLASTY WITH STENT PLACEMENT      CORONARY ARTERY BYPASS GRAFT  2008    HERNIA REPAIR      HYSTERECTOMY      LAPAROTOMY N/A 9/26/2018    Procedure: LAPAROTOMY EXPLORATORY WITH  RIGHT HEMICOLECTOMY;  Surgeon: Leslie Benavides DO;  Location: [facility];  Service: Gastroenterology    LAPAROTOMY N/A 9/26/2018    Procedure: LAPAROTOMY EXPLORATORY WITH HEMICOLECTOMY;  Surgeon: Sue Beaulieu DO;  Location: [facility];  Service: General    IA COLONOSCOPY FLX DX W/COLLJ SPEC WHEN PFRMD N/A 9/26/2018    Procedure: COLONOSCOPY;  Surgeon: Aurelia Resendiz DO;  Location: [facility];  Service: Gastroenterology    OK SIGMOIDOSCOPY FLX DX W/COLLJ SPEC BR/WA IF PFRMD N/A 6/21/2018    Procedure: SIGMOIDOSCOPY FLEXIBLE;  Surgeon: Aurelia Resendiz DO;  Location: [facility]; Service: Gastroenterology       Meds/Allergies:    Prior to Admission medications    Medication Sig Start Date End Date Taking? Authorizing Provider   ALPRAZolam Ayesha Wadsworth) 0 25 mg tablet Take 0 25 mg by mouth 3 (three) times a day as needed for anxiety  Historical Provider, MD   atorvastatin (LIPITOR) 20 mg tablet Take 20 mg by mouth daily after dinner  Historical Provider, MD   ergocalciferol (ERGOCALCIFEROL) 46743 UNITS capsule Take 50,000 Units by mouth once a week  Takes on Wednesdays    Historical Provider, MD   ergocalciferol (ERGOCALCIFEROL) 23549 units capsule Take 1 capsule every week by oral route for 90 days  Historical Provider, MD   furosemide (LASIX) 20 mg tablet Take 1 tablet (20 mg total) by mouth 2 (two) times a day for 90 days  Patient taking differently: Take 40 mg by mouth daily   2/8/18 9/26/18  Solo Yang MD   lisinopril (ZESTRIL) 10 mg tablet Take 10 mg by mouth daily      Historical Provider, MD   metolazone (ZAROXOLYN) 2 5 mg tablet TAKE ONE TABLET BY MOUTH ONCE A WEEK OR  AS  DIRECTED  FOR  WEIGHT  GAIN 7/8/18   Solo Yang MD   metolazone (ZAROXOLYN) 2 5 mg tablet Take 1 tablet (2 5 mg total) by mouth daily 7/8/18   Solo Yang MD   metoprolol tartrate (LOPRESSOR) 50 mg tablet Take 50 mg by mouth 2 (two) times a day    Historical Provider, MD   Multiple Vitamins-Minerals (PRESERVISION AREDS) CAPS Take 1 capsule by mouth 2 (two) times a day    Historical Provider, MD   Na Sulfate-K Sulfate-Mg Sulf (SUPREP BOWEL PREP KIT) 17 5-3 13-1 6 GM/180ML SOLN Take 177 mL by mouth once for 1 dose 7/13/18 7/19/18  Maricarmen Gonzalez PA-C   nitroglycerin (NITROSTAT) 0 4 mg SL tablet Place 0 4 mg under the tongue every 5 (five) minutes as needed for chest pain     Historical Provider, MD   Omega-3 Fatty Acids (FISH OIL PO) Take 1,000 mg by mouth daily  Historical Provider, MD   oxyCODONE-acetaminophen (PERCOCET) 5-325 mg per tablet Take 1 tablet by mouth every 4 (four) hours as needed for moderate pain for up to 10 days Max Daily Amount: 6 tablets 10/1/18 10/11/18  Rome Poe MD   potassium chloride (K-DUR,KLOR-CON) 20 mEq tablet Take 1 tablet (20 mEq total) by mouth daily 9/12/18   Barb May MD   ranitidine (ZANTAC) 300 MG capsule Take 300 mg by mouth daily      Historical Provider, MD   warfarin (COUMADIN) 2 mg tablet TAKE ONE TO TWO TABLETS BY MOUTH ONCE DAILY OR  AS  ORDERED  BY  PHYSICIAN  Patient taking differently: monday, wednesday, friday, and saturday take 4mg and tuesdays, thursdays, and sundays 2mg 3/2/18   Barb May MD     I have reviewed home medications with patient personally  Allergies: Allergies   Allergen Reactions    Codeine GI Intolerance    Lansoprazole Other (See Comments)     GI Upset, dania protonix    Morphine Nausea Only    Morphine And Related GI Intolerance       Social History:     Marital Status: /Civil Union   Occupation: retired  Patient Pre-hospital Living Situation: independent w/   Patient Pre-hospital Level of Mobility: SC  Patient Pre-hospital Diet Restrictions: none  Substance Use History:   History   Alcohol Use No     History   Smoking Status    Former Smoker   Smokeless Tobacco    Never Used     Comment: quit 15 yrs ago     History   Drug Use No       Family History:    non-contributory    Physical Exam:     Vitals:   Blood Pressure: 148/70 (10/03/18 1400)  Pulse: 86 (10/03/18 1400)  Temperature: 97 8 °F (36 6 °C) (10/03/18 1011)  Temp Source: Temporal (10/03/18 1011)  Respirations: 21 (10/03/18 1400)  Weight - Scale: 84 8 kg (186 lb 15 2 oz) (10/03/18 1058)  SpO2: 99 % (10/03/18 1400)    Physical Exam   Constitutional: She appears well-developed and well-nourished  She appears ill  HENT:   Head: Normocephalic and atraumatic  Eyes: Pupils are equal, round, and reactive to light  Right eye exhibits no discharge  Left eye exhibits no discharge  No scleral icterus  Neck: Normal range of motion  Neck supple  No JVD present  No tracheal deviation present  No thyromegaly present  Cardiovascular: Normal rate, regular rhythm, normal heart sounds and intact distal pulses  Exam reveals no gallop and no friction rub  No murmur heard  Pulmonary/Chest: No stridor  Tachypnea noted  No respiratory distress  She has wheezes  She has no rales  Abdominal: Soft  Bowel sounds are normal  She exhibits distension  There is tenderness (incisional)  Musculoskeletal: Normal range of motion  She exhibits edema (b/l le +2)  She exhibits no tenderness or deformity  Neurological: She is alert  She displays normal reflexes  No cranial nerve deficit  Coordination normal    Skin: Skin is warm and dry  No rash noted  She is not diaphoretic  No erythema  No pallor  Psychiatric: She has a normal mood and affect  Her behavior is normal  Judgment and thought content normal            Additional Data:     Lab Results: I have personally reviewed pertinent reports  Results from last 7 days  Lab Units 10/03/18  1045   WBC Thousand/uL 7 11   HEMOGLOBIN g/dL 8 9*   HEMATOCRIT % 28 5*   PLATELETS Thousands/uL 180   NEUTROS PCT % 79*   LYMPHS PCT % 11*   MONOS PCT % 7   EOS PCT % 2       Results from last 7 days  Lab Units 10/03/18  1045   SODIUM mmol/L 143   POTASSIUM mmol/L 2 6*   CHLORIDE mmol/L 104   CO2 mmol/L 33*   BUN mg/dL 10   CREATININE mg/dL 1 01   CALCIUM mg/dL 8 8   ALK PHOS U/L 134*   ALT U/L 30   AST U/L 29       Results from last 7 days  Lab Units 10/03/18  1045   INR  2 72*       Imaging: I have personally reviewed pertinent reports  Xr Chest 1 View Portable    Result Date: 10/3/2018  Narrative: CHEST portable INDICATION: abdominal distention +GIB   emergent partial colectomy 26 Sept d/t perforation COMPARISON: September 27, 2018 and prior's VIEWS:  AP semierect; IMAGES:  1 FINDINGS: Midline sternotomy wires are present from previous cardiac surgery  The cardiomediastinal silhouette is unremarkable  Small right pleural effusion  No developing pulmonary infiltrates  Bony thorax is otherwise unremarkable  Incidentally, calcification is noted in the soft tissues adjacent to the greater tuberosity of the proximal right humerus indicative of calcific bursitis/tendinitis calcareous of the right shoulder  Electrode (EKG) monitoring devices overlie the chest      Impression: Small right pleural effusion  Status post CABG    Workstation performed: [id number]     Xr Chest Portable    Result Date: 9/28/2018  Narrative: CHEST INDICATION:   shortness of breath  Coughing spells  Congestion  COMPARISON:  September 26, 2018  EXAM PERFORMED/VIEWS:  XR CHEST PORTABLE FINDINGS:  There are median sternotomy wires indicating prior cardiac surgery  Heart shadow is enlarged but unchanged from prior exam  Lung bases are poorly seen due to overlying soft tissue densities  There is a small to moderate left pleural effusion which appears similar from prior examination  This probably trace right pleural effusion  Aerated lung zones are clear  Extensive subcutaneous emphysema is noted in the abdomen and chest   Previously noted free intraperitoneal air in the abdomen is not detectable  No acute osseous abnormality  Impression: Stable small left pleural effusion  Cardiomegaly  No focal airspace opacity  Extensive subcutaneous emphysema  Free intraperitoneal air, no unremarkable visualized  Workstation performed: [id number]     Xr Chest Portable    Result Date: 9/26/2018  Narrative: CHEST INDICATION:   Hypoxia and distended abdomen after colonoscopy  COMPARISON:  6/6/2018   EXAM PERFORMED/VIEWS:  XR CHEST PORTABLE FINDINGS: Large amount of retroperitoneal and peritoneal free air with medialization of the abdominal viscera and kidneys  There is also a large amount of air in the upper abdomen and lower neck  Diaphragms are elevated by subjacent free air  There are streaky bilateral basilar consolidations of the lungs doesn't of atelectasis  Question small left pleural effusion  Cardiac silhouette is largely obscured  Aortic atherosclerosis  Intact median sternotomy wires  Calcific tendinosis the right shoulder  No acute osseous findings  Impression: 1  Large amount of free air in the retroperitoneum and peritoneal cavity  Findings raise concern for perforation  2   Free air causes medialization of the abdominal and retroperitoneal viscera and elevation of the hemidiaphragms resulting in bibasilar compressive atelectasis  3   Subcutaneous emphysema abdominal wall and tracking upwards into the lower neck  I personally discussed this study with OR staff (Joss Villaseñor) by telephone on 9/26/2018 at 9:53 AM  Workstation performed: [id number]     Ct Abdomen Pelvis With Contrast    Addendum Date: 10/3/2018 Addendum:   ADDENDUM: CT ABDOMEN AND PELVIS WITH IV CONTRAST INDICATION:   abdominal distention/gib; emergent partial colectomy 26 Sept after perforation during colonoscopy  COMPARISON:  January 2, 2017 TECHNIQUE:  CT examination of the abdomen and pelvis was performed  Axial, sagittal, and coronal 2D reformatted images were created from the source data and submitted for interpretation  Radiation dose length product (DLP) for this visit:  581 29 mGy-cm   This examination, like all CT scans performed in the [facility], was performed utilizing techniques to minimize radiation dose exposure, including the use of iterative  reconstruction and automated exposure control  IV Contrast:  100 mL of iohexol (OMNIPAQUE) Enteric Contrast:  Enteric contrast was not administered  FINDINGS: ABDOMEN LOWER CHEST:  Small right pleural effusion  Bibasilar atelectatic changes, right greater the left   Subcutaneous emphysema noted along the lateral chest walls and posterior lateral right shoulder musculature  Midline sternotomy wires present from previous cardiac surgery  Mild cardiomegaly  LIVER/BILIARY TREE:  Liver measures 18 cm in height, mildly and diffusely diminished in attenuation without focal masses or dilated biliary ducts  GALLBLADDER:  Surgically absent  SPLEEN:  Normal in size and contour  Small accessory splenule noted in the splenic hilum  See above additional nonacute findings  PANCREAS:  Unremarkable  ADRENAL GLANDS:  Unremarkable  KIDNEYS/URETERS:  Unremarkable  No hydronephrosis  STOMACH AND BOWEL:  Stomach unremarkable  No small bowel dilatation  Left colonic diverticular disease noted without evidence of acute diverticulitis  Resection of the cecum and ascending colon noted with mid transverse ileocolonic anastomosis, patent  APPENDIX:  No findings to suggest appendicitis  ABDOMINOPELVIC CAVITY:  Small amount of ascites noted along the liver margins  Small amount of intraperitoneal and retroperitoneal emphysema with small collections of gas also noted in the bilateral LABIA  VESSELS:  Unremarkable for patient's age  PELVIS REPRODUCTIVE ORGANS:  Unremarkable for patient's age  URINARY BLADDER:  Unremarkable  ABDOMINAL WALL/INGUINAL REGIONS:  Extensive subcutaneous emphysema in the abdomen wall, lower back, pelvic, perineal and inguinal regions  Midline ventral closed surgical scar noted with surgical skin closures/staples present  OSSEOUS STRUCTURES:  Vacuum disc phenomenon/degenerative disc disease at L2-3, L3-4 and L5-S1  IMPRESSION:  Extensive subcutaneous emphysema of the torso with intraperitoneal, retroperitoneal and LABIAL extension  Negative for abscess detection  Small amount of ascites and right pleural effusion with right basilar atelectatic changes  Left colonic diverticular disease without evidence of acute diverticulitis  Status post right hemicolectomy  Mild hepatomegaly and hepatic steatosis  See above additional nonacute findings  I personally discussed this study with Leanna Ortez on 10/3/2018 at 1:19 PM     Result Date: 10/3/2018  Narrative: CT ABDOMEN AND PELVIS WITH IV CONTRAST INDICATION:   abdominal distention/gib; emergent partial colectomy 26 Sept after perforation during colonoscopy  COMPARISON:  January 2, 2017 TECHNIQUE:  CT examination of the abdomen and pelvis was performed  Axial, sagittal, and coronal 2D reformatted images were created from the source data and submitted for interpretation  Radiation dose length product (DLP) for this visit:  581 29 mGy-cm   This examination, like all CT scans performed in the [facility], was performed utilizing techniques to minimize radiation dose exposure, including the use of iterative  reconstruction and automated exposure control  IV Contrast:  100 mL of iohexol (OMNIPAQUE) Enteric Contrast:  Enteric contrast was not administered  FINDINGS: ABDOMEN LOWER CHEST:  Small right pleural effusion  Bibasilar atelectatic changes, right greater the left  Subcutaneous emphysema noted along the lateral chest walls and posterior lateral right shoulder musculature  Midline sternotomy wires present from previous cardiac surgery  Mild cardiomegaly  LIVER/BILIARY TREE:  Liver measures 18 cm in height, mildly and diffusely diminished in attenuation without focal masses or dilated biliary ducts  GALLBLADDER:  Surgically absent  SPLEEN:  Normal in size and contour  Small accessory splenule noted in the splenic hilum  See above additional nonacute findings  PANCREAS:  Unremarkable  ADRENAL GLANDS:  Unremarkable  KIDNEYS/URETERS:  Unremarkable  No hydronephrosis  STOMACH AND BOWEL:  Stomach unremarkable  No small bowel dilatation  Left colonic diverticular disease noted without evidence of acute diverticulitis  Resection of the cecum and ascending colon noted with mid transverse ileocolonic anastomosis, patent   APPENDIX:  No findings to suggest appendicitis  ABDOMINOPELVIC CAVITY:  Small amount of ascites noted along the liver margins  Small amount of intraperitoneal and retroperitoneal emphysema with small collections of gas also noted in the bilateral scrotum  VESSELS:  Unremarkable for patient's age  PELVIS REPRODUCTIVE ORGANS:  Unremarkable for patient's age  URINARY BLADDER:  Unremarkable  ABDOMINAL WALL/INGUINAL REGIONS:  Extensive subcutaneous emphysema in the abdomen wall, lower back, pelvic, perineal and inguinal regions  Midline ventral closed surgical scar noted with surgical skin closures/staples present  OSSEOUS STRUCTURES:  Vacuum disc phenomenon/degenerative disc disease at L2-3, L3-4 and L5-S1  Impression: Extensive subcutaneous emphysema of the torso with intraperitoneal, retroperitoneal and scrotal extension  Negative for abscess detection  Small amount of ascites and right pleural effusion with right basilar atelectatic changes  Left colonic diverticular disease without evidence of acute diverticulitis  Status post right hemicolectomy  Mild hepatomegaly and hepatic steatosis  See above additional nonacute findings  I personally discussed this study with Tim Pagan on 10/3/2018 at 1:19 PM  Workstation performed: [id number]         Allscripts / Epic Records Reviewed: Yes     ** Please Note: This note has been constructed using a voice recognition system   **

## 2018-10-03 NOTE — ASSESSMENT & PLAN NOTE
Present on admission   Xr Chest 1 View Portable"Impression: Small right pleural effusion   Status post CABG "  +2 B/L LE edema present  Patient denies taking home lasix today  Lasix 40 mg IVP now  Continue SPO2 monitoring   Respiratory protocol

## 2018-10-03 NOTE — ASSESSMENT & PLAN NOTE
Currently stable   Continue to trend H & H  Continual cardiac monitoring   VS per protocol  -Heme positive stools

## 2018-10-03 NOTE — ASSESSMENT & PLAN NOTE
BNP 5295  Patient expresses fluid excess concern  Did not take PTA lasix today  Xr Chest 1 View Portable"Impression: Small right pleural effusion   Status post CABG "  ECHO from 1/18 shows EF 60 %- ordered new ECHO  Lasix 40 mg IVP - now  Accurate I & O   Daily weight   Respiratory protocol

## 2018-10-03 NOTE — ASSESSMENT & PLAN NOTE
Most likely secondary to CHF exacerbation   BNP 5295  Patient expresses fluid excess concern  Did not take PTA lasix today  Xr Chest 1 View Portable"Impression: Small right pleural effusion   Status post CABG "  ECHO from 1/18 shows EF 60 %- ordered new ECHO  Lasix 40 mg IVP - now  Accurate I & O   Daily weight   Respiratory protocol

## 2018-10-04 ENCOUNTER — APPOINTMENT (OUTPATIENT)
Dept: NON INVASIVE DIAGNOSTICS | Facility: HOSPITAL | Age: 78
End: 2018-10-04
Payer: MEDICARE

## 2018-10-04 VITALS
WEIGHT: 186.07 LBS | RESPIRATION RATE: 18 BRPM | HEIGHT: 65 IN | OXYGEN SATURATION: 98 % | SYSTOLIC BLOOD PRESSURE: 127 MMHG | TEMPERATURE: 97.1 F | DIASTOLIC BLOOD PRESSURE: 62 MMHG | BODY MASS INDEX: 31 KG/M2 | HEART RATE: 78 BPM

## 2018-10-04 LAB
ALBUMIN SERPL BCP-MCNC: 2.4 G/DL (ref 3.5–5)
ALP SERPL-CCNC: 113 U/L (ref 46–116)
ALT SERPL W P-5'-P-CCNC: 26 U/L (ref 12–78)
ANION GAP SERPL CALCULATED.3IONS-SCNC: 5 MMOL/L (ref 4–13)
AST SERPL W P-5'-P-CCNC: 28 U/L (ref 5–45)
BILIRUB SERPL-MCNC: 0.6 MG/DL (ref 0.2–1)
BUN SERPL-MCNC: 9 MG/DL (ref 5–25)
CALCIUM SERPL-MCNC: 8.4 MG/DL (ref 8.3–10.1)
CHLORIDE SERPL-SCNC: 107 MMOL/L (ref 100–108)
CO2 SERPL-SCNC: 29 MMOL/L (ref 21–32)
CREAT SERPL-MCNC: 0.85 MG/DL (ref 0.6–1.3)
GFR SERPL CREATININE-BSD FRML MDRD: 65 ML/MIN/1.73SQ M
GLUCOSE SERPL-MCNC: 97 MG/DL (ref 65–140)
HCT VFR BLD AUTO: 28.9 % (ref 34.8–46.1)
HEMOCCULT STL QL: NEGATIVE
HGB BLD-MCNC: 8.7 G/DL (ref 11.5–15.4)
INR PPP: 2.86 (ref 0.86–1.17)
MAGNESIUM SERPL-MCNC: 2 MG/DL (ref 1.6–2.6)
PHOSPHATE SERPL-MCNC: 2.7 MG/DL (ref 2.3–4.1)
POTASSIUM SERPL-SCNC: 3.2 MMOL/L (ref 3.5–5.3)
PROT SERPL-MCNC: 5.7 G/DL (ref 6.4–8.2)
PROTHROMBIN TIME: 28.7 SECONDS (ref 11.8–14.2)
SODIUM SERPL-SCNC: 141 MMOL/L (ref 136–145)

## 2018-10-04 PROCEDURE — 80053 COMPREHEN METABOLIC PANEL: CPT | Performed by: INTERNAL MEDICINE

## 2018-10-04 PROCEDURE — 83735 ASSAY OF MAGNESIUM: CPT | Performed by: INTERNAL MEDICINE

## 2018-10-04 PROCEDURE — 94640 AIRWAY INHALATION TREATMENT: CPT

## 2018-10-04 PROCEDURE — 94762 N-INVAS EAR/PLS OXIMTRY CONT: CPT

## 2018-10-04 PROCEDURE — 93306 TTE W/DOPPLER COMPLETE: CPT

## 2018-10-04 PROCEDURE — 99217 PR OBSERVATION CARE DISCHARGE MANAGEMENT: CPT | Performed by: INTERNAL MEDICINE

## 2018-10-04 PROCEDURE — 84100 ASSAY OF PHOSPHORUS: CPT | Performed by: INTERNAL MEDICINE

## 2018-10-04 PROCEDURE — 85610 PROTHROMBIN TIME: CPT | Performed by: INTERNAL MEDICINE

## 2018-10-04 PROCEDURE — 94760 N-INVAS EAR/PLS OXIMETRY 1: CPT

## 2018-10-04 PROCEDURE — 93306 TTE W/DOPPLER COMPLETE: CPT | Performed by: INTERNAL MEDICINE

## 2018-10-04 PROCEDURE — 85014 HEMATOCRIT: CPT | Performed by: NURSE PRACTITIONER

## 2018-10-04 PROCEDURE — 85018 HEMOGLOBIN: CPT | Performed by: NURSE PRACTITIONER

## 2018-10-04 RX ORDER — POTASSIUM CHLORIDE 20 MEQ/1
40 TABLET, EXTENDED RELEASE ORAL ONCE
Status: COMPLETED | OUTPATIENT
Start: 2018-10-04 | End: 2018-10-04

## 2018-10-04 RX ORDER — ACETAMINOPHEN 325 MG/1
650 TABLET ORAL EVERY 6 HOURS PRN
Status: DISCONTINUED | OUTPATIENT
Start: 2018-10-04 | End: 2018-10-04 | Stop reason: HOSPADM

## 2018-10-04 RX ORDER — FUROSEMIDE 40 MG/1
40 TABLET ORAL DAILY
Status: DISCONTINUED | OUTPATIENT
Start: 2018-10-04 | End: 2018-10-04 | Stop reason: HOSPADM

## 2018-10-04 RX ADMIN — ACETAMINOPHEN 650 MG: 325 TABLET, FILM COATED ORAL at 05:32

## 2018-10-04 RX ADMIN — METOPROLOL TARTRATE 50 MG: 50 TABLET ORAL at 08:47

## 2018-10-04 RX ADMIN — FUROSEMIDE 40 MG: 40 TABLET ORAL at 09:45

## 2018-10-04 RX ADMIN — LEVALBUTEROL 1.25 MG: 1.25 SOLUTION, CONCENTRATE RESPIRATORY (INHALATION) at 09:02

## 2018-10-04 RX ADMIN — POTASSIUM CHLORIDE 40 MEQ: 1500 TABLET, EXTENDED RELEASE ORAL at 08:47

## 2018-10-04 RX ADMIN — ISODIUM CHLORIDE 3 ML: 0.03 SOLUTION RESPIRATORY (INHALATION) at 09:02

## 2018-10-04 RX ADMIN — POTASSIUM CHLORIDE 40 MEQ: 1500 TABLET, EXTENDED RELEASE ORAL at 06:40

## 2018-10-04 NOTE — ASSESSMENT & PLAN NOTE
Most likely secondary to CHF exacerbation   BNP 5295 on admission   Xr Chest 1 View Portable"Impression: Small right pleural effusion   Status post CABG "  ECHO collected EF 60%  Resume PTA lasix 40 mg QD  Accurate I & O   Daily weight   Respiratory protocol

## 2018-10-04 NOTE — ASSESSMENT & PLAN NOTE
Present on admission   Xr Chest 1 View Portable"Impression: Small right pleural effusion   Status post CABG "  +2 B/L LE edema present  Resume PTA lasix  Continue SPO2 monitoring   Respiratory protocol

## 2018-10-04 NOTE — DISCHARGE INSTR - APPOINTMENTS
Pt has an appointment with Naz Morse on Wednesday October 10, 2018 at 10:45 in the [facility] office

## 2018-10-04 NOTE — ASSESSMENT & PLAN NOTE
-S/P emergent ex lap with partial hemicolectomy- D/C 10/1/18  -reports bleeding scant amounts since D/C on 10/1/18  - Dr Arya Valencia consulted  -Continual cardiac monitoring  -Trend H&H-- HGB stable 8 9 was 9 1 on 10/1/18, HGB 10/4/18 1900=9  2- awaiting am H&H  -hemoccult all stools  - patient last BM last evening / early am -denies emma blood

## 2018-10-04 NOTE — CASE MANAGEMENT
Initial Clinical Review  Admission: Date/Time/Statement:   Admission Orders     Ordered        10/03/18 1339  Place in Observation  Once             Orders Placed This Encounter   Procedures    Place in Observation     Standing Status:   Standing     Number of Occurrences:   1     Order Specific Question:   Admitting Physician     Answer:   Gilberto Hurst     Order Specific Question:   Level of Care     Answer:   Med Surg [16]   ED: Date/Time/Mode of Arrival:   ED Arrival Information     Expected Arrival Acuity Means of Arrival Escorted By Service Admission Type    - 10/3/2018 10:05 Urgent Wheelchair Family Member General Medicine Urgent    Arrival Complaint    Rectal Bleed      Chief Complaint:   Chief Complaint   Patient presents with    Post-op Problem     Colon resection last Wednesday, rectal bleeding since, and having drainage from incision   History of Illness:   19-year-old woman with the noted past medical history who presents to the emergency department for evaluation of rectal bleeding that has been present since 1 October 2018; patient was hospitalized in this facility from 26 September-1 October after a perforation of the large bowel during a colonoscopy in this facility that required emergent ex lap and partial hemicolectomy by Dr Yaneli Rosales  Did have postoperative issues related to oxygenation/volume overload but was ultimately discharged home in good condition where she has resided since 1 October 2018  She reports daily episodes of gastrointestinal bleeding described as small volume passage of dark red blood without significant amounts of stool  There is mild abdominal cramping associated with the passage of stool  In between the episodes of bleeding, she does not have any ongoing active or continued abdominal pain  There has also been daily serous yellow discharge from the laparotomy incision; she reports no pain or discomfort around the incision site    ED Vital Signs:   ED Triage Vitals [10/03/18 1011]   Temperature Pulse Respirations Blood Pressure SpO2   97 8 °F (36 6 °C) 91 20 140/65 (!) 86 %      Temp Source Heart Rate Source Patient Position - Orthostatic VS BP Location FiO2 (%)   Temporal Monitor Lying Left arm --      Pain Score       4        Wt Readings from Last 1 Encounters:   10/04/18 84 4 kg (186 lb 1 1 oz)   Vital Signs (abnormal): Abnormal Labs/Diagnostic Test Results:   K 3 2 TPROT 5 7 ALB 2 4 PT 28 7 INR 2 86   CXR=Small right pleural effusion  Status post CABG  CT A/P=Extensive subcutaneous emphysema of the torso with intraperitoneal, retroperitoneal and LABIAL extension  Negative for abscess detection  Small amount of ascites and right pleural effusion with right basilar atelectatic changes  Left colonic diverticular disease without evidence of acute diverticulitis  Status post right hemicolectomy    Mild hepatomegaly and hepatic steatosis    EKG=  Ventricular Rate BPM 82    Atrial Rate BPM 82    FL Interval ms 154    QRSD Interval ms 114    QT Interval ms 406    QTC Interval ms 474    P Axis degrees 52    QRS Axis degrees 10    T Wave Axis degrees -7      Sinus rhythm with occasional Premature ventricular complexes  Possible Left atrial enlargement  Low voltage QRS  Incomplete right bundle branch block  Nonspecific ST and T wave abnormality  Prolonged QT      ED Treatment:   Medication Administration from 10/03/2018 1005 to 10/03/2018 1434       Date/Time Order Dose Route Action Action by Comments     10/03/2018 1052 ondansetron (ZOFRAN) injection 4 mg 4 mg Intravenous Given Camille Price RN      10/03/2018 1328 lactated ringers bolus 1,000 mL 0 mL Intravenous Stopped Rylee Donnise Blue, RN      10/03/2018 1136 lactated ringers bolus 1,000 mL 1,000 mL Intravenous New Bag Rylee Donnise Blue, RN      10/03/2018 1302 potassium chloride 20 mEq IVPB (premix) 0 mEq Intravenous Stopped Venita Borden RN      10/03/2018 1137 potassium chloride 20 mEq IVPB (premix) 40 mEq Intravenous New Bag Rylee Audra Hoa, RN      10/03/2018 1341 magnesium sulfate 2 g/50 mL IVPB (premix) 2 g 0 g Intravenous Stopped Rylee Audra Hoa, RN      10/03/2018 1220 magnesium sulfate 2 g/50 mL IVPB (premix) 2 g 2 g Intravenous New Bag Rylee Barb Long RN      10/03/2018 1238 iohexol (OMNIPAQUE) 350 MG/ML injection (SINGLE-DOSE) 100 mL 100 mL Intravenous Given Casimiro Ye      10/03/2018 1427 potassium chloride 20 mEq IVPB (premix) 0 mEq Intravenous Stopped Laya Higgins RN      10/03/2018 1328 potassium chloride 20 mEq IVPB (premix) 20 mEq Intravenous New Bag Rylee Audra Hoa, RN      10/03/2018 1330 sodium chloride 0 9 % infusion 125 mL/hr Intravenous New Bag Rylee Audra Hoa, RN       Past Medical/Surgical History:    Active Ambulatory Problems     Diagnosis Date Noted    Diverticulosis 01/02/2017    Rectal bleeding 01/02/2017    Hypoalbuminemia 01/02/2017    CAD (coronary artery disease) 01/02/2017    Essential hypertension 01/02/2017    Hyperlipidemia 01/02/2017    Elevated MCV 01/02/2017    Atrial flutter ([facility] [address] ) 01/02/2017    Right-sided thoracic back pain 01/02/2017    History of shingles 01/02/2017    Incomplete right bundle branch block 01/02/2017    Hx of CABG 01/02/2017    Thoracic radiculopathy 01/02/2017    Pyuria 01/02/2017    Microscopic hematuria 01/02/2017    Acute on chronic diastolic (congestive) heart failure (HCC) 02/08/2018    Shortness of breath 02/08/2018    Chronic diastolic heart failure ([address] ) 05/24/2018    Diverticulosis of large intestine with hemorrhage 06/16/2018    Vomiting 06/17/2018    Colonic mass 06/19/2018    Tubulovillous adenoma of colon 07/25/2018    Perforated viscus      Resolved Ambulatory Problems     Diagnosis Date Noted    Left upper lobe pneumonia ([facility] [address] ) 09/18/2016    Vasovagal syncope 06/16/2018    Essential hypererythropoietinemia 09/26/2018     Past Medical History:   Diagnosis Date    Cardiac disease     GERD (gastroesophageal reflux disease)     Hyperlipidemia     Hypertension    Admitting Diagnosis: Subcutaneous emphysema ([facility] [address] ) [T79  7XXA]  Hypokalemia [E87 6]  Hypomagnesemia [E83 42]  GI bleeding [K92 2]  Rectal bleeding [K62 5]  Age/Sex: 77 y o  female  Assessment/Plan:   78 YO FEMALE TO ER FROM HOME C/O RECTAL BLEEDING, SEROSANGUINOUS DRAINAGE FROM MIDLINE ABD INCISION  S/P HEMICOLECTOMY ON 10/1/18  ADMISSION TESTING FOUND PT TO BE HYPOKALEMIC & HYPOXIC WITH R PLEURAL EFFUSION  ADMITTED TO INPATIENT STATUS FOR RECTAL BLEEDING    Admission Orders:  TELEMETRY  VENODYNES  Scheduled Meds:   Current Facility-Administered Medications:  acetaminophen 650 mg Oral Q6H PRN Cesilia Russell PA-C   albuterol 2 5 mg Nebulization Q6H PRN Dicie Needy, DO   ALPRAZolam 0 25 mg Oral TID PRN Cesilia Russell PA-C   atorvastatin 20 mg Oral After Dinner Puhialcira Bautista, CRNP   ergocalciferol 50,000 Units Oral Weekly Puhi Michele, CRNP   famotidine 40 mg Oral HS Re Y Swank, CRNP   levalbuterol 1 25 mg Nebulization TID Dicie Needy, DO   metoprolol tartrate 50 mg Oral BID Re Y Swank, CRNP   potassium chloride 40 mEq Oral Once Re Y Swank, CRNP   sodium chloride 3 mL Nebulization TID Dicie Needy, DO   Continuous Infusions:    PRN Meds:   acetaminophen    albuterol    ALPRAZolam 2 days/day(s)

## 2018-10-04 NOTE — ASSESSMENT & PLAN NOTE
-S/P emergent ex lap with partial hemicolectomy- D/C 10/1/18  -reports bleeding scant amounts since D/C on 10/1/18  - Dr Latrice Morel consulted  -Continual cardiac monitoring  -Trend H&H-- HGB stable 8 9 was 9 1 on 10/1/18, HGB 10/4/18 1900=9  2- awaiting am H&H  -hemoccult all stools  - patient last BM last evening / early am -denies emma blood

## 2018-10-04 NOTE — ASSESSMENT & PLAN NOTE
Currently stable   Continue to trend H & H- currently stable  Continual cardiac monitoring   VS per protocol  -Heme positive stools

## 2018-10-04 NOTE — SOCIAL WORK
Pt is being discharged today  Pt's  will give the pt a ride home  [facility] that [facility]  Follow up appointment made for pt with Dr Ivette Tai   Pt has a follow up appt with Dr Catalina Root already made  Case Management reviewed discharge planning process including the following: identifying help that is needed at home, pt's preference for discharge needs and Meds at [facility]  Reviewed with Pt that any member of the healthcare team can answer questions regarding : medications, jmportance of recognizing  Signs and symptoms of any  medical problems  Case Management also encouraged pt to follow up with all recommended appointments after discharge

## 2018-10-04 NOTE — SOCIAL WORK
Met with pt's   to discuss role as  in helping pt to develop discharge plan and to help pt carry out their plan  Pt lives in a house with her    Pt has 1 JAMIA  Pt has 14 steps on the inside  Pt has bedroom and bathroom on the 2nd floor  Pt has a cane , walker and wheel chair at home  Pt has been using a cane to ambulate  Pt has 02 at 2 liters  Continuously and pt's 02 is supplied from [address] w[address]j Pt is independent with ADL's  Pt's  has been doing the cooking and cleaning  Pt's legally blind in the Left eye   Pt's  drives her to [employer]  Pt is active with [facility] although they had not been in for their first visit yet    Pt is a  30 day readmission

## 2018-10-04 NOTE — DISCHARGE SUMMARY
Discharge- Amanda Grajeda 1940, 77 y o  female MRN: [id number]    Unit/Bed#: 425-01 Encounter: [id number]    Primary Care Provider: Stephanie Worthy MD   Date and time admitted to hospital: 10/3/2018 10:07 AM        Pleural effusion   Assessment & Plan    Present on admission   Xr Chest 1 View Portable"Impression: Small right pleural effusion  Status post CABG "  +2 B/L LE edema present  Resume PTA lasix  Continue SPO2 monitoring   Respiratory protocol       Hypoxia   Assessment & Plan    New home O@ requirement since D/C on 10/1/18  Patient reports that she only uses when she feels like she needs it - while climbing stairs  Continual SPO2 monitoring  Respiratory protocol  Continue diureisis       Shortness of breath   Assessment & Plan    Most likely secondary to CHF exacerbation   BNP 5295 on admission   Xr Chest 1 View Portable"Impression: Small right pleural effusion  Status post CABG "  ECHO collected EF 60%  Resume PTA lasix 40 mg QD  Accurate I & O   Daily weight   Respiratory protocol         Acute on chronic diastolic (congestive) heart failure ([facility] [address] )   Assessment & Plan    BNP 5295  Patient expresses fluid excess concern on admission  Xr Chest 1 View Portable"Impression: Small right pleural effusion  Status post CABG "  ECHO completed EF 60%  Resume PTA -lasix 40 mg   Accurate I & O   Daily weight   Respiratory protocol           * Rectal bleeding   Assessment & Plan    -S/P emergent ex lap with partial hemicolectomy- D/C 10/1/18  -reports bleeding scant amounts since D/C on 10/1/18  - Dr Tamy Cheung consulted  -Continual cardiac monitoring  -Trend H&H-- HGB stable 8 9 was 9 1 on 10/1/18, HGB 10/4/18 1900=9  2- awaiting am H&H  -hemoccult all stools  - patient last BM last evening / early am -denies emma blood       Hypokalemia   Assessment & Plan    K 3 2 -replaced   Trend BMP  Continual cardiac monitoring      Anemia   Assessment & Plan    Currently stable   Continue to trend H & H- currently stable  Continual cardiac monitoring   VS per protocol  -Heme positive stools         Hyperlipidemia   Assessment & Plan    Continue PTA medications     Essential hypertension   Assessment & Plan    Blood pressure currently stable  Continue to monitor per protocol  Continue PTA medications               Discharging Physician / Practitioner: ANTHONY Ham  PCP: Enid Bernard MD  Admission Date:   Admission Orders     Ordered        10/03/18 1339  Place in Observation  Once             Discharge Date: 10/04/18    Resolved Problems  Date Reviewed: 10/4/2018    None          Consultations During Hospital Stay:  · Care management     Procedures Performed:     · N/A    Significant Findings / Test Results:     · ECHO -completed --EF 60%    Incidental Findings:   · none     Test Results Pending at Discharge (will require follow up):   · N/A     Outpatient Tests Requested:  · N/A    Complications:  none    Reason for Admission: rectal bleeding     Hospital Course:     Mary Duran is a 77 y o  female patient who originally presented to the hospital on 10/3/2018 for evaluation of her rectal bleed  Recently patient was discharged on October 1st status post hemicolectomy exploratory lap  Her patient's  reports emma blood in stool and brought her to the emergency room for evaluation  In ER patient was hypoxic 86% SpO2  Was placed on 2 L nasal cannula, on discharge on October 1st she was prescribed 2 L oxygen continuous but she reports only wear when she feels like she needs it  Images were collected and showed a small left pleural effusion  There is bilateral lower extremity +2 edema present  Patient states did not have any daily medicines  She was given 40 mg IV push Lasix  She was admitted for observation  H&H was trended, hemoglobin is stable, patient denies any emma blood in stool, additionally reports improved breathing  Will be discharged home w/       Please see above list of diagnoses and related plan for additional information  Condition at Discharge: stable     Discharge Day Visit / Exam:     Subjective:  Mild incisional pain, denies emma blood w/ BM, feels that her breathing is improved   Vitals: Blood Pressure: 127/62 (10/04/18 0712)  Pulse: 78 (10/04/18 0712)  Temperature: (!) 97 1 °F (36 2 °C) (10/04/18 0712)  Temp Source: Temporal (10/04/18 8374)  Respirations: 18 (10/04/18 0712)  Height: 5' 5" (165 1 cm) (10/03/18 1449)  Weight - Scale: 84 4 kg (186 lb 1 1 oz) (10/04/18 0600)  SpO2: 98 % (10/04/18 0904)  Exam:   Physical Exam   Constitutional: She is oriented to person, place, and time  She appears well-developed and well-nourished  No distress  HENT:   Head: Normocephalic and atraumatic  Mouth/Throat: No oropharyngeal exudate  Eyes: Pupils are equal, round, and reactive to light  Right eye exhibits no discharge  Left eye exhibits no discharge  No scleral icterus  Neck: Normal range of motion  Neck supple  No JVD present  No tracheal deviation present  No thyromegaly present  Cardiovascular: Normal rate  Exam reveals no gallop  No murmur heard  Pulmonary/Chest: Effort normal and breath sounds normal  No stridor  No respiratory distress  She has no wheezes  She has no rales  Abdominal: Soft  Bowel sounds are normal  She exhibits no distension  There is tenderness (mild incisional)  There is no rebound  Musculoskeletal: Normal range of motion  She exhibits edema (+1 b/l le)  She exhibits no tenderness or deformity  Neurological: She is alert and oriented to person, place, and time  No cranial nerve deficit  Coordination normal  GCS eye subscore is 4  GCS verbal subscore is 5  GCS motor subscore is 6  Skin: Skin is warm and dry  She is not diaphoretic  Psychiatric: She has a normal mood and affect   Her behavior is normal  Judgment and thought content normal        Discussion with Family: f/u with Surgery outpatient and PCP    Discharge instructions/Information to patient and family:   See after visit summary for information provided to patient and family  Provisions for Follow-Up Care:  See after visit summary for information related to follow-up care and any pertinent home health orders  Disposition:     Home    For Discharges to [facility] SNF:   · Not Applicable to this Patient - Not Applicable to this Patient    Planned Readmission: n/a     Discharge Statement:  I spent 45 minutes discharging the patient  This time was spent on the day of discharge  I had direct contact with the patient on the day of discharge  Greater than 50% of the total time was spent examining patient, answering all patient questions, arranging and discussing plan of care with patient as well as directly providing post-discharge instructions  Additional time then spent on discharge activities  Discharge Medications:  See after visit summary for reconciled discharge medications provided to patient and family        ** Please Note: This note has been constructed using a voice recognition system **

## 2018-10-04 NOTE — ASSESSMENT & PLAN NOTE
BNP 5295  Patient expresses fluid excess concern on admission  Xr Chest 1 View Portable"Impression: Small right pleural effusion   Status post CABG "  ECHO completed EF 60%  Resume PTA -lasix 40 mg   Accurate I & O   Daily weight   Respiratory protocol

## 2018-10-10 ENCOUNTER — HOSPITAL ENCOUNTER (OUTPATIENT)
Dept: RADIOLOGY | Facility: HOSPITAL | Age: 78
Discharge: HOME | End: 2018-10-10
Payer: MEDICARE

## 2018-10-10 ENCOUNTER — TRANSCRIBE ORDERS (OUTPATIENT)
Dept: ADMINISTRATIVE | Facility: HOSPITAL | Age: 78
End: 2018-10-10

## 2018-10-10 ENCOUNTER — APPOINTMENT (OUTPATIENT)
Dept: LAB | Facility: HOSPITAL | Age: 78
End: 2018-10-10
Payer: MEDICARE

## 2018-10-10 DIAGNOSIS — E11.9 DIABETES MELLITUS WITHOUT COMPLICATION (HCC): ICD-10-CM

## 2018-10-10 DIAGNOSIS — R10.9 STOMACH ACHE: ICD-10-CM

## 2018-10-10 DIAGNOSIS — Z79.82 ENCOUNTER FOR LONG-TERM (CURRENT) USE OF ASPIRIN: ICD-10-CM

## 2018-10-10 DIAGNOSIS — R10.12 ABDOMINAL PAIN, LEFT UPPER QUADRANT: Primary | ICD-10-CM

## 2018-10-10 DIAGNOSIS — R10.12 ABDOMINAL PAIN, LEFT UPPER QUADRANT: ICD-10-CM

## 2018-10-10 LAB
ALBUMIN SERPL BCP-MCNC: 3.3 G/DL (ref 3.5–5)
ALP SERPL-CCNC: 166 U/L (ref 46–116)
ALT SERPL W P-5'-P-CCNC: 21 U/L (ref 12–78)
ANION GAP SERPL CALCULATED.3IONS-SCNC: 11 MMOL/L (ref 4–13)
AST SERPL W P-5'-P-CCNC: 22 U/L (ref 5–45)
BILIRUB SERPL-MCNC: 0.7 MG/DL (ref 0.2–1)
BUN SERPL-MCNC: 12 MG/DL (ref 5–25)
CALCIUM SERPL-MCNC: 9.1 MG/DL (ref 8.3–10.1)
CHLORIDE SERPL-SCNC: 105 MMOL/L (ref 100–108)
CO2 SERPL-SCNC: 26 MMOL/L (ref 21–32)
CREAT SERPL-MCNC: 0.9 MG/DL (ref 0.6–1.3)
ERYTHROCYTE [DISTWIDTH] IN BLOOD BY AUTOMATED COUNT: 15.4 % (ref 11.6–15.1)
GFR SERPL CREATININE-BSD FRML MDRD: 61 ML/MIN/1.73SQ M
GLUCOSE SERPL-MCNC: 106 MG/DL (ref 65–140)
HCT VFR BLD AUTO: 34.4 % (ref 34.8–46.1)
HGB BLD-MCNC: 10.5 G/DL (ref 11.5–15.4)
MCH RBC QN AUTO: 28.9 PG (ref 26.8–34.3)
MCHC RBC AUTO-ENTMCNC: 30.5 G/DL (ref 31.4–37.4)
MCV RBC AUTO: 95 FL (ref 82–98)
PLATELET # BLD AUTO: 269 THOUSANDS/UL (ref 149–390)
PMV BLD AUTO: 9.4 FL (ref 8.9–12.7)
POTASSIUM SERPL-SCNC: 3.2 MMOL/L (ref 3.5–5.3)
PROT SERPL-MCNC: 7.1 G/DL (ref 6.4–8.2)
RBC # BLD AUTO: 3.63 MILLION/UL (ref 3.81–5.12)
SODIUM SERPL-SCNC: 142 MMOL/L (ref 136–145)
WBC # BLD AUTO: 13.83 THOUSAND/UL (ref 4.31–10.16)

## 2018-10-10 PROCEDURE — 80053 COMPREHEN METABOLIC PANEL: CPT

## 2018-10-10 PROCEDURE — 74018 RADEX ABDOMEN 1 VIEW: CPT

## 2018-10-10 PROCEDURE — 71046 X-RAY EXAM CHEST 2 VIEWS: CPT

## 2018-10-10 PROCEDURE — 36415 COLL VENOUS BLD VENIPUNCTURE: CPT

## 2018-10-10 PROCEDURE — 85027 COMPLETE CBC AUTOMATED: CPT

## 2018-10-11 ENCOUNTER — OFFICE VISIT (OUTPATIENT)
Dept: SURGERY | Facility: HOSPITAL | Age: 78
End: 2018-10-11

## 2018-10-11 VITALS
TEMPERATURE: 98 F | HEIGHT: 65 IN | DIASTOLIC BLOOD PRESSURE: 107 MMHG | HEART RATE: 78 BPM | WEIGHT: 172 LBS | BODY MASS INDEX: 28.66 KG/M2 | SYSTOLIC BLOOD PRESSURE: 148 MMHG

## 2018-10-11 DIAGNOSIS — R19.8 PERFORATED VISCUS: ICD-10-CM

## 2018-10-11 DIAGNOSIS — R19.7 DIARRHEA, UNSPECIFIED TYPE: Primary | ICD-10-CM

## 2018-10-11 PROCEDURE — 99024 POSTOP FOLLOW-UP VISIT: CPT | Performed by: SURGERY

## 2018-10-12 ENCOUNTER — APPOINTMENT (OUTPATIENT)
Dept: LAB | Facility: HOSPITAL | Age: 78
End: 2018-10-12
Attending: SURGERY
Payer: MEDICARE

## 2018-10-12 DIAGNOSIS — R19.7 DIARRHEA, UNSPECIFIED TYPE: ICD-10-CM

## 2018-10-12 DIAGNOSIS — N30.01 ACUTE CYSTITIS WITH HEMATURIA: Primary | ICD-10-CM

## 2018-10-12 PROBLEM — N30.00 ACUTE CYSTITIS: Status: ACTIVE | Noted: 2018-10-12

## 2018-10-12 LAB
BACTERIA UR QL AUTO: ABNORMAL /HPF
BILIRUB UR QL STRIP: NEGATIVE
CLARITY UR: ABNORMAL
COLOR UR: YELLOW
GLUCOSE UR STRIP-MCNC: NEGATIVE MG/DL
HGB UR QL STRIP.AUTO: ABNORMAL
KETONES UR STRIP-MCNC: NEGATIVE MG/DL
LEUKOCYTE ESTERASE UR QL STRIP: ABNORMAL
NITRITE UR QL STRIP: POSITIVE
NON-SQ EPI CELLS URNS QL MICRO: ABNORMAL /HPF
OTHER STN SPEC: ABNORMAL
PH UR STRIP.AUTO: 5.5 [PH] (ref 4.5–8)
PROT UR STRIP-MCNC: NEGATIVE MG/DL
RBC #/AREA URNS AUTO: ABNORMAL /HPF
SP GR UR STRIP.AUTO: 1.02 (ref 1–1.03)
UROBILINOGEN UR QL STRIP.AUTO: 0.2 E.U./DL
WBC #/AREA URNS AUTO: ABNORMAL /HPF

## 2018-10-12 PROCEDURE — 87505 NFCT AGENT DETECTION GI: CPT

## 2018-10-12 PROCEDURE — 81001 URINALYSIS AUTO W/SCOPE: CPT | Performed by: SURGERY

## 2018-10-12 PROCEDURE — 87493 C DIFF AMPLIFIED PROBE: CPT

## 2018-10-12 RX ORDER — CIPROFLOXACIN 500 MG/1
500 TABLET, FILM COATED ORAL EVERY 12 HOURS SCHEDULED
Qty: 10 TABLET | Refills: 0 | Status: SHIPPED | OUTPATIENT
Start: 2018-10-12 | End: 2018-10-17

## 2018-10-13 DIAGNOSIS — A04.72 CLOSTRIDIUM DIFFICILE DIARRHEA: Primary | ICD-10-CM

## 2018-10-13 LAB
C DIFF TOX GENS STL QL NAA+PROBE: ABNORMAL
CAMPYLOBACTER DNA SPEC NAA+PROBE: NORMAL
SALMONELLA DNA SPEC QL NAA+PROBE: NORMAL
SHIGA TOXIN STX GENE SPEC NAA+PROBE: NORMAL
SHIGELLA DNA SPEC QL NAA+PROBE: NORMAL

## 2018-10-13 NOTE — SOCIAL WORK
10/13/2018  The patient needs vancomycin I ran the script via [facility] [employer] and it is covered with a copay of 8$ I called the patient and her  will pick it up today I did explain the instructions for taking the medication and the importance of finishing all the med  As prescribed to the patient Dr Tabitha Carr is aware

## 2018-10-16 ENCOUNTER — ANTICOAG VISIT (OUTPATIENT)
Dept: CARDIOLOGY CLINIC | Facility: CLINIC | Age: 78
End: 2018-10-16

## 2018-10-16 ENCOUNTER — LAB REQUISITION (OUTPATIENT)
Dept: LAB | Facility: HOSPITAL | Age: 78
End: 2018-10-16
Payer: MEDICARE

## 2018-10-16 DIAGNOSIS — I48.91 ATRIAL FIBRILLATION (HCC): ICD-10-CM

## 2018-10-16 DIAGNOSIS — I50.32 CHRONIC DIASTOLIC HEART FAILURE (HCC): ICD-10-CM

## 2018-10-16 DIAGNOSIS — I48.3 TYPICAL ATRIAL FLUTTER (HCC): ICD-10-CM

## 2018-10-16 LAB
ERYTHROCYTE [DISTWIDTH] IN BLOOD BY AUTOMATED COUNT: 15 % (ref 11.6–15.1)
HCT VFR BLD AUTO: 35.1 % (ref 34.8–46.1)
HGB BLD-MCNC: 10.8 G/DL (ref 11.5–15.4)
INR PPP: 2.56 (ref 0.86–1.17)
MCH RBC QN AUTO: 28.9 PG (ref 26.8–34.3)
MCHC RBC AUTO-ENTMCNC: 30.8 G/DL (ref 31.4–37.4)
MCV RBC AUTO: 94 FL (ref 82–98)
PLATELET # BLD AUTO: 245 THOUSANDS/UL (ref 149–390)
PMV BLD AUTO: 9.9 FL (ref 8.9–12.7)
PROTHROMBIN TIME: 26.3 SECONDS (ref 11.8–14.2)
RBC # BLD AUTO: 3.74 MILLION/UL (ref 3.81–5.12)
WBC # BLD AUTO: 6.04 THOUSAND/UL (ref 4.31–10.16)

## 2018-10-16 PROCEDURE — 85027 COMPLETE CBC AUTOMATED: CPT | Performed by: INTERNAL MEDICINE

## 2018-10-16 PROCEDURE — 85610 PROTHROMBIN TIME: CPT | Performed by: INTERNAL MEDICINE

## 2018-10-16 NOTE — ASSESSMENT & PLAN NOTE
Patient presents for postop check after undergoing emergent exploratory laparotomy and right hemicolectomy for bowel perforation after colonoscopy  Recent admission for some bloody a bowel movements but hemoglobin was stable  Currently doing well  Eating better although still having some diarrhea and just overall not feeling well  Recent labs show leukocytosis of 13  Given the his labs plus diarrhea will send outs urinalysis and stool studies and realize C diff

## 2018-10-16 NOTE — PROGRESS NOTES
Assessment/Plan:    Perforated viscus   Patient presents for postop check after undergoing emergent exploratory laparotomy and right hemicolectomy for bowel perforation after colonoscopy  Recent admission for some bloody a bowel movements but hemoglobin was stable  Currently doing well  Eating better although still having some diarrhea and just overall not feeling well  Recent labs show leukocytosis of 13  Given the his labs plus diarrhea will send outs urinalysis and stool studies and realize C diff  Diagnoses and all orders for this visit:    Diarrhea, unspecified type  -     Urinalysis with microscopic  -     Clostridium difficile toxin by PCR; Future  -     Stool Enteric Bacterial Panel by PCR; Future    Perforated viscus          Subjective:      Patient ID: Konstantin Osorio is a 77 y o  female  68-year-old female with the recent emergent exploratory laparotomy and right hemicolectomy with primary anastomosis due to perforation secondary to colonoscopy, presents today for follow-up  Since her discharge she was admitted once again for some dark heme-positive stools  At that time she was having some diarrhea  Her hemoglobin was stable and she was subsequent discharged  She comes in today overall doing okay but still feeling kind of port  Denies any nausea vomiting  No fevers or chills  She is eating but her appetite is still poor  She also feels that any time she eats a goes right through her and she has multiple bouts of diarrhea per day, sometimes greater than fiber 6  Denies any bright red blood  Denies any blood in urine or dysuria  She recently underwent some lab work by her PCP which did show a leukocytosis of 13  She states that she has some mild incisional pain but her incisions healing well          The following portions of the patient's history were reviewed and updated as appropriate:   She  has a past medical history of Cardiac disease; GERD (gastroesophageal reflux disease); Hyperlipidemia; and Hypertension  She   Patient Active Problem List    Diagnosis Date Noted    Clostridium difficile diarrhea 10/13/2018    Acute cystitis 10/12/2018    Anemia 10/03/2018    Hypokalemia 10/03/2018    Hypoxia 10/03/2018    Pleural effusion 10/03/2018    Perforated viscus     Tubulovillous adenoma of colon 07/25/2018    Colonic mass 06/19/2018    Vomiting 06/17/2018    Diverticulosis of large intestine with hemorrhage 06/16/2018    Chronic diastolic heart failure ([facility] ) 05/24/2018    Acute on chronic diastolic (congestive) heart failure (HCC) 02/08/2018    Shortness of breath 02/08/2018    Diverticulosis 01/02/2017    Rectal bleeding 01/02/2017    Hypoalbuminemia 01/02/2017    CAD (coronary artery disease) 01/02/2017    Essential hypertension 01/02/2017    Hyperlipidemia 01/02/2017    Elevated MCV 01/02/2017    Atrial flutter ([facility] [address] ) 01/02/2017    Right-sided thoracic back pain 01/02/2017    History of shingles 01/02/2017    Incomplete right bundle branch block 01/02/2017    Hx of CABG 01/02/2017    Thoracic radiculopathy 01/02/2017    Pyuria 01/02/2017    Microscopic hematuria 01/02/2017     She  has a past surgical history that includes Cardiac surgery; Cholecystectomy; Hernia repair; Cataract extraction; Coronary angioplasty with stent; Hysterectomy; Colonoscopy; Colonoscopy w/ control of hemorrhage; Cardiac surgery; Coronary artery bypass graft (2008); Colonoscopy (N/A, 6/18/2018); pr sigmoidoscopy flx dx w/collj spec br/wa if pfrmd (N/A, 6/21/2018); pr colonoscopy flx dx w/collj spec when pfrmd (N/A, 9/26/2018); LAPAROTOMY (N/A, 9/26/2018); LAPAROTOMY (N/A, 9/26/2018); and Colon surgery  Her family history is not on file  She  reports that she has quit smoking  She has never used smokeless tobacco  She reports that she does not drink alcohol or use drugs    Current Outpatient Prescriptions   Medication Sig Dispense Refill    ALPRAZolam (XANAX) 0 25 mg tablet Take 0 25 mg by mouth 3 (three) times a day as needed for anxiety   atorvastatin (LIPITOR) 20 mg tablet Take 20 mg by mouth daily after dinner   ciprofloxacin (CIPRO) 500 mg tablet Take 1 tablet (500 mg total) by mouth every 12 (twelve) hours for 5 days 10 tablet 0    ergocalciferol (ERGOCALCIFEROL) 31458 UNITS capsule Take 50,000 Units by mouth once a week  Takes on Wednesdays      ergocalciferol (ERGOCALCIFEROL) 18340 units capsule Take 1 capsule every week by oral route for 90 days   furosemide (LASIX) 20 mg tablet Take 1 tablet (20 mg total) by mouth 2 (two) times a day for 90 days (Patient taking differently: Take 40 mg by mouth daily  ) 180 tablet 3    metolazone (ZAROXOLYN) 2 5 mg tablet TAKE ONE TABLET BY MOUTH ONCE A WEEK OR  AS  DIRECTED  FOR  WEIGHT  GAIN 20 tablet 5    metoprolol tartrate (LOPRESSOR) 50 mg tablet Take 50 mg by mouth 2 (two) times a day      Multiple Vitamins-Minerals (PRESERVISION AREDS) CAPS Take 1 capsule by mouth daily        nitroglycerin (NITROSTAT) 0 4 mg SL tablet Place 0 4 mg under the tongue every 5 (five) minutes as needed for chest pain   Omega-3 Fatty Acids (FISH OIL PO) Take 1,000 mg by mouth daily   potassium chloride (K-DUR,KLOR-CON) 20 mEq tablet Take 1 tablet (20 mEq total) by mouth daily 90 tablet 3    ranitidine (ZANTAC) 300 MG capsule Take 300 mg by mouth daily        warfarin (COUMADIN) 2 mg tablet TAKE ONE TO TWO TABLETS BY MOUTH ONCE DAILY OR  AS  ORDERED  BY  PHYSICIAN (Patient taking differently: monday, wednesday, friday, and saturday take 4mg and tuesdays, thursdays, and sundays 2mg) 60 tablet 11     No current facility-administered medications for this visit  She is allergic to codeine; lansoprazole; morphine; and morphine and related       Review of Systems   Constitutional: Positive for appetite change and fatigue  Negative for activity change, chills, diaphoresis, fever and unexpected weight change     Gastrointestinal: Positive for abdominal pain (At the incision), blood in stool and diarrhea  Negative for abdominal distention, nausea, rectal pain and vomiting  Skin: Negative for color change, pallor, rash and wound  Objective:      BP (!) 148/107   Pulse 78   Temp 98 °F (36 7 °C)   Ht 5' 5" (1 651 m)   Wt 78 kg (172 lb)   BMI 28 62 kg/m²          Physical Exam   Constitutional: She appears well-developed and well-nourished  No distress  Abdominal: Soft  She exhibits no distension and no mass  There is no tenderness  There is no rebound and no guarding  Midline incision healing well  Staples in place  No evidence of any active drainage or   Skin: She is not diaphoretic  Vitals reviewed  Procedure:    Staples removed midline incision Steri-Strips were placed  Patient tolerated procedure well

## 2018-10-18 ENCOUNTER — TELEPHONE (OUTPATIENT)
Dept: GASTROENTEROLOGY | Facility: MEDICAL CENTER | Age: 78
End: 2018-10-18

## 2018-10-18 NOTE — TELEPHONE ENCOUNTER
I called pt to check on her  See that she was treated for  C diff  She tells me her diarrhea is gone  I instructed her to call my office if symptoms return  All questions answered

## 2018-10-30 ENCOUNTER — ANTICOAG VISIT (OUTPATIENT)
Dept: CARDIOLOGY CLINIC | Facility: CLINIC | Age: 78
End: 2018-10-30

## 2018-10-30 ENCOUNTER — LAB REQUISITION (OUTPATIENT)
Dept: LAB | Facility: HOSPITAL | Age: 78
End: 2018-10-30
Payer: MEDICARE

## 2018-10-30 DIAGNOSIS — I48.3 TYPICAL ATRIAL FLUTTER (HCC): ICD-10-CM

## 2018-10-30 DIAGNOSIS — I48.91 ATRIAL FIBRILLATION (HCC): ICD-10-CM

## 2018-10-30 LAB
INR PPP: 1.77 (ref 0.86–1.17)
PROTHROMBIN TIME: 19.8 SECONDS (ref 11.8–14.2)

## 2018-10-30 PROCEDURE — 85610 PROTHROMBIN TIME: CPT | Performed by: INTERNAL MEDICINE

## 2018-11-06 RX ORDER — ESCITALOPRAM OXALATE 10 MG/1
10 TABLET ORAL DAILY
Refills: 3 | COMMUNITY
Start: 2018-10-05 | End: 2020-11-11 | Stop reason: ALTCHOICE

## 2018-11-06 RX ORDER — VANCOMYCIN HYDROCHLORIDE 125 MG/1
CAPSULE ORAL
Refills: 0 | COMMUNITY
Start: 2018-10-13 | End: 2018-11-12 | Stop reason: ALTCHOICE

## 2018-11-08 ENCOUNTER — OFFICE VISIT (OUTPATIENT)
Dept: CARDIOLOGY CLINIC | Facility: HOSPITAL | Age: 78
End: 2018-11-08
Payer: MEDICARE

## 2018-11-08 VITALS
SYSTOLIC BLOOD PRESSURE: 140 MMHG | BODY MASS INDEX: 28.49 KG/M2 | DIASTOLIC BLOOD PRESSURE: 70 MMHG | HEIGHT: 65 IN | WEIGHT: 171 LBS | HEART RATE: 75 BPM

## 2018-11-08 DIAGNOSIS — I10 ESSENTIAL HYPERTENSION: ICD-10-CM

## 2018-11-08 DIAGNOSIS — I50.32 CHRONIC DIASTOLIC HEART FAILURE (HCC): Primary | ICD-10-CM

## 2018-11-08 DIAGNOSIS — I48.3 TYPICAL ATRIAL FLUTTER (HCC): ICD-10-CM

## 2018-11-08 DIAGNOSIS — Z95.1 HX OF CABG: ICD-10-CM

## 2018-11-08 DIAGNOSIS — I25.10 CORONARY ARTERY DISEASE INVOLVING NATIVE CORONARY ARTERY OF NATIVE HEART WITHOUT ANGINA PECTORIS: ICD-10-CM

## 2018-11-08 DIAGNOSIS — R06.02 SHORTNESS OF BREATH: ICD-10-CM

## 2018-11-08 PROCEDURE — 99214 OFFICE O/P EST MOD 30 MIN: CPT | Performed by: INTERNAL MEDICINE

## 2018-11-09 NOTE — PROGRESS NOTES
Cardiology Follow Up    Kelby Ochoa  1940  [id number]  [address] [facility] [address] [address] [phone]    1  Chronic diastolic heart failure ([address] )     2  Essential hypertension     3  Coronary artery disease involving native coronary artery of native heart without angina pectoris     4  Shortness of breath     5  Hx of CABG     6  Typical atrial flutter (HCC)           Discussion/Summary: All of her assessed cardiac problems are stable  She has not has any further bleeding and her Hgb is stable  She is on Coumadin for her history of Atrial Flutter  I have reviewed her medications and made no changes  No cardiac testing is ordered  Interval History: She has not had any cardiac problems since her last OV  She denies CP or LE edema  She gets SOB when going up steps but otherwise this is stable  She is on Lasix 40 mg daily and rarely needs to take Zaroxolyn  She has CAD with remote CABG  ECHO 10/4/2018 - EF 60%      Patient Active Problem List   Diagnosis    Diverticulosis    Rectal bleeding    Hypoalbuminemia    CAD (coronary artery disease)    Essential hypertension    Hyperlipidemia    Elevated MCV    Atrial flutter (HCC)    Right-sided thoracic back pain    History of shingles    Incomplete right bundle branch block    Hx of CABG    Thoracic radiculopathy    Pyuria    Microscopic hematuria    Acute on chronic diastolic (congestive) heart failure (HCC)    Shortness of breath    Chronic diastolic heart failure (HCC)    Diverticulosis of large intestine with hemorrhage    Vomiting    Colonic mass    Tubulovillous adenoma of colon    Perforated viscus    Anemia    Hypokalemia    Hypoxia    Pleural effusion    Acute cystitis    Clostridium difficile diarrhea     Past Medical History:   Diagnosis Date    Cardiac disease     GERD (gastroesophageal reflux disease)     Hyperlipidemia     Hypertension      Social History     Social History    Marital status: /Civil Union     Spouse name: N/A    Number of children: N/A    Years of education: N/A     Occupational History    Not on file  Social History Main Topics    Smoking status: Former Smoker    Smokeless tobacco: Never Used      Comment: quit 15 yrs ago    Alcohol use No    Drug use: No    Sexual activity: Not on file     Other Topics Concern    Not on file     Social History Narrative    No narrative on file      History reviewed  No pertinent family history  Past Surgical History:   Procedure Laterality Date    CARDIAC SURGERY      CARDIAC SURGERY      CATARACT EXTRACTION      CHOLECYSTECTOMY      COLON SURGERY      COLONOSCOPY      COLONOSCOPY N/A 6/18/2018    Procedure: COLONOSCOPY;  Surgeon: [facility] ;  [facility]; Service: Gastroenterology    COLONOSCOPY W/ CONTROL OF HEMORRHAGE      CORONARY ANGIOPLASTY WITH STENT PLACEMENT      CORONARY ARTERY BYPASS GRAFT  2008    HERNIA REPAIR      HYSTERECTOMY      LAPAROTOMY N/A 9/26/2018    Procedure: LAPAROTOMY EXPLORATORY WITH  RIGHT HEMICOLECTOMY;  Surgeon: [facility] ;  Location: [facility];  Service: Gastroenterology    LAPAROTOMY N/A 9/26/2018    Procedure: LAPAROTOMY EXPLORATORY WITH HEMICOLECTOMY;  Surgeon: Luke Bolden DO;  [facility]: [facility];  Service: General    FL COLONOSCOPY FLX DX W/COLLJ SPEC WHEN PFRMD N/A 9/26/2018    Procedure: COLONOSCOPY;  Surgeon: [facility] ;  [facility]: [facility];  Service: Gastroenterology    FL SIGMOIDOSCOPY FLX DX W/COLLJ SPEC BR/WA IF PFRMD N/A 6/21/2018    Procedure: SIGMOIDOSCOPY FLEXIBLE;  Surgeon: [facility] ;  [facility]; Service: Gastroenterology       Current Outpatient Prescriptions:     ALPRAZolam (XANAX) 0 25 mg tablet, Take 0 25 mg by mouth 3 (three) times a day as needed for anxiety  , Disp: , Rfl:    atorvastatin (LIPITOR) 20 mg tablet, Take 20 mg by mouth daily after dinner   , Disp: , Rfl:     ergocalciferol (ERGOCALCIFEROL) 69259 UNITS capsule, Take 50,000 Units by mouth once a week  Takes on Wednesdays, Disp: , Rfl:     escitalopram (LEXAPRO) 10 mg tablet, , Disp: , Rfl: 3    furosemide (LASIX) 20 mg tablet, Take 1 tablet (20 mg total) by mouth 2 (two) times a day for 90 days (Patient taking differently: Take 40 mg by mouth daily  ), Disp: 180 tablet, Rfl: 3    metolazone (ZAROXOLYN) 2 5 mg tablet, TAKE ONE TABLET BY MOUTH ONCE A WEEK OR  AS  DIRECTED  FOR  WEIGHT  GAIN, Disp: 20 tablet, Rfl: 5    metoprolol tartrate (LOPRESSOR) 50 mg tablet, Take 50 mg by mouth 2 (two) times a day, Disp: , Rfl:     Multiple Vitamins-Minerals (PRESERVISION AREDS) CAPS, Take 1 capsule by mouth daily  , Disp: , Rfl:     nitroglycerin (NITROSTAT) 0 4 mg SL tablet, Place 0 4 mg under the tongue every 5 (five) minutes as needed for chest pain , Disp: , Rfl:     Omega-3 Fatty Acids (FISH OIL PO), Take 1,000 mg by mouth daily  , Disp: , Rfl:     potassium chloride (K-DUR,KLOR-CON) 20 mEq tablet, Take 1 tablet (20 mEq total) by mouth daily, Disp: 90 tablet, Rfl: 3    ranitidine (ZANTAC) 300 MG capsule, Take 300 mg by mouth daily  , Disp: , Rfl:     vancomycin (VANCOCIN) 125 MG capsule, , Disp: , Rfl: 0    warfarin (COUMADIN) 2 mg tablet, TAKE ONE TO TWO TABLETS BY MOUTH ONCE DAILY OR  AS  ORDERED  BY  PHYSICIAN (Patient taking differently: monday, wednesday, friday, and saturday take 4mg and tuesdays, thursdays, and sundays 2mg), Disp: 60 tablet, Rfl: 11  Allergies   Allergen Reactions    Codeine GI Intolerance    Lansoprazole Other (See Comments)     GI Upset, dania protonix    Morphine Nausea Only    Morphine And Related GI Intolerance     Vitals:    11/08/18 1512   BP: 140/70   Pulse: 75   Weight: 77 6 kg (171 lb)   Height: 5' 5" (1 651 m)     Weight (last 2 days)     Date/Time   Weight    11/08/18 1512  77 6 (171) Blood pressure 140/70, pulse 75, height 5' 5" (1 651 m), weight 77 6 kg (171 lb)  , Body mass index is 28 46 kg/m²  Labs:  Lab Requisition on 10/30/2018   Component Date Value    Protime 10/30/2018 19 8*    INR 10/30/2018 1 77*   Lab Requisition on 10/16/2018   Component Date Value    Protime 10/16/2018 26 3*    INR 10/16/2018 2 56*    WBC 10/16/2018 6 04     RBC 10/16/2018 3 74*    Hemoglobin 10/16/2018 10 8*    Hematocrit 10/16/2018 35 1     MCV 10/16/2018 94     MCH 10/16/2018 28 9     MCHC 10/16/2018 30 8*    RDW 10/16/2018 15 0     Platelets 52/57/4462 245     MPV 10/16/2018 9 9    Appointment on 10/12/2018   Component Date Value    C difficile toxin by PCR 10/12/2018 POSITIVE for C difficle toxin by PCR  *    Salmonella sp PCR 10/12/2018 None Detected     Shigella sp/Enteroinvasi* 10/12/2018 None Detected     Campylobacter sp (jejuni* 10/12/2018 None Detected     Shiga toxin 1/Shiga toxi* 10/12/2018 None Detected    Office Visit on 10/11/2018   Component Date Value    Clarity, UA 10/12/2018 Cloudy     Color, UA 10/12/2018 Yellow     Specific Gravity, UA 10/12/2018 1 020     pH, UA 10/12/2018 5 5     Glucose, UA 10/12/2018 Negative     Ketones, UA 10/12/2018 Negative     Blood, UA 10/12/2018 Trace-Intact*    Protein, UA 10/12/2018 Negative     Nitrite, UA 10/12/2018 Positive*    Bilirubin, UA 10/12/2018 Negative     Urobilinogen, UA 10/12/2018 0 2     Leukocytes, UA 10/12/2018 Moderate*    WBC, UA 10/12/2018 30-50*    RBC, UA 10/12/2018 1-2*    Bacteria, UA 10/12/2018 Moderate*    OTHER OBSERVATIONS 10/12/2018 WBCs Clumped     Epithelial Cells 10/12/2018 Occasional    Appointment on 10/10/2018   Component Date Value    Sodium 10/10/2018 142     Potassium 10/10/2018 3 2*    Chloride 10/10/2018 105     CO2 10/10/2018 26     ANION GAP 10/10/2018 11     BUN 10/10/2018 12     Creatinine 10/10/2018 0 90     Glucose 10/10/2018 106     Calcium 10/10/2018 9 1  AST 10/10/2018 22     ALT 10/10/2018 21     Alkaline Phosphatase 10/10/2018 166*    Total Protein 10/10/2018 7 1     Albumin 10/10/2018 3 3*    Total Bilirubin 10/10/2018 0 70     eGFR 10/10/2018 61     WBC 10/10/2018 13 83*    RBC 10/10/2018 3 63*    Hemoglobin 10/10/2018 10 5*    Hematocrit 10/10/2018 34 4*    MCV 10/10/2018 95     MCH 10/10/2018 28 9     MCHC 10/10/2018 30 5*    RDW 10/10/2018 15 4*    Platelets 93/56/4700 269     MPV 10/10/2018 9 4    Admission on 10/03/2018, Discharged on 10/04/2018   Component Date Value    WBC 10/03/2018 7 11     RBC 10/03/2018 3 00*    Hemoglobin 10/03/2018 8 9*    Hematocrit 10/03/2018 28 5*    MCV 10/03/2018 95     MCH 10/03/2018 29 7     MCHC 10/03/2018 31 2*    RDW 10/03/2018 15 7*    MPV 10/03/2018 9 5     Platelets 12/25/5275 180     nRBC 10/03/2018 0     Neutrophils Relative 10/03/2018 79*    Immat GRANS % 10/03/2018 1     Lymphocytes Relative 10/03/2018 11*    Monocytes Relative 10/03/2018 7     Eosinophils Relative 10/03/2018 2     Basophils Relative 10/03/2018 0     Neutrophils Absolute 10/03/2018 5 62     Immature Grans Absolute 10/03/2018 0 06     Lymphocytes Absolute 10/03/2018 0 80     Monocytes Absolute 10/03/2018 0 48     Eosinophils Absolute 10/03/2018 0 14     Basophils Absolute 10/03/2018 0 01     Sodium 10/03/2018 143     Potassium 10/03/2018 2 6*    Chloride 10/03/2018 104     CO2 10/03/2018 33*    ANION GAP 10/03/2018 6     BUN 10/03/2018 10     Creatinine 10/03/2018 1 01     Glucose 10/03/2018 99     Calcium 10/03/2018 8 8     AST 10/03/2018 29     ALT 10/03/2018 30     Alkaline Phosphatase 10/03/2018 134*    Total Protein 10/03/2018 6 4     Albumin 10/03/2018 2 6*    Total Bilirubin 10/03/2018 0 90     eGFR 10/03/2018 53     Lipase 10/03/2018 119     Magnesium 10/03/2018 1 5*    Protime 10/03/2018 27 6*    INR 10/03/2018 2 72*    LACTIC ACID 10/03/2018 1 4     ABO Grouping 10/03/2018 A  Rh Factor 10/03/2018 Negative     Antibody Screen 10/03/2018 Negative     Specimen Expiration Date 10/03/2018 [id number]     Extra Tube 10/03/2018 Hold for add-ons   NT-proBNP 10/03/2018 5295*    Fecal Occult Blood Diagn* 10/03/2018 Negative     Fecal Occult Blood Diagn* 10/03/2018 Negative     Fecal Occult Blood Diagn* 10/03/2018 Negative     Hemoglobin 10/03/2018 9 2*    Hematocrit 10/03/2018 30 2*    Sodium 10/03/2018 141     Potassium 10/03/2018 3 1*    Chloride 10/03/2018 105     CO2 10/03/2018 32     ANION GAP 10/03/2018 4     BUN 10/03/2018 8     Creatinine 10/03/2018 1 01     Glucose 10/03/2018 118     Calcium 10/03/2018 8 8     eGFR 10/03/2018 53     Magnesium 10/03/2018 2 0     Phosphorus 10/03/2018 2 4     Ventricular Rate 10/03/2018 82     Atrial Rate 10/03/2018 82     ME Interval 10/03/2018 154     QRSD Interval 10/03/2018 114     QT Interval 10/03/2018 406     QTC Interval 10/03/2018 474     P Axis 10/03/2018 52     QRS Axis 10/03/2018 10     T Wave Axis 10/03/2018 -7     Sodium 10/04/2018 141     Potassium 10/04/2018 3 2*    Chloride 10/04/2018 107     CO2 10/04/2018 29     ANION GAP 10/04/2018 5     BUN 10/04/2018 9     Creatinine 10/04/2018 0 85     Glucose 10/04/2018 97     Calcium 10/04/2018 8 4     AST 10/04/2018 28     ALT 10/04/2018 26     Alkaline Phosphatase 10/04/2018 113     Total Protein 10/04/2018 5 7*    Albumin 10/04/2018 2 4*    Total Bilirubin 10/04/2018 0 60     eGFR 10/04/2018 65     Magnesium 10/04/2018 2 0     Phosphorus 10/04/2018 2 7     Protime 10/04/2018 28 7*    INR 10/04/2018 2 86*    Hemoglobin 10/04/2018 8 7*    Hematocrit 10/04/2018 28 9*   Admission on 09/26/2018, Discharged on 10/01/2018   No results displayed because visit has over 200 results        Appointment on 09/21/2018   Component Date Value    WBC 09/21/2018 6 68     RBC 09/21/2018 4 05     Hemoglobin 09/21/2018 12 2     Hematocrit 09/21/2018 38 5     MCV 09/21/2018 95     MCH 09/21/2018 30 1     MCHC 09/21/2018 31 7     RDW 09/21/2018 14 3     Platelets 46/61/5083 157     MPV 09/21/2018 10 0    Ancillary Orders on 09/21/2018   Component Date Value    Protime 09/21/2018 25 6*    INR 09/21/2018 2 47*   Ancillary Orders on 09/07/2018   Component Date Value    WBC 09/11/2018 7 86     RBC 09/11/2018 4 10     Hemoglobin 09/11/2018 12 1     Hematocrit 09/11/2018 38 7     MCV 09/11/2018 94     MCH 09/11/2018 29 5     MCHC 09/11/2018 31 3*    RDW 09/11/2018 14 2     Platelets 26/37/6881 174     MPV 09/11/2018 9 9    There may be more visits with results that are not included  Imaging: Xr Chest Pa & Lateral    Result Date: 10/11/2018  Narrative: CHEST INDICATION:   E11 9: Type 2 diabetes mellitus without complications M33 3: Unspecified abdominal pain Z79 82: Long term (current) use of aspirin R10 12: Left upper quadrant pain  COMPARISON:  October 3, 2018 EXAM PERFORMED/VIEWS:  XR CHEST PA & LATERAL  The frontal view was performed utilizing dual energy radiographic technique  Images: 4 FINDINGS:  Sternal wires remain intact, and collinear  Surgical clips from aortocoronary grafting, appears similar  Cardiomediastinal silhouette appears unremarkable  There is a positive "spine sign "on the lateral study  This was seen on an older study from June 6, 2018  This would raise the suspicion of some chronic pleural parenchymal or pleural process involving the lower lobes  This is probably due to the right-sided pleural effusion, seen on previous CT  Calcifications are again seen overlying the right rotator cuff, consistent with calcific tendinitis  Subcutaneous emphysema is again seen in the right upper quadrant  Adjusting for differences in technique, this appears less impressive than the CT from October 3       Impression: Stable right pleural effusion Some improvement in subcutaneous emphysema Workstation performed: [id number]     Xr Abdomen 1 View Kub    Result Date: 10/11/2018  Narrative: ABDOMEN INDICATION:   R10 9: Unspecified abdominal pain R10 12: Left upper quadrant pain  COMPARISON:  September 15, 2008 VIEWS:  AP supine Images: 2 FINDINGS: There is a nonobstructive bowel gas pattern  Subcutaneous emphysema is again seen  This is less impressive than the CT from October 3  Stables are seen over the midline abdomen There is degenerative change of the lumbar spine  This has worsened since 2008  Impression: Improvement in subcutaneous emphysema Workstation performed: [id number]       Review of Systems:  Review of Systems   Constitutional: Negative for diaphoresis, fatigue, fever and unexpected weight change  HENT: Negative  Respiratory: Positive for shortness of breath  Negative for cough and wheezing  Cardiovascular: Negative for chest pain, palpitations and leg swelling  Gastrointestinal: Negative for abdominal pain, diarrhea and nausea  Musculoskeletal: Negative for gait problem and myalgias  Skin: Negative for rash  Neurological: Negative for dizziness and numbness  Psychiatric/Behavioral: Negative  Physical Exam:  Physical Exam   Constitutional: She is oriented to person, place, and time  She appears well-developed and well-nourished  HENT:   Head: Normocephalic and atraumatic  Eyes: Pupils are equal, round, and reactive to light  Neck: Normal range of motion  Neck supple  No JVD present  Cardiovascular: Regular rhythm, S1 normal, S2 normal and normal pulses  Pulses:       Carotid pulses are 2+ on the right side, and 2+ on the left side  Pulmonary/Chest: Effort normal and breath sounds normal  She has no wheezes  She has no rales  Abdominal: Soft  Bowel sounds are normal  There is no tenderness  Musculoskeletal: Normal range of motion  She exhibits no edema or tenderness  Neurological: She is alert and oriented to person, place, and time  She has normal reflexes  No cranial nerve deficit     Skin: Skin is warm  Psychiatric: She has a normal mood and affect

## 2018-11-12 ENCOUNTER — OFFICE VISIT (OUTPATIENT)
Dept: SURGERY | Facility: HOSPITAL | Age: 78
End: 2018-11-12

## 2018-11-12 VITALS
DIASTOLIC BLOOD PRESSURE: 62 MMHG | SYSTOLIC BLOOD PRESSURE: 120 MMHG | BODY MASS INDEX: 28.82 KG/M2 | WEIGHT: 173 LBS | HEIGHT: 65 IN

## 2018-11-12 DIAGNOSIS — Z09 POSTOP CHECK: Primary | ICD-10-CM

## 2018-11-12 DIAGNOSIS — R19.8 PERFORATED VISCUS: ICD-10-CM

## 2018-11-12 PROCEDURE — 99024 POSTOP FOLLOW-UP VISIT: CPT | Performed by: SURGERY

## 2018-11-12 RX ORDER — VANCOMYCIN HYDROCHLORIDE 125 MG/1
CAPSULE ORAL
COMMUNITY
End: 2018-11-12 | Stop reason: ALTCHOICE

## 2018-11-12 RX ORDER — CIPROFLOXACIN 500 MG/1
TABLET, FILM COATED ORAL
COMMUNITY
End: 2018-11-12 | Stop reason: ALTCHOICE

## 2018-11-12 NOTE — ASSESSMENT & PLAN NOTE
Status post exploratory laparotomy and right hemicolectomy for perforated colon after colonoscopy  Overall doing very well per drainage levels up  Diarrhea has greatly improved  Tolerating diet  Follow-up p r n

## 2018-11-12 NOTE — PROGRESS NOTES
Assessment/Plan:    Perforated viscus  Status post exploratory laparotomy and right hemicolectomy for perforated colon after colonoscopy  Overall doing very well per drainage levels up  Diarrhea has greatly improved  Tolerating diet  Follow-up p r n  Diagnoses and all orders for this visit:    Postop check    Perforated viscus    Other orders  -     Discontinue: ciprofloxacin (CIPRO) 500 mg tablet; ciprofloxacin 500 mg tablet  -     Discontinue: vancomycin (VANCOCIN) 125 MG capsule; vancomycin 125 mg capsule          Subjective:      Patient ID: Joann Harmon is a 77 y o  female  74-year-old female status post exploratory laparotomy right hemicolectomy after a a right colonic perforation during colonoscopy, presents today for follow-up  Overall doing very well  Tolerating diet  No fevers or chills  No nausea vomiting  No abdominal pain  Patient's diarrhea is greatly improved and only has 1 or 2 episodes every month  Denies any blood in the stool  Energy levels greatly improved  The following portions of the patient's history were reviewed and updated as appropriate:   She  has a past medical history of Cardiac disease; GERD (gastroesophageal reflux disease); Hyperlipidemia; and Hypertension    She   Patient Active Problem List    Diagnosis Date Noted    Clostridium difficile diarrhea 10/13/2018    Acute cystitis 10/12/2018    Anemia 10/03/2018    Hypokalemia 10/03/2018    Hypoxia 10/03/2018    Pleural effusion 10/03/2018    Tubulovillous adenoma of colon 07/25/2018    Colonic mass 06/19/2018    Vomiting 06/17/2018    Diverticulosis of large intestine with hemorrhage 06/16/2018    Chronic diastolic heart failure ([facility] [address] ) 05/24/2018    Acute on chronic diastolic (congestive) heart failure ([facility] [address] ) 02/08/2018    Shortness of breath 02/08/2018    Diverticulosis 01/02/2017    Rectal bleeding 01/02/2017    Hypoalbuminemia 01/02/2017    CAD (coronary artery disease) 01/02/2017    Essential hypertension 01/02/2017    Hyperlipidemia 01/02/2017    Elevated MCV 01/02/2017    Atrial flutter ([address] ) 01/02/2017    Right-sided thoracic back pain 01/02/2017    History of shingles 01/02/2017    Incomplete right bundle branch block 01/02/2017    Hx of CABG 01/02/2017    Thoracic radiculopathy 01/02/2017    Pyuria 01/02/2017    Microscopic hematuria 01/02/2017     She  has a past surgical history that includes Cardiac surgery; Cholecystectomy; Hernia repair; Cataract extraction; Coronary angioplasty with stent; Hysterectomy; Colonoscopy; Colonoscopy w/ control of hemorrhage; Cardiac surgery; Coronary artery bypass graft (2008); Colonoscopy (N/A, 6/18/2018); pr sigmoidoscopy flx dx w/collj spec br/wa if pfrmd (N/A, 6/21/2018); pr colonoscopy flx dx w/collj spec when pfrmd (N/A, 9/26/2018); LAPAROTOMY (N/A, 9/26/2018); LAPAROTOMY (N/A, 9/26/2018); and Colon surgery  Her family history is not on file  She  reports that she has quit smoking  She has never used smokeless tobacco  She reports that she does not drink alcohol or use drugs  Current Outpatient Prescriptions   Medication Sig Dispense Refill    ALPRAZolam (XANAX) 0 25 mg tablet Take 0 25 mg by mouth 3 (three) times a day as needed for anxiety   atorvastatin (LIPITOR) 20 mg tablet Take 20 mg by mouth daily after dinner   ergocalciferol (ERGOCALCIFEROL) 58285 UNITS capsule Take 50,000 Units by mouth once a week  Takes on Wednesdays      escitalopram (LEXAPRO) 10 mg tablet   3    metolazone (ZAROXOLYN) 2 5 mg tablet TAKE ONE TABLET BY MOUTH ONCE A WEEK OR  AS  DIRECTED  FOR  WEIGHT  GAIN 20 tablet 5    metoprolol tartrate (LOPRESSOR) 50 mg tablet Take 50 mg by mouth 2 (two) times a day      Multiple Vitamins-Minerals (PRESERVISION AREDS) CAPS Take 1 capsule by mouth daily        nitroglycerin (NITROSTAT) 0 4 mg SL tablet Place 0 4 mg under the tongue every 5 (five) minutes as needed for chest pain        Omega-3 Fatty Acids (FISH OIL PO) Take 1,000 mg by mouth daily   potassium chloride (K-DUR,KLOR-CON) 20 mEq tablet Take 1 tablet (20 mEq total) by mouth daily 90 tablet 3    ranitidine (ZANTAC) 300 MG capsule Take 300 mg by mouth daily        warfarin (COUMADIN) 2 mg tablet TAKE ONE TO TWO TABLETS BY MOUTH ONCE DAILY OR  AS  ORDERED  BY  PHYSICIAN (Patient taking differently: monday, wednesday, friday, and saturday take 4mg and tuesdays, thursdays, and sundays 2mg) 60 tablet 11    furosemide (LASIX) 20 mg tablet Take 1 tablet (20 mg total) by mouth 2 (two) times a day for 90 days (Patient taking differently: Take 40 mg by mouth daily  ) 180 tablet 3     No current facility-administered medications for this visit  She is allergic to codeine; lansoprazole; morphine; and morphine and related       Review of Systems   Constitutional: Negative  Gastrointestinal: Negative  Skin: Negative  Objective:      /62 (BP Location: Left arm, Patient Position: Sitting)   Ht 5' 5" (1 651 m)   Wt 78 5 kg (173 lb)   BMI 28 79 kg/m²          Physical Exam   Constitutional: She appears well-developed and well-nourished  No distress  Abdominal: Soft  She exhibits no distension and no mass  There is no tenderness  There is no rebound and no guarding  Midline incision healed well  Good cosmesis  No hernias present  Skin: Skin is warm  No rash noted  She is not diaphoretic  No erythema  No pallor  Vitals reviewed

## 2018-11-14 ENCOUNTER — ANTICOAG VISIT (OUTPATIENT)
Dept: CARDIOLOGY CLINIC | Facility: CLINIC | Age: 78
End: 2018-11-14

## 2018-11-14 ENCOUNTER — LAB REQUISITION (OUTPATIENT)
Dept: LAB | Facility: HOSPITAL | Age: 78
End: 2018-11-14
Payer: MEDICARE

## 2018-11-14 DIAGNOSIS — I48.3 TYPICAL ATRIAL FLUTTER (HCC): ICD-10-CM

## 2018-11-14 DIAGNOSIS — I48.91 ATRIAL FIBRILLATION (HCC): ICD-10-CM

## 2018-11-14 LAB
INR PPP: 2 (ref 0.86–1.17)
PROTHROMBIN TIME: 21.7 SECONDS (ref 11.8–14.2)

## 2018-11-14 PROCEDURE — 85610 PROTHROMBIN TIME: CPT | Performed by: INTERNAL MEDICINE

## 2018-11-20 DIAGNOSIS — I48.3 TYPICAL ATRIAL FLUTTER (HCC): Primary | ICD-10-CM

## 2018-11-20 RX ORDER — METOPROLOL TARTRATE 50 MG/1
50 TABLET, FILM COATED ORAL EVERY 12 HOURS SCHEDULED
Qty: 180 TABLET | Refills: 3 | Status: SHIPPED | OUTPATIENT
Start: 2018-11-20 | End: 2019-11-21

## 2018-12-05 ENCOUNTER — APPOINTMENT (OUTPATIENT)
Dept: LAB | Facility: HOSPITAL | Age: 78
End: 2018-12-05
Attending: INTERNAL MEDICINE
Payer: MEDICARE

## 2018-12-06 ENCOUNTER — ANTICOAG VISIT (OUTPATIENT)
Dept: CARDIOLOGY CLINIC | Facility: CLINIC | Age: 78
End: 2018-12-06

## 2018-12-06 DIAGNOSIS — I48.3 TYPICAL ATRIAL FLUTTER (HCC): ICD-10-CM

## 2019-01-03 ENCOUNTER — TRANSCRIBE ORDERS (OUTPATIENT)
Dept: ADMINISTRATIVE | Facility: HOSPITAL | Age: 79
End: 2019-01-03

## 2019-01-03 ENCOUNTER — ANTICOAG VISIT (OUTPATIENT)
Dept: CARDIOLOGY CLINIC | Facility: CLINIC | Age: 79
End: 2019-01-03

## 2019-01-03 ENCOUNTER — APPOINTMENT (OUTPATIENT)
Dept: LAB | Facility: HOSPITAL | Age: 79
End: 2019-01-03
Attending: INTERNAL MEDICINE
Payer: MEDICARE

## 2019-01-03 DIAGNOSIS — Z79.899 ENCOUNTER FOR LONG-TERM (CURRENT) USE OF MEDICATIONS: ICD-10-CM

## 2019-01-03 DIAGNOSIS — I10 ESSENTIAL HYPERTENSION, BENIGN: Primary | ICD-10-CM

## 2019-01-03 DIAGNOSIS — I10 ESSENTIAL HYPERTENSION, BENIGN: ICD-10-CM

## 2019-01-03 DIAGNOSIS — I48.3 TYPICAL ATRIAL FLUTTER (HCC): ICD-10-CM

## 2019-01-03 DIAGNOSIS — I25.10 ASCVD (ARTERIOSCLEROTIC CARDIOVASCULAR DISEASE): ICD-10-CM

## 2019-01-03 DIAGNOSIS — E78.5 HYPERLIPIDEMIA, UNSPECIFIED HYPERLIPIDEMIA TYPE: ICD-10-CM

## 2019-01-03 DIAGNOSIS — I50.9 CONGESTIVE HEART FAILURE, UNSPECIFIED HF CHRONICITY, UNSPECIFIED HEART FAILURE TYPE (HCC): ICD-10-CM

## 2019-01-03 DIAGNOSIS — E03.9 HYPOTHYROIDISM (ACQUIRED): ICD-10-CM

## 2019-01-03 DIAGNOSIS — Z79.82 LONG-TERM USE OF ASPIRIN THERAPY: ICD-10-CM

## 2019-01-03 DIAGNOSIS — R60.9 EDEMA, UNSPECIFIED TYPE: ICD-10-CM

## 2019-01-03 DIAGNOSIS — E55.9 VITAMIN D DEFICIENCY DISEASE: ICD-10-CM

## 2019-01-03 LAB
25(OH)D3 SERPL-MCNC: 56.2 NG/ML (ref 30–100)
ALBUMIN SERPL BCP-MCNC: 3.3 G/DL (ref 3.5–5)
ALP SERPL-CCNC: 165 U/L (ref 46–116)
ALT SERPL W P-5'-P-CCNC: 22 U/L (ref 12–78)
ANION GAP SERPL CALCULATED.3IONS-SCNC: 11 MMOL/L (ref 4–13)
AST SERPL W P-5'-P-CCNC: 23 U/L (ref 5–45)
BILIRUB SERPL-MCNC: 0.7 MG/DL (ref 0.2–1)
BUN SERPL-MCNC: 18 MG/DL (ref 5–25)
CALCIUM SERPL-MCNC: 9 MG/DL (ref 8.3–10.1)
CHLORIDE SERPL-SCNC: 105 MMOL/L (ref 100–108)
CO2 SERPL-SCNC: 28 MMOL/L (ref 21–32)
CREAT SERPL-MCNC: 1.01 MG/DL (ref 0.6–1.3)
ERYTHROCYTE [DISTWIDTH] IN BLOOD BY AUTOMATED COUNT: 15.8 % (ref 11.6–15.1)
EST. AVERAGE GLUCOSE BLD GHB EST-MCNC: 114 MG/DL
GFR SERPL CREATININE-BSD FRML MDRD: 53 ML/MIN/1.73SQ M
GLUCOSE P FAST SERPL-MCNC: 102 MG/DL (ref 65–99)
HBA1C MFR BLD: 5.6 % (ref 4.2–6.3)
HCT VFR BLD AUTO: 34.4 % (ref 34.8–46.1)
HGB BLD-MCNC: 10.5 G/DL (ref 11.5–15.4)
LDLC SERPL DIRECT ASSAY-MCNC: 53 MG/DL (ref 0–100)
MCH RBC QN AUTO: 28 PG (ref 26.8–34.3)
MCHC RBC AUTO-ENTMCNC: 30.5 G/DL (ref 31.4–37.4)
MCV RBC AUTO: 92 FL (ref 82–98)
NT-PROBNP SERPL-MCNC: 2508 PG/ML
PLATELET # BLD AUTO: 162 THOUSANDS/UL (ref 149–390)
PMV BLD AUTO: 9.6 FL (ref 8.9–12.7)
POTASSIUM SERPL-SCNC: 3.6 MMOL/L (ref 3.5–5.3)
PROT SERPL-MCNC: 6.7 G/DL (ref 6.4–8.2)
RBC # BLD AUTO: 3.75 MILLION/UL (ref 3.81–5.12)
SODIUM SERPL-SCNC: 144 MMOL/L (ref 136–145)
T4 FREE SERPL-MCNC: 1.03 NG/DL (ref 0.76–1.46)
TRIGL SERPL-MCNC: 97 MG/DL
TSH SERPL DL<=0.05 MIU/L-ACNC: 2.61 UIU/ML (ref 0.36–3.74)
WBC # BLD AUTO: 5.82 THOUSAND/UL (ref 4.31–10.16)

## 2019-01-03 PROCEDURE — 84439 ASSAY OF FREE THYROXINE: CPT

## 2019-01-03 PROCEDURE — 83880 ASSAY OF NATRIURETIC PEPTIDE: CPT

## 2019-01-03 PROCEDURE — 80053 COMPREHEN METABOLIC PANEL: CPT

## 2019-01-03 PROCEDURE — 84478 ASSAY OF TRIGLYCERIDES: CPT

## 2019-01-03 PROCEDURE — 83721 ASSAY OF BLOOD LIPOPROTEIN: CPT

## 2019-01-03 PROCEDURE — 83036 HEMOGLOBIN GLYCOSYLATED A1C: CPT

## 2019-01-03 PROCEDURE — 84443 ASSAY THYROID STIM HORMONE: CPT

## 2019-01-03 PROCEDURE — 82306 VITAMIN D 25 HYDROXY: CPT

## 2019-01-03 PROCEDURE — 85027 COMPLETE CBC AUTOMATED: CPT

## 2019-01-14 ENCOUNTER — ANTICOAG VISIT (OUTPATIENT)
Dept: CARDIOLOGY CLINIC | Facility: CLINIC | Age: 79
End: 2019-01-14

## 2019-01-14 ENCOUNTER — APPOINTMENT (OUTPATIENT)
Dept: LAB | Facility: HOSPITAL | Age: 79
End: 2019-01-14
Attending: INTERNAL MEDICINE
Payer: MEDICARE

## 2019-01-14 DIAGNOSIS — I48.3 TYPICAL ATRIAL FLUTTER (HCC): ICD-10-CM

## 2019-01-30 ENCOUNTER — APPOINTMENT (OUTPATIENT)
Dept: LAB | Facility: HOSPITAL | Age: 79
End: 2019-01-30
Attending: INTERNAL MEDICINE
Payer: MEDICARE

## 2019-01-31 ENCOUNTER — ANTICOAG VISIT (OUTPATIENT)
Dept: CARDIOLOGY CLINIC | Facility: CLINIC | Age: 79
End: 2019-01-31

## 2019-01-31 DIAGNOSIS — I48.3 TYPICAL ATRIAL FLUTTER (HCC): ICD-10-CM

## 2019-02-16 ENCOUNTER — APPOINTMENT (OUTPATIENT)
Dept: LAB | Facility: HOSPITAL | Age: 79
End: 2019-02-16
Attending: INTERNAL MEDICINE
Payer: MEDICARE

## 2019-02-16 ENCOUNTER — TRANSCRIBE ORDERS (OUTPATIENT)
Dept: ADMINISTRATIVE | Facility: HOSPITAL | Age: 79
End: 2019-02-16

## 2019-02-16 DIAGNOSIS — I48.91 ATRIAL FIBRILLATION, UNSPECIFIED TYPE (HCC): Primary | ICD-10-CM

## 2019-02-16 DIAGNOSIS — I48.91 ATRIAL FIBRILLATION, UNSPECIFIED TYPE (HCC): ICD-10-CM

## 2019-02-16 LAB
INR PPP: 2.27 (ref 0.86–1.17)
PROTHROMBIN TIME: 24 SECONDS (ref 11.8–14.2)

## 2019-02-16 PROCEDURE — 85610 PROTHROMBIN TIME: CPT

## 2019-02-16 PROCEDURE — 36415 COLL VENOUS BLD VENIPUNCTURE: CPT

## 2019-02-18 ENCOUNTER — ANTICOAG VISIT (OUTPATIENT)
Dept: CARDIOLOGY CLINIC | Facility: CLINIC | Age: 79
End: 2019-02-18

## 2019-02-18 DIAGNOSIS — I48.92 ATRIAL FLUTTER, UNSPECIFIED TYPE (HCC): Primary | ICD-10-CM

## 2019-02-18 DIAGNOSIS — I48.3 TYPICAL ATRIAL FLUTTER (HCC): ICD-10-CM

## 2019-02-28 ENCOUNTER — APPOINTMENT (OUTPATIENT)
Dept: LAB | Facility: HOSPITAL | Age: 79
End: 2019-02-28
Attending: INTERNAL MEDICINE
Payer: MEDICARE

## 2019-02-28 LAB
INR PPP: 1.98 (ref 0.86–1.17)
PROTHROMBIN TIME: 21.6 SECONDS (ref 11.8–14.2)

## 2019-02-28 PROCEDURE — 36415 COLL VENOUS BLD VENIPUNCTURE: CPT

## 2019-02-28 PROCEDURE — 85610 PROTHROMBIN TIME: CPT

## 2019-03-01 ENCOUNTER — ANTICOAG VISIT (OUTPATIENT)
Dept: CARDIOLOGY CLINIC | Facility: CLINIC | Age: 79
End: 2019-03-01

## 2019-03-01 DIAGNOSIS — I48.3 TYPICAL ATRIAL FLUTTER (HCC): ICD-10-CM

## 2019-03-01 RX ORDER — CHOLECALCIFEROL (VITAMIN D3) 125 MCG
TABLET ORAL
COMMUNITY
End: 2019-07-23 | Stop reason: SDUPTHER

## 2019-03-01 RX ORDER — FUROSEMIDE 40 MG/1
TABLET ORAL DAILY
COMMUNITY
End: 2019-07-23 | Stop reason: SDUPTHER

## 2019-03-01 RX ORDER — CROMOLYN SODIUM 40 MG/ML
SOLUTION/ DROPS OPHTHALMIC EVERY 12 HOURS
COMMUNITY
End: 2019-07-23

## 2019-03-01 RX ORDER — ALPRAZOLAM 0.25 MG/1
TABLET ORAL 3 TIMES DAILY
COMMUNITY
End: 2019-07-23 | Stop reason: SDUPTHER

## 2019-03-05 ENCOUNTER — OFFICE VISIT (OUTPATIENT)
Dept: GASTROENTEROLOGY | Facility: HOSPITAL | Age: 79
End: 2019-03-05
Payer: MEDICARE

## 2019-03-05 VITALS
HEIGHT: 65 IN | SYSTOLIC BLOOD PRESSURE: 141 MMHG | HEART RATE: 80 BPM | WEIGHT: 168.8 LBS | DIASTOLIC BLOOD PRESSURE: 75 MMHG | TEMPERATURE: 98.6 F | BODY MASS INDEX: 28.12 KG/M2

## 2019-03-05 DIAGNOSIS — Z90.49 STATUS POST RIGHT HEMICOLECTOMY: ICD-10-CM

## 2019-03-05 DIAGNOSIS — K57.31 DIVERTICULOSIS OF LARGE INTESTINE WITH HEMORRHAGE: ICD-10-CM

## 2019-03-05 DIAGNOSIS — D12.6 TUBULOVILLOUS ADENOMA OF COLON: Primary | ICD-10-CM

## 2019-03-05 DIAGNOSIS — A04.72 CLOSTRIDIUM DIFFICILE DIARRHEA: ICD-10-CM

## 2019-03-05 PROCEDURE — 99212 OFFICE O/P EST SF 10 MIN: CPT | Performed by: INTERNAL MEDICINE

## 2019-03-05 RX ORDER — FUROSEMIDE 20 MG/1
20 TABLET ORAL 2 TIMES DAILY
Status: ON HOLD | COMMUNITY
Start: 2019-01-03 | End: 2019-12-03 | Stop reason: SDUPTHER

## 2019-03-05 RX ORDER — RANITIDINE 300 MG/1
300 TABLET ORAL
COMMUNITY
Start: 2019-01-12 | End: 2020-04-27 | Stop reason: HOSPADM

## 2019-03-14 ENCOUNTER — APPOINTMENT (OUTPATIENT)
Dept: LAB | Facility: HOSPITAL | Age: 79
End: 2019-03-14
Attending: INTERNAL MEDICINE
Payer: MEDICARE

## 2019-03-14 ENCOUNTER — ANTICOAG VISIT (OUTPATIENT)
Dept: CARDIOLOGY CLINIC | Facility: CLINIC | Age: 79
End: 2019-03-14

## 2019-03-14 DIAGNOSIS — I48.3 TYPICAL ATRIAL FLUTTER (HCC): ICD-10-CM

## 2019-03-14 NOTE — PROGRESS NOTES
[facility] - Outpatient Follow-up Note  Joann Harmon 77 y o  female MRN: [id number]  Encounter: [id number]          ASSESSMENT AND PLAN:    77year old female here for follow up  Tubulovillous adenoma of colon- s/p perforation after recent colonoscopy with right hemicolectomy who is doing quite well  I have recommended no further colonoscopies for surveillance and she is agreeable to this plan     ______________________________________________________________________    SUBJECTIVE:  Pt here for follow up  She denies any current GI symptoms  She had colonoscopy and unfortunately had perforation thereafter and had right hemicolectomy  She is doing well  REVIEW OF SYSTEMS IS OTHERWISE NEGATIVE  Historical Information   Past Medical History:   Diagnosis Date    Cardiac disease     GERD (gastroesophageal reflux disease)     Hyperlipidemia     Hypertension      Past Surgical History:   Procedure Laterality Date    CARDIAC SURGERY      CARDIAC SURGERY      CATARACT EXTRACTION      CHOLECYSTECTOMY      COLON SURGERY      COLONOSCOPY      COLONOSCOPY N/A 6/18/2018    Procedure: COLONOSCOPY;  Surgeon: Sophie Elena DO;  Location: [facility];   Service: Gastroenterology    COLONOSCOPY W/ CONTROL OF HEMORRHAGE      CORONARY ANGIOPLASTY WITH STENT PLACEMENT      CORONARY ARTERY BYPASS GRAFT  2008    HERNIA REPAIR      HYSTERECTOMY      LAPAROTOMY N/A 9/26/2018    Procedure: LAPAROTOMY EXPLORATORY WITH  RIGHT HEMICOLECTOMY;  Surgeon: Sophie Elena DO;  Location: [facility];  Service: Gastroenterology    LAPAROTOMY N/A 9/26/2018    Procedure: LAPAROTOMY EXPLORATORY WITH HEMICOLECTOMY;  Surgeon: Era Candelaria DO;  Location: [facility];  Service: General    FL COLONOSCOPY FLX DX W/COLLJ SPEC WHEN PFRMD N/A 9/26/2018    Procedure: COLONOSCOPY;  Surgeon: Sophie Elena DO;  Location: [facility];  Service: Gastroenterology    FL SIGMOIDOSCOPY FLX DX W/COLLJ SPEC BR/WA IF PFRMD N/A 6/21/2018    Procedure: SIGMOIDOSCOPY FLEXIBLE;  Surgeon: Ludwin Jenkins DO;  Location: [facility]; Service: Gastroenterology     Social History   Social History     Substance and Sexual Activity   Alcohol Use No     Social History     Substance and Sexual Activity   Drug Use No     Social History     Tobacco Use   Smoking Status Former Smoker   Smokeless Tobacco Never Used   Tobacco Comment    quit 15 yrs ago     No family history on file  Meds/Allergies       Current Outpatient Medications:     ALPRAZolam (XANAX) 0 25 mg tablet    atorvastatin (LIPITOR) 20 mg tablet    cromolyn (OPTICROM) 4 % ophthalmic solution    ergocalciferol (ERGOCALCIFEROL) 04668 UNITS capsule    Ergocalciferol (VITAMIN D2) 2000 units TABS    escitalopram (LEXAPRO) 10 mg tablet    furosemide (LASIX) 40 mg tablet    metolazone (ZAROXOLYN) 2 5 mg tablet    metoprolol tartrate (LOPRESSOR) 50 mg tablet    Multiple Vitamins-Minerals (PRESERVISION AREDS) CAPS    nitroglycerin (NITROSTAT) 0 4 mg SL tablet    Omega-3 Fatty Acids (FISH OIL PO)    potassium chloride (K-DUR,KLOR-CON) 20 mEq tablet    ranitidine (ZANTAC) 300 MG tablet    warfarin (COUMADIN) 2 mg tablet    ALPRAZolam (XANAX) 0 25 mg tablet    furosemide (LASIX) 20 mg tablet    furosemide (LASIX) 20 mg tablet    ranitidine (ZANTAC) 300 MG capsule    Allergies   Allergen Reactions    Codeine GI Intolerance    Lansoprazole Other (See Comments)     GI Upset, dania protonix    Morphine Nausea Only    Morphine And Related GI Intolerance           Objective     Blood pressure 141/75, pulse 80, temperature 98 6 °F (37 °C), temperature source Tympanic, height 5' 5" (1 651 m), weight 76 6 kg (168 lb 12 8 oz)  Body mass index is 28 09 kg/m²        PHYSICAL EXAM:      General Appearance:   Alert, cooperative, no distress   HEENT:   Normocephalic, atraumatic, anicteric      Neck:  Supple, symmetrical, trachea midline   Lungs:   Clear to auscultation bilaterally; no rales, rhonchi or wheezing; respirations unlabored    Heart[de-identified]   Regular rate and rhythm; no murmur, rub, or gallop  Abdomen:   Soft, non-tender, non-distended; normal bowel sounds; no masses, no organomegaly, surgical scar well healed    Genitalia:   Deferred    Rectal:   Deferred    Extremities:  No cyanosis, clubbing or edema    Pulses:  2+ and symmetric    Skin:  No jaundice, rashes, or lesions    Lymph nodes:  No palpable cervical lymphadenopathy        Lab Results:   No visits with results within 1 Day(s) from this visit  Latest known visit with results is:   Orders Only on 02/18/2019   Component Date Value    Protime 02/28/2019 21 6*    INR 02/28/2019 1 98*         Radiology Results:   No results found

## 2019-03-24 DIAGNOSIS — I27.82 OTHER CHRONIC PULMONARY EMBOLISM WITHOUT ACUTE COR PULMONALE (HCC): ICD-10-CM

## 2019-03-25 RX ORDER — WARFARIN SODIUM 2 MG/1
TABLET ORAL
Qty: 60 TABLET | Refills: 11 | Status: SHIPPED | OUTPATIENT
Start: 2019-03-25 | End: 2019-09-24 | Stop reason: ALTCHOICE

## 2019-03-28 ENCOUNTER — ANTICOAG VISIT (OUTPATIENT)
Dept: CARDIOLOGY CLINIC | Facility: CLINIC | Age: 79
End: 2019-03-28

## 2019-03-28 ENCOUNTER — APPOINTMENT (OUTPATIENT)
Dept: LAB | Facility: HOSPITAL | Age: 79
End: 2019-03-28
Attending: INTERNAL MEDICINE
Payer: MEDICARE

## 2019-03-28 DIAGNOSIS — I48.3 TYPICAL ATRIAL FLUTTER (HCC): ICD-10-CM

## 2019-04-02 ENCOUNTER — TRANSCRIBE ORDERS (OUTPATIENT)
Dept: ADMINISTRATIVE | Facility: HOSPITAL | Age: 79
End: 2019-04-02

## 2019-04-02 ENCOUNTER — HOSPITAL ENCOUNTER (OUTPATIENT)
Dept: RADIOLOGY | Facility: HOSPITAL | Age: 79
Discharge: HOME | End: 2019-04-02
Payer: MEDICARE

## 2019-04-02 DIAGNOSIS — R05.9 COUGH: Primary | ICD-10-CM

## 2019-04-02 DIAGNOSIS — R05.9 COUGH: ICD-10-CM

## 2019-04-02 PROCEDURE — 71046 X-RAY EXAM CHEST 2 VIEWS: CPT

## 2019-04-13 ENCOUNTER — APPOINTMENT (OUTPATIENT)
Dept: LAB | Facility: HOSPITAL | Age: 79
End: 2019-04-13
Attending: INTERNAL MEDICINE
Payer: MEDICARE

## 2019-04-15 ENCOUNTER — ANTICOAG VISIT (OUTPATIENT)
Dept: CARDIOLOGY CLINIC | Facility: CLINIC | Age: 79
End: 2019-04-15

## 2019-04-15 DIAGNOSIS — I25.10 CORONARY ARTERY DISEASE INVOLVING NATIVE CORONARY ARTERY OF NATIVE HEART WITHOUT ANGINA PECTORIS: ICD-10-CM

## 2019-04-15 DIAGNOSIS — I48.3 TYPICAL ATRIAL FLUTTER (HCC): ICD-10-CM

## 2019-04-15 RX ORDER — POTASSIUM CHLORIDE 20 MEQ/1
20 TABLET, EXTENDED RELEASE ORAL DAILY
Qty: 90 TABLET | Refills: 3 | Status: SHIPPED | OUTPATIENT
Start: 2019-04-15 | End: 2019-10-01 | Stop reason: SDUPTHER

## 2019-04-26 ENCOUNTER — APPOINTMENT (OUTPATIENT)
Dept: LAB | Facility: HOSPITAL | Age: 79
End: 2019-04-26
Attending: INTERNAL MEDICINE
Payer: MEDICARE

## 2019-04-26 ENCOUNTER — ANTICOAG VISIT (OUTPATIENT)
Dept: CARDIOLOGY CLINIC | Facility: CLINIC | Age: 79
End: 2019-04-26

## 2019-04-26 DIAGNOSIS — I48.3 TYPICAL ATRIAL FLUTTER (HCC): ICD-10-CM

## 2019-04-28 ENCOUNTER — HOSPITAL ENCOUNTER (EMERGENCY)
Facility: HOSPITAL | Age: 79
Discharge: HOME | End: 2019-04-28
Attending: EMERGENCY MEDICINE
Payer: MEDICARE

## 2019-04-28 ENCOUNTER — APPOINTMENT (EMERGENCY)
Dept: CT IMAGING | Facility: HOSPITAL | Age: 79
End: 2019-04-28
Payer: MEDICARE

## 2019-04-28 VITALS
RESPIRATION RATE: 21 BRPM | OXYGEN SATURATION: 96 % | BODY MASS INDEX: 29.35 KG/M2 | TEMPERATURE: 98 F | WEIGHT: 176.37 LBS | DIASTOLIC BLOOD PRESSURE: 67 MMHG | HEART RATE: 85 BPM | SYSTOLIC BLOOD PRESSURE: 111 MMHG

## 2019-04-28 DIAGNOSIS — R07.89 ATYPICAL CHEST PAIN: Primary | ICD-10-CM

## 2019-04-28 DIAGNOSIS — M54.6 ACUTE BILATERAL THORACIC BACK PAIN: ICD-10-CM

## 2019-04-28 LAB
ANION GAP SERPL CALCULATED.3IONS-SCNC: 8 MMOL/L (ref 4–13)
ATRIAL RATE: 208 BPM
BASOPHILS # BLD AUTO: 0.04 THOUSANDS/ΜL (ref 0–0.1)
BASOPHILS NFR BLD AUTO: 1 % (ref 0–1)
BUN SERPL-MCNC: 20 MG/DL (ref 5–25)
CALCIUM SERPL-MCNC: 9.2 MG/DL (ref 8.3–10.1)
CHLORIDE SERPL-SCNC: 99 MMOL/L (ref 100–108)
CO2 SERPL-SCNC: 32 MMOL/L (ref 21–32)
CREAT SERPL-MCNC: 0.94 MG/DL (ref 0.6–1.3)
EOSINOPHIL # BLD AUTO: 0.05 THOUSAND/ΜL (ref 0–0.61)
EOSINOPHIL NFR BLD AUTO: 1 % (ref 0–6)
ERYTHROCYTE [DISTWIDTH] IN BLOOD BY AUTOMATED COUNT: 16.4 % (ref 11.6–15.1)
GFR SERPL CREATININE-BSD FRML MDRD: 58 ML/MIN/1.73SQ M
GLUCOSE SERPL-MCNC: 113 MG/DL (ref 65–140)
HCT VFR BLD AUTO: 40.6 % (ref 34.8–46.1)
HGB BLD-MCNC: 12.8 G/DL (ref 11.5–15.4)
IMM GRANULOCYTES # BLD AUTO: 0.03 THOUSAND/UL (ref 0–0.2)
IMM GRANULOCYTES NFR BLD AUTO: 0 % (ref 0–2)
LYMPHOCYTES # BLD AUTO: 1.43 THOUSANDS/ΜL (ref 0.6–4.47)
LYMPHOCYTES NFR BLD AUTO: 17 % (ref 14–44)
MCH RBC QN AUTO: 29.6 PG (ref 26.8–34.3)
MCHC RBC AUTO-ENTMCNC: 31.5 G/DL (ref 31.4–37.4)
MCV RBC AUTO: 94 FL (ref 82–98)
MONOCYTES # BLD AUTO: 0.73 THOUSAND/ΜL (ref 0.17–1.22)
MONOCYTES NFR BLD AUTO: 9 % (ref 4–12)
NEUTROPHILS # BLD AUTO: 6.23 THOUSANDS/ΜL (ref 1.85–7.62)
NEUTS SEG NFR BLD AUTO: 72 % (ref 43–75)
NRBC BLD AUTO-RTO: 0 /100 WBCS
NT-PROBNP SERPL-MCNC: 1310 PG/ML
P AXIS: 256 DEGREES
PLATELET # BLD AUTO: 200 THOUSANDS/UL (ref 149–390)
PMV BLD AUTO: 9.5 FL (ref 8.9–12.7)
POTASSIUM SERPL-SCNC: 3 MMOL/L (ref 3.5–5.3)
QRS AXIS: 16 DEGREES
QRSD INTERVAL: 120 MS
QT INTERVAL: 382 MS
QTC INTERVAL: 472 MS
RBC # BLD AUTO: 4.33 MILLION/UL (ref 3.81–5.12)
SODIUM SERPL-SCNC: 139 MMOL/L (ref 136–145)
T WAVE AXIS: 2 DEGREES
TROPONIN I SERPL-MCNC: <0.02 NG/ML
VENTRICULAR RATE: 92 BPM
WBC # BLD AUTO: 8.51 THOUSAND/UL (ref 4.31–10.16)

## 2019-04-28 PROCEDURE — 74174 CTA ABD&PLVS W/CONTRAST: CPT

## 2019-04-28 PROCEDURE — 80048 BASIC METABOLIC PNL TOTAL CA: CPT | Performed by: EMERGENCY MEDICINE

## 2019-04-28 PROCEDURE — 99285 EMERGENCY DEPT VISIT HI MDM: CPT | Performed by: EMERGENCY MEDICINE

## 2019-04-28 PROCEDURE — 36415 COLL VENOUS BLD VENIPUNCTURE: CPT | Performed by: EMERGENCY MEDICINE

## 2019-04-28 PROCEDURE — 83880 ASSAY OF NATRIURETIC PEPTIDE: CPT | Performed by: EMERGENCY MEDICINE

## 2019-04-28 PROCEDURE — 99284 EMERGENCY DEPT VISIT MOD MDM: CPT

## 2019-04-28 PROCEDURE — 71275 CT ANGIOGRAPHY CHEST: CPT

## 2019-04-28 PROCEDURE — 96374 THER/PROPH/DIAG INJ IV PUSH: CPT

## 2019-04-28 PROCEDURE — 84484 ASSAY OF TROPONIN QUANT: CPT | Performed by: EMERGENCY MEDICINE

## 2019-04-28 PROCEDURE — 93010 ELECTROCARDIOGRAM REPORT: CPT | Performed by: INTERNAL MEDICINE

## 2019-04-28 PROCEDURE — 93005 ELECTROCARDIOGRAM TRACING: CPT

## 2019-04-28 PROCEDURE — 85025 COMPLETE CBC W/AUTO DIFF WBC: CPT | Performed by: EMERGENCY MEDICINE

## 2019-04-28 RX ORDER — ASPIRIN 81 MG/1
324 TABLET, CHEWABLE ORAL ONCE
Status: COMPLETED | OUTPATIENT
Start: 2019-04-28 | End: 2019-04-28

## 2019-04-28 RX ORDER — POTASSIUM CHLORIDE 20 MEQ/1
40 TABLET, EXTENDED RELEASE ORAL ONCE
Status: COMPLETED | OUTPATIENT
Start: 2019-04-28 | End: 2019-04-28

## 2019-04-28 RX ORDER — 0.9 % SODIUM CHLORIDE 0.9 %
3 VIAL (ML) INJECTION AS NEEDED
Status: DISCONTINUED | OUTPATIENT
Start: 2019-04-28 | End: 2019-04-28 | Stop reason: HOSPADM

## 2019-04-28 RX ORDER — FENTANYL CITRATE 50 UG/ML
75 INJECTION, SOLUTION INTRAMUSCULAR; INTRAVENOUS ONCE
Status: COMPLETED | OUTPATIENT
Start: 2019-04-28 | End: 2019-04-28

## 2019-04-28 RX ADMIN — POTASSIUM CHLORIDE 40 MEQ: 1500 TABLET, EXTENDED RELEASE ORAL at 08:43

## 2019-04-28 RX ADMIN — ASPIRIN 81 MG 324 MG: 81 TABLET ORAL at 07:43

## 2019-04-28 RX ADMIN — IOHEXOL 100 ML: 350 INJECTION, SOLUTION INTRAVENOUS at 09:35

## 2019-04-28 RX ADMIN — FENTANYL CITRATE 75 MCG: 50 INJECTION, SOLUTION INTRAMUSCULAR; INTRAVENOUS at 08:56

## 2019-04-29 ENCOUNTER — HOSPITAL ENCOUNTER (EMERGENCY)
Facility: HOSPITAL | Age: 79
Discharge: HOME | End: 2019-04-29
Attending: EMERGENCY MEDICINE | Admitting: EMERGENCY MEDICINE
Payer: MEDICARE

## 2019-04-29 VITALS
RESPIRATION RATE: 18 BRPM | BODY MASS INDEX: 29.38 KG/M2 | HEIGHT: 65 IN | TEMPERATURE: 96.7 F | SYSTOLIC BLOOD PRESSURE: 118 MMHG | HEART RATE: 112 BPM | OXYGEN SATURATION: 96 % | WEIGHT: 176.37 LBS | DIASTOLIC BLOOD PRESSURE: 88 MMHG

## 2019-04-29 DIAGNOSIS — S22.000A CLOSED COMPRESSION FRACTURE OF THORACIC VERTEBRA, INITIAL ENCOUNTER (HCC): Primary | ICD-10-CM

## 2019-04-29 LAB
ALBUMIN SERPL BCP-MCNC: 4 G/DL (ref 3.5–5.7)
ALP SERPL-CCNC: 145 U/L (ref 55–165)
ALT SERPL W P-5'-P-CCNC: 21 U/L (ref 7–52)
ANION GAP SERPL CALCULATED.3IONS-SCNC: 6 MMOL/L (ref 4–13)
AST SERPL W P-5'-P-CCNC: 26 U/L (ref 13–39)
ATRIAL RATE: 214 BPM
BASOPHILS # BLD AUTO: 0.1 THOUSANDS/ΜL (ref 0–0.1)
BASOPHILS NFR BLD AUTO: 1 % (ref 0–2)
BILIRUB SERPL-MCNC: 0.5 MG/DL (ref 0.2–1)
BUN SERPL-MCNC: 16 MG/DL (ref 7–25)
CALCIUM SERPL-MCNC: 9.7 MG/DL (ref 8.6–10.5)
CHLORIDE SERPL-SCNC: 98 MMOL/L (ref 98–107)
CO2 SERPL-SCNC: 34 MMOL/L (ref 21–31)
CREAT SERPL-MCNC: 0.83 MG/DL (ref 0.6–1.2)
EOSINOPHIL # BLD AUTO: 0.1 THOUSAND/ΜL (ref 0–0.61)
EOSINOPHIL NFR BLD AUTO: 2 % (ref 0–5)
ERYTHROCYTE [DISTWIDTH] IN BLOOD BY AUTOMATED COUNT: 17.6 % (ref 11.5–14.5)
GFR SERPL CREATININE-BSD FRML MDRD: 67 ML/MIN/1.73SQ M
GLUCOSE SERPL-MCNC: 104 MG/DL (ref 65–99)
HCT VFR BLD AUTO: 36 % (ref 42–47)
HGB BLD-MCNC: 12.2 G/DL (ref 12–16)
LIPASE SERPL-CCNC: 16 U/L (ref 11–82)
LYMPHOCYTES # BLD AUTO: 1.1 THOUSANDS/ΜL (ref 0.6–4.47)
LYMPHOCYTES NFR BLD AUTO: 18 % (ref 21–51)
MCH RBC QN AUTO: 30.4 PG (ref 26–34)
MCHC RBC AUTO-ENTMCNC: 33.8 G/DL (ref 31–37)
MCV RBC AUTO: 90 FL (ref 81–99)
MONOCYTES # BLD AUTO: 0.7 THOUSAND/ΜL (ref 0.17–1.22)
MONOCYTES NFR BLD AUTO: 12 % (ref 2–12)
NEUTROPHILS # BLD AUTO: 4.3 THOUSANDS/ΜL (ref 1.4–6.5)
NEUTS SEG NFR BLD AUTO: 68 % (ref 42–75)
P AXIS: -84 DEGREES
PLATELET # BLD AUTO: 185 THOUSANDS/UL (ref 149–390)
PMV BLD AUTO: 7.3 FL (ref 8.6–11.7)
POTASSIUM SERPL-SCNC: 3.6 MMOL/L (ref 3.5–5.5)
PROT SERPL-MCNC: 6.9 G/DL (ref 6.4–8.9)
QRS AXIS: 13 DEGREES
QRSD INTERVAL: 162 MS
QT INTERVAL: 492 MS
QTC INTERVAL: 656 MS
RBC # BLD AUTO: 4.02 MILLION/UL (ref 3.9–5.2)
SODIUM SERPL-SCNC: 138 MMOL/L (ref 134–143)
T WAVE AXIS: -61 DEGREES
TROPONIN I SERPL-MCNC: <0.03 NG/ML
VENTRICULAR RATE: 107 BPM
WBC # BLD AUTO: 6.3 THOUSAND/UL (ref 4.8–10.8)

## 2019-04-29 PROCEDURE — 36415 COLL VENOUS BLD VENIPUNCTURE: CPT | Performed by: PHYSICIAN ASSISTANT

## 2019-04-29 PROCEDURE — 99284 EMERGENCY DEPT VISIT MOD MDM: CPT

## 2019-04-29 PROCEDURE — 93005 ELECTROCARDIOGRAM TRACING: CPT

## 2019-04-29 PROCEDURE — 85025 COMPLETE CBC W/AUTO DIFF WBC: CPT | Performed by: PHYSICIAN ASSISTANT

## 2019-04-29 PROCEDURE — 80053 COMPREHEN METABOLIC PANEL: CPT | Performed by: PHYSICIAN ASSISTANT

## 2019-04-29 PROCEDURE — 84484 ASSAY OF TROPONIN QUANT: CPT | Performed by: PHYSICIAN ASSISTANT

## 2019-04-29 PROCEDURE — 93010 ELECTROCARDIOGRAM REPORT: CPT | Performed by: INTERNAL MEDICINE

## 2019-04-29 PROCEDURE — 83690 ASSAY OF LIPASE: CPT | Performed by: PHYSICIAN ASSISTANT

## 2019-04-29 RX ORDER — HYDROCODONE BITARTRATE AND ACETAMINOPHEN 5; 325 MG/1; MG/1
1 TABLET ORAL EVERY 6 HOURS PRN
Qty: 15 TABLET | Refills: 0 | Status: SHIPPED | OUTPATIENT
Start: 2019-04-29 | End: 2019-05-03

## 2019-04-29 RX ORDER — HYDROCODONE BITARTRATE AND ACETAMINOPHEN 5; 325 MG/1; MG/1
1 TABLET ORAL ONCE
Status: COMPLETED | OUTPATIENT
Start: 2019-04-29 | End: 2019-04-29

## 2019-04-29 RX ORDER — ONDANSETRON 4 MG/1
4 TABLET, FILM COATED ORAL EVERY 8 HOURS PRN
Qty: 12 TABLET | Refills: 0 | Status: SHIPPED | OUTPATIENT
Start: 2019-04-29 | End: 2019-09-24 | Stop reason: ALTCHOICE

## 2019-04-29 RX ORDER — ONDANSETRON 4 MG/1
4 TABLET, ORALLY DISINTEGRATING ORAL ONCE
Status: COMPLETED | OUTPATIENT
Start: 2019-04-29 | End: 2019-04-29

## 2019-04-29 RX ADMIN — HYDROCODONE BITARTRATE AND ACETAMINOPHEN 1 TABLET: 5; 325 TABLET ORAL at 16:53

## 2019-04-29 RX ADMIN — ONDANSETRON 4 MG: 4 TABLET, ORALLY DISINTEGRATING ORAL at 15:38

## 2019-05-01 ENCOUNTER — OFFICE VISIT (OUTPATIENT)
Dept: CARDIOLOGY CLINIC | Facility: HOSPITAL | Age: 79
End: 2019-05-01
Payer: MEDICARE

## 2019-05-01 VITALS
DIASTOLIC BLOOD PRESSURE: 78 MMHG | HEIGHT: 65 IN | BODY MASS INDEX: 29.66 KG/M2 | WEIGHT: 178 LBS | HEART RATE: 97 BPM | SYSTOLIC BLOOD PRESSURE: 116 MMHG

## 2019-05-01 DIAGNOSIS — I10 ESSENTIAL HYPERTENSION: ICD-10-CM

## 2019-05-01 DIAGNOSIS — I50.32 CHRONIC DIASTOLIC HEART FAILURE (HCC): ICD-10-CM

## 2019-05-01 DIAGNOSIS — I48.3 TYPICAL ATRIAL FLUTTER (HCC): Primary | ICD-10-CM

## 2019-05-01 DIAGNOSIS — I25.10 CORONARY ARTERY DISEASE INVOLVING NATIVE CORONARY ARTERY OF NATIVE HEART WITHOUT ANGINA PECTORIS: ICD-10-CM

## 2019-05-01 PROCEDURE — 99214 OFFICE O/P EST MOD 30 MIN: CPT | Performed by: INTERNAL MEDICINE

## 2019-05-02 PROCEDURE — 93000 ELECTROCARDIOGRAM COMPLETE: CPT | Performed by: INTERNAL MEDICINE

## 2019-05-10 DIAGNOSIS — I50.32 CHRONIC DIASTOLIC CONGESTIVE HEART FAILURE (HCC): ICD-10-CM

## 2019-05-11 RX ORDER — FUROSEMIDE 20 MG/1
40 TABLET ORAL DAILY
Qty: 180 TABLET | Refills: 3 | Status: ON HOLD | OUTPATIENT
Start: 2019-05-11 | End: 2019-07-25 | Stop reason: ALTCHOICE

## 2019-05-12 ENCOUNTER — APPOINTMENT (EMERGENCY)
Dept: RADIOLOGY | Facility: HOSPITAL | Age: 79
End: 2019-05-12
Payer: MEDICARE

## 2019-05-12 ENCOUNTER — HOSPITAL ENCOUNTER (EMERGENCY)
Facility: HOSPITAL | Age: 79
Discharge: HOME | End: 2019-05-12
Attending: EMERGENCY MEDICINE | Admitting: EMERGENCY MEDICINE
Payer: MEDICARE

## 2019-05-12 VITALS
SYSTOLIC BLOOD PRESSURE: 129 MMHG | BODY MASS INDEX: 28.79 KG/M2 | WEIGHT: 173 LBS | TEMPERATURE: 97.7 F | OXYGEN SATURATION: 98 % | DIASTOLIC BLOOD PRESSURE: 76 MMHG | HEART RATE: 99 BPM | RESPIRATION RATE: 21 BRPM

## 2019-05-12 DIAGNOSIS — G89.29 CHRONIC BACK PAIN: ICD-10-CM

## 2019-05-12 DIAGNOSIS — J40 BRONCHITIS: Primary | ICD-10-CM

## 2019-05-12 DIAGNOSIS — M54.9 CHRONIC BACK PAIN: ICD-10-CM

## 2019-05-12 LAB
ANION GAP SERPL CALCULATED.3IONS-SCNC: 7 MMOL/L (ref 4–13)
BASOPHILS # BLD AUTO: 0.1 THOUSANDS/ΜL (ref 0–0.1)
BASOPHILS NFR BLD AUTO: 1 % (ref 0–2)
BNP SERPL-MCNC: 253 PG/ML (ref 1–100)
BUN SERPL-MCNC: 24 MG/DL (ref 7–25)
CALCIUM SERPL-MCNC: 9.6 MG/DL (ref 8.6–10.5)
CHLORIDE SERPL-SCNC: 107 MMOL/L (ref 98–107)
CO2 SERPL-SCNC: 27 MMOL/L (ref 21–31)
CREAT SERPL-MCNC: 0.99 MG/DL (ref 0.6–1.2)
EOSINOPHIL # BLD AUTO: 0 THOUSAND/ΜL (ref 0–0.61)
EOSINOPHIL NFR BLD AUTO: 1 % (ref 0–5)
ERYTHROCYTE [DISTWIDTH] IN BLOOD BY AUTOMATED COUNT: 18.5 % (ref 11.5–14.5)
GFR SERPL CREATININE-BSD FRML MDRD: 54 ML/MIN/1.73SQ M
GLUCOSE SERPL-MCNC: 102 MG/DL (ref 65–99)
HCT VFR BLD AUTO: 35.7 % (ref 42–47)
HGB BLD-MCNC: 11.6 G/DL (ref 12–16)
LYMPHOCYTES # BLD AUTO: 1.5 THOUSANDS/ΜL (ref 0.6–4.47)
LYMPHOCYTES NFR BLD AUTO: 19 % (ref 21–51)
MCH RBC QN AUTO: 29.7 PG (ref 26–34)
MCHC RBC AUTO-ENTMCNC: 32.6 G/DL (ref 31–37)
MCV RBC AUTO: 91 FL (ref 81–99)
MONOCYTES # BLD AUTO: 0.7 THOUSAND/ΜL (ref 0.17–1.22)
MONOCYTES NFR BLD AUTO: 9 % (ref 2–12)
NEUTROPHILS # BLD AUTO: 5.4 THOUSANDS/ΜL (ref 1.4–6.5)
NEUTS SEG NFR BLD AUTO: 70 % (ref 42–75)
PLATELET # BLD AUTO: 164 THOUSANDS/UL (ref 149–390)
PMV BLD AUTO: 7 FL (ref 8.6–11.7)
POTASSIUM SERPL-SCNC: 4.8 MMOL/L (ref 3.5–5.5)
RBC # BLD AUTO: 3.91 MILLION/UL (ref 3.9–5.2)
SODIUM SERPL-SCNC: 141 MMOL/L (ref 134–143)
TROPONIN I SERPL-MCNC: <0.03 NG/ML
WBC # BLD AUTO: 7.7 THOUSAND/UL (ref 4.8–10.8)

## 2019-05-12 PROCEDURE — 85025 COMPLETE CBC W/AUTO DIFF WBC: CPT | Performed by: EMERGENCY MEDICINE

## 2019-05-12 PROCEDURE — 96374 THER/PROPH/DIAG INJ IV PUSH: CPT

## 2019-05-12 PROCEDURE — 93005 ELECTROCARDIOGRAM TRACING: CPT

## 2019-05-12 PROCEDURE — 84484 ASSAY OF TROPONIN QUANT: CPT | Performed by: EMERGENCY MEDICINE

## 2019-05-12 PROCEDURE — 83880 ASSAY OF NATRIURETIC PEPTIDE: CPT | Performed by: EMERGENCY MEDICINE

## 2019-05-12 PROCEDURE — 99285 EMERGENCY DEPT VISIT HI MDM: CPT

## 2019-05-12 PROCEDURE — 36415 COLL VENOUS BLD VENIPUNCTURE: CPT | Performed by: EMERGENCY MEDICINE

## 2019-05-12 PROCEDURE — 71045 X-RAY EXAM CHEST 1 VIEW: CPT

## 2019-05-12 PROCEDURE — 80048 BASIC METABOLIC PNL TOTAL CA: CPT | Performed by: EMERGENCY MEDICINE

## 2019-05-12 RX ORDER — ALBUTEROL SULFATE 90 UG/1
2 AEROSOL, METERED RESPIRATORY (INHALATION) ONCE
Status: COMPLETED | OUTPATIENT
Start: 2019-05-12 | End: 2019-05-12

## 2019-05-12 RX ORDER — PREDNISONE 20 MG/1
60 TABLET ORAL ONCE
Status: COMPLETED | OUTPATIENT
Start: 2019-05-12 | End: 2019-05-12

## 2019-05-12 RX ORDER — PREDNISONE 20 MG/1
20 TABLET ORAL DAILY
Qty: 15 TABLET | Refills: 0 | Status: SHIPPED | OUTPATIENT
Start: 2019-05-12 | End: 2019-07-23 | Stop reason: ALTCHOICE

## 2019-05-12 RX ORDER — FENTANYL CITRATE 50 UG/ML
75 INJECTION, SOLUTION INTRAMUSCULAR; INTRAVENOUS ONCE
Status: COMPLETED | OUTPATIENT
Start: 2019-05-12 | End: 2019-05-12

## 2019-05-12 RX ADMIN — FENTANYL CITRATE 75 MCG: 50 INJECTION INTRAMUSCULAR; INTRAVENOUS at 22:54

## 2019-05-12 RX ADMIN — ALBUTEROL SULFATE 2 PUFF: 90 AEROSOL, METERED RESPIRATORY (INHALATION) at 22:54

## 2019-05-12 RX ADMIN — PREDNISONE 60 MG: 20 TABLET ORAL at 22:54

## 2019-05-13 LAB
ATRIAL RATE: 202 BPM
P AXIS: -84 DEGREES
QRS AXIS: -9 DEGREES
QRSD INTERVAL: 112 MS
QT INTERVAL: 380 MS
QTC INTERVAL: 433 MS
T WAVE AXIS: -20 DEGREES
VENTRICULAR RATE: 78 BPM

## 2019-05-13 PROCEDURE — 93010 ELECTROCARDIOGRAM REPORT: CPT | Performed by: INTERNAL MEDICINE

## 2019-05-16 ENCOUNTER — APPOINTMENT (OUTPATIENT)
Dept: LAB | Facility: MEDICAL CENTER | Age: 79
End: 2019-05-16
Payer: MEDICARE

## 2019-05-17 ENCOUNTER — ANTICOAG VISIT (OUTPATIENT)
Dept: CARDIOLOGY CLINIC | Facility: CLINIC | Age: 79
End: 2019-05-17

## 2019-05-17 DIAGNOSIS — I48.3 TYPICAL ATRIAL FLUTTER (HCC): ICD-10-CM

## 2019-05-30 ENCOUNTER — APPOINTMENT (OUTPATIENT)
Dept: LAB | Facility: HOSPITAL | Age: 79
End: 2019-05-30
Attending: INTERNAL MEDICINE
Payer: MEDICARE

## 2019-05-30 ENCOUNTER — ANTICOAG VISIT (OUTPATIENT)
Dept: CARDIOLOGY CLINIC | Facility: CLINIC | Age: 79
End: 2019-05-30

## 2019-05-30 DIAGNOSIS — I48.3 TYPICAL ATRIAL FLUTTER (HCC): ICD-10-CM

## 2019-06-07 ENCOUNTER — APPOINTMENT (OUTPATIENT)
Dept: LAB | Facility: HOSPITAL | Age: 79
End: 2019-06-07
Attending: INTERNAL MEDICINE
Payer: MEDICARE

## 2019-06-07 ENCOUNTER — ANTICOAG VISIT (OUTPATIENT)
Dept: CARDIOLOGY CLINIC | Facility: CLINIC | Age: 79
End: 2019-06-07

## 2019-06-07 DIAGNOSIS — I48.3 TYPICAL ATRIAL FLUTTER (HCC): ICD-10-CM

## 2019-06-20 ENCOUNTER — ANTICOAG VISIT (OUTPATIENT)
Dept: CARDIOLOGY CLINIC | Facility: CLINIC | Age: 79
End: 2019-06-20

## 2019-06-20 ENCOUNTER — APPOINTMENT (OUTPATIENT)
Dept: LAB | Facility: HOSPITAL | Age: 79
End: 2019-06-20
Attending: INTERNAL MEDICINE
Payer: MEDICARE

## 2019-06-20 DIAGNOSIS — I48.3 TYPICAL ATRIAL FLUTTER (HCC): ICD-10-CM

## 2019-06-24 ENCOUNTER — OFFICE VISIT (OUTPATIENT)
Dept: SURGERY | Facility: CLINIC | Age: 79
End: 2019-06-24

## 2019-06-24 VITALS
DIASTOLIC BLOOD PRESSURE: 72 MMHG | HEIGHT: 65 IN | WEIGHT: 170 LBS | BODY MASS INDEX: 28.32 KG/M2 | SYSTOLIC BLOOD PRESSURE: 124 MMHG | HEART RATE: 88 BPM | TEMPERATURE: 97.8 F | RESPIRATION RATE: 18 BRPM

## 2019-06-24 DIAGNOSIS — Z79.01 ON CONTINUOUS ORAL ANTICOAGULATION: ICD-10-CM

## 2019-06-24 DIAGNOSIS — Z01.810 ENCOUNTER FOR PRE-OPERATIVE CARDIOVASCULAR CLEARANCE: ICD-10-CM

## 2019-06-24 DIAGNOSIS — K64.4: Primary | ICD-10-CM

## 2019-06-24 RX ORDER — SODIUM CHLORIDE, SODIUM LACTATE, POTASSIUM CHLORIDE, CALCIUM CHLORIDE 600; 310; 30; 20 MG/100ML; MG/100ML; MG/100ML; MG/100ML
100 INJECTION, SOLUTION INTRAVENOUS CONTINUOUS
Status: CANCELLED | OUTPATIENT
Start: 2019-06-24

## 2019-06-24 RX ORDER — CEFAZOLIN SODIUM 1 G/50ML
1000 SOLUTION INTRAVENOUS ONCE
Status: CANCELLED | OUTPATIENT
Start: 2019-07-25 | End: 2019-06-24

## 2019-07-02 ENCOUNTER — APPOINTMENT (OUTPATIENT)
Dept: LAB | Facility: HOSPITAL | Age: 79
End: 2019-07-02
Attending: INTERNAL MEDICINE
Payer: MEDICARE

## 2019-07-02 ENCOUNTER — ANTICOAG VISIT (OUTPATIENT)
Dept: CARDIOLOGY CLINIC | Facility: CLINIC | Age: 79
End: 2019-07-02

## 2019-07-02 DIAGNOSIS — I48.3 TYPICAL ATRIAL FLUTTER (HCC): ICD-10-CM

## 2019-07-16 ENCOUNTER — APPOINTMENT (OUTPATIENT)
Dept: LAB | Facility: HOSPITAL | Age: 79
End: 2019-07-16
Payer: MEDICARE

## 2019-07-16 ENCOUNTER — HOSPITAL ENCOUNTER (OUTPATIENT)
Dept: RADIOLOGY | Facility: HOSPITAL | Age: 79
Discharge: HOME | End: 2019-07-16
Payer: MEDICARE

## 2019-07-16 ENCOUNTER — ANTICOAG VISIT (OUTPATIENT)
Dept: CARDIOLOGY CLINIC | Facility: CLINIC | Age: 79
End: 2019-07-16

## 2019-07-16 DIAGNOSIS — Z79.01 ON CONTINUOUS ORAL ANTICOAGULATION: ICD-10-CM

## 2019-07-16 DIAGNOSIS — Z01.810 ENCOUNTER FOR PRE-OPERATIVE CARDIOVASCULAR CLEARANCE: ICD-10-CM

## 2019-07-16 DIAGNOSIS — K64.4: ICD-10-CM

## 2019-07-16 DIAGNOSIS — I48.3 TYPICAL ATRIAL FLUTTER (HCC): ICD-10-CM

## 2019-07-16 LAB
ANION GAP SERPL CALCULATED.3IONS-SCNC: 11 MMOL/L (ref 4–13)
BUN SERPL-MCNC: 15 MG/DL (ref 5–25)
CALCIUM SERPL-MCNC: 9.3 MG/DL (ref 8.3–10.1)
CHLORIDE SERPL-SCNC: 103 MMOL/L (ref 100–108)
CO2 SERPL-SCNC: 27 MMOL/L (ref 21–32)
CREAT SERPL-MCNC: 0.94 MG/DL (ref 0.6–1.3)
ERYTHROCYTE [DISTWIDTH] IN BLOOD BY AUTOMATED COUNT: 15.3 % (ref 11.6–15.1)
GFR SERPL CREATININE-BSD FRML MDRD: 57 ML/MIN/1.73SQ M
GLUCOSE SERPL-MCNC: 107 MG/DL (ref 65–140)
HCT VFR BLD AUTO: 39.5 % (ref 34.8–46.1)
HGB BLD-MCNC: 12.9 G/DL (ref 11.5–15.4)
INR PPP: 2.52 (ref 0.84–1.19)
MCH RBC QN AUTO: 32.1 PG (ref 26.8–34.3)
MCHC RBC AUTO-ENTMCNC: 32.7 G/DL (ref 31.4–37.4)
MCV RBC AUTO: 98 FL (ref 82–98)
PLATELET # BLD AUTO: 170 THOUSANDS/UL (ref 149–390)
PMV BLD AUTO: 9.5 FL (ref 8.9–12.7)
POTASSIUM SERPL-SCNC: 3.5 MMOL/L (ref 3.5–5.3)
PROTHROMBIN TIME: 27.5 SECONDS (ref 11.6–14.5)
RBC # BLD AUTO: 4.02 MILLION/UL (ref 3.81–5.12)
SODIUM SERPL-SCNC: 141 MMOL/L (ref 136–145)
WBC # BLD AUTO: 8.65 THOUSAND/UL (ref 4.31–10.16)

## 2019-07-16 PROCEDURE — 85027 COMPLETE CBC AUTOMATED: CPT

## 2019-07-16 PROCEDURE — 36415 COLL VENOUS BLD VENIPUNCTURE: CPT

## 2019-07-16 PROCEDURE — 80048 BASIC METABOLIC PNL TOTAL CA: CPT

## 2019-07-16 PROCEDURE — 85610 PROTHROMBIN TIME: CPT

## 2019-07-16 PROCEDURE — 71046 X-RAY EXAM CHEST 2 VIEWS: CPT

## 2019-07-17 ENCOUNTER — TELEPHONE (OUTPATIENT)
Dept: CARDIOLOGY CLINIC | Facility: HOSPITAL | Age: 79
End: 2019-07-17

## 2019-07-18 ENCOUNTER — OFFICE VISIT (OUTPATIENT)
Dept: CARDIOLOGY CLINIC | Facility: HOSPITAL | Age: 79
End: 2019-07-18
Payer: MEDICARE

## 2019-07-18 VITALS
HEIGHT: 65 IN | DIASTOLIC BLOOD PRESSURE: 80 MMHG | SYSTOLIC BLOOD PRESSURE: 130 MMHG | BODY MASS INDEX: 27.82 KG/M2 | WEIGHT: 167 LBS | HEART RATE: 103 BPM

## 2019-07-18 DIAGNOSIS — I50.32 CHRONIC DIASTOLIC HEART FAILURE (HCC): ICD-10-CM

## 2019-07-18 DIAGNOSIS — Z01.810 ENCOUNTER FOR PRE-OPERATIVE CARDIOVASCULAR CLEARANCE: ICD-10-CM

## 2019-07-18 DIAGNOSIS — K62.5 RECTAL BLEEDING: ICD-10-CM

## 2019-07-18 DIAGNOSIS — Z79.01 ON CONTINUOUS ORAL ANTICOAGULATION: ICD-10-CM

## 2019-07-18 DIAGNOSIS — I48.3 TYPICAL ATRIAL FLUTTER (HCC): Primary | ICD-10-CM

## 2019-07-18 DIAGNOSIS — I10 ESSENTIAL HYPERTENSION: ICD-10-CM

## 2019-07-18 DIAGNOSIS — I25.10 CORONARY ARTERY DISEASE INVOLVING NATIVE CORONARY ARTERY OF NATIVE HEART WITHOUT ANGINA PECTORIS: ICD-10-CM

## 2019-07-18 DIAGNOSIS — Z95.1 HX OF CABG: ICD-10-CM

## 2019-07-18 PROBLEM — I50.33 ACUTE ON CHRONIC DIASTOLIC (CONGESTIVE) HEART FAILURE (HCC): Status: RESOLVED | Noted: 2018-02-08 | Resolved: 2019-07-18

## 2019-07-18 PROCEDURE — 99214 OFFICE O/P EST MOD 30 MIN: CPT | Performed by: INTERNAL MEDICINE

## 2019-07-18 PROCEDURE — 93000 ELECTROCARDIOGRAM COMPLETE: CPT | Performed by: INTERNAL MEDICINE

## 2019-07-18 NOTE — PROGRESS NOTES
Cardiology Follow Up    Jono Benitez  1940  [id number]  [address] [facility]  [place]  [place] [address] [place] [address] [facility] [address]  [phone]    1  Typical atrial flutter (HCC)  POCT ECG   2  Coronary artery disease involving native coronary artery of native heart without angina pectoris     3  Chronic diastolic heart failure ([address] )     4  Essential hypertension     5  Rectal bleeding     6  Hx of CABG     7  Encounter for pre-operative cardiovascular clearance  POCT ECG   8  On continuous oral anticoagulation           Discussion/Summary: She is stable from a cardiac standpoint and is cleared for her hemorrhoid surgery  She can hold her Coumadin for 5 days prior to surgery and restart ASAP after surgery  I did discuss her Atrial Flutter with EP and they feel that she is a good candidate for ablation  I will have her see EP as an outpatient for further discussion  RTO 6 months  Interval History: She is scheduled to have hemorrhoid surgery on 7/25/2019  She has CAD with remote CABG, persistent atrial flutter with HR  BPM, HTN, chronic diastolic CHF which is compensated  EF is 60%  She denies CP, palpitations, dizziness  She gets occasional SOB and abdominal bloating      ECG today - Atrial Flutter ,  BPM     Patient Active Problem List   Diagnosis    Diverticulosis    Rectal bleeding    Hypoalbuminemia    CAD (coronary artery disease)    Essential hypertension    Hyperlipidemia    Elevated MCV    Atrial flutter (HCC)    Right-sided thoracic back pain    History of shingles    Incomplete right bundle branch block    Hx of CABG    Thoracic radiculopathy    Pyuria    Microscopic hematuria    Shortness of breath    Chronic diastolic heart failure (HCC)    Diverticulosis of large intestine with hemorrhage    Vomiting    Colonic mass    Tubulovillous adenoma of colon    Anemia  Hypokalemia    Hypoxia    Pleural effusion    Acute cystitis    Clostridium difficile diarrhea    Ulcerated external hemorrhoids    On continuous oral anticoagulation    Encounter for pre-operative cardiovascular clearance     Past Medical History:   Diagnosis Date    Anxiety     CAD (coronary artery disease)     Cardiac disease     GERD (gastroesophageal reflux disease)     History of colon polyps     History of hiatal hernia     Hyperlipidemia     Hypertension      Social History     Socioeconomic History    Marital status:       Spouse name: Not on file    Number of children: Not on file    Years of education: Not on file    Highest education level: Not on file   Occupational History    Not on file   Social Needs    Financial resource strain: Not on file    Food insecurity:     Worry: Not on file     Inability: Not on file    Transportation needs:     Medical: Not on file     Non-medical: Not on file   Tobacco Use    Smoking status: Former Smoker    Smokeless tobacco: Never Used    Tobacco comment: quit 15 yrs ago   Substance and Sexual Activity    Alcohol use: No    Drug use: No    Sexual activity: Not on file   Lifestyle    Physical activity:     Days per week: Not on file     Minutes per session: Not on file    Stress: Not on file   Relationships    Social connections:     Talks on phone: Not on file     Gets together: Not on file     Attends [employer] service: Not on file     Active member of club or organization: Not on file     Attends meetings of clubs or organizations: Not on file     Relationship status: Not on file    Intimate partner violence:     Fear of current or ex partner: Not on file     Emotionally abused: Not on file     Physically abused: Not on file     Forced sexual activity: Not on file   Other Topics Concern    Not on file   Social History Narrative    Not on file      Family History   Problem Relation Age of Onset    Heart disease Mother    Etelvina Ivory Cirrhosis Father     Lung cancer Brother      Past Surgical History:   Procedure Laterality Date    CARDIAC SURGERY      CARDIAC SURGERY      CATARACT EXTRACTION      CHOLECYSTECTOMY  1987    COLON SURGERY      COLONOSCOPY      COLONOSCOPY N/A 6/18/2018    Procedure: COLONOSCOPY;  Surgeon: Maninder Ash DO;  [facility]; Service: Gastroenterology    COLONOSCOPY W/ CONTROL OF HEMORRHAGE      CORONARY ANGIOPLASTY WITH STENT PLACEMENT      reports two blockages not able to be stented    CORONARY ARTERY BYPASS GRAFT  2008    HERNIA REPAIR      hiatal hernia    HYSTERECTOMY      partial not due to malignancy    LAPAROTOMY N/A 9/26/2018    Procedure: LAPAROTOMY EXPLORATORY WITH  RIGHT HEMICOLECTOMY;  Surgeon: Maninder Ash DO;  [facility];  Service: Gastroenterology    LAPAROTOMY N/A 9/26/2018    Procedure: LAPAROTOMY EXPLORATORY WITH HEMICOLECTOMY;  Surgeon: Yuly Cruz DO;  [facility];  Service: General    ND COLONOSCOPY FLX DX W/COLLJ [address] PFRMD N/A 9/26/2018    Procedure: COLONOSCOPY;  Surgeon: Maninder Ash DO;  [facility];  Service: Gastroenterology    ND SIGMOIDOSCOPY FLX DX W/COLLJ [facility] BR/WA IF PFRMD N/A 6/21/2018    Procedure: SIGMOIDOSCOPY FLEXIBLE;  Surgeon: Maninder Ash DO;  [facility]; Service: Gastroenterology       Current Outpatient Medications:     ALPRAZolam (XANAX) 0 25 mg tablet, Take 0 25 mg by mouth 3 (three) times a day as needed for anxiety  , Disp: , Rfl:     atorvastatin (LIPITOR) 20 mg tablet, Take 20 mg by mouth daily after dinner   , Disp: , Rfl:     ergocalciferol (ERGOCALCIFEROL) 60560 UNITS capsule, Take 50,000 Units by mouth once a week   Takes on Wednesdays, Disp: , Rfl:     escitalopram (LEXAPRO) 10 mg tablet, , Disp: , Rfl: 3    furosemide (LASIX) 40 mg tablet, Daily, Disp: , Rfl:     metolazone (ZAROXOLYN) 2 5 mg tablet, TAKE ONE TABLET BY MOUTH ONCE A WEEK OR  AS  DIRECTED  FOR  WEIGHT  GAIN, Disp: 20 tablet, Rfl: 5    metoprolol tartrate (LOPRESSOR) 50 mg tablet, Take 1 tablet (50 mg total) by mouth every 12 (twelve) hours, Disp: 180 tablet, Rfl: 3    Multiple Vitamins-Minerals (PRESERVISION AREDS) CAPS, Take 1 capsule by mouth daily  , Disp: , Rfl:     nitroglycerin (NITROSTAT) 0 4 mg SL tablet, Place 0 4 mg under the tongue every 5 (five) minutes as needed for chest pain , Disp: , Rfl:     Omega-3 Fatty Acids (FISH OIL PO), Take 1,000 mg by mouth daily  , Disp: , Rfl:     potassium chloride (K-DUR,KLOR-CON) 20 mEq tablet, Take 1 tablet (20 mEq total) by mouth daily, Disp: 90 tablet, Rfl: 3    ranitidine (ZANTAC) 300 MG capsule, Take 30 mg by mouth daily , Disp: , Rfl:     warfarin (COUMADIN) 2 mg tablet, TAKE 1 TO 2 TABLETS BY MOUTH ONCE DAILY OR  AS  DIRECTED  BY  PHYSCIAN, Disp: 60 tablet, Rfl: 11    ALPRAZolam (XANAX) 0 25 mg tablet, 3 (three) times a day, Disp: , Rfl:     cromolyn (OPTICROM) 4 % ophthalmic solution, Every 12 hours, Disp: , Rfl:     Ergocalciferol (VITAMIN D2) 2000 units TABS, Vitamin D2 50,000 unit capsule, Disp: , Rfl:     furosemide (LASIX) 20 mg tablet, , Disp: , Rfl:     furosemide (LASIX) 20 mg tablet, Take 2 tablets (40 mg total) by mouth daily (Patient not taking: Reported on 6/24/2019), Disp: 180 tablet, Rfl: 3    HYDROCODONE-ACETAMINOPHEN PO, Take by mouth, Disp: , Rfl:     ondansetron (ZOFRAN) 4 mg tablet, Take 1 tablet (4 mg total) by mouth every 8 (eight) hours as needed for nausea or vomiting (Patient not taking: Reported on 6/24/2019), Disp: 12 tablet, Rfl: 0    predniSONE 20 mg tablet, Take 1 tablet (20 mg total) by mouth daily (Patient not taking: Reported on 6/24/2019), Disp: 15 tablet, Rfl: 0    ranitidine (ZANTAC) 300 MG tablet, , Disp: , Rfl:   Allergies   Allergen Reactions    Codeine GI Intolerance    Lansoprazole Other (See Comments)     GI Upset, dania protonix    Morphine Nausea Only    Morphine And Related GI Intolerance     Vitals: 07/18/19 1520   BP: 130/80   BP Location: Left arm   Patient Position: Sitting   Cuff Size: Standard   Pulse: 103   Weight: 75 8 kg (167 lb)   Height: 5' 5" (1 651 m)     Weight (last 2 days)     Date/Time   Weight    07/18/19 1520   75 8 (167)             Blood pressure 130/80, pulse 103, height 5' 5" (1 651 m), weight 75 8 kg (167 lb)  , Body mass index is 27 79 kg/m²      Labs:  Appointment on 07/16/2019   Component Date Value    WBC 07/16/2019 8 65     RBC 07/16/2019 4 02     Hemoglobin 07/16/2019 12 9     Hematocrit 07/16/2019 39 5     MCV 07/16/2019 98     MCH 07/16/2019 32 1     MCHC 07/16/2019 32 7     RDW 07/16/2019 15 3*    Platelets 05/15/7979 170     MPV 07/16/2019 9 5     Sodium 07/16/2019 141     Potassium 07/16/2019 3 5     Chloride 07/16/2019 103     CO2 07/16/2019 27     ANION GAP 07/16/2019 11     BUN 07/16/2019 15     Creatinine 07/16/2019 0 94     Glucose 07/16/2019 107     Calcium 07/16/2019 9 3     eGFR 07/16/2019 57     Protime 07/16/2019 27 5*    INR 07/16/2019 2 52*   Ancillary Orders on 06/14/2019   Component Date Value    Protime 07/02/2019 21 2*    INR 07/02/2019 1 82*   Ancillary Orders on 06/07/2019   Component Date Value    Protime 06/07/2019 28 5*    INR 06/07/2019 2 84*   Ancillary Orders on 05/31/2019   Component Date Value    Protime 06/20/2019 33 8*    INR 06/20/2019 3 54*   Ancillary Orders on 05/17/2019   Component Date Value    Protime 05/30/2019 40 6*    INR 05/30/2019 4 48*   Admission on 05/12/2019, Discharged on 05/12/2019   Component Date Value    Sodium 05/12/2019 141     Potassium 05/12/2019 4 8     Chloride 05/12/2019 107     CO2 05/12/2019 27     ANION GAP 05/12/2019 7     BUN 05/12/2019 24     Creatinine 05/12/2019 0 99     Glucose 05/12/2019 102*    Calcium 05/12/2019 9 6     eGFR 05/12/2019 54     WBC 05/12/2019 7 70     RBC 05/12/2019 3 91     Hemoglobin 05/12/2019 11 6*    Hematocrit 05/12/2019 35 7*    MCV 05/12/2019 91  MCH 05/12/2019 29 7     MCHC 05/12/2019 32 6     RDW 05/12/2019 18 5*    MPV 05/12/2019 7 0*    Platelets 65/97/2671 164     Neutrophils Relative 05/12/2019 70     Lymphocytes Relative 05/12/2019 19*    Monocytes Relative 05/12/2019 9     Eosinophils Relative 05/12/2019 1     Basophils Relative 05/12/2019 1     Neutrophils Absolute 05/12/2019 5 40     Lymphocytes Absolute 05/12/2019 1 50     Monocytes Absolute 05/12/2019 0 70     Eosinophils Absolute 05/12/2019 0 00     Basophils Absolute 05/12/2019 0 10     Troponin I 05/12/2019 <0 03     BNP 05/12/2019 253*    Ventricular Rate 05/12/2019 78     Atrial Rate 05/12/2019 202     QRSD Interval 05/12/2019 112     QT Interval 05/12/2019 380     QTC Interval 05/12/2019 433     P Axis 05/12/2019 -84     QRS Axis 05/12/2019 -9     T Wave Axis 05/12/2019 -20    Office Visit on 05/01/2019   Component Date Value    INTERPRETATIONS 05/02/2019     Admission on 04/29/2019, Discharged on 04/29/2019   Component Date Value    WBC 04/29/2019 6 30     RBC 04/29/2019 4 02     Hemoglobin 04/29/2019 12 2     Hematocrit 04/29/2019 36 0*    MCV 04/29/2019 90     MCH 04/29/2019 30 4     MCHC 04/29/2019 33 8     RDW 04/29/2019 17 6*    MPV 04/29/2019 7 3*    Platelets 03/74/5823 185     Neutrophils Relative 04/29/2019 68     Lymphocytes Relative 04/29/2019 18*    Monocytes Relative 04/29/2019 12     Eosinophils Relative 04/29/2019 2     Basophils Relative 04/29/2019 1     Neutrophils Absolute 04/29/2019 4 30     Lymphocytes Absolute 04/29/2019 1 10     Monocytes Absolute 04/29/2019 0 70     Eosinophils Absolute 04/29/2019 0 10     Basophils Absolute 04/29/2019 0 10     Sodium 04/29/2019 138     Potassium 04/29/2019 3 6     Chloride 04/29/2019 98     CO2 04/29/2019 34*    ANION GAP 04/29/2019 6     BUN 04/29/2019 16     Creatinine 04/29/2019 0 83     Glucose 04/29/2019 104*    Calcium 04/29/2019 9 7     AST 04/29/2019 26     ALT 04/29/2019 21     Alkaline Phosphatase 04/29/2019 145     Total Protein 04/29/2019 6 9     Albumin 04/29/2019 4 0     Total Bilirubin 04/29/2019 0 50     eGFR 04/29/2019 67     Lipase 04/29/2019 16     Troponin I 04/29/2019 <0 03     Ventricular Rate 04/29/2019 107     Atrial Rate 04/29/2019 214     QRSD Interval 04/29/2019 162     QT Interval 04/29/2019 492     QTC Interval 04/29/2019 656     P Axis 04/29/2019 -84     QRS Axis 04/29/2019 13     T Wave Axis 04/29/2019 -61    Admission on 04/28/2019, Discharged on 04/28/2019   Component Date Value    WBC 04/28/2019 8 51     RBC 04/28/2019 4 33     Hemoglobin 04/28/2019 12 8     Hematocrit 04/28/2019 40 6     MCV 04/28/2019 94     MCH 04/28/2019 29 6     MCHC 04/28/2019 31 5     RDW 04/28/2019 16 4*    MPV 04/28/2019 9 5     Platelets 28/29/6941 200     nRBC 04/28/2019 0     Neutrophils Relative 04/28/2019 72     Immat GRANS % 04/28/2019 0     Lymphocytes Relative 04/28/2019 17     Monocytes Relative 04/28/2019 9     Eosinophils Relative 04/28/2019 1     Basophils Relative 04/28/2019 1     Neutrophils Absolute 04/28/2019 6 23     Immature Grans Absolute 04/28/2019 0 03     Lymphocytes Absolute 04/28/2019 1 43     Monocytes Absolute 04/28/2019 0 73     Eosinophils Absolute 04/28/2019 0 05     Basophils Absolute 04/28/2019 0 04     Sodium 04/28/2019 139     Potassium 04/28/2019 3 0*    Chloride 04/28/2019 99*    CO2 04/28/2019 32     ANION GAP 04/28/2019 8     BUN 04/28/2019 20     Creatinine 04/28/2019 0 94     Glucose 04/28/2019 113     Calcium 04/28/2019 9 2     eGFR 04/28/2019 58     Troponin I 04/28/2019 <0 02     NT-proBNP 04/28/2019 1,310*    Ventricular Rate 04/28/2019 92     Atrial Rate 04/28/2019 208     QRSD Interval 04/28/2019 120     QT Interval 04/28/2019 382     QTC Interval 04/28/2019 472     P Axis 04/28/2019 256     QRS Axis 04/28/2019 16     T Wave Axis 04/28/2019 2    Ancillary Orders on 04/12/2019   Component Date Value    Protime 04/13/2019 21 2*    INR 04/13/2019 1 94*   There may be more visits with results that are not included  Imaging: No results found  Review of Systems:  Review of Systems   Constitutional: Negative for diaphoresis, fatigue, fever and unexpected weight change  HENT: Negative  Respiratory: Positive for shortness of breath  Negative for cough and wheezing  Cardiovascular: Negative for chest pain, palpitations and leg swelling  Gastrointestinal: Negative for abdominal pain, diarrhea and nausea  Musculoskeletal: Negative for gait problem and myalgias  Skin: Negative for rash  Neurological: Negative for dizziness and numbness  Psychiatric/Behavioral: Negative  Physical Exam:  Physical Exam   Constitutional: She is oriented to person, place, and time  She appears well-developed and well-nourished  HENT:   Head: Normocephalic and atraumatic  Eyes: Pupils are equal, round, and reactive to light  Neck: Normal range of motion  Neck supple  No JVD present  Cardiovascular: Regular rhythm, S1 normal, S2 normal and normal pulses  Pulses:       Carotid pulses are 2+ on the right side, and 2+ on the left side  Pulmonary/Chest: Effort normal and breath sounds normal  She has no wheezes  She has no rales  Abdominal: Soft  Bowel sounds are normal  There is no tenderness  Musculoskeletal: Normal range of motion  She exhibits no edema or tenderness  Neurological: She is alert and oriented to person, place, and time  She has normal reflexes  No cranial nerve deficit  Skin: Skin is warm  Psychiatric: She has a normal mood and affect

## 2019-07-22 ENCOUNTER — TELEPHONE (OUTPATIENT)
Dept: SURGERY | Facility: CLINIC | Age: 79
End: 2019-07-22

## 2019-07-22 NOTE — TELEPHONE ENCOUNTER
Called the patient to discuss the results from her chest x-ray revealing T11 compression fracture  Patient states that she is aware of this diagnosis  She has been referred to Pain Management in [place] where she is receiving injections  Her pain has been improving  She has no difficulty ambulating or neurologic symptoms  I will reach out to the anesthesia team preoperatively to make them aware of the patient's condition as well as notify the OR staff to take spinal precautions during the procedure  All questions answered to her understanding and satisfaction

## 2019-07-23 NOTE — PRE-PROCEDURE INSTRUCTIONS
Pre-Surgery Instructions:   Medication Instructions    ALPRAZolam (XANAX) 0 25 mg tablet Instructed patient per Anesthesia Guidelines  May take DOS    atorvastatin (LIPITOR) 20 mg tablet Instructed patient per Anesthesia Guidelines  Last dose 7/24    ergocalciferol (ERGOCALCIFEROL) 23510 UNITS capsule Instructed patient per Anesthesia Guidelines  Last dose 7/24    escitalopram (LEXAPRO) 10 mg tablet Instructed patient per Anesthesia Guidelines  Last dose 7/24    metolazone (ZAROXOLYN) 2 5 mg tablet Instructed patient per Anesthesia Guidelines  Last dose 7/24    metoprolol tartrate (LOPRESSOR) 50 mg tablet Instructed patient per Anesthesia Guidelines  Take DOS    Multiple Vitamins-Minerals (PRESERVISION AREDS) CAPS Instructed patient per Anesthesia Guidelines  Last dose 7/24    nitroglycerin (NITROSTAT) 0 4 mg SL tablet Instructed patient per Anesthesia Guidelines  Last dose 7/24    Omega-3 Fatty Acids (FISH OIL PO) Instructed patient per Anesthesia Guidelines  Last dose 7/19    potassium chloride (K-DUR,KLOR-CON) 20 mEq tablet Instructed patient per Anesthesia Guidelines  Last dose 7/24    warfarin (COUMADIN) 2 mg tablet Patient was instructed by Physician and understands  Last dose 7/19    [DISCONTINUED] furosemide (LASIX) 40 mg tablet Instructed patient per Anesthesia Guidelines   [DISCONTINUED] ranitidine (ZANTAC) 300 MG capsule Instructed patient per Anesthesia Guidelines  Patient continues to take Lasix 40mg and Zantac 300mg, will not take Lasix DOS but will take Zantac morning of surgery  My Surgical Experience    The following information was developed to assist you to prepare for your operation  What do I need to do before coming to the hospital?   Arrange for a responsible person to drive you to and from the hospital    Arrange care for your children at home   Children are not allowed in the recovery areas of the hospital   Plan to wear clothing that is easy to put on and take off  If you are having shoulder surgery, wear a shirt that buttons or zippers in the front  Bathing  o Shower the evening before and the morning of your surgery with an antibacterial soap  Please refer to the Pre Op Showering Instructions for Surgery Patients Sheet   o Remove nail polish and all body piercing jewelry  o Do not shave any body part for at least 24 hours before surgery-this includes face, arms, legs and upper body  Food  o Nothing to eat or drink after midnight the night before your surgery  This includes candy and chewing gum  o Exception: If your surgery is after 12:00pm (noon), you may have clear liquids such as 7-Up®, ginger ale, apple or cranberry juice, Jell-O®, water, or clear broth until 8:00 am  o Do not drink milk or juice with pulp on the morning before surgery  o Do not drink alcohol 24 hours before surgery  Medicine  o Follow instructions you received from your surgeon about which medicines you may take on the day of surgery  o If instructed to take medicine on the morning of surgery, take pills with just a small sip of water  Call your prescribing doctor for specific infroamtion on what to do if you take insulin    What should I bring to the hospital?    Bring:  Kori  or a walker, if you have them, for foot or knee surgery   A list of the daily medicines, vitamins, minerals, herbals and nutritional supplements you take  Include the dosages of medicines and the time you take them each day   Glasses, dentures or hearing aids   Minimal clothing; you will be wearing hospital sleepwear   Photo ID; required to verify your identity   If you have a Living Will or Power of , bring a copy of the documents   If you have an ostomy, bring an extra pouch and any supplies you use    Do not bring   Medicines or inhalers   Money, valuables or jewelry    What other information should I know about the day of surgery?    Notify your surgeons if you develop a cold, sore throat, cough, fever, rash or any other illness   Report to the Ambulatory Surgical/Same Day Surgery Unit   You will be instructed to stop at Registration only if you have not been pre-registered   Inform your  fi they do not stay that they will be asked by the staff to leave a phone number where they can be reached   Be available to be reached before surgery  In the event the operating room schedule changes, you may be asked to come in earlier or later than expected    *It is important to tell your doctor and others involved in your health care if you are taking or have been taking any non-prescription drugs, vitamins, minerals, herbals or other nutritional supplements   Any of these may interact with some food or medicines and cause a reaction

## 2019-07-24 ENCOUNTER — ANESTHESIA EVENT (OUTPATIENT)
Dept: PERIOP | Facility: HOSPITAL | Age: 79
End: 2019-07-24
Payer: MEDICARE

## 2019-07-24 NOTE — ANESTHESIA PREPROCEDURE EVALUATION
Review of Systems/Medical History  Patient summary reviewed  Chart reviewed  No history of anesthetic complications     Cardiovascular  EKG reviewed, Hyperlipidemia, Hypertension , CAD , History of CABG (2008, x3), Cardiac stents (2009,x1)  History of percutaneous transluminal coronary angioplasty, Dysrhythmias ( A flutter-rapid 110) , atrial flutter, CHF (Chronic diastolic HF) , BOB,   Comment: EF 60%; RV mild-mod depressed (mod dilation); sev TR, PASP 48 mmHg  EKG A flutter inc RBBB  Awaiting ablation  ,  Pulmonary  Smoker ex-smoker  , Shortness of breath,        GI/Hepatic    GERD ,   Comment: Colonic mass/Diverticulosis  Rectal bleeding     Negative  ROS        Endo/Other  Negative endo/other ROS   Obesity (BMI 34)    GYN  Negative gynecology ROS          Hematology  Negative hematology ROS      Musculoskeletal  Osteoarthritis,   Comment: Thoracic radiculopathy   Thoracic compression fracture      Neurology  Negative neurology ROS      Psychology   Negative psychology ROS            Lab Results   Component Value Date    WBC 8 65 07/16/2019    HGB 12 9 07/16/2019    HCT 39 5 07/16/2019    MCV 98 07/16/2019     07/16/2019     Lab Results   Component Value Date    GLUCOSE 235 (H) 09/26/2018    CALCIUM 9 3 07/16/2019     12/02/2015    K 3 5 07/16/2019    CO2 27 07/16/2019     07/16/2019    BUN 15 07/16/2019    CREATININE 0 94 07/16/2019     Lab Results   Component Value Date    INR 2 52 (H) 07/16/2019    INR 1 82 (H) 07/02/2019    INR 3 54 (H) 06/20/2019    PROTIME 27 5 (H) 07/16/2019    PROTIME 21 2 (H) 07/02/2019    PROTIME 33 8 (H) 06/20/2019   pt on coumadin   Repeat PT PTT in AM DOS repeat normal  Lab Results   Component Value Date    PTT 71 (H) 12/19/2017     Physical Exam    Airway    Mallampati score: II  TM Distance: >3 FB  Neck ROM: full     Dental   lower dentures and upper dentures,     Cardiovascular  Rhythm: irregular, Cardiovascular exam normal    Pulmonary  Pulmonary exam normal Breath sounds clear to auscultation,     Other Findings        Anesthesia Plan  ASA Score- 3 Emergent    Anesthesia Type- spinal with ASA Monitors  Additional Monitors:   Airway Plan:     Comment: Plan discussed is spinal with GA as backup  I personally discussed risks and benefits to this anesthetic  All patient questions were answered  Pt agrees with anesthesia plan        Plan Factors-    Induction- intravenous  Postoperative Plan-     Informed Consent- Anesthetic plan and risks discussed with patient  I personally reviewed this patient with the CRNA  Discussed and agreed on the Anesthesia Plan with the CRNA  Nannette Addison

## 2019-07-25 ENCOUNTER — HOSPITAL ENCOUNTER (OUTPATIENT)
Facility: HOSPITAL | Age: 79
Setting detail: OUTPATIENT SURGERY
Discharge: HOME | End: 2019-07-25
Attending: SURGERY | Admitting: SURGERY
Payer: MEDICARE

## 2019-07-25 ENCOUNTER — ANESTHESIA (OUTPATIENT)
Dept: PERIOP | Facility: HOSPITAL | Age: 79
End: 2019-07-25
Payer: MEDICARE

## 2019-07-25 VITALS
DIASTOLIC BLOOD PRESSURE: 62 MMHG | TEMPERATURE: 98 F | SYSTOLIC BLOOD PRESSURE: 119 MMHG | RESPIRATION RATE: 18 BRPM | WEIGHT: 170 LBS | HEART RATE: 73 BPM | HEIGHT: 65 IN | OXYGEN SATURATION: 96 % | BODY MASS INDEX: 28.32 KG/M2

## 2019-07-25 DIAGNOSIS — K64.4: ICD-10-CM

## 2019-07-25 DIAGNOSIS — Z79.01 ON CONTINUOUS ORAL ANTICOAGULATION: Primary | ICD-10-CM

## 2019-07-25 LAB
APTT PPP: 36 SECONDS (ref 23–37)
INR PPP: 1.09 (ref 0.9–1.5)
PROTHROMBIN TIME: 12.6 SECONDS (ref 10.2–13)

## 2019-07-25 PROCEDURE — 85730 THROMBOPLASTIN TIME PARTIAL: CPT | Performed by: ANESTHESIOLOGY

## 2019-07-25 PROCEDURE — 88304 TISSUE EXAM BY PATHOLOGIST: CPT | Performed by: PATHOLOGY

## 2019-07-25 PROCEDURE — 46260 REMOVE IN/EX HEM GROUPS 2+: CPT | Performed by: SURGERY

## 2019-07-25 PROCEDURE — NC001 PR NO CHARGE: Performed by: SURGERY

## 2019-07-25 PROCEDURE — 85610 PROTHROMBIN TIME: CPT | Performed by: PHYSICIAN ASSISTANT

## 2019-07-25 PROCEDURE — 46260 REMOVE IN/EX HEM GROUPS 2+: CPT | Performed by: PHYSICIAN ASSISTANT

## 2019-07-25 RX ORDER — SENNOSIDES 8.6 MG
650 CAPSULE ORAL EVERY 8 HOURS PRN
Qty: 15 TABLET | Refills: 0 | Status: SHIPPED | OUTPATIENT
Start: 2019-07-25

## 2019-07-25 RX ORDER — NON-ADHERENT BANDAGE 5"X9"
BANDAGE TOPICAL 3 TIMES DAILY
Qty: 20 EACH | Refills: 3 | Status: SHIPPED | OUTPATIENT
Start: 2019-07-25 | End: 2020-11-11 | Stop reason: ALTCHOICE

## 2019-07-25 RX ORDER — LEVALBUTEROL INHALATION SOLUTION 0.63 MG/3ML
0.63 SOLUTION RESPIRATORY (INHALATION) EVERY 8 HOURS PRN
Status: CANCELLED | OUTPATIENT
Start: 2019-07-25

## 2019-07-25 RX ORDER — SODIUM CHLORIDE, SODIUM LACTATE, POTASSIUM CHLORIDE, CALCIUM CHLORIDE 600; 310; 30; 20 MG/100ML; MG/100ML; MG/100ML; MG/100ML
100 INJECTION, SOLUTION INTRAVENOUS CONTINUOUS
Status: DISCONTINUED | OUTPATIENT
Start: 2019-07-25 | End: 2019-07-25 | Stop reason: HOSPADM

## 2019-07-25 RX ORDER — ONDANSETRON 2 MG/ML
4 INJECTION INTRAMUSCULAR; INTRAVENOUS EVERY 6 HOURS PRN
Status: CANCELLED | OUTPATIENT
Start: 2019-07-25

## 2019-07-25 RX ORDER — MAGNESIUM HYDROXIDE 1200 MG/15ML
LIQUID ORAL AS NEEDED
Status: DISCONTINUED | OUTPATIENT
Start: 2019-07-25 | End: 2019-07-25 | Stop reason: HOSPADM

## 2019-07-25 RX ORDER — KETOROLAC TROMETHAMINE 30 MG/ML
15 INJECTION, SOLUTION INTRAMUSCULAR; INTRAVENOUS ONCE AS NEEDED
Status: DISCONTINUED | OUTPATIENT
Start: 2019-07-25 | End: 2019-07-25 | Stop reason: HOSPADM

## 2019-07-25 RX ORDER — CEFAZOLIN SODIUM 1 G/50ML
1000 SOLUTION INTRAVENOUS ONCE
Status: COMPLETED | OUTPATIENT
Start: 2019-07-25 | End: 2019-07-25

## 2019-07-25 RX ORDER — ONDANSETRON 2 MG/ML
4 INJECTION INTRAMUSCULAR; INTRAVENOUS ONCE AS NEEDED
Status: DISCONTINUED | OUTPATIENT
Start: 2019-07-25 | End: 2019-07-25 | Stop reason: HOSPADM

## 2019-07-25 RX ORDER — FENTANYL CITRATE 50 UG/ML
INJECTION, SOLUTION INTRAMUSCULAR; INTRAVENOUS AS NEEDED
Status: DISCONTINUED | OUTPATIENT
Start: 2019-07-25 | End: 2019-07-25 | Stop reason: SURG

## 2019-07-25 RX ORDER — DOCUSATE SODIUM 100 MG/1
100 CAPSULE, LIQUID FILLED ORAL 2 TIMES DAILY
Qty: 10 CAPSULE | Refills: 0 | Status: SHIPPED | OUTPATIENT
Start: 2019-07-25 | End: 2019-11-30

## 2019-07-25 RX ORDER — TRICLOSAN 0.5 ML/100ML
1 SOAP TOPICAL 3 TIMES DAILY
Qty: 1 BOTTLE | Refills: 2 | Status: SHIPPED | OUTPATIENT
Start: 2019-07-25 | End: 2020-04-10 | Stop reason: HOSPADM

## 2019-07-25 RX ORDER — FENTANYL CITRATE/PF 50 MCG/ML
50 SYRINGE (ML) INJECTION 2 TIMES DAILY PRN
Status: DISCONTINUED | OUTPATIENT
Start: 2019-07-25 | End: 2019-07-25 | Stop reason: HOSPADM

## 2019-07-25 RX ORDER — SODIUM CHLORIDE, SODIUM LACTATE, POTASSIUM CHLORIDE, CALCIUM CHLORIDE 600; 310; 30; 20 MG/100ML; MG/100ML; MG/100ML; MG/100ML
125 INJECTION, SOLUTION INTRAVENOUS CONTINUOUS
Status: DISCONTINUED | OUTPATIENT
Start: 2019-07-25 | End: 2019-07-25 | Stop reason: HOSPADM

## 2019-07-25 RX ORDER — BUPIVACAINE HYDROCHLORIDE 5 MG/ML
INJECTION, SOLUTION PERINEURAL AS NEEDED
Status: DISCONTINUED | OUTPATIENT
Start: 2019-07-25 | End: 2019-07-25 | Stop reason: HOSPADM

## 2019-07-25 RX ORDER — ACETAMINOPHEN 325 MG/1
650 TABLET ORAL EVERY 6 HOURS PRN
Status: CANCELLED | OUTPATIENT
Start: 2019-07-25

## 2019-07-25 RX ORDER — SODIUM CHLORIDE, SODIUM LACTATE, POTASSIUM CHLORIDE, CALCIUM CHLORIDE 600; 310; 30; 20 MG/100ML; MG/100ML; MG/100ML; MG/100ML
50 INJECTION, SOLUTION INTRAVENOUS CONTINUOUS
Status: CANCELLED | OUTPATIENT
Start: 2019-07-25

## 2019-07-25 RX ORDER — MIDAZOLAM HYDROCHLORIDE 1 MG/ML
INJECTION INTRAMUSCULAR; INTRAVENOUS AS NEEDED
Status: DISCONTINUED | OUTPATIENT
Start: 2019-07-25 | End: 2019-07-25 | Stop reason: SURG

## 2019-07-25 RX ORDER — BUPIVACAINE HYDROCHLORIDE 7.5 MG/ML
INJECTION, SOLUTION INTRASPINAL AS NEEDED
Status: DISCONTINUED | OUTPATIENT
Start: 2019-07-25 | End: 2019-07-25 | Stop reason: SURG

## 2019-07-25 RX ORDER — MEPERIDINE HYDROCHLORIDE 50 MG/ML
12.5 INJECTION INTRAMUSCULAR; INTRAVENOUS; SUBCUTANEOUS
Status: DISCONTINUED | OUTPATIENT
Start: 2019-07-25 | End: 2019-07-25 | Stop reason: HOSPADM

## 2019-07-25 RX ORDER — HYDROMORPHONE HCL/PF 1 MG/ML
0.5 SYRINGE (ML) INJECTION
Status: DISCONTINUED | OUTPATIENT
Start: 2019-07-25 | End: 2019-07-25 | Stop reason: HOSPADM

## 2019-07-25 RX ORDER — IBUPROFEN 200 MG
CAPSULE ORAL 3 TIMES DAILY
Qty: 30 EACH | Refills: 2 | Status: SHIPPED | OUTPATIENT
Start: 2019-07-25 | End: 2020-11-11 | Stop reason: ALTCHOICE

## 2019-07-25 RX ADMIN — MIDAZOLAM HYDROCHLORIDE 1 MG: 1 INJECTION, SOLUTION INTRAMUSCULAR; INTRAVENOUS at 10:50

## 2019-07-25 RX ADMIN — CEFAZOLIN SODIUM 1000 MG: 1 SOLUTION INTRAVENOUS at 10:48

## 2019-07-25 RX ADMIN — FENTANYL CITRATE 50 MCG: 50 INJECTION INTRAMUSCULAR; INTRAVENOUS at 10:50

## 2019-07-25 RX ADMIN — FENTANYL CITRATE 50 MCG: 50 INJECTION INTRAMUSCULAR; INTRAVENOUS at 10:52

## 2019-07-25 RX ADMIN — MIDAZOLAM HYDROCHLORIDE 1 MG: 1 INJECTION, SOLUTION INTRAMUSCULAR; INTRAVENOUS at 10:52

## 2019-07-25 RX ADMIN — SODIUM CHLORIDE, POTASSIUM CHLORIDE, SODIUM LACTATE AND CALCIUM CHLORIDE 100 ML/HR: 600; 310; 30; 20 INJECTION, SOLUTION INTRAVENOUS at 10:35

## 2019-07-25 RX ADMIN — BUPIVACAINE HYDROCHLORIDE IN DEXTROSE 1 ML: 7.5 INJECTION, SOLUTION SUBARACHNOID at 10:59

## 2019-07-25 NOTE — OP NOTE
OPERATIVE REPORT  PATIENT NAME: Roly Morton    :  1940  MRN: [id number]  Pt Location: [address] [place] [address] OR ROOM 02    SURGERY DATE: 2019    Surgeon(s) and Role:     Stacey Rich MD - Primary     * Kenyon Gates PA-C - Assisting  The PA was necessary to provide expert assistance; i e  in the form of providing optimal exposure with retraction, suturing, and assistance with dissection in order to perform the most efficient operation and in order to optimize patient safety in the abscence of a qualified surgical resident  Preop Diagnosis:  Ulcerated external hemorrhoids [K64 4]    Post-Op Diagnosis Codes:     * Ulcerated external hemorrhoids [K64 4]    Procedure(s) (LRB):  EXAM UNDER ANESTHESIA (EUA) (N/A)  HEMORRHOIDECTOMY EXCISION (N/A)    Specimen(s):  ID Type Source Tests Collected by Time Destination   1 : right anterior hemrrhoidal plexus Tissue Rectum TISSUE Evan Arteaga MD 2019 1113    2 : right posterior hemorrhoidal plexus Tissue Rectum TISSUE EXAM Kaykay Prado MD 2019 1113        Estimated Blood Loss:   100 mL    Drains:  * No LDAs found *    Anesthesia Type:   Spinal    Operative Indications:  Ulcerated external hemorrhoids [K64 4]  Patient is a pleasant 48-year-old female systemically anticoagulated on Coumadin presenting with chronically bleeding internal and external hemorrhoids for which definitive treatment is now indicated  Operative Findings:  The patient had 2 grade 3 internal and external hemorrhoids on the right anterior and right posterior plexus  The patient had a grade 2 internal hemorrhoid on the left lateral plexus  Excision of the right anterior and right posterior hemorrhoidal plexus was performed  The rectal mucosa reapproximated primarily with running 4 0 Vicryl suture  Figure-of-eight stitch placed around the left lateral hemorrhoidal plexus and the procedure completed uneventfully      Complications:   None    Procedure and Technique:  Patient taken to the operating room  Spinal anesthetic administered  Patient rolled back into the prone position  Digital rectal exam per formed  Examination under anesthesia performed  A both findings noted  The right anterior hemorrhoidal plexus was grasped clamped with a Bre the anoderm scored with a 15 blade dissection carried back to the pedicle  Suture ligature of 4 0 Vicryl placed across the hemorrhoidal pedicle  The hemorrhoid excised and sent for permanent pathologic evaluation  The rectal mucosa and anoderm reapproximated with a running 4 0 Vicryl suture  Exact same procedure completed on the right posterior hemorrhoidal plexus  Finally a figure-of-eight suture placed around the left lateral internal hemorrhoidal plexus       The procedure completed with placement of Gelfoam in the anal canal   Pudendal nerve block performed with 0 5% Marcaine and the procedure completed uneventfully  The patient tolerated the procedure well     I was present for the entire procedure    Patient Disposition:  PACU     SIGNATURE: Susu Peralta MD  DATE: July 25, 2019  TIME: 12:01 PM

## 2019-07-25 NOTE — ANESTHESIA POSTPROCEDURE EVALUATION
Post-Op Assessment Note    CV Status:  Stable  Pain Score: 0    Pain management: adequate     Mental Status:  Alert and awake   Hydration Status:  Euvolemic   PONV Controlled:  Controlled   Airway Patency:  Patent   Post Op Vitals Reviewed: Yes      Staff: CRNA           BP   118/68   Temp   96 7   Pulse  68   Resp   16   SpO2   100

## 2019-07-25 NOTE — DISCHARGE INSTR - AVS FIRST PAGE
[facility]    Discharge Instructions  Light activity for 2 weeks  Change gauze/dressing daily and with bowel movements  Keep area clean with gentle rinse in the shower or warm soak in the bath  May use bidet or peroneal irrigation bottle for hygiene  Take discharge medications as prescribed  Notify our office for nausea, vomiting, fever, diarrhea, chest pain, trouble breathing  Monitor for bleeding  Follow up in our office in 2 weeks or sooner if needed  Call with additional questions or concerns [phone]

## 2019-07-25 NOTE — NURSING NOTE
Pt thoroughly reviewed discharge instructions and follow up care;  Reviewed information with pt and daughter and given starter supplies  Pt dangled at bedside, did well, moving legs well, supported weight but unable to move legs and feet to walk  Assisted to a chair per request, given refreshement   Report to Michelle Ortez

## 2019-07-25 NOTE — H&P
Addendum 07/25/2019 10:49 AM  /76   Pulse 86   Temp 98 3 °F (36 8 °C) (Temporal)   Resp 16   Ht 5' 5" (1 651 m)   Wt 77 1 kg (170 lb)   SpO2 96%   BMI 28 29 kg/m²     H&P reviewed  Only change include T11 compression fracture noted on pre-op CXR for which spinal precautions and neuro checks have been ordered  No other changes noted  H&P Exam - General Surgery   Jean Paul Rosario [de-identified] y o  female MRN: [id number]   Encounter: [id number]    Assessment/Plan    No problem-specific Assessment & Plan notes found for this encounter  Pleasant [de-identified] yo F with symptomatic bleeding hemorrhoids for which hemorrhoidectomy is now indicated  Please refer to original H&P on 06/24/2019  Diagnoses and all orders for this visit:    Ulcerated external hemorrhoids  -     Case request operating room: EXAM UNDER ANESTHESIA (EUA), HEMORRHOIDECTOMY EXCISION; Standing  -     CBC and Platelet; Future  -     Basic metabolic panel; Future  -     Protime-INR; Future  -     XR chest pa & lateral; Future  -     EKG 12 lead; Future  -     Case request operating room: EXAM UNDER ANESTHESIA (EUA), HEMORRHOIDECTOMY EXCISION    On continuous oral anticoagulation  -     Ambulatory referral to Cardiology; Future  -     CBC and Platelet; Future  -     Basic metabolic panel; Future  -     Protime-INR; Future  -     XR chest pa & lateral; Future  -     EKG 12 lead; Future    Encounter for pre-operative cardiovascular clearance  -     Ambulatory referral to Cardiology; Future  -     CBC and Platelet; Future  -     Basic metabolic panel; Future  -     Protime-INR; Future  -     XR chest pa & lateral; Future  -     EKG 12 lead;  Future    Other orders  -     Diet NPO; Sips with meds; Standing  -     Apply Sequential Compression Device; Standing  -     Level 1-Full Code: all life saving measures are indicated; Standing  -     Vital signs; Standing  -     Height and weight upon arrival; Standing  -     Insert and maintain IV line; Standing  -     Void; Standing  -     Protime-INR; Standing  -     lactated ringers infusion  -     ceFAZolin (ANCEF) 1,000 mg in dextrose 5 % 100 mL IVPB        History of Present Illness   No chief complaint on file  Pleasant 50-year-old female anticoagulated on Coumadin presents to the office for for evaluation of recurrent rectal bleeding  She has a history of hemorrhoidal disease and was previously scheduled for a hemorrhoidectomy which was canceled by the patient  She reports intermittent episodes of rectal discomfort and bleeding  This has been chronic and recurrent while she has been on anticoagulation  Reports bright red blood per rectum  No melena  Associated discomfort and itching  Denies problems with hygiene  No other changes with her bowel movements  Review of Systems   Constitutional: Negative for chills and fever  Respiratory: Negative for shortness of breath  Cardiovascular: Negative for chest pain  Gastrointestinal: Negative for abdominal pain, constipation, diarrhea, nausea and vomiting  Genitourinary: Negative for dysuria  Neurological: Negative for headaches  Historical Information   Past Medical History:   Diagnosis Date    Angina pectoris ([address] )     Anxiety     CAD (coronary artery disease)     Cardiac disease     Depression     GERD (gastroesophageal reflux disease)     History of colon polyps     History of hiatal hernia     Hx of bleeding disorder     hemorrhoids    Hyperlipidemia     Hypertension     Irregular heart beat     gets tachycardia    Shortness of breath     related to heart     Past Surgical History:   Procedure Laterality Date    CARDIAC SURGERY      CARDIAC SURGERY      CATARACT EXTRACTION Bilateral     CHOLECYSTECTOMY  1987    COLON SURGERY      repair of colon    COLONOSCOPY      COLONOSCOPY N/A 6/18/2018    Procedure: COLONOSCOPY;  Surgeon: Sheldon Lutz DO;  [facility];   Service: Gastroenterology   Lisa Mercer COLONOSCOPY W/ CONTROL OF HEMORRHAGE      CORONARY ANGIOPLASTY WITH STENT PLACEMENT      reports two blockages not able to be stented    CORONARY ARTERY BYPASS GRAFT  2008    3 vessels    HERNIA REPAIR      hiatal hernia    HYSTERECTOMY      partial not due to malignancy    LAPAROTOMY N/A 9/26/2018    Procedure: LAPAROTOMY EXPLORATORY WITH  RIGHT HEMICOLECTOMY;  Surgeon: Elina Wilson DO;  [facility];  Service: Gastroenterology    LAPAROTOMY N/A 9/26/2018    Procedure: LAPAROTOMY EXPLORATORY WITH HEMICOLECTOMY;  Surgeon: Roberto Camp DO;  [facility];  Service: General    LA COLONOSCOPY FLX DX W/COLLJ SPEC WHEN PFRMD N/A 9/26/2018    Procedure: COLONOSCOPY;  Surgeon: Elina Wilson DO;  [facility];  Service: Gastroenterology    LA SIGMOIDOSCOPY FLX DX W/COLLJ [facility] BR/WA IF PFRMD N/A 6/21/2018    Procedure: SIGMOIDOSCOPY FLEXIBLE;  Surgeon: Elina Wilson DO;  [facility];   Service: Gastroenterology     Social History   Social History     Substance and Sexual Activity   Alcohol Use No     Social History     Substance and Sexual Activity   Drug Use No     Social History     Tobacco Use   Smoking Status Former Smoker   Smokeless Tobacco Never Used   Tobacco Comment    quit 15 yrs ago     Family History   Problem Relation Age of Onset    Heart disease Mother     Cirrhosis Father     Lung cancer Brother        Meds/Allergies     Current Facility-Administered Medications:     ceFAZolin (ANCEF) IVPB (premix) 1,000 mg, 1,000 mg, Intravenous, Once, Kenyon S Dimitriadis, PA-C    lactated ringers infusion, 125 mL/hr, Intravenous, Continuous, Leyla Cheatham MD    lactated ringers infusion, 100 mL/hr, Intravenous, Continuous, Kenyon S Dimitriadis, PA-C, Last Rate: 100 mL/hr at 07/25/19 1035, 100 mL/hr at 07/25/19 1035  Allergies   Allergen Reactions    Codeine GI Intolerance    Lansoprazole Other (See Comments)     GI Upset, dania protonix    Morphine Nausea Only    Morphine And Related GI Intolerance       The following portions of the patient's history were reviewed and updated as appropriate: allergies, current medications, past family history, past medical history, past social history, past surgical history and problem list     Objective   Current Vitals:   Blood pressure 133/76, pulse 86, temperature 98 3 °F (36 8 °C), temperature source Temporal, resp  rate 16, height 5' 5" (1 651 m), weight 77 1 kg (170 lb), SpO2 96 %  Physical Exam   Constitutional: She is oriented to person, place, and time  She appears well-developed and well-nourished  No distress  HENT:   Head: Normocephalic and atraumatic  Eyes: Pupils are equal, round, and reactive to light  EOM are normal    Neck: Normal range of motion  Pulmonary/Chest: No respiratory distress  Genitourinary:       Musculoskeletal: Normal range of motion  Neurological: She is alert and oriented to person, place, and time  Skin: Skin is warm and dry  No rash noted  She is not diaphoretic  Psychiatric: She has a normal mood and affect  Her behavior is normal    Nursing note and vitals reviewed        Signature:  Oleksandr Gates PA-C  Date: 7/25/2019 Time: 10:47 AM

## 2019-07-25 NOTE — ANESTHESIA PROCEDURE NOTES
Spinal Block    Patient location during procedure: OR  Start time: 7/25/2019 10:59 AM  Reason for block: at surgeon's request and primary anesthetic  Staffing  Anesthesiologist: Tata Sargent MD  Performed: anesthesiologist   Preanesthetic Checklist  Completed: patient identified, site marked, surgical consent, pre-op evaluation, timeout performed, IV checked, risks and benefits discussed and monitors and equipment checked  Spinal Block  Patient position: sitting  Patient monitoring: heart rate, cardiac monitor, continuous pulse ox and frequent blood pressure checks  Approach: left paramedian  Location: L3-4  Injection technique: single-shot  Needle  Needle type: pencil-tip   Needle gauge: 25 G  Needle length: 10 cm  Assessment  Injection Assessment:  negative aspiration for heme, no paresthesia on injection and positive aspiration for clear CSF  Post-procedure:  site cleaned  Additional Notes  Spinal performed in Sitting Position, betadine X3, Local injected, 25g Pencan used X2 paramedian  +free flow CSF in 360 before and after injection 7 5mg Preservative-free Marcaine, w/ dextrose  No Blood or paresthesia  Pt  Tolerated procedure well, no complications

## 2019-07-25 NOTE — DISCHARGE INSTRUCTIONS
Hemorrhoidectomy   WHAT YOU SHOULD KNOW:   A hemorrhoidectomy is surgery to remove hemorrhoids  Hemorrhoids are swollen blood vessels inside your rectum or on your anus  INSTRUCTIONS:   Medicines:   · Topical medicine: This may come as pads, creams, ointments, or lotions  This medicine may help decrease pain and swelling, and help your rectum heal faster  · Pain medicine: You may be given a prescription medicine to decrease pain  Do not wait until the pain is severe before you take this medicine  · Stool softeners: This medicine makes it easier for you to have a bowel movement  You may need this medicine to treat or prevent constipation  · Take your medicine as directed  Call your healthcare provider if you think your medicine is not helping or if you have side effects  Tell him if you are allergic to any medicine  Keep a list of the medicines, vitamins, and herbs you take  Include the amounts, and when and why you take them  Bring the list or the pill bottles to follow-up visits  Carry your medicine list with you in case of an emergency  Follow up with your healthcare provider as directed:  Write down your questions so you remember to ask them during your visits  Self-care:   · Warm sitz bath:  Heat may help to decrease pain  Use a sitz bath  A sitz bath is a pan that fits on the toilet bowl and has warm water in it  Ask how often to use a sitz bath  · Prevent constipation:  Eat foods that are high in fiber, and drink more liquids  High-fiber foods include fruits, vegetables, whole grains, and bran  This will help soften your bowel movements  Regular exercise may also help prevent constipation  Contact your healthcare provider if:   · You have nausea or are vomiting  · You have a fever  · It is hard to urinate or have a bowel movement  · You have pain when you urinate or have a bowel movement  · You have questions or concerns about your condition or care    Return to the emergency department if:   · You begin to bleed from your rectum and cannot get it to stop  · You have severe pain in your stomach or anus  · You cannot urinate, or you urinate very little  · Your arm or leg feels warm, tender, and painful  It may look swollen and red  · You suddenly feel lightheaded and short of breath  · You have chest pain when you take a deep breath or cough  You cough up blood  © 2014 [address] is for End User's use only and may not be sold, redistributed or otherwise used for commercial purposes  All illustrations and images included in CareNotes® are the copyrighted property of A D A M [employer] Inc  or Nino Kendrick  The above information is an  only  It is not intended as medical advice for individual conditions or treatments  Talk to your doctor, nurse or pharmacist before following any medical regimen to see if it is safe and effective for you

## 2019-07-25 NOTE — H&P
H&P Exam - General Surgery   Magaly Russell [de-identified] y o  female MRN: [id number]  Unit/Bed#: OR [place] Encounter: [id number]    Assessment/Plan     Assessment:  Patient presents for the definitive treatment of her external hemorrhoids  Plan:  Examination under anesthesia and hemorrhoidectomy  Options benefits risks and alternatives reviewed  All questions answered to the satisfaction of the patient and informed written consent obtained to proceed  History of Present Illness     HPI:  Magaly Russell is a [de-identified] y o  female who presents with symptomatic bleeding external hemorrhoids  Review of Systems   All other systems reviewed and are negative  Historical Information   Past Medical History:   Diagnosis Date    [address] [address]     Anxiety     CAD (coronary artery disease)     Cardiac disease     Depression     GERD (gastroesophageal reflux disease)     History of colon polyps     History of hiatal hernia     Hx of bleeding disorder     hemorrhoids    Hyperlipidemia     Hypertension     Irregular heart beat     gets tachycardia    Shortness of breath     related to heart     Past Surgical History:   Procedure Laterality Date    CARDIAC SURGERY      CARDIAC SURGERY      CATARACT EXTRACTION Bilateral     CHOLECYSTECTOMY  1987    COLON SURGERY      repair of colon    COLONOSCOPY      COLONOSCOPY N/A 6/18/2018    Procedure: COLONOSCOPY;  Surgeon: Rogelia Prader, DO;  [facility];   Service: Gastroenterology    COLONOSCOPY W/ CONTROL OF HEMORRHAGE      CORONARY ANGIOPLASTY WITH STENT PLACEMENT      reports two blockages not able to be stented    CORONARY ARTERY BYPASS GRAFT  2008    3 vessels    HERNIA REPAIR      hiatal hernia    HYSTERECTOMY      partial not due to malignancy    LAPAROTOMY N/A 9/26/2018    Procedure: LAPAROTOMY EXPLORATORY WITH  RIGHT HEMICOLECTOMY;  Surgeon: Rogelia Prader, DO;  Location: [facility];  Service: Gastroenterology    LAPAROTOMY N/A 9/26/2018    Procedure: LAPAROTOMY EXPLORATORY WITH HEMICOLECTOMY;  Surgeon: Addy Robledo DO;  [facility];  Service: General    ME COLONOSCOPY FLX DX W/COLLJ [facility] WHEN PFRMD N/A 9/26/2018    Procedure: COLONOSCOPY;  Surgeon: Rogelia Prader, DO;  [facility];  Service: Gastroenterology    ME SIGMOIDOSCOPY FLX DX W/COLLJ SPEC BR/WA IF PFRMD N/A 6/21/2018    Procedure: SIGMOIDOSCOPY FLEXIBLE;  Surgeon: Rogelia Prader, DO;  [facility];   Service: Gastroenterology     Social History   Social History     Substance and Sexual Activity   Alcohol Use No     Social History     Substance and Sexual Activity   Drug Use No     Social History     Tobacco Use   Smoking Status Former Smoker   Smokeless Tobacco Never Used   Tobacco Comment    quit 15 yrs ago     Family History: non-contributory    Meds/Allergies   all medications and allergies reviewed  Allergies   Allergen Reactions    Codeine GI Intolerance    Lansoprazole Other (See Comments)     GI Upset, dania protonix    Morphine Nausea Only    Morphine And Related GI Intolerance       Objective   First Vitals:   Blood Pressure: 133/76 (07/25/19 0830)  Pulse: 86 (07/25/19 0830)  Temperature: 98 3 °F (36 8 °C) (07/25/19 0830)  Temp Source: Temporal (07/25/19 0830)  Respirations: 16 (07/25/19 0830)  Height: 5' 5" (165 1 cm) (07/25/19 0830)  Weight - Scale: 77 1 kg (170 lb) (07/25/19 0830)  SpO2: 96 % (07/25/19 0830)    Current Vitals:   Blood Pressure: 133/76 (07/25/19 0830)  Pulse: 86 (07/25/19 0830)  Temperature: 98 3 °F (36 8 °C) (07/25/19 0830)  Temp Source: Temporal (07/25/19 0830)  Respirations: 16 (07/25/19 0830)  Height: 5' 5" (165 1 cm) (07/25/19 0830)  Weight - Scale: 77 1 kg (170 lb) (07/25/19 0830)  SpO2: 96 % (07/25/19 0830)    No intake or output data in the 24 hours ending 07/25/19 1048    Invasive Devices     Peripheral Intravenous Line            Peripheral IV 07/25/19 Left Hand less than 1 day                Physical Exam Constitutional: She is oriented to person, place, and time  She appears well-developed and well-nourished  HENT:   Head: Normocephalic and atraumatic  Eyes: Pupils are equal, round, and reactive to light  Conjunctivae are normal    Neck: Normal range of motion  Neck supple  Cardiovascular: Normal rate  Pulmonary/Chest: Effort normal and breath sounds normal    Abdominal: Soft  Bowel sounds are normal    Genitourinary:   Genitourinary Comments: Two external hemorrhoid   Musculoskeletal: Normal range of motion  Neurological: She is alert and oriented to person, place, and time  Skin: Skin is warm and dry  Psychiatric: Her behavior is normal  Judgment and thought content normal    Vitals reviewed  Lab Results: I have personally reviewed pertinent lab results  Imaging: I have personally reviewed pertinent reports  EKG, Pathology, and Other Studies: I have personally reviewed pertinent reports  Code Status: Level 1 - Full Code  Advance Directive and Living Will:      Power of :    POLST:      Counseling / Coordination of Care  Total floor / unit time spent today 35 minutes  Greater than 50% of total time was spent with the patient and / or family counseling and / or coordination of care  A description of the counseling / coordination of care: 15

## 2019-07-30 ENCOUNTER — TELEPHONE (OUTPATIENT)
Dept: SURGERY | Facility: CLINIC | Age: 79
End: 2019-07-30

## 2019-07-30 NOTE — TELEPHONE ENCOUNTER
Called patient post hemorrhoidectomy 7-    Patient states that she is having a burning sensation when she has bowel movement and is having bleeding when she wipes and at suture site  She also noted the sutures feel tight  Appointment was given to the patient for tomorrow to be seen by Dr Vikki Chin to check incision area

## 2019-07-31 ENCOUNTER — OFFICE VISIT (OUTPATIENT)
Dept: SURGERY | Facility: CLINIC | Age: 79
End: 2019-07-31

## 2019-07-31 DIAGNOSIS — K64.4: Primary | ICD-10-CM

## 2019-07-31 PROCEDURE — 99024 POSTOP FOLLOW-UP VISIT: CPT | Performed by: SURGERY

## 2019-07-31 PROCEDURE — 1123F ACP DISCUSS/DSCN MKR DOCD: CPT | Performed by: SURGERY

## 2019-07-31 NOTE — PROGRESS NOTES
Assessment/Plan:    Ulcerated external hemorrhoids  Patient returns early status post hemorrhoidectomy with concerns regarding continued blood per rectum and burning discomfort  On physical examination she is awake alert and oriented  She is competent reliable historian  Inspection of the surgical wounds reveal them to be intact  Patient appears to be clinically and surgically stable  I have explained that which she is feeling is normal   I do not believe that she is any imminent danger  The I expect that the bleeding should subside over the upcoming week  The burning is normal   I have recommended Sitz baths and application of topical zinc oxide to the surgical wounds  I look for to seeing her back for her scar scheduled postop visit  The patient's granddaughter accompanies her at the bedside today  Diagnoses and all orders for this visit:    Ulcerated external hemorrhoids          Subjective:      Patient ID: Jean Paul Rosario is a [de-identified] y o  female  07- Patient is having a burning sensation with bleeding,s/p hemorrhoidectomy 7-  Patient stated she is still having the burning sensation and she had a bowel movement today she had blood when she wiped  She started doing the Sitz bath which are sort of helping  Shahla Castaneda MA    Wound Check         The following portions of the patient's history were reviewed and updated as appropriate: allergies, current medications, past family history, past medical history, past social history, past surgical history and problem list     Review of Systems   All other systems reviewed and are negative  Objective: There were no vitals taken for this visit  Physical Exam   Constitutional: She is oriented to person, place, and time  She appears well-developed and well-nourished  HENT:   Head: Normocephalic and atraumatic  Eyes: Pupils are equal, round, and reactive to light   Conjunctivae are normal    Neck: Normal range of motion  Neck supple  Cardiovascular: Normal rate and regular rhythm  Pulmonary/Chest: Effort normal and breath sounds normal    Abdominal: Soft  Bowel sounds are normal    Genitourinary:   Genitourinary Comments: Normal surgical wounds status post hemorrhoidectomy right posterior left lateral plexus   Musculoskeletal: Normal range of motion  Neurological: She is alert and oriented to person, place, and time  Skin: Skin is warm and dry     Psychiatric: Her behavior is normal  Judgment and thought content normal

## 2019-07-31 NOTE — LETTER
August 1, 2019     Ilda Reyes MD  [address] [place] [id number]    Patient: Magaly Russell   YOB: 1940   Date of Visit: 7/31/2019       Dear Dr Haleigh Choi: Thank you for referring Tran Bunch to me for evaluation  Below are my notes for this consultation  If you have questions, please do not hesitate to call me  I look forward to following your patient along with you  Sincerely,        Jessy Giron MD        CC: No Recipients  Jessy Giron MD  8/1/2019 11:30 PM  Sign at close encounter  Assessment/Plan:    Ulcerated external hemorrhoids  Patient returns early status post hemorrhoidectomy with concerns regarding continued blood per rectum and burning discomfort  On physical examination she is awake alert and oriented  She is competent reliable historian  Inspection of the surgical wounds reveal them to be intact  Patient appears to be clinically and surgically stable  I have explained that which she is feeling is normal   I do not believe that she is any imminent danger  The I expect that the bleeding should subside over the upcoming week  The burning is normal   I have recommended Sitz baths and application of topical zinc oxide to the surgical wounds  I look for to seeing her back for her scar scheduled postop visit  The patient's granddaughter accompanies her at the bedside today  Diagnoses and all orders for this visit:    Ulcerated external hemorrhoids          Subjective:      Patient ID: Magaly Russell is a [de-identified] y o  female  07- Patient is having a burning sensation with bleeding,s/p hemorrhoidectomy 7-  Patient stated she is still having the burning sensation and she had a bowel movement today she had blood when she wiped  She started doing the Sitz bath which are sort of helping    Shahla Castaneda MA    Wound Check         The following portions of the patient's history were reviewed and updated as appropriate: allergies, current medications, past family history, past medical history, past social history, past surgical history and problem list     Review of Systems   All other systems reviewed and are negative  Objective: There were no vitals taken for this visit  Physical Exam   Constitutional: She is oriented to person, place, and time  She appears well-developed and well-nourished  HENT:   Head: Normocephalic and atraumatic  Eyes: Pupils are equal, round, and reactive to light  Conjunctivae are normal    Neck: Normal range of motion  Neck supple  Cardiovascular: Normal rate and regular rhythm  Pulmonary/Chest: Effort normal and breath sounds normal    Abdominal: Soft  Bowel sounds are normal    Genitourinary:   Genitourinary Comments: Normal surgical wounds status post hemorrhoidectomy right posterior left lateral plexus   Musculoskeletal: Normal range of motion  Neurological: She is alert and oriented to person, place, and time  Skin: Skin is warm and dry     Psychiatric: Her behavior is normal  Judgment and thought content normal

## 2019-08-01 PROBLEM — J44.9 CHRONIC OBSTRUCTIVE LUNG DISEASE (HCC): Status: ACTIVE | Noted: 2019-04-02

## 2019-08-01 PROBLEM — I50.33 ACUTE ON CHRONIC DIASTOLIC CONGESTIVE HEART FAILURE (HCC): Status: ACTIVE | Noted: 2018-01-15

## 2019-08-01 PROBLEM — F41.8 MIXED ANXIETY AND DEPRESSIVE DISORDER: Status: ACTIVE | Noted: 2017-09-05

## 2019-08-01 PROBLEM — IMO0002 SECONDARY PULMONARY HYPERTENSION: Status: ACTIVE | Noted: 2017-08-09

## 2019-08-01 PROBLEM — I50.9 CONGESTIVE HEART FAILURE (HCC): Status: ACTIVE | Noted: 2017-09-05

## 2019-08-02 NOTE — ASSESSMENT & PLAN NOTE
Patient returns early status post hemorrhoidectomy with concerns regarding continued blood per rectum and burning discomfort  On physical examination she is awake alert and oriented  She is competent reliable historian  Inspection of the surgical wounds reveal them to be intact  Patient appears to be clinically and surgically stable  I have explained that which she is feeling is normal   I do not believe that she is any imminent danger  The I expect that the bleeding should subside over the upcoming week  The burning is normal   I have recommended Sitz baths and application of topical zinc oxide to the surgical wounds  I look for to seeing her back for her scar scheduled postop visit  The patient's granddaughter accompanies her at the bedside today

## 2019-08-07 ENCOUNTER — OFFICE VISIT (OUTPATIENT)
Dept: SURGERY | Facility: CLINIC | Age: 79
End: 2019-08-07

## 2019-08-07 DIAGNOSIS — K62.5 RECTAL BLEEDING: Primary | ICD-10-CM

## 2019-08-07 DIAGNOSIS — K64.8 HEMORRHOIDS, INTERNAL: ICD-10-CM

## 2019-08-07 PROCEDURE — 99024 POSTOP FOLLOW-UP VISIT: CPT | Performed by: SURGERY

## 2019-08-07 NOTE — PROGRESS NOTES
Assessment/Plan:    Rectal bleeding  The patient reports that an excellent clinical response to the 2 column hemorrhoidectomy of the right anterior and right posterior hemorrhoidal plexus  She feels more physically comfortable  Her pain continues to improve  She denies further passage of blood per rectum  On physical examination the patient is well appearing  She is awake alert  Inspection of the anal wound of finds them to be clean dry and intact  My clinical impression is that the patient has had a very good response to the prescribed surgical intervention  I have answered all questions to the satisfaction of the patient answered to follow up on an as-needed basis with questions or concerns in the future  Diagnoses and all orders for this visit:    Rectal bleeding    Hemorrhoids, internal          Subjective:      Patient ID: Para Law is a [de-identified] y o  female  HPI    The following portions of the patient's history were reviewed and updated as appropriate: allergies, current medications, past family history, past medical history, past social history, past surgical history and problem list     Review of Systems   All other systems reviewed and are negative  Objective: There were no vitals taken for this visit  Physical Exam   Constitutional: She is oriented to person, place, and time  She appears well-developed and well-nourished  HENT:   Head: Normocephalic and atraumatic  Eyes: Pupils are equal, round, and reactive to light  Conjunctivae are normal    Neck: Normal range of motion  Neck supple  Cardiovascular: Normal rate and regular rhythm  Pulmonary/Chest: Effort normal and breath sounds normal    Abdominal: Soft  Bowel sounds are normal    Genitourinary:   Genitourinary Comments: Status post 2 column hemorrhoidectomy in right anterior right posterior columns  The patient appears clinically well in the postoperative period     Musculoskeletal: Normal range of motion  Neurological: She is alert and oriented to person, place, and time  Skin: Skin is warm and dry     Psychiatric: Her behavior is normal  Judgment and thought content normal

## 2019-08-07 NOTE — ASSESSMENT & PLAN NOTE
The patient reports that an excellent clinical response to the 2 column hemorrhoidectomy of the right anterior and right posterior hemorrhoidal plexus  She feels more physically comfortable  Her pain continues to improve  She denies further passage of blood per rectum  On physical examination the patient is well appearing  She is awake alert  Inspection of the anal wound of finds them to be clean dry and intact  My clinical impression is that the patient has had a very good response to the prescribed surgical intervention  I have answered all questions to the satisfaction of the patient answered to follow up on an as-needed basis with questions or concerns in the future

## 2019-08-07 NOTE — LETTER
August 7, 2019     Chace Gr MD  [address] [place] [id number]    Patient: Will Blackwood   YOB: 1940   Date of Visit: 8/7/2019       Dear Dr Mike Benson: Thank you for referring Lencho Estrada to me for evaluation  Below are my notes for this consultation  If you have questions, please do not hesitate to call me  I look forward to following your patient along with you  Sincerely,        Yolis Chiu MD        CC: No Recipients  Yolis Chiu MD  8/7/2019  2:40 PM  Incomplete  Assessment/Plan:    Rectal bleeding  The patient reports that an excellent clinical response to the 2 column hemorrhoidectomy of the right anterior and right posterior hemorrhoidal plexus  She feels more physically comfortable  Her pain continues to improve  She denies further passage of blood per rectum  On physical examination the patient is well appearing  She is awake alert  Inspection of the anal wound of finds them to be clean dry and intact  My clinical impression is that the patient has had a very good response to the prescribed surgical intervention  I have answered all questions to the satisfaction of the patient answered to follow up on an as-needed basis with questions or concerns in the future  Diagnoses and all orders for this visit:    Rectal bleeding    Hemorrhoids, internal          Subjective:      Patient ID: Will Blackwood is a [de-identified] y o  female  HPI    The following portions of the patient's history were reviewed and updated as appropriate: allergies, current medications, past family history, past medical history, past social history, past surgical history and problem list     Review of Systems   All other systems reviewed and are negative  Objective: There were no vitals taken for this visit  Physical Exam   Constitutional: She is oriented to person, place, and time  She appears well-developed and well-nourished  HENT:   Head: Normocephalic and atraumatic  Eyes: Pupils are equal, round, and reactive to light  Conjunctivae are normal    Neck: Normal range of motion  Neck supple  Cardiovascular: Normal rate and regular rhythm  Pulmonary/Chest: Effort normal and breath sounds normal    Abdominal: Soft  Bowel sounds are normal    Genitourinary:   Genitourinary Comments: Status post 2 column hemorrhoidectomy in right anterior right posterior columns  The patient appears clinically well in the postoperative period  Musculoskeletal: Normal range of motion  Neurological: She is alert and oriented to person, place, and time  Skin: Skin is warm and dry     Psychiatric: Her behavior is normal  Judgment and thought content normal

## 2019-08-12 ENCOUNTER — APPOINTMENT (OUTPATIENT)
Dept: LAB | Facility: HOSPITAL | Age: 79
End: 2019-08-12
Attending: INTERNAL MEDICINE
Payer: MEDICARE

## 2019-08-12 ENCOUNTER — ANTICOAG VISIT (OUTPATIENT)
Dept: CARDIOLOGY CLINIC | Facility: CLINIC | Age: 79
End: 2019-08-12

## 2019-08-12 DIAGNOSIS — I48.3 TYPICAL ATRIAL FLUTTER (HCC): ICD-10-CM

## 2019-09-10 ENCOUNTER — APPOINTMENT (OUTPATIENT)
Dept: LAB | Facility: HOSPITAL | Age: 79
End: 2019-09-10
Attending: INTERNAL MEDICINE
Payer: MEDICARE

## 2019-09-10 ENCOUNTER — ANTICOAG VISIT (OUTPATIENT)
Dept: CARDIOLOGY CLINIC | Facility: CLINIC | Age: 79
End: 2019-09-10

## 2019-09-10 DIAGNOSIS — I48.3 TYPICAL ATRIAL FLUTTER (HCC): ICD-10-CM

## 2019-09-24 ENCOUNTER — CONSULT (OUTPATIENT)
Dept: CARDIOLOGY CLINIC | Facility: CLINIC | Age: 79
End: 2019-09-24
Payer: MEDICARE

## 2019-09-24 ENCOUNTER — TELEPHONE (OUTPATIENT)
Dept: CARDIOLOGY CLINIC | Facility: CLINIC | Age: 79
End: 2019-09-24

## 2019-09-24 VITALS
WEIGHT: 170 LBS | HEIGHT: 65 IN | DIASTOLIC BLOOD PRESSURE: 90 MMHG | HEART RATE: 98 BPM | SYSTOLIC BLOOD PRESSURE: 136 MMHG | BODY MASS INDEX: 28.32 KG/M2

## 2019-09-24 DIAGNOSIS — I48.3 TYPICAL ATRIAL FLUTTER (HCC): Primary | ICD-10-CM

## 2019-09-24 PROCEDURE — 93000 ELECTROCARDIOGRAM COMPLETE: CPT | Performed by: INTERNAL MEDICINE

## 2019-09-24 PROCEDURE — 99214 OFFICE O/P EST MOD 30 MIN: CPT | Performed by: INTERNAL MEDICINE

## 2019-09-24 NOTE — PATIENT INSTRUCTIONS
1  Stop taking warfarin    2  Start taking Eliquis 5 mg twice daily on Friday 9/27/19    3  Obtain blood work for kidney and anemia count    4   Schedule flutter ablation

## 2019-09-24 NOTE — PROGRESS NOTES
EPS Consultation/New Patient Evaluation - Renny Andrews [de-identified] y o  female MRN: [id number]       Referring: Dr Brady Hess    CC/HPI:   It was a pleasure to see Renny Andrews in our arrhythmia clinic at [facility]  As you know she is a pleasant [de-identified] y o  woman who was referred by Dr Sophie Pak for atrial flutter ablation  Patient has a history of CAD status post CABG, hypertension, chronic diastolic heart failure, and hyperlipidemia  Patient reports having trouble breathing and evening waking her up at night  She does admit having a history shortness of breath and in 2008 she had open heart surgery which helped however she started having symptoms of dyspnea on exertion few years ago  Her symptoms have increased in severity gradually over the past 4 years  She notices dyspnea on exertion to walking from 1 room to another room in the house  She also has worsening of swelling in her lower extremities  She notices that time she wakes up at night with shortness of breath  Patient does have rare chest pain however she denies palpitations, pre syncope, or syncope  She has been compliant with her Coumadin but has noticed INR been labile lately           Past Medical History:  Past Medical History:   Diagnosis Date    Angina pectoris ([facility] [address] )     Anxiety     CAD (coronary artery disease)     Cardiac disease     Depression     GERD (gastroesophageal reflux disease)     History of colon polyps     History of hiatal hernia     Hx of bleeding disorder     hemorrhoids    Hyperlipidemia     Hypertension     Irregular heart beat     gets tachycardia    Shortness of breath     related to heart       Medications:      Current Outpatient Medications:     acetaminophen (TYLENOL) 650 mg CR tablet, Take 1 tablet (650 mg total) by mouth every 8 (eight) hours as needed for mild pain or moderate pain, Disp: 15 tablet, Rfl: 0    ALPRAZolam (XANAX) 0 25 mg tablet, Take 0 25 mg by mouth 3 (three) times a day as needed for anxiety  , Disp: , Rfl:     atorvastatin (LIPITOR) 20 mg tablet, Take 20 mg by mouth daily after dinner   , Disp: , Rfl:     ergocalciferol (ERGOCALCIFEROL) 51415 UNITS capsule, Take 50,000 Units by mouth once a week  Takes on Wednesdays, Disp: , Rfl:     furosemide (LASIX) 20 mg tablet, 40 mg , Disp: , Rfl:     Gauze Pads & Dressings (COMBINE ABD) 5"X9" PADS, by Does not apply route 3 (three) times a day, Disp: 20 each, Rfl: 3    Gauze Pads & Dressings (GAUZE PADS 4"X4") 4"X4" PADS, by Does not apply route 3 (three) times a day, Disp: 30 each, Rfl: 2    Incontinent Wash (SOOTHE & COOL PERINEAL WASH) LIQD, Apply 1 application topically 3 (three) times a day, Disp: 1 Bottle, Rfl: 2    metolazone (ZAROXOLYN) 2 5 mg tablet, TAKE ONE TABLET BY MOUTH ONCE A WEEK OR  AS  DIRECTED  FOR  WEIGHT  GAIN, Disp: 20 tablet, Rfl: 5    metoprolol tartrate (LOPRESSOR) 50 mg tablet, Take 1 tablet (50 mg total) by mouth every 12 (twelve) hours, Disp: 180 tablet, Rfl: 3    Misc  Devices (SPRAY BOTTLE/PLASTIC 120ML) MISC, by Does not apply route 3 (three) times a day Use to cleanse area 3 times daily or as needed with bowel movements  , Disp: 1 each, Rfl: 0    Multiple Vitamins-Minerals (PRESERVISION AREDS) CAPS, Take 1 capsule by mouth daily  , Disp: , Rfl:     nitroglycerin (NITROSTAT) 0 4 mg SL tablet, Place 0 4 mg under the tongue every 5 (five) minutes as needed for chest pain , Disp: , Rfl:     Omega-3 Fatty Acids (FISH OIL PO), Take 1,000 mg by mouth daily  , Disp: , Rfl:     potassium chloride (K-DUR,KLOR-CON) 20 mEq tablet, Take 1 tablet (20 mEq total) by mouth daily, Disp: 90 tablet, Rfl: 3    ranitidine (ZANTAC) 300 MG tablet, 300 mg , Disp: , Rfl:     apixaban (ELIQUIS) 5 mg, Take 1 tablet (5 mg total) by mouth 2 (two) times a day, Disp: 60 tablet, Rfl: 3    docusate sodium (COLACE) 100 mg capsule, Take 1 capsule (100 mg total) by mouth 2 (two) times a day (Patient not taking: Reported on 9/24/2019), Disp: 10 capsule, Rfl: 0    escitalopram (LEXAPRO) 10 mg tablet, Take 10 mg by mouth daily , Disp: , Rfl: 3     Family History   Problem Relation Age of Onset    Heart disease Mother     Cirrhosis Father     Lung cancer Brother      Social History     Socioeconomic History    Marital status:      Spouse name: Not on file    Number of children: Not on file    Years of education: Not on file    Highest education level: Not on file   Occupational History    Not on file   Social Needs    Financial resource strain: Not on file    Food insecurity:     Worry: Not on file     Inability: Not on file    Transportation needs:     Medical: Not on file     Non-medical: Not on file   Tobacco Use    Smoking status: Former Smoker    Smokeless tobacco: Never Used    Tobacco comment: quit 15 yrs ago   Substance and Sexual Activity    Alcohol use: No    Drug use: No    Sexual activity: Not on file   Lifestyle    Physical activity:     Days per week: Not on file     Minutes per session: Not on file    Stress: Not on file   Relationships    Social connections:     Talks on phone: Not on file     Gets together: Not on file     Attends Religion service: Not on file     Active member of club or organization: Not on file     Attends meetings of clubs or organizations: Not on file     Relationship status: Not on file    Intimate partner violence:     Fear of current or ex partner: Not on file     Emotionally abused: Not on file     Physically abused: Not on file     Forced sexual activity: Not on file   Other Topics Concern    Not on file   Social History Narrative    Not on file     Social History     Tobacco Use   Smoking Status Former Smoker   Smokeless Tobacco Never Used   Tobacco Comment    quit 15 yrs ago     Social History     Substance and Sexual Activity   Alcohol Use No       Review of Systems   Constitution: Positive for malaise/fatigue  Negative for chills and fever     HENT: Negative  Eyes: Positive for vision loss in left eye and vision loss in right eye  Cardiovascular: Positive for dyspnea on exertion, leg swelling and paroxysmal nocturnal dyspnea  Negative for chest pain, orthopnea, palpitations and syncope  Respiratory: Negative  Negative for cough and shortness of breath  Endocrine: Negative  Skin: Negative  Negative for rash  Musculoskeletal: Positive for arthritis  Gastrointestinal: Negative for abdominal pain, nausea and vomiting  Genitourinary: Negative  Neurological: Negative for dizziness and light-headedness  Psychiatric/Behavioral: Negative  Objective:     Vitals: Blood pressure 136/90, pulse 98, height 5' 5" (1 651 m), weight 77 1 kg (170 lb)  , Body mass index is 28 29 kg/m²  ,     Physical Exam:    GEN: Sol Congress appears well, alert and oriented x 3, pleasant and cooperative   HEENT: pupils equal, extraocular muscles intact; blind in both eyes  NECK: supple, no carotid bruits   HEART: regular rhythm, normal S1 and S2, no murmurs, clicks, gallops or rubs   LUNGS: clear to auscultation bilaterally; no wheezes, rales, or rhonchi   ABDOMEN: normal bowel sounds, soft, no tenderness, no distention  EXTREMITIES: peripheral pulses normal; no clubbing, cyanosis, +1 swelling in bilateral lower extremities  NEURO: no focal findings   SKIN: normal without suspicious lesions on exposed skin      Labs & Results:  Below is the patient's most recent value for Albumin, ALT, AST, BUN, Calcium, Chloride, Cholesterol, CO2, Creatinine, GFR, Glucose, HDL, Hematocrit, Hemoglobin, Hemoglobin A1C, LDL, Magnesium, Phosphorus, Platelets, Potassium, PSA, Sodium, Triglycerides, and WBC     Lab Results   Component Value Date    ALT 21 04/29/2019    AST 26 04/29/2019    BUN 15 07/16/2019    CALCIUM 9 3 07/16/2019     07/16/2019    CHOL 153 04/08/2014    CO2 27 07/16/2019    CREATININE 0 94 07/16/2019    HDL 54 12/26/2017    HCT 39 5 07/16/2019    HGB 12 9 2019    HGBA1C 5 6 2019    MG 2 0 10/04/2018    PHOS 2 7 10/04/2018     2019    K 3 5 2019     2015    TRIG 97 2019    WBC 8 65 2019     Note: for a comprehensive list of the patient's lab results, access the Results Review activity  Cardiac testing:     I personally reviewed the ECG performed in the clinic on 19  It reveals atrial flutter with 2-1 response, ventricular rate of 98 beats per minute and incomplete right bundle-branch block  Echocardiograms:  Results for orders placed during the hospital encounter of 10/03/18   Echo complete with contrast if indicated    Narrative [address]  [address], [place] [address]  [phone]    Transthoracic Echocardiogram  2D, M-mode, Doppler, and Color Doppler    Study date:  04-Oct-2018    Patient: Iman Swenson  MR number: [id number]  Account number: [de-identified]  : 1940  Age: 77 years  Gender: Female  Status: Outpatient  Location: Echo lab  Height: 65 in  Weight: 186 lb  BP: 113/ 56 mmHg    Indications: Shortness of breath    Diagnoses: J98 9 - Respiratory disorder, unspecified    Sonographer:  VILMA Rey  Primary Physician:  Ilda Reyes MD  Referring Physician:  Yecenia Cool DO  Group:  [id number]  Interpreting Physician:  Graciela Kolb MD    SUMMARY    LEFT VENTRICLE:  Size was normal   Systolic function was normal  Ejection fraction was estimated to be 60 %  There were no regional wall motion abnormalities  Wall thickness was normal   There was no evidence of concentric hypertrophy  VENTRICULAR SEPTUM:  There was dyssynergic motion  These changes are consistent with RV pressure overload  RIGHT VENTRICLE:  The ventricle was moderately dilated  Systolic function was mildly reduced  LEFT ATRIUM:  The atrium was dilated  RIGHT ATRIUM:  The atrium was moderately dilated      MITRAL VALVE:  There was moderate regurgitation  TRICUSPID VALVE:  There was severe regurgitation  Estimated peak PA pressure was 60 mmHg  PULMONIC VALVE:  There was mild regurgitation  HISTORY: PRIOR HISTORY: cabg, cad, pulm htn    PROCEDURE: The procedure was performed in the echo lab  This was a routine study  The transthoracic approach was used  The study included complete 2D imaging, M-mode, complete spectral Doppler, and color Doppler  The heart rate was 80 bpm,  at the start of the study  Image quality was adequate  LEFT VENTRICLE: Size was normal  Systolic function was normal  Ejection fraction was estimated to be 60 %  There were no regional wall motion abnormalities  Wall thickness was normal  There was no evidence of concentric hypertrophy  VENTRICULAR SEPTUM: There was dyssynergic motion  These changes are consistent with RV pressure overload  RIGHT VENTRICLE: The ventricle was moderately dilated  Systolic function was mildly reduced  LEFT ATRIUM: The atrium was dilated  RIGHT ATRIUM: The atrium was moderately dilated  MITRAL VALVE: Valve structure was normal  There was normal leaflet separation  DOPPLER: The transmitral velocity was within the normal range  There was no evidence for stenosis  There was moderate regurgitation  AORTIC VALVE: The valve was trileaflet  Leaflets exhibited normal thickness and normal cuspal separation  DOPPLER: Transaortic velocity was within the normal range  There was no evidence for stenosis  There was no regurgitation  TRICUSPID VALVE: DOPPLER: There was severe regurgitation  Estimated peak PA pressure was 60 mmHg  PULMONIC VALVE: Leaflets exhibited normal thickness, no calcification, and normal cuspal separation  DOPPLER: The transpulmonic velocity was within the normal range  There was mild regurgitation  PERICARDIUM: There was no pericardial effusion  The pericardium was normal in appearance      AORTA: The root exhibited normal size     SYSTEM MEASUREMENT TABLES    2D  %FS: 40 8 %  Ao Diam: 2 61 cm  EDV(Teich): 93 53 ml  EF(Teich): 71 72 %  ESV(Teich): 26 45 ml  IVSd: 0 82 cm  LA Diam: 4 18 cm  LVIDd: 4 52 cm  LVIDs: 2 68 cm  LVPWd: 0 81 cm  SV(Teich): 67 08 ml    CW  AV Vmax: 1 53 m/s  AV maxP 39 mmHg  RAP: 0 mmHg  TR Vmax: 3 66 m/s  TR maxP 56 mmHg    MM  TAPSE: 2 6 cm    PW  E' Sept: 0 08 m/s  E/E' Sept: 13 9  LVOT Vmax: 1 03 m/s  LVOT maxP 24 mmHg  MV A Jose: 0 9 m/s  MV Dec [place]: 5 12 m/s2  MV DecT: 225 77 ms  MV E Jose: 1 16 m/s  MV E/A Ratio: 1 28  RVSP: 53 56 mmHg    [facility]    Prepared and electronically signed by    Luna Marie MD  Signed 04-Oct-2018 10:38:29       No results found for this or any previous visit  Catheterizations:   No results found for this or any previous visit  Stress Tests:  No results found for this or any previous visit  Holter monitor -   No results found for this or any previous visit  No results found for this or any previous visit  ASSESSMENT:  1  Typical atrial flutter (HCC)  POCT ECG    apixaban (ELIQUIS) 5 mg       SUMMARY:        In summary, Ladonna Asher has a history of coronary artery disease status post CABG, hypertension, hyperlipidemia, chronic diastolic heart failure and has been found to have typical atrial flutter  She was diagnosed with typical atrial flutter in 2017 and has remained in the flutter since then  We had a detailed discussion regarding the mechanism of this arrhythmia and reviewed that the likelihood of adequately suppressing atrial flutter with medications is very difficult  Nonetheless, various management options were reviewed including:  Rate control strategy using:   AV loraine antagonists - the various agents (beta-blockers, calcium channel blockers and digoxin) were reviewed along with their individual side effect profiles     Rhythm control strategy using:   Antiarrhythmic agents - the various agents (flecainide, propafenone, sotalol, dofetilide, dronedarone and amiodarone) were reviewed along with their individual side effect profiles  She was also informed that in order to initiate sotalol or dofetilide, she will need a short hospitalization for monitoring during the loading   Catheter based ablation - the procedure was outlined in detail as well as the associated risks which include but are not limited to bleeding; bruising or hematoma; infection; blood clot formation; damage to the blood vessels including AV fistulas / pseudoaneursyms; damage to cardiac structures including heart valves; cardiac perforation, bradycardia that may require temporary pacing and less likely implantation of a permanent pacemaker; arrhythmias that may require defibrillation; cardiac tamponade; radiation skin burns; vagus nerve injury; phrenic nerve injury; stroke, MI and even death  Should IV contrast dye be used, risk can include an allergic reaction and/or reduced kidney function  Given the diagnosis of atrial flutter, she was also informed that there is a 20% independent chance that she may develop atrial fibrillation at some point in the future  Having answered all of her questions, she has decided to proceed with the ablation  Her INR has been labile lately  She is interested in switching over to no acts  We will start Eliquis 5 mg b i d  She will continue all of her medication until theTEE/ablation  The office will be in touch with her to schedule this procedure  She is welcome to call the office with any questions or concerns in the interim  Thank you for allowing me to participate in this patient's care  Please do not hesitate to contact the office with any questions or concerns  PLAN:  1  Typical atrial flutter  On Metoprolol and anticoagulated with Coumadin  Stop Coumadin and start Eliquis 5 mg b i d  3 days later  Schedule GARRETT/flutter ablation    2   CAD  S/p CABG  LVEF 60% from echo in November 2018    3  Chronic diastolic heart failure  On Lasix and potassium    4   AC  On Coumadin with regular INR checks   Will switch anticoagulant to Eliquis 5 mg twice daily  Stop Coumadin today and start Eliquis on Friday 09/27/2019              Scribe Attestation    I,:   hCristen King am acting as a scribe while in the presence of the attending physician :        I,:    personally performed the services described in this documentation    as scribed in my presence :

## 2019-09-24 NOTE — TELEPHONE ENCOUNTER
Pt is scheduled on 10/17/19 for a GARRETT/ Aflutter Ablation with Dr Donna Burns at [facility]  Pt in office for instructions  Can I get preauth please

## 2019-09-24 NOTE — H&P (VIEW-ONLY)
EPS Consultation/New Patient Evaluation - Tangela Fuller [de-identified] y o  female MRN: [id number]       Referring: Dr Antony Coy    CC/HPI:   It was a pleasure to see Tangela Fuller in our arrhythmia clinic at [facility]  As you know she is a pleasant [de-identified] y o  woman who was referred by Dr Eugenia Mitchell for atrial flutter ablation  Patient has a history of CAD status post CABG, hypertension, chronic diastolic heart failure, and hyperlipidemia  Patient reports having trouble breathing and evening waking her up at night  She does admit having a history shortness of breath and in 2008 she had open heart surgery which helped however she started having symptoms of dyspnea on exertion few years ago  Her symptoms have increased in severity gradually over the past 4 years  She notices dyspnea on exertion to walking from 1 room to another room in the house  She also has worsening of swelling in her lower extremities  She notices that time she wakes up at night with shortness of breath  Patient does have rare chest pain however she denies palpitations, pre syncope, or syncope  She has been compliant with her Coumadin but has noticed INR been labile lately           Past Medical History:  Past Medical History:   Diagnosis Date    Angina pectoris ([facility] [address] )     Anxiety     CAD (coronary artery disease)     Cardiac disease     Depression     GERD (gastroesophageal reflux disease)     History of colon polyps     History of hiatal hernia     Hx of bleeding disorder     hemorrhoids    Hyperlipidemia     Hypertension     Irregular heart beat     gets tachycardia    Shortness of breath     related to heart       Medications:      Current Outpatient Medications:     acetaminophen (TYLENOL) 650 mg CR tablet, Take 1 tablet (650 mg total) by mouth every 8 (eight) hours as needed for mild pain or moderate pain, Disp: 15 tablet, Rfl: 0    ALPRAZolam (XANAX) 0 25 mg tablet, Take 0 25 mg by mouth 3 (three) times a day as needed for anxiety  , Disp: , Rfl:     atorvastatin (LIPITOR) 20 mg tablet, Take 20 mg by mouth daily after dinner   , Disp: , Rfl:     ergocalciferol (ERGOCALCIFEROL) 90598 UNITS capsule, Take 50,000 Units by mouth once a week  Takes on Wednesdays, Disp: , Rfl:     furosemide (LASIX) 20 mg tablet, 40 mg , Disp: , Rfl:     Gauze Pads & Dressings (COMBINE ABD) 5"X9" PADS, by Does not apply route 3 (three) times a day, Disp: 20 each, Rfl: 3    Gauze Pads & Dressings (GAUZE PADS 4"X4") 4"X4" PADS, by Does not apply route 3 (three) times a day, Disp: 30 each, Rfl: 2    Incontinent Wash (SOOTHE & COOL PERINEAL WASH) LIQD, Apply 1 application topically 3 (three) times a day, Disp: 1 Bottle, Rfl: 2    metolazone (ZAROXOLYN) 2 5 mg tablet, TAKE ONE TABLET BY MOUTH ONCE A WEEK OR  AS  DIRECTED  FOR  WEIGHT  GAIN, Disp: 20 tablet, Rfl: 5    metoprolol tartrate (LOPRESSOR) 50 mg tablet, Take 1 tablet (50 mg total) by mouth every 12 (twelve) hours, Disp: 180 tablet, Rfl: 3    Misc  Devices (SPRAY BOTTLE/PLASTIC 120ML) MISC, by Does not apply route 3 (three) times a day Use to cleanse area 3 times daily or as needed with bowel movements  , Disp: 1 each, Rfl: 0    Multiple Vitamins-Minerals (PRESERVISION AREDS) CAPS, Take 1 capsule by mouth daily  , Disp: , Rfl:     nitroglycerin (NITROSTAT) 0 4 mg SL tablet, Place 0 4 mg under the tongue every 5 (five) minutes as needed for chest pain , Disp: , Rfl:     Omega-3 Fatty Acids (FISH OIL PO), Take 1,000 mg by mouth daily  , Disp: , Rfl:     potassium chloride (K-DUR,KLOR-CON) 20 mEq tablet, Take 1 tablet (20 mEq total) by mouth daily, Disp: 90 tablet, Rfl: 3    ranitidine (ZANTAC) 300 MG tablet, 300 mg , Disp: , Rfl:     apixaban (ELIQUIS) 5 mg, Take 1 tablet (5 mg total) by mouth 2 (two) times a day, Disp: 60 tablet, Rfl: 3    docusate sodium (COLACE) 100 mg capsule, Take 1 capsule (100 mg total) by mouth 2 (two) times a day (Patient not taking: Reported on 9/24/2019), Disp: 10 capsule, Rfl: 0    escitalopram (LEXAPRO) 10 mg tablet, Take 10 mg by mouth daily , Disp: , Rfl: 3     Family History   Problem Relation Age of Onset    Heart disease Mother     Cirrhosis Father     Lung cancer Brother      Social History     Socioeconomic History    Marital status:      Spouse name: Not on file    Number of children: Not on file    Years of education: Not on file    Highest education level: Not on file   Occupational History    Not on file   Social Needs    Financial resource strain: Not on file    Food insecurity:     Worry: Not on file     Inability: Not on file    Transportation needs:     Medical: Not on file     Non-medical: Not on file   Tobacco Use    Smoking status: Former Smoker    Smokeless tobacco: Never Used    Tobacco comment: quit 15 yrs ago   Substance and Sexual Activity    Alcohol use: No    Drug use: No    Sexual activity: Not on file   Lifestyle    Physical activity:     Days per week: Not on file     Minutes per session: Not on file    Stress: Not on file   Relationships    Social connections:     Talks on phone: Not on file     Gets together: Not on file     Attends [employer] service: Not on file     Active member of club or organization: Not on file     Attends meetings of clubs or organizations: Not on file     Relationship status: Not on file    Intimate partner violence:     Fear of current or ex partner: Not on file     Emotionally abused: Not on file     Physically abused: Not on file     Forced sexual activity: Not on file   Other Topics Concern    Not on file   Social History Narrative    Not on file     Social History     Tobacco Use   Smoking Status Former Smoker   Smokeless Tobacco Never Used   Tobacco Comment    quit 15 yrs ago     Social History     Substance and Sexual Activity   Alcohol Use No       Review of Systems   Constitution: Positive for malaise/fatigue  Negative for chills and fever     HENT: Negative  Eyes: Positive for vision loss in left eye and vision loss in right eye  Cardiovascular: Positive for dyspnea on exertion, leg swelling and paroxysmal nocturnal dyspnea  Negative for chest pain, orthopnea, palpitations and syncope  Respiratory: Negative  Negative for cough and shortness of breath  Endocrine: Negative  Skin: Negative  Negative for rash  Musculoskeletal: Positive for arthritis  Gastrointestinal: Negative for abdominal pain, nausea and vomiting  Genitourinary: Negative  Neurological: Negative for dizziness and light-headedness  Psychiatric/Behavioral: Negative  Objective:     Vitals: Blood pressure 136/90, pulse 98, height 5' 5" (1 651 m), weight 77 1 kg (170 lb)  , Body mass index is 28 29 kg/m²  ,     Physical Exam:    GEN: Cristy Boucher appears well, alert and oriented x 3, pleasant and cooperative   HEENT: pupils equal, extraocular muscles intact; blind in both eyes  NECK: supple, no carotid bruits   HEART: regular rhythm, normal S1 and S2, no murmurs, clicks, gallops or rubs   LUNGS: clear to auscultation bilaterally; no wheezes, rales, or rhonchi   ABDOMEN: normal bowel sounds, soft, no tenderness, no distention  EXTREMITIES: peripheral pulses normal; no clubbing, cyanosis, +1 swelling in bilateral lower extremities  NEURO: no focal findings   SKIN: normal without suspicious lesions on exposed skin      Labs & Results:  Below is the patient's most recent value for Albumin, ALT, AST, BUN, Calcium, Chloride, Cholesterol, CO2, Creatinine, GFR, Glucose, HDL, Hematocrit, Hemoglobin, Hemoglobin A1C, LDL, Magnesium, Phosphorus, Platelets, Potassium, PSA, Sodium, Triglycerides, and WBC     Lab Results   Component Value Date    ALT 21 04/29/2019    AST 26 04/29/2019    BUN 15 07/16/2019    CALCIUM 9 3 07/16/2019     07/16/2019    CHOL 153 04/08/2014    CO2 27 07/16/2019    CREATININE 0 94 07/16/2019    HDL 54 12/26/2017    HCT 39 5 07/16/2019    HGB 12 9 2019    HGBA1C 5 6 2019    MG 2 0 10/04/2018    PHOS 2 7 10/04/2018     2019    K 3 5 2019     2015    TRIG 97 2019    WBC 8 65 2019     Note: for a comprehensive list of the patient's lab results, access the Results Review activity  Cardiac testing:     I personally reviewed the ECG performed in the clinic on 19  It reveals atrial flutter with 2-1 response, ventricular rate of 98 beats per minute and incomplete right bundle-branch block  Echocardiograms:  Results for orders placed during the hospital encounter of 10/03/18   Echo complete with contrast if indicated    Narrative [address]  [address], [place] [address]  [phone]    Transthoracic Echocardiogram  2D, M-mode, Doppler, and Color Doppler    Study date:  04-Oct-2018    Patient: Stefania Hernandez  MR number: [id number]  Account number: [de-identified]  : 1940  Age: 77 years  Gender: Female  Status: Outpatient  Location: Echo lab  Height: 65 in  Weight: 186 lb  BP: 113/ 56 mmHg    Indications: Shortness of breath    Diagnoses: J98 9 - Respiratory disorder, unspecified    Sonographer:  VILMA Dimas  Primary Physician:  Esthela Senior MD  Referring Physician:  Pasha Mcgraw DO  Group:  [id number]  Interpreting Physician:  Poly Vital MD    SUMMARY    LEFT VENTRICLE:  Size was normal   Systolic function was normal  Ejection fraction was estimated to be 60 %  There were no regional wall motion abnormalities  Wall thickness was normal   There was no evidence of concentric hypertrophy  VENTRICULAR SEPTUM:  There was dyssynergic motion  These changes are consistent with RV pressure overload  RIGHT VENTRICLE:  The ventricle was moderately dilated  Systolic function was mildly reduced  LEFT ATRIUM:  The atrium was dilated  RIGHT ATRIUM:  The atrium was moderately dilated      MITRAL VALVE:  There was moderate regurgitation  TRICUSPID VALVE:  There was severe regurgitation  Estimated peak PA pressure was 60 mmHg  PULMONIC VALVE:  There was mild regurgitation  HISTORY: PRIOR HISTORY: cabg, cad, pulm htn    PROCEDURE: The procedure was performed in the echo lab  This was a routine study  The transthoracic approach was used  The study included complete 2D imaging, M-mode, complete spectral Doppler, and color Doppler  The heart rate was 80 bpm,  at the start of the study  Image quality was adequate  LEFT VENTRICLE: Size was normal  Systolic function was normal  Ejection fraction was estimated to be 60 %  There were no regional wall motion abnormalities  Wall thickness was normal  There was no evidence of concentric hypertrophy  VENTRICULAR SEPTUM: There was dyssynergic motion  These changes are consistent with RV pressure overload  RIGHT VENTRICLE: The ventricle was moderately dilated  Systolic function was mildly reduced  LEFT ATRIUM: The atrium was dilated  RIGHT ATRIUM: The atrium was moderately dilated  MITRAL VALVE: Valve structure was normal  There was normal leaflet separation  DOPPLER: The transmitral velocity was within the normal range  There was no evidence for stenosis  There was moderate regurgitation  AORTIC VALVE: The valve was trileaflet  Leaflets exhibited normal thickness and normal cuspal separation  DOPPLER: Transaortic velocity was within the normal range  There was no evidence for stenosis  There was no regurgitation  TRICUSPID VALVE: DOPPLER: There was severe regurgitation  Estimated peak PA pressure was 60 mmHg  PULMONIC VALVE: Leaflets exhibited normal thickness, no calcification, and normal cuspal separation  DOPPLER: The transpulmonic velocity was within the normal range  There was mild regurgitation  PERICARDIUM: There was no pericardial effusion  The pericardium was normal in appearance      AORTA: The root exhibited normal size     SYSTEM MEASUREMENT TABLES    2D  %FS: 40 8 %  Ao Diam: 2 61 cm  EDV(Teich): 93 53 ml  EF(Teich): 71 72 %  ESV(Teich): 26 45 ml  IVSd: 0 82 cm  LA Diam: 4 18 cm  LVIDd: 4 52 cm  LVIDs: 2 68 cm  LVPWd: 0 81 cm  SV(Teich): 67 08 ml    CW  AV Vmax: 1 53 m/s  AV maxP 39 mmHg  RAP: 0 mmHg  TR Vmax: 3 66 m/s  TR maxP 56 mmHg    MM  TAPSE: 2 6 cm    PW  E' Sept: 0 08 m/s  E/E' Sept: 13 9  LVOT Vmax: 1 03 m/s  LVOT maxP 24 mmHg  MV A Jose: 0 9 m/s  MV Dec [place]: 5 12 m/s2  MV DecT: 225 77 ms  MV E Jose: 1 16 m/s  MV E/A Ratio: 1 28  RVSP: 53 56 mmHg    [facility] Accredited [facility]ch[facility]    Prepared and electronically signed by    Rachel Delgado MD  Signed 04-Oct-2018 10:38:29       No results found for this or any previous visit  Catheterizations:   No results found for this or any previous visit  Stress Tests:  No results found for this or any previous visit  Holter monitor -   No results found for this or any previous visit  No results found for this or any previous visit  ASSESSMENT:  1  Typical atrial flutter (HCC)  POCT ECG    apixaban (ELIQUIS) 5 mg       SUMMARY:        In summary, Sherri Loredo has a history of coronary artery disease status post CABG, hypertension, hyperlipidemia, chronic diastolic heart failure and has been found to have typical atrial flutter  She was diagnosed with typical atrial flutter in 2017 and has remained in the flutter since then  We had a detailed discussion regarding the mechanism of this arrhythmia and reviewed that the likelihood of adequately suppressing atrial flutter with medications is very difficult  Nonetheless, various management options were reviewed including:  Rate control strategy using:   AV loraine antagonists  the various agents (beta-blockers, calcium channel blockers and digoxin) were reviewed along with their individual side effect profiles     Rhythm control strategy using:   Antiarrhythmic agents  the various agents (flecainide, propafenone, sotalol, dofetilide, dronedarone and amiodarone) were reviewed along with their individual side effect profiles  She was also informed that in order to initiate sotalol or dofetilide, she will need a short hospitalization for monitoring during the loading   Catheter based ablation  the procedure was outlined in detail as well as the associated risks which include but are not limited to bleeding; bruising or hematoma; infection; blood clot formation; damage to the blood vessels including AV fistulas / pseudoaneursyms; damage to cardiac structures including heart valves; cardiac perforation, bradycardia that may require temporary pacing and less likely implantation of a permanent pacemaker; arrhythmias that may require defibrillation; cardiac tamponade; radiation skin burns; vagus nerve injury; phrenic nerve injury; stroke, MI and even death  Should IV contrast dye be used, risk can include an allergic reaction and/or reduced kidney function  Given the diagnosis of atrial flutter, she was also informed that there is a 20% independent chance that she may develop atrial fibrillation at some point in the future  Having answered all of her questions, she has decided to proceed with the ablation  Her INR has been labile lately  She is interested in switching over to no acts  We will start Eliquis 5 mg b i d  She will continue all of her medication until theTEE/ablation  The office will be in touch with her to schedule this procedure  She is welcome to call the office with any questions or concerns in the interim  Thank you for allowing me to participate in this patient's care  Please do not hesitate to contact the office with any questions or concerns  PLAN:  1  Typical atrial flutter  On Metoprolol and anticoagulated with Coumadin  Stop Coumadin and start Eliquis 5 mg b i d  3 days later  Schedule GARRETT/flutter ablation    2   CAD  S/p CABG  LVEF 60% from echo in November 2018    3  Chronic diastolic heart failure  On Lasix and potassium    4   AC  On Coumadin with regular INR checks   Will switch anticoagulant to Eliquis 5 mg twice daily  Stop Coumadin today and start Eliquis on Friday 09/27/2019              Scribe Attestation    I,:   Amber Cerna am acting as a scribe while in the presence of the attending physician :        I,:    personally performed the services described in this documentation    as scribed in my presence :

## 2019-10-01 DIAGNOSIS — I25.10 CORONARY ARTERY DISEASE INVOLVING NATIVE CORONARY ARTERY OF NATIVE HEART WITHOUT ANGINA PECTORIS: ICD-10-CM

## 2019-10-01 RX ORDER — POTASSIUM CHLORIDE 20 MEQ/1
20 TABLET, EXTENDED RELEASE ORAL DAILY
Qty: 90 TABLET | Refills: 3 | Status: SHIPPED | OUTPATIENT
Start: 2019-10-01 | End: 2020-10-14 | Stop reason: SDUPTHER

## 2019-10-09 ENCOUNTER — APPOINTMENT (OUTPATIENT)
Dept: LAB | Facility: HOSPITAL | Age: 79
End: 2019-10-09
Payer: MEDICARE

## 2019-10-09 ENCOUNTER — ANTICOAG VISIT (OUTPATIENT)
Dept: CARDIOLOGY CLINIC | Facility: CLINIC | Age: 79
End: 2019-10-09

## 2019-10-09 ENCOUNTER — TELEPHONE (OUTPATIENT)
Dept: CARDIOLOGY CLINIC | Facility: CLINIC | Age: 79
End: 2019-10-09

## 2019-10-09 DIAGNOSIS — I48.3 TYPICAL ATRIAL FLUTTER (HCC): ICD-10-CM

## 2019-10-09 LAB
ALBUMIN SERPL BCP-MCNC: 3.7 G/DL (ref 3.5–5)
ALP SERPL-CCNC: 152 U/L (ref 46–116)
ALT SERPL W P-5'-P-CCNC: 17 U/L (ref 12–78)
ANION GAP SERPL CALCULATED.3IONS-SCNC: 7 MMOL/L (ref 4–13)
AST SERPL W P-5'-P-CCNC: 21 U/L (ref 5–45)
BASOPHILS # BLD AUTO: 0.06 THOUSANDS/ΜL (ref 0–0.1)
BASOPHILS NFR BLD AUTO: 1 % (ref 0–1)
BILIRUB SERPL-MCNC: 0.7 MG/DL (ref 0.2–1)
BUN SERPL-MCNC: 19 MG/DL (ref 5–25)
CALCIUM SERPL-MCNC: 9.1 MG/DL (ref 8.3–10.1)
CHLORIDE SERPL-SCNC: 104 MMOL/L (ref 100–108)
CO2 SERPL-SCNC: 29 MMOL/L (ref 21–32)
CREAT SERPL-MCNC: 0.94 MG/DL (ref 0.6–1.3)
EOSINOPHIL # BLD AUTO: 0.04 THOUSAND/ΜL (ref 0–0.61)
EOSINOPHIL NFR BLD AUTO: 1 % (ref 0–6)
ERYTHROCYTE [DISTWIDTH] IN BLOOD BY AUTOMATED COUNT: 13.6 % (ref 11.6–15.1)
GFR SERPL CREATININE-BSD FRML MDRD: 57 ML/MIN/1.73SQ M
GLUCOSE SERPL-MCNC: 102 MG/DL (ref 65–140)
HCT VFR BLD AUTO: 43.2 % (ref 34.8–46.1)
HGB BLD-MCNC: 14 G/DL (ref 11.5–15.4)
IMM GRANULOCYTES # BLD AUTO: 0.02 THOUSAND/UL (ref 0–0.2)
IMM GRANULOCYTES NFR BLD AUTO: 0 % (ref 0–2)
INR PPP: 1.25 (ref 0.84–1.19)
LYMPHOCYTES # BLD AUTO: 1.74 THOUSANDS/ΜL (ref 0.6–4.47)
LYMPHOCYTES NFR BLD AUTO: 20 % (ref 14–44)
MCH RBC QN AUTO: 32 PG (ref 26.8–34.3)
MCHC RBC AUTO-ENTMCNC: 32.4 G/DL (ref 31.4–37.4)
MCV RBC AUTO: 99 FL (ref 82–98)
MONOCYTES # BLD AUTO: 0.78 THOUSAND/ΜL (ref 0.17–1.22)
MONOCYTES NFR BLD AUTO: 9 % (ref 4–12)
NEUTROPHILS # BLD AUTO: 6.22 THOUSANDS/ΜL (ref 1.85–7.62)
NEUTS SEG NFR BLD AUTO: 69 % (ref 43–75)
NRBC BLD AUTO-RTO: 0 /100 WBCS
PLATELET # BLD AUTO: 161 THOUSANDS/UL (ref 149–390)
PMV BLD AUTO: 9 FL (ref 8.9–12.7)
POTASSIUM SERPL-SCNC: 3.7 MMOL/L (ref 3.5–5.3)
PROT SERPL-MCNC: 7.7 G/DL (ref 6.4–8.2)
PROTHROMBIN TIME: 15.8 SECONDS (ref 11.6–14.5)
RBC # BLD AUTO: 4.37 MILLION/UL (ref 3.81–5.12)
SODIUM SERPL-SCNC: 140 MMOL/L (ref 136–145)
WBC # BLD AUTO: 8.86 THOUSAND/UL (ref 4.31–10.16)

## 2019-10-09 PROCEDURE — 85025 COMPLETE CBC W/AUTO DIFF WBC: CPT

## 2019-10-09 PROCEDURE — 36415 COLL VENOUS BLD VENIPUNCTURE: CPT

## 2019-10-09 PROCEDURE — 80053 COMPREHEN METABOLIC PANEL: CPT

## 2019-10-09 PROCEDURE — 85610 PROTHROMBIN TIME: CPT

## 2019-10-09 NOTE — TELEPHONE ENCOUNTER
Called patient as a friendly reminder of blood work that is needed for her upcoming procedure with Dr Keyla Singletary on October 17, 2019  No answer, left patient a message  Orders are entered  Patient is able to go to any [facility] lab at her earliest convenience if she wishes to go to an outside lab she would need to  a script  Extension given 2071 if any questions or concerns

## 2019-10-10 ENCOUNTER — ANTICOAG VISIT (OUTPATIENT)
Dept: CARDIOLOGY CLINIC | Facility: CLINIC | Age: 79
End: 2019-10-10

## 2019-10-10 DIAGNOSIS — I48.3 TYPICAL ATRIAL FLUTTER (HCC): ICD-10-CM

## 2019-10-16 ENCOUNTER — ANESTHESIA EVENT (OUTPATIENT)
Dept: NON INVASIVE DIAGNOSTICS | Facility: HOSPITAL | Age: 79
End: 2019-10-16
Payer: MEDICARE

## 2019-10-16 ENCOUNTER — TELEPHONE (OUTPATIENT)
Dept: SURGERY | Facility: HOSPITAL | Age: 79
End: 2019-10-16

## 2019-10-17 ENCOUNTER — HOSPITAL ENCOUNTER (OUTPATIENT)
Dept: NON INVASIVE DIAGNOSTICS | Facility: HOSPITAL | Age: 79
Discharge: HOME | End: 2019-10-17
Attending: INTERNAL MEDICINE | Admitting: INTERNAL MEDICINE
Payer: MEDICARE

## 2019-10-17 ENCOUNTER — APPOINTMENT (OUTPATIENT)
Dept: NON INVASIVE DIAGNOSTICS | Facility: HOSPITAL | Age: 79
End: 2019-10-17
Attending: INTERNAL MEDICINE
Payer: MEDICARE

## 2019-10-17 ENCOUNTER — ANESTHESIA (OUTPATIENT)
Dept: NON INVASIVE DIAGNOSTICS | Facility: HOSPITAL | Age: 79
End: 2019-10-17
Payer: MEDICARE

## 2019-10-17 VITALS
DIASTOLIC BLOOD PRESSURE: 67 MMHG | WEIGHT: 170 LBS | SYSTOLIC BLOOD PRESSURE: 119 MMHG | OXYGEN SATURATION: 97 % | RESPIRATION RATE: 15 BRPM | HEART RATE: 78 BPM | HEIGHT: 65 IN | BODY MASS INDEX: 28.32 KG/M2 | TEMPERATURE: 98.4 F

## 2019-10-17 DIAGNOSIS — I48.3 TYPICAL ATRIAL FLUTTER (HCC): ICD-10-CM

## 2019-10-17 LAB
ANION GAP SERPL CALCULATED.3IONS-SCNC: 6 MMOL/L (ref 4–13)
ATRIAL RATE: 229 BPM
BASOPHILS # BLD AUTO: 0.06 THOUSANDS/ΜL (ref 0–0.1)
BASOPHILS NFR BLD AUTO: 1 % (ref 0–1)
BUN SERPL-MCNC: 20 MG/DL (ref 5–25)
CALCIUM SERPL-MCNC: 9.5 MG/DL (ref 8.3–10.1)
CHLORIDE SERPL-SCNC: 106 MMOL/L (ref 100–108)
CO2 SERPL-SCNC: 26 MMOL/L (ref 21–32)
CREAT SERPL-MCNC: 1 MG/DL (ref 0.6–1.3)
EOSINOPHIL # BLD AUTO: 0.05 THOUSAND/ΜL (ref 0–0.61)
EOSINOPHIL NFR BLD AUTO: 1 % (ref 0–6)
ERYTHROCYTE [DISTWIDTH] IN BLOOD BY AUTOMATED COUNT: 14 % (ref 11.6–15.1)
GFR SERPL CREATININE-BSD FRML MDRD: 53 ML/MIN/1.73SQ M
GLUCOSE P FAST SERPL-MCNC: 108 MG/DL (ref 65–99)
GLUCOSE SERPL-MCNC: 108 MG/DL (ref 65–140)
HCT VFR BLD AUTO: 41.8 % (ref 34.8–46.1)
HGB BLD-MCNC: 13.7 G/DL (ref 11.5–15.4)
IMM GRANULOCYTES # BLD AUTO: 0.02 THOUSAND/UL (ref 0–0.2)
IMM GRANULOCYTES NFR BLD AUTO: 0 % (ref 0–2)
INR PPP: 1.27 (ref 0.84–1.19)
LYMPHOCYTES # BLD AUTO: 1.54 THOUSANDS/ΜL (ref 0.6–4.47)
LYMPHOCYTES NFR BLD AUTO: 19 % (ref 14–44)
MCH RBC QN AUTO: 32.1 PG (ref 26.8–34.3)
MCHC RBC AUTO-ENTMCNC: 32.8 G/DL (ref 31.4–37.4)
MCV RBC AUTO: 98 FL (ref 82–98)
MONOCYTES # BLD AUTO: 0.68 THOUSAND/ΜL (ref 0.17–1.22)
MONOCYTES NFR BLD AUTO: 8 % (ref 4–12)
NEUTROPHILS # BLD AUTO: 5.99 THOUSANDS/ΜL (ref 1.85–7.62)
NEUTS SEG NFR BLD AUTO: 71 % (ref 43–75)
NRBC BLD AUTO-RTO: 0 /100 WBCS
P AXIS: 268 DEGREES
PLATELET # BLD AUTO: 149 THOUSANDS/UL (ref 149–390)
PMV BLD AUTO: 9.3 FL (ref 8.9–12.7)
POTASSIUM SERPL-SCNC: 3.5 MMOL/L (ref 3.5–5.3)
PROTHROMBIN TIME: 15.5 SECONDS (ref 11.6–14.5)
QRS AXIS: 2 DEGREES
QRSD INTERVAL: 116 MS
QT INTERVAL: 362 MS
QTC INTERVAL: 430 MS
RBC # BLD AUTO: 4.27 MILLION/UL (ref 3.81–5.12)
SODIUM SERPL-SCNC: 138 MMOL/L (ref 136–145)
T WAVE AXIS: 20 DEGREES
VENTRICULAR RATE: 85 BPM
WBC # BLD AUTO: 8.34 THOUSAND/UL (ref 4.31–10.16)

## 2019-10-17 PROCEDURE — 93325 DOPPLER ECHO COLOR FLOW MAPG: CPT | Performed by: INTERNAL MEDICINE

## 2019-10-17 PROCEDURE — 93613 INTRACARDIAC EPHYS 3D MAPG: CPT | Performed by: INTERNAL MEDICINE

## 2019-10-17 PROCEDURE — 85025 COMPLETE CBC W/AUTO DIFF WBC: CPT | Performed by: PHYSICIAN ASSISTANT

## 2019-10-17 PROCEDURE — 93653 COMPRE EP EVAL TX SVT: CPT | Performed by: INTERNAL MEDICINE

## 2019-10-17 PROCEDURE — C1732 CATH, EP, DIAG/ABL, 3D/VECT: HCPCS | Performed by: INTERNAL MEDICINE

## 2019-10-17 PROCEDURE — C1894 INTRO/SHEATH, NON-LASER: HCPCS | Performed by: INTERNAL MEDICINE

## 2019-10-17 PROCEDURE — 76937 US GUIDE VASCULAR ACCESS: CPT | Performed by: INTERNAL MEDICINE

## 2019-10-17 PROCEDURE — 93621 COMP EP EVL L PAC&REC C SINS: CPT | Performed by: INTERNAL MEDICINE

## 2019-10-17 PROCEDURE — 93005 ELECTROCARDIOGRAM TRACING: CPT

## 2019-10-17 PROCEDURE — 80048 BASIC METABOLIC PNL TOTAL CA: CPT | Performed by: PHYSICIAN ASSISTANT

## 2019-10-17 PROCEDURE — 93321 DOPPLER ECHO F-UP/LMTD STD: CPT | Performed by: INTERNAL MEDICINE

## 2019-10-17 PROCEDURE — 85610 PROTHROMBIN TIME: CPT | Performed by: PHYSICIAN ASSISTANT

## 2019-10-17 PROCEDURE — 93010 ELECTROCARDIOGRAM REPORT: CPT | Performed by: INTERNAL MEDICINE

## 2019-10-17 PROCEDURE — 93312 ECHO TRANSESOPHAGEAL: CPT | Performed by: INTERNAL MEDICINE

## 2019-10-17 PROCEDURE — C1766 INTRO/SHEATH,STRBLE,NON-PEEL: HCPCS | Performed by: INTERNAL MEDICINE

## 2019-10-17 PROCEDURE — 93312 ECHO TRANSESOPHAGEAL: CPT

## 2019-10-17 RX ORDER — FENTANYL CITRATE 50 UG/ML
INJECTION, SOLUTION INTRAMUSCULAR; INTRAVENOUS AS NEEDED
Status: DISCONTINUED | OUTPATIENT
Start: 2019-10-17 | End: 2019-10-17 | Stop reason: SURG

## 2019-10-17 RX ORDER — ONDANSETRON 2 MG/ML
INJECTION INTRAMUSCULAR; INTRAVENOUS AS NEEDED
Status: DISCONTINUED | OUTPATIENT
Start: 2019-10-17 | End: 2019-10-17 | Stop reason: SURG

## 2019-10-17 RX ORDER — PROPOFOL 10 MG/ML
INJECTION, EMULSION INTRAVENOUS AS NEEDED
Status: DISCONTINUED | OUTPATIENT
Start: 2019-10-17 | End: 2019-10-17 | Stop reason: SURG

## 2019-10-17 RX ORDER — SODIUM CHLORIDE 9 MG/ML
INJECTION, SOLUTION INTRAVENOUS CONTINUOUS PRN
Status: DISCONTINUED | OUTPATIENT
Start: 2019-10-17 | End: 2019-10-17 | Stop reason: SURG

## 2019-10-17 RX ORDER — LIDOCAINE HYDROCHLORIDE 10 MG/ML
INJECTION, SOLUTION INFILTRATION; PERINEURAL AS NEEDED
Status: DISCONTINUED | OUTPATIENT
Start: 2019-10-17 | End: 2019-10-17 | Stop reason: SURG

## 2019-10-17 RX ORDER — ROCURONIUM BROMIDE 10 MG/ML
INJECTION, SOLUTION INTRAVENOUS AS NEEDED
Status: DISCONTINUED | OUTPATIENT
Start: 2019-10-17 | End: 2019-10-17 | Stop reason: SURG

## 2019-10-17 RX ORDER — PROMETHAZINE HYDROCHLORIDE 25 MG/ML
12.5 INJECTION, SOLUTION INTRAMUSCULAR; INTRAVENOUS
Status: DISCONTINUED | OUTPATIENT
Start: 2019-10-17 | End: 2019-10-17 | Stop reason: HOSPADM

## 2019-10-17 RX ORDER — MIDAZOLAM HYDROCHLORIDE 1 MG/ML
INJECTION INTRAMUSCULAR; INTRAVENOUS AS NEEDED
Status: DISCONTINUED | OUTPATIENT
Start: 2019-10-17 | End: 2019-10-17 | Stop reason: SURG

## 2019-10-17 RX ORDER — FENTANYL CITRATE/PF 50 MCG/ML
25 SYRINGE (ML) INJECTION
Status: DISCONTINUED | OUTPATIENT
Start: 2019-10-17 | End: 2019-10-17 | Stop reason: HOSPADM

## 2019-10-17 RX ORDER — GLYCOPYRROLATE 0.2 MG/ML
INJECTION INTRAMUSCULAR; INTRAVENOUS AS NEEDED
Status: DISCONTINUED | OUTPATIENT
Start: 2019-10-17 | End: 2019-10-17 | Stop reason: SURG

## 2019-10-17 RX ORDER — ACETAMINOPHEN 325 MG/1
650 TABLET ORAL EVERY 4 HOURS PRN
Status: DISCONTINUED | OUTPATIENT
Start: 2019-10-17 | End: 2019-10-17 | Stop reason: HOSPADM

## 2019-10-17 RX ORDER — ONDANSETRON 2 MG/ML
4 INJECTION INTRAMUSCULAR; INTRAVENOUS ONCE AS NEEDED
Status: DISCONTINUED | OUTPATIENT
Start: 2019-10-17 | End: 2019-10-17 | Stop reason: HOSPADM

## 2019-10-17 RX ORDER — NEOSTIGMINE METHYLSULFATE 1 MG/ML
INJECTION INTRAVENOUS AS NEEDED
Status: DISCONTINUED | OUTPATIENT
Start: 2019-10-17 | End: 2019-10-17 | Stop reason: SURG

## 2019-10-17 RX ORDER — DEXAMETHASONE SODIUM PHOSPHATE 10 MG/ML
INJECTION, SOLUTION INTRAMUSCULAR; INTRAVENOUS AS NEEDED
Status: DISCONTINUED | OUTPATIENT
Start: 2019-10-17 | End: 2019-10-17 | Stop reason: SURG

## 2019-10-17 RX ADMIN — FENTANYL CITRATE 50 MCG: 50 INJECTION, SOLUTION INTRAMUSCULAR; INTRAVENOUS at 13:42

## 2019-10-17 RX ADMIN — NEOSTIGMINE METHYLSULFATE 3 MG: 1 INJECTION, SOLUTION INTRAVENOUS at 14:32

## 2019-10-17 RX ADMIN — MIDAZOLAM 2 MG: 1 INJECTION INTRAMUSCULAR; INTRAVENOUS at 12:13

## 2019-10-17 RX ADMIN — SODIUM CHLORIDE: 0.9 INJECTION, SOLUTION INTRAVENOUS at 12:14

## 2019-10-17 RX ADMIN — APIXABAN 5 MG: 5 TABLET, FILM COATED ORAL at 12:29

## 2019-10-17 RX ADMIN — DEXAMETHASONE SODIUM PHOSPHATE 5 MG: 10 INJECTION, SOLUTION INTRAMUSCULAR; INTRAVENOUS at 13:02

## 2019-10-17 RX ADMIN — FENTANYL CITRATE 25 MCG: 50 INJECTION, SOLUTION INTRAMUSCULAR; INTRAVENOUS at 15:22

## 2019-10-17 RX ADMIN — GLYCOPYRROLATE 0.4 MG: 0.2 INJECTION, SOLUTION INTRAMUSCULAR; INTRAVENOUS at 14:32

## 2019-10-17 RX ADMIN — FENTANYL CITRATE 25 MCG: 50 INJECTION, SOLUTION INTRAMUSCULAR; INTRAVENOUS at 15:09

## 2019-10-17 RX ADMIN — PHENYLEPHRINE HYDROCHLORIDE 60 MCG/MIN: 10 INJECTION INTRAVENOUS at 12:43

## 2019-10-17 RX ADMIN — PROPOFOL 150 MG: 10 INJECTION, EMULSION INTRAVENOUS at 12:34

## 2019-10-17 RX ADMIN — FENTANYL CITRATE 50 MCG: 50 INJECTION, SOLUTION INTRAMUSCULAR; INTRAVENOUS at 12:34

## 2019-10-17 RX ADMIN — LIDOCAINE HYDROCHLORIDE 50 MG: 10 INJECTION, SOLUTION INFILTRATION; PERINEURAL at 12:34

## 2019-10-17 RX ADMIN — FENTANYL CITRATE 25 MCG: 50 INJECTION, SOLUTION INTRAMUSCULAR; INTRAVENOUS at 14:40

## 2019-10-17 RX ADMIN — ROCURONIUM BROMIDE 30 MG: 50 INJECTION, SOLUTION INTRAVENOUS at 12:34

## 2019-10-17 RX ADMIN — ONDANSETRON 4 MG: 2 INJECTION INTRAMUSCULAR; INTRAVENOUS at 14:23

## 2019-10-17 RX ADMIN — GLYCOPYRROLATE 0.1 MG: 0.2 INJECTION, SOLUTION INTRAMUSCULAR; INTRAVENOUS at 13:02

## 2019-10-17 NOTE — ANESTHESIA POSTPROCEDURE EVALUATION
Post-Op Assessment Note    CV Status:  Stable  Pain Score: 0    Pain management: adequate     Mental Status:  Alert and awake   Hydration Status:  Euvolemic and stable   PONV Controlled:  Controlled   Airway Patency:  Patent   Post Op Vitals Reviewed: Yes      Staff: Anesthesiologist, CRNA   Comments: VSS  Pt states she is comfortable  Report given to recovering RN          /64 (10/17/19 1450)    Temp 98 4 °F (36 9 °C) (10/17/19 1450)    Pulse 84 (10/17/19 1450)   Resp 15 (10/17/19 1450)    SpO2 94 % (10/17/19 1450)

## 2019-10-17 NOTE — ANESTHESIA PREPROCEDURE EVALUATION
Review of Systems/Medical History  Patient summary reviewed  Chart reviewed  No history of anesthetic complications     Cardiovascular  EKG reviewed, Exercise tolerance (METS): >4,  Hyperlipidemia, Hypertension , CAD , History of CABG (2008, x3), Cardiac stents (2009,x1)  History of percutaneous transluminal coronary angioplasty, Dysrhythmias ( A flutter-rapid 110) , atrial flutter, CHF (Chronic diastolic HF) , BOB,   Comment: EF 60%; RV mild-mod depressed (mod dilation); sev TR, PASP 48 mmHg  EKG A flutter inc RBBB  Awaiting ablation  , Pulmonary hypertension Pulmonary  Smoker ex-smoker  , COPD , Shortness of breath,        GI/Hepatic    GERD ,   Comment: Colonic mass/Diverticulosis  Rectal bleeding     Negative  ROS        Endo/Other  Negative endo/other ROS   Overweight: BMI 34  GYN  Negative gynecology ROS          Hematology  Negative hematology ROS      Musculoskeletal  Osteoarthritis,   Comment: Thoracic radiculopathy   Thoracic compression fracture      Neurology  Negative neurology ROS      Psychology   Negative psychology ROS Anxiety, Depression ,            Physical Exam    Airway    Mallampati score: II  TM Distance: >3 FB  Neck ROM: full     Dental   lower dentures and upper dentures,     Cardiovascular  Rhythm: irregular, Rate: abnormal,     Pulmonary  Breath sounds clear to auscultation,     Other Findings        Anesthesia Plan  ASA Score- 3     Anesthesia Type- general with ASA Monitors  Additional Monitors: arterial line  Airway Plan: ETT  Comment:     Plan Factors-  Patient did not smoke on day of surgery  Induction- intravenous  Postoperative Plan- Plan for postoperative opioid use  Informed Consent- Anesthetic plan and risks discussed with patient  I personally reviewed this patient with the CRNA  Discussed and agreed on the Anesthesia Plan with the CRNA  Evaristo Lockett

## 2019-10-17 NOTE — DISCHARGE INSTRUCTIONS
No heavy lifting or strenuous activity for one week  No soaking in a bath tub/hot tub/swimming pool for one week or until groin heals  If you notice ongoing bleeding, swelling, or firm lumps in groin near ablation incision, please contact Dr Isha Guerrero office - [phone]        Please restart your eliquis at 10pm on the night of hospital discharge

## 2019-10-17 NOTE — INTERVAL H&P NOTE
Diamond Sue is an [de-identified]year old female w/ CAD s/p CABG, HTN, HLD, chronic diastolic HF and long standing persistent atrial flutter AC w/ eliquis who presents to B under direction of Dr Jacky Scheuermann for EPS w/ atrial flutter ablation  For full details please see office note from Dr Jacky Scheuermann on 9-24-19  Since this office visit no new changes have occurred  This AM labs are pending with ECG showing macro re entrant (likely typical) atrial flutter  Patient is without any concerns or complaints  Plan for flutter ablation with likely discharge home post bedrest  Risks vs benefits of procedure discussed with patient by attending, written consent will be obtained and placed in patient chart   /70 (BP Location: Right arm)   Pulse 84   Temp 97 7 °F (36 5 °C) (Oral)   Resp 18   Ht 5' 5" (1 651 m)   Wt 77 1 kg (170 lb)   SpO2 96%   BMI 28 29 kg/m²

## 2019-10-18 LAB
ATRIAL RATE: 84 BPM
P AXIS: 73 DEGREES
PR INTERVAL: 158 MS
QRS AXIS: 27 DEGREES
QRSD INTERVAL: 113 MS
QT INTERVAL: 388 MS
QTC INTERVAL: 459 MS
T WAVE AXIS: -42 DEGREES
VENTRICULAR RATE: 84 BPM

## 2019-10-18 PROCEDURE — 93010 ELECTROCARDIOGRAM REPORT: CPT | Performed by: INTERNAL MEDICINE

## 2019-11-13 ENCOUNTER — OFFICE VISIT (OUTPATIENT)
Dept: CARDIOLOGY CLINIC | Facility: CLINIC | Age: 79
End: 2019-11-13
Payer: MEDICARE

## 2019-11-13 VITALS
SYSTOLIC BLOOD PRESSURE: 126 MMHG | DIASTOLIC BLOOD PRESSURE: 84 MMHG | HEIGHT: 65 IN | BODY MASS INDEX: 27.82 KG/M2 | WEIGHT: 167 LBS | HEART RATE: 68 BPM

## 2019-11-13 DIAGNOSIS — R06.02 SHORTNESS OF BREATH ON EXERTION: ICD-10-CM

## 2019-11-13 DIAGNOSIS — I48.3 TYPICAL ATRIAL FLUTTER (HCC): Primary | ICD-10-CM

## 2019-11-13 DIAGNOSIS — R07.9 CHEST PAIN AT REST: ICD-10-CM

## 2019-11-13 DIAGNOSIS — Z95.1 HX OF CABG: ICD-10-CM

## 2019-11-13 PROCEDURE — 99211 OFF/OP EST MAY X REQ PHY/QHP: CPT | Performed by: INTERNAL MEDICINE

## 2019-11-13 PROCEDURE — 93000 ELECTROCARDIOGRAM COMPLETE: CPT | Performed by: INTERNAL MEDICINE

## 2019-11-13 NOTE — PROGRESS NOTES
EPS Follow-up Patient Evaluation - Kristian Bolivar [de-identified] y o  female MRN: [id number]       Referring: Dr Tim Benavides    Interval history:  She had atrial flutter ablation successfully performed on 10/17/2019  Patient reports feeling 75% better since the ablation procedure  She did have episode of left-sided sharp chest pain 2 weeks after the procedure that was nonradiating  She took 2 sublingual nitro tablets which led to resolution of her symptoms  She also reports occasionally having dyspnea on exertion to climbing 1 flight of stairs  She denies any significant swelling in lower extremities or orthopnea, PND or syncope episode  She also denies any significant palpitations episodes  Prior History:   Ms Kristian Bolivar is a [de-identified] y o  woman who was referred by Dr Allegra Joiner for atrial flutter ablation and was initially seen on 09/24/2019  Patient has a history of CAD status post CABG, hypertension, chronic diastolic heart failure, and hyperlipidemia  Patient reported having trouble breathing and evening waking her up at night  She does admit having a history shortness of breath and in 2008 she had open heart surgery which helped however she started having symptoms of dyspnea on exertion few years ago  Her symptoms had increased in severity gradually over the past 4 years  She noticed dyspnea on exertion to walking from 1 room to another room in the house  She also had worsening of swelling in her lower extremities  She noticed that time she wakes up at night with shortness of breath  Patient did have rare chest pain however she denied palpitations, pre syncope, or syncope  She had been compliant with her Coumadin but has noticed INR been labile lately  She was noted to be in atrial flutter and after discussion about the management options she decided to proceed with a GARRETT/atrial flutter ablation            Past Medical History:  Past Medical History:   Diagnosis Date    Angina pectoris ([facility] )     Anxiety     CAD (coronary artery disease)     Cardiac disease     Depression     GERD (gastroesophageal reflux disease)     History of colon polyps     History of hiatal hernia     Hx of bleeding disorder     hemorrhoids    Hyperlipidemia     Hypertension     Irregular heart beat     gets tachycardia    Shortness of breath     related to heart       Medications:      Current Outpatient Medications:     acetaminophen (TYLENOL) 650 mg CR tablet, Take 1 tablet (650 mg total) by mouth every 8 (eight) hours as needed for mild pain or moderate pain, Disp: 15 tablet, Rfl: 0    ALPRAZolam (XANAX) 0 25 mg tablet, Take 0 25 mg by mouth 3 (three) times a day as needed for anxiety  , Disp: , Rfl:     atorvastatin (LIPITOR) 20 mg tablet, Take 20 mg by mouth daily after dinner   , Disp: , Rfl:     ergocalciferol (ERGOCALCIFEROL) 01839 UNITS capsule, Take 50,000 Units by mouth once a week  Takes on Wednesdays, Disp: , Rfl:     escitalopram (LEXAPRO) 10 mg tablet, Take 10 mg by mouth daily , Disp: , Rfl: 3    furosemide (LASIX) 20 mg tablet, 40 mg , Disp: , Rfl:     metolazone (ZAROXOLYN) 2 5 mg tablet, TAKE ONE TABLET BY MOUTH ONCE A WEEK OR  AS  DIRECTED  FOR  WEIGHT  GAIN, Disp: 20 tablet, Rfl: 5    metoprolol tartrate (LOPRESSOR) 50 mg tablet, Take 1 tablet (50 mg total) by mouth every 12 (twelve) hours, Disp: 180 tablet, Rfl: 3    Multiple Vitamins-Minerals (PRESERVISION AREDS) CAPS, Take 1 capsule by mouth daily  , Disp: , Rfl:     nitroglycerin (NITROSTAT) 0 4 mg SL tablet, Place 0 4 mg under the tongue every 5 (five) minutes as needed for chest pain , Disp: , Rfl:     Omega-3 Fatty Acids (FISH OIL PO), Take 1,000 mg by mouth daily  , Disp: , Rfl:     potassium chloride (K-DUR,KLOR-CON) 20 mEq tablet, Take 1 tablet (20 mEq total) by mouth daily, Disp: 90 tablet, Rfl: 3    ranitidine (ZANTAC) 300 MG tablet, 300 mg , Disp: , Rfl:     apixaban (ELIQUIS) 5 mg, Take 1 tablet (5 mg total) by mouth 2 (two) times a day, Disp: 60 tablet, Rfl: 3    docusate sodium (COLACE) 100 mg capsule, Take 1 capsule (100 mg total) by mouth 2 (two) times a day (Patient not taking: Reported on 9/24/2019), Disp: 10 capsule, Rfl: 0    Gauze Pads & Dressings (COMBINE ABD) 5"X9" PADS, by Does not apply route 3 (three) times a day, Disp: 20 each, Rfl: 3    Gauze Pads & Dressings (GAUZE PADS 4"X4") 4"X4" PADS, by Does not apply route 3 (three) times a day, Disp: 30 each, Rfl: 2    Incontinent Wash (SOOTHE & COOL PERINEAL WASH) LIQD, Apply 1 application topically 3 (three) times a day, Disp: 1 Bottle, Rfl: 2    Misc  Devices (SPRAY BOTTLE/PLASTIC 120ML) MISC, by Does not apply route 3 (three) times a day Use to cleanse area 3 times daily or as needed with bowel movements  , Disp: 1 each, Rfl: 0     Family History   Problem Relation Age of Onset    Heart disease Mother     Cirrhosis Father     Lung cancer Brother      Social History     Socioeconomic History    Marital status:       Spouse name: Not on file    Number of children: Not on file    Years of education: Not on file    Highest education level: Not on file   Occupational History    Not on file   Social Needs    Financial resource strain: Not on file    Food insecurity:     Worry: Not on file     Inability: Not on file    Transportation needs:     Medical: Not on file     Non-medical: Not on file   Tobacco Use    Smoking status: Former Smoker    Smokeless tobacco: Never Used    Tobacco comment: quit 15 yrs ago   Substance and Sexual Activity    Alcohol use: No    Drug use: No    Sexual activity: Not on file   Lifestyle    Physical activity:     Days per week: Not on file     Minutes per session: Not on file    Stress: Not on file   Relationships    Social connections:     Talks on phone: Not on file     Gets together: Not on file     Attends [employer] service: Not on file     Active member of club or organization: Not on file     Attends meetings of clubs or organizations: Not on file     Relationship status: Not on file    Intimate partner violence:     Fear of current or ex partner: Not on file     Emotionally abused: Not on file     Physically abused: Not on file     Forced sexual activity: Not on file   Other Topics Concern    Not on file   Social History Narrative    Not on file     Social History     Tobacco Use   Smoking Status Former Smoker   Smokeless Tobacco Never Used   Tobacco Comment    quit 15 yrs ago     Social History     Substance and Sexual Activity   Alcohol Use No       Review of Systems   Constitution: Negative for chills, fever and malaise/fatigue  HENT: Negative  Eyes: Positive for vision loss in left eye and vision loss in right eye  Cardiovascular: Positive for dyspnea on exertion  Negative for chest pain, leg swelling, orthopnea, palpitations, paroxysmal nocturnal dyspnea and syncope  Respiratory: Negative  Negative for cough and shortness of breath  Endocrine: Negative  Skin: Negative  Negative for rash  Musculoskeletal: Positive for arthritis  Gastrointestinal: Negative for abdominal pain, nausea and vomiting  Genitourinary: Negative  Neurological: Negative for dizziness and light-headedness  Psychiatric/Behavioral: Negative  Objective:     Vitals: Blood pressure 126/84, pulse 68, height 5' 5" (1 651 m), weight 75 8 kg (167 lb)  , Body mass index is 27 79 kg/m²  ,     Physical Exam:    GEN: Darin Perish appears well, alert and oriented x 3, pleasant and cooperative   HEENT: pupils equal, extraocular muscles intact; blind in both eyes    NECK: supple, no carotid bruits   HEART: regular rhythm, normal S1 and S2, no murmurs, clicks, gallops or rubs   LUNGS: clear to auscultation bilaterally; no wheezes, rales, or rhonchi   ABDOMEN: normal bowel sounds, soft, no tenderness, no distention  EXTREMITIES: peripheral pulses normal; no clubbing, cyanosis, trace edema in bilateral lower extremities  NEURO: no focal findings   SKIN: normal without suspicious lesions on exposed skin      Labs & Results:  Below is the patient's most recent value for Albumin, ALT, AST, BUN, Calcium, Chloride, Cholesterol, CO2, Creatinine, GFR, Glucose, HDL, Hematocrit, Hemoglobin, Hemoglobin A1C, LDL, Magnesium, Phosphorus, Platelets, Potassium, PSA, Sodium, Triglycerides, and WBC  Lab Results   Component Value Date    ALT 17 10/09/2019    AST 21 10/09/2019    BUN 20 10/17/2019    CALCIUM 9 5 10/17/2019     10/17/2019    CHOL 153 2014    CO2 26 10/17/2019    CREATININE 1 00 10/17/2019    HDL 54 2017    HCT 41 8 10/17/2019    HGB 13 7 10/17/2019    HGBA1C 5 6 2019    MG 2 0 10/04/2018    PHOS 2 7 10/04/2018     10/17/2019    K 3 5 10/17/2019     2015    TRIG 97 2019    WBC 8 34 10/17/2019     Note: for a comprehensive list of the patient's lab results, access the Results Review activity  Cardiac testing:     I personally reviewed the ECG performed in the clinic on 19 reveals normal sinus rhythm with incomplete right bundle-branch block at 67 beats per minute       ECG performed in the clinic on 2019 revealed atrial flutter with 2-1 response, ventricular rate of 98 beats per minute and incomplete right bundle-branch block      Echocardiograms:  Results for orders placed during the hospital encounter of 10/03/18   Echo complete with contrast if indicated    Narrative [address]  [address], [place] [address]  [phone]    Transthoracic Echocardiogram  2D, M-mode, Doppler, and Color Doppler    Study date:  04-Oct-2018    Patient: Fabiana Strange  MR number: [id number]  Account number: [de-identified]  : 1940  Age: 77 years  Gender: Female  Status: Outpatient  Location: Echo lab  Height: 65 in  Weight: 186 lb  BP: 113/ 56 mmHg    Indications: Shortness of breath    Diagnoses: J98 9 - Respiratory disorder, unspecified    Sonographer:  VILMA Del Valle  Primary Physician:  Sole Schulz MD  Referring Physician:  Lina Eugene DO  Group:  [facility]  Interpreting Physician:  Juan Daniel Tillman MD    SUMMARY    LEFT VENTRICLE:  Size was normal   Systolic function was normal  Ejection fraction was estimated to be 60 %  There were no regional wall motion abnormalities  Wall thickness was normal   There was no evidence of concentric hypertrophy  VENTRICULAR SEPTUM:  There was dyssynergic motion  These changes are consistent with RV pressure overload  RIGHT VENTRICLE:  The ventricle was moderately dilated  Systolic function was mildly reduced  LEFT ATRIUM:  The atrium was dilated  RIGHT ATRIUM:  The atrium was moderately dilated  MITRAL VALVE:  There was moderate regurgitation  TRICUSPID VALVE:  There was severe regurgitation  Estimated peak PA pressure was 60 mmHg  PULMONIC VALVE:  There was mild regurgitation  HISTORY: PRIOR HISTORY: cabg, cad, pulm htn    PROCEDURE: The procedure was performed in the echo lab  This was a routine study  The transthoracic approach was used  The study included complete 2D imaging, M-mode, complete spectral Doppler, and color Doppler  The heart rate was 80 bpm,  at the start of the study  Image quality was adequate  LEFT VENTRICLE: Size was normal  Systolic function was normal  Ejection fraction was estimated to be 60 %  There were no regional wall motion abnormalities  Wall thickness was normal  There was no evidence of concentric hypertrophy  VENTRICULAR SEPTUM: There was dyssynergic motion  These changes are consistent with RV pressure overload  RIGHT VENTRICLE: The ventricle was moderately dilated  Systolic function was mildly reduced  LEFT ATRIUM: The atrium was dilated  RIGHT ATRIUM: The atrium was moderately dilated  MITRAL VALVE: Valve structure was normal  There was normal leaflet separation   DOPPLER: The transmitral velocity was within the normal range  There was no evidence for stenosis  There was moderate regurgitation  AORTIC VALVE: The valve was trileaflet  Leaflets exhibited normal thickness and normal cuspal separation  DOPPLER: Transaortic velocity was within the normal range  There was no evidence for stenosis  There was no regurgitation  TRICUSPID VALVE: DOPPLER: There was severe regurgitation  Estimated peak PA pressure was 60 mmHg  PULMONIC VALVE: Leaflets exhibited normal thickness, no calcification, and normal cuspal separation  DOPPLER: The transpulmonic velocity was within the normal range  There was mild regurgitation  PERICARDIUM: There was no pericardial effusion  The pericardium was normal in appearance  AORTA: The root exhibited normal size  SYSTEM MEASUREMENT TABLES    2D  %FS: 40 8 %  Ao Diam: 2 61 cm  EDV(Teich): 93 53 ml  EF(Teich): 71 72 %  ESV(Teich): 26 45 ml  IVSd: 0 82 cm  LA Diam: 4 18 cm  LVIDd: 4 52 cm  LVIDs: 2 68 cm  LVPWd: 0 81 cm  SV(Teich): 67 08 ml    CW  AV Vmax: 1 53 m/s  AV maxP 39 mmHg  RAP: 0 mmHg  TR Vmax: 3 66 m/s  TR maxP 56 mmHg    MM  TAPSE: 2 6 cm    PW  E' Sept: 0 08 m/s  E/E' Sept: 13 9  LVOT Vmax: 1 03 m/s  LVOT maxP 24 mmHg  MV A Jose: 0 9 m/s  MV Dec Chaves: 5 12 m/s2  MV DecT: 225 77 ms  MV E Jose: 1 16 m/s  MV E/A Ratio: 1 28  RVSP: 53 56 mmHg    [facility] Accredited Echocardiography Laboratory    Prepared and electronically signed by    Cleveland Aguilar MD  Signed 04-Oct-2018 10:38:29       No results found for this or any previous visit  ASSESSMENT:  1  Typical atrial flutter (HCC)  POCT ECG   2  Hx of CABG  NM myocardial perfusion spect (rx stress and/or rest)   3  Shortness of breath on exertion  NM myocardial perfusion spect (rx stress and/or rest)   4   Chest pain at rest  NM myocardial perfusion spect (rx stress and/or rest)       SUMMARY:        In summary, Odette Covert has a history of coronary artery disease status post CABG, hypertension, hyperlipidemia, chronic diastolic heart failure and has been found to have typical atrial flutter  She is status post typical flutter ablation on 10/17/2019  GARRETT at that time showed preserved LV function of 60%  She continues to have occasional dyspnea on exertion to walking 1 flight of stairs and had an episode of left-sided chest pain that resolved with sublingual nitro  Given she had CABG in the past and had similar symptoms prior to the CABG will obtain pharmacologic nuclear stress test to evaluate her coronary artery disease  She will give our office a call after the stress test to discuss the results  She is doing well from the arrhythmia standpoint  She should continue her Eliquis as she had also has a history of pulmonary embolism  Given the diagnosis of atrial flutter, she was also informed that there is a 20% independent chance that she may develop atrial fibrillation at some point in the future  Thank you for allowing me to participate in this patient's care  Please do not hesitate to contact the office with any questions or concerns  PLAN:  1  Typical atrial flutter  Status post ablation  Doing well  Continue Metoprolol and Eliquis    2  CAD  S/p CABG  Having dyspnea on exertion and chest pain  LVEF 60% from GARRETT during the flutter ablation  Obtain stress test to evaluate her coronary artery disease    3   Chronic diastolic heart failure  On Lasix and potassium    Follow-up in 3 months and if doing well at 3 months then will just have her follow up with Dr Aleshia Day

## 2019-11-21 DIAGNOSIS — I48.3 TYPICAL ATRIAL FLUTTER (HCC): ICD-10-CM

## 2019-11-21 DIAGNOSIS — I25.10 ARTERIOSCLEROTIC CORONARY ARTERY DISEASE: Primary | ICD-10-CM

## 2019-11-21 RX ORDER — METOPROLOL TARTRATE 50 MG/1
50 TABLET, FILM COATED ORAL EVERY 12 HOURS SCHEDULED
Qty: 180 TABLET | Refills: 3 | Status: SHIPPED | OUTPATIENT
Start: 2019-11-21 | End: 2020-11-30

## 2019-11-21 RX ORDER — NITROGLYCERIN 0.4 MG/1
0.4 TABLET SUBLINGUAL
Qty: 25 TABLET | Refills: 2 | Status: SHIPPED | OUTPATIENT
Start: 2019-11-21

## 2019-11-30 ENCOUNTER — HOSPITAL ENCOUNTER (INPATIENT)
Facility: HOSPITAL | Age: 79
LOS: 2 days | Discharge: HOME | DRG: 372 | End: 2019-12-03
Attending: EMERGENCY MEDICINE | Admitting: HOSPITALIST
Payer: MEDICARE

## 2019-11-30 ENCOUNTER — APPOINTMENT (EMERGENCY)
Dept: CT IMAGING | Facility: HOSPITAL | Age: 79
DRG: 372 | End: 2019-11-30
Payer: MEDICARE

## 2019-11-30 DIAGNOSIS — I10 ESSENTIAL HYPERTENSION: ICD-10-CM

## 2019-11-30 DIAGNOSIS — A04.72 CLOSTRIDIUM DIFFICILE DIARRHEA: ICD-10-CM

## 2019-11-30 DIAGNOSIS — K57.92 DIVERTICULITIS: Primary | ICD-10-CM

## 2019-11-30 DIAGNOSIS — A04.72 C. DIFFICILE COLITIS: ICD-10-CM

## 2019-11-30 PROBLEM — R19.7 DIARRHEA OF PRESUMED INFECTIOUS ORIGIN: Status: ACTIVE | Noted: 2019-11-30

## 2019-11-30 LAB
ALBUMIN SERPL BCP-MCNC: 4.5 G/DL (ref 3.5–5.7)
ALP SERPL-CCNC: 128 U/L (ref 55–165)
ALT SERPL W P-5'-P-CCNC: 25 U/L (ref 7–52)
ANION GAP SERPL CALCULATED.3IONS-SCNC: 12 MMOL/L (ref 4–13)
ANISOCYTOSIS BLD QL SMEAR: PRESENT
AST SERPL W P-5'-P-CCNC: 32 U/L (ref 13–39)
ATRIAL RATE: 92 BPM
BILIRUB SERPL-MCNC: 1.1 MG/DL (ref 0.2–1)
BUN SERPL-MCNC: 21 MG/DL (ref 7–25)
CALCIUM SERPL-MCNC: 9.6 MG/DL (ref 8.6–10.5)
CHLORIDE SERPL-SCNC: 105 MMOL/L (ref 98–107)
CO2 SERPL-SCNC: 26 MMOL/L (ref 21–31)
CREAT SERPL-MCNC: 0.97 MG/DL (ref 0.6–1.2)
ERYTHROCYTE [DISTWIDTH] IN BLOOD BY AUTOMATED COUNT: 14.4 % (ref 11.5–14.5)
GFR SERPL CREATININE-BSD FRML MDRD: 55 ML/MIN/1.73SQ M
GLUCOSE SERPL-MCNC: 124 MG/DL (ref 65–99)
HCT VFR BLD AUTO: 41.6 % (ref 42–47)
HGB BLD-MCNC: 14.3 G/DL (ref 12–16)
LIPASE SERPL-CCNC: 11 U/L (ref 11–82)
LYMPHOCYTES # BLD AUTO: 0.29 THOUSAND/UL (ref 0.6–4.47)
LYMPHOCYTES # BLD AUTO: 3 % (ref 20–51)
MCH RBC QN AUTO: 32.4 PG (ref 26–34)
MCHC RBC AUTO-ENTMCNC: 34.3 G/DL (ref 31–37)
MCV RBC AUTO: 94 FL (ref 81–99)
MONOCYTES # BLD AUTO: 0.1 THOUSAND/UL (ref 0–1.22)
MONOCYTES NFR BLD AUTO: 1 % (ref 4–12)
NEUTS SEG # BLD: 9.12 THOUSAND/UL (ref 1.81–6.82)
NEUTS SEG NFR BLD AUTO: 96 % (ref 42–75)
P AXIS: 72 DEGREES
PLATELET # BLD AUTO: 129 THOUSANDS/UL (ref 149–390)
PLATELET BLD QL SMEAR: ABNORMAL
PMV BLD AUTO: 7.7 FL (ref 8.6–11.7)
POTASSIUM SERPL-SCNC: 4.2 MMOL/L (ref 3.5–5.5)
PR INTERVAL: 142 MS
PROT SERPL-MCNC: 7.4 G/DL (ref 6.4–8.9)
QRS AXIS: 27 DEGREES
QRSD INTERVAL: 102 MS
QT INTERVAL: 362 MS
QTC INTERVAL: 447 MS
RBC # BLD AUTO: 4.4 MILLION/UL (ref 3.9–5.2)
RBC MORPH BLD: ABNORMAL
SODIUM SERPL-SCNC: 143 MMOL/L (ref 134–143)
T WAVE AXIS: 76 DEGREES
TOTAL CELLS COUNTED SPEC: 100
TROPONIN I SERPL-MCNC: <0.03 NG/ML
VENTRICULAR RATE: 92 BPM
WBC # BLD AUTO: 9.5 THOUSAND/UL (ref 4.8–10.8)

## 2019-11-30 PROCEDURE — 74177 CT ABD & PELVIS W/CONTRAST: CPT

## 2019-11-30 PROCEDURE — 80053 COMPREHEN METABOLIC PANEL: CPT | Performed by: EMERGENCY MEDICINE

## 2019-11-30 PROCEDURE — 93005 ELECTROCARDIOGRAM TRACING: CPT

## 2019-11-30 PROCEDURE — 96374 THER/PROPH/DIAG INJ IV PUSH: CPT

## 2019-11-30 PROCEDURE — 83690 ASSAY OF LIPASE: CPT | Performed by: EMERGENCY MEDICINE

## 2019-11-30 PROCEDURE — 96361 HYDRATE IV INFUSION ADD-ON: CPT

## 2019-11-30 PROCEDURE — 36415 COLL VENOUS BLD VENIPUNCTURE: CPT | Performed by: EMERGENCY MEDICINE

## 2019-11-30 PROCEDURE — 96375 TX/PRO/DX INJ NEW DRUG ADDON: CPT

## 2019-11-30 PROCEDURE — 84484 ASSAY OF TROPONIN QUANT: CPT | Performed by: EMERGENCY MEDICINE

## 2019-11-30 PROCEDURE — 85007 BL SMEAR W/DIFF WBC COUNT: CPT | Performed by: EMERGENCY MEDICINE

## 2019-11-30 PROCEDURE — 85027 COMPLETE CBC AUTOMATED: CPT | Performed by: EMERGENCY MEDICINE

## 2019-11-30 PROCEDURE — 99285 EMERGENCY DEPT VISIT HI MDM: CPT | Performed by: EMERGENCY MEDICINE

## 2019-11-30 PROCEDURE — 99220 PR INITIAL OBSERVATION CARE/DAY 70 MINUTES: CPT | Performed by: PHYSICIAN ASSISTANT

## 2019-11-30 PROCEDURE — 99285 EMERGENCY DEPT VISIT HI MDM: CPT

## 2019-11-30 PROCEDURE — 87493 C DIFF AMPLIFIED PROBE: CPT | Performed by: EMERGENCY MEDICINE

## 2019-11-30 PROCEDURE — 93010 ELECTROCARDIOGRAM REPORT: CPT | Performed by: INTERNAL MEDICINE

## 2019-11-30 PROCEDURE — 96376 TX/PRO/DX INJ SAME DRUG ADON: CPT

## 2019-11-30 RX ORDER — METOCLOPRAMIDE HYDROCHLORIDE 5 MG/ML
10 INJECTION INTRAMUSCULAR; INTRAVENOUS ONCE
Status: COMPLETED | OUTPATIENT
Start: 2019-11-30 | End: 2019-11-30

## 2019-11-30 RX ORDER — FENTANYL CITRATE 50 UG/ML
25 INJECTION, SOLUTION INTRAMUSCULAR; INTRAVENOUS ONCE
Status: COMPLETED | OUTPATIENT
Start: 2019-11-30 | End: 2019-11-30

## 2019-11-30 RX ORDER — FUROSEMIDE 40 MG/1
40 TABLET ORAL
Status: DISCONTINUED | OUTPATIENT
Start: 2019-12-01 | End: 2019-12-01

## 2019-11-30 RX ORDER — POTASSIUM CHLORIDE 20 MEQ/1
20 TABLET, EXTENDED RELEASE ORAL DAILY
Status: DISCONTINUED | OUTPATIENT
Start: 2019-12-01 | End: 2019-12-03 | Stop reason: HOSPADM

## 2019-11-30 RX ORDER — FAMOTIDINE 20 MG/1
40 TABLET, FILM COATED ORAL
Status: DISCONTINUED | OUTPATIENT
Start: 2019-11-30 | End: 2019-12-03 | Stop reason: HOSPADM

## 2019-11-30 RX ORDER — ATORVASTATIN CALCIUM 20 MG/1
20 TABLET, FILM COATED ORAL
Status: DISCONTINUED | OUTPATIENT
Start: 2019-11-30 | End: 2019-12-03 | Stop reason: HOSPADM

## 2019-11-30 RX ORDER — ONDANSETRON 2 MG/ML
4 INJECTION INTRAMUSCULAR; INTRAVENOUS ONCE
Status: COMPLETED | OUTPATIENT
Start: 2019-11-30 | End: 2019-11-30

## 2019-11-30 RX ORDER — SODIUM CHLORIDE 9 MG/ML
75 INJECTION, SOLUTION INTRAVENOUS CONTINUOUS
Status: DISCONTINUED | OUTPATIENT
Start: 2019-11-30 | End: 2019-12-02

## 2019-11-30 RX ORDER — ACETAMINOPHEN 325 MG/1
650 TABLET ORAL EVERY 6 HOURS PRN
Status: DISCONTINUED | OUTPATIENT
Start: 2019-11-30 | End: 2019-12-03 | Stop reason: HOSPADM

## 2019-11-30 RX ORDER — CHLORAL HYDRATE 500 MG
1000 CAPSULE ORAL DAILY
Status: DISCONTINUED | OUTPATIENT
Start: 2019-12-01 | End: 2019-12-03 | Stop reason: HOSPADM

## 2019-11-30 RX ORDER — ONDANSETRON 2 MG/ML
4 INJECTION INTRAMUSCULAR; INTRAVENOUS EVERY 6 HOURS PRN
Status: DISCONTINUED | OUTPATIENT
Start: 2019-11-30 | End: 2019-12-03 | Stop reason: HOSPADM

## 2019-11-30 RX ORDER — NITROGLYCERIN 0.4 MG/1
0.4 TABLET SUBLINGUAL
Status: DISCONTINUED | OUTPATIENT
Start: 2019-11-30 | End: 2019-12-03 | Stop reason: HOSPADM

## 2019-11-30 RX ORDER — METOPROLOL TARTRATE 50 MG/1
50 TABLET, FILM COATED ORAL EVERY 12 HOURS SCHEDULED
Status: DISCONTINUED | OUTPATIENT
Start: 2019-11-30 | End: 2019-12-03 | Stop reason: HOSPADM

## 2019-11-30 RX ORDER — CIPROFLOXACIN 2 MG/ML
400 INJECTION, SOLUTION INTRAVENOUS EVERY 12 HOURS
Status: DISCONTINUED | OUTPATIENT
Start: 2019-11-30 | End: 2019-12-02

## 2019-11-30 RX ORDER — ALPRAZOLAM 0.25 MG/1
0.25 TABLET ORAL 3 TIMES DAILY PRN
Status: DISCONTINUED | OUTPATIENT
Start: 2019-11-30 | End: 2019-12-03 | Stop reason: HOSPADM

## 2019-11-30 RX ORDER — ESCITALOPRAM OXALATE 10 MG/1
10 TABLET ORAL DAILY
Status: DISCONTINUED | OUTPATIENT
Start: 2019-12-01 | End: 2019-12-03 | Stop reason: HOSPADM

## 2019-11-30 RX ADMIN — METOPROLOL TARTRATE 50 MG: 50 TABLET, FILM COATED ORAL at 23:27

## 2019-11-30 RX ADMIN — SODIUM CHLORIDE 500 ML: 0.9 INJECTION, SOLUTION INTRAVENOUS at 21:04

## 2019-11-30 RX ADMIN — FENTANYL CITRATE 25 MCG: 50 INJECTION INTRAMUSCULAR; INTRAVENOUS at 19:43

## 2019-11-30 RX ADMIN — FAMOTIDINE 40 MG: 20 TABLET ORAL at 23:27

## 2019-11-30 RX ADMIN — ACETAMINOPHEN 650 MG: 325 TABLET ORAL at 23:33

## 2019-11-30 RX ADMIN — IOHEXOL 100 ML: 350 INJECTION, SOLUTION INTRAVENOUS at 20:08

## 2019-11-30 RX ADMIN — PIPERACILLIN SODIUM AND TAZOBACTAM SODIUM 3.38 G: 3; .375 INJECTION, POWDER, LYOPHILIZED, FOR SOLUTION INTRAVENOUS at 21:21

## 2019-11-30 RX ADMIN — IOHEXOL 50 ML: 240 INJECTION, SOLUTION INTRATHECAL; INTRAVASCULAR; INTRAVENOUS; ORAL at 18:18

## 2019-11-30 RX ADMIN — SODIUM CHLORIDE 1000 ML: 0.9 INJECTION, SOLUTION INTRAVENOUS at 18:13

## 2019-11-30 RX ADMIN — ATORVASTATIN CALCIUM 20 MG: 20 TABLET, FILM COATED ORAL at 23:27

## 2019-11-30 RX ADMIN — ONDANSETRON 4 MG: 2 INJECTION INTRAMUSCULAR; INTRAVENOUS at 19:43

## 2019-11-30 RX ADMIN — METOCLOPRAMIDE 10 MG: 5 INJECTION, SOLUTION INTRAMUSCULAR; INTRAVENOUS at 21:38

## 2019-11-30 RX ADMIN — ONDANSETRON 4 MG: 2 INJECTION INTRAMUSCULAR; INTRAVENOUS at 18:13

## 2019-11-30 RX ADMIN — FENTANYL CITRATE 25 MCG: 50 INJECTION INTRAMUSCULAR; INTRAVENOUS at 18:13

## 2019-11-30 NOTE — ED PROVIDER NOTES
History  Chief Complaint   Patient presents with    Vomiting     unable to keep fluids down     Diarrhea     Patient has been having diarrhea today and states that when she was vomiting she developed pain in her left hip down to her knee  She states she has been having pain across her chest with vomiting  This is an 69-year-old female with multiple complaints including nausea, vomiting, diarrhea, and right lower quadrant/hip pain  Patient states that approximately 2 days ago she started to have increased frequency of urination, yesterday she continued to have that despite stopping taking her Lasix  Today she had significant nausea and vomiting which was nonbloody nonbilious, as well as frequent bowel movements which she has difficulty explaining the characterization of  Patient noted that she also is having suprapubic, left lower quadrant and right lower quadrant pain which is severe  All of them are worse with movement and palpation and better with rest   Patient states she has been unable to keep anything down all day feels significantly dehydrated  Prior to Admission Medications   Prescriptions Last Dose Informant Patient Reported? Taking? ALPRAZolam (XANAX) 0 25 mg tablet  Self Yes No   Sig: Take 0 25 mg by mouth 3 (three) times a day as needed for anxiety  Gauze Pads & Dressings (COMBINE ABD) 5"X9" PADS  Self No No   Sig: by Does not apply route 3 (three) times a day   Gauze Pads & Dressings (GAUZE PADS 4"X4") 4"X4" PADS  Self No No   Sig: by Does not apply route 3 (three) times a day   Incontinent Wash (SOOTHE & COOL PERINEAL WASH) LIQD  Self No No   Sig: Apply 1 application topically 3 (three) times a day   Misc  Devices (SPRAY BOTTLE/PLASTIC 120ML) MISC  Self No No   Sig: by Does not apply route 3 (three) times a day Use to cleanse area 3 times daily or as needed with bowel movements     Multiple Vitamins-Minerals (PRESERVISION AREDS) CAPS  Self Yes No   Sig: Take 1 capsule by mouth daily     Omega-3 Fatty Acids (FISH OIL PO)  Self Yes No   Sig: Take 1,000 mg by mouth daily  acetaminophen (TYLENOL) 650 mg CR tablet  Self No No   Sig: Take 1 tablet (650 mg total) by mouth every 8 (eight) hours as needed for mild pain or moderate pain   apixaban (ELIQUIS) 5 mg   No No   Sig: Take 1 tablet (5 mg total) by mouth 2 (two) times a day   atorvastatin (LIPITOR) 20 mg tablet  Self Yes No   Sig: Take 20 mg by mouth daily after dinner  ergocalciferol (ERGOCALCIFEROL) 63556 UNITS capsule  Self Yes No   Sig: Take 50,000 Units by mouth once a week  Takes on    escitalopram (LEXAPRO) 10 mg tablet  Self Yes No   Sig: Take 10 mg by mouth daily    furosemide (LASIX) 20 mg tablet  Self Yes No   Si mg    metolazone (ZAROXOLYN) 2 5 mg tablet  Self No No   Sig: TAKE ONE TABLET BY MOUTH ONCE A WEEK OR  AS  DIRECTED  FOR  WEIGHT  GAIN   metoprolol tartrate (LOPRESSOR) 50 mg tablet   No No   Sig: Take 1 tablet (50 mg total) by mouth every 12 (twelve) hours   nitroglycerin (NITROSTAT) 0 4 mg SL tablet   No No   Sig: Place 1 tablet (0 4 mg total) under the tongue every 5 (five) minutes as needed for chest pain (times three for chest pain   If no relief after second tablet, call 911 )   potassium chloride (K-DUR,KLOR-CON) 20 mEq tablet   No No   Sig: Take 1 tablet (20 mEq total) by mouth daily   ranitidine (ZANTAC) 300 MG tablet  Self Yes No   Si mg       Facility-Administered Medications: None       Past Medical History:   Diagnosis Date    Angina pectoris (HCC)     Anxiety     C  difficile diarrhea     CAD (coronary artery disease)     Cardiac disease     Depression     GERD (gastroesophageal reflux disease)     History of colon polyps     History of hiatal hernia     Hx of bleeding disorder     hemorrhoids    Hyperlipidemia     Hypertension     Irregular heart beat     gets tachycardia    Shortness of breath     related to heart       Past Surgical History:   Procedure Laterality Date    CARDIAC ELECTROPHYSIOLOGY MAPPING AND ABLATION      CARDIAC SURGERY      CARDIAC SURGERY      CATARACT EXTRACTION Bilateral     CHOLECYSTECTOMY  1987    COLON SURGERY      repair of colon    COLONOSCOPY      COLONOSCOPY N/A 6/18/2018    Procedure: COLONOSCOPY;  Surgeon: Cathy Quinones DO;  [facility]; Service: Gastroenterology    COLONOSCOPY W/ CONTROL OF HEMORRHAGE      CORONARY ANGIOPLASTY WITH STENT PLACEMENT      reports two blockages not able to be stented    CORONARY ARTERY BYPASS GRAFT  2008    3 vessels    HERNIA REPAIR      hiatal hernia    HYSTERECTOMY      partial not due to malignancy    LAPAROTOMY N/A 9/26/2018    Procedure: LAPAROTOMY EXPLORATORY WITH  RIGHT HEMICOLECTOMY;  Surgeon: Cathy Quinones DO;  Location: [facility];  Service: Gastroenterology    LAPAROTOMY N/A 9/26/2018    Procedure: LAPAROTOMY EXPLORATORY WITH HEMICOLECTOMY;  Surgeon: Joyce Calderon DO;  [facility];  Service: General    WA COLONOSCOPY FLX DX W/COLLJ SPEC WHEN PFRMD N/A 9/26/2018    Procedure: COLONOSCOPY;  Surgeon: Cathy Quinones DO;  [facility];  Service: Gastroenterology    WA HEMORRHOIDECTOMY,INT/EXT,1 COLUMN/GROUP N/A 7/25/2019    Procedure: HEMORRHOIDECTOMY EXCISION;  Surgeon: Angela Quispe MD;  Location: [facility] MAIN OR;  Service: General    WA SIGMOIDOSCOPY FLX DX W/COLLJ SPEC BR/WA IF PFRMD N/A 6/21/2018    Procedure: [place] FLEXIBLE;  Surgeon: Cathy Quinones DO;  Location:  GI LAB; Service: Gastroenterology    WA SURG DIAGNOSTIC EXAM, ANORECTAL N/A 7/25/2019    Procedure: EXAM UNDER ANESTHESIA (EUA); Surgeon: Angela Quispe MD;  Location: [facility] MAIN OR;  Service: General       Family History   Problem Relation Age of Onset    Heart disease Mother     Cirrhosis Father     Lung cancer Brother      I have reviewed and agree with the history as documented      Social History     Tobacco Use    Smoking status: Former Smoker    Smokeless tobacco: Never Used    Tobacco comment: quit 15 yrs ago   Substance Use Topics    Alcohol use: No    Drug use: No        Review of Systems   Constitutional: Positive for chills and fatigue  Negative for activity change and fever  HENT: Negative for congestion  Eyes: Negative for visual disturbance  Respiratory: Negative for cough, chest tightness and shortness of breath  Cardiovascular: Positive for chest pain  Gastrointestinal: Positive for abdominal pain, diarrhea, nausea and vomiting  Endocrine: Positive for polyuria  Genitourinary: Negative for dysuria  Skin: Negative for rash  Neurological: Negative for dizziness and numbness  Weakness: generalized  Physical Exam  Physical Exam   Constitutional: She is oriented to person, place, and time  She appears well-developed and well-nourished  HENT:   Head: Normocephalic and atraumatic  Eyes: Pupils are equal, round, and reactive to light  Conjunctivae and EOM are normal    Neck: Normal range of motion  Neck supple  Cardiovascular: Normal rate, regular rhythm and normal heart sounds  Pulmonary/Chest: Effort normal and breath sounds normal  No respiratory distress  Abdominal: Soft  Bowel sounds are normal  She exhibits no distension  There is tenderness  There is no rebound and no guarding  Musculoskeletal: Normal range of motion  Neurological: She is alert and oriented to person, place, and time  Skin: Skin is warm and dry  Capillary refill takes less than 2 seconds  Psychiatric: She has a normal mood and affect   Her behavior is normal        Vital Signs  ED Triage Vitals [11/30/19 1739]   Temperature Pulse Respirations Blood Pressure SpO2   97 6 °F (36 4 °C) 98 16 119/66 95 %      Temp Source Heart Rate Source Patient Position - Orthostatic VS BP Location FiO2 (%)   Temporal Monitor Sitting Left arm --      Pain Score       8           Vitals:    11/30/19 1739   BP: 119/66   Pulse: 98   Patient Position - Orthostatic VS: Sitting Visual Acuity      ED Medications  Medications   ondansetron (ZOFRAN) injection 4 mg (has no administration in time range)   fentanyl citrate (PF) 100 MCG/2ML 25 mcg (has no administration in time range)       Diagnostic Studies  Results Reviewed     Procedure Component Value Units Date/Time    Clostridium difficile toxin by PCR [[id number]]     Lab Status:  No result Specimen:  Stool     Troponin I [[id number]]     Lab Status:  No result Specimen:  Blood     CBC and differential [[id number]]     Lab Status:  No result Specimen:  Blood     CMP [[id number]]     Lab Status:  No result Specimen:  Blood     Lipase [[id number]]     Lab Status:  No result Specimen:  Blood     UA w Reflex to Microscopic w Reflex to Culture [[id number]]     Lab Status:  No result Specimen:  Urine                  CT Abdomen pelvis with contrast    (Results Pending)              Procedures  Procedures       ED Course                               MDM  Number of Diagnoses or Management Options  Diverticulitis: new and requires workup  Diagnosis management comments: This is an [de-identified] female with diverticulitis  Patient has known diverticulosis, lower abdominal pain with CT demonstrating diverticulitis and an elevated white count  Patient started on Zosyn  Patient given fluid IV  Patient given Zofran x2 but continued to have some nausea  Patient started on Reglan  Patient no longer tachycardic  Patient's pain decreased with fentanyl x2  Patient presents for the hospitalist service for further management  Patient and daughter state understanding and agreement with the plan         Amount and/or Complexity of Data Reviewed  Clinical lab tests: ordered and reviewed  Tests in the radiology section of CPT®: ordered  Discuss the patient with other providers: yes        Disposition  Final diagnoses:   None     ED Disposition     None      Follow-up Information    None         Patient's Medications   Discharge Prescriptions    No medications on file     No discharge procedures on file      ED Provider  Electronically Signed by           Jayne Fonseca MD  11/30/19 6639

## 2019-12-01 PROBLEM — R11.2 INTRACTABLE VOMITING WITH NAUSEA: Status: ACTIVE | Noted: 2018-06-17

## 2019-12-01 PROBLEM — R19.7 NAUSEA, VOMITING AND DIARRHEA: Status: ACTIVE | Noted: 2018-06-17

## 2019-12-01 LAB
ANION GAP SERPL CALCULATED.3IONS-SCNC: 10 MMOL/L (ref 4–13)
BACTERIA UR QL AUTO: ABNORMAL /HPF
BASOPHILS # BLD AUTO: 0 THOUSANDS/ΜL (ref 0–0.1)
BASOPHILS NFR BLD AUTO: 0 % (ref 0–2)
BILIRUB UR QL STRIP: ABNORMAL
BUN SERPL-MCNC: 18 MG/DL (ref 7–25)
CALCIUM SERPL-MCNC: 8.4 MG/DL (ref 8.6–10.5)
CHLORIDE SERPL-SCNC: 109 MMOL/L (ref 98–107)
CLARITY UR: ABNORMAL
CO2 SERPL-SCNC: 24 MMOL/L (ref 21–31)
COLOR UR: YELLOW
CREAT SERPL-MCNC: 0.88 MG/DL (ref 0.6–1.2)
EOSINOPHIL # BLD AUTO: 0 THOUSAND/ΜL (ref 0–0.61)
EOSINOPHIL NFR BLD AUTO: 0 % (ref 0–5)
ERYTHROCYTE [DISTWIDTH] IN BLOOD BY AUTOMATED COUNT: 14.5 % (ref 11.5–14.5)
GFR SERPL CREATININE-BSD FRML MDRD: 62 ML/MIN/1.73SQ M
GLUCOSE SERPL-MCNC: 94 MG/DL (ref 65–99)
GLUCOSE UR STRIP-MCNC: NEGATIVE MG/DL
HCT VFR BLD AUTO: 36.3 % (ref 42–47)
HGB BLD-MCNC: 12.5 G/DL (ref 12–16)
HGB UR QL STRIP.AUTO: ABNORMAL
KETONES UR STRIP-MCNC: NEGATIVE MG/DL
LEUKOCYTE ESTERASE UR QL STRIP: ABNORMAL
LYMPHOCYTES # BLD AUTO: 0.5 THOUSANDS/ΜL (ref 0.6–4.47)
LYMPHOCYTES NFR BLD AUTO: 8 % (ref 21–51)
MCH RBC QN AUTO: 33 PG (ref 26–34)
MCHC RBC AUTO-ENTMCNC: 34.5 G/DL (ref 31–37)
MCV RBC AUTO: 96 FL (ref 81–99)
MONOCYTES # BLD AUTO: 0.4 THOUSAND/ΜL (ref 0.17–1.22)
MONOCYTES NFR BLD AUTO: 6 % (ref 2–12)
NEUTROPHILS # BLD AUTO: 5.9 THOUSANDS/ΜL (ref 1.4–6.5)
NEUTS SEG NFR BLD AUTO: 86 % (ref 42–75)
NITRITE UR QL STRIP: NEGATIVE
NON-SQ EPI CELLS URNS QL MICRO: ABNORMAL /HPF
PH UR STRIP.AUTO: 5 [PH]
PLATELET # BLD AUTO: 110 THOUSANDS/UL (ref 149–390)
PMV BLD AUTO: 7.7 FL (ref 8.6–11.7)
POTASSIUM SERPL-SCNC: 3.3 MMOL/L (ref 3.5–5.5)
PROT UR STRIP-MCNC: NEGATIVE MG/DL
RBC # BLD AUTO: 3.79 MILLION/UL (ref 3.9–5.2)
RBC #/AREA URNS AUTO: ABNORMAL /HPF
SODIUM SERPL-SCNC: 143 MMOL/L (ref 134–143)
SP GR UR STRIP.AUTO: 1.02 (ref 1–1.03)
UROBILINOGEN UR QL STRIP.AUTO: 0.2 E.U./DL
WBC # BLD AUTO: 6.8 THOUSAND/UL (ref 4.8–10.8)
WBC #/AREA URNS AUTO: ABNORMAL /HPF

## 2019-12-01 PROCEDURE — 85025 COMPLETE CBC W/AUTO DIFF WBC: CPT | Performed by: PHYSICIAN ASSISTANT

## 2019-12-01 PROCEDURE — 99232 SBSQ HOSP IP/OBS MODERATE 35: CPT | Performed by: NURSE PRACTITIONER

## 2019-12-01 PROCEDURE — 81001 URINALYSIS AUTO W/SCOPE: CPT | Performed by: PHYSICIAN ASSISTANT

## 2019-12-01 PROCEDURE — 80048 BASIC METABOLIC PNL TOTAL CA: CPT | Performed by: PHYSICIAN ASSISTANT

## 2019-12-01 PROCEDURE — 87086 URINE CULTURE/COLONY COUNT: CPT | Performed by: NURSE PRACTITIONER

## 2019-12-01 RX ORDER — BUTALBITAL, ACETAMINOPHEN AND CAFFEINE 50; 325; 40 MG/1; MG/1; MG/1
1 TABLET ORAL EVERY 6 HOURS PRN
Status: DISCONTINUED | OUTPATIENT
Start: 2019-12-01 | End: 2019-12-03 | Stop reason: HOSPADM

## 2019-12-01 RX ORDER — POTASSIUM CHLORIDE 14.9 MG/ML
20 INJECTION INTRAVENOUS ONCE
Status: COMPLETED | OUTPATIENT
Start: 2019-12-01 | End: 2019-12-01

## 2019-12-01 RX ADMIN — POTASSIUM CHLORIDE 20 MEQ: 1500 TABLET, EXTENDED RELEASE ORAL at 08:31

## 2019-12-01 RX ADMIN — ACETAMINOPHEN 650 MG: 325 TABLET ORAL at 12:54

## 2019-12-01 RX ADMIN — FAMOTIDINE 40 MG: 20 TABLET ORAL at 21:35

## 2019-12-01 RX ADMIN — OMEGA-3 FATTY ACIDS CAP 1000 MG 1000 MG: 1000 CAP at 08:31

## 2019-12-01 RX ADMIN — FUROSEMIDE 40 MG: 40 TABLET ORAL at 05:38

## 2019-12-01 RX ADMIN — SODIUM CHLORIDE 75 ML/HR: 9 INJECTION, SOLUTION INTRAVENOUS at 17:51

## 2019-12-01 RX ADMIN — APIXABAN 5 MG: 5 TABLET, FILM COATED ORAL at 08:31

## 2019-12-01 RX ADMIN — METRONIDAZOLE 500 MG: 500 INJECTION, SOLUTION INTRAVENOUS at 00:00

## 2019-12-01 RX ADMIN — CIPROFLOXACIN 400 MG: 2 INJECTION, SOLUTION INTRAVENOUS at 01:00

## 2019-12-01 RX ADMIN — METOPROLOL TARTRATE 50 MG: 50 TABLET, FILM COATED ORAL at 21:36

## 2019-12-01 RX ADMIN — ONDANSETRON 4 MG: 2 INJECTION INTRAMUSCULAR; INTRAVENOUS at 11:01

## 2019-12-01 RX ADMIN — ACETAMINOPHEN 650 MG: 325 TABLET ORAL at 05:44

## 2019-12-01 RX ADMIN — METRONIDAZOLE 500 MG: 500 INJECTION, SOLUTION INTRAVENOUS at 15:30

## 2019-12-01 RX ADMIN — BUTALBITAL, ACETAMINOPHEN AND CAFFEINE 1 TABLET: 50; 325; 40 TABLET ORAL at 16:03

## 2019-12-01 RX ADMIN — VANCOMYCIN HYDROCHLORIDE 125 MG: 500 INJECTION, POWDER, LYOPHILIZED, FOR SOLUTION INTRAVENOUS at 08:31

## 2019-12-01 RX ADMIN — VANCOMYCIN HYDROCHLORIDE 125 MG: 500 INJECTION, POWDER, LYOPHILIZED, FOR SOLUTION INTRAVENOUS at 01:00

## 2019-12-01 RX ADMIN — POTASSIUM CHLORIDE 20 MEQ: 14.9 INJECTION, SOLUTION INTRAVENOUS at 11:01

## 2019-12-01 RX ADMIN — ATORVASTATIN CALCIUM 20 MG: 20 TABLET, FILM COATED ORAL at 17:47

## 2019-12-01 RX ADMIN — APIXABAN 5 MG: 5 TABLET, FILM COATED ORAL at 17:47

## 2019-12-01 RX ADMIN — SODIUM CHLORIDE 75 ML/HR: 9 INJECTION, SOLUTION INTRAVENOUS at 00:00

## 2019-12-01 RX ADMIN — ESCITALOPRAM OXALATE 10 MG: 10 TABLET ORAL at 08:31

## 2019-12-01 RX ADMIN — METOPROLOL TARTRATE 50 MG: 50 TABLET, FILM COATED ORAL at 08:31

## 2019-12-01 RX ADMIN — ACETAMINOPHEN 650 MG: 325 TABLET ORAL at 21:35

## 2019-12-01 RX ADMIN — METRONIDAZOLE 500 MG: 500 INJECTION, SOLUTION INTRAVENOUS at 08:31

## 2019-12-01 RX ADMIN — CIPROFLOXACIN 400 MG: 2 INJECTION, SOLUTION INTRAVENOUS at 11:07

## 2019-12-01 RX ADMIN — VANCOMYCIN HYDROCHLORIDE 125 MG: 500 INJECTION, POWDER, LYOPHILIZED, FOR SOLUTION INTRAVENOUS at 21:34

## 2019-12-01 NOTE — ASSESSMENT & PLAN NOTE
· Urinalysis shows moderate bacteria with trace leuks  · Receiving cipro and flagyl for diverticulitis- will continue with those     · Check urine culture

## 2019-12-01 NOTE — ASSESSMENT & PLAN NOTE
Wt Readings from Last 3 Encounters:   11/30/19 79 7 kg (175 lb 11 3 oz)   11/13/19 75 8 kg (167 lb)   10/17/19 77 1 kg (170 lb)     Continue pre-hospital Lopressor 50 mg p  O  B i d , nitroglycerin 0 4 mg sublingual Q 5 minutes p r n , Lasix 40 mg p o   Daily and obtain daily weights

## 2019-12-01 NOTE — PLAN OF CARE
Problem: PAIN - ADULT  Goal: Verbalizes/displays adequate comfort level or baseline comfort level  Description  Interventions:  - Encourage patient to monitor pain and request assistance  - Assess pain using appropriate pain scale  - Administer analgesics based on type and severity of pain and evaluate response  - Implement non-pharmacological measures as appropriate and evaluate response  - Consider cultural and social influences on pain and pain management  - Notify physician/advanced practitioner if interventions unsuccessful or patient reports new pain  Outcome: Progressing     Problem: INFECTION - ADULT  Goal: Absence or prevention of progression during hospitalization  Description  INTERVENTIONS:  - Assess and monitor for signs and symptoms of infection  - Monitor lab/diagnostic results  - Monitor all insertion sites, i e  indwelling lines, tubes, and drains  - Monitor endotracheal if appropriate and nasal secretions for changes in amount and color  - [place] appropriate cooling/warming therapies per order  - Administer medications as ordered  - Instruct and encourage patient and family to use good hand hygiene technique  - Identify and instruct in appropriate isolation precautions for identified infection/condition  Outcome: Progressing  Goal: Absence of fever/infection during neutropenic period  Description  INTERVENTIONS:  - Monitor WBC    Outcome: Progressing     Problem: SAFETY ADULT  Goal: Patient will remain free of falls  Description  INTERVENTIONS:  - Assess patient frequently for physical needs  -  Identify cognitive and physical deficits and behaviors that affect risk of falls    -  [place] fall precautions as indicated by assessment   - Educate patient/family on patient safety including physical limitations  - Instruct patient to call for assistance with activity based on assessment  - Modify environment to reduce risk of injury  - Consider OT/PT consult to assist with strengthening/mobility  Outcome: Progressing  Goal: Maintain or return to baseline ADL function  Description  INTERVENTIONS:  -  Assess patient's ability to carry out ADLs; assess patient's baseline for ADL function and identify physical deficits which impact ability to perform ADLs (bathing, care of mouth/teeth, toileting, grooming, dressing, etc )  - Assess/evaluate cause of self-care deficits   - Assess range of motion  - Assess patient's mobility; develop plan if impaired  - Assess patient's need for assistive devices and provide as appropriate  - Encourage maximum independence but intervene and supervise when necessary  - Involve family in performance of ADLs  - Assess for home care needs following discharge   - Consider OT consult to assist with ADL evaluation and planning for discharge  - Provide patient education as appropriate  Outcome: Progressing  Goal: Maintain or return mobility status to optimal level  Description  INTERVENTIONS:  - Assess patient's baseline mobility status (ambulation, transfers, stairs, etc )    - Identify cognitive and physical deficits and behaviors that affect mobility  - Identify mobility aids required to assist with transfers and/or ambulation (gait belt, sit-to-stand, lift, walker, cane, etc )  - [place] fall precautions as indicated by assessment  - Record patient progress and toleration of activity level on Mobility SBAR; progress patient to next Phase/Stage  - Instruct patient to call for assistance with activity based on assessment  - Consider rehabilitation consult to assist with strengthening/weightbearing, etc   Outcome: Progressing     Problem: DISCHARGE PLANNING  Goal: Discharge to home or other facility with appropriate resources  Description  INTERVENTIONS:  - Identify barriers to discharge w/patient and caregiver  - Arrange for needed discharge resources and transportation as appropriate  - Identify discharge learning needs (meds, wound care, etc )  - Arrange for interpretive services to assist at discharge as needed  - Refer to Case Management Department for coordinating discharge planning if the patient needs post-hospital services based on physician/advanced practitioner order or complex needs related to functional status, cognitive ability, or social support system  Outcome: Progressing     Problem: Knowledge Deficit  Goal: Patient/family/caregiver demonstrates understanding of disease process, treatment plan, medications, and discharge instructions  Description  Complete learning assessment and assess knowledge base    Interventions:  - Provide teaching at level of understanding  - Provide teaching via preferred learning methods  Outcome: Progressing

## 2019-12-01 NOTE — PROGRESS NOTES
Progress Note - Tangela Fuller 1940, [de-identified] y o  female MRN: [id number]    Unit/Bed#: [facility] 112-02 Encounter: [id number]    Primary Care Provider: Catherine Bray MD   Date and time admitted to hospital: 11/30/2019  5:39 PM        * Diverticulitis  Assessment & Plan  · No evidence of complications- no abscess or free air per CT a/p  · Clear liquid diet   · Continue with IVF   · Ciprofloxacin and metronidazole day#2  · Continue with PO vanco for c diff prophylaxis  · Give Zofran p r n  Nausea  · Pain control   · Clinically is improving   · Will hold off GI consult for now     Diarrhea of presumed infectious origin  Assessment & Plan  · Patient has a history of C diff colitis in the past  · Continue with oral vancomycin for prophylaxis    Acute cystitis without hematuria  Assessment & Plan  · Urinalysis shows moderate bacteria with trace leuks  · Receiving cipro and flagyl for diverticulitis- will continue with those  · Check urine culture     Nausea, vomiting and diarrhea  Assessment & Plan  · Likely secondary to diverticulitis with possible gastroenteritis/rule out C diff  · Clinically is improving   · Will start on pepcid   · C diff- pending     Chronic diastolic heart failure (HCC)  Assessment & Plan  Wt Readings from Last 3 Encounters:   11/30/19 79 7 kg (175 lb 11 3 oz)   11/13/19 75 8 kg (167 lb)   10/17/19 77 1 kg (170 lb)     · In no acute exacerbation   · Continue Lopressor   · Daily weight  · Will hold lasix for now while on IVF, n/v and diarrhea            Hypercholesterolemia  Assessment & Plan  · Continue Lipitor    Essential hypertension  Assessment & Plan  · Continue Lopressor   · BP is controlled  Will monitor  VTE Pharmacologic Prophylaxis: Pharmacologic: Apixaban (Eliquis)    Patient Centered Rounds: I have performed bedside rounds with nursing staff today      Discussions with Specialists or Other Care Team Provider: nursing, Rx  Education and Discussions with Family / Patient: patient     Current Length of Stay: 0 day(s)    Current Patient Status: Inpatient   Certification Statement: The patient will continue to require additional inpatient hospital stay due to Diverticulitis with diarrhea and unable to tolerate diet    Discharge Plan:  Pending hospital course  Hopeful in next 24-48 hours  Code Status: Level 1 - Full Code    Subjective:   Feeling slightly better but continues to have diarrhea  4 episode since this AM  Some nausea but no vomiting  Objective:     Vitals:   Temp (24hrs), Av 3 °F (36 8 °C), Min:97 6 °F (36 4 °C), Max:99 3 °F (37 4 °C)    Temp:  [97 6 °F (36 4 °C)-99 3 °F (37 4 °C)] 98 °F (36 7 °C)  HR:  [] 81  Resp:  [16-21] 21  BP: (104-182)/(56-84) 118/56  SpO2:  [93 %-99 %] 93 %  Body mass index is 29 24 kg/m²  Input and Output Summary (last 24 hours): Intake/Output Summary (Last 24 hours) at 2019 1534  Last data filed at 2019 0000  Gross per 24 hour   Intake 2000 ml   Output    Net 2000 ml       Physical Exam:     Physical Exam   Constitutional: She is oriented to person, place, and time  She appears well-developed and well-nourished  No distress  HENT:   Head: Normocephalic and atraumatic  Mouth/Throat: Oropharynx is clear and moist    Eyes: Pupils are equal, round, and reactive to light  Conjunctivae and EOM are normal    Neck: Normal range of motion  Neck supple  No thyromegaly present  Cardiovascular: Normal rate, regular rhythm and normal heart sounds  Exam reveals no gallop and no friction rub  No murmur heard  Pulmonary/Chest: Effort normal and breath sounds normal  She has no wheezes  She has no rales  Abdominal: Soft  Bowel sounds are normal  She exhibits no distension  There is tenderness (mild LLQ)  There is no rebound  Musculoskeletal: Normal range of motion  She exhibits no edema, tenderness or deformity  Neurological: She is alert and oriented to person, place, and time  No cranial nerve deficit     Skin: Skin is warm and dry  No rash noted  No erythema  Psychiatric: She has a normal mood and affect  Her behavior is normal  Judgment and thought content normal    Vitals reviewed  Additional Data:     Labs:    Results from last 7 days   Lab Units 12/01/19  0450   WBC Thousand/uL 6 80   HEMOGLOBIN g/dL 12 5   HEMATOCRIT % 36 3*   PLATELETS Thousands/uL 110*   NEUTROS PCT % 86*   LYMPHS PCT % 8*   MONOS PCT % 6   EOS PCT % 0     Results from last 7 days   Lab Units 12/01/19  0450 11/30/19  1809   POTASSIUM mmol/L 3 3* 4 2   CHLORIDE mmol/L 109* 105   CO2 mmol/L 24 26   BUN mg/dL 18 21   CREATININE mg/dL 0 88 0 97   CALCIUM mg/dL 8 4* 9 6   ALK PHOS U/L  --  128   ALT U/L  --  25   AST U/L  --  32                   * I Have Reviewed All Lab Data Listed Above  * Additional Pertinent Lab Tests Reviewed:  [place] [facility] Admission  Reviewed    Imaging:  Imaging Reports Reviewed Today Include: ct a/p    Recent Cultures (last 7 days):           Last 24 Hours Medication List:     Current Facility-Administered Medications:  acetaminophen 650 mg Oral Q6H PRN Janis Quill, PA-C    ALPRAZolam 0 25 mg Oral TID PRN Janis Quill, PA-C    apixaban 5 mg Oral BID Janis Quill, PA-C    atorvastatin 20 mg Oral After Og Rosin, PA-C    ciprofloxacin 400 mg Intravenous Q12H Janis Quill, PA-C Last Rate: 400 mg (12/01/19 1107)   escitalopram 10 mg Oral Daily Janis Quill, PA-C    famotidine 40 mg Oral HS Janis Quill, PA-C    fish oil 1,000 mg Oral Daily Janis Quill, PA-C    iohexol 50 mL Oral Once in imaging Fabiana Pisano MD    metoprolol tartrate 50 mg Oral Q12H [address], PA-C    metroNIDAZOLE 500 mg Intravenous Q8H Janis Quill, PA-C Last Rate: 500 mg (12/01/19 1530)   nitroglycerin 0 4 mg Sublingual Q5 Min PRN Janis Quill, PA-C    ondansetron 4 mg Intravenous Q6H PRN Janis Quill, PA-C    potassium chloride 20 mEq Oral Daily Janis Quill, PA-C    sodium chloride 75 mL/hr Intravenous Continuous Jayde Alfaro PA-C Last Rate: 75 mL/hr (12/01/19 0000)   vancomycin 125 mg Oral Q12H [facility] Jayde Alfaro PA-C         Today, Patient Was Seen By: ANTHONY Oviedo    ** Please Note: Dictation voice to text software may have been used in the creation of this document   **

## 2019-12-01 NOTE — ASSESSMENT & PLAN NOTE
Likely secondary to diverticulitis but these patient unable to tolerate p o  Antibiotics on an outpatient basis will be Zofran p r n

## 2019-12-01 NOTE — ASSESSMENT & PLAN NOTE
· Patient has a history of C diff colitis in the past  · Continue with oral vancomycin for prophylaxis

## 2019-12-01 NOTE — ASSESSMENT & PLAN NOTE
· No evidence of complications- no abscess or free air per CT a/p  · Clear liquid diet   · Continue with IVF   · Ciprofloxacin and metronidazole day#2  · Continue with PO vanco for c diff prophylaxis  · Give Zofran p r n   Nausea  · Pain control   · Clinically is improving   · Will hold off GI consult for now

## 2019-12-01 NOTE — UTILIZATION REVIEW
Initial Clinical Review    Admission: Date/Time/Statement:  Placed in Observation status  On 11/30 @ 21:07 changed to inpatient admission  On 12/1 @ 15:34  12/01/19 1534  Inpatient Admission      Transfer Service: General Medicine    Expected Discharge Date: 12/03/19       Question Answer Comment   Admitting Physician Ivonne BIGGS    Level of Care Med Surg    Estimated length of stay More than 2 Midnights    Certification I certify that inpatient services are medically necessary for this patient for a duration of greater than two midnights  See H&P and MD Progress Notes for additional information about the patient's course of treatment  ED Arrival Information     Expected Arrival Acuity Means of Arrival Escorted By Service Admission Type    - 11/30/2019 17:36 Urgent Wheelchair Family Member General Medicine Urgent    Arrival Complaint    vomiting and diarreah        Chief Complaint   Patient presents with    Vomiting     unable to keep fluids down     Diarrhea     Patient has been having diarrhea today and states that when she was vomiting she developed pain in her left hip down to her knee  She states she has been having pain across her chest with vomiting  Assessment/Plan: [de-identified] yo f with a pmh of angina, anxiety, CAD, C diff colitis, depression, GERD,  Colon polyps,  hiatial hernia,  Hemorrhoids,  HLD, HTN,  presents to the Er with complaints of nausea & vomiting with diarrhea ( > 10 episodes)   X 1 day  Additionally she reports increased urination the past two days despite stopping her lasix, along with suprapubic,  LLQ and RLQ pain which is severe  She feels dehydrated due to her being unable to keep anything down  CT Showed diverticulitis  Placed on IVF's and ABX's admitted  to Observation status         ED Triage Vitals [11/30/19 1739]   Temperature Pulse Respirations Blood Pressure SpO2   97 6 °F (36 4 °C) 98 16 119/66 95 %      Temp Source Heart Rate Source Patient Position - Orthostatic VS BP Location FiO2 (%)   Temporal Monitor Sitting Left arm --      Pain Score       8        Wt Readings from Last 1 Encounters:   11/30/19 79 7 kg (175 lb 11 3 oz)     Additional Vital Signs:   Date/Time  Temp  Pulse  Resp  BP  SpO2  O2 Device  Patient Position - Orthostatic VS   12/01/19 0838            None (Room air)     12/01/19 0831        118/62         12/01/19 0721  98 2 °F (36 8 °C)  91  20  104/59  93 %  None (Room air)  Lying   11/30/19 2333            None (Room air)     11/30/19 2327    106Abnormal     134/78         11/30/19 2146  99 3 °F (37 4 °C)  107Abnormal   20  153/67  96 %  None (Room air)  Lying   11/30/19 2100    107Abnormal   21  153/65  95 %       11/30/19 1945    87  20  182/84Abnormal   99 %  None (Room air)           Pertinent Labs/Diagnostic Test Results:   Results from last 7 days   Lab Units 12/01/19  0450 11/30/19  1809   WBC Thousand/uL 6 80 9 50   HEMOGLOBIN g/dL 12 5 14 3   HEMATOCRIT % 36 3* 41 6*   PLATELETS Thousands/uL 110* 129*   NEUTROS ABS Thousands/µL 5 90  --    TOTAL NEUT ABS Thousand/uL  --  9 12*     Results from last 7 days   Lab Units 12/01/19  0450 11/30/19  1809   SODIUM mmol/L 143 143   POTASSIUM mmol/L 3 3* 4 2   CHLORIDE mmol/L 109* 105   CO2 mmol/L 24 26   ANION GAP mmol/L 10 12   BUN mg/dL 18 21   CREATININE mg/dL 0 88 0 97   EGFR ml/min/1 73sq m 62 55   CALCIUM mg/dL 8 4* 9 6     Results from last 7 days   Lab Units 11/30/19  1809   AST U/L 32   ALT U/L 25   ALK PHOS U/L 128   TOTAL PROTEIN g/dL 7 4   ALBUMIN g/dL 4 5   TOTAL BILIRUBIN mg/dL 1 10*     Results from last 7 days   Lab Units 12/01/19  0450 11/30/19  1809   GLUCOSE RANDOM mg/dL 94 124*       Results from last 7 days   Lab Units 11/30/19  1809   TROPONIN I ng/mL <0 03     Results from last 7 days   Lab Units 11/30/19  1809   LIPASE u/L 11     Results from last 7 days   Lab Units 11/30/19  1809   TOTAL COUNTED  100     CT Abdomen pelvis with contrast (11/30 2041)   1  Findings compatible with acute sigmoid diverticulitis  No pericolonic collection to suggest abscess  2   Hepatomegaly and hepatic steatosis  3   Cardiomegaly     4   Compression fracture of T10 stable from chest radiograph 7/16/2019       ED Treatment:   Medication Administration from 11/30/2019 1735 to 11/30/2019 2143       Date/Time Order Dose Route Action     11/30/2019 1813 ondansetron (ZOFRAN) injection 4 mg 4 mg Intravenous Given     11/30/2019 1813 fentanyl citrate (PF) 100 MCG/2ML 25 mcg 25 mcg Intravenous Given     11/30/2019 1813 sodium chloride 0 9 % bolus 1,000 mL 1,000 mL Intravenous New Bag     11/30/2019 1818 iohexol (OMNIPAQUE) 240 MG/ML solution 50 mL 50 mL Oral Given     11/30/2019 2104 sodium chloride 0 9 % bolus 500 mL 500 mL Intravenous New Bag     11/30/2019 1943 ondansetron (ZOFRAN) injection 4 mg 4 mg Intravenous Given     11/30/2019 1943 fentanyl citrate (PF) 100 MCG/2ML 25 mcg 25 mcg Intravenous Given     11/30/2019 2121 piperacillin-tazobactam (ZOSYN) 3 375 g in sodium chloride 0 9 % 50 mL IVPB 3 375 g Intravenous New Bag     11/30/2019 2138 metoclopramide (REGLAN) injection 10 mg 10 mg Intravenous Given        Past Medical History:   Diagnosis Date    Angina pectoris (HCC)     Anxiety     C  difficile diarrhea     CAD (coronary artery disease)     Cardiac disease     Depression     GERD (gastroesophageal reflux disease)     History of colon polyps     History of hiatal hernia     Hx of bleeding disorder     hemorrhoids    Hyperlipidemia     Hypertension     Irregular heart beat     gets tachycardia    Shortness of breath     related to heart     Present on Admission:   Diverticulitis   Hypertension   Hypercholesterolemia   Chronic diastolic heart failure (HCC)   Vomiting   Anemia   Diarrhea of presumed infectious origin      Admitting Diagnosis: Diverticulitis [K57 92]  Vomiting [R11 10]  Age/Sex: [de-identified] y o  female  Admission Orders:  Scheduled Medications:    Medications:  apixaban 5 mg Oral BID   atorvastatin 20 mg Oral After Dinner   ciprofloxacin 400 mg Intravenous Q12H   escitalopram 10 mg Oral Daily   famotidine 40 mg Oral HS   fish oil 1,000 mg Oral Daily   furosemide 40 mg Oral Early Morning   metoprolol tartrate 50 mg Oral Q12H ISIDRO   metroNIDAZOLE 500 mg Intravenous Q8H   potassium chloride 20 mEq Oral Daily   vancomycin 125 mg Oral Q12H [facility]     Continuous IV Infusions:    sodium chloride 75 mL/hr Intravenous Continuous     PRN Meds:    acetaminophen 650 mg Oral Q6H PRN 11/30 x 1  12/1 x 1    ALPRAZolam 0 25 mg Oral TID PRN    nitroglycerin 0 4 mg Sublingual Q5 Min PRN    ondansetron 4 mg Intravenous Q6H PRN        Nursing Orders -  VS q 4 - up & OOB as tolerated - SCD's to le's-  Diet NPO     Network Utilization Review Department  [email or web address]  ATTENTION: Please call with any questions or concerns to [phone] and carefully listen to the prompts so that you are directed to the right person  All voicemails are confidential   Haris Boyd all requests for admission clinical reviews, approved or denied determinations and any other requests to dedicated fax number below belonging to the campus where the patient is receiving treatment    FACILITY NAME UR FAX NUMBER   ADMISSION DENIALS (Administrative/Medical Necessity) [address] [facility] (Maternity/NICU/Pediatrics) [phone]   [facility] [address] [facility] [phone]   [address] [place] [address] [address] [facility] [phone]   Forestine Bitter [address] [facility] [address] Edwina Merlin [phone]

## 2019-12-01 NOTE — ASSESSMENT & PLAN NOTE
Wt Readings from Last 3 Encounters:   11/30/19 79 7 kg (175 lb 11 3 oz)   11/13/19 75 8 kg (167 lb)   10/17/19 77 1 kg (170 lb)     · In no acute exacerbation   · Continue Lopressor   · Daily weight  · Will hold lasix for now while on IVF, n/v and diarrhea

## 2019-12-01 NOTE — ASSESSMENT & PLAN NOTE
· Placed in observation Medicine  · Make patient NPO except for meds and advance as tolerated  · Give Zofran p r n  Nausea  · An control as needed  · Give Cipro and Flagyl IV  · Repeat labs in a m

## 2019-12-01 NOTE — H&P
H&P- Tangela Fuller 1940, [de-identified] y o  female MRN: [id number]    Unit/Bed#: [place] [address] Encounter: [id number]    Primary Care Provider: Catherine Bray MD   Date and time admitted to hospital: 11/30/2019  5:39 PM        * Diverticulitis  Assessment & Plan  · Placed in observation Medicine  · Make patient NPO except for meds and advance as tolerated  · Give Zofran p r n  Nausea  · An control as needed  · Give Cipro and Flagyl IV  · Repeat labs in a m  Diarrhea of presumed infectious origin  Assessment & Plan  Patient has a history of C diff colitis in the past, will place cautionary isolation while pending C diff results    Vomiting  Assessment & Plan  Likely secondary to diverticulitis but these patient unable to tolerate p o  Antibiotics on an outpatient basis will be Zofran p r n  Chronic diastolic heart failure (HCC)  Assessment & Plan  Wt Readings from Last 3 Encounters:   11/30/19 79 7 kg (175 lb 11 3 oz)   11/13/19 75 8 kg (167 lb)   10/17/19 77 1 kg (170 lb)     Continue pre-hospital Lopressor 50 mg p  O  B i d , nitroglycerin 0 4 mg sublingual Q 5 minutes p r n , Lasix 40 mg p o  Daily and obtain daily weights          Anemia  Assessment & Plan  This appears to be at baseline will continue monitor with repeat labs in a m  Hypercholesterolemia  Assessment & Plan  Continue pre-hospital Lipitor 20 mg p o  Daily    Hypertension  Assessment & Plan  Continue pre-hospital Lopressor 50 mg p o  B i d  And Lasix 40 mg p o  Daily    VTE Prophylaxis: Apixaban (Eliquis)  Code Status:  Level 1  POLST: There is no POLST form on file for this patient (pre-hospital)  Discussion with family:  Daughter is present at bedside at time of exam, diagnosis and treatment plan reviewed all questions answered    Anticipated Length of Stay:  Patient will be admitted on an Observation basis with an anticipated length of stay of  < 2 midnights     Justification for Hospital Stay:  Diverticulitis with protracted vomiting requiring IV fluid resuscitation as well as IV antibiotics  Chief Complaint:   Nausea, vomiting and diarrhea x1 day    History of Present Illness:    Chrissy Pisano is a [de-identified] y o  female who presents with nausea vomiting and diarrhea x1 day  Patient presents to ER for further evaluation treatment a 1 day history of multiple episodes of vomiting and diarrhea approximately 10 times today that began acutely  This is not accompanied with any bright red blood per rectum or abdominal pain  Additionally complains of some increased urination for the past 2 days this continued despite stopping taking her Lasix  Review of chart shows that patient has a history of C diff colitis in the past the patient denies ever being told this  She has not recently been on any antibiotics is additionally complaining of suprapubic as well as lumbosacral back pain that radiates into her bilateral legs  Patient denies any fever but does complain of some chills and general fatigue  Review of Systems:  Review of Systems   Constitutional: Positive for chills and fatigue  Negative for fever  Gastrointestinal: Positive for diarrhea, nausea and vomiting  Negative for abdominal distention, abdominal pain and blood in stool  Genitourinary: Positive for frequency  Negative for dysuria, hematuria and urgency  Neurological: Negative for dizziness, syncope, weakness and light-headedness  All other systems reviewed and are negative        Past Medical and Surgical History:   Past Medical History:   Diagnosis Date    Angina pectoris ([address] )     Anxiety     C  difficile diarrhea     CAD (coronary artery disease)     Cardiac disease     Depression     GERD (gastroesophageal reflux disease)     History of colon polyps     History of hiatal hernia     Hx of bleeding disorder     hemorrhoids    Hyperlipidemia     Hypertension     Irregular heart beat     gets tachycardia    Shortness of breath     related to heart       Past Surgical History:   Procedure Laterality Date    CARDIAC ELECTROPHYSIOLOGY MAPPING AND ABLATION      CARDIAC SURGERY      CARDIAC SURGERY      CATARACT EXTRACTION Bilateral     CHOLECYSTECTOMY  1987    COLON SURGERY      repair of colon    COLONOSCOPY      COLONOSCOPY N/A 6/18/2018    Procedure: COLONOSCOPY;  Surgeon: Cleo Powers DO;  [facility]; Service: Gastroenterology    COLONOSCOPY W/ CONTROL OF HEMORRHAGE      CORONARY ANGIOPLASTY WITH STENT PLACEMENT      reports two blockages not able to be stented    CORONARY ARTERY BYPASS GRAFT  2008    3 vessels    HERNIA REPAIR      hiatal hernia    HYSTERECTOMY      partial not due to malignancy    LAPAROTOMY N/A 9/26/2018    Procedure: LAPAROTOMY EXPLORATORY WITH  RIGHT HEMICOLECTOMY;  Surgeon: Cleo Powers DO;  Location: [facility];  Service: Gastroenterology    LAPAROTOMY N/A 9/26/2018    Procedure: LAPAROTOMY EXPLORATORY WITH HEMICOLECTOMY;  Surgeon: Cristina Reyes DO;  [facility];  Service: General    NJ COLONOSCOPY FLX DX W/COLLJ SPEC WHEN PFRMD N/A 9/26/2018    Procedure: COLONOSCOPY;  Surgeon: Cleo Powers DO;  [facility];  Service: Gastroenterology    NJ HEMORRHOIDECTOMY,INT/EXT,1 COLUMN/GROUP N/A 7/25/2019    Procedure: HEMORRHOIDECTOMY EXCISION;  Surgeon: Jeannette Grijalva MD;  Location: [address] [place] [address] MAIN OR;  Service: General    NJ SIGMOIDOSCOPY FLX DX W/COLLJ SPEC BR/WA IF PFRMD N/A 6/21/2018    Procedure: Clydia Innocent FLEXIBLE;  Surgeon: Cleo Powers DO;  Location:  GI LAB; Service: Gastroenterology    [place] SURG DIAGNOSTIC EXAM, ANORECTAL N/A 7/25/2019    Procedure: EXAM UNDER ANESTHESIA (EUA); Surgeon: Jeannette Grijalva MD;  Location: [address] [place] [address] MAIN OR;  Service: General       Meds/Allergies:  Prior to Admission medications    Medication Sig Start Date End Date Taking?  Authorizing Provider   acetaminophen (TYLENOL) 650 mg CR tablet Take 1 tablet (650 mg total) by mouth every 8 (eight) hours as needed for mild pain or moderate pain 7/25/19   Kenyon Gates PA-C   ALPRAZolam (XANAX) 0 25 mg tablet Take 0 25 mg by mouth 3 (three) times a day as needed for anxiety  Historical Provider, MD   apixaban (ELIQUIS) 5 mg Take 1 tablet (5 mg total) by mouth 2 (two) times a day 9/24/19 10/24/19  Nela Wesley MD   atorvastatin (LIPITOR) 20 mg tablet Take 20 mg by mouth daily after dinner  Historical Provider, MD   ergocalciferol (ERGOCALCIFEROL) 86670 UNITS capsule Take 50,000 Units by mouth once a week  Takes on Wednesdays    Historical Provider, MD   escitalopram (LEXAPRO) 10 mg tablet Take 10 mg by mouth daily  10/5/18   Historical Provider, MD   furosemide (LASIX) 20 mg tablet 20 mg 2 (two) times a day  1/3/19   Historical Provider, MD   Gauze Pads & Dressings (COMBINE ABD) 5"X9" PADS by Does not apply route 3 (three) times a day 7/25/19   Kenyon Gates PA-C   Gauze Pads & Dressings (GAUZE PADS 4"X4") 4"X4" PADS by Does not apply route 3 (three) times a day 7/25/19   Kenyon Gates PA-C   Incontinent Wash (SOOTHE & COOL PERINEAL [address]) LIQD Apply 1 application topically 3 (three) times a day 7/25/19   Kenyon Gates PA-C   metolazone (ZAROXOLYN) 2 5 mg tablet TAKE ONE TABLET BY MOUTH ONCE A WEEK OR  AS  DIRECTED  FOR  WEIGHT  GAIN 7/8/18   Josette Mary MD   metoprolol tartrate (LOPRESSOR) 50 mg tablet Take 1 tablet (50 mg total) by mouth every 12 (twelve) hours 11/21/19   Josette Mary MD   Misc  Devices (SPRAY BOTTLE/PLASTIC 120ML) MISC by Does not apply route 3 (three) times a day Use to cleanse area 3 times daily or as needed with bowel movements   7/25/19   Kenyon Gates PA-C   Multiple Vitamins-Minerals (PRESERVISION AREDS) CAPS Take 1 capsule by mouth daily      Historical Provider, MD   nitroglycerin (NITROSTAT) 0 4 mg SL tablet Place 1 tablet (0 4 mg total) under the tongue every 5 (five) minutes as needed for chest pain (times three for chest pain  If no relief after second tablet, call 911 ) 11/21/19   Li Salguero MD   Omega-3 Fatty Acids (FISH OIL PO) Take 1,000 mg by mouth daily  Historical Provider, MD   potassium chloride (K-DUR,KLOR-CON) 20 mEq tablet Take 1 tablet (20 mEq total) by mouth daily 10/1/19   Li Salguero MD   ranitidine (ZANTAC) 300 MG tablet 300 mg  1/12/19   Historical Provider, MD   docusate sodium (COLACE) 100 mg capsule Take 1 capsule (100 mg total) by mouth 2 (two) times a day  Patient not taking: Reported on 9/24/2019 7/25/19 11/30/19  Kenyon Gates PA-C     I have reviewed home medications with patient personally  Allergies: Allergies   Allergen Reactions    Codeine GI Intolerance    Lansoprazole Other (See Comments)     GI Upset, dania protonix    Morphine Nausea Only    Morphine And Related GI Intolerance       Social History:  Marital Status:    Occupation:  Retired   Patient Pre-hospital Living Situation:  Resides at home alone  Patient Pre-hospital Level of Mobility:  Full with assist  Patient Pre-hospital Diet Restrictions:  None  Substance Use History:   Social History     Substance and Sexual Activity   Alcohol Use No     Social History     Tobacco Use   Smoking Status Former Smoker   Smokeless Tobacco Never Used   Tobacco Comment    quit 15 yrs ago     Social History     Substance and Sexual Activity   Drug Use No       Family History:  I have reviewed the patients family history    Physical Exam:   Vitals:   Blood Pressure: 153/67 (11/30/19 2146)  Pulse: (!) 107 (11/30/19 2146)  Temperature: 99 3 °F (37 4 °C) (11/30/19 2146)  Temp Source: Temporal (11/30/19 2146)  Respirations: 20 (11/30/19 2146)  Height: 5' 5" (165 1 cm)(Stated ) (11/30/19 2146)  Weight - Scale: 79 7 kg (175 lb 11 3 oz)(Weigh bed) (11/30/19 2146)  SpO2: 96 % (11/30/19 2146)    Physical Exam   Constitutional: She is oriented to person, place, and time   She appears well-developed and well-nourished  HENT:   Head: Normocephalic and atraumatic  Mouth/Throat: No oropharyngeal exudate  Eyes: Pupils are equal, round, and reactive to light  EOM are normal  No scleral icterus  Neck: Normal range of motion  Neck supple  No JVD present  Cardiovascular: Normal rate, regular rhythm and normal heart sounds  No murmur heard  Pulmonary/Chest: Effort normal and breath sounds normal  No respiratory distress  She has no wheezes  She has no rales  Abdominal: Soft  Bowel sounds are normal  There is no tenderness  There is no rebound and no guarding  Musculoskeletal: Normal range of motion  She exhibits no edema  Lymphadenopathy:     She has no cervical adenopathy  Neurological: She is alert and oriented to person, place, and time  Skin: Skin is warm and dry  No rash noted  No erythema  Psychiatric: She has a normal mood and affect  Her behavior is normal    Nursing note and vitals reviewed  Additional Data:   Lab Results: I have personally reviewed pertinent reports  Results from last 7 days   Lab Units 11/30/19  1809   WBC Thousand/uL 9 50   HEMOGLOBIN g/dL 14 3   HEMATOCRIT % 41 6*   PLATELETS Thousands/uL 129*   LYMPHO PCT % 3*   MONO PCT % 1*     Results from last 7 days   Lab Units 11/30/19  1809   POTASSIUM mmol/L 4 2   CHLORIDE mmol/L 105   CO2 mmol/L 26   BUN mg/dL 21   CREATININE mg/dL 0 97   CALCIUM mg/dL 9 6   ALK PHOS U/L 128   ALT U/L 25   AST U/L 32                   Imaging: I have personally reviewed pertinent reports  CT Abdomen pelvis with contrast   Final Result by Roney Trevizo MD (11/30 2041)   1  Findings compatible with acute sigmoid diverticulitis  No pericolonic collection to suggest abscess  Consider follow-up CT or colonoscopy (if not recently performed) following treatment to ensure resolution and exclude underlying mass  2   Hepatomegaly and hepatic steatosis  3   Cardiomegaly     4   Compression fracture of T10 stable from chest radiograph 7/16/2019      Workstation performed: [id number]             EKG, Pathology, and Other Studies Reviewed on Admission:   · EKG: N/A    NetAccess / Epic Records Reviewed: Yes     ** Please Note: This note has been constructed using a voice recognition system   **

## 2019-12-01 NOTE — ASSESSMENT & PLAN NOTE
Patient has a history of C diff colitis in the past, will place cautionary isolation while pending C diff results

## 2019-12-01 NOTE — ASSESSMENT & PLAN NOTE
· Likely secondary to diverticulitis with possible gastroenteritis/rule out C diff  · Clinically is improving   · Will start on pepcid   · C diff- pending

## 2019-12-02 PROBLEM — A04.72 C. DIFFICILE COLITIS: Status: ACTIVE | Noted: 2019-12-02

## 2019-12-02 LAB
ANION GAP SERPL CALCULATED.3IONS-SCNC: 8 MMOL/L (ref 4–13)
ANISOCYTOSIS BLD QL SMEAR: PRESENT
BACTERIA UR CULT: NORMAL
BUN SERPL-MCNC: 11 MG/DL (ref 7–25)
C DIFF TOX GENS STL QL NAA+PROBE: ABNORMAL
CALCIUM SERPL-MCNC: 8.4 MG/DL (ref 8.6–10.5)
CHLORIDE SERPL-SCNC: 110 MMOL/L (ref 98–107)
CO2 SERPL-SCNC: 21 MMOL/L (ref 21–31)
CREAT SERPL-MCNC: 0.87 MG/DL (ref 0.6–1.2)
ERYTHROCYTE [DISTWIDTH] IN BLOOD BY AUTOMATED COUNT: 14.5 % (ref 11.5–14.5)
GFR SERPL CREATININE-BSD FRML MDRD: 63 ML/MIN/1.73SQ M
GLUCOSE SERPL-MCNC: 100 MG/DL (ref 65–99)
HCT VFR BLD AUTO: 31.6 % (ref 42–47)
HGB BLD-MCNC: 11 G/DL (ref 12–16)
MCH RBC QN AUTO: 32.8 PG (ref 26–34)
MCHC RBC AUTO-ENTMCNC: 34.8 G/DL (ref 31–37)
MCV RBC AUTO: 94 FL (ref 81–99)
PLATELET # BLD AUTO: 92 THOUSANDS/UL (ref 149–390)
PLATELET BLD QL SMEAR: ABNORMAL
PMV BLD AUTO: 7.6 FL (ref 8.6–11.7)
POTASSIUM SERPL-SCNC: 3.1 MMOL/L (ref 3.5–5.5)
RBC # BLD AUTO: 3.35 MILLION/UL (ref 3.9–5.2)
RBC MORPH BLD: ABNORMAL
SODIUM SERPL-SCNC: 139 MMOL/L (ref 134–143)
WBC # BLD AUTO: 4.3 THOUSAND/UL (ref 4.8–10.8)

## 2019-12-02 PROCEDURE — 99232 SBSQ HOSP IP/OBS MODERATE 35: CPT | Performed by: NURSE PRACTITIONER

## 2019-12-02 PROCEDURE — 80048 BASIC METABOLIC PNL TOTAL CA: CPT | Performed by: NURSE PRACTITIONER

## 2019-12-02 PROCEDURE — 85027 COMPLETE CBC AUTOMATED: CPT | Performed by: NURSE PRACTITIONER

## 2019-12-02 RX ORDER — VANCOMYCIN HYDROCHLORIDE 125 MG/1
125 CAPSULE ORAL 4 TIMES DAILY
Qty: 120 CAPSULE | Refills: 0 | Status: SHIPPED | OUTPATIENT
Start: 2019-12-02 | End: 2020-01-01

## 2019-12-02 RX ORDER — METRONIDAZOLE 500 MG/1
500 TABLET ORAL EVERY 8 HOURS SCHEDULED
Status: DISCONTINUED | OUTPATIENT
Start: 2019-12-02 | End: 2019-12-03 | Stop reason: HOSPADM

## 2019-12-02 RX ORDER — SODIUM CHLORIDE 9 MG/ML
75 INJECTION, SOLUTION INTRAVENOUS CONTINUOUS
Status: DISCONTINUED | OUTPATIENT
Start: 2019-12-02 | End: 2019-12-02

## 2019-12-02 RX ORDER — POTASSIUM CHLORIDE 14.9 MG/ML
20 INJECTION INTRAVENOUS ONCE
Status: COMPLETED | OUTPATIENT
Start: 2019-12-02 | End: 2019-12-02

## 2019-12-02 RX ORDER — CEFTRIAXONE 1 G/50ML
1000 INJECTION, SOLUTION INTRAVENOUS EVERY 24 HOURS
Status: DISCONTINUED | OUTPATIENT
Start: 2019-12-02 | End: 2019-12-03 | Stop reason: HOSPADM

## 2019-12-02 RX ADMIN — BUTALBITAL, ACETAMINOPHEN AND CAFFEINE 1 TABLET: 50; 325; 40 TABLET ORAL at 00:25

## 2019-12-02 RX ADMIN — ATORVASTATIN CALCIUM 20 MG: 20 TABLET, FILM COATED ORAL at 18:18

## 2019-12-02 RX ADMIN — FAMOTIDINE 40 MG: 20 TABLET ORAL at 22:00

## 2019-12-02 RX ADMIN — METOPROLOL TARTRATE 50 MG: 50 TABLET, FILM COATED ORAL at 22:18

## 2019-12-02 RX ADMIN — POTASSIUM CHLORIDE 20 MEQ: 1500 TABLET, EXTENDED RELEASE ORAL at 08:53

## 2019-12-02 RX ADMIN — APIXABAN 5 MG: 5 TABLET, FILM COATED ORAL at 08:53

## 2019-12-02 RX ADMIN — ESCITALOPRAM OXALATE 10 MG: 10 TABLET ORAL at 08:53

## 2019-12-02 RX ADMIN — VANCOMYCIN HYDROCHLORIDE 125 MG: 500 INJECTION, POWDER, LYOPHILIZED, FOR SOLUTION INTRAVENOUS at 14:18

## 2019-12-02 RX ADMIN — VANCOMYCIN HYDROCHLORIDE 125 MG: 500 INJECTION, POWDER, LYOPHILIZED, FOR SOLUTION INTRAVENOUS at 18:18

## 2019-12-02 RX ADMIN — VANCOMYCIN HYDROCHLORIDE 125 MG: 500 INJECTION, POWDER, LYOPHILIZED, FOR SOLUTION INTRAVENOUS at 23:50

## 2019-12-02 RX ADMIN — VANCOMYCIN HYDROCHLORIDE 125 MG: 500 INJECTION, POWDER, LYOPHILIZED, FOR SOLUTION INTRAVENOUS at 08:53

## 2019-12-02 RX ADMIN — APIXABAN 5 MG: 5 TABLET, FILM COATED ORAL at 18:18

## 2019-12-02 RX ADMIN — METRONIDAZOLE 500 MG: 500 TABLET, FILM COATED ORAL at 23:50

## 2019-12-02 RX ADMIN — POTASSIUM CHLORIDE 20 MEQ: 14.9 INJECTION, SOLUTION INTRAVENOUS at 10:53

## 2019-12-02 RX ADMIN — SODIUM CHLORIDE 75 ML/HR: 9 INJECTION, SOLUTION INTRAVENOUS at 13:13

## 2019-12-02 RX ADMIN — METOPROLOL TARTRATE 50 MG: 50 TABLET, FILM COATED ORAL at 08:53

## 2019-12-02 RX ADMIN — METRONIDAZOLE 500 MG: 500 INJECTION, SOLUTION INTRAVENOUS at 16:47

## 2019-12-02 RX ADMIN — POTASSIUM CHLORIDE 20 MEQ: 14.9 INJECTION, SOLUTION INTRAVENOUS at 14:18

## 2019-12-02 RX ADMIN — OMEGA-3 FATTY ACIDS CAP 1000 MG 1000 MG: 1000 CAP at 08:53

## 2019-12-02 RX ADMIN — CEFTRIAXONE 1000 MG: 1 INJECTION, SOLUTION INTRAVENOUS at 13:10

## 2019-12-02 RX ADMIN — METRONIDAZOLE 500 MG: 500 INJECTION, SOLUTION INTRAVENOUS at 07:33

## 2019-12-02 RX ADMIN — ALPRAZOLAM 0.25 MG: 0.25 TABLET ORAL at 22:53

## 2019-12-02 RX ADMIN — METRONIDAZOLE 500 MG: 500 INJECTION, SOLUTION INTRAVENOUS at 01:30

## 2019-12-02 RX ADMIN — CIPROFLOXACIN 400 MG: 2 INJECTION, SOLUTION INTRAVENOUS at 00:21

## 2019-12-02 NOTE — ASSESSMENT & PLAN NOTE
· Likely secondary to diverticulitis with C diff colitis  · Clinically is improving   · Continue with pepcid   · C diff- positive

## 2019-12-02 NOTE — SOCIAL WORK
CM met with pt at bedside and explained role  Pt lives alone in a 2 story house; [address], 14 steps inside  Bedroom and bathroom on 2nd floor  Pt denies the use of an assistive device to ambulate  She has shower chair, RW, can and W/C at home  Pt report she is completely independnet with ALDs in the home  She perkins snot drive and relies on daughter Jt Banuelos for same  Pt reports daughter takes her to all her appointments and shopping  Pt PCP is Dr Orion Snider  She uses [facility] for medications and denies any barriers to obtaining them  Pt denies any history of HHC, STR, MH and D&A treatment  Pt denies any discharge needs at this time  She inidcates her daughter will transport her home on discharge  Pt with LACE score of 77  She would like to make her own follow up PCP appointment  She cannot be referred to [facility] as she does not have [facility] PCP  CM will continue to follow as needed CM reviewed d/c planning process including the following: identifying help at home, patient preference for d/c planning needs, availability of treatment team to discuss questions or concerns patient and/or family may have regarding understanding medications and recognizing signs and symptoms once discharged  CM also encouraged patient to follow up with all recommended appointments after discharge  Patient advised of importance for patient and family to participate in managing patients medical well being

## 2019-12-02 NOTE — DISCHARGE SUMMARY
Discharge- Horacioine Covert 1940, [de-identified] y o  female MRN: [id number]    Unit/Bed#: [facility] 87 112-02 Encounter: [id number]    Primary Care Provider: Nehemiah Sanders MD   Date and time admitted to hospital: 11/30/2019  5:39 PM        * Diverticulitis  Assessment & Plan  · No evidence of complications- no abscess or free air per CT a/p  · Clinically is much improved- no abdominal pain, n/v      · Discharge on low residue low-fiber diet  · Discharge on p o  Ceftin and Flagyl, complete 5 additional days of therapy  · GI recommended outpatient follow-up, 30 day course of p o  Vancomycin, rapid taper of p o  Vanco after 30 days  C  difficile colitis  Assessment & Plan  · Recurrent with first recorded episode 10/2018  · Thirty day course of p o  Vancomycin recommended by GI, they recommended rapid p o  Taper after 30 day course    Diarrhea of presumed infectious origin  Assessment & Plan  · Patient has a history of C diff colitis in the past  · Per records, 10/12/2018- unclear what treatment patient received at that time   · C diff is positive currently     Acute cystitis without hematuria  Assessment & Plan  Urine culture negative, patient asymptomatic, this represents asymptomatic bacteriuria    Nausea, vomiting and diarrhea  Assessment & Plan  Resolved  Chronic diastolic heart failure (HCC)  Assessment & Plan  Wt Readings from Last 3 Encounters:   12/02/19 79 6 kg (175 lb 9 5 oz)   11/13/19 75 8 kg (167 lb)   10/17/19 77 1 kg (170 lb)     · In no acute exacerbation   · Continue Lopressor   · Daily weight  Patient is directed to resume Lasix once diarrhea has completely resolved and her p o  Intake returns to normal        Hypercholesterolemia  Assessment & Plan  · Continue Lipitor      Essential hypertension  Assessment & Plan  · Continue Lopressor   · BP is controlled  Will monitor               Discharging Physician / Practitioner: ANTHONY Rodriguez  PCP: Nehemiah Sanders MD  Admission Date:   Admission Orders (From admission, onward)     Ordered        12/01/19 1534  Inpatient Admission  Once         11/30/19 2106  Place in Observation  Once                   Discharge Date:     Resolved Problems  Date Reviewed: 12/2/2019    None          Consultations During Hospital Stay:  · GI     Procedures Performed:   · None     Significant Findings / Test Results:   · CT a/p 1   Findings compatible with acute sigmoid diverticulitis   No pericolonic collection to suggest abscess   Consider follow-up CT or colonoscopy (if not recently performed) following treatment to ensure resolution and exclude underlying mass  2   Hepatomegaly and hepatic steatosis  3   Cardiomegaly  4   Compression fracture of T10 stable from chest radiograph 7/16/2019    Incidental Findings:   · None      Test Results Pending at Discharge (will require follow up): · None     Outpatient Tests Requested:  · Follow up with PCP   · Follow up with GI     Complications:     None     Reason for Admission:  Nausea, vomiting, diarrhea for 1 day    Hospital Course:     Nissa Rodriguez is a [de-identified] y o  female patient who originally presented to the hospital on 11/30/2019 due to nausea, vomiting, diarrhea  Please refer to H&P for initial presenting complaint  In brief the patient presented with increasing nausea, vomiting, and diarrhea for 1 day and subsequently was admitted for sigmoid diverticulitis  She was started on ciprofloxacin and metronidazole IV  Stool studies later showed that the patient has C difficile positive  The patient was started on vancomycin p o  125 mg q 6 hours  She does have history of C difficile colitis in October of 2018  Her antibiotics regimen was transition to ceftriaxone and metronidazole IV  Overall, she is able to tolerate diet and clinically is feeling much improved  The patient is advised to follow up with Gastroenterology as an outpatient  Discharge planning discussed in detail with the patient her daughter    Many questions answered  Please see above list of diagnoses and related plan for additional information  Condition at Discharge: good     Discharge Day Visit / Exam:     Subjective:  Feeling much improved  Denies nausea  She vomited potassium pill this a m  Because was stuck in her throat  Other than that the patient has not had any vomiting  She stated her diarrhea is improving  Denies abdominal pain  Would like to go home  Vitals: Blood Pressure: 109/53 (12/03/19 0655)  Pulse: 73 (12/03/19 0655)  Temperature: 98 2 °F (36 8 °C) (12/03/19 0655)  Temp Source: Temporal (12/03/19 0655)  Respirations: 18 (12/03/19 0655)  Height: 5' 5" (165 1 cm) (12/02/19 2057)  Weight - Scale: 80 5 kg (177 lb 7 5 oz) (12/03/19 0542)  SpO2: 96 % (12/03/19 0655)  Exam:   Physical Exam   Constitutional: She is oriented to person, place, and time  She appears well-developed and well-nourished  No distress  HENT:   Head: Normocephalic and atraumatic  Mouth/Throat: Oropharynx is clear and moist    Eyes: Pupils are equal, round, and reactive to light  Conjunctivae and EOM are normal    Neck: Normal range of motion  Neck supple  No thyromegaly present  Cardiovascular: Normal rate, regular rhythm and normal heart sounds  Exam reveals no gallop and no friction rub  No murmur heard  Pulmonary/Chest: Effort normal and breath sounds normal  She has no wheezes  She has no rales  Abdominal: Soft  Bowel sounds are normal  She exhibits no distension  There is no tenderness  There is no rebound  Musculoskeletal: Normal range of motion  She exhibits no edema, tenderness or deformity  Neurological: She is alert and oriented to person, place, and time  No cranial nerve deficit  Skin: Skin is warm and dry  No rash noted  No erythema  Psychiatric: She has a normal mood and affect  Her behavior is normal  Thought content normal    Vitals reviewed            Discharge instructions/Information to patient and family:   See after visit summary for information provided to patient and family  Provisions for Follow-Up Care:  See after visit summary for information related to follow-up care and any pertinent home health orders  Disposition:     Home    For Discharges to [facility] SNF:   · Not Applicable to this Patient - Not Applicable to this Patient    Planned Readmission:    No      Discharge Statement:  I spent 35 minutes discharging the patient  This time was spent on the day of discharge  I had direct contact with the patient on the day of discharge  Greater than 50% of the total time was spent examining patient, answering all patient questions, arranging and discussing plan of care with patient as well as directly providing post-discharge instructions  Additional time then spent on discharge activities  Discharge Medications:  See after visit summary for reconciled discharge medications provided to patient and family        ** Please Note: This note has been constructed using a voice recognition system **

## 2019-12-02 NOTE — ASSESSMENT & PLAN NOTE
· Patient has a history of C diff colitis in the past  · Per records, 10/12/2018- unclear what treatment patient received at that time   · C diff is positive currently   · Will change PO vanc to 125 mg PO q6h  · GI input appreciated

## 2019-12-02 NOTE — ASSESSMENT & PLAN NOTE
· No evidence of complications- no abscess or free air per CT a/p  · Clinically is much improved- no abdominal pain, n/v   · Continue with IVF for a few more hours; will stop IVF once tolerate diet    · Advance diet to GI soft   · Metronidazole day#3, will change cipro to ceftriaxone   · Patient will transition to metronidazole and keflex on discharge  · GI input appreciated

## 2019-12-02 NOTE — PROGRESS NOTES
Progress Note - Andre Gardiner 1940, [de-identified] y o  female MRN: [id number]    Unit/Bed#: [facility] 112-02 Encounter: [id number]    Primary Care Provider: Toin Bolanos MD   Date and time admitted to hospital: 11/30/2019  5:39 PM        * Diverticulitis  Assessment & Plan  · No evidence of complications- no abscess or free air per CT a/p  · Clinically is much improved- no abdominal pain, n/v   · Continue with IVF for a few more hours; will stop IVF once tolerate diet  · Advance diet to GI soft   · Metronidazole day#3, will change cipro to ceftriaxone   · Patient will transition to metronidazole and keflex on discharge  · GI input appreciated         C  difficile colitis  Assessment & Plan  · Recurrent with first recorded episode 10/2018  · Will start PO q6h 125mg of vancomycin  · GI recommended to continue with vancomycin orally for 30 days   · Follow up with GI OP     Diarrhea of presumed infectious origin  Assessment & Plan  · Patient has a history of C diff colitis in the past  · Per records, 10/12/2018- unclear what treatment patient received at that time   · C diff is positive currently   · Will change PO vanc to 125 mg PO q6h  · GI input appreciated      Acute cystitis without hematuria  Assessment & Plan  · Urinalysis shows moderate bacteria with trace leuks  · Receiving flagyl and ceftriaxone for diverticulitis- will continue with those     · Urine culture- pending     Nausea, vomiting and diarrhea  Assessment & Plan  · Likely secondary to diverticulitis with C diff colitis  · Clinically is improving   · Continue with pepcid   · C diff- positive     Chronic diastolic heart failure (HCC)  Assessment & Plan  Wt Readings from Last 3 Encounters:   12/02/19 79 6 kg (175 lb 9 5 oz)   11/13/19 75 8 kg (167 lb)   10/17/19 77 1 kg (170 lb)     · In no acute exacerbation   · Continue Lopressor   · Daily weight  · Will hold lasix for now while on IVF, n/v and diarrhea    · If tolerate PO intake, will resume lasix tomorrow          Hypercholesterolemia  Assessment & Plan  · Continue Lipitor      Essential hypertension  Assessment & Plan  · Continue Lopressor   · BP is controlled  Will monitor  VTE Pharmacologic Prophylaxis: Pharmacologic: Apixaban (Eliquis)    Patient Centered Rounds: I have performed bedside rounds with nursing staff today  Discussions with Specialists or Other Care Team Provider: GI, nursing, pharmacy  Education and Discussions with Family / Patient: patient and daughter     Current Length of Stay: 1 day(s)    Current Patient Status: Inpatient   Certification Statement: The patient will continue to require additional inpatient hospital stay due to diverticulitis     Discharge Plan: pending hospital course     Code Status: Level 1 - Full Code    Subjective:   Feeling much better  No n/a  Stools are more formed today  Objective:     Vitals:   Temp (24hrs), Av 7 °F (36 5 °C), Min:97 4 °F (36 3 °C), Max:98 °F (36 7 °C)    Temp:  [97 4 °F (36 3 °C)-98 °F (36 7 °C)] 97 4 °F (36 3 °C)  HR:  [69-81] 70  Resp:  [18-21] 18  BP: (110-130)/(56-72) 122/72  SpO2:  [93 %-96 %] 94 %  Body mass index is 29 22 kg/m²  Input and Output Summary (last 24 hours): Intake/Output Summary (Last 24 hours) at 2019 1447  Last data filed at 2019 1241  Gross per 24 hour   Intake 730 ml   Output 650 ml   Net 80 ml       Physical Exam:     Physical Exam   Constitutional: She is oriented to person, place, and time  She appears well-developed  No distress  Frail      HENT:   Head: Normocephalic and atraumatic  Mouth/Throat: Oropharynx is clear and moist    Eyes: Pupils are equal, round, and reactive to light  Conjunctivae and EOM are normal    Neck: Normal range of motion  Neck supple  No thyromegaly present  Cardiovascular: Normal rate, regular rhythm and normal heart sounds  Exam reveals no gallop and no friction rub  No murmur heard    Pulmonary/Chest: Effort normal and breath sounds normal  She has no wheezes  She has no rales  Abdominal: Soft  Bowel sounds are normal  She exhibits no distension  There is no tenderness  There is no rebound  Musculoskeletal: Normal range of motion  She exhibits no edema, tenderness or deformity  Neurological: She is alert and oriented to person, place, and time  No cranial nerve deficit  Skin: Skin is warm and dry  No rash noted  No erythema  Psychiatric: She has a normal mood and affect  Her behavior is normal  Thought content normal    Vitals reviewed  Additional Data:     Labs:    Results from last 7 days   Lab Units 12/02/19  0520 12/01/19  0450   WBC Thousand/uL 4 30* 6 80   HEMOGLOBIN g/dL 11 0* 12 5   HEMATOCRIT % 31 6* 36 3*   PLATELETS Thousands/uL 92* 110*   NEUTROS PCT %  --  86*   LYMPHS PCT %  --  8*   MONOS PCT %  --  6   EOS PCT %  --  0     Results from last 7 days   Lab Units 12/02/19  0520  11/30/19  1809   POTASSIUM mmol/L 3 1*   < > 4 2   CHLORIDE mmol/L 110*   < > 105   CO2 mmol/L 21   < > 26   BUN mg/dL 11   < > 21   CREATININE mg/dL 0 87   < > 0 97   CALCIUM mg/dL 8 4*   < > 9 6   ALK PHOS U/L  --   --  128   ALT U/L  --   --  25   AST U/L  --   --  32    < > = values in this interval not displayed  * I Have Reviewed All Lab Data Listed Above  * Additional Pertinent Lab Tests Reviewed: [facility]  Reviewed    Imaging:  Imaging Reports Reviewed Today Include: n/a     Recent Cultures (last 7 days):     Results from last 7 days   Lab Units 11/30/19  2138   C DIFF TOXIN B  POSITIVE for C difficle toxin by PCR  *       Last 24 Hours Medication List:     Current Facility-Administered Medications:  acetaminophen 650 mg Oral Q6H PRN [place], PA-C    ALPRAZolam 0 25 mg Oral TID PRN [place], PA-C    apixaban 5 mg Oral BID [place], PA-C    atorvastatin 20 mg Oral After Mel Ferraro PA-C    butalbital-acetaminophen-caffeine 1 tablet Oral Q6H PRN ANTHONY Crespo cefTRIAXone 1,000 mg Intravenous Q24H ANTHONY Meza Last Rate: 1,000 mg (12/02/19 1310)   escitalopram 10 mg Oral Daily Meg Irizarry PA-C    famotidine 40 mg Oral HS Meg Irizarry PA-C    fish oil 1,000 mg Oral Daily Meg Irizarry PA-C    iohexol 50 mL Oral Once in imaging Bharati Duke MD    metoprolol tartrate 50 mg Oral Q12H [address] [facility]    metroNIDAZOLE 500 mg Intravenous Q8H Meg Irizarry PA-C Last Rate: 500 mg (12/02/19 0733)   nitroglycerin 0 4 mg Sublingual Q5 Min PRN Meg Irizarry PA-C    ondansetron 4 mg Intravenous Q6H PRN Meg Irizarry PA-C    potassium chloride 20 mEq Oral Daily Meg Irizarry PA-C    potassium chloride 20 mEq Intravenous Once ANTHONY Meza Last Rate: 20 mEq (12/02/19 1418)   sodium chloride 75 mL/hr Intravenous Continuous ANTHONY Meza Last Rate: 75 mL/hr (12/02/19 1313)   vancomycin 125 mg Oral Q6H [facility]         Today, Patient Was Seen By: ANTHONY Meza    ** Please Note: Dictation voice to text software may have been used in the creation of this document   **

## 2019-12-02 NOTE — ASSESSMENT & PLAN NOTE
· Urinalysis shows moderate bacteria with trace leuks  · Receiving flagyl and ceftriaxone for diverticulitis- will continue with those     · Urine culture- pending

## 2019-12-02 NOTE — ASSESSMENT & PLAN NOTE
· Recurrent with first recorded episode 10/2018  · Will start PO q6h 125mg of vancomycin  · GI recommended to continue with vancomycin orally for 30 days   · Follow up with GI OP

## 2019-12-02 NOTE — CONSULTS
Consultation - Fredy Holding [de-identified] y o  female MRN: [id number]  Unit/Bed#: -02 Encounter: [id number]      Assessment/Plan     Assessment:  Acute diverticulitis, possibly complicated by C diff colitis as C diff toxin was positive  Plan:  Agree with plan with broad-spectrum antibiotics to cover the diverticulitis and p o  Vancomycin at dosage 125 mg p o  Q i d  For approximately 1 month  At the end of the 1 month the vancomycin can be tapered over several days  A repeat colonoscopy may be needed  I would need to review old records to determine what exact surgery was done, where polyps were located and what the pathology on the polyps showed  History of Present Illness   Physician Requesting Consult: Slivia Osuna MD  Reason for Consult / Principal Problem:  Diverticulitis, C diff  Hx and PE limited by:   HPI: Bridger Brian is a [de-identified]y o  year old female who presents with acute onset of left lower quadrant pain 2 days ago associated with diarrhea, and severe nausea  There was no rectal bleeding  The patient has a history of a colon resection due to a perforated colon at the time of colonoscopically polypectomy  Colostomy was not needed  It is unclear what portion of the colon was resected  Consults    Review of Systems   Constitutional: Positive for appetite change, chills and fatigue  HENT: Negative  Respiratory: Negative  Cardiovascular: Negative  Gastrointestinal: Positive for abdominal pain, diarrhea and nausea  Endocrine: Negative  Genitourinary: Negative  Musculoskeletal: Positive for back pain  Neurological: Negative  Hematological: Negative  Psychiatric/Behavioral: Negative          Historical Information   Past Medical History:   Diagnosis Date    Angina pectoris (HCC)     Anxiety     C  difficile diarrhea     CAD (coronary artery disease)     Cardiac disease     Depression     GERD (gastroesophageal reflux disease)     History of colon polyps     History of hiatal hernia     Hx of bleeding disorder     hemorrhoids    Hyperlipidemia     Hypertension     Irregular heart beat     gets tachycardia    Shortness of breath     related to heart     Past Surgical History:   Procedure Laterality Date    CARDIAC ELECTROPHYSIOLOGY MAPPING AND ABLATION      CARDIAC SURGERY      CARDIAC SURGERY      CATARACT EXTRACTION Bilateral     CHOLECYSTECTOMY  1987    COLON SURGERY      repair of colon    COLONOSCOPY      COLONOSCOPY N/A 6/18/2018    Procedure: COLONOSCOPY;  Surgeon: Cathy Quinones DO;  [facility]: [facility]; Service: Gastroenterology    COLONOSCOPY W/ CONTROL OF HEMORRHAGE      CORONARY ANGIOPLASTY WITH STENT PLACEMENT      reports two blockages not able to be stented    CORONARY ARTERY BYPASS GRAFT  2008    3 vessels    HERNIA REPAIR      hiatal hernia    HYSTERECTOMY      partial not due to malignancy    LAPAROTOMY N/A 9/26/2018    Procedure: LAPAROTOMY EXPLORATORY WITH  RIGHT HEMICOLECTOMY;  Surgeon: Cathy Quinones DO;  Location: [facility];  Service: Gastroenterology    LAPAROTOMY N/A 9/26/2018    Procedure: LAPAROTOMY EXPLORATORY WITH HEMICOLECTOMY;  Surgeon: Joyce Calderon DO;  [facility]: [facility];  Service: General    TX COLONOSCOPY FLX DX W/COLLJ SPEC WHEN PFRMD N/A 9/26/2018    Procedure: COLONOSCOPY;  Surgeon: Cathy Quinones DO;  [facility];  Service: Gastroenterology    TX HEMORRHOIDECTOMY,INT/EXT,1 COLUMN/GROUP N/A 7/25/2019    Procedure: HEMORRHOIDECTOMY EXCISION;  Surgeon: Angela Quispe MD;  Location: [facility] MAIN OR;  Service: General    TX SIGMOIDOSCOPY FLX DX W/COLLJ SPEC BR/WA IF PFRMD N/A 6/21/2018    Procedure: [place] FLEXIBLE;  Surgeon: Cathy Quinones DO;  [facility]; Service: Gastroenterology    TX SURG DIAGNOSTIC EXAM, ANORECTAL N/A 7/25/2019    Procedure: EXAM UNDER ANESTHESIA (EUA);   Surgeon: Angela Quispe MD;  Location: [facility] MAIN OR;  Service: General     Social History   Social History     Substance and Sexual Activity   Alcohol Use Not Currently     Social History     Substance and Sexual Activity   Drug Use No     Social History     Tobacco Use   Smoking Status Former Smoker   Smokeless Tobacco Never Used   Tobacco Comment    quit 15 yrs ago     Family History: non-contributory    Meds/Allergies    Current Facility-Administered Medications   Medication Dose Route Frequency Provider Last Rate Last Dose    acetaminophen (TYLENOL) tablet 650 mg  650 mg Oral Q6H PRN Diane Spry, PA-C   650 mg at 12/01/19 2135    ALPRAZolam (XANAX) tablet 0 25 mg  0 25 mg Oral TID PRN Diane Spry, PA-C        apixaban (ELIQUIS) tablet 5 mg  5 mg Oral BID Diane Spry, PA-C   5 mg at 12/02/19 0853    atorvastatin (LIPITOR) tablet 20 mg  20 mg Oral After James Thomas, PA-C   20 mg at 12/01/19 1747    butalbital-acetaminophen-caffeine (FIORICET,ESGIC) -40 mg per tablet 1 tablet  1 tablet Oral Q6H PRN Jacqueline Plane, CRNP   1 tablet at 12/02/19 0025    cefTRIAXone (ROCEPHIN) IVPB (premix) 1,000 mg  1,000 mg Intravenous Q24H Jacqueline Plane, CRNP 100 mL/hr at 12/02/19 1310 1,000 mg at 12/02/19 1310    escitalopram (LEXAPRO) tablet 10 mg  10 mg Oral Daily Diane Spry, PA-C   10 mg at 12/02/19 0853    famotidine (PEPCID) tablet 40 mg  40 mg Oral HS Diane Spry, PA-C   40 mg at 12/01/19 2135    fish oil capsule 1,000 mg  1,000 mg Oral Daily Diane Spry, PA-C   1,000 mg at 12/02/19 0853    iohexol (OMNIPAQUE) 240 MG/ML solution 50 mL  50 mL Oral Once in [facility] MD Mesha        metoprolol tartrate (LOPRESSOR) tablet 50 mg  50 mg Oral Q12H [address] Diane [place], PA-C   50 mg at 12/02/19 0853    metroNIDAZOLE (FLAGYL) IVPB (premix) 500 mg  500 mg Intravenous Q8H Diane Spry, PA-C 200 mL/hr at 12/02/19 0733 500 mg at 12/02/19 0733    nitroglycerin (NITROSTAT) SL tablet 0 4 mg  0 4 mg Sublingual Q5 Min PRN Diane Newman PA-C        ondansetron [facility]) injection 4 mg  4 mg Intravenous Q6H PRN Marvelyn Free, PA-C   4 mg at 12/01/19 1101    potassium chloride (K-DUR,KLOR-CON) CR tablet 20 mEq  20 mEq Oral Daily Marvelyn Free, PA-C   20 mEq at 12/02/19 0853    potassium chloride 20 mEq IVPB (premix)  20 mEq Intravenous Once Valla Anderson, CRNP        sodium chloride 0 9 % infusion  75 mL/hr Intravenous Continuous Valla Anderson, CRNP 75 mL/hr at 12/02/19 1313 75 mL/hr at 12/02/19 1313    vancomycin (VANCOCIN) oral solution 125 mg  125 mg Oral Q6H [facility] Valla Anderson, CRNP         all current active meds have been reviewed    Allergies   Allergen Reactions    Codeine GI Intolerance    Lansoprazole Other (See Comments)     GI Upset, dania protonix    Morphine Nausea Only    Morphine And Related GI Intolerance       Objective       Intake/Output Summary (Last 24 hours) at 12/2/2019 1410  Last data filed at 12/2/2019 1241  Gross per 24 hour   Intake 730 ml   Output 650 ml   Net 80 ml       Invasive Devices:   Peripheral IV 12/01/19 Left Forearm (Active)   Site Assessment Clean;Dry; Intact 12/2/2019  7:39 AM   Dressing Type Transparent 12/2/2019  7:39 AM   Line Status Infusing 12/2/2019  7:39 AM   Dressing Status Clean;Dry; Intact 12/2/2019  7:39 AM       Physical Exam   Constitutional: She appears well-developed and well-nourished  Neck: Normal range of motion  Neck supple  Cardiovascular: Normal rate  Pulmonary/Chest: Effort normal and breath sounds normal    Abdominal: Soft  There is tenderness in the left lower quadrant  Neurological: She is alert  Skin: Skin is warm and dry  Psychiatric: She has a normal mood and affect  Her speech is normal        Lab Results: I have personally reviewed pertinent reports  Imaging Studies: I have personally reviewed pertinent reports  EKG, Pathology, and Other Studies: I have personally reviewed pertinent reports        VTE Prophylaxis: Reason for no pharmacologic prophylaxis On Eliquis    Counseling / Coordination of Care  Total floor / unit time spent today 45 minutes  Greater than 50% of total time was spent with the patient and / or family counseling and / or coordination of care  A description of the counseling / coordination of care:  Case discussed with hospital physician

## 2019-12-02 NOTE — ASSESSMENT & PLAN NOTE
Wt Readings from Last 3 Encounters:   12/02/19 79 6 kg (175 lb 9 5 oz)   11/13/19 75 8 kg (167 lb)   10/17/19 77 1 kg (170 lb)     · In no acute exacerbation   · Continue Lopressor   · Daily weight  · Will hold lasix for now while on IVF, n/v and diarrhea    · If tolerate PO intake, will resume lasix tomorrow

## 2019-12-03 VITALS
TEMPERATURE: 98.2 F | WEIGHT: 177.47 LBS | SYSTOLIC BLOOD PRESSURE: 109 MMHG | BODY MASS INDEX: 29.57 KG/M2 | HEART RATE: 73 BPM | DIASTOLIC BLOOD PRESSURE: 53 MMHG | HEIGHT: 65 IN | RESPIRATION RATE: 18 BRPM | OXYGEN SATURATION: 96 %

## 2019-12-03 LAB
ANION GAP SERPL CALCULATED.3IONS-SCNC: 8 MMOL/L (ref 4–13)
BUN SERPL-MCNC: 9 MG/DL (ref 7–25)
CALCIUM SERPL-MCNC: 8.4 MG/DL (ref 8.6–10.5)
CHLORIDE SERPL-SCNC: 110 MMOL/L (ref 98–107)
CO2 SERPL-SCNC: 22 MMOL/L (ref 21–31)
CREAT SERPL-MCNC: 0.85 MG/DL (ref 0.6–1.2)
ERYTHROCYTE [DISTWIDTH] IN BLOOD BY AUTOMATED COUNT: 14.5 % (ref 11.5–14.5)
GFR SERPL CREATININE-BSD FRML MDRD: 65 ML/MIN/1.73SQ M
GLUCOSE SERPL-MCNC: 94 MG/DL (ref 65–99)
HCT VFR BLD AUTO: 32.6 % (ref 42–47)
HGB BLD-MCNC: 11.4 G/DL (ref 12–16)
MCH RBC QN AUTO: 33 PG (ref 26–34)
MCHC RBC AUTO-ENTMCNC: 34.9 G/DL (ref 31–37)
MCV RBC AUTO: 94 FL (ref 81–99)
PLATELET # BLD AUTO: 100 THOUSANDS/UL (ref 149–390)
PMV BLD AUTO: 7.7 FL (ref 8.6–11.7)
POTASSIUM SERPL-SCNC: 3.2 MMOL/L (ref 3.5–5.5)
RBC # BLD AUTO: 3.45 MILLION/UL (ref 3.9–5.2)
SODIUM SERPL-SCNC: 140 MMOL/L (ref 134–143)
WBC # BLD AUTO: 5 THOUSAND/UL (ref 4.8–10.8)

## 2019-12-03 PROCEDURE — 99239 HOSP IP/OBS DSCHRG MGMT >30: CPT | Performed by: INTERNAL MEDICINE

## 2019-12-03 PROCEDURE — 85027 COMPLETE CBC AUTOMATED: CPT | Performed by: NURSE PRACTITIONER

## 2019-12-03 PROCEDURE — 80048 BASIC METABOLIC PNL TOTAL CA: CPT | Performed by: NURSE PRACTITIONER

## 2019-12-03 RX ORDER — FUROSEMIDE 20 MG/1
20 TABLET ORAL 2 TIMES DAILY
Refills: 0
Start: 2019-12-03

## 2019-12-03 RX ORDER — POTASSIUM CHLORIDE 20 MEQ/1
40 TABLET, EXTENDED RELEASE ORAL ONCE
Status: COMPLETED | OUTPATIENT
Start: 2019-12-03 | End: 2019-12-03

## 2019-12-03 RX ORDER — METRONIDAZOLE 500 MG/1
500 TABLET ORAL EVERY 8 HOURS SCHEDULED
Qty: 15 TABLET | Refills: 0 | Status: SHIPPED | OUTPATIENT
Start: 2019-12-03 | End: 2019-12-08

## 2019-12-03 RX ORDER — CEFUROXIME AXETIL 250 MG/1
500 TABLET ORAL EVERY 12 HOURS SCHEDULED
Qty: 20 TABLET | Refills: 0 | Status: SHIPPED | OUTPATIENT
Start: 2019-12-03 | End: 2019-12-08

## 2019-12-03 RX ADMIN — APIXABAN 5 MG: 5 TABLET, FILM COATED ORAL at 08:15

## 2019-12-03 RX ADMIN — POTASSIUM CHLORIDE 40 MEQ: 1500 TABLET, EXTENDED RELEASE ORAL at 11:04

## 2019-12-03 RX ADMIN — OMEGA-3 FATTY ACIDS CAP 1000 MG 1000 MG: 1000 CAP at 08:16

## 2019-12-03 RX ADMIN — VANCOMYCIN HYDROCHLORIDE 125 MG: 500 INJECTION, POWDER, LYOPHILIZED, FOR SOLUTION INTRAVENOUS at 05:22

## 2019-12-03 RX ADMIN — METRONIDAZOLE 500 MG: 500 TABLET, FILM COATED ORAL at 14:35

## 2019-12-03 RX ADMIN — POTASSIUM CHLORIDE 20 MEQ: 1500 TABLET, EXTENDED RELEASE ORAL at 08:16

## 2019-12-03 RX ADMIN — METRONIDAZOLE 500 MG: 500 TABLET, FILM COATED ORAL at 05:22

## 2019-12-03 RX ADMIN — ESCITALOPRAM OXALATE 10 MG: 10 TABLET ORAL at 08:16

## 2019-12-03 RX ADMIN — VANCOMYCIN HYDROCHLORIDE 125 MG: 500 INJECTION, POWDER, LYOPHILIZED, FOR SOLUTION INTRAVENOUS at 11:07

## 2019-12-03 NOTE — PLAN OF CARE
Problem: PAIN - ADULT  Goal: Verbalizes/displays adequate comfort level or baseline comfort level  Description  Interventions:  - Encourage patient to monitor pain and request assistance  - Assess pain using appropriate pain scale  - Administer analgesics based on type and severity of pain and evaluate response  - Implement non-pharmacological measures as appropriate and evaluate response  - Consider cultural and social influences on pain and pain management  - Notify physician/advanced practitioner if interventions unsuccessful or patient reports new pain  12/3/2019 0341 by Dana Shine RN  Outcome: Progressing  12/3/2019 0340 by Dana Shine RN  Outcome: Progressing  12/3/2019 0340 by Dana Shine RN  Outcome: Progressing     Problem: INFECTION - ADULT  Goal: Absence or prevention of progression during hospitalization  Description  INTERVENTIONS:  - Assess and monitor for signs and symptoms of infection  - Monitor lab/diagnostic results  - Monitor all insertion sites, i e  indwelling lines, tubes, and drains  - Monitor endotracheal if appropriate and nasal secretions for changes in amount and color  - [place] appropriate cooling/warming therapies per order  - Administer medications as ordered  - Instruct and encourage patient and family to use good hand hygiene technique  - Identify and instruct in appropriate isolation precautions for identified infection/condition  12/3/2019 0341 by Dana Shine RN  Outcome: Progressing  12/3/2019 0340 by Dana Shine RN  Outcome: Progressing  12/3/2019 0340 by Dana Shine RN  Outcome: Progressing  Goal: Absence of fever/infection during neutropenic period  Description  INTERVENTIONS:  - Monitor WBC    12/3/2019 0341 by Dana Shine RN  Outcome: Progressing  12/3/2019 0340 by Dana Shine RN  Outcome: Progressing  12/3/2019 0340 by Dana Shine RN  Outcome: Progressing     Problem: SAFETY ADULT  Goal: Patient will remain free of falls  Description  INTERVENTIONS:  - Assess patient frequently for physical needs  -  Identify cognitive and physical deficits and behaviors that affect risk of falls    -  [place] fall precautions as indicated by assessment   - Educate patient/family on patient safety including physical limitations  - Instruct patient to call for assistance with activity based on assessment  - Modify environment to reduce risk of injury  - Consider OT/PT consult to assist with strengthening/mobility  12/3/2019 0341 by Ralph Gonsalez RN  Outcome: Progressing  12/3/2019 0340 by Ralph Gonsalez RN  Outcome: Progressing  12/3/2019 0340 by Ralph Gonsalez RN  Outcome: Progressing  Goal: Maintain or return to baseline ADL function  Description  INTERVENTIONS:  -  Assess patient's ability to carry out ADLs; assess patient's baseline for ADL function and identify physical deficits which impact ability to perform ADLs (bathing, care of mouth/teeth, toileting, grooming, dressing, etc )  - Assess/evaluate cause of self-care deficits   - Assess range of motion  - Assess patient's mobility; develop plan if impaired  - Assess patient's need for assistive devices and provide as appropriate  - Encourage maximum independence but intervene and supervise when necessary  - Involve family in performance of ADLs  - Assess for home care needs following discharge   - Consider OT consult to assist with ADL evaluation and planning for discharge  - Provide patient education as appropriate  12/3/2019 0341 by Ralph Gonsalez RN  Outcome: Progressing  12/3/2019 0340 by Ralph Gonsalez RN  Outcome: Progressing  12/3/2019 0340 by Ralph Gonsalez RN  Outcome: Progressing  Goal: Maintain or return mobility status to optimal level  Description  INTERVENTIONS:  - Assess patient's baseline mobility status (ambulation, transfers, stairs, etc )    - Identify cognitive and physical deficits and behaviors that affect mobility  - Identify mobility aids required to assist with transfers and/or ambulation (gait belt, sit-to-stand, lift, walker, cane, etc )  - [place] fall precautions as indicated by assessment  - Record patient progress and toleration of activity level on Mobility SBAR; progress patient to next Phase/Stage  - Instruct patient to call for assistance with activity based on assessment  - Consider rehabilitation consult to assist with strengthening/weightbearing, etc   12/3/2019 0341 by Dariusz Peña RN  Outcome: Progressing  12/3/2019 0340 by Dariusz Peña RN  Outcome: Progressing  12/3/2019 0340 by Dariusz Peña RN  Outcome: Progressing     Problem: DISCHARGE PLANNING  Goal: Discharge to home or other facility with appropriate resources  Description  INTERVENTIONS:  - Identify barriers to discharge w/patient and caregiver  - Arrange for needed discharge resources and transportation as appropriate  - Identify discharge learning needs (meds, wound care, etc )  - Arrange for interpretive services to assist at discharge as needed  - Refer to Case Management Department for coordinating discharge planning if the patient needs post-hospital services based on physician/advanced practitioner order or complex needs related to functional status, cognitive ability, or social support system  12/3/2019 0341 by Dariusz Peña RN  Outcome: Progressing  12/3/2019 0340 by Dariusz Peña RN  Outcome: Progressing  12/3/2019 0340 by Dariusz Peña RN  Outcome: Progressing     Problem: Knowledge Deficit  Goal: Patient/family/caregiver demonstrates understanding of disease process, treatment plan, medications, and discharge instructions  Description  Complete learning assessment and assess knowledge base    Interventions:  - Provide teaching at level of understanding  - Provide teaching via preferred learning methods  12/3/2019 0341 by Dariusz Peña RN  Outcome: Progressing  12/3/2019 0340 by Dariusz Peña RN  Outcome: Progressing  12/3/2019 0340 by Dariusz Peña RN  Outcome: Progressing Problem: Potential for Falls  Goal: Patient will remain free of falls  Description  INTERVENTIONS:  - Assess patient frequently for physical needs  -  Identify cognitive and physical deficits and behaviors that affect risk of falls  -  [place] fall precautions as indicated by assessment   - Educate patient/family on patient safety including physical limitations  - Instruct patient to call for assistance with activity based on assessment  - Modify environment to reduce risk of injury  - Consider OT/PT consult to assist with strengthening/mobility  12/3/2019 0341 by Anil Bush RN  Outcome: Progressing  12/3/2019 0340 by Anil Bush RN  Outcome: Progressing  12/3/2019 0340 by Anil Bush RN  Outcome: Progressing     Problem: Prexisting or High Potential for Compromised Skin Integrity  Goal: Skin integrity is maintained or improved  Description  INTERVENTIONS:  - Identify patients at risk for skin breakdown  - Assess and monitor skin integrity  - Assess and monitor nutrition and hydration status  - Monitor labs   - Assess for incontinence   - Turn and reposition patient  - Assist with mobility/ambulation  - Relieve pressure over bony prominences  - Avoid friction and shearing  - Provide appropriate hygiene as needed including keeping skin clean and dry  - Evaluate need for skin moisturizer/barrier cream  - Collaborate with interdisciplinary team   - Patient/family teaching  - Consider wound care consult   12/3/2019 0341 by Anil Bush RN  Outcome: Progressing  12/3/2019 0340 by Anil Bush RN  Outcome: Progressing  12/3/2019 0340 by Anil Bush RN  Outcome: Progressing     Problem: Nutrition/Hydration-ADULT  Goal: Nutrient/Hydration intake appropriate for improving, restoring or maintaining nutritional needs  Description  Monitor and assess patient's nutrition/hydration status for malnutrition  Collaborate with interdisciplinary team and initiate plan and interventions as ordered    Monitor patient's weight and dietary intake as ordered or per policy  Utilize nutrition screening tool and intervene as necessary  Determine patient's food preferences and provide high-protein, high-caloric foods as appropriate       INTERVENTIONS:  - Monitor oral intake, urinary output, labs, and treatment plans  - Assess nutrition and hydration status and recommend course of action  - Evaluate amount of meals eaten  - Assist patient with eating if necessary   - Allow adequate time for meals  - Recommend/ encourage appropriate diets, oral nutritional supplements, and vitamin/mineral supplements  - Order, calculate, and assess calorie counts as needed  - Recommend, monitor, and adjust tube feedings and TPN/PPN based on assessed needs  - Assess need for intravenous fluids  - Provide specific nutrition/hydration education as appropriate  - Include patient/family/caregiver in decisions related to nutrition  12/3/2019 0341 by Zahra Mcintosh RN  Outcome: Progressing  12/3/2019 0340 by Zahra Mcintosh RN  Outcome: Progressing  12/3/2019 0340 by Zahra Mcintosh RN  Outcome: Progressing

## 2019-12-03 NOTE — SOCIAL WORK
SANDY price checked vancomycin script at pts pharmacy [address] [facility] [address]  Script is covered by insurance but pt has $6 copay  Pt agreeable to same  Pharmacy will have script ready for pt today  Pt for possible discharge later today, reports she has ride home form daughter  CM to follow

## 2019-12-19 ENCOUNTER — HOSPITAL ENCOUNTER (OUTPATIENT)
Dept: NUCLEAR MEDICINE | Facility: HOSPITAL | Age: 79
Discharge: HOME | End: 2019-12-19
Payer: MEDICARE

## 2019-12-19 ENCOUNTER — HOSPITAL ENCOUNTER (OUTPATIENT)
Dept: NON INVASIVE DIAGNOSTICS | Facility: HOSPITAL | Age: 79
Discharge: HOME | End: 2019-12-19
Payer: MEDICARE

## 2019-12-19 DIAGNOSIS — Z95.1 HX OF CABG: ICD-10-CM

## 2019-12-19 DIAGNOSIS — R06.02 SHORTNESS OF BREATH ON EXERTION: ICD-10-CM

## 2019-12-19 DIAGNOSIS — R07.9 CHEST PAIN AT REST: ICD-10-CM

## 2019-12-19 LAB
ARRHY DURING EX: NORMAL
CHEST PAIN STATEMENT: NORMAL
MAX DIASTOLIC BP: 80 MMHG
MAX HEART RATE: 96 BPM
MAX PREDICTED HEART RATE: 140 BPM
MAX. SYSTOLIC BP: 140 MMHG
PROTOCOL NAME: NORMAL
REASON FOR TERMINATION: NORMAL
TARGET HR FORMULA: NORMAL
TEST INDICATION: NORMAL
TIME IN EXERCISE PHASE: NORMAL

## 2019-12-19 PROCEDURE — 93016 CV STRESS TEST SUPVJ ONLY: CPT | Performed by: INTERNAL MEDICINE

## 2019-12-19 PROCEDURE — 78452 HT MUSCLE IMAGE SPECT MULT: CPT

## 2019-12-19 PROCEDURE — 93018 CV STRESS TEST I&R ONLY: CPT | Performed by: INTERNAL MEDICINE

## 2019-12-19 PROCEDURE — 93017 CV STRESS TEST TRACING ONLY: CPT

## 2019-12-19 PROCEDURE — A9502 TC99M TETROFOSMIN: HCPCS

## 2019-12-19 PROCEDURE — 78452 HT MUSCLE IMAGE SPECT MULT: CPT | Performed by: INTERNAL MEDICINE

## 2019-12-19 RX ADMIN — REGADENOSON 0.4 MG: 0.08 INJECTION, SOLUTION INTRAVENOUS at 10:15

## 2019-12-20 ENCOUNTER — TRANSCRIBE ORDERS (OUTPATIENT)
Dept: ADMINISTRATIVE | Facility: HOSPITAL | Age: 79
End: 2019-12-20

## 2019-12-20 DIAGNOSIS — K57.92 DIVERTICULITIS: Primary | ICD-10-CM

## 2019-12-20 DIAGNOSIS — Z09 FOLLOW UP: ICD-10-CM

## 2019-12-27 ENCOUNTER — HOSPITAL ENCOUNTER (OUTPATIENT)
Dept: CT IMAGING | Facility: HOSPITAL | Age: 79
Discharge: HOME | End: 2019-12-27
Payer: MEDICARE

## 2019-12-27 DIAGNOSIS — Z09 FOLLOW UP: ICD-10-CM

## 2019-12-27 DIAGNOSIS — K57.92 DIVERTICULITIS: ICD-10-CM

## 2019-12-27 PROCEDURE — 74177 CT ABD & PELVIS W/CONTRAST: CPT

## 2019-12-27 RX ADMIN — IOHEXOL 100 ML: 350 INJECTION, SOLUTION INTRAVENOUS at 09:08

## 2020-01-18 ENCOUNTER — TRANSCRIBE ORDERS (OUTPATIENT)
Dept: ADMINISTRATIVE | Facility: HOSPITAL | Age: 80
End: 2020-01-18

## 2020-01-18 ENCOUNTER — APPOINTMENT (OUTPATIENT)
Dept: LAB | Facility: HOSPITAL | Age: 80
End: 2020-01-18
Payer: MEDICARE

## 2020-01-18 DIAGNOSIS — R60.9 EDEMA, UNSPECIFIED TYPE: ICD-10-CM

## 2020-01-18 DIAGNOSIS — E55.9 VITAMIN D DEFICIENCY, UNSPECIFIED: ICD-10-CM

## 2020-01-18 DIAGNOSIS — Z79.899 ENCOUNTER FOR LONG-TERM (CURRENT) USE OF OTHER MEDICATIONS: ICD-10-CM

## 2020-01-18 DIAGNOSIS — E11.9 TYPE 2 DIABETES MELLITUS WITHOUT COMPLICATION, UNSPECIFIED WHETHER LONG TERM INSULIN USE (HCC): ICD-10-CM

## 2020-01-18 DIAGNOSIS — E03.9 HYPOTHYROIDISM, ADULT: ICD-10-CM

## 2020-01-18 DIAGNOSIS — D64.9 ANEMIA, UNSPECIFIED TYPE: ICD-10-CM

## 2020-01-18 DIAGNOSIS — I10 ESSENTIAL HYPERTENSION, MALIGNANT: Primary | ICD-10-CM

## 2020-01-18 DIAGNOSIS — R73.9 HYPERGLYCEMIA: ICD-10-CM

## 2020-01-18 DIAGNOSIS — I50.9 HEART FAILURE, UNSPECIFIED HF CHRONICITY, UNSPECIFIED HEART FAILURE TYPE (HCC): ICD-10-CM

## 2020-01-18 DIAGNOSIS — E78.5 HYPERLIPIDEMIA, UNSPECIFIED HYPERLIPIDEMIA TYPE: ICD-10-CM

## 2020-01-18 DIAGNOSIS — I10 ESSENTIAL HYPERTENSION, MALIGNANT: ICD-10-CM

## 2020-01-18 LAB
25(OH)D3 SERPL-MCNC: 49.4 NG/ML (ref 30–100)
ALBUMIN SERPL BCP-MCNC: 3.8 G/DL (ref 3.5–5)
ALP SERPL-CCNC: 139 U/L (ref 46–116)
ALT SERPL W P-5'-P-CCNC: 31 U/L (ref 12–78)
ANION GAP SERPL CALCULATED.3IONS-SCNC: 8 MMOL/L (ref 4–13)
AST SERPL W P-5'-P-CCNC: 25 U/L (ref 5–45)
BILIRUB SERPL-MCNC: 0.9 MG/DL (ref 0.2–1)
BUN SERPL-MCNC: 16 MG/DL (ref 5–25)
CALCIUM SERPL-MCNC: 9.1 MG/DL (ref 8.3–10.1)
CHLORIDE SERPL-SCNC: 106 MMOL/L (ref 100–108)
CHOLEST SERPL-MCNC: 173 MG/DL (ref 50–200)
CO2 SERPL-SCNC: 28 MMOL/L (ref 21–32)
CREAT SERPL-MCNC: 0.88 MG/DL (ref 0.6–1.3)
ERYTHROCYTE [DISTWIDTH] IN BLOOD BY AUTOMATED COUNT: 13.5 % (ref 11.6–15.1)
EST. AVERAGE GLUCOSE BLD GHB EST-MCNC: 105 MG/DL
FERRITIN SERPL-MCNC: 73 NG/ML (ref 8–388)
GFR SERPL CREATININE-BSD FRML MDRD: 62 ML/MIN/1.73SQ M
GLUCOSE P FAST SERPL-MCNC: 99 MG/DL (ref 65–99)
HBA1C MFR BLD: 5.3 % (ref 4.2–6.3)
HCT VFR BLD AUTO: 40.1 % (ref 34.8–46.1)
HDLC SERPL-MCNC: 71 MG/DL
HGB BLD-MCNC: 13.2 G/DL (ref 11.5–15.4)
IRON SERPL-MCNC: 91 UG/DL (ref 50–170)
LDLC SERPL CALC-MCNC: 72 MG/DL (ref 0–100)
MCH RBC QN AUTO: 33.5 PG (ref 26.8–34.3)
MCHC RBC AUTO-ENTMCNC: 32.9 G/DL (ref 31.4–37.4)
MCV RBC AUTO: 102 FL (ref 82–98)
NONHDLC SERPL-MCNC: 102 MG/DL
NT-PROBNP SERPL-MCNC: 490 PG/ML
PLATELET # BLD AUTO: 130 THOUSANDS/UL (ref 149–390)
PMV BLD AUTO: 9.2 FL (ref 8.9–12.7)
POTASSIUM SERPL-SCNC: 3.9 MMOL/L (ref 3.5–5.3)
PROT SERPL-MCNC: 7.4 G/DL (ref 6.4–8.2)
RBC # BLD AUTO: 3.94 MILLION/UL (ref 3.81–5.12)
SODIUM SERPL-SCNC: 142 MMOL/L (ref 136–145)
T4 FREE SERPL-MCNC: 0.95 NG/DL (ref 0.76–1.46)
TIBC SERPL-MCNC: 317 UG/DL (ref 250–450)
TRIGL SERPL-MCNC: 149 MG/DL
TSH SERPL DL<=0.05 MIU/L-ACNC: 1.96 UIU/ML (ref 0.36–3.74)
WBC # BLD AUTO: 6.54 THOUSAND/UL (ref 4.31–10.16)

## 2020-01-18 PROCEDURE — 36415 COLL VENOUS BLD VENIPUNCTURE: CPT

## 2020-01-18 PROCEDURE — 84439 ASSAY OF FREE THYROXINE: CPT

## 2020-01-18 PROCEDURE — 83550 IRON BINDING TEST: CPT

## 2020-01-18 PROCEDURE — 84443 ASSAY THYROID STIM HORMONE: CPT

## 2020-01-18 PROCEDURE — 82306 VITAMIN D 25 HYDROXY: CPT

## 2020-01-18 PROCEDURE — 83880 ASSAY OF NATRIURETIC PEPTIDE: CPT

## 2020-01-18 PROCEDURE — 82728 ASSAY OF FERRITIN: CPT

## 2020-01-18 PROCEDURE — 83036 HEMOGLOBIN GLYCOSYLATED A1C: CPT

## 2020-01-18 PROCEDURE — 83540 ASSAY OF IRON: CPT

## 2020-01-18 PROCEDURE — 82747 ASSAY OF FOLIC ACID RBC: CPT

## 2020-01-18 PROCEDURE — 85027 COMPLETE CBC AUTOMATED: CPT

## 2020-01-18 PROCEDURE — 80053 COMPREHEN METABOLIC PANEL: CPT

## 2020-01-18 PROCEDURE — 80061 LIPID PANEL: CPT

## 2020-01-19 LAB
FOLATE BLD-MCNC: >620 NG/ML
FOLATE RBC-MCNC: >1586 NG/ML
HCT VFR BLD AUTO: 39.1 % (ref 34–46.6)

## 2020-01-29 ENCOUNTER — OFFICE VISIT (OUTPATIENT)
Dept: CARDIOLOGY CLINIC | Facility: HOSPITAL | Age: 80
End: 2020-01-29
Payer: MEDICARE

## 2020-01-29 VITALS
HEART RATE: 77 BPM | HEIGHT: 65 IN | DIASTOLIC BLOOD PRESSURE: 88 MMHG | SYSTOLIC BLOOD PRESSURE: 136 MMHG | WEIGHT: 177.03 LBS | BODY MASS INDEX: 29.49 KG/M2

## 2020-01-29 DIAGNOSIS — I10 ESSENTIAL HYPERTENSION: ICD-10-CM

## 2020-01-29 DIAGNOSIS — I48.3 TYPICAL ATRIAL FLUTTER (HCC): Primary | ICD-10-CM

## 2020-01-29 DIAGNOSIS — I25.10 CORONARY ARTERY DISEASE INVOLVING NATIVE CORONARY ARTERY OF NATIVE HEART WITHOUT ANGINA PECTORIS: ICD-10-CM

## 2020-01-29 DIAGNOSIS — Z95.1 HX OF CABG: ICD-10-CM

## 2020-01-29 DIAGNOSIS — E78.00 HYPERCHOLESTEROLEMIA: ICD-10-CM

## 2020-01-29 DIAGNOSIS — I50.32 CHRONIC DIASTOLIC CONGESTIVE HEART FAILURE (HCC): ICD-10-CM

## 2020-01-29 PROBLEM — I50.33 ACUTE ON CHRONIC DIASTOLIC CONGESTIVE HEART FAILURE (HCC): Status: RESOLVED | Noted: 2018-01-15 | Resolved: 2020-01-29

## 2020-01-29 PROCEDURE — 99214 OFFICE O/P EST MOD 30 MIN: CPT | Performed by: INTERNAL MEDICINE

## 2020-01-29 RX ORDER — FAMOTIDINE 10 MG
10 TABLET ORAL 2 TIMES DAILY
COMMUNITY
End: 2020-11-11 | Stop reason: ALTCHOICE

## 2020-01-29 NOTE — PROGRESS NOTES
Cardiology Follow Up    Hu Jennings  1940  [id number]  [address] [facility] [address] [facility] [employer]  [place]  [place] [address]  [phone]    1  Typical atrial flutter ([address] )     2  Chronic diastolic congestive heart failure ([address] )     3  Coronary artery disease involving native coronary artery of native heart without angina pectoris     4  Hx of CABG     5  Hypercholesterolemia     6  Essential hypertension           Discussion/Summary: All of her assessed cardiac problems are stable  I have reviewed her medications and made no changes  No further cardiac testing is needed at this time  RTO 9 months  Interval History: She is doing very well since her Atrial Flutter ablation  She has much less BOB and she can go up stairs much better  She denies CP or chest pressure  Recent nuclear stress test showed a normal EF with no ischemia  She has CAD with remote CABG  BP is controlled      Patient Active Problem List   Diagnosis    Diverticulosis    Hypoalbuminemia    Essential hypertension    Hypercholesterolemia    Elevated MCV    Typical atrial flutter (HCC)    Right-sided thoracic back pain    History of shingles    Incomplete right bundle branch block    Hx of CABG    Thoracic radiculopathy    Pyuria    Microscopic hematuria    Shortness of breath    Diverticulosis of large intestine with hemorrhage    Nausea, vomiting and diarrhea    Colonic mass    Tubulovillous adenoma of colon    Anemia    Hypokalemia    Hypoxia    Pleural effusion    Acute cystitis without hematuria    Clostridium difficile diarrhea    On continuous oral anticoagulation    Encounter for pre-operative cardiovascular clearance    Chronic obstructive lung disease (HCC)    Congestive heart failure (HCC)    Mixed anxiety and depressive disorder    Secondary pulmonary hypertension    Diverticulitis    Diarrhea of presumed infectious origin    C  difficile colitis    Coronary artery disease involving native coronary artery of native heart without angina pectoris     Past Medical History:   Diagnosis Date    Angina pectoris (HCC)     Anxiety     C  difficile diarrhea     CAD (coronary artery disease)     Cardiac disease     Depression     GERD (gastroesophageal reflux disease)     History of colon polyps     History of hiatal hernia     Hx of bleeding disorder     hemorrhoids    Hyperlipidemia     Hypertension     Irregular heart beat     gets tachycardia    Shortness of breath     related to heart     Social History     Socioeconomic History    Marital status:       Spouse name: Not on file    Number of children: Not on file    Years of education: Not on file    Highest education level: Not on file   Occupational History    Not on file   Social Needs    Financial resource strain: Not on file    Food insecurity:     Worry: Not on file     Inability: Not on file    Transportation needs:     Medical: Not on file     Non-medical: Not on file   Tobacco Use    Smoking status: Former Smoker    Smokeless tobacco: Never Used    Tobacco comment: quit 15 yrs ago   Substance and Sexual Activity    Alcohol use: Not Currently    Drug use: No    Sexual activity: Not on file   Lifestyle    Physical activity:     Days per week: Not on file     Minutes per session: Not on file    Stress: Not on file   Relationships    Social connections:     Talks on phone: Not on file     Gets together: Not on file     Attends [employer] service: Not on file     Active member of club or organization: Not on file     Attends meetings of clubs or organizations: Not on file     Relationship status: Not on file    Intimate partner violence:     Fear of current or ex partner: Not on file     Emotionally abused: Not on file     Physically abused: Not on file     Forced sexual activity: Not on file   Other Topics Concern    Not on file Social History Narrative    Not on file      Family History   Problem Relation Age of Onset    Heart disease Mother     Cirrhosis Father     Lung cancer Brother      Past Surgical History:   Procedure Laterality Date    CARDIAC ELECTROPHYSIOLOGY MAPPING AND ABLATION      CARDIAC SURGERY      CARDIAC SURGERY      CATARACT EXTRACTION Bilateral     CHOLECYSTECTOMY  1987    COLON SURGERY      repair of colon    COLONOSCOPY      COLONOSCOPY N/A 6/18/2018    Procedure: COLONOSCOPY;  Surgeon: Rica Nixon DO;  [facility]; Service: Gastroenterology    COLONOSCOPY W/ CONTROL OF HEMORRHAGE      CORONARY ANGIOPLASTY WITH STENT PLACEMENT      reports two blockages not able to be stented    CORONARY ARTERY BYPASS GRAFT  2008    3 vessels    HERNIA REPAIR      hiatal hernia    HYSTERECTOMY      partial not due to malignancy    LAPAROTOMY N/A 9/26/2018    Procedure: LAPAROTOMY EXPLORATORY WITH  RIGHT HEMICOLECTOMY;  Surgeon: Rica Nixon DO;  [facility];  Service: Gastroenterology    LAPAROTOMY N/A 9/26/2018    Procedure: LAPAROTOMY EXPLORATORY WITH HEMICOLECTOMY;  Surgeon: Andres Elliott DO;  [facility];  Service: General    AK COLONOSCOPY FLX DX W/COLLJ SPEC WHEN PFRMD N/A 9/26/2018    Procedure: COLONOSCOPY;  Surgeon: Rica Nixon DO;  [facility];  Service: Gastroenterology    AK HEMORRHOIDECTOMY,INT/EXT,1 COLUMN/GROUP N/A 7/25/2019    Procedure: HEMORRHOIDECTOMY EXCISION;  Surgeon: Inna Arrington MD;  [facility];  Service: General    AK SIGMOIDOSCOPY FLX DX W/COLLJ SPEC BR/WA IF PFRMD N/A 6/21/2018    Procedure: Chernoel Balint FLEXIBLE;  Surgeon: Rica Nixon DO;  [facility]; Service: Gastroenterology    AK SURG DIAGNOSTIC EXAM, ANORECTAL N/A 7/25/2019    Procedure: EXAM UNDER ANESTHESIA (EUA);   Surgeon: Inna Arrington MD;  Location: [address] [facility] [address] MAIN OR;  Service: General       Current Outpatient Medications:     acetaminophen (TYLENOL) 650 mg CR tablet, Take 1 tablet (650 mg total) by mouth every 8 (eight) hours as needed for mild pain or moderate pain, Disp: 15 tablet, Rfl: 0    ALPRAZolam (XANAX) 0 25 mg tablet, Take 0 25 mg by mouth 3 (three) times a day as needed for anxiety  , Disp: , Rfl:     apixaban (ELIQUIS) 5 mg, Take 1 tablet (5 mg total) by mouth 2 (two) times a day, Disp: 60 tablet, Rfl: 3    atorvastatin (LIPITOR) 20 mg tablet, Take 20 mg by mouth daily after dinner   , Disp: , Rfl:     ergocalciferol (ERGOCALCIFEROL) 82573 UNITS capsule, Take 50,000 Units by mouth once a week  Takes on Wednesdays, Disp: , Rfl:     escitalopram (LEXAPRO) 10 mg tablet, Take 10 mg by mouth daily , Disp: , Rfl: 3    famotidine (PEPCID) 10 mg tablet, Take 10 mg by mouth 2 (two) times a day, Disp: , Rfl:     furosemide (LASIX) 20 mg tablet, Take 1 tablet (20 mg total) by mouth 2 (two) times a day Hold until diarrhea resolved, Disp: , Rfl: 0    Gauze Pads & Dressings (COMBINE ABD) 5"X9" PADS, by Does not apply route 3 (three) times a day, Disp: 20 each, Rfl: 3    Gauze Pads & Dressings (GAUZE PADS 4"X4") 4"X4" PADS, by Does not apply route 3 (three) times a day, Disp: 30 each, Rfl: 2    Incontinent Wash (SOOTHE & COOL PERINEAL WASH) LIQD, Apply 1 application topically 3 (three) times a day, Disp: 1 Bottle, Rfl: 2    metolazone (ZAROXOLYN) 2 5 mg tablet, TAKE ONE TABLET BY MOUTH ONCE A WEEK OR  AS  DIRECTED  FOR  WEIGHT  GAIN, Disp: 20 tablet, Rfl: 5    metoprolol tartrate (LOPRESSOR) 50 mg tablet, Take 1 tablet (50 mg total) by mouth every 12 (twelve) hours, Disp: 180 tablet, Rfl: 3    Misc  Devices (SPRAY BOTTLE/PLASTIC 120ML) MISC, by Does not apply route 3 (three) times a day Use to cleanse area 3 times daily or as needed with bowel movements  , Disp: 1 each, Rfl: 0    nitroglycerin (NITROSTAT) 0 4 mg SL tablet, Place 1 tablet (0 4 mg total) under the tongue every 5 (five) minutes as needed for chest pain (times three for chest pain  If no relief after second tablet, call 911 ), Disp: 25 tablet, Rfl: 2    Omega-3 Fatty Acids (FISH OIL PO), Take 1,000 mg by mouth daily  , Disp: , Rfl:     potassium chloride (K-DUR,KLOR-CON) 20 mEq tablet, Take 1 tablet (20 mEq total) by mouth daily, Disp: 90 tablet, Rfl: 3    Multiple Vitamins-Minerals (PRESERVISION AREDS) CAPS, Take 1 capsule by mouth daily  , Disp: , Rfl:     ranitidine (ZANTAC) 300 MG tablet, 300 mg , Disp: , Rfl:   Allergies   Allergen Reactions    Codeine GI Intolerance    Lansoprazole Other (See Comments)     GI Upset, dania protonix    Morphine Nausea Only    Morphine And Related GI Intolerance     Vitals:    01/29/20 1241   BP: 136/88   BP Location: Right arm   Patient Position: Sitting   Cuff Size: Standard   Pulse: 77   Weight: 80 3 kg (177 lb 0 5 oz)   Height: 5' 5" (1 651 m)     Weight (last 2 days)     Date/Time   Weight    01/29/20 1241   80 3 (177 03)             Blood pressure 136/88, pulse 77, height 5' 5" (1 651 m), weight 80 3 kg (177 lb 0 5 oz)  , Body mass index is 29 46 kg/m²      Labs:  Appointment on 01/18/2020   Component Date Value    Sodium 01/18/2020 142     Potassium 01/18/2020 3 9     Chloride 01/18/2020 106     CO2 01/18/2020 28     ANION GAP 01/18/2020 8     BUN 01/18/2020 16     Creatinine 01/18/2020 0 88     Glucose, Fasting 01/18/2020 99     Calcium 01/18/2020 9 1     AST 01/18/2020 25     ALT 01/18/2020 31     Alkaline Phosphatase 01/18/2020 139*    Total Protein 01/18/2020 7 4     Albumin 01/18/2020 3 8     Total Bilirubin 01/18/2020 0 90     eGFR 01/18/2020 62     Cholesterol 01/18/2020 173     Triglycerides 01/18/2020 149     HDL, Direct 01/18/2020 71     LDL Calculated 01/18/2020 72     Non-HDL-Chol (CHOL-HDL) 01/18/2020 102     TSH 3RD GENERATON 01/18/2020 1 955     Free T4 01/18/2020 0 95     NT-proBNP 01/18/2020 490*    Hemoglobin A1C 01/18/2020 5 3     EAG 01/18/2020 105     WBC 01/18/2020 6 54     RBC 01/18/2020 3 94     Hemoglobin 01/18/2020 13 2     Hematocrit 01/18/2020 40 1     MCV 01/18/2020 102*    MCH 01/18/2020 33 5     MCHC 01/18/2020 32 9     RDW 01/18/2020 13 5     Platelets 74/09/7225 130*    MPV 01/18/2020 9 2     Ferritin 01/18/2020 73     Iron 01/18/2020 91     TIBC 01/18/2020 317     RBC Folate 01/18/2020 >1586     Folate, Hemolysate 01/18/2020 >620 0     HCT 01/18/2020 39 1     Vit D, 25-Hydroxy 01/18/2020 49 4    Hospital Outpatient Visit on 12/19/2019   Component Date Value    Protocol Name 12/19/2019 MARY ANNE SIT     Time In Exercise Phase 12/19/2019 00:03:08     MAX   SYSTOLIC BP 30/84/8413 577     Max Diastolic Bp 59/08/2926 80     Max Heart Rate 12/19/2019 96     Max Predicted Heart Rate 12/19/2019 140     Reason for Termination 12/19/2019 Test Complete     Test Indication 12/19/2019 EVAL KNOWN CAD     Target Hr Formular 12/19/2019 (220 - Age)*85%     Arrhy During Ex 12/19/2019 none     Chest Pain Statement 12/19/2019 none    Admission on 11/30/2019, Discharged on 12/03/2019   Component Date Value    WBC 11/30/2019 9 50     RBC 11/30/2019 4 40     Hemoglobin 11/30/2019 14 3     Hematocrit 11/30/2019 41 6*    MCV 11/30/2019 94     MCH 11/30/2019 32 4     MCHC 11/30/2019 34 3     RDW 11/30/2019 14 4     MPV 11/30/2019 7 7*    Platelets 49/06/3930 129*    Sodium 11/30/2019 143     Potassium 11/30/2019 4 2     Chloride 11/30/2019 105     CO2 11/30/2019 26     ANION GAP 11/30/2019 12     BUN 11/30/2019 21     Creatinine 11/30/2019 0 97     Glucose 11/30/2019 124*    Calcium 11/30/2019 9 6     AST 11/30/2019 32     ALT 11/30/2019 25     Alkaline Phosphatase 11/30/2019 128     Total Protein 11/30/2019 7 4     Albumin 11/30/2019 4 5     Total Bilirubin 11/30/2019 1 10*    eGFR 11/30/2019 55     Lipase 11/30/2019 11     Color, UA 12/01/2019 Yellow     Clarity,  12/01/2019 Slightly Cloudy*    Specific Gravity,  12/01/2019 1 025     pH,  12/01/2019 5 0  Leukocytes, UA 12/01/2019 Trace*    Nitrite, UA 12/01/2019 Negative     Protein, UA 12/01/2019 Negative     Glucose, UA 12/01/2019 Negative     Ketones, UA 12/01/2019 Negative     Urobilinogen, UA 12/01/2019 0 2     Bilirubin, UA 12/01/2019 1+*    Blood, UA 12/01/2019 2+*    Troponin I 11/30/2019 <0 03     C difficile toxin by PCR 11/30/2019 POSITIVE for C difficle toxin by PCR  *    Segmented % 11/30/2019 96*    Lymphocytes % 11/30/2019 3*    Monocytes % 11/30/2019 1*    Neutrophils Absolute 11/30/2019 9 12*    Lymphocytes Absolute 11/30/2019 0 29*    Monocytes Absolute 11/30/2019 0 10     Total Counted 11/30/2019 100     RBC Morphology 11/30/2019 abnormal     Anisocytosis 11/30/2019 Present     Platelet Estimate 36/18/1140 Decreased*    Ventricular Rate 11/30/2019 92     Atrial Rate 11/30/2019 92     OH Interval 11/30/2019 142     QRSD Interval 11/30/2019 102     QT Interval 11/30/2019 362     QTC Interval 11/30/2019 447     P Axis 11/30/2019 72     QRS Axis 11/30/2019 27     T Wave Axis 11/30/2019 76     Sodium 12/01/2019 143     Potassium 12/01/2019 3 3*    Chloride 12/01/2019 109*    CO2 12/01/2019 24     ANION GAP 12/01/2019 10     BUN 12/01/2019 18     Creatinine 12/01/2019 0 88     Glucose 12/01/2019 94     Calcium 12/01/2019 8 4*    eGFR 12/01/2019 62     WBC 12/01/2019 6 80     RBC 12/01/2019 3 79*    Hemoglobin 12/01/2019 12 5     Hematocrit 12/01/2019 36 3*    MCV 12/01/2019 96     MCH 12/01/2019 33 0     MCHC 12/01/2019 34 5     RDW 12/01/2019 14 5     MPV 12/01/2019 7 7*    Platelets 68/67/0784 110*    Neutrophils Relative 12/01/2019 86*    Lymphocytes Relative 12/01/2019 8*    Monocytes Relative 12/01/2019 6     Eosinophils Relative 12/01/2019 0     Basophils Relative 12/01/2019 0     Neutrophils Absolute 12/01/2019 5 90     Lymphocytes Absolute 12/01/2019 0 50*    Monocytes Absolute 12/01/2019 0 40     Eosinophils Absolute 12/01/2019 0 00  Basophils Absolute 12/01/2019 0 00     RBC, UA 12/01/2019 2-4*    WBC, UA 12/01/2019 4-10*    Epithelial Cells 12/01/2019 Occasional     Bacteria, UA 12/01/2019 Moderate*    Urine Culture 12/01/2019 <10,000 cfu/ml      WBC 12/02/2019 4 30*    RBC 12/02/2019 3 35*    Hemoglobin 12/02/2019 11 0*    Hematocrit 12/02/2019 31 6*    MCV 12/02/2019 94     MCH 12/02/2019 32 8     MCHC 12/02/2019 34 8     RDW 12/02/2019 14 5     Platelets 52/54/6740 92*    MPV 12/02/2019 7 6*    Sodium 12/02/2019 139     Potassium 12/02/2019 3 1*    Chloride 12/02/2019 110*    CO2 12/02/2019 21     ANION GAP 12/02/2019 8     BUN 12/02/2019 11     Creatinine 12/02/2019 0 87     Glucose 12/02/2019 100*    Calcium 12/02/2019 8 4*    eGFR 12/02/2019 63     RBC Morphology 12/02/2019 abnormal     Anisocytosis 12/02/2019 Present     Platelet Estimate 11/05/9307 Decreased*    WBC 12/03/2019 5 00     RBC 12/03/2019 3 45*    Hemoglobin 12/03/2019 11 4*    Hematocrit 12/03/2019 32 6*    MCV 12/03/2019 94     MCH 12/03/2019 33 0     MCHC 12/03/2019 34 9     RDW 12/03/2019 14 5     Platelets 19/14/9568 100*    MPV 12/03/2019 7 7*    Sodium 12/03/2019 140     Potassium 12/03/2019 3 2*    Chloride 12/03/2019 110*    CO2 12/03/2019 22     ANION GAP 12/03/2019 8     BUN 12/03/2019 9     Creatinine 12/03/2019 0 85     Glucose 12/03/2019 94     Calcium 12/03/2019 8 4*    eGFR 12/03/2019 65    Admission on 10/17/2019, Discharged on 10/17/2019   Component Date Value    Sodium 10/17/2019 138     Potassium 10/17/2019 3 5     Chloride 10/17/2019 106     CO2 10/17/2019 26     ANION GAP 10/17/2019 6     BUN 10/17/2019 20     Creatinine 10/17/2019 1 00     Glucose 10/17/2019 108     Glucose, Fasting 10/17/2019 108*    Calcium 10/17/2019 9 5     eGFR 10/17/2019 53     WBC 10/17/2019 8 34     RBC 10/17/2019 4 27     Hemoglobin 10/17/2019 13 7     Hematocrit 10/17/2019 41 8     MCV 10/17/2019 98     MCH 10/17/2019 32 1     MCHC 10/17/2019 32 8     RDW 10/17/2019 14 0     MPV 10/17/2019 9 3     Platelets 28/31/1282 149     nRBC 10/17/2019 0     Neutrophils Relative 10/17/2019 71     Immat GRANS % 10/17/2019 0     Lymphocytes Relative 10/17/2019 19     Monocytes Relative 10/17/2019 8     Eosinophils Relative 10/17/2019 1     Basophils Relative 10/17/2019 1     Neutrophils Absolute 10/17/2019 5 99     Immature Grans Absolute 10/17/2019 0 02     Lymphocytes Absolute 10/17/2019 1 54     Monocytes Absolute 10/17/2019 0 68     Eosinophils Absolute 10/17/2019 0 05     Basophils Absolute 10/17/2019 0 06     Protime 10/17/2019 15 5*    INR 10/17/2019 1 27*    Ventricular Rate 10/17/2019 85     Atrial Rate 10/17/2019 229     QRSD Interval 10/17/2019 116     QT Interval 10/17/2019 362     QTC Interval 10/17/2019 430     P Axis 10/17/2019 268     QRS Axis 10/17/2019 2     T Wave Axis 10/17/2019 20     Ventricular Rate 10/17/2019 84     Atrial Rate 10/17/2019 84     [place] Interval 10/17/2019 158     QRSD Interval 10/17/2019 113     QT Interval 10/17/2019 388     QTC Interval 10/17/2019 459     P Axis 10/17/2019 73     QRS Axis 10/17/2019 27     T Wave Axis 10/17/2019 -42    Appointment on 10/09/2019   Component Date Value    WBC 10/09/2019 8 86     RBC 10/09/2019 4 37     Hemoglobin 10/09/2019 14 0     Hematocrit 10/09/2019 43 2     MCV 10/09/2019 99*    MCH 10/09/2019 32 0     MCHC 10/09/2019 32 4     RDW 10/09/2019 13 6     MPV 10/09/2019 9 0     Platelets 33/79/7314 161     nRBC 10/09/2019 0     Neutrophils Relative 10/09/2019 69     Immat GRANS % 10/09/2019 0     Lymphocytes Relative 10/09/2019 20     Monocytes Relative 10/09/2019 9     Eosinophils Relative 10/09/2019 1     Basophils Relative 10/09/2019 1     Neutrophils Absolute 10/09/2019 6 22     Immature Grans Absolute 10/09/2019 0 02     Lymphocytes Absolute 10/09/2019 1 74     Monocytes Absolute 10/09/2019 0 78     Eosinophils Absolute 10/09/2019 0 04     Basophils Absolute 10/09/2019 0 06     Sodium 10/09/2019 140     Potassium 10/09/2019 3 7     Chloride 10/09/2019 104     CO2 10/09/2019 29     ANION GAP 10/09/2019 7     BUN 10/09/2019 19     Creatinine 10/09/2019 0 94     Glucose 10/09/2019 102     Calcium 10/09/2019 9 1     AST 10/09/2019 21     ALT 10/09/2019 17     Alkaline Phosphatase 10/09/2019 152*    Total Protein 10/09/2019 7 7     Albumin 10/09/2019 3 7     Total Bilirubin 10/09/2019 0 70     eGFR 10/09/2019 57     Protime 10/09/2019 15 8*    INR 10/09/2019 1 25*     Imaging: No results found  Review of Systems:  Review of Systems   Constitutional: Negative for diaphoresis, fatigue, fever and unexpected weight change  HENT: Negative  Respiratory: Negative for cough, shortness of breath and wheezing  Cardiovascular: Negative for chest pain, palpitations and leg swelling  Gastrointestinal: Negative for abdominal pain, diarrhea and nausea  Musculoskeletal: Negative for gait problem and myalgias  Skin: Negative for rash  Neurological: Negative for dizziness and numbness  Psychiatric/Behavioral: Negative  Physical Exam:  Physical Exam   Constitutional: She is oriented to person, place, and time  She appears well-developed and well-nourished  HENT:   Head: Normocephalic and atraumatic  Eyes: Pupils are equal, round, and reactive to light  Neck: Normal range of motion  Neck supple  No JVD present  Cardiovascular: Regular rhythm, S1 normal, S2 normal and normal pulses  Pulses:       Carotid pulses are 2+ on the right side, and 2+ on the left side  Pulmonary/Chest: Effort normal and breath sounds normal  She has no wheezes  She has no rales  Abdominal: Soft  Bowel sounds are normal  There is no tenderness  Musculoskeletal: Normal range of motion  She exhibits no edema or tenderness  Neurological: She is alert and oriented to person, place, and time   She has normal reflexes  No cranial nerve deficit  Skin: Skin is warm  Psychiatric: She has a normal mood and affect

## 2020-02-24 ENCOUNTER — OFFICE VISIT (OUTPATIENT)
Dept: CARDIOLOGY CLINIC | Facility: CLINIC | Age: 80
End: 2020-02-24
Payer: MEDICARE

## 2020-02-24 DIAGNOSIS — I48.3 TYPICAL ATRIAL FLUTTER (HCC): Primary | ICD-10-CM

## 2020-02-24 PROCEDURE — 99213 OFFICE O/P EST LOW 20 MIN: CPT | Performed by: INTERNAL MEDICINE

## 2020-02-24 NOTE — PROGRESS NOTES
EPS Progress Note - Brittny Manley [de-identified] y o  female MRN: [id number]           ASSESSMENT/PLAN:  1  Atrial flutter   S/p successful atrial flutter ablation in October 2019, she experienced one episode shortly after her procedure however no recurrent episodes since then  On Metoprolol 50 mg twice daily   Continue current medical therapy with beta-blocker and Eliquis    2  CAD  History of CABG  Continue atorvastatin and beta-blocker  Also on furosemide twice daily    3  Hypertension   Well controlled  Of his blood pressure showed 124/92 mmHg  -continue to monitor home blood pressure     4  Hyperlipidemia   Tolerating statin     5  AC  Maintained on Eliquis 5 mg twice daily       Follow up in as needed with EP       HPI:   Brittny Manley is a [de-identified] y o  female who presents to the office today for a hospital follow up  Patient reports having an episode shortly after her procedure however no recurrent episode since then  She states she had an increase of stamina and is able to walk up the stairs without any shortness of breath or fatigue  She denies any chest pain, shortness of breath, palpitations, LE edema, pre syncope, or syncope  ECG performed in the office and reviewed by myself reveals normal sinus rhythm with incomplete right bundle-branch block and left atrial enlargement  ROS:   Review of Systems   Constitution: Negative  HENT: Negative  Eyes: Negative for blurred vision and double vision  Cardiovascular: Negative for chest pain, dyspnea on exertion, near-syncope, palpitations and syncope  Respiratory: Negative for cough, shortness of breath and wheezing  Endocrine: Negative  Hematologic/Lymphatic: Negative  Skin: Negative  Musculoskeletal: Negative  Gastrointestinal: Negative  Genitourinary: Negative  Neurological: Negative  Psychiatric/Behavioral: Negative  Allergic/Immunologic: Negative             Objective:     Vitals:Blood pressure 124/92, heart rate 70  Physical Exam:    GEN: Trent Lopez appears well, alert and oriented x 3, pleasant and cooperative   HEENT: pupils equal, round, and reactive to light; extraocular muscles intact  NECK: supple, no carotid bruits   HEART: regular rhythm, normal S1 and S2, no murmurs, clicks, gallops or rubs   LUNGS: clear to auscultation bilaterally; no wheezes, rales, or rhonchi   ABDOMEN: normal bowel sounds, soft, no tenderness, no distention  EXTREMITIES: peripheral pulses normal; no clubbing, cyanosis, or edema  NEURO: no focal findings   SKIN: normal without suspicious lesions on exposed skin    Medications:      Current Outpatient Medications:     acetaminophen (TYLENOL) 650 mg CR tablet, Take 1 tablet (650 mg total) by mouth every 8 (eight) hours as needed for mild pain or moderate pain, Disp: 15 tablet, Rfl: 0    ALPRAZolam (XANAX) 0 25 mg tablet, Take 0 25 mg by mouth 3 (three) times a day as needed for anxiety  , Disp: , Rfl:     apixaban (ELIQUIS) 5 mg, Take 1 tablet (5 mg total) by mouth 2 (two) times a day, Disp: 60 tablet, Rfl: 3    atorvastatin (LIPITOR) 20 mg tablet, Take 20 mg by mouth daily after dinner   , Disp: , Rfl:     ergocalciferol (ERGOCALCIFEROL) 63119 UNITS capsule, Take 50,000 Units by mouth once a week   Takes on Wednesdays, Disp: , Rfl:     escitalopram (LEXAPRO) 10 mg tablet, Take 10 mg by mouth daily , Disp: , Rfl: 3    furosemide (LASIX) 20 mg tablet, Take 1 tablet (20 mg total) by mouth 2 (two) times a day Hold until diarrhea resolved, Disp: , Rfl: 0    Gauze Pads & Dressings (COMBINE ABD) 5"X9" PADS, by Does not apply route 3 (three) times a day, Disp: 20 each, Rfl: 3    Gauze Pads & Dressings (GAUZE PADS 4"X4") 4"X4" PADS, by Does not apply route 3 (three) times a day, Disp: 30 each, Rfl: 2    Incontinent Wash (SOOTHE & COOL PERINEAL WASH) LIQD, Apply 1 application topically 3 (three) times a day, Disp: 1 Bottle, Rfl: 2    metolazone (ZAROXOLYN) 2 5 mg tablet, TAKE ONE TABLET BY MOUTH ONCE A WEEK OR  AS  DIRECTED  FOR  WEIGHT  GAIN, Disp: 20 tablet, Rfl: 5    metoprolol tartrate (LOPRESSOR) 50 mg tablet, Take 1 tablet (50 mg total) by mouth every 12 (twelve) hours, Disp: 180 tablet, Rfl: 3    Misc  Devices (SPRAY BOTTLE/PLASTIC 120ML) MISC, by Does not apply route 3 (three) times a day Use to cleanse area 3 times daily or as needed with bowel movements  , Disp: 1 each, Rfl: 0    Multiple Vitamins-Minerals (PRESERVISION AREDS) CAPS, Take 1 capsule by mouth daily  , Disp: , Rfl:     nitroglycerin (NITROSTAT) 0 4 mg SL tablet, Place 1 tablet (0 4 mg total) under the tongue every 5 (five) minutes as needed for chest pain (times three for chest pain  If no relief after second tablet, call 911 ), Disp: 25 tablet, Rfl: 2    Omega-3 Fatty Acids (FISH OIL PO), Take 1,000 mg by mouth daily  , Disp: , Rfl:     potassium chloride (K-DUR,KLOR-CON) 20 mEq tablet, Take 1 tablet (20 mEq total) by mouth daily, Disp: 90 tablet, Rfl: 3    ranitidine (ZANTAC) 300 MG tablet, 300 mg , Disp: , Rfl:     famotidine (PEPCID) 10 mg tablet, Take 10 mg by mouth 2 (two) times a day, Disp: , Rfl:      Family History   Problem Relation Age of Onset    Heart disease Mother     Cirrhosis Father     Lung cancer Brother      Social History     Socioeconomic History    Marital status:       Spouse name: Not on file    Number of children: Not on file    Years of education: Not on file    Highest education level: Not on file   Occupational History    Not on file   Social Needs    Financial resource strain: Not on file    Food insecurity:     Worry: Not on file     Inability: Not on file    Transportation needs:     Medical: Not on file     Non-medical: Not on file   Tobacco Use    Smoking status: Former Smoker    Smokeless tobacco: Never Used    Tobacco comment: quit 15 yrs ago   Substance and Sexual Activity    Alcohol use: Not Currently    Drug use: No    Sexual activity: Not on file   Lifestyle  Physical activity:     Days per week: Not on file     Minutes per session: Not on file    Stress: Not on file   Relationships    Social connections:     Talks on phone: Not on file     Gets together: Not on file     Attends Anabaptism service: Not on file     Active member of club or organization: Not on file     Attends meetings of clubs or organizations: Not on file     Relationship status: Not on file    Intimate partner violence:     Fear of current or ex partner: Not on file     Emotionally abused: Not on file     Physically abused: Not on file     Forced sexual activity: Not on file   Other Topics Concern    Not on file   Social History Narrative    Not on file     Social History     Tobacco Use   Smoking Status Former Smoker   Smokeless Tobacco Never Used   Tobacco Comment    quit 15 yrs ago     Social History     Substance and Sexual Activity   Alcohol Use Not Currently       Labs & Results:  Below is the patient's most recent value for Albumin, ALT, AST, BUN, Calcium, Chloride, Cholesterol, CO2, Creatinine, GFR, Glucose, HDL, Hematocrit, Hemoglobin, Hemoglobin A1C, LDL, Magnesium, Phosphorus, Platelets, Potassium, PSA, Sodium, Triglycerides, and WBC  Lab Results   Component Value Date    ALT 31 01/18/2020    AST 25 01/18/2020    BUN 16 01/18/2020    CALCIUM 9 1 01/18/2020     01/18/2020    CHOL 153 04/08/2014    CO2 28 01/18/2020    CREATININE 0 88 01/18/2020    HDL 71 01/18/2020    HCT 40 1 01/18/2020    HCT 39 1 01/18/2020    HGB 13 2 01/18/2020    HGBA1C 5 3 01/18/2020    MG 2 0 10/04/2018    PHOS 2 7 10/04/2018     (L) 01/18/2020    K 3 9 01/18/2020     12/02/2015    TRIG 149 01/18/2020    WBC 6 54 01/18/2020     Note: for a comprehensive list of the patient's lab results, access the Results Review activity              Cardiac testing:   Results for orders placed during the hospital encounter of 10/03/18   Echo complete with contrast if indicated    Narrative [facility]  [address]  [phone]    Transthoracic Echocardiogram  2D, M-mode, Doppler, and Color Doppler    Study date:  04-Oct-2018    Patient: Merlene Mistry  MR number: [id number]  Account number: [de-identified]  : 1940  Age: 77 years  Gender: Female  Status: Outpatient  Location: Echo lab  Height: 65 in  Weight: 186 lb  BP: 113/ 56 mmHg    Indications: Shortness of breath    Diagnoses: J98 9 - Respiratory disorder, unspecified    Sonographer:  VILMA Belle  Primary Physician:  Nenita Simmons MD  Referring Physician:  Coralee Ganser, DO  Group:  [id number]  Interpreting Physician:  Ivette Rodriguez MD    SUMMARY    LEFT VENTRICLE:  Size was normal   Systolic function was normal  Ejection fraction was estimated to be 60 %  There were no regional wall motion abnormalities  Wall thickness was normal   There was no evidence of concentric hypertrophy  VENTRICULAR SEPTUM:  There was dyssynergic motion  These changes are consistent with RV pressure overload  RIGHT VENTRICLE:  The ventricle was moderately dilated  Systolic function was mildly reduced  LEFT ATRIUM:  The atrium was dilated  RIGHT ATRIUM:  The atrium was moderately dilated  MITRAL VALVE:  There was moderate regurgitation  TRICUSPID VALVE:  There was severe regurgitation  Estimated peak PA pressure was 60 mmHg  PULMONIC VALVE:  There was mild regurgitation  HISTORY: PRIOR HISTORY: cabg, cad, pulm htn    PROCEDURE: The procedure was performed in the echo lab  This was a routine study  The transthoracic approach was used  The study included complete 2D imaging, M-mode, complete spectral Doppler, and color Doppler  The heart rate was 80 bpm,  at the start of the study  Image quality was adequate  LEFT VENTRICLE: Size was normal  Systolic function was normal  Ejection fraction was estimated to be 60 %   There were no regional wall motion abnormalities  Wall thickness was normal  There was no evidence of concentric hypertrophy  VENTRICULAR SEPTUM: There was dyssynergic motion  These changes are consistent with RV pressure overload  RIGHT VENTRICLE: The ventricle was moderately dilated  Systolic function was mildly reduced  LEFT ATRIUM: The atrium was dilated  RIGHT ATRIUM: The atrium was moderately dilated  MITRAL VALVE: Valve structure was normal  There was normal leaflet separation  DOPPLER: The transmitral velocity was within the normal range  There was no evidence for stenosis  There was moderate regurgitation  AORTIC VALVE: The valve was trileaflet  Leaflets exhibited normal thickness and normal cuspal separation  DOPPLER: Transaortic velocity was within the normal range  There was no evidence for stenosis  There was no regurgitation  TRICUSPID VALVE: DOPPLER: There was severe regurgitation  Estimated peak PA pressure was 60 mmHg  PULMONIC VALVE: Leaflets exhibited normal thickness, no calcification, and normal cuspal separation  DOPPLER: The transpulmonic velocity was within the normal range  There was mild regurgitation  PERICARDIUM: There was no pericardial effusion  The pericardium was normal in appearance  AORTA: The root exhibited normal size      SYSTEM MEASUREMENT TABLES    2D  %FS: 40 8 %  Ao Diam: 2 61 cm  EDV(Teich): 93 53 ml  EF(Teich): 71 72 %  ESV(Teich): 26 45 ml  IVSd: 0 82 cm  LA Diam: 4 18 cm  LVIDd: 4 52 cm  LVIDs: 2 68 cm  LVPWd: 0 81 cm  SV(Teich): 67 08 ml    CW  AV Vmax: 1 53 m/s  AV maxP 39 mmHg  RAP: 0 mmHg  TR Vmax: 3 66 m/s  TR maxP 56 mmHg    MM  TAPSE: 2 6 cm    PW  E' Sept: 0 08 m/s  E/E' Sept: 13 9  LVOT Vmax: 1 03 m/s  LVOT maxP 24 mmHg  MV A Jose: 0 9 m/s  MV Dec [place]: 5 12 m/s2  MV DecT: 225 77 ms  MV E Jose: 1 16 m/s  MV E/A Ratio: 1 28  RVSP: 53 56 mmHg    [facility] [place] [facility] Accredited Echocardiography Laboratory    Prepared and electronically signed by    José Miguel Cramer Diana King MD  Signed 04-Oct-2018 10:38:29       Results for orders placed during the hospital encounter of 10/17/19   GARRETT    Narrative [address]  [facility], [address] [facility]  [phone]    Transesophageal Echocardiogram  2D, Doppler, and Color Doppler    Study date:  17-Oct-2019    Patient: Momo Ritter  MR number: [id number]  Account number: [de-identified]  : 1940  Age: [de-identified] years  Gender: Female  Status: Outpatient  Location: Cath lab  Height: 65 in  Weight: 170 lb  BP: 122/ 70 mmHg    Indications: A-Flutter    Diagnoses: I48 1 - Atrial flutter    Sonographer:  TATYANA Victoria  Interpreting Physician:  Carmen Gerber MD  Primary Physician:  Ambrosio Dubin, MD  Referring Physician:  Yoseph Agarwal MD  Group:  [facility]    SUMMARY    LEFT VENTRICLE:  Systolic function was normal  Ejection fraction was estimated to be 60 %  There were no regional wall motion abnormalities  RIGHT VENTRICLE:  The ventricle was moderately dilated  Systolic function was normal     LEFT ATRIUM:  The atrium was mildly dilated  LEFT ATRIAL APPENDAGE:  No thrombus was identified  ATRIAL SEPTUM:  No defect or patent foramen ovale was identified  RIGHT ATRIUM:  The atrium was markedly dilated  MITRAL VALVE:  There was moderate regurgitation  TRICUSPID VALVE:  There was severe regurgitation  AORTA:  There was moderately severe atheroma in the visaulized portions of the intrathoracic aorta  HISTORY: PRIOR HISTORY: COPD,Pleural effusion,CAD,GERD,HTN,A-flutter,CHF,Pulmonary HTN,CABG,Incomplete RBBB    PROCEDURE: The procedure was performed in the catheterization laboratory  This was a routine study  The risks and alternatives of the procedure were explained to the patient and informed consent was obtained  The transesophageal approach  was used  The study included complete 2D imaging, complete spectral Doppler, and color Doppler   The heart rate was 93 bpm, at the start of the study  An adult omniplane probe was inserted by the attending cardiologist  Intubated with ease  One intubation attempt(s)  There was no blood detected on the probe  Image quality was adequate  MEDICATIONS: General anesthesia administered by anesthesia team     LEFT VENTRICLE: Size was normal  Systolic function was normal  Ejection fraction was estimated to be 60 %  There were no regional wall motion abnormalities  RIGHT VENTRICLE: The ventricle was moderately dilated  Systolic function was normal     LEFT ATRIUM: The atrium was mildly dilated  No thrombus was identified  APPENDAGE: The size was normal  No thrombus was identified  DOPPLER: The function was normal (normal emptying velocity)  ATRIAL SEPTUM: No defect or patent foramen ovale was identified  RIGHT ATRIUM: The atrium was markedly dilated  MITRAL VALVE: Valve structure was normal  There was normal leaflet separation  DOPPLER: There was moderate regurgitation  AORTIC VALVE: The valve was trileaflet  Leaflets exhibited normal thickness and normal cuspal separation  DOPPLER: There was no regurgitation  TRICUSPID VALVE: The valve structure was normal  There was normal leaflet separation  There was no echocardiographic evidence of vegetation  DOPPLER: There was severe regurgitation  PULMONIC VALVE: Leaflets exhibited normal thickness, no calcification, and normal cuspal separation  PERICARDIUM: There was no pericardial effusion  AORTA: The root exhibited normal size  There was moderately severe atheroma in the visaulized portions of the intrathoracic aorta      [address] Accredited Echocardiography Laboratory    Prepared and electronically signed by    Carmen Gerber MD  Signed 17-Oct-2019 17:09:27          Scribe Attestation    I,:   Anabell Bills am acting as a scribe while in the presence of the attending physician :        I,:   Yoseph Agarwal MD personally performed the services described in this documentation    as scribed in my presence :

## 2020-02-24 NOTE — PATIENT INSTRUCTIONS
Continue all current medications     Follow up with general cardiology and as needed with Dr Sloan Ugarte

## 2020-04-09 ENCOUNTER — APPOINTMENT (EMERGENCY)
Dept: RADIOLOGY | Facility: HOSPITAL | Age: 80
End: 2020-04-09
Payer: MEDICARE

## 2020-04-09 ENCOUNTER — HOSPITAL ENCOUNTER (OUTPATIENT)
Facility: HOSPITAL | Age: 80
Setting detail: OBSERVATION
Discharge: HOME | End: 2020-04-10
Attending: EMERGENCY MEDICINE | Admitting: INTERNAL MEDICINE
Payer: MEDICARE

## 2020-04-09 DIAGNOSIS — U07.1 COVID-19 VIRUS INFECTION: ICD-10-CM

## 2020-04-09 DIAGNOSIS — R11.2 NAUSEA & VOMITING: ICD-10-CM

## 2020-04-09 DIAGNOSIS — R05.9 COUGH: ICD-10-CM

## 2020-04-09 DIAGNOSIS — M25.50 ARTHRALGIA: ICD-10-CM

## 2020-04-09 DIAGNOSIS — R07.9 CHEST PAIN: Primary | ICD-10-CM

## 2020-04-09 PROBLEM — I50.32 CHRONIC DIASTOLIC CONGESTIVE HEART FAILURE (HCC): Status: ACTIVE | Noted: 2017-09-05

## 2020-04-09 PROBLEM — I25.119 CORONARY ARTERY DISEASE INVOLVING NATIVE CORONARY ARTERY OF NATIVE HEART WITH ANGINA PECTORIS (HCC): Status: ACTIVE | Noted: 2020-01-29

## 2020-04-09 PROBLEM — Z20.822 SUSPECTED COVID-19 VIRUS INFECTION: Status: ACTIVE | Noted: 2020-04-09

## 2020-04-09 LAB
ALBUMIN SERPL BCP-MCNC: 4.7 G/DL (ref 3.5–5.7)
ALP SERPL-CCNC: 128 U/L (ref 55–165)
ALT SERPL W P-5'-P-CCNC: 31 U/L (ref 7–52)
ANION GAP SERPL CALCULATED.3IONS-SCNC: 11 MMOL/L (ref 4–13)
AST SERPL W P-5'-P-CCNC: 38 U/L (ref 13–39)
BASOPHILS # BLD AUTO: 0 THOUSANDS/ΜL (ref 0–0.1)
BASOPHILS NFR BLD AUTO: 1 % (ref 0–2)
BILIRUB SERPL-MCNC: 0.7 MG/DL (ref 0.2–1)
BNP SERPL-MCNC: 31 PG/ML (ref 1–100)
BUN SERPL-MCNC: 18 MG/DL (ref 7–25)
CALCIUM SERPL-MCNC: 9.6 MG/DL (ref 8.6–10.5)
CHLORIDE SERPL-SCNC: 96 MMOL/L (ref 98–107)
CK SERPL-CCNC: 28 U/L (ref 30–192)
CO2 SERPL-SCNC: 31 MMOL/L (ref 21–31)
CREAT SERPL-MCNC: 1.07 MG/DL (ref 0.6–1.2)
D DIMER PPP FEU-MCNC: <0.15 UG/ML FEU
EOSINOPHIL # BLD AUTO: 0 THOUSAND/ΜL (ref 0–0.61)
EOSINOPHIL NFR BLD AUTO: 0 % (ref 0–5)
ERYTHROCYTE [DISTWIDTH] IN BLOOD BY AUTOMATED COUNT: 13.6 % (ref 11.5–14.5)
ERYTHROCYTE [SEDIMENTATION RATE] IN BLOOD: 31 MM/HOUR (ref 0–30)
FLUAV RNA NPH QL NAA+PROBE: NORMAL
FLUBV RNA NPH QL NAA+PROBE: NORMAL
GFR SERPL CREATININE-BSD FRML MDRD: 49 ML/MIN/1.73SQ M
GLUCOSE SERPL-MCNC: 110 MG/DL (ref 65–99)
HCT VFR BLD AUTO: 42 % (ref 42–47)
HGB BLD-MCNC: 14 G/DL (ref 12–16)
LDH SERPL-CCNC: 177 U/L (ref 84–246)
LYMPHOCYTES # BLD AUTO: 0.6 THOUSANDS/ΜL (ref 0.6–4.47)
LYMPHOCYTES NFR BLD AUTO: 9 % (ref 21–51)
MCH RBC QN AUTO: 32.5 PG (ref 26–34)
MCHC RBC AUTO-ENTMCNC: 33.4 G/DL (ref 31–37)
MCV RBC AUTO: 97 FL (ref 81–99)
MONOCYTES # BLD AUTO: 0.9 THOUSAND/ΜL (ref 0.17–1.22)
MONOCYTES NFR BLD AUTO: 13 % (ref 2–12)
NEUTROPHILS # BLD AUTO: 5.1 THOUSANDS/ΜL (ref 1.4–6.5)
NEUTS SEG NFR BLD AUTO: 77 % (ref 42–75)
PLATELET # BLD AUTO: 126 THOUSANDS/UL (ref 149–390)
PMV BLD AUTO: 8.4 FL (ref 8.6–11.7)
POTASSIUM SERPL-SCNC: 2.8 MMOL/L (ref 3.5–5.5)
PROT SERPL-MCNC: 7.7 G/DL (ref 6.4–8.9)
RBC # BLD AUTO: 4.32 MILLION/UL (ref 3.9–5.2)
RSV RNA NPH QL NAA+PROBE: NORMAL
SODIUM SERPL-SCNC: 138 MMOL/L (ref 134–143)
TROPONIN I SERPL-MCNC: <0.03 NG/ML
WBC # BLD AUTO: 6.7 THOUSAND/UL (ref 4.8–10.8)

## 2020-04-09 PROCEDURE — 87635 SARS-COV-2 COVID-19 AMP PRB: CPT | Performed by: PHYSICIAN ASSISTANT

## 2020-04-09 PROCEDURE — 83880 ASSAY OF NATRIURETIC PEPTIDE: CPT | Performed by: PHYSICIAN ASSISTANT

## 2020-04-09 PROCEDURE — 94664 DEMO&/EVAL PT USE INHALER: CPT

## 2020-04-09 PROCEDURE — 84484 ASSAY OF TROPONIN QUANT: CPT | Performed by: PHYSICIAN ASSISTANT

## 2020-04-09 PROCEDURE — 36415 COLL VENOUS BLD VENIPUNCTURE: CPT | Performed by: PHYSICIAN ASSISTANT

## 2020-04-09 PROCEDURE — 82550 ASSAY OF CK (CPK): CPT | Performed by: NURSE PRACTITIONER

## 2020-04-09 PROCEDURE — 85384 FIBRINOGEN ACTIVITY: CPT | Performed by: NURSE PRACTITIONER

## 2020-04-09 PROCEDURE — 99285 EMERGENCY DEPT VISIT HI MDM: CPT

## 2020-04-09 PROCEDURE — 93005 ELECTROCARDIOGRAM TRACING: CPT

## 2020-04-09 PROCEDURE — 87631 RESP VIRUS 3-5 TARGETS: CPT | Performed by: NURSE PRACTITIONER

## 2020-04-09 PROCEDURE — 85025 COMPLETE CBC W/AUTO DIFF WBC: CPT | Performed by: PHYSICIAN ASSISTANT

## 2020-04-09 PROCEDURE — 82728 ASSAY OF FERRITIN: CPT | Performed by: NURSE PRACTITIONER

## 2020-04-09 PROCEDURE — 99285 EMERGENCY DEPT VISIT HI MDM: CPT | Performed by: PHYSICIAN ASSISTANT

## 2020-04-09 PROCEDURE — 85379 FIBRIN DEGRADATION QUANT: CPT | Performed by: PHYSICIAN ASSISTANT

## 2020-04-09 PROCEDURE — 96374 THER/PROPH/DIAG INJ IV PUSH: CPT

## 2020-04-09 PROCEDURE — 84484 ASSAY OF TROPONIN QUANT: CPT | Performed by: NURSE PRACTITIONER

## 2020-04-09 PROCEDURE — 71045 X-RAY EXAM CHEST 1 VIEW: CPT

## 2020-04-09 PROCEDURE — 84145 PROCALCITONIN (PCT): CPT | Performed by: NURSE PRACTITIONER

## 2020-04-09 PROCEDURE — 80053 COMPREHEN METABOLIC PANEL: CPT | Performed by: PHYSICIAN ASSISTANT

## 2020-04-09 PROCEDURE — 83615 LACTATE (LD) (LDH) ENZYME: CPT | Performed by: NURSE PRACTITIONER

## 2020-04-09 PROCEDURE — 99220 PR INITIAL OBSERVATION CARE/DAY 70 MINUTES: CPT | Performed by: NURSE PRACTITIONER

## 2020-04-09 PROCEDURE — 94760 N-INVAS EAR/PLS OXIMETRY 1: CPT

## 2020-04-09 PROCEDURE — 85652 RBC SED RATE AUTOMATED: CPT | Performed by: NURSE PRACTITIONER

## 2020-04-09 RX ORDER — ONDANSETRON 2 MG/ML
4 INJECTION INTRAMUSCULAR; INTRAVENOUS ONCE
Status: COMPLETED | OUTPATIENT
Start: 2020-04-09 | End: 2020-04-09

## 2020-04-09 RX ORDER — FUROSEMIDE 20 MG/1
20 TABLET ORAL 2 TIMES DAILY
Status: DISCONTINUED | OUTPATIENT
Start: 2020-04-09 | End: 2020-04-10 | Stop reason: HOSPADM

## 2020-04-09 RX ORDER — NITROGLYCERIN 0.4 MG/1
0.4 TABLET SUBLINGUAL
Status: DISCONTINUED | OUTPATIENT
Start: 2020-04-09 | End: 2020-04-10 | Stop reason: HOSPADM

## 2020-04-09 RX ORDER — ALPRAZOLAM 0.25 MG/1
0.25 TABLET ORAL 3 TIMES DAILY PRN
Status: DISCONTINUED | OUTPATIENT
Start: 2020-04-09 | End: 2020-04-10 | Stop reason: HOSPADM

## 2020-04-09 RX ORDER — METOPROLOL TARTRATE 50 MG/1
50 TABLET, FILM COATED ORAL EVERY 12 HOURS SCHEDULED
Status: DISCONTINUED | OUTPATIENT
Start: 2020-04-09 | End: 2020-04-10 | Stop reason: HOSPADM

## 2020-04-09 RX ORDER — POTASSIUM CHLORIDE 14.9 MG/ML
20 INJECTION INTRAVENOUS ONCE
Status: COMPLETED | OUTPATIENT
Start: 2020-04-09 | End: 2020-04-09

## 2020-04-09 RX ORDER — POTASSIUM CHLORIDE 20 MEQ/1
40 TABLET, EXTENDED RELEASE ORAL ONCE
Status: DISCONTINUED | OUTPATIENT
Start: 2020-04-09 | End: 2020-04-09

## 2020-04-09 RX ORDER — ESCITALOPRAM OXALATE 10 MG/1
10 TABLET ORAL DAILY
Status: DISCONTINUED | OUTPATIENT
Start: 2020-04-10 | End: 2020-04-10 | Stop reason: HOSPADM

## 2020-04-09 RX ORDER — FAMOTIDINE 20 MG/1
10 TABLET, FILM COATED ORAL 2 TIMES DAILY
Status: DISCONTINUED | OUTPATIENT
Start: 2020-04-09 | End: 2020-04-10 | Stop reason: HOSPADM

## 2020-04-09 RX ORDER — POTASSIUM CHLORIDE 20 MEQ/1
20 TABLET, EXTENDED RELEASE ORAL DAILY
Status: DISCONTINUED | OUTPATIENT
Start: 2020-04-10 | End: 2020-04-10 | Stop reason: HOSPADM

## 2020-04-09 RX ORDER — ACETAMINOPHEN 325 MG/1
650 TABLET ORAL EVERY 4 HOURS PRN
Status: DISCONTINUED | OUTPATIENT
Start: 2020-04-09 | End: 2020-04-10 | Stop reason: HOSPADM

## 2020-04-09 RX ORDER — POTASSIUM CHLORIDE 20 MEQ/1
40 TABLET, EXTENDED RELEASE ORAL ONCE
Status: COMPLETED | OUTPATIENT
Start: 2020-04-09 | End: 2020-04-09

## 2020-04-09 RX ORDER — ATORVASTATIN CALCIUM 20 MG/1
20 TABLET, FILM COATED ORAL
Status: DISCONTINUED | OUTPATIENT
Start: 2020-04-09 | End: 2020-04-10 | Stop reason: HOSPADM

## 2020-04-09 RX ORDER — CHLORAL HYDRATE 500 MG
1000 CAPSULE ORAL DAILY
Status: DISCONTINUED | OUTPATIENT
Start: 2020-04-10 | End: 2020-04-10 | Stop reason: HOSPADM

## 2020-04-09 RX ORDER — ONDANSETRON 2 MG/ML
4 INJECTION INTRAMUSCULAR; INTRAVENOUS EVERY 4 HOURS PRN
Status: DISCONTINUED | OUTPATIENT
Start: 2020-04-09 | End: 2020-04-10 | Stop reason: HOSPADM

## 2020-04-09 RX ADMIN — FAMOTIDINE 10 MG: 20 TABLET ORAL at 18:10

## 2020-04-09 RX ADMIN — ATORVASTATIN CALCIUM 20 MG: 20 TABLET, FILM COATED ORAL at 18:40

## 2020-04-09 RX ADMIN — POTASSIUM CHLORIDE 20 MEQ: 14.9 INJECTION, SOLUTION INTRAVENOUS at 19:30

## 2020-04-09 RX ADMIN — FUROSEMIDE 20 MG: 20 TABLET ORAL at 18:40

## 2020-04-09 RX ADMIN — APIXABAN 5 MG: 5 TABLET, FILM COATED ORAL at 18:40

## 2020-04-09 RX ADMIN — ONDANSETRON 4 MG: 2 INJECTION INTRAMUSCULAR; INTRAVENOUS at 14:15

## 2020-04-09 RX ADMIN — POTASSIUM CHLORIDE 40 MEQ: 1500 TABLET, EXTENDED RELEASE ORAL at 18:40

## 2020-04-09 RX ADMIN — POTASSIUM CHLORIDE 40 MEQ: 1500 TABLET, EXTENDED RELEASE ORAL at 15:25

## 2020-04-09 RX ADMIN — METOPROLOL TARTRATE 50 MG: 50 TABLET, FILM COATED ORAL at 22:26

## 2020-04-10 VITALS
OXYGEN SATURATION: 96 % | WEIGHT: 183.2 LBS | RESPIRATION RATE: 20 BRPM | SYSTOLIC BLOOD PRESSURE: 138 MMHG | DIASTOLIC BLOOD PRESSURE: 68 MMHG | BODY MASS INDEX: 30.52 KG/M2 | TEMPERATURE: 97.8 F | HEART RATE: 91 BPM | HEIGHT: 65 IN

## 2020-04-10 LAB
ALBUMIN SERPL BCP-MCNC: 4.1 G/DL (ref 3.5–5.7)
ALP SERPL-CCNC: 112 U/L (ref 55–165)
ALT SERPL W P-5'-P-CCNC: 32 U/L (ref 7–52)
ANION GAP SERPL CALCULATED.3IONS-SCNC: 10 MMOL/L (ref 4–13)
AST SERPL W P-5'-P-CCNC: 38 U/L (ref 13–39)
ATRIAL RATE: 81 BPM
ATRIAL RATE: 91 BPM
BASOPHILS # BLD AUTO: 0 THOUSANDS/ΜL (ref 0–0.1)
BASOPHILS NFR BLD AUTO: 1 % (ref 0–2)
BILIRUB SERPL-MCNC: 0.6 MG/DL (ref 0.2–1)
BUN SERPL-MCNC: 22 MG/DL (ref 7–25)
CALCIUM SERPL-MCNC: 9 MG/DL (ref 8.6–10.5)
CHLORIDE SERPL-SCNC: 99 MMOL/L (ref 98–107)
CHOLEST SERPL-MCNC: 134 MG/DL (ref 0–200)
CO2 SERPL-SCNC: 29 MMOL/L (ref 21–31)
CREAT SERPL-MCNC: 1.11 MG/DL (ref 0.6–1.2)
EOSINOPHIL # BLD AUTO: 0 THOUSAND/ΜL (ref 0–0.61)
EOSINOPHIL NFR BLD AUTO: 0 % (ref 0–5)
ERYTHROCYTE [DISTWIDTH] IN BLOOD BY AUTOMATED COUNT: 13.6 % (ref 11.5–14.5)
FERRITIN SERPL-MCNC: 100 NG/ML (ref 8–388)
FIBRINOGEN PPP-MCNC: 500 MG/DL (ref 227–495)
GFR SERPL CREATININE-BSD FRML MDRD: 47 ML/MIN/1.73SQ M
GLUCOSE SERPL-MCNC: 104 MG/DL (ref 65–99)
HCT VFR BLD AUTO: 37.5 % (ref 42–47)
HDLC SERPL-MCNC: 52 MG/DL
HGB BLD-MCNC: 12.6 G/DL (ref 12–16)
LDLC SERPL CALC-MCNC: 56 MG/DL (ref 0–100)
LYMPHOCYTES # BLD AUTO: 0.7 THOUSANDS/ΜL (ref 0.6–4.47)
LYMPHOCYTES NFR BLD AUTO: 15 % (ref 21–51)
MAGNESIUM SERPL-MCNC: 1.9 MG/DL (ref 1.9–2.7)
MCH RBC QN AUTO: 32.9 PG (ref 26–34)
MCHC RBC AUTO-ENTMCNC: 33.8 G/DL (ref 31–37)
MCV RBC AUTO: 97 FL (ref 81–99)
MONOCYTES # BLD AUTO: 0.9 THOUSAND/ΜL (ref 0.17–1.22)
MONOCYTES NFR BLD AUTO: 18 % (ref 2–12)
NEUTROPHILS # BLD AUTO: 3.3 THOUSANDS/ΜL (ref 1.4–6.5)
NEUTS SEG NFR BLD AUTO: 66 % (ref 42–75)
P AXIS: 64 DEGREES
P AXIS: 66 DEGREES
PLATELET # BLD AUTO: 107 THOUSANDS/UL (ref 149–390)
PMV BLD AUTO: 8.7 FL (ref 8.6–11.7)
POTASSIUM SERPL-SCNC: 3.6 MMOL/L (ref 3.5–5.5)
PR INTERVAL: 160 MS
PR INTERVAL: 162 MS
PROCALCITONIN SERPL-MCNC: 0.18 NG/ML
PROT SERPL-MCNC: 6.8 G/DL (ref 6.4–8.9)
QRS AXIS: 12 DEGREES
QRS AXIS: 6 DEGREES
QRSD INTERVAL: 106 MS
QRSD INTERVAL: 108 MS
QT INTERVAL: 386 MS
QT INTERVAL: 408 MS
QTC INTERVAL: 473 MS
QTC INTERVAL: 474 MS
RBC # BLD AUTO: 3.85 MILLION/UL (ref 3.9–5.2)
SARS-COV-2 RNA SPEC QL NAA+PROBE: DETECTED
SODIUM SERPL-SCNC: 138 MMOL/L (ref 134–143)
T WAVE AXIS: 16 DEGREES
T WAVE AXIS: 21 DEGREES
TRIGL SERPL-MCNC: 132 MG/DL (ref 44–166)
VENTRICULAR RATE: 81 BPM
VENTRICULAR RATE: 91 BPM
WBC # BLD AUTO: 5 THOUSAND/UL (ref 4.8–10.8)

## 2020-04-10 PROCEDURE — 93010 ELECTROCARDIOGRAM REPORT: CPT | Performed by: INTERNAL MEDICINE

## 2020-04-10 PROCEDURE — 85025 COMPLETE CBC W/AUTO DIFF WBC: CPT | Performed by: NURSE PRACTITIONER

## 2020-04-10 PROCEDURE — 80061 LIPID PANEL: CPT | Performed by: NURSE PRACTITIONER

## 2020-04-10 PROCEDURE — 83735 ASSAY OF MAGNESIUM: CPT | Performed by: NURSE PRACTITIONER

## 2020-04-10 PROCEDURE — 93005 ELECTROCARDIOGRAM TRACING: CPT

## 2020-04-10 PROCEDURE — 99217 PR OBSERVATION CARE DISCHARGE MANAGEMENT: CPT | Performed by: NURSE PRACTITIONER

## 2020-04-10 PROCEDURE — 80053 COMPREHEN METABOLIC PANEL: CPT | Performed by: NURSE PRACTITIONER

## 2020-04-10 RX ADMIN — FAMOTIDINE 10 MG: 20 TABLET ORAL at 08:18

## 2020-04-10 RX ADMIN — ACETAMINOPHEN 650 MG: 325 TABLET ORAL at 01:10

## 2020-04-10 RX ADMIN — APIXABAN 5 MG: 5 TABLET, FILM COATED ORAL at 08:18

## 2020-04-10 RX ADMIN — OMEGA-3 FATTY ACIDS CAP 1000 MG 1000 MG: 1000 CAP at 08:19

## 2020-04-10 RX ADMIN — METOPROLOL TARTRATE 50 MG: 50 TABLET, FILM COATED ORAL at 08:19

## 2020-04-10 RX ADMIN — ACETAMINOPHEN 650 MG: 325 TABLET ORAL at 14:51

## 2020-04-10 RX ADMIN — ESCITALOPRAM OXALATE 10 MG: 10 TABLET ORAL at 08:18

## 2020-04-10 RX ADMIN — FUROSEMIDE 20 MG: 20 TABLET ORAL at 08:19

## 2020-04-10 RX ADMIN — POTASSIUM CHLORIDE 20 MEQ: 1500 TABLET, EXTENDED RELEASE ORAL at 08:20

## 2020-04-11 RX ORDER — HYDROXYCHLOROQUINE SULFATE 200 MG/1
200 TABLET, FILM COATED ORAL 2 TIMES DAILY WITH MEALS
Qty: 12 TABLET | Refills: 0 | Status: SHIPPED | OUTPATIENT
Start: 2020-04-11 | End: 2020-04-27 | Stop reason: HOSPADM

## 2020-04-11 RX ORDER — ONDANSETRON 4 MG/1
4 TABLET, ORALLY DISINTEGRATING ORAL EVERY 6 HOURS PRN
Qty: 20 TABLET | Refills: 0 | Status: SHIPPED | OUTPATIENT
Start: 2020-04-11 | End: 2020-11-11 | Stop reason: ALTCHOICE

## 2020-04-11 RX ORDER — HYDROXYCHLOROQUINE SULFATE 200 MG/1
200 TABLET, FILM COATED ORAL 2 TIMES DAILY WITH MEALS
Qty: 12 TABLET | Refills: 0 | Status: SHIPPED | OUTPATIENT
Start: 2020-04-11 | End: 2020-04-11 | Stop reason: SDUPTHER

## 2020-04-18 ENCOUNTER — APPOINTMENT (EMERGENCY)
Dept: RADIOLOGY | Facility: HOSPITAL | Age: 80
End: 2020-04-18
Payer: MEDICARE

## 2020-04-18 ENCOUNTER — HOSPITAL ENCOUNTER (EMERGENCY)
Facility: HOSPITAL | Age: 80
End: 2020-04-18
Attending: INTERNAL MEDICINE | Admitting: EMERGENCY MEDICINE
Payer: MEDICARE

## 2020-04-18 VITALS
DIASTOLIC BLOOD PRESSURE: 58 MMHG | HEIGHT: 65 IN | SYSTOLIC BLOOD PRESSURE: 125 MMHG | WEIGHT: 174 LBS | HEART RATE: 83 BPM | TEMPERATURE: 99.3 F | OXYGEN SATURATION: 95 % | BODY MASS INDEX: 28.99 KG/M2 | RESPIRATION RATE: 24 BRPM

## 2020-04-18 DIAGNOSIS — R09.02 HYPOXIA: ICD-10-CM

## 2020-04-18 DIAGNOSIS — A41.9 SEPSIS (HCC): ICD-10-CM

## 2020-04-18 DIAGNOSIS — B34.9 VIRAL SYNDROME: Primary | ICD-10-CM

## 2020-04-18 DIAGNOSIS — R06.02 SHORTNESS OF BREATH: ICD-10-CM

## 2020-04-18 LAB
ALBUMIN SERPL BCP-MCNC: 3.9 G/DL (ref 3.5–5.7)
ALP SERPL-CCNC: 114 U/L (ref 55–165)
ALT SERPL W P-5'-P-CCNC: 17 U/L (ref 7–52)
ANION GAP SERPL CALCULATED.3IONS-SCNC: 10 MMOL/L (ref 4–13)
APTT PPP: 36 SECONDS (ref 23–37)
AST SERPL W P-5'-P-CCNC: 25 U/L (ref 13–39)
BASOPHILS # BLD AUTO: 0 THOUSANDS/ΜL (ref 0–0.1)
BASOPHILS NFR BLD AUTO: 0 % (ref 0–2)
BILIRUB SERPL-MCNC: 0.5 MG/DL (ref 0.2–1)
BILIRUB UR QL STRIP: NEGATIVE
BNP SERPL-MCNC: 130 PG/ML (ref 1–100)
BUN SERPL-MCNC: 18 MG/DL (ref 7–25)
CALCIUM SERPL-MCNC: 8.4 MG/DL (ref 8.6–10.5)
CHLORIDE SERPL-SCNC: 102 MMOL/L (ref 98–107)
CLARITY UR: CLEAR
CO2 SERPL-SCNC: 25 MMOL/L (ref 21–31)
COLOR UR: YELLOW
CREAT SERPL-MCNC: 1.16 MG/DL (ref 0.6–1.2)
CRP SERPL QL: 55.8 MG/L
D DIMER PPP FEU-MCNC: 0.16 UG/ML FEU
EOSINOPHIL # BLD AUTO: 0 THOUSAND/ΜL (ref 0–0.61)
EOSINOPHIL NFR BLD AUTO: 0 % (ref 0–5)
ERYTHROCYTE [DISTWIDTH] IN BLOOD BY AUTOMATED COUNT: 13.9 % (ref 11.5–14.5)
ERYTHROCYTE [SEDIMENTATION RATE] IN BLOOD: 60 MM/HOUR (ref 0–30)
GFR SERPL CREATININE-BSD FRML MDRD: 45 ML/MIN/1.73SQ M
GLUCOSE SERPL-MCNC: 106 MG/DL (ref 65–99)
GLUCOSE UR STRIP-MCNC: NEGATIVE MG/DL
HCT VFR BLD AUTO: 35.3 % (ref 42–47)
HGB BLD-MCNC: 11.8 G/DL (ref 12–16)
HGB UR QL STRIP.AUTO: NEGATIVE
INR PPP: 1.95 (ref 0.9–1.5)
KETONES UR STRIP-MCNC: NEGATIVE MG/DL
LACTATE SERPL-SCNC: 1.4 MMOL/L (ref 0.5–2)
LDH SERPL-CCNC: 278 U/L (ref 84–246)
LEUKOCYTE ESTERASE UR QL STRIP: NEGATIVE
LYMPHOCYTES # BLD AUTO: 0.5 THOUSANDS/ΜL (ref 0.6–4.47)
LYMPHOCYTES NFR BLD AUTO: 11 % (ref 21–51)
MCH RBC QN AUTO: 32.2 PG (ref 26–34)
MCHC RBC AUTO-ENTMCNC: 33.4 G/DL (ref 31–37)
MCV RBC AUTO: 97 FL (ref 81–99)
MONOCYTES # BLD AUTO: 0.3 THOUSAND/ΜL (ref 0.17–1.22)
MONOCYTES NFR BLD AUTO: 8 % (ref 2–12)
NEUTROPHILS # BLD AUTO: 3.7 THOUSANDS/ΜL (ref 1.4–6.5)
NEUTS SEG NFR BLD AUTO: 82 % (ref 42–75)
NITRITE UR QL STRIP: NEGATIVE
PH UR STRIP.AUTO: 6 [PH]
PLATELET # BLD AUTO: 124 THOUSANDS/UL (ref 149–390)
PMV BLD AUTO: 8.5 FL (ref 8.6–11.7)
POTASSIUM SERPL-SCNC: 4.4 MMOL/L (ref 3.5–5.5)
PROT SERPL-MCNC: 6.9 G/DL (ref 6.4–8.9)
PROT UR STRIP-MCNC: NEGATIVE MG/DL
PROTHROMBIN TIME: 22.8 SECONDS (ref 10.2–13)
RBC # BLD AUTO: 3.65 MILLION/UL (ref 3.9–5.2)
SODIUM SERPL-SCNC: 137 MMOL/L (ref 134–143)
SP GR UR STRIP.AUTO: 1.02 (ref 1–1.03)
TROPONIN I SERPL-MCNC: <0.03 NG/ML
UROBILINOGEN UR QL STRIP.AUTO: 0.2 E.U./DL
WBC # BLD AUTO: 4.6 THOUSAND/UL (ref 4.8–10.8)

## 2020-04-18 PROCEDURE — 85652 RBC SED RATE AUTOMATED: CPT | Performed by: PHYSICIAN ASSISTANT

## 2020-04-18 PROCEDURE — 96365 THER/PROPH/DIAG IV INF INIT: CPT

## 2020-04-18 PROCEDURE — 86140 C-REACTIVE PROTEIN: CPT | Performed by: PHYSICIAN ASSISTANT

## 2020-04-18 PROCEDURE — 83615 LACTATE (LD) (LDH) ENZYME: CPT | Performed by: PHYSICIAN ASSISTANT

## 2020-04-18 PROCEDURE — 84484 ASSAY OF TROPONIN QUANT: CPT | Performed by: PHYSICIAN ASSISTANT

## 2020-04-18 PROCEDURE — 85610 PROTHROMBIN TIME: CPT | Performed by: PHYSICIAN ASSISTANT

## 2020-04-18 PROCEDURE — 99285 EMERGENCY DEPT VISIT HI MDM: CPT

## 2020-04-18 PROCEDURE — 85379 FIBRIN DEGRADATION QUANT: CPT | Performed by: PHYSICIAN ASSISTANT

## 2020-04-18 PROCEDURE — 80053 COMPREHEN METABOLIC PANEL: CPT | Performed by: PHYSICIAN ASSISTANT

## 2020-04-18 PROCEDURE — 85384 FIBRINOGEN ACTIVITY: CPT | Performed by: PHYSICIAN ASSISTANT

## 2020-04-18 PROCEDURE — 96367 TX/PROPH/DG ADDL SEQ IV INF: CPT

## 2020-04-18 PROCEDURE — 83605 ASSAY OF LACTIC ACID: CPT | Performed by: PHYSICIAN ASSISTANT

## 2020-04-18 PROCEDURE — 83880 ASSAY OF NATRIURETIC PEPTIDE: CPT | Performed by: PHYSICIAN ASSISTANT

## 2020-04-18 PROCEDURE — 81003 URINALYSIS AUTO W/O SCOPE: CPT | Performed by: PHYSICIAN ASSISTANT

## 2020-04-18 PROCEDURE — 84145 PROCALCITONIN (PCT): CPT | Performed by: PHYSICIAN ASSISTANT

## 2020-04-18 PROCEDURE — 85730 THROMBOPLASTIN TIME PARTIAL: CPT | Performed by: PHYSICIAN ASSISTANT

## 2020-04-18 PROCEDURE — 36415 COLL VENOUS BLD VENIPUNCTURE: CPT | Performed by: PHYSICIAN ASSISTANT

## 2020-04-18 PROCEDURE — 85025 COMPLETE CBC W/AUTO DIFF WBC: CPT | Performed by: PHYSICIAN ASSISTANT

## 2020-04-18 PROCEDURE — 71045 X-RAY EXAM CHEST 1 VIEW: CPT

## 2020-04-18 PROCEDURE — 87040 BLOOD CULTURE FOR BACTERIA: CPT | Performed by: PHYSICIAN ASSISTANT

## 2020-04-18 PROCEDURE — 93005 ELECTROCARDIOGRAM TRACING: CPT

## 2020-04-18 PROCEDURE — 99285 EMERGENCY DEPT VISIT HI MDM: CPT | Performed by: PHYSICIAN ASSISTANT

## 2020-04-18 PROCEDURE — 82728 ASSAY OF FERRITIN: CPT | Performed by: PHYSICIAN ASSISTANT

## 2020-04-18 RX ORDER — ACETAMINOPHEN 325 MG/1
650 TABLET ORAL ONCE
Status: COMPLETED | OUTPATIENT
Start: 2020-04-18 | End: 2020-04-18

## 2020-04-18 RX ORDER — HYDROXYCHLOROQUINE SULFATE 200 MG/1
400 TABLET, FILM COATED ORAL 2 TIMES DAILY
Status: DISCONTINUED | OUTPATIENT
Start: 2020-04-18 | End: 2020-04-19 | Stop reason: HOSPADM

## 2020-04-18 RX ORDER — CEFTRIAXONE 1 G/50ML
1000 INJECTION, SOLUTION INTRAVENOUS ONCE
Status: COMPLETED | OUTPATIENT
Start: 2020-04-18 | End: 2020-04-18

## 2020-04-18 RX ORDER — CEFEPIME HYDROCHLORIDE 2 G/50ML
2000 INJECTION, SOLUTION INTRAVENOUS ONCE
Status: COMPLETED | OUTPATIENT
Start: 2020-04-18 | End: 2020-04-18

## 2020-04-18 RX ORDER — AZITHROMYCIN 500 MG/1
500 TABLET, FILM COATED ORAL ONCE
Status: COMPLETED | OUTPATIENT
Start: 2020-04-18 | End: 2020-04-18

## 2020-04-18 RX ADMIN — ACETAMINOPHEN 650 MG: 325 TABLET ORAL at 20:31

## 2020-04-18 RX ADMIN — CEFTRIAXONE 1000 MG: 1 INJECTION, SOLUTION INTRAVENOUS at 22:24

## 2020-04-18 RX ADMIN — AZITHROMYCIN 500 MG: 500 TABLET, FILM COATED ORAL at 22:57

## 2020-04-18 RX ADMIN — HYDROXYCHLOROQUINE SULFATE 400 MG: 200 TABLET ORAL at 22:19

## 2020-04-18 RX ADMIN — CEFEPIME HYDROCHLORIDE 2000 MG: 2 INJECTION, SOLUTION INTRAVENOUS at 20:26

## 2020-04-19 ENCOUNTER — HOSPITAL ENCOUNTER (INPATIENT)
Facility: HOSPITAL | Age: 80
LOS: 8 days | Discharge: HOME | DRG: 177 | End: 2020-04-27
Attending: INTERNAL MEDICINE | Admitting: INTERNAL MEDICINE
Payer: MEDICARE

## 2020-04-19 DIAGNOSIS — U07.1 PNEUMONIA DUE TO COVID-19 VIRUS: ICD-10-CM

## 2020-04-19 DIAGNOSIS — J12.82 PNEUMONIA DUE TO COVID-19 VIRUS: ICD-10-CM

## 2020-04-19 DIAGNOSIS — U07.1 COVID-19 VIRUS INFECTION: Primary | ICD-10-CM

## 2020-04-19 PROBLEM — M54.9 BACK PAIN: Status: ACTIVE | Noted: 2017-01-02

## 2020-04-19 PROBLEM — Z86.19 HISTORY OF CLOSTRIDIOIDES DIFFICILE COLITIS: Status: ACTIVE | Noted: 2018-10-13

## 2020-04-19 LAB
25(OH)D3 SERPL-MCNC: 66.6 NG/ML (ref 30–100)
ALBUMIN SERPL BCP-MCNC: 2.9 G/DL (ref 3.5–5)
ALP SERPL-CCNC: 137 U/L (ref 46–116)
ALT SERPL W P-5'-P-CCNC: 24 U/L (ref 12–78)
ANION GAP SERPL CALCULATED.3IONS-SCNC: 5 MMOL/L (ref 4–13)
AST SERPL W P-5'-P-CCNC: 38 U/L (ref 5–45)
BASOPHILS # BLD AUTO: 0.01 THOUSANDS/ΜL (ref 0–0.1)
BASOPHILS NFR BLD AUTO: 0 % (ref 0–1)
BILIRUB SERPL-MCNC: 0.44 MG/DL (ref 0.2–1)
BUN SERPL-MCNC: 16 MG/DL (ref 5–25)
CALCIUM SERPL-MCNC: 8.5 MG/DL (ref 8.3–10.1)
CHLORIDE SERPL-SCNC: 108 MMOL/L (ref 100–108)
CO2 SERPL-SCNC: 25 MMOL/L (ref 21–32)
CREAT SERPL-MCNC: 1.13 MG/DL (ref 0.6–1.3)
CRP SERPL QL: 51.2 MG/L
EOSINOPHIL # BLD AUTO: 0 THOUSAND/ΜL (ref 0–0.61)
EOSINOPHIL NFR BLD AUTO: 0 % (ref 0–6)
ERYTHROCYTE [DISTWIDTH] IN BLOOD BY AUTOMATED COUNT: 13.9 % (ref 11.6–15.1)
FERRITIN SERPL-MCNC: 329 NG/ML (ref 8–388)
FIBRINOGEN PPP-MCNC: 718 MG/DL (ref 227–495)
GFR SERPL CREATININE-BSD FRML MDRD: 46 ML/MIN/1.73SQ M
GLUCOSE SERPL-MCNC: 88 MG/DL (ref 65–140)
HCT VFR BLD AUTO: 32.8 % (ref 34.8–46.1)
HGB BLD-MCNC: 10.8 G/DL (ref 11.5–15.4)
IMM GRANULOCYTES # BLD AUTO: 0.02 THOUSAND/UL (ref 0–0.2)
IMM GRANULOCYTES NFR BLD AUTO: 1 % (ref 0–2)
LYMPHOCYTES # BLD AUTO: 0.73 THOUSANDS/ΜL (ref 0.6–4.47)
LYMPHOCYTES NFR BLD AUTO: 17 % (ref 14–44)
MAGNESIUM SERPL-MCNC: 2.3 MG/DL (ref 1.6–2.6)
MCH RBC QN AUTO: 32.1 PG (ref 26.8–34.3)
MCHC RBC AUTO-ENTMCNC: 32.9 G/DL (ref 31.4–37.4)
MCV RBC AUTO: 98 FL (ref 82–98)
MONOCYTES # BLD AUTO: 0.25 THOUSAND/ΜL (ref 0.17–1.22)
MONOCYTES NFR BLD AUTO: 6 % (ref 4–12)
NEUTROPHILS # BLD AUTO: 3.21 THOUSANDS/ΜL (ref 1.85–7.62)
NEUTS SEG NFR BLD AUTO: 76 % (ref 43–75)
NRBC BLD AUTO-RTO: 0 /100 WBCS
PHOSPHATE SERPL-MCNC: 2.7 MG/DL (ref 2.3–4.1)
PLATELET # BLD AUTO: 114 THOUSANDS/UL (ref 149–390)
PMV BLD AUTO: 10.2 FL (ref 8.9–12.7)
POTASSIUM SERPL-SCNC: 4.1 MMOL/L (ref 3.5–5.3)
PROCALCITONIN SERPL-MCNC: 0.07 NG/ML
PROCALCITONIN SERPL-MCNC: 0.1 NG/ML
PROT SERPL-MCNC: 6.9 G/DL (ref 6.4–8.2)
RBC # BLD AUTO: 3.36 MILLION/UL (ref 3.81–5.12)
SODIUM SERPL-SCNC: 138 MMOL/L (ref 136–145)
TROPONIN I SERPL-MCNC: <0.02 NG/ML
TSH SERPL DL<=0.05 MIU/L-ACNC: 2.75 UIU/ML (ref 0.36–3.74)
WBC # BLD AUTO: 4.22 THOUSAND/UL (ref 4.31–10.16)

## 2020-04-19 PROCEDURE — 99232 SBSQ HOSP IP/OBS MODERATE 35: CPT | Performed by: INTERNAL MEDICINE

## 2020-04-19 PROCEDURE — 99222 1ST HOSP IP/OBS MODERATE 55: CPT | Performed by: INTERNAL MEDICINE

## 2020-04-19 PROCEDURE — 99223 1ST HOSP IP/OBS HIGH 75: CPT | Performed by: INTERNAL MEDICINE

## 2020-04-19 PROCEDURE — 84100 ASSAY OF PHOSPHORUS: CPT | Performed by: INTERNAL MEDICINE

## 2020-04-19 PROCEDURE — 82306 VITAMIN D 25 HYDROXY: CPT | Performed by: INTERNAL MEDICINE

## 2020-04-19 PROCEDURE — 99222 1ST HOSP IP/OBS MODERATE 55: CPT | Performed by: NURSE PRACTITIONER

## 2020-04-19 PROCEDURE — 80053 COMPREHEN METABOLIC PANEL: CPT | Performed by: INTERNAL MEDICINE

## 2020-04-19 PROCEDURE — 84443 ASSAY THYROID STIM HORMONE: CPT | Performed by: INTERNAL MEDICINE

## 2020-04-19 PROCEDURE — 86140 C-REACTIVE PROTEIN: CPT | Performed by: INTERNAL MEDICINE

## 2020-04-19 PROCEDURE — 84484 ASSAY OF TROPONIN QUANT: CPT | Performed by: INTERNAL MEDICINE

## 2020-04-19 PROCEDURE — 83520 IMMUNOASSAY QUANT NOS NONAB: CPT | Performed by: INTERNAL MEDICINE

## 2020-04-19 PROCEDURE — 84145 PROCALCITONIN (PCT): CPT | Performed by: INTERNAL MEDICINE

## 2020-04-19 PROCEDURE — 85025 COMPLETE CBC W/AUTO DIFF WBC: CPT | Performed by: INTERNAL MEDICINE

## 2020-04-19 PROCEDURE — 83735 ASSAY OF MAGNESIUM: CPT | Performed by: INTERNAL MEDICINE

## 2020-04-19 RX ORDER — DOCUSATE SODIUM 100 MG/1
100 CAPSULE, LIQUID FILLED ORAL 2 TIMES DAILY PRN
Status: DISCONTINUED | OUTPATIENT
Start: 2020-04-19 | End: 2020-04-21

## 2020-04-19 RX ORDER — FUROSEMIDE 20 MG/1
20 TABLET ORAL
Status: DISCONTINUED | OUTPATIENT
Start: 2020-04-19 | End: 2020-04-27 | Stop reason: HOSPADM

## 2020-04-19 RX ORDER — ACETAMINOPHEN 325 MG/1
650 TABLET ORAL EVERY 6 HOURS PRN
Status: DISCONTINUED | OUTPATIENT
Start: 2020-04-19 | End: 2020-04-27 | Stop reason: HOSPADM

## 2020-04-19 RX ORDER — ALPRAZOLAM 0.25 MG/1
0.25 TABLET ORAL 2 TIMES DAILY PRN
Status: DISCONTINUED | OUTPATIENT
Start: 2020-04-19 | End: 2020-04-27 | Stop reason: HOSPADM

## 2020-04-19 RX ORDER — MAGNESIUM HYDROXIDE/ALUMINUM HYDROXICE/SIMETHICONE 120; 1200; 1200 MG/30ML; MG/30ML; MG/30ML
30 SUSPENSION ORAL EVERY 6 HOURS PRN
Status: DISCONTINUED | OUTPATIENT
Start: 2020-04-19 | End: 2020-04-27 | Stop reason: HOSPADM

## 2020-04-19 RX ORDER — AZITHROMYCIN 250 MG/1
250 TABLET, FILM COATED ORAL EVERY 24 HOURS
Status: DISCONTINUED | OUTPATIENT
Start: 2020-04-19 | End: 2020-04-20

## 2020-04-19 RX ORDER — ASCORBIC ACID 500 MG
1000 TABLET ORAL 2 TIMES DAILY
Status: DISCONTINUED | OUTPATIENT
Start: 2020-04-19 | End: 2020-04-27 | Stop reason: HOSPADM

## 2020-04-19 RX ORDER — LIDOCAINE 50 MG/G
1 PATCH TOPICAL DAILY
Status: DISCONTINUED | OUTPATIENT
Start: 2020-04-19 | End: 2020-04-27 | Stop reason: HOSPADM

## 2020-04-19 RX ORDER — NITROGLYCERIN 0.4 MG/1
0.4 TABLET SUBLINGUAL
Status: DISCONTINUED | OUTPATIENT
Start: 2020-04-19 | End: 2020-04-27 | Stop reason: HOSPADM

## 2020-04-19 RX ORDER — ONDANSETRON 2 MG/ML
4 INJECTION INTRAMUSCULAR; INTRAVENOUS EVERY 6 HOURS PRN
Status: DISCONTINUED | OUTPATIENT
Start: 2020-04-19 | End: 2020-04-20

## 2020-04-19 RX ORDER — METOPROLOL TARTRATE 50 MG/1
50 TABLET, FILM COATED ORAL EVERY 12 HOURS SCHEDULED
Status: DISCONTINUED | OUTPATIENT
Start: 2020-04-19 | End: 2020-04-27 | Stop reason: HOSPADM

## 2020-04-19 RX ORDER — HYDROXYCHLOROQUINE SULFATE 200 MG/1
200 TABLET, FILM COATED ORAL 2 TIMES DAILY
Status: COMPLETED | OUTPATIENT
Start: 2020-04-19 | End: 2020-04-23

## 2020-04-19 RX ORDER — ZINC SULFATE 50(220)MG
220 CAPSULE ORAL DAILY
Status: DISCONTINUED | OUTPATIENT
Start: 2020-04-19 | End: 2020-04-27 | Stop reason: HOSPADM

## 2020-04-19 RX ORDER — GUAIFENESIN 600 MG
600 TABLET, EXTENDED RELEASE 12 HR ORAL EVERY 12 HOURS SCHEDULED
Status: DISCONTINUED | OUTPATIENT
Start: 2020-04-19 | End: 2020-04-27 | Stop reason: HOSPADM

## 2020-04-19 RX ORDER — SACCHAROMYCES BOULARDII 250 MG
250 CAPSULE ORAL 2 TIMES DAILY
Status: DISCONTINUED | OUTPATIENT
Start: 2020-04-19 | End: 2020-04-27 | Stop reason: HOSPADM

## 2020-04-19 RX ORDER — ATORVASTATIN CALCIUM 40 MG/1
40 TABLET, FILM COATED ORAL
Status: DISCONTINUED | OUTPATIENT
Start: 2020-04-19 | End: 2020-04-27 | Stop reason: HOSPADM

## 2020-04-19 RX ADMIN — VANCOMYCIN HYDROCHLORIDE 125 MG: 500 INJECTION, POWDER, LYOPHILIZED, FOR SOLUTION INTRAVENOUS at 21:50

## 2020-04-19 RX ADMIN — Medication 250 MG: at 11:22

## 2020-04-19 RX ADMIN — CEFTRIAXONE SODIUM 1000 MG: 10 INJECTION, POWDER, FOR SOLUTION INTRAVENOUS at 21:51

## 2020-04-19 RX ADMIN — LIDOCAINE 1 PATCH: 50 PATCH CUTANEOUS at 12:24

## 2020-04-19 RX ADMIN — ACETAMINOPHEN 650 MG: 325 TABLET ORAL at 09:22

## 2020-04-19 RX ADMIN — GUAIFENESIN 600 MG: 600 TABLET, EXTENDED RELEASE ORAL at 21:52

## 2020-04-19 RX ADMIN — GUAIFENESIN 600 MG: 600 TABLET, EXTENDED RELEASE ORAL at 11:22

## 2020-04-19 RX ADMIN — METOPROLOL TARTRATE 50 MG: 50 TABLET, FILM COATED ORAL at 21:52

## 2020-04-19 RX ADMIN — OXYCODONE HYDROCHLORIDE AND ACETAMINOPHEN 1000 MG: 500 TABLET ORAL at 08:54

## 2020-04-19 RX ADMIN — HYDROXYCHLOROQUINE SULFATE 200 MG: 200 TABLET, FILM COATED ORAL at 17:32

## 2020-04-19 RX ADMIN — Medication 250 MG: at 17:33

## 2020-04-19 RX ADMIN — VANCOMYCIN HYDROCHLORIDE 125 MG: 500 INJECTION, POWDER, LYOPHILIZED, FOR SOLUTION INTRAVENOUS at 01:14

## 2020-04-19 RX ADMIN — APIXABAN 5 MG: 5 TABLET, FILM COATED ORAL at 17:33

## 2020-04-19 RX ADMIN — OXYCODONE HYDROCHLORIDE AND ACETAMINOPHEN 1000 MG: 500 TABLET ORAL at 17:32

## 2020-04-19 RX ADMIN — FUROSEMIDE 20 MG: 20 TABLET ORAL at 17:33

## 2020-04-19 RX ADMIN — METOPROLOL TARTRATE 50 MG: 50 TABLET, FILM COATED ORAL at 01:14

## 2020-04-19 RX ADMIN — ATORVASTATIN CALCIUM 40 MG: 40 TABLET, FILM COATED ORAL at 17:32

## 2020-04-19 RX ADMIN — Medication 1 TABLET: at 08:54

## 2020-04-19 RX ADMIN — VANCOMYCIN HYDROCHLORIDE 125 MG: 500 INJECTION, POWDER, LYOPHILIZED, FOR SOLUTION INTRAVENOUS at 08:54

## 2020-04-19 RX ADMIN — AZITHROMYCIN 250 MG: 250 TABLET, FILM COATED ORAL at 08:54

## 2020-04-19 RX ADMIN — ZINC SULFATE 220 MG (50 MG) CAPSULE 220 MG: CAPSULE at 08:54

## 2020-04-19 RX ADMIN — APIXABAN 5 MG: 5 TABLET, FILM COATED ORAL at 08:54

## 2020-04-20 LAB
ALBUMIN SERPL BCP-MCNC: 2.9 G/DL (ref 3.5–5)
ALP SERPL-CCNC: 136 U/L (ref 46–116)
ALT SERPL W P-5'-P-CCNC: 24 U/L (ref 12–78)
ANION GAP SERPL CALCULATED.3IONS-SCNC: 7 MMOL/L (ref 4–13)
AST SERPL W P-5'-P-CCNC: 40 U/L (ref 5–45)
ATRIAL RATE: 84 BPM
BASOPHILS # BLD AUTO: 0 THOUSANDS/ΜL (ref 0–0.1)
BASOPHILS NFR BLD AUTO: 0 % (ref 0–1)
BILIRUB SERPL-MCNC: 0.48 MG/DL (ref 0.2–1)
BUN SERPL-MCNC: 12 MG/DL (ref 5–25)
CALCIUM SERPL-MCNC: 8.8 MG/DL (ref 8.3–10.1)
CHLORIDE SERPL-SCNC: 105 MMOL/L (ref 100–108)
CO2 SERPL-SCNC: 26 MMOL/L (ref 21–32)
CREAT SERPL-MCNC: 1.01 MG/DL (ref 0.6–1.3)
CRP SERPL QL: 52.8 MG/L
D DIMER PPP FEU-MCNC: 0.38 UG/ML FEU
EOSINOPHIL # BLD AUTO: 0.01 THOUSAND/ΜL (ref 0–0.61)
EOSINOPHIL NFR BLD AUTO: 0 % (ref 0–6)
ERYTHROCYTE [DISTWIDTH] IN BLOOD BY AUTOMATED COUNT: 13.6 % (ref 11.6–15.1)
FERRITIN SERPL-MCNC: 372 NG/ML (ref 8–388)
GFR SERPL CREATININE-BSD FRML MDRD: 53 ML/MIN/1.73SQ M
GLUCOSE SERPL-MCNC: 97 MG/DL (ref 65–140)
HCT VFR BLD AUTO: 33.2 % (ref 34.8–46.1)
HGB BLD-MCNC: 10.9 G/DL (ref 11.5–15.4)
IMM GRANULOCYTES # BLD AUTO: 0.02 THOUSAND/UL (ref 0–0.2)
IMM GRANULOCYTES NFR BLD AUTO: 0 % (ref 0–2)
LYMPHOCYTES # BLD AUTO: 0.73 THOUSANDS/ΜL (ref 0.6–4.47)
LYMPHOCYTES NFR BLD AUTO: 16 % (ref 14–44)
MCH RBC QN AUTO: 31.9 PG (ref 26.8–34.3)
MCHC RBC AUTO-ENTMCNC: 32.8 G/DL (ref 31.4–37.4)
MCV RBC AUTO: 97 FL (ref 82–98)
MONOCYTES # BLD AUTO: 0.33 THOUSAND/ΜL (ref 0.17–1.22)
MONOCYTES NFR BLD AUTO: 7 % (ref 4–12)
NEUTROPHILS # BLD AUTO: 3.58 THOUSANDS/ΜL (ref 1.85–7.62)
NEUTS SEG NFR BLD AUTO: 77 % (ref 43–75)
NRBC BLD AUTO-RTO: 0 /100 WBCS
P AXIS: 71 DEGREES
PLATELET # BLD AUTO: 120 THOUSANDS/UL (ref 149–390)
PMV BLD AUTO: 10.1 FL (ref 8.9–12.7)
POTASSIUM SERPL-SCNC: 4.1 MMOL/L (ref 3.5–5.3)
PR INTERVAL: 160 MS
PROT SERPL-MCNC: 7 G/DL (ref 6.4–8.2)
QRS AXIS: 56 DEGREES
QRSD INTERVAL: 102 MS
QT INTERVAL: 404 MS
QTC INTERVAL: 477 MS
RBC # BLD AUTO: 3.42 MILLION/UL (ref 3.81–5.12)
SODIUM SERPL-SCNC: 138 MMOL/L (ref 136–145)
T WAVE AXIS: 62 DEGREES
VENTRICULAR RATE: 84 BPM
WBC # BLD AUTO: 4.67 THOUSAND/UL (ref 4.31–10.16)

## 2020-04-20 PROCEDURE — 85025 COMPLETE CBC W/AUTO DIFF WBC: CPT | Performed by: INTERNAL MEDICINE

## 2020-04-20 PROCEDURE — 82728 ASSAY OF FERRITIN: CPT | Performed by: INTERNAL MEDICINE

## 2020-04-20 PROCEDURE — 99232 SBSQ HOSP IP/OBS MODERATE 35: CPT | Performed by: INTERNAL MEDICINE

## 2020-04-20 PROCEDURE — 85379 FIBRIN DEGRADATION QUANT: CPT | Performed by: INTERNAL MEDICINE

## 2020-04-20 PROCEDURE — 93010 ELECTROCARDIOGRAM REPORT: CPT | Performed by: INTERNAL MEDICINE

## 2020-04-20 PROCEDURE — 86140 C-REACTIVE PROTEIN: CPT | Performed by: INTERNAL MEDICINE

## 2020-04-20 PROCEDURE — 80053 COMPREHEN METABOLIC PANEL: CPT | Performed by: INTERNAL MEDICINE

## 2020-04-20 RX ORDER — FAMOTIDINE 20 MG/1
20 TABLET, FILM COATED ORAL DAILY
Status: DISCONTINUED | OUTPATIENT
Start: 2020-04-20 | End: 2020-04-27 | Stop reason: HOSPADM

## 2020-04-20 RX ADMIN — HYDROXYCHLOROQUINE SULFATE 200 MG: 200 TABLET, FILM COATED ORAL at 08:43

## 2020-04-20 RX ADMIN — Medication 250 MG: at 17:15

## 2020-04-20 RX ADMIN — APIXABAN 5 MG: 5 TABLET, FILM COATED ORAL at 17:15

## 2020-04-20 RX ADMIN — VANCOMYCIN HYDROCHLORIDE 125 MG: 500 INJECTION, POWDER, LYOPHILIZED, FOR SOLUTION INTRAVENOUS at 08:43

## 2020-04-20 RX ADMIN — METOPROLOL TARTRATE 50 MG: 50 TABLET, FILM COATED ORAL at 08:43

## 2020-04-20 RX ADMIN — Medication 1 TABLET: at 08:43

## 2020-04-20 RX ADMIN — OXYCODONE HYDROCHLORIDE AND ACETAMINOPHEN 1000 MG: 500 TABLET ORAL at 17:14

## 2020-04-20 RX ADMIN — ONDANSETRON 4 MG: 2 INJECTION INTRAMUSCULAR; INTRAVENOUS at 07:11

## 2020-04-20 RX ADMIN — FUROSEMIDE 20 MG: 20 TABLET ORAL at 17:15

## 2020-04-20 RX ADMIN — ACETAMINOPHEN 650 MG: 325 TABLET ORAL at 22:10

## 2020-04-20 RX ADMIN — ACETAMINOPHEN 650 MG: 325 TABLET ORAL at 14:18

## 2020-04-20 RX ADMIN — Medication 250 MG: at 08:43

## 2020-04-20 RX ADMIN — METOPROLOL TARTRATE 50 MG: 50 TABLET, FILM COATED ORAL at 22:10

## 2020-04-20 RX ADMIN — HYDROXYCHLOROQUINE SULFATE 200 MG: 200 TABLET, FILM COATED ORAL at 17:15

## 2020-04-20 RX ADMIN — OXYCODONE HYDROCHLORIDE AND ACETAMINOPHEN 1000 MG: 500 TABLET ORAL at 08:43

## 2020-04-20 RX ADMIN — GUAIFENESIN 600 MG: 600 TABLET, EXTENDED RELEASE ORAL at 22:02

## 2020-04-20 RX ADMIN — FAMOTIDINE 20 MG: 20 TABLET, FILM COATED ORAL at 17:15

## 2020-04-20 RX ADMIN — APIXABAN 5 MG: 5 TABLET, FILM COATED ORAL at 08:43

## 2020-04-20 RX ADMIN — ZINC SULFATE 220 MG (50 MG) CAPSULE 220 MG: CAPSULE at 08:43

## 2020-04-20 RX ADMIN — AZITHROMYCIN 250 MG: 250 TABLET, FILM COATED ORAL at 08:43

## 2020-04-20 RX ADMIN — FUROSEMIDE 20 MG: 20 TABLET ORAL at 08:43

## 2020-04-20 RX ADMIN — GUAIFENESIN 600 MG: 600 TABLET, EXTENDED RELEASE ORAL at 08:43

## 2020-04-20 RX ADMIN — ATORVASTATIN CALCIUM 40 MG: 40 TABLET, FILM COATED ORAL at 17:15

## 2020-04-20 RX ADMIN — LIDOCAINE 1 PATCH: 50 PATCH CUTANEOUS at 08:43

## 2020-04-21 PROBLEM — J96.01 ACUTE RESPIRATORY FAILURE WITH HYPOXIA (HCC): Status: ACTIVE | Noted: 2020-04-21

## 2020-04-21 LAB
ALBUMIN SERPL BCP-MCNC: 2.9 G/DL (ref 3.5–5)
ALP SERPL-CCNC: 131 U/L (ref 46–116)
ALT SERPL W P-5'-P-CCNC: 22 U/L (ref 12–78)
ANION GAP SERPL CALCULATED.3IONS-SCNC: 6 MMOL/L (ref 4–13)
AST SERPL W P-5'-P-CCNC: 35 U/L (ref 5–45)
BASOPHILS # BLD AUTO: 0.02 THOUSANDS/ΜL (ref 0–0.1)
BASOPHILS NFR BLD AUTO: 0 % (ref 0–1)
BILIRUB SERPL-MCNC: 0.68 MG/DL (ref 0.2–1)
BUN SERPL-MCNC: 17 MG/DL (ref 5–25)
CALCIUM SERPL-MCNC: 9.1 MG/DL (ref 8.3–10.1)
CHLORIDE SERPL-SCNC: 106 MMOL/L (ref 100–108)
CO2 SERPL-SCNC: 26 MMOL/L (ref 21–32)
CREAT SERPL-MCNC: 0.98 MG/DL (ref 0.6–1.3)
CRP SERPL QL: 52.4 MG/L
EOSINOPHIL # BLD AUTO: 0.04 THOUSAND/ΜL (ref 0–0.61)
EOSINOPHIL NFR BLD AUTO: 1 % (ref 0–6)
ERYTHROCYTE [DISTWIDTH] IN BLOOD BY AUTOMATED COUNT: 13.8 % (ref 11.6–15.1)
GFR SERPL CREATININE-BSD FRML MDRD: 55 ML/MIN/1.73SQ M
GLUCOSE SERPL-MCNC: 108 MG/DL (ref 65–140)
HCT VFR BLD AUTO: 33.7 % (ref 34.8–46.1)
HGB BLD-MCNC: 10.9 G/DL (ref 11.5–15.4)
IL6 SERPL-MCNC: 53.1 PG/ML (ref 0–15.5)
IMM GRANULOCYTES # BLD AUTO: 0.02 THOUSAND/UL (ref 0–0.2)
IMM GRANULOCYTES NFR BLD AUTO: 0 % (ref 0–2)
LYMPHOCYTES # BLD AUTO: 0.63 THOUSANDS/ΜL (ref 0.6–4.47)
LYMPHOCYTES NFR BLD AUTO: 10 % (ref 14–44)
MAGNESIUM SERPL-MCNC: 2.3 MG/DL (ref 1.6–2.6)
MCH RBC QN AUTO: 31.6 PG (ref 26.8–34.3)
MCHC RBC AUTO-ENTMCNC: 32.3 G/DL (ref 31.4–37.4)
MCV RBC AUTO: 98 FL (ref 82–98)
MONOCYTES # BLD AUTO: 0.38 THOUSAND/ΜL (ref 0.17–1.22)
MONOCYTES NFR BLD AUTO: 6 % (ref 4–12)
NEUTROPHILS # BLD AUTO: 5.47 THOUSANDS/ΜL (ref 1.85–7.62)
NEUTS SEG NFR BLD AUTO: 83 % (ref 43–75)
NRBC BLD AUTO-RTO: 0 /100 WBCS
PLATELET # BLD AUTO: 144 THOUSANDS/UL (ref 149–390)
PMV BLD AUTO: 10 FL (ref 8.9–12.7)
POTASSIUM SERPL-SCNC: 3.4 MMOL/L (ref 3.5–5.3)
PROCALCITONIN SERPL-MCNC: 0.08 NG/ML
PROT SERPL-MCNC: 7 G/DL (ref 6.4–8.2)
RBC # BLD AUTO: 3.45 MILLION/UL (ref 3.81–5.12)
SODIUM SERPL-SCNC: 138 MMOL/L (ref 136–145)
WBC # BLD AUTO: 6.56 THOUSAND/UL (ref 4.31–10.16)

## 2020-04-21 PROCEDURE — 83735 ASSAY OF MAGNESIUM: CPT | Performed by: PHYSICIAN ASSISTANT

## 2020-04-21 PROCEDURE — 99233 SBSQ HOSP IP/OBS HIGH 50: CPT | Performed by: INTERNAL MEDICINE

## 2020-04-21 PROCEDURE — 84145 PROCALCITONIN (PCT): CPT | Performed by: PHYSICIAN ASSISTANT

## 2020-04-21 PROCEDURE — 80053 COMPREHEN METABOLIC PANEL: CPT | Performed by: INTERNAL MEDICINE

## 2020-04-21 PROCEDURE — 86140 C-REACTIVE PROTEIN: CPT | Performed by: INTERNAL MEDICINE

## 2020-04-21 PROCEDURE — 85025 COMPLETE CBC W/AUTO DIFF WBC: CPT | Performed by: INTERNAL MEDICINE

## 2020-04-21 RX ORDER — POTASSIUM CHLORIDE 20 MEQ/1
20 TABLET, EXTENDED RELEASE ORAL ONCE
Status: COMPLETED | OUTPATIENT
Start: 2020-04-21 | End: 2020-04-21

## 2020-04-21 RX ADMIN — METOPROLOL TARTRATE 50 MG: 50 TABLET, FILM COATED ORAL at 22:00

## 2020-04-21 RX ADMIN — APIXABAN 5 MG: 5 TABLET, FILM COATED ORAL at 08:55

## 2020-04-21 RX ADMIN — OXYCODONE HYDROCHLORIDE AND ACETAMINOPHEN 1000 MG: 500 TABLET ORAL at 17:49

## 2020-04-21 RX ADMIN — POTASSIUM CHLORIDE 20 MEQ: 1500 TABLET, EXTENDED RELEASE ORAL at 12:02

## 2020-04-21 RX ADMIN — Medication 250 MG: at 08:55

## 2020-04-21 RX ADMIN — GUAIFENESIN 600 MG: 600 TABLET, EXTENDED RELEASE ORAL at 22:00

## 2020-04-21 RX ADMIN — FAMOTIDINE 20 MG: 20 TABLET, FILM COATED ORAL at 08:55

## 2020-04-21 RX ADMIN — FUROSEMIDE 20 MG: 20 TABLET ORAL at 08:55

## 2020-04-21 RX ADMIN — Medication 250 MG: at 17:49

## 2020-04-21 RX ADMIN — HYDROXYCHLOROQUINE SULFATE 200 MG: 200 TABLET, FILM COATED ORAL at 17:50

## 2020-04-21 RX ADMIN — GUAIFENESIN 600 MG: 600 TABLET, EXTENDED RELEASE ORAL at 08:55

## 2020-04-21 RX ADMIN — APIXABAN 5 MG: 5 TABLET, FILM COATED ORAL at 17:49

## 2020-04-21 RX ADMIN — ZINC SULFATE 220 MG (50 MG) CAPSULE 220 MG: CAPSULE at 08:55

## 2020-04-21 RX ADMIN — OXYCODONE HYDROCHLORIDE AND ACETAMINOPHEN 1000 MG: 500 TABLET ORAL at 08:55

## 2020-04-21 RX ADMIN — Medication 1 TABLET: at 08:55

## 2020-04-21 RX ADMIN — HYDROXYCHLOROQUINE SULFATE 200 MG: 200 TABLET, FILM COATED ORAL at 08:55

## 2020-04-21 RX ADMIN — METOPROLOL TARTRATE 50 MG: 50 TABLET, FILM COATED ORAL at 08:55

## 2020-04-21 RX ADMIN — FUROSEMIDE 20 MG: 20 TABLET ORAL at 17:50

## 2020-04-21 RX ADMIN — ATORVASTATIN CALCIUM 40 MG: 40 TABLET, FILM COATED ORAL at 17:49

## 2020-04-22 LAB
ATRIAL RATE: 94 BPM
P AXIS: 52 DEGREES
PR INTERVAL: 164 MS
QRS AXIS: 4 DEGREES
QRSD INTERVAL: 98 MS
QT INTERVAL: 382 MS
QTC INTERVAL: 477 MS
T WAVE AXIS: 11 DEGREES
VENTRICULAR RATE: 94 BPM

## 2020-04-22 PROCEDURE — 99233 SBSQ HOSP IP/OBS HIGH 50: CPT | Performed by: INTERNAL MEDICINE

## 2020-04-22 PROCEDURE — 93005 ELECTROCARDIOGRAM TRACING: CPT

## 2020-04-22 PROCEDURE — 97167 OT EVAL HIGH COMPLEX 60 MIN: CPT

## 2020-04-22 PROCEDURE — 93010 ELECTROCARDIOGRAM REPORT: CPT | Performed by: INTERNAL MEDICINE

## 2020-04-22 PROCEDURE — 97163 PT EVAL HIGH COMPLEX 45 MIN: CPT

## 2020-04-22 PROCEDURE — 99232 SBSQ HOSP IP/OBS MODERATE 35: CPT | Performed by: INTERNAL MEDICINE

## 2020-04-22 RX ORDER — METHYLPREDNISOLONE SODIUM SUCCINATE 40 MG/ML
40 INJECTION, POWDER, LYOPHILIZED, FOR SOLUTION INTRAMUSCULAR; INTRAVENOUS DAILY
Status: COMPLETED | OUTPATIENT
Start: 2020-04-22 | End: 2020-04-22

## 2020-04-22 RX ORDER — PREDNISONE 20 MG/1
40 TABLET ORAL DAILY
Status: DISCONTINUED | OUTPATIENT
Start: 2020-04-23 | End: 2020-04-25

## 2020-04-22 RX ADMIN — METHYLPREDNISOLONE SODIUM SUCCINATE 40 MG: 40 INJECTION, POWDER, FOR SOLUTION INTRAMUSCULAR; INTRAVENOUS at 10:35

## 2020-04-22 RX ADMIN — APIXABAN 5 MG: 5 TABLET, FILM COATED ORAL at 17:18

## 2020-04-22 RX ADMIN — ATORVASTATIN CALCIUM 40 MG: 40 TABLET, FILM COATED ORAL at 17:18

## 2020-04-22 RX ADMIN — ACETAMINOPHEN 650 MG: 325 TABLET ORAL at 10:34

## 2020-04-22 RX ADMIN — METOPROLOL TARTRATE 50 MG: 50 TABLET, FILM COATED ORAL at 20:22

## 2020-04-22 RX ADMIN — HYDROXYCHLOROQUINE SULFATE 200 MG: 200 TABLET, FILM COATED ORAL at 10:34

## 2020-04-22 RX ADMIN — GUAIFENESIN 600 MG: 600 TABLET, EXTENDED RELEASE ORAL at 10:34

## 2020-04-22 RX ADMIN — OXYCODONE HYDROCHLORIDE AND ACETAMINOPHEN 1000 MG: 500 TABLET ORAL at 10:00

## 2020-04-22 RX ADMIN — ZINC SULFATE 220 MG (50 MG) CAPSULE 220 MG: CAPSULE at 10:34

## 2020-04-22 RX ADMIN — Medication 1 TABLET: at 10:35

## 2020-04-22 RX ADMIN — FUROSEMIDE 20 MG: 20 TABLET ORAL at 17:18

## 2020-04-22 RX ADMIN — Medication 250 MG: at 10:35

## 2020-04-22 RX ADMIN — LIDOCAINE 1 PATCH: 50 PATCH CUTANEOUS at 10:36

## 2020-04-22 RX ADMIN — Medication 250 MG: at 17:18

## 2020-04-22 RX ADMIN — METOPROLOL TARTRATE 50 MG: 50 TABLET, FILM COATED ORAL at 10:35

## 2020-04-22 RX ADMIN — APIXABAN 5 MG: 5 TABLET, FILM COATED ORAL at 10:35

## 2020-04-22 RX ADMIN — FUROSEMIDE 20 MG: 20 TABLET ORAL at 10:34

## 2020-04-22 RX ADMIN — ACETAMINOPHEN 650 MG: 325 TABLET ORAL at 17:17

## 2020-04-22 RX ADMIN — HYDROXYCHLOROQUINE SULFATE 200 MG: 200 TABLET, FILM COATED ORAL at 17:17

## 2020-04-22 RX ADMIN — GUAIFENESIN 600 MG: 600 TABLET, EXTENDED RELEASE ORAL at 20:22

## 2020-04-22 RX ADMIN — FAMOTIDINE 20 MG: 20 TABLET, FILM COATED ORAL at 10:35

## 2020-04-22 RX ADMIN — OXYCODONE HYDROCHLORIDE AND ACETAMINOPHEN 1000 MG: 500 TABLET ORAL at 17:17

## 2020-04-23 LAB
ALBUMIN SERPL BCP-MCNC: 2.6 G/DL (ref 3.5–5)
ALP SERPL-CCNC: 127 U/L (ref 46–116)
ALT SERPL W P-5'-P-CCNC: 18 U/L (ref 12–78)
ANION GAP SERPL CALCULATED.3IONS-SCNC: 5 MMOL/L (ref 4–13)
AST SERPL W P-5'-P-CCNC: 24 U/L (ref 5–45)
BASOPHILS # BLD AUTO: 0.01 THOUSANDS/ΜL (ref 0–0.1)
BASOPHILS NFR BLD AUTO: 0 % (ref 0–1)
BILIRUB SERPL-MCNC: 0.59 MG/DL (ref 0.2–1)
BUN SERPL-MCNC: 17 MG/DL (ref 5–25)
CALCIUM SERPL-MCNC: 8.8 MG/DL (ref 8.3–10.1)
CHLORIDE SERPL-SCNC: 108 MMOL/L (ref 100–108)
CO2 SERPL-SCNC: 25 MMOL/L (ref 21–32)
CREAT SERPL-MCNC: 0.83 MG/DL (ref 0.6–1.3)
CRP SERPL QL: 73 MG/L
EOSINOPHIL # BLD AUTO: 0.07 THOUSAND/ΜL (ref 0–0.61)
EOSINOPHIL NFR BLD AUTO: 1 % (ref 0–6)
ERYTHROCYTE [DISTWIDTH] IN BLOOD BY AUTOMATED COUNT: 13.3 % (ref 11.6–15.1)
GFR SERPL CREATININE-BSD FRML MDRD: 67 ML/MIN/1.73SQ M
GLUCOSE SERPL-MCNC: 97 MG/DL (ref 65–140)
HCT VFR BLD AUTO: 33 % (ref 34.8–46.1)
HGB BLD-MCNC: 10.8 G/DL (ref 11.5–15.4)
IMM GRANULOCYTES # BLD AUTO: 0.03 THOUSAND/UL (ref 0–0.2)
IMM GRANULOCYTES NFR BLD AUTO: 0 % (ref 0–2)
LYMPHOCYTES # BLD AUTO: 0.72 THOUSANDS/ΜL (ref 0.6–4.47)
LYMPHOCYTES NFR BLD AUTO: 11 % (ref 14–44)
MCH RBC QN AUTO: 31.3 PG (ref 26.8–34.3)
MCHC RBC AUTO-ENTMCNC: 32.7 G/DL (ref 31.4–37.4)
MCV RBC AUTO: 96 FL (ref 82–98)
MONOCYTES # BLD AUTO: 0.42 THOUSAND/ΜL (ref 0.17–1.22)
MONOCYTES NFR BLD AUTO: 6 % (ref 4–12)
NEUTROPHILS # BLD AUTO: 5.51 THOUSANDS/ΜL (ref 1.85–7.62)
NEUTS SEG NFR BLD AUTO: 82 % (ref 43–75)
NRBC BLD AUTO-RTO: 0 /100 WBCS
PLATELET # BLD AUTO: 162 THOUSANDS/UL (ref 149–390)
PMV BLD AUTO: 9.9 FL (ref 8.9–12.7)
POTASSIUM SERPL-SCNC: 3.4 MMOL/L (ref 3.5–5.3)
PROT SERPL-MCNC: 7.1 G/DL (ref 6.4–8.2)
RBC # BLD AUTO: 3.45 MILLION/UL (ref 3.81–5.12)
SODIUM SERPL-SCNC: 138 MMOL/L (ref 136–145)
WBC # BLD AUTO: 6.76 THOUSAND/UL (ref 4.31–10.16)

## 2020-04-23 PROCEDURE — 85025 COMPLETE CBC W/AUTO DIFF WBC: CPT | Performed by: INTERNAL MEDICINE

## 2020-04-23 PROCEDURE — 80053 COMPREHEN METABOLIC PANEL: CPT | Performed by: INTERNAL MEDICINE

## 2020-04-23 PROCEDURE — 99233 SBSQ HOSP IP/OBS HIGH 50: CPT | Performed by: INTERNAL MEDICINE

## 2020-04-23 PROCEDURE — 86140 C-REACTIVE PROTEIN: CPT | Performed by: INTERNAL MEDICINE

## 2020-04-23 PROCEDURE — 99232 SBSQ HOSP IP/OBS MODERATE 35: CPT | Performed by: INTERNAL MEDICINE

## 2020-04-23 RX ORDER — POTASSIUM CHLORIDE 20 MEQ/1
40 TABLET, EXTENDED RELEASE ORAL ONCE
Status: COMPLETED | OUTPATIENT
Start: 2020-04-23 | End: 2020-04-23

## 2020-04-23 RX ADMIN — FUROSEMIDE 20 MG: 20 TABLET ORAL at 17:05

## 2020-04-23 RX ADMIN — HYDROXYCHLOROQUINE SULFATE 200 MG: 200 TABLET, FILM COATED ORAL at 09:08

## 2020-04-23 RX ADMIN — FAMOTIDINE 20 MG: 20 TABLET, FILM COATED ORAL at 09:08

## 2020-04-23 RX ADMIN — APIXABAN 5 MG: 5 TABLET, FILM COATED ORAL at 09:09

## 2020-04-23 RX ADMIN — LIDOCAINE 1 PATCH: 50 PATCH CUTANEOUS at 09:09

## 2020-04-23 RX ADMIN — METOPROLOL TARTRATE 50 MG: 50 TABLET, FILM COATED ORAL at 09:08

## 2020-04-23 RX ADMIN — POTASSIUM CHLORIDE 40 MEQ: 1500 TABLET, EXTENDED RELEASE ORAL at 17:05

## 2020-04-23 RX ADMIN — OXYCODONE HYDROCHLORIDE AND ACETAMINOPHEN 1000 MG: 500 TABLET ORAL at 09:08

## 2020-04-23 RX ADMIN — PREDNISONE 40 MG: 20 TABLET ORAL at 09:08

## 2020-04-23 RX ADMIN — OXYCODONE HYDROCHLORIDE AND ACETAMINOPHEN 1000 MG: 500 TABLET ORAL at 17:05

## 2020-04-23 RX ADMIN — FUROSEMIDE 20 MG: 20 TABLET ORAL at 09:08

## 2020-04-23 RX ADMIN — ACETAMINOPHEN 650 MG: 325 TABLET ORAL at 21:18

## 2020-04-23 RX ADMIN — GUAIFENESIN 600 MG: 600 TABLET, EXTENDED RELEASE ORAL at 09:08

## 2020-04-23 RX ADMIN — ZINC SULFATE 220 MG (50 MG) CAPSULE 220 MG: CAPSULE at 09:09

## 2020-04-23 RX ADMIN — Medication 250 MG: at 09:08

## 2020-04-23 RX ADMIN — APIXABAN 5 MG: 5 TABLET, FILM COATED ORAL at 17:05

## 2020-04-23 RX ADMIN — Medication 250 MG: at 17:05

## 2020-04-23 RX ADMIN — GUAIFENESIN 600 MG: 600 TABLET, EXTENDED RELEASE ORAL at 21:14

## 2020-04-23 RX ADMIN — Medication 1 TABLET: at 09:08

## 2020-04-23 RX ADMIN — METOPROLOL TARTRATE 50 MG: 50 TABLET, FILM COATED ORAL at 21:14

## 2020-04-23 RX ADMIN — ATORVASTATIN CALCIUM 40 MG: 40 TABLET, FILM COATED ORAL at 17:05

## 2020-04-24 LAB
BACTERIA BLD CULT: NORMAL
BACTERIA BLD CULT: NORMAL

## 2020-04-24 PROCEDURE — 99233 SBSQ HOSP IP/OBS HIGH 50: CPT | Performed by: INTERNAL MEDICINE

## 2020-04-24 PROCEDURE — 99232 SBSQ HOSP IP/OBS MODERATE 35: CPT | Performed by: INTERNAL MEDICINE

## 2020-04-24 RX ORDER — ESCITALOPRAM OXALATE 10 MG/1
10 TABLET ORAL DAILY
Status: DISCONTINUED | OUTPATIENT
Start: 2020-04-24 | End: 2020-04-27 | Stop reason: HOSPADM

## 2020-04-24 RX ORDER — METOLAZONE 2.5 MG/1
2.5 TABLET ORAL ONCE
Status: COMPLETED | OUTPATIENT
Start: 2020-04-24 | End: 2020-04-24

## 2020-04-24 RX ADMIN — ESCITALOPRAM OXALATE 10 MG: 10 TABLET ORAL at 15:45

## 2020-04-24 RX ADMIN — ATORVASTATIN CALCIUM 40 MG: 40 TABLET, FILM COATED ORAL at 17:27

## 2020-04-24 RX ADMIN — APIXABAN 5 MG: 5 TABLET, FILM COATED ORAL at 17:27

## 2020-04-24 RX ADMIN — PREDNISONE 40 MG: 20 TABLET ORAL at 10:19

## 2020-04-24 RX ADMIN — LIDOCAINE 1 PATCH: 50 PATCH CUTANEOUS at 10:20

## 2020-04-24 RX ADMIN — FUROSEMIDE 20 MG: 20 TABLET ORAL at 10:20

## 2020-04-24 RX ADMIN — METOLAZONE 2.5 MG: 2.5 TABLET ORAL at 16:21

## 2020-04-24 RX ADMIN — Medication 250 MG: at 10:19

## 2020-04-24 RX ADMIN — FAMOTIDINE 20 MG: 20 TABLET, FILM COATED ORAL at 10:20

## 2020-04-24 RX ADMIN — GUAIFENESIN 600 MG: 600 TABLET, EXTENDED RELEASE ORAL at 10:20

## 2020-04-24 RX ADMIN — OXYCODONE HYDROCHLORIDE AND ACETAMINOPHEN 1000 MG: 500 TABLET ORAL at 17:27

## 2020-04-24 RX ADMIN — FUROSEMIDE 20 MG: 20 TABLET ORAL at 15:45

## 2020-04-24 RX ADMIN — OXYCODONE HYDROCHLORIDE AND ACETAMINOPHEN 1000 MG: 500 TABLET ORAL at 10:20

## 2020-04-24 RX ADMIN — ZINC SULFATE 220 MG (50 MG) CAPSULE 220 MG: CAPSULE at 10:19

## 2020-04-24 RX ADMIN — METOPROLOL TARTRATE 50 MG: 50 TABLET, FILM COATED ORAL at 21:33

## 2020-04-24 RX ADMIN — METOPROLOL TARTRATE 50 MG: 50 TABLET, FILM COATED ORAL at 10:20

## 2020-04-24 RX ADMIN — GUAIFENESIN 600 MG: 600 TABLET, EXTENDED RELEASE ORAL at 21:17

## 2020-04-24 RX ADMIN — APIXABAN 5 MG: 5 TABLET, FILM COATED ORAL at 10:20

## 2020-04-24 RX ADMIN — ACETAMINOPHEN 650 MG: 325 TABLET ORAL at 21:33

## 2020-04-24 RX ADMIN — Medication 1 TABLET: at 10:19

## 2020-04-24 RX ADMIN — Medication 250 MG: at 17:27

## 2020-04-25 LAB
ANION GAP SERPL CALCULATED.3IONS-SCNC: 6 MMOL/L (ref 4–13)
BASOPHILS # BLD AUTO: 0.02 THOUSANDS/ΜL (ref 0–0.1)
BASOPHILS NFR BLD AUTO: 0 % (ref 0–1)
BUN SERPL-MCNC: 23 MG/DL (ref 5–25)
CALCIUM SERPL-MCNC: 9.2 MG/DL (ref 8.3–10.1)
CHLORIDE SERPL-SCNC: 103 MMOL/L (ref 100–108)
CO2 SERPL-SCNC: 29 MMOL/L (ref 21–32)
CREAT SERPL-MCNC: 0.85 MG/DL (ref 0.6–1.3)
EOSINOPHIL # BLD AUTO: 0.02 THOUSAND/ΜL (ref 0–0.61)
EOSINOPHIL NFR BLD AUTO: 0 % (ref 0–6)
ERYTHROCYTE [DISTWIDTH] IN BLOOD BY AUTOMATED COUNT: 13 % (ref 11.6–15.1)
GFR SERPL CREATININE-BSD FRML MDRD: 65 ML/MIN/1.73SQ M
GLUCOSE SERPL-MCNC: 95 MG/DL (ref 65–140)
HCT VFR BLD AUTO: 33.3 % (ref 34.8–46.1)
HGB BLD-MCNC: 11.1 G/DL (ref 11.5–15.4)
IMM GRANULOCYTES # BLD AUTO: 0.1 THOUSAND/UL (ref 0–0.2)
IMM GRANULOCYTES NFR BLD AUTO: 1 % (ref 0–2)
LYMPHOCYTES # BLD AUTO: 0.85 THOUSANDS/ΜL (ref 0.6–4.47)
LYMPHOCYTES NFR BLD AUTO: 10 % (ref 14–44)
MAGNESIUM SERPL-MCNC: 2.4 MG/DL (ref 1.6–2.6)
MCH RBC QN AUTO: 31.7 PG (ref 26.8–34.3)
MCHC RBC AUTO-ENTMCNC: 33.3 G/DL (ref 31.4–37.4)
MCV RBC AUTO: 95 FL (ref 82–98)
MONOCYTES # BLD AUTO: 0.54 THOUSAND/ΜL (ref 0.17–1.22)
MONOCYTES NFR BLD AUTO: 7 % (ref 4–12)
NEUTROPHILS # BLD AUTO: 6.76 THOUSANDS/ΜL (ref 1.85–7.62)
NEUTS SEG NFR BLD AUTO: 82 % (ref 43–75)
NRBC BLD AUTO-RTO: 0 /100 WBCS
PLATELET # BLD AUTO: 235 THOUSANDS/UL (ref 149–390)
PMV BLD AUTO: 9.8 FL (ref 8.9–12.7)
POTASSIUM SERPL-SCNC: 3.4 MMOL/L (ref 3.5–5.3)
RBC # BLD AUTO: 3.5 MILLION/UL (ref 3.81–5.12)
SODIUM SERPL-SCNC: 138 MMOL/L (ref 136–145)
WBC # BLD AUTO: 8.29 THOUSAND/UL (ref 4.31–10.16)

## 2020-04-25 PROCEDURE — 85025 COMPLETE CBC W/AUTO DIFF WBC: CPT | Performed by: INTERNAL MEDICINE

## 2020-04-25 PROCEDURE — 83735 ASSAY OF MAGNESIUM: CPT | Performed by: INTERNAL MEDICINE

## 2020-04-25 PROCEDURE — 99233 SBSQ HOSP IP/OBS HIGH 50: CPT | Performed by: INTERNAL MEDICINE

## 2020-04-25 PROCEDURE — 80048 BASIC METABOLIC PNL TOTAL CA: CPT | Performed by: INTERNAL MEDICINE

## 2020-04-25 RX ORDER — POTASSIUM CHLORIDE 20 MEQ/1
40 TABLET, EXTENDED RELEASE ORAL ONCE
Status: COMPLETED | OUTPATIENT
Start: 2020-04-25 | End: 2020-04-25

## 2020-04-25 RX ADMIN — FUROSEMIDE 20 MG: 20 TABLET ORAL at 17:29

## 2020-04-25 RX ADMIN — OXYCODONE HYDROCHLORIDE AND ACETAMINOPHEN 1000 MG: 500 TABLET ORAL at 09:30

## 2020-04-25 RX ADMIN — OXYCODONE HYDROCHLORIDE AND ACETAMINOPHEN 1000 MG: 500 TABLET ORAL at 17:29

## 2020-04-25 RX ADMIN — ESCITALOPRAM OXALATE 10 MG: 10 TABLET ORAL at 09:30

## 2020-04-25 RX ADMIN — Medication 1 TABLET: at 09:30

## 2020-04-25 RX ADMIN — Medication 250 MG: at 17:29

## 2020-04-25 RX ADMIN — ATORVASTATIN CALCIUM 40 MG: 40 TABLET, FILM COATED ORAL at 17:29

## 2020-04-25 RX ADMIN — ACETAMINOPHEN 650 MG: 325 TABLET ORAL at 09:30

## 2020-04-25 RX ADMIN — Medication 250 MG: at 09:31

## 2020-04-25 RX ADMIN — FAMOTIDINE 20 MG: 20 TABLET, FILM COATED ORAL at 09:29

## 2020-04-25 RX ADMIN — APIXABAN 5 MG: 5 TABLET, FILM COATED ORAL at 09:30

## 2020-04-25 RX ADMIN — GUAIFENESIN 600 MG: 600 TABLET, EXTENDED RELEASE ORAL at 20:00

## 2020-04-25 RX ADMIN — FUROSEMIDE 20 MG: 20 TABLET ORAL at 09:29

## 2020-04-25 RX ADMIN — GUAIFENESIN 600 MG: 600 TABLET, EXTENDED RELEASE ORAL at 09:30

## 2020-04-25 RX ADMIN — LIDOCAINE 1 PATCH: 50 PATCH CUTANEOUS at 09:31

## 2020-04-25 RX ADMIN — ZINC SULFATE 220 MG (50 MG) CAPSULE 220 MG: CAPSULE at 09:30

## 2020-04-25 RX ADMIN — METOPROLOL TARTRATE 50 MG: 50 TABLET, FILM COATED ORAL at 09:30

## 2020-04-25 RX ADMIN — METOPROLOL TARTRATE 50 MG: 50 TABLET, FILM COATED ORAL at 20:00

## 2020-04-25 RX ADMIN — POTASSIUM CHLORIDE 40 MEQ: 1500 TABLET, EXTENDED RELEASE ORAL at 11:00

## 2020-04-25 RX ADMIN — APIXABAN 5 MG: 5 TABLET, FILM COATED ORAL at 17:29

## 2020-04-25 RX ADMIN — PREDNISONE 40 MG: 20 TABLET ORAL at 09:30

## 2020-04-25 RX ADMIN — ALPRAZOLAM 0.25 MG: 0.25 TABLET ORAL at 20:00

## 2020-04-26 PROCEDURE — 94618 PULMONARY STRESS TESTING: CPT

## 2020-04-26 PROCEDURE — 99233 SBSQ HOSP IP/OBS HIGH 50: CPT | Performed by: INTERNAL MEDICINE

## 2020-04-26 RX ADMIN — ATORVASTATIN CALCIUM 40 MG: 40 TABLET, FILM COATED ORAL at 17:04

## 2020-04-26 RX ADMIN — ESCITALOPRAM OXALATE 10 MG: 10 TABLET ORAL at 08:08

## 2020-04-26 RX ADMIN — Medication 250 MG: at 17:04

## 2020-04-26 RX ADMIN — ACETAMINOPHEN 650 MG: 325 TABLET ORAL at 08:08

## 2020-04-26 RX ADMIN — METOPROLOL TARTRATE 50 MG: 50 TABLET, FILM COATED ORAL at 20:31

## 2020-04-26 RX ADMIN — ZINC SULFATE 220 MG (50 MG) CAPSULE 220 MG: CAPSULE at 08:09

## 2020-04-26 RX ADMIN — LIDOCAINE 1 PATCH: 50 PATCH CUTANEOUS at 08:08

## 2020-04-26 RX ADMIN — OXYCODONE HYDROCHLORIDE AND ACETAMINOPHEN 1000 MG: 500 TABLET ORAL at 17:04

## 2020-04-26 RX ADMIN — GUAIFENESIN 600 MG: 600 TABLET, EXTENDED RELEASE ORAL at 20:31

## 2020-04-26 RX ADMIN — OXYCODONE HYDROCHLORIDE AND ACETAMINOPHEN 1000 MG: 500 TABLET ORAL at 08:08

## 2020-04-26 RX ADMIN — Medication 250 MG: at 08:08

## 2020-04-26 RX ADMIN — APIXABAN 5 MG: 5 TABLET, FILM COATED ORAL at 17:04

## 2020-04-26 RX ADMIN — FAMOTIDINE 20 MG: 20 TABLET, FILM COATED ORAL at 08:08

## 2020-04-26 RX ADMIN — GUAIFENESIN 600 MG: 600 TABLET, EXTENDED RELEASE ORAL at 08:08

## 2020-04-26 RX ADMIN — METOPROLOL TARTRATE 50 MG: 50 TABLET, FILM COATED ORAL at 08:08

## 2020-04-26 RX ADMIN — Medication 1 TABLET: at 08:08

## 2020-04-26 RX ADMIN — PREDNISONE 30 MG: 20 TABLET ORAL at 08:08

## 2020-04-26 RX ADMIN — FUROSEMIDE 20 MG: 20 TABLET ORAL at 17:04

## 2020-04-26 RX ADMIN — FUROSEMIDE 20 MG: 20 TABLET ORAL at 08:08

## 2020-04-26 RX ADMIN — APIXABAN 5 MG: 5 TABLET, FILM COATED ORAL at 08:08

## 2020-04-27 VITALS
HEIGHT: 65 IN | WEIGHT: 175.49 LBS | SYSTOLIC BLOOD PRESSURE: 133 MMHG | OXYGEN SATURATION: 93 % | HEART RATE: 96 BPM | BODY MASS INDEX: 29.24 KG/M2 | TEMPERATURE: 98.3 F | RESPIRATION RATE: 18 BRPM | DIASTOLIC BLOOD PRESSURE: 92 MMHG

## 2020-04-27 PROCEDURE — 99239 HOSP IP/OBS DSCHRG MGMT >30: CPT | Performed by: INTERNAL MEDICINE

## 2020-04-27 RX ORDER — PREDNISONE 10 MG/1
30 TABLET ORAL DAILY
Qty: 30 TABLET | Refills: 0 | Status: SHIPPED | OUTPATIENT
Start: 2020-04-28 | End: 2020-11-11 | Stop reason: ALTCHOICE

## 2020-04-27 RX ADMIN — GUAIFENESIN 600 MG: 600 TABLET, EXTENDED RELEASE ORAL at 08:24

## 2020-04-27 RX ADMIN — Medication 250 MG: at 08:24

## 2020-04-27 RX ADMIN — ESCITALOPRAM OXALATE 10 MG: 10 TABLET ORAL at 08:24

## 2020-04-27 RX ADMIN — Medication 1 TABLET: at 08:24

## 2020-04-27 RX ADMIN — PREDNISONE 30 MG: 20 TABLET ORAL at 08:24

## 2020-04-27 RX ADMIN — METOPROLOL TARTRATE 50 MG: 50 TABLET, FILM COATED ORAL at 08:33

## 2020-04-27 RX ADMIN — ZINC SULFATE 220 MG (50 MG) CAPSULE 220 MG: CAPSULE at 08:24

## 2020-04-27 RX ADMIN — APIXABAN 5 MG: 5 TABLET, FILM COATED ORAL at 08:24

## 2020-04-27 RX ADMIN — OXYCODONE HYDROCHLORIDE AND ACETAMINOPHEN 1000 MG: 500 TABLET ORAL at 08:24

## 2020-04-27 RX ADMIN — FAMOTIDINE 20 MG: 20 TABLET, FILM COATED ORAL at 08:24

## 2020-04-27 RX ADMIN — LIDOCAINE 1 PATCH: 50 PATCH CUTANEOUS at 08:25

## 2020-05-02 PROCEDURE — 93010 ELECTROCARDIOGRAM REPORT: CPT | Performed by: INTERNAL MEDICINE

## 2020-06-10 ENCOUNTER — TRANSCRIBE ORDERS (OUTPATIENT)
Dept: ADMINISTRATIVE | Facility: HOSPITAL | Age: 80
End: 2020-06-10

## 2020-06-10 DIAGNOSIS — R14.0 BLOATING: ICD-10-CM

## 2020-06-10 DIAGNOSIS — I10 HYPERTENSION, UNSPECIFIED TYPE: ICD-10-CM

## 2020-06-10 DIAGNOSIS — R10.9 ABDOMINAL PAIN, UNSPECIFIED ABDOMINAL LOCATION: ICD-10-CM

## 2020-06-10 DIAGNOSIS — R06.02 SOB (SHORTNESS OF BREATH): Primary | ICD-10-CM

## 2020-06-10 DIAGNOSIS — R05.9 COUGH: ICD-10-CM

## 2020-06-12 ENCOUNTER — TRANSCRIBE ORDERS (OUTPATIENT)
Dept: LAB | Facility: HOSPITAL | Age: 80
End: 2020-06-12

## 2020-06-12 ENCOUNTER — HOSPITAL ENCOUNTER (OUTPATIENT)
Dept: CT IMAGING | Facility: HOSPITAL | Age: 80
Discharge: HOME | End: 2020-06-12
Attending: INTERNAL MEDICINE
Payer: MEDICARE

## 2020-06-12 ENCOUNTER — APPOINTMENT (OUTPATIENT)
Dept: LAB | Facility: HOSPITAL | Age: 80
End: 2020-06-12
Attending: INTERNAL MEDICINE
Payer: MEDICARE

## 2020-06-12 DIAGNOSIS — R05.9 COUGH: ICD-10-CM

## 2020-06-12 DIAGNOSIS — R06.02 SOB (SHORTNESS OF BREATH): ICD-10-CM

## 2020-06-12 DIAGNOSIS — R10.9 ABDOMINAL PAIN, UNSPECIFIED ABDOMINAL LOCATION: ICD-10-CM

## 2020-06-12 DIAGNOSIS — I10 HYPERTENSION, UNSPECIFIED TYPE: ICD-10-CM

## 2020-06-12 DIAGNOSIS — R14.0 BLOATING: ICD-10-CM

## 2020-06-12 LAB
ALBUMIN SERPL BCP-MCNC: 4.4 G/DL (ref 3.5–5.7)
ALP SERPL-CCNC: 104 U/L (ref 55–165)
ALT SERPL W P-5'-P-CCNC: 23 U/L (ref 7–52)
ANION GAP SERPL CALCULATED.3IONS-SCNC: 7 MMOL/L (ref 4–13)
AST SERPL W P-5'-P-CCNC: 25 U/L (ref 13–39)
BILIRUB SERPL-MCNC: 0.8 MG/DL (ref 0.2–1)
BNP SERPL-MCNC: 120 PG/ML (ref 1–100)
BUN SERPL-MCNC: 13 MG/DL (ref 7–25)
CALCIUM SERPL-MCNC: 9.5 MG/DL (ref 8.6–10.5)
CHLORIDE SERPL-SCNC: 106 MMOL/L (ref 98–107)
CO2 SERPL-SCNC: 27 MMOL/L (ref 21–31)
CREAT SERPL-MCNC: 0.8 MG/DL (ref 0.6–1.2)
ERYTHROCYTE [DISTWIDTH] IN BLOOD BY AUTOMATED COUNT: 14.7 % (ref 11.5–14.5)
FERRITIN SERPL-MCNC: 55 NG/ML (ref 8–388)
FOLATE SERPL-MCNC: >20 NG/ML (ref 3.1–17.5)
GFR SERPL CREATININE-BSD FRML MDRD: 70 ML/MIN/1.73SQ M
GLUCOSE P FAST SERPL-MCNC: 107 MG/DL (ref 65–99)
HCT VFR BLD AUTO: 36.9 % (ref 42–47)
HGB BLD-MCNC: 12.4 G/DL (ref 12–16)
IRON SATN MFR SERPL: 22 %
IRON SERPL-MCNC: 72 UG/DL (ref 50–170)
MCH RBC QN AUTO: 32.3 PG (ref 26–34)
MCHC RBC AUTO-ENTMCNC: 33.7 G/DL (ref 31–37)
MCV RBC AUTO: 96 FL (ref 81–99)
PLATELET # BLD AUTO: 144 THOUSANDS/UL (ref 149–390)
PMV BLD AUTO: 7.8 FL (ref 8.6–11.7)
POTASSIUM SERPL-SCNC: 4 MMOL/L (ref 3.5–5.5)
PROT SERPL-MCNC: 7.2 G/DL (ref 6.4–8.9)
RBC # BLD AUTO: 3.85 MILLION/UL (ref 3.9–5.2)
SODIUM SERPL-SCNC: 140 MMOL/L (ref 134–143)
T4 FREE SERPL-MCNC: 1.04 NG/DL (ref 0.76–1.46)
TIBC SERPL-MCNC: 332 UG/DL (ref 250–450)
TSH SERPL DL<=0.05 MIU/L-ACNC: 2.1 UIU/ML (ref 0.45–5.33)
WBC # BLD AUTO: 5.8 THOUSAND/UL (ref 4.8–10.8)

## 2020-06-12 PROCEDURE — 83540 ASSAY OF IRON: CPT

## 2020-06-12 PROCEDURE — 82747 ASSAY OF FOLIC ACID RBC: CPT

## 2020-06-12 PROCEDURE — 36415 COLL VENOUS BLD VENIPUNCTURE: CPT

## 2020-06-12 PROCEDURE — 84443 ASSAY THYROID STIM HORMONE: CPT

## 2020-06-12 PROCEDURE — 83550 IRON BINDING TEST: CPT

## 2020-06-12 PROCEDURE — 82746 ASSAY OF FOLIC ACID SERUM: CPT

## 2020-06-12 PROCEDURE — 83880 ASSAY OF NATRIURETIC PEPTIDE: CPT

## 2020-06-12 PROCEDURE — 82728 ASSAY OF FERRITIN: CPT

## 2020-06-12 PROCEDURE — 74177 CT ABD & PELVIS W/CONTRAST: CPT

## 2020-06-12 PROCEDURE — 84439 ASSAY OF FREE THYROXINE: CPT

## 2020-06-12 PROCEDURE — 71260 CT THORAX DX C+: CPT

## 2020-06-12 PROCEDURE — 80053 COMPREHEN METABOLIC PANEL: CPT

## 2020-06-12 PROCEDURE — 85027 COMPLETE CBC AUTOMATED: CPT

## 2020-06-12 RX ADMIN — IOHEXOL 100 ML: 350 INJECTION, SOLUTION INTRAVENOUS at 14:45

## 2020-06-14 LAB
FOLATE BLD-MCNC: >620 NG/ML
FOLATE RBC-MCNC: >1737 NG/ML
HCT VFR BLD AUTO: 35.7 % (ref 34–46.6)

## 2020-07-17 NOTE — ASSESSMENT & PLAN NOTE
-S/P emergent ex lap with partial hemicolectomy- D/C 10/1/18  -reports bleeding scant amounts since D/C   - Dr Ayse Mays consulted  -Continual cardiac monitoring  -Trend H&H-- HGB stable 8 9 was 9 1 on 10/1/18  -hemoccult all stools no

## 2020-08-01 ENCOUNTER — TRANSCRIBE ORDERS (OUTPATIENT)
Dept: LAB | Facility: MEDICAL CENTER | Age: 80
End: 2020-08-01

## 2020-08-01 ENCOUNTER — APPOINTMENT (OUTPATIENT)
Dept: LAB | Facility: MEDICAL CENTER | Age: 80
End: 2020-08-01
Payer: MEDICARE

## 2020-08-01 DIAGNOSIS — R19.7 DIARRHEA, UNSPECIFIED TYPE: ICD-10-CM

## 2020-08-01 DIAGNOSIS — R19.7 DIARRHEA, UNSPECIFIED TYPE: Primary | ICD-10-CM

## 2020-08-01 PROCEDURE — 87505 NFCT AGENT DETECTION GI: CPT

## 2020-08-01 PROCEDURE — 87177 OVA AND PARASITES SMEARS: CPT

## 2020-08-01 PROCEDURE — 87209 SMEAR COMPLEX STAIN: CPT

## 2020-08-01 PROCEDURE — 89055 LEUKOCYTE ASSESSMENT FECAL: CPT

## 2020-08-02 LAB
C DIFF TOX GENS STL QL NAA+PROBE: NEGATIVE
CAMPYLOBACTER DNA SPEC NAA+PROBE: NORMAL
SALMONELLA DNA SPEC QL NAA+PROBE: NORMAL
SHIGA TOXIN STX GENE SPEC NAA+PROBE: NORMAL
SHIGELLA DNA SPEC QL NAA+PROBE: NORMAL

## 2020-08-04 LAB — G LAMBLIA AG STL QL IA: NEGATIVE

## 2020-08-06 LAB — WBC SPEC QL GRAM STN: NORMAL

## 2020-10-02 ENCOUNTER — TRANSCRIBE ORDERS (OUTPATIENT)
Dept: ADMINISTRATIVE | Facility: HOSPITAL | Age: 80
End: 2020-10-02

## 2020-10-02 DIAGNOSIS — Z86.79 HX OF CAROTID STENOSIS: Primary | ICD-10-CM

## 2020-10-02 DIAGNOSIS — H53.9 VISUAL DISTURBANCE: ICD-10-CM

## 2020-10-02 DIAGNOSIS — H53.133 SUDDEN VISUAL LOSS, BILATERAL: ICD-10-CM

## 2020-10-05 ENCOUNTER — HOSPITAL ENCOUNTER (OUTPATIENT)
Dept: RADIOLOGY | Facility: HOSPITAL | Age: 80
Discharge: HOME | End: 2020-10-05
Payer: MEDICARE

## 2020-10-05 DIAGNOSIS — K59.09 OTHER CONSTIPATION: ICD-10-CM

## 2020-10-05 DIAGNOSIS — R10.33 PERIUMBILICAL ABDOMINAL PAIN: ICD-10-CM

## 2020-10-05 PROCEDURE — 74022 RADEX COMPL AQT ABD SERIES: CPT

## 2020-10-14 ENCOUNTER — APPOINTMENT (OUTPATIENT)
Dept: LAB | Facility: HOSPITAL | Age: 80
End: 2020-10-14
Payer: MEDICARE

## 2020-10-14 ENCOUNTER — TRANSCRIBE ORDERS (OUTPATIENT)
Dept: ADMINISTRATIVE | Facility: HOSPITAL | Age: 80
End: 2020-10-14

## 2020-10-14 DIAGNOSIS — E03.9 MYXEDEMA HEART DISEASE: ICD-10-CM

## 2020-10-14 DIAGNOSIS — I10 ESSENTIAL HYPERTENSION, MALIGNANT: Primary | ICD-10-CM

## 2020-10-14 DIAGNOSIS — I25.10 CORONARY ARTERY DISEASE INVOLVING NATIVE CORONARY ARTERY OF NATIVE HEART WITHOUT ANGINA PECTORIS: ICD-10-CM

## 2020-10-14 DIAGNOSIS — Z79.899 ENCOUNTER FOR LONG-TERM (CURRENT) USE OF OTHER MEDICATIONS: ICD-10-CM

## 2020-10-14 DIAGNOSIS — I51.9 MYXEDEMA HEART DISEASE: ICD-10-CM

## 2020-10-14 DIAGNOSIS — E55.9 AVITAMINOSIS D: ICD-10-CM

## 2020-10-14 DIAGNOSIS — R53.83 FATIGUE, UNSPECIFIED TYPE: ICD-10-CM

## 2020-10-14 DIAGNOSIS — I10 ESSENTIAL HYPERTENSION, MALIGNANT: ICD-10-CM

## 2020-10-14 DIAGNOSIS — E78.5 HYPERLIPIDEMIA, UNSPECIFIED HYPERLIPIDEMIA TYPE: ICD-10-CM

## 2020-10-14 LAB
25(OH)D3 SERPL-MCNC: 53.4 NG/ML (ref 30–100)
ALBUMIN SERPL BCP-MCNC: 4 G/DL (ref 3.5–5)
ALP SERPL-CCNC: 134 U/L (ref 46–116)
ALT SERPL W P-5'-P-CCNC: 30 U/L (ref 12–78)
ANION GAP SERPL CALCULATED.3IONS-SCNC: 6 MMOL/L (ref 4–13)
AST SERPL W P-5'-P-CCNC: 25 U/L (ref 5–45)
BILIRUB SERPL-MCNC: 0.7 MG/DL (ref 0.2–1)
BUN SERPL-MCNC: 20 MG/DL (ref 5–25)
CALCIUM SERPL-MCNC: 9.4 MG/DL (ref 8.3–10.1)
CHLORIDE SERPL-SCNC: 105 MMOL/L (ref 100–108)
CHOLEST SERPL-MCNC: 156 MG/DL (ref 50–200)
CO2 SERPL-SCNC: 28 MMOL/L (ref 21–32)
CREAT SERPL-MCNC: 0.87 MG/DL (ref 0.6–1.3)
ERYTHROCYTE [DISTWIDTH] IN BLOOD BY AUTOMATED COUNT: 13.6 % (ref 11.6–15.1)
EST. AVERAGE GLUCOSE BLD GHB EST-MCNC: 105 MG/DL
GFR SERPL CREATININE-BSD FRML MDRD: 63 ML/MIN/1.73SQ M
GLUCOSE P FAST SERPL-MCNC: 105 MG/DL (ref 65–99)
HBA1C MFR BLD: 5.3 %
HCT VFR BLD AUTO: 40.8 % (ref 34.8–46.1)
HDLC SERPL-MCNC: 70 MG/DL
HGB BLD-MCNC: 13.2 G/DL (ref 11.5–15.4)
LDLC SERPL CALC-MCNC: 56 MG/DL (ref 0–100)
MCH RBC QN AUTO: 32.1 PG (ref 26.8–34.3)
MCHC RBC AUTO-ENTMCNC: 32.4 G/DL (ref 31.4–37.4)
MCV RBC AUTO: 99 FL (ref 82–98)
NONHDLC SERPL-MCNC: 86 MG/DL
PLATELET # BLD AUTO: 141 THOUSANDS/UL (ref 149–390)
PMV BLD AUTO: 9.6 FL (ref 8.9–12.7)
POTASSIUM SERPL-SCNC: 4.3 MMOL/L (ref 3.5–5.3)
PROT SERPL-MCNC: 7.8 G/DL (ref 6.4–8.2)
RBC # BLD AUTO: 4.11 MILLION/UL (ref 3.81–5.12)
SODIUM SERPL-SCNC: 139 MMOL/L (ref 136–145)
T4 FREE SERPL-MCNC: 0.91 NG/DL (ref 0.76–1.46)
TRIGL SERPL-MCNC: 149 MG/DL
TSH SERPL DL<=0.05 MIU/L-ACNC: 2.37 UIU/ML (ref 0.36–3.74)
WBC # BLD AUTO: 7.31 THOUSAND/UL (ref 4.31–10.16)

## 2020-10-14 PROCEDURE — 36415 COLL VENOUS BLD VENIPUNCTURE: CPT

## 2020-10-14 PROCEDURE — 84439 ASSAY OF FREE THYROXINE: CPT

## 2020-10-14 PROCEDURE — 80053 COMPREHEN METABOLIC PANEL: CPT

## 2020-10-14 PROCEDURE — 84443 ASSAY THYROID STIM HORMONE: CPT

## 2020-10-14 PROCEDURE — 80061 LIPID PANEL: CPT

## 2020-10-14 PROCEDURE — 85027 COMPLETE CBC AUTOMATED: CPT

## 2020-10-14 PROCEDURE — 82306 VITAMIN D 25 HYDROXY: CPT

## 2020-10-14 PROCEDURE — 83036 HEMOGLOBIN GLYCOSYLATED A1C: CPT

## 2020-10-15 RX ORDER — POTASSIUM CHLORIDE 20 MEQ/1
20 TABLET, EXTENDED RELEASE ORAL DAILY
Qty: 90 TABLET | Refills: 3 | Status: SHIPPED | OUTPATIENT
Start: 2020-10-15 | End: 2021-10-18

## 2020-10-20 ENCOUNTER — TELEPHONE (OUTPATIENT)
Dept: CARDIOLOGY CLINIC | Facility: HOSPITAL | Age: 80
End: 2020-10-20

## 2020-11-04 ENCOUNTER — HOSPITAL ENCOUNTER (OUTPATIENT)
Dept: NON INVASIVE DIAGNOSTICS | Facility: HOSPITAL | Age: 80
Discharge: HOME | End: 2020-11-04
Attending: INTERNAL MEDICINE
Payer: MEDICARE

## 2020-11-04 DIAGNOSIS — Z86.79 HX OF CAROTID STENOSIS: ICD-10-CM

## 2020-11-04 DIAGNOSIS — H53.133 SUDDEN VISUAL LOSS, BILATERAL: ICD-10-CM

## 2020-11-04 DIAGNOSIS — H53.9 VISUAL DISTURBANCE: ICD-10-CM

## 2020-11-04 PROCEDURE — 93880 EXTRACRANIAL BILAT STUDY: CPT | Performed by: SURGERY

## 2020-11-04 PROCEDURE — 93880 EXTRACRANIAL BILAT STUDY: CPT

## 2020-11-11 ENCOUNTER — OFFICE VISIT (OUTPATIENT)
Dept: CARDIOLOGY CLINIC | Facility: HOSPITAL | Age: 80
End: 2020-11-11
Payer: MEDICARE

## 2020-11-11 VITALS
DIASTOLIC BLOOD PRESSURE: 84 MMHG | HEART RATE: 82 BPM | OXYGEN SATURATION: 95 % | HEIGHT: 65 IN | SYSTOLIC BLOOD PRESSURE: 134 MMHG | BODY MASS INDEX: 30.32 KG/M2 | WEIGHT: 182 LBS

## 2020-11-11 DIAGNOSIS — J44.9 CHRONIC OBSTRUCTIVE PULMONARY DISEASE, UNSPECIFIED COPD TYPE (HCC): Primary | ICD-10-CM

## 2020-11-11 DIAGNOSIS — Z95.1 HX OF CABG: ICD-10-CM

## 2020-11-11 DIAGNOSIS — I10 ESSENTIAL HYPERTENSION: ICD-10-CM

## 2020-11-11 DIAGNOSIS — I25.119 CORONARY ARTERY DISEASE INVOLVING NATIVE CORONARY ARTERY OF NATIVE HEART WITH ANGINA PECTORIS (HCC): ICD-10-CM

## 2020-11-11 DIAGNOSIS — I50.32 CHRONIC DIASTOLIC CONGESTIVE HEART FAILURE (HCC): ICD-10-CM

## 2020-11-11 PROCEDURE — 99214 OFFICE O/P EST MOD 30 MIN: CPT | Performed by: INTERNAL MEDICINE

## 2020-11-28 DIAGNOSIS — I48.3 TYPICAL ATRIAL FLUTTER (HCC): ICD-10-CM

## 2020-11-30 RX ORDER — METOPROLOL TARTRATE 50 MG/1
TABLET, FILM COATED ORAL
Qty: 180 TABLET | Refills: 3 | Status: SHIPPED | OUTPATIENT
Start: 2020-11-30 | End: 2022-03-01

## 2021-01-22 ENCOUNTER — IMMUNIZATIONS (OUTPATIENT)
Dept: FAMILY MEDICINE CLINIC | Facility: HOSPITAL | Age: 81
End: 2021-01-22

## 2021-01-22 DIAGNOSIS — Z23 ENCOUNTER FOR IMMUNIZATION: Primary | ICD-10-CM

## 2021-01-22 PROCEDURE — 0011A SARS-COV-2 / COVID-19 MRNA VACCINE (MODERNA) 100 MCG: CPT

## 2021-01-22 PROCEDURE — 91301 SARS-COV-2 / COVID-19 MRNA VACCINE (MODERNA) 100 MCG: CPT

## 2021-02-23 ENCOUNTER — IMMUNIZATIONS (OUTPATIENT)
Dept: FAMILY MEDICINE CLINIC | Facility: HOSPITAL | Age: 81
End: 2021-02-23

## 2021-02-23 DIAGNOSIS — Z23 ENCOUNTER FOR IMMUNIZATION: Primary | ICD-10-CM

## 2021-02-23 PROCEDURE — 0012A SARS-COV-2 / COVID-19 MRNA VACCINE (MODERNA) 100 MCG: CPT

## 2021-02-23 PROCEDURE — 91301 SARS-COV-2 / COVID-19 MRNA VACCINE (MODERNA) 100 MCG: CPT

## 2021-05-08 ENCOUNTER — HOSPITAL ENCOUNTER (OUTPATIENT)
Dept: RADIOLOGY | Facility: HOSPITAL | Age: 81
Discharge: HOME | End: 2021-05-08
Attending: PODIATRIST
Payer: MEDICARE

## 2021-05-08 ENCOUNTER — TRANSCRIBE ORDERS (OUTPATIENT)
Dept: ADMINISTRATIVE | Facility: HOSPITAL | Age: 81
End: 2021-05-08

## 2021-05-08 DIAGNOSIS — M79.672 PAIN IN BOTH FEET: ICD-10-CM

## 2021-05-08 DIAGNOSIS — M79.672 PAIN IN BOTH FEET: Primary | ICD-10-CM

## 2021-05-08 DIAGNOSIS — M79.671 PAIN IN BOTH FEET: Primary | ICD-10-CM

## 2021-05-08 DIAGNOSIS — M79.671 PAIN IN BOTH FEET: ICD-10-CM

## 2021-05-08 PROCEDURE — 73630 X-RAY EXAM OF FOOT: CPT

## 2021-06-16 ENCOUNTER — APPOINTMENT (OUTPATIENT)
Dept: LAB | Facility: HOSPITAL | Age: 81
End: 2021-06-16
Payer: MEDICARE

## 2021-06-16 DIAGNOSIS — R60.9 EDEMA, UNSPECIFIED TYPE: ICD-10-CM

## 2021-06-16 DIAGNOSIS — I50.9 HEART FAILURE, UNSPECIFIED HF CHRONICITY, UNSPECIFIED HEART FAILURE TYPE (HCC): ICD-10-CM

## 2021-06-16 DIAGNOSIS — R53.83 FATIGUE, UNSPECIFIED TYPE: ICD-10-CM

## 2021-06-16 DIAGNOSIS — E55.9 VITAMIN D DEFICIENCY, UNSPECIFIED: ICD-10-CM

## 2021-06-16 DIAGNOSIS — R73.9 HYPERGLYCEMIA: ICD-10-CM

## 2021-06-16 DIAGNOSIS — I10 HYPERTENSION, UNSPECIFIED TYPE: ICD-10-CM

## 2021-06-16 DIAGNOSIS — E78.5 HYPERLIPIDEMIA, UNSPECIFIED HYPERLIPIDEMIA TYPE: ICD-10-CM

## 2021-06-16 DIAGNOSIS — Z79.01 LONG TERM (CURRENT) USE OF ANTICOAGULANTS: ICD-10-CM

## 2021-06-16 DIAGNOSIS — Z79.899 ENCOUNTER FOR LONG-TERM (CURRENT) USE OF OTHER MEDICATIONS: ICD-10-CM

## 2021-06-16 LAB
25(OH)D3 SERPL-MCNC: 53.2 NG/ML (ref 30–100)
ALBUMIN SERPL BCP-MCNC: 3.8 G/DL (ref 3.5–5)
ALP SERPL-CCNC: 92 U/L (ref 46–116)
ALT SERPL W P-5'-P-CCNC: 21 U/L (ref 12–78)
ANION GAP SERPL CALCULATED.3IONS-SCNC: 7 MMOL/L (ref 4–13)
AST SERPL W P-5'-P-CCNC: 15 U/L (ref 5–45)
BILIRUB SERPL-MCNC: 0.7 MG/DL (ref 0.2–1)
BUN SERPL-MCNC: 19 MG/DL (ref 5–25)
CALCIUM SERPL-MCNC: 9.2 MG/DL (ref 8.3–10.1)
CHLORIDE SERPL-SCNC: 106 MMOL/L (ref 100–108)
CHOLEST SERPL-MCNC: 158 MG/DL (ref 50–200)
CO2 SERPL-SCNC: 30 MMOL/L (ref 21–32)
CREAT SERPL-MCNC: 0.95 MG/DL (ref 0.6–1.3)
ERYTHROCYTE [DISTWIDTH] IN BLOOD BY AUTOMATED COUNT: 13.2 % (ref 11.6–15.1)
EST. AVERAGE GLUCOSE BLD GHB EST-MCNC: 105 MG/DL
GFR SERPL CREATININE-BSD FRML MDRD: 56 ML/MIN/1.73SQ M
GLUCOSE P FAST SERPL-MCNC: 107 MG/DL (ref 65–99)
HBA1C MFR BLD: 5.3 %
HCT VFR BLD AUTO: 39.8 % (ref 34.8–46.1)
HDLC SERPL-MCNC: 68 MG/DL
HGB BLD-MCNC: 12.6 G/DL (ref 11.5–15.4)
LDLC SERPL CALC-MCNC: 58 MG/DL (ref 0–100)
MCH RBC QN AUTO: 31.8 PG (ref 26.8–34.3)
MCHC RBC AUTO-ENTMCNC: 31.7 G/DL (ref 31.4–37.4)
MCV RBC AUTO: 101 FL (ref 82–98)
NONHDLC SERPL-MCNC: 90 MG/DL
NT-PROBNP SERPL-MCNC: 360 PG/ML
PLATELET # BLD AUTO: 152 THOUSANDS/UL (ref 149–390)
PMV BLD AUTO: 9.2 FL (ref 8.9–12.7)
POTASSIUM SERPL-SCNC: 4 MMOL/L (ref 3.5–5.3)
PROT SERPL-MCNC: 7.6 G/DL (ref 6.4–8.2)
RBC # BLD AUTO: 3.96 MILLION/UL (ref 3.81–5.12)
SODIUM SERPL-SCNC: 143 MMOL/L (ref 136–145)
T4 FREE SERPL-MCNC: 0.93 NG/DL (ref 0.76–1.46)
TRIGL SERPL-MCNC: 160 MG/DL
TSH SERPL DL<=0.05 MIU/L-ACNC: 1.98 UIU/ML (ref 0.36–3.74)
WBC # BLD AUTO: 7.04 THOUSAND/UL (ref 4.31–10.16)

## 2021-06-16 PROCEDURE — 36415 COLL VENOUS BLD VENIPUNCTURE: CPT

## 2021-06-16 PROCEDURE — 83880 ASSAY OF NATRIURETIC PEPTIDE: CPT

## 2021-06-16 PROCEDURE — 85027 COMPLETE CBC AUTOMATED: CPT

## 2021-06-16 PROCEDURE — 80053 COMPREHEN METABOLIC PANEL: CPT

## 2021-06-16 PROCEDURE — 83036 HEMOGLOBIN GLYCOSYLATED A1C: CPT

## 2021-06-16 PROCEDURE — 82306 VITAMIN D 25 HYDROXY: CPT

## 2021-06-16 PROCEDURE — 84439 ASSAY OF FREE THYROXINE: CPT

## 2021-06-16 PROCEDURE — 84443 ASSAY THYROID STIM HORMONE: CPT

## 2021-06-16 PROCEDURE — 80061 LIPID PANEL: CPT

## 2021-07-02 ENCOUNTER — OFFICE VISIT (OUTPATIENT)
Dept: CARDIOLOGY CLINIC | Facility: HOSPITAL | Age: 81
End: 2021-07-02
Payer: MEDICARE

## 2021-07-02 VITALS
SYSTOLIC BLOOD PRESSURE: 132 MMHG | WEIGHT: 178 LBS | HEART RATE: 73 BPM | DIASTOLIC BLOOD PRESSURE: 84 MMHG | BODY MASS INDEX: 29.66 KG/M2 | HEIGHT: 65 IN

## 2021-07-02 DIAGNOSIS — I50.32 CHRONIC DIASTOLIC CONGESTIVE HEART FAILURE (HCC): ICD-10-CM

## 2021-07-02 DIAGNOSIS — E78.00 HYPERCHOLESTEROLEMIA: ICD-10-CM

## 2021-07-02 DIAGNOSIS — J44.9 CHRONIC OBSTRUCTIVE PULMONARY DISEASE, UNSPECIFIED COPD TYPE (HCC): ICD-10-CM

## 2021-07-02 DIAGNOSIS — I25.119 CORONARY ARTERY DISEASE INVOLVING NATIVE CORONARY ARTERY OF NATIVE HEART WITH ANGINA PECTORIS (HCC): Primary | ICD-10-CM

## 2021-07-02 DIAGNOSIS — Z95.1 HX OF CABG: ICD-10-CM

## 2021-07-02 DIAGNOSIS — I48.3 TYPICAL ATRIAL FLUTTER (HCC): ICD-10-CM

## 2021-07-02 PROCEDURE — 99214 OFFICE O/P EST MOD 30 MIN: CPT | Performed by: INTERNAL MEDICINE

## 2021-07-14 PROCEDURE — 93000 ELECTROCARDIOGRAM COMPLETE: CPT | Performed by: INTERNAL MEDICINE

## 2021-07-14 NOTE — PROGRESS NOTES
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 pectoris (HCC)  POCT ECG   2  Chronic diastolic congestive heart failure (HCC)  POCT ECG   3  Hx of CABG  POCT ECG   4  Hypercholesterolemia     5  Chronic obstructive pulmonary disease, unspecified COPD type ([address] )     6  Typical atrial flutter (HCC)           Discussion/Summary: All of her assessed cardiac problems are stable  I have reviewed her medications and made no changes  No cardiac testing is ordered  RTO 9 months  Interval History: She has not had any cardiac problems since her last office visit  Her weight is down from 182 to 178 lbs  She tries to stay active  She denies CP or significant SOB  She does have COPD  She has CAD with prior CABG  Nuclear stress test 12/2019 - no ischemia  /84  LDL 58    ECG today - NSR  HR 73  IRBBB      Patient Active Problem List   Diagnosis    Diverticulosis    Hypoalbuminemia    Essential hypertension    Hypercholesterolemia    Elevated MCV    Typical atrial flutter (HCC)    Back pain    History of shingles    Incomplete right bundle branch block    Hx of CABG    Thoracic radiculopathy    Pyuria    Microscopic hematuria    Shortness of breath    Diverticulosis of large intestine with hemorrhage    Nausea, vomiting and diarrhea    Colonic mass    Tubulovillous adenoma of colon    Anemia    Hypokalemia    Hypoxia    Pleural effusion    Acute cystitis without hematuria    History of Clostridioides difficile colitis    On continuous oral anticoagulation    Encounter for pre-operative cardiovascular clearance    Chronic obstructive lung disease (HCC)    Chronic diastolic congestive heart failure (HCC)    Mixed anxiety and depressive disorder    Secondary pulmonary hypertension    Diverticulitis    Diarrhea of presumed infectious origin    C  difficile colitis    Coronary artery disease involving native coronary artery of native heart with angina pectoris ([facility] )    Pneumonia due to COVID-19 virus    Acute respiratory failure with hypoxia ([facility] )     Past Medical History:   Diagnosis Date    Angina pectoris ([facility] )     Anxiety     C  difficile diarrhea     CAD (coronary artery disease)     Cardiac disease     Colon polyp     COVID-19 04/2020    Depression     Diverticulitis of colon 11/2019    GERD (gastroesophageal reflux disease)     History of colon polyps     History of hiatal hernia     Hx of bleeding disorder     hemorrhoids    Hyperlipidemia     Hypertension     Irregular heart beat     gets tachycardia    Shortness of breath     related to heart     Social History     Socioeconomic History    Marital status:      Spouse name: Not on file    Number of children: Not on file    Years of education: Not on file    Highest education level: Not on file   Occupational History    Not on file   Tobacco Use    Smoking status: Former Smoker     Packs/day: 0 50     Years: 30 00     Pack years: 15 00    Smokeless tobacco: Never Used    Tobacco comment: quit 15 yrs ago   Vaping Use    Vaping Use: Never used   Substance and Sexual Activity    Alcohol use: Never     Alcohol/week: 0 0 standard drinks    Drug use: No    Sexual activity: Not on file   Other Topics Concern    Not on file   Social History Narrative    Not on file     Social Determinants of Health     Financial Resource Strain:     Difficulty of Paying Living Expenses:    Food Insecurity:     Worried About [address] in the Last Year:     [address]  in the Last Year:    Transportation Needs:     Lack of Transportation (Medical):      Lack of Transportation (Non-Medical):    Physical Activity:     Days of Exercise per Week:     Minutes of Exercise per Session:    Stress:     Feeling of Stress :    Social Connections:     Frequency of Communication with Friends and Family:     Frequency of Social Gatherings with Friends and Family:     Attends [employer] Services:     Active Member of Clubs or Organizations:     Attends Club or Organization Meetings:     Marital Status:    Intimate Partner Violence:     Fear of Current or Ex-Partner:     Emotionally Abused:     Physically Abused:     Sexually Abused:       Family History   Problem Relation Age of Onset    Heart disease Mother     Cirrhosis Father     Cancer Father     Lung cancer Brother      Past Surgical History:   Procedure Laterality Date    CARDIAC ELECTROPHYSIOLOGY MAPPING AND ABLATION      CARDIAC SURGERY      CARDIAC SURGERY      CATARACT EXTRACTION Bilateral     CHOLECYSTECTOMY  1987    COLON SURGERY      repair of colon    COLON SURGERY  2018    COLONOSCOPY  2009    Tubulovillous adenoma    COLONOSCOPY N/A 6/18/2018    Procedure: COLONOSCOPY;  Surgeon: Nolan Thakur DO;  Location: [facility];   Service: Gastroenterology    COLONOSCOPY  2011    2 cm right colon polyp and 1 5 cm mid right colon polyps tubular adenomas    COLONOSCOPY W/ CONTROL OF HEMORRHAGE      CORONARY ANGIOPLASTY WITH STENT PLACEMENT      reports two blockages not able to be stented    CORONARY ARTERY BYPASS GRAFT  2008    3 vessels    HERNIA REPAIR      hiatal hernia    HYSTERECTOMY      partial not due to malignancy    LAPAROTOMY N/A 9/26/2018    Procedure: LAPAROTOMY EXPLORATORY WITH  RIGHT HEMICOLECTOMY;  Surgeon: Nolan Thakur DO;  Location: [facility];  Service: Gastroenterology    LAPAROTOMY N/A 9/26/2018    Procedure: LAPAROTOMY EXPLORATORY WITH HEMICOLECTOMY;  Surgeon: Skyla Ricketts DO;  Location: [facility];  Service: General    HI COLONOSCOPY FLX DX W/[facility] WHEN PFRMD N/A 9/26/2018    Procedure: COLONOSCOPY;  Surgeon: Nolan Thakur DO; Location: [facility];  Service: Gastroenterology    CT HEMORRHOIDECTOMY,INT/EXT,1 COLUMN/GROUP N/A 7/25/2019    Procedure: HEMORRHOIDECTOMY EXCISION;  Surgeon: Sarah Dennis MD;  Location: [facility] MA[facility] OR;  Service: General    CT SIGMOIDOSCOPY FLX DX W/COLLJ SPEC BR/WA IF PFRMD N/A 6/21/2018    Procedure: Nonnie Clunes FLEXIBLE;  Surgeon: Qasim Garland DO;  [facility]; Service: Gastroenterology    CT SURG DIAGNOSTIC EXAM, ANORECTAL N/A 7/25/2019    Procedure: EXAM UNDER ANESTHESIA (EUA); Surgeon: Sarah Dennis MD;  Location: [facility] MAIN OR;  Service: General       Current Outpatient Medications:     acetaminophen (TYLENOL) 650 mg CR tablet, Take 1 tablet (650 mg total) by mouth every 8 (eight) hours as needed for mild pain or moderate pain, Disp: 15 tablet, Rfl: 0    ALPRAZolam (XANAX) 0 25 mg tablet, Take 0 25 mg by mouth 3 (three) times a day as needed for anxiety  , Disp: , Rfl:     apixaban (ELIQUIS) 5 mg, Take 1 tablet (5 mg total) by mouth 2 (two) times a day, Disp: 60 tablet, Rfl: 3    atorvastatin (LIPITOR) 20 mg tablet, Take 20 mg by mouth daily after dinner   , Disp: , Rfl:     calcium carbonate (OS-FERN) 600 MG tablet, Take 600 mg by mouth 2 (two) times a day with meals, Disp: , Rfl:     ergocalciferol (ERGOCALCIFEROL) 18052 UNITS capsule, Take 50,000 Units by mouth once a week  Takes on Wednesdays, Disp: , Rfl:     esomeprazole (NexIUM) 40 MG capsule, Take 20 mg by mouth Daily , Disp: , Rfl:     furosemide (LASIX) 20 mg tablet, Take 1 tablet (20 mg total) by mouth 2 (two) times a day Hold until diarrhea resolved (Patient taking differently: Take 40 mg by mouth daily Hold until diarrhea resolved), Disp: , Rfl: 0    metoprolol tartrate (LOPRESSOR) 50 mg tablet, take 1 tablet by mouth every 12 hours, Disp: 180 tablet, Rfl: 3    Misc   Devices (SPRAY BOTTLE/PLASTIC 120ML) MISC, by Does not apply route 3 (three) times a day Use to cleanse area 3 times daily or as needed with bowel movements  , Disp: 1 each, Rfl: 0    Multiple Vitamins-Minerals (PRESERVISION AREDS) CAPS, Take 1 capsule by mouth daily  , Disp: , Rfl:     nitroglycerin (NITROSTAT) 0 4 mg SL tablet, Place 1 tablet (0 4 mg total) under the tongue every 5 (five) minutes as needed for chest pain (times three for chest pain  If no relief after second tablet, call 911 ), Disp: 25 tablet, Rfl: 2    Omega-3 Fatty Acids (FISH OIL PO), Take 1,000 mg by mouth daily  , Disp: , Rfl:     potassium chloride (K-DUR,KLOR-CON) 20 mEq tablet, Take 1 tablet (20 mEq total) by mouth daily, Disp: 90 tablet, Rfl: 3    metolazone (ZAROXOLYN) 2 5 mg tablet, TAKE ONE TABLET BY MOUTH ONCE A WEEK OR  AS  DIRECTED  FOR  WEIGHT  GAIN (Patient not taking: Reported on 11/11/2020), Disp: 20 tablet, Rfl: 5  Allergies   Allergen Reactions    Codeine GI Intolerance    Lansoprazole Other (See Comments)     GI Upset, dania protonix    Morphine Nausea Only    Morphine And Related GI Intolerance     Vitals:    07/02/21 1520   BP: 132/84   Pulse: 73   Weight: 80 7 kg (178 lb)   Height: 5' 5" (1 651 m)     Weight (last 2 days)     None         Blood pressure 132/84, pulse 73, height 5' 5" (1 651 m), weight 80 7 kg (178 lb), not currently breastfeeding , Body mass index is 29 62 kg/m²      Labs:  Appointment on 06/16/2021   Component Date Value    Sodium 06/16/2021 143     Potassium 06/16/2021 4 0     Chloride 06/16/2021 106     CO2 06/16/2021 30     ANION GAP 06/16/2021 7     BUN 06/16/2021 19     Creatinine 06/16/2021 0 95     Glucose, Fasting 06/16/2021 107*    Calcium 06/16/2021 9 2     AST 06/16/2021 15     ALT 06/16/2021 21     Alkaline Phosphatase 06/16/2021 92     Total Protein 06/16/2021 7 6     Albumin 06/16/2021 3 8     Total Bilirubin 06/16/2021 0 70     eGFR 06/16/2021 56     Cholesterol 06/16/2021 158     Triglycerides 06/16/2021 160*    HDL, Direct 06/16/2021 68     LDL Calculated 06/16/2021 58     Non-HDL-Chol (CHOL-HDL) 06/16/2021 90     TSH 3RD GENERATON 06/16/2021 1 979     Free T4 06/16/2021 0 93     NT-proBNP 06/16/2021 360     Hemoglobin A1C 06/16/2021 5 3     EAG 06/16/2021 105     Vit D, 25-Hydroxy 06/16/2021 53 2     WBC 06/16/2021 7 04     RBC 06/16/2021 3 96     Hemoglobin 06/16/2021 12 6     Hematocrit 06/16/2021 39 8     MCV 06/16/2021 101*    MCH 06/16/2021 31 8     MCHC 06/16/2021 31 7     RDW 06/16/2021 13 2     Platelets 37/42/5557 152     MPV 06/16/2021 9 2      Imaging: No results found  Review of Systems:  Review of Systems   Constitutional: Negative for diaphoresis, fatigue, fever and unexpected weight change  HENT: Negative  Respiratory: Negative for cough, shortness of breath and wheezing  Cardiovascular: Negative for chest pain, palpitations and leg swelling  Gastrointestinal: Negative for abdominal pain, diarrhea and nausea  Musculoskeletal: Negative for gait problem and myalgias  Skin: Negative for rash  Neurological: Negative for dizziness and numbness  Psychiatric/Behavioral: Negative  Physical Exam:  Physical Exam  Constitutional:       Appearance: She is well-developed  HENT:      Head: Normocephalic and atraumatic  Eyes:      Pupils: Pupils are equal, round, and reactive to light  Neck:      Vascular: No JVD  Cardiovascular:      Rate and Rhythm: Regular rhythm  Pulses: Normal pulses  Carotid pulses are 2+ on the right side and 2+ on the left side  Heart sounds: S1 normal and S2 normal    Pulmonary:      Effort: Pulmonary effort is normal       Breath sounds: Normal breath sounds  No wheezing or rales  Abdominal:      General: Bowel sounds are normal       Palpations: Abdomen is soft  Tenderness: There is no abdominal tenderness  Musculoskeletal:         General: No tenderness  Normal range of motion  Cervical back: Normal range of motion and neck supple  Skin:     General: Skin is warm     Neurological:      Mental Status: She is alert and oriented to person, place, and time  Cranial Nerves: No cranial nerve deficit  Deep Tendon Reflexes: Reflexes are normal and symmetric

## 2021-09-23 ENCOUNTER — HOSPITAL ENCOUNTER (OUTPATIENT)
Dept: ULTRASOUND IMAGING | Facility: HOSPITAL | Age: 81
Discharge: HOME | End: 2021-09-23
Payer: MEDICARE

## 2021-09-23 ENCOUNTER — HOSPITAL ENCOUNTER (OUTPATIENT)
Dept: RADIOLOGY | Facility: HOSPITAL | Age: 81
Discharge: HOME | End: 2021-09-23
Payer: MEDICARE

## 2021-09-23 ENCOUNTER — HOSPITAL ENCOUNTER (OUTPATIENT)
Dept: MAMMOGRAPHY | Facility: HOSPITAL | Age: 81
Discharge: HOME | End: 2021-09-23
Payer: MEDICARE

## 2021-09-23 VITALS — WEIGHT: 178 LBS | BODY MASS INDEX: 29.66 KG/M2 | HEIGHT: 65 IN

## 2021-09-23 DIAGNOSIS — M54.2 NECK PAIN: ICD-10-CM

## 2021-09-23 DIAGNOSIS — Z12.39 ENCOUNTER FOR SCREENING FOR MALIGNANT NEOPLASM OF BREAST, UNSPECIFIED SCREENING MODALITY: ICD-10-CM

## 2021-09-23 DIAGNOSIS — N63.20 LUMP OF LEFT BREAST: ICD-10-CM

## 2021-09-23 DIAGNOSIS — Z12.31 ENCOUNTER FOR SCREENING MAMMOGRAM FOR MALIGNANT NEOPLASM OF BREAST: ICD-10-CM

## 2021-09-23 PROCEDURE — 77066 DX MAMMO INCL CAD BI: CPT

## 2021-09-23 PROCEDURE — G0279 TOMOSYNTHESIS, MAMMO: HCPCS

## 2021-09-23 PROCEDURE — 76642 ULTRASOUND BREAST LIMITED: CPT

## 2021-09-23 PROCEDURE — 72050 X-RAY EXAM NECK SPINE 4/5VWS: CPT

## 2021-10-18 DIAGNOSIS — I25.10 CORONARY ARTERY DISEASE INVOLVING NATIVE CORONARY ARTERY OF NATIVE HEART WITHOUT ANGINA PECTORIS: ICD-10-CM

## 2021-10-18 RX ORDER — POTASSIUM CHLORIDE 20 MEQ/1
TABLET, EXTENDED RELEASE ORAL
Qty: 90 TABLET | Refills: 3 | Status: SHIPPED | OUTPATIENT
Start: 2021-10-18

## 2021-10-30 ENCOUNTER — APPOINTMENT (OUTPATIENT)
Dept: LAB | Facility: HOSPITAL | Age: 81
End: 2021-10-30
Attending: INTERNAL MEDICINE
Payer: MEDICARE

## 2021-10-30 DIAGNOSIS — D64.9 ANEMIA, UNSPECIFIED TYPE: ICD-10-CM

## 2021-10-30 DIAGNOSIS — R71.8 ELEVATED MCV: ICD-10-CM

## 2021-10-30 DIAGNOSIS — I10 HYPERTENSION, UNSPECIFIED TYPE: ICD-10-CM

## 2021-10-30 DIAGNOSIS — E55.9 VITAMIN D DEFICIENCY, UNSPECIFIED: ICD-10-CM

## 2021-10-30 LAB
25(OH)D3 SERPL-MCNC: 58.6 NG/ML (ref 30–100)
ALBUMIN SERPL BCP-MCNC: 3.8 G/DL (ref 3.5–5)
ALP SERPL-CCNC: 125 U/L (ref 46–116)
ALT SERPL W P-5'-P-CCNC: 20 U/L (ref 12–78)
ANION GAP SERPL CALCULATED.3IONS-SCNC: 9 MMOL/L (ref 4–13)
AST SERPL W P-5'-P-CCNC: 17 U/L (ref 5–45)
BILIRUB SERPL-MCNC: 0.56 MG/DL (ref 0.2–1)
BUN SERPL-MCNC: 18 MG/DL (ref 5–25)
CALCIUM SERPL-MCNC: 9.4 MG/DL (ref 8.3–10.1)
CHLORIDE SERPL-SCNC: 102 MMOL/L (ref 100–108)
CO2 SERPL-SCNC: 28 MMOL/L (ref 21–32)
CREAT SERPL-MCNC: 0.96 MG/DL (ref 0.6–1.3)
ERYTHROCYTE [DISTWIDTH] IN BLOOD BY AUTOMATED COUNT: 13.5 % (ref 11.6–15.1)
FERRITIN SERPL-MCNC: 55 NG/ML (ref 8–388)
FOLATE SERPL-MCNC: >20 NG/ML (ref 3.1–17.5)
GFR SERPL CREATININE-BSD FRML MDRD: 55 ML/MIN/1.73SQ M
GLUCOSE SERPL-MCNC: 107 MG/DL (ref 65–140)
HCT VFR BLD AUTO: 37.4 % (ref 34.8–46.1)
HGB BLD-MCNC: 11.8 G/DL (ref 11.5–15.4)
IRON SERPL-MCNC: 60 UG/DL (ref 50–170)
MCH RBC QN AUTO: 31 PG (ref 26.8–34.3)
MCHC RBC AUTO-ENTMCNC: 31.6 G/DL (ref 31.4–37.4)
MCV RBC AUTO: 98 FL (ref 82–98)
PLATELET # BLD AUTO: 148 THOUSANDS/UL (ref 149–390)
PMV BLD AUTO: 9.4 FL (ref 8.9–12.7)
POTASSIUM SERPL-SCNC: 4 MMOL/L (ref 3.5–5.3)
PROT SERPL-MCNC: 7.5 G/DL (ref 6.4–8.2)
RBC # BLD AUTO: 3.81 MILLION/UL (ref 3.81–5.12)
RETICS # AUTO: NORMAL 10*3/UL (ref 14097–95744)
RETICS # CALC: 1.77 % (ref 0.37–1.87)
SODIUM SERPL-SCNC: 139 MMOL/L (ref 136–145)
TIBC SERPL-MCNC: 319 UG/DL (ref 250–450)
VIT B12 SERPL-MCNC: 1815 PG/ML (ref 100–900)
WBC # BLD AUTO: 7.12 THOUSAND/UL (ref 4.31–10.16)

## 2021-10-30 PROCEDURE — 85027 COMPLETE CBC AUTOMATED: CPT

## 2021-10-30 PROCEDURE — 82306 VITAMIN D 25 HYDROXY: CPT

## 2021-10-30 PROCEDURE — 80053 COMPREHEN METABOLIC PANEL: CPT

## 2021-10-30 PROCEDURE — 82747 ASSAY OF FOLIC ACID RBC: CPT

## 2021-10-30 PROCEDURE — 83540 ASSAY OF IRON: CPT

## 2021-10-30 PROCEDURE — 82746 ASSAY OF FOLIC ACID SERUM: CPT

## 2021-10-30 PROCEDURE — 82728 ASSAY OF FERRITIN: CPT

## 2021-10-30 PROCEDURE — 83550 IRON BINDING TEST: CPT

## 2021-10-30 PROCEDURE — 82607 VITAMIN B-12: CPT

## 2021-10-30 PROCEDURE — 85045 AUTOMATED RETICULOCYTE COUNT: CPT

## 2021-10-30 PROCEDURE — 36415 COLL VENOUS BLD VENIPUNCTURE: CPT

## 2021-10-31 LAB
FOLATE BLD-MCNC: >620 NG/ML
FOLATE RBC-MCNC: >1658 NG/ML
HCT VFR BLD AUTO: 37.4 % (ref 34–46.6)

## 2021-12-30 ENCOUNTER — OFFICE VISIT (OUTPATIENT)
Dept: CARDIOLOGY CLINIC | Facility: HOSPITAL | Age: 81
End: 2021-12-30
Payer: MEDICARE

## 2021-12-30 VITALS
HEIGHT: 65 IN | DIASTOLIC BLOOD PRESSURE: 80 MMHG | WEIGHT: 176 LBS | BODY MASS INDEX: 29.32 KG/M2 | SYSTOLIC BLOOD PRESSURE: 134 MMHG | HEART RATE: 81 BPM

## 2021-12-30 DIAGNOSIS — I50.32 CHRONIC DIASTOLIC CONGESTIVE HEART FAILURE (HCC): ICD-10-CM

## 2021-12-30 DIAGNOSIS — I45.10 INCOMPLETE RIGHT BUNDLE BRANCH BLOCK: ICD-10-CM

## 2021-12-30 DIAGNOSIS — Z95.1 S/P CABG (CORONARY ARTERY BYPASS GRAFT): ICD-10-CM

## 2021-12-30 DIAGNOSIS — I34.0 NONRHEUMATIC MITRAL VALVE REGURGITATION: ICD-10-CM

## 2021-12-30 DIAGNOSIS — I25.119 CORONARY ARTERY DISEASE INVOLVING NATIVE CORONARY ARTERY OF NATIVE HEART WITH ANGINA PECTORIS (HCC): ICD-10-CM

## 2021-12-30 DIAGNOSIS — I48.3 TYPICAL ATRIAL FLUTTER (HCC): Primary | ICD-10-CM

## 2021-12-30 DIAGNOSIS — E78.5 DYSLIPIDEMIA: ICD-10-CM

## 2021-12-30 DIAGNOSIS — R06.00 DYSPNEA ON EXERTION: ICD-10-CM

## 2021-12-30 DIAGNOSIS — I10 ESSENTIAL HYPERTENSION: ICD-10-CM

## 2021-12-30 PROCEDURE — 99214 OFFICE O/P EST MOD 30 MIN: CPT | Performed by: PHYSICIAN ASSISTANT

## 2021-12-30 PROCEDURE — 93000 ELECTROCARDIOGRAM COMPLETE: CPT | Performed by: PHYSICIAN ASSISTANT

## 2022-01-20 ENCOUNTER — HOSPITAL ENCOUNTER (OUTPATIENT)
Dept: NUCLEAR MEDICINE | Facility: HOSPITAL | Age: 82
Discharge: HOME | End: 2022-01-20
Payer: MEDICARE

## 2022-01-20 ENCOUNTER — HOSPITAL ENCOUNTER (OUTPATIENT)
Dept: NON INVASIVE DIAGNOSTICS | Facility: HOSPITAL | Age: 82
Discharge: HOME | End: 2022-01-20
Payer: MEDICARE

## 2022-01-20 VITALS
HEART RATE: 80 BPM | DIASTOLIC BLOOD PRESSURE: 80 MMHG | WEIGHT: 176 LBS | SYSTOLIC BLOOD PRESSURE: 130 MMHG | BODY MASS INDEX: 29.32 KG/M2 | HEIGHT: 65 IN

## 2022-01-20 DIAGNOSIS — I25.119 CORONARY ARTERY DISEASE INVOLVING NATIVE CORONARY ARTERY OF NATIVE HEART WITH ANGINA PECTORIS (HCC): ICD-10-CM

## 2022-01-20 DIAGNOSIS — R06.00 DYSPNEA ON EXERTION: ICD-10-CM

## 2022-01-20 DIAGNOSIS — I34.0 NONRHEUMATIC MITRAL VALVE REGURGITATION: ICD-10-CM

## 2022-01-20 DIAGNOSIS — I50.32 CHRONIC DIASTOLIC CONGESTIVE HEART FAILURE (HCC): ICD-10-CM

## 2022-01-20 LAB
AORTIC ROOT: 2.6 CM
BASELINE ST DEPRESSION: 0 MM
E WAVE DECELERATION TIME: 212 MS
FRACTIONAL SHORTENING: 30 % (ref 28–44)
INTERVENTRICULAR SEPTUM IN DIASTOLE (PARASTERNAL SHORT AXIS VIEW): 1.4 CM
LEFT ATRIUM AREA SYSTOLE SINGLE PLANE A4C: 20 CM2
LEFT ATRIUM SIZE: 4.2 CM
LEFT INTERNAL DIMENSION IN SYSTOLE: 2.6 CM (ref 2.1–4)
LEFT VENTRICULAR INTERNAL DIMENSION IN DIASTOLE: 3.7 CM (ref 4.52–6.73)
LEFT VENTRICULAR POSTERIOR WALL IN END DIASTOLE: 1.2 CM
LEFT VENTRICULAR STROKE VOLUME: 34 ML
MV E'TISSUE VEL-SEP: 7 CM/S
MV PEAK A VEL: 1.14 M/S
MV PEAK E VEL: 73 CM/S
MV STENOSIS PRESSURE HALF TIME: 0 MS
NUC STRESS EJECTION FRACTION: 67 %
RA PRESSURE ESTIMATED: 5 MMHG
RIGHT ATRIUM AREA SYSTOLE A4C: 22.6 CM2
RIGHT VENTRICLE ID DIMENSION: 2.7 CM
RV PSP: 52 MMHG
SL CV LV EF: 60
SL CV PED ECHO LEFT VENTRICLE DIASTOLIC VOLUME (MOD BIPLANE) 2D: 59 ML
SL CV PED ECHO LEFT VENTRICLE SYSTOLIC VOLUME (MOD BIPLANE) 2D: 24 ML
SL CV REST NUCLEAR ISOTOPE DOSE: 11 MCI
SL CV STRESS NUCLEAR ISOTOPE DOSE: 32.8 MCI
STRESS ANGINA INDEX: 0
STRESS ST DEPRESSION: 0 MM
STRESS/REST PERFUSION RATIO: 1.06
TR MAX PG: 47 MMHG
TRICUSPID ANNULAR PLANE SYSTOLIC EXCURSION: 2 CM
TRICUSPID VALVE PEAK REGURGITATION VELOCITY: 3.44 M/S
Z-SCORE OF LEFT VENTRICULAR DIMENSION IN END SYSTOLE: -3.98

## 2022-01-20 PROCEDURE — 93018 CV STRESS TEST I&R ONLY: CPT | Performed by: INTERNAL MEDICINE

## 2022-01-20 PROCEDURE — 93016 CV STRESS TEST SUPVJ ONLY: CPT | Performed by: INTERNAL MEDICINE

## 2022-01-20 PROCEDURE — 78452 HT MUSCLE IMAGE SPECT MULT: CPT | Performed by: INTERNAL MEDICINE

## 2022-01-20 PROCEDURE — 93306 TTE W/DOPPLER COMPLETE: CPT

## 2022-01-20 PROCEDURE — A9502 TC99M TETROFOSMIN: HCPCS

## 2022-01-20 PROCEDURE — 93306 TTE W/DOPPLER COMPLETE: CPT | Performed by: INTERNAL MEDICINE

## 2022-01-20 PROCEDURE — 93017 CV STRESS TEST TRACING ONLY: CPT

## 2022-01-20 PROCEDURE — 78452 HT MUSCLE IMAGE SPECT MULT: CPT

## 2022-01-20 RX ADMIN — REGADENOSON 0.4 MG: 0.08 INJECTION, SOLUTION INTRAVENOUS at 09:46

## 2022-02-25 DIAGNOSIS — I48.3 TYPICAL ATRIAL FLUTTER (HCC): ICD-10-CM

## 2022-03-01 RX ORDER — METOPROLOL TARTRATE 50 MG/1
TABLET, FILM COATED ORAL
Qty: 180 TABLET | Refills: 3 | Status: SHIPPED | OUTPATIENT
Start: 2022-03-01

## 2022-03-09 ENCOUNTER — APPOINTMENT (OUTPATIENT)
Dept: LAB | Facility: HOSPITAL | Age: 82
End: 2022-03-09
Attending: INTERNAL MEDICINE
Payer: MEDICARE

## 2022-03-09 ENCOUNTER — CONSULT (OUTPATIENT)
Dept: PULMONOLOGY | Facility: CLINIC | Age: 82
End: 2022-03-09
Payer: MEDICARE

## 2022-03-09 ENCOUNTER — HOSPITAL ENCOUNTER (OUTPATIENT)
Dept: RADIOLOGY | Facility: HOSPITAL | Age: 82
Discharge: HOME | End: 2022-03-09
Attending: INTERNAL MEDICINE
Payer: MEDICARE

## 2022-03-09 VITALS
BODY MASS INDEX: 29.56 KG/M2 | HEIGHT: 65 IN | TEMPERATURE: 97.3 F | HEART RATE: 98 BPM | OXYGEN SATURATION: 95 % | DIASTOLIC BLOOD PRESSURE: 98 MMHG | WEIGHT: 177.4 LBS | SYSTOLIC BLOOD PRESSURE: 173 MMHG

## 2022-03-09 DIAGNOSIS — R60.9 EDEMA, UNSPECIFIED TYPE: ICD-10-CM

## 2022-03-09 DIAGNOSIS — I50.9 CONGESTIVE HEART FAILURE, UNSPECIFIED HF CHRONICITY, UNSPECIFIED HEART FAILURE TYPE (HCC): ICD-10-CM

## 2022-03-09 DIAGNOSIS — J12.82 PNEUMONIA DUE TO COVID-19 VIRUS: ICD-10-CM

## 2022-03-09 DIAGNOSIS — J43.2 CENTRILOBULAR EMPHYSEMA (HCC): ICD-10-CM

## 2022-03-09 DIAGNOSIS — E55.9 VITAMIN D DEFICIENCY DISEASE: ICD-10-CM

## 2022-03-09 DIAGNOSIS — R06.02 SHORTNESS OF BREATH: ICD-10-CM

## 2022-03-09 DIAGNOSIS — D64.9 ANEMIA, UNSPECIFIED TYPE: ICD-10-CM

## 2022-03-09 DIAGNOSIS — R06.02 SHORTNESS OF BREATH: Primary | ICD-10-CM

## 2022-03-09 DIAGNOSIS — R94.2 ABNORMAL PFT: ICD-10-CM

## 2022-03-09 DIAGNOSIS — I25.119 CORONARY ARTERY DISEASE INVOLVING NATIVE CORONARY ARTERY OF NATIVE HEART WITH ANGINA PECTORIS (HCC): ICD-10-CM

## 2022-03-09 DIAGNOSIS — I10 ESSENTIAL HYPERTENSION: ICD-10-CM

## 2022-03-09 DIAGNOSIS — E78.5 HYPERLIPIDEMIA, UNSPECIFIED HYPERLIPIDEMIA TYPE: ICD-10-CM

## 2022-03-09 DIAGNOSIS — U07.1 PNEUMONIA DUE TO COVID-19 VIRUS: ICD-10-CM

## 2022-03-09 DIAGNOSIS — I10 ESSENTIAL HYPERTENSION, MALIGNANT: ICD-10-CM

## 2022-03-09 DIAGNOSIS — E11.9 DIABETES MELLITUS WITHOUT COMPLICATION (HCC): ICD-10-CM

## 2022-03-09 DIAGNOSIS — E03.9 PRIMARY HYPOTHYROIDISM: ICD-10-CM

## 2022-03-09 PROBLEM — J43.9 EMPHYSEMA OF LUNG (HCC): Status: ACTIVE | Noted: 2019-04-02

## 2022-03-09 LAB
25(OH)D3 SERPL-MCNC: 55.2 NG/ML (ref 30–100)
ALBUMIN SERPL BCP-MCNC: 3.7 G/DL (ref 3.5–5)
ALP SERPL-CCNC: 122 U/L (ref 46–116)
ALT SERPL W P-5'-P-CCNC: 17 U/L (ref 12–78)
ANION GAP SERPL CALCULATED.3IONS-SCNC: 9 MMOL/L (ref 4–13)
AST SERPL W P-5'-P-CCNC: 15 U/L (ref 5–45)
BILIRUB SERPL-MCNC: 0.61 MG/DL (ref 0.2–1)
BUN SERPL-MCNC: 16 MG/DL (ref 5–25)
CALCIUM SERPL-MCNC: 9 MG/DL (ref 8.3–10.1)
CHLORIDE SERPL-SCNC: 103 MMOL/L (ref 100–108)
CHOLEST SERPL-MCNC: 147 MG/DL
CO2 SERPL-SCNC: 28 MMOL/L (ref 21–32)
CREAT SERPL-MCNC: 0.93 MG/DL (ref 0.6–1.3)
ERYTHROCYTE [DISTWIDTH] IN BLOOD BY AUTOMATED COUNT: 13.2 % (ref 11.6–15.1)
EST. AVERAGE GLUCOSE BLD GHB EST-MCNC: 108 MG/DL
FERRITIN SERPL-MCNC: 39 NG/ML (ref 8–388)
FOLATE SERPL-MCNC: >20 NG/ML (ref 3.1–17.5)
GFR SERPL CREATININE-BSD FRML MDRD: 57 ML/MIN/1.73SQ M
GLUCOSE P FAST SERPL-MCNC: 106 MG/DL (ref 65–99)
HBA1C MFR BLD: 5.4 %
HCT VFR BLD AUTO: 38 % (ref 34.8–46.1)
HDLC SERPL-MCNC: 66 MG/DL
HGB BLD-MCNC: 12.2 G/DL (ref 11.5–15.4)
IRON SERPL-MCNC: 60 UG/DL (ref 50–170)
LDLC SERPL CALC-MCNC: 43 MG/DL (ref 0–100)
MCH RBC QN AUTO: 31.1 PG (ref 26.8–34.3)
MCHC RBC AUTO-ENTMCNC: 32.1 G/DL (ref 31.4–37.4)
MCV RBC AUTO: 97 FL (ref 82–98)
NONHDLC SERPL-MCNC: 81 MG/DL
NT-PROBNP SERPL-MCNC: 512 PG/ML
PLATELET # BLD AUTO: 155 THOUSANDS/UL (ref 149–390)
PMV BLD AUTO: 9.7 FL (ref 8.9–12.7)
POTASSIUM SERPL-SCNC: 3.5 MMOL/L (ref 3.5–5.3)
PROT SERPL-MCNC: 7.6 G/DL (ref 6.4–8.2)
RBC # BLD AUTO: 3.92 MILLION/UL (ref 3.81–5.12)
RETICS # AUTO: ABNORMAL 10*3/UL (ref 14097–95744)
RETICS # CALC: 2.1 % (ref 0.37–1.87)
SODIUM SERPL-SCNC: 140 MMOL/L (ref 136–145)
T4 FREE SERPL-MCNC: 1.14 NG/DL (ref 0.76–1.46)
TIBC SERPL-MCNC: 346 UG/DL (ref 250–450)
TRIGL SERPL-MCNC: 191 MG/DL
TSH SERPL DL<=0.05 MIU/L-ACNC: 2.59 UIU/ML (ref 0.36–3.74)
VIT B12 SERPL-MCNC: 3179 PG/ML (ref 100–900)
WBC # BLD AUTO: 7.93 THOUSAND/UL (ref 4.31–10.16)

## 2022-03-09 PROCEDURE — 85027 COMPLETE CBC AUTOMATED: CPT

## 2022-03-09 PROCEDURE — 94618 PULMONARY STRESS TESTING: CPT | Performed by: INTERNAL MEDICINE

## 2022-03-09 PROCEDURE — 84439 ASSAY OF FREE THYROXINE: CPT

## 2022-03-09 PROCEDURE — 82607 VITAMIN B-12: CPT

## 2022-03-09 PROCEDURE — 80061 LIPID PANEL: CPT

## 2022-03-09 PROCEDURE — 99215 OFFICE O/P EST HI 40 MIN: CPT | Performed by: INTERNAL MEDICINE

## 2022-03-09 PROCEDURE — 80053 COMPREHEN METABOLIC PANEL: CPT

## 2022-03-09 PROCEDURE — 82306 VITAMIN D 25 HYDROXY: CPT

## 2022-03-09 PROCEDURE — 83880 ASSAY OF NATRIURETIC PEPTIDE: CPT

## 2022-03-09 PROCEDURE — 84443 ASSAY THYROID STIM HORMONE: CPT

## 2022-03-09 PROCEDURE — 83540 ASSAY OF IRON: CPT

## 2022-03-09 PROCEDURE — 83036 HEMOGLOBIN GLYCOSYLATED A1C: CPT

## 2022-03-09 PROCEDURE — 82746 ASSAY OF FOLIC ACID SERUM: CPT

## 2022-03-09 PROCEDURE — 83550 IRON BINDING TEST: CPT

## 2022-03-09 PROCEDURE — 82747 ASSAY OF FOLIC ACID RBC: CPT

## 2022-03-09 PROCEDURE — 85045 AUTOMATED RETICULOCYTE COUNT: CPT

## 2022-03-09 PROCEDURE — 71046 X-RAY EXAM CHEST 2 VIEWS: CPT

## 2022-03-09 PROCEDURE — 36415 COLL VENOUS BLD VENIPUNCTURE: CPT

## 2022-03-09 PROCEDURE — 82728 ASSAY OF FERRITIN: CPT

## 2022-03-09 RX ORDER — FOLIC ACID 1 MG/1
TABLET ORAL DAILY
COMMUNITY

## 2022-03-09 RX ORDER — UBIDECARENONE 75 MG
CAPSULE ORAL DAILY
COMMUNITY

## 2022-03-09 RX ORDER — ALBUTEROL SULFATE 90 UG/1
2 AEROSOL, METERED RESPIRATORY (INHALATION) EVERY 6 HOURS PRN
Qty: 18 G | Refills: 1 | Status: SHIPPED | OUTPATIENT
Start: 2022-03-09 | End: 2022-04-08

## 2022-03-09 RX ORDER — LISINOPRIL 5 MG/1
TABLET ORAL
COMMUNITY
Start: 2021-12-26

## 2022-03-09 NOTE — PROGRESS NOTES
Pulmonary Consultation   Anjum Bautista 79 y o  female MRN: [id number]  3/9/2022      Reason for Consultation:  Shortness of breath    Requested by: Dr Tanisha Pagan:  1  Shortness of breath  · I suspect this to be multifactorial to include deconditioning, mild weight gain, possible underlying COPD and airflow restriction  · I encouraged attempts at regular light exercise  · Will perform further testing/interventions as below    2  Emphysema on imaging  · No evidence of exertional desaturations today  · Will check complete PFTs and CXR now  · Will start trial of JESUS inhaler and monitor response  · Flu vaccine encouraged yearly, COVID-19 x 3, UTD on pneumovax/prevnar  · Follow up in 6-8 weeks or sooner as needed    3  History of COVID-19 infection  · Repeat CXR now, further imaging based upon results    4  Restrictive airflow defect on spirometry  · Repeat PFTs as above    5  CAD/atrial flutter - per cardiology, recent cardiac testing overall unremarkable    Plan:    Diagnoses and all orders for this visit:    Shortness of breath  -     albuterol (Ventolin HFA) 90 mcg/act inhaler; Inhale 2 puffs every 6 (six) hours as needed for wheezing or shortness of breath  -     XR chest pa & lateral; Future  -     Complete PFT with post bronchodilator; Future  -     POCT 6 minute walk    Centrilobular emphysema (HCC)    Pneumonia due to COVID-19 virus    Essential hypertension    Coronary artery disease involving native coronary artery of native heart with angina pectoris (HCC)    Abnormal PFT    Other orders  -     lisinopril (ZESTRIL) 5 mg tablet; TAKE 1 TABLET EVERY DAY BY ORAL ROUTE  -     folic acid (FOLVITE) 1 mg tablet; Take by mouth daily  -     cyanocobalamin (VITAMIN B-12) 100 mcg tablet;  Take by mouth daily        History of Present Illness   HPI:  Anjum Bautista is a 79 y o  female who has atrial flutter post ablation, CAD post CABG/PCI, HTN, HLP, MR, COVID-19 infection requiring admission in April 2020 (records reviewed), and GERD/hiatal hernia  She presents for evaluation of dyspnea  She reports having increased dyspnea over the past 2-3 months but family reports slow decline in exertional activities  She reports dyspnea with walking stairs and family reports slowing down during shopping and resting more frequently  She denied cough or sputum production  She denied SSCP, no pleurisy  She will have very rare chest tightness  She denied rest or nocturnal dyspnea  She denied frequent respiratory infections  She reports no sig LE edema, no orthopnea  She reports remote inhaler use and was labeled as having COPD in 2008 but no improvements with inhalers  She was d/c'd post COVID with oxygen but never utilized it  She does report to sedentary lifestyle and does not walk secondary to poor eyesight  Review of Systems   Constitutional: Positive for activity change and unexpected weight change  Negative for chills, fatigue and fever  HENT: Positive for hearing loss  Negative for sore throat and trouble swallowing  Eyes: Positive for visual disturbance  Respiratory: Positive for chest tightness and shortness of breath  Negative for cough and wheezing  Cardiovascular: Negative for chest pain, palpitations and leg swelling  Gastrointestinal: Negative for abdominal pain, nausea and vomiting  Endocrine: Negative for cold intolerance and heat intolerance  Musculoskeletal: Positive for arthralgias and back pain  Skin: Negative for wound  Allergic/Immunologic: Negative for immunocompromised state  Neurological: Negative for syncope, weakness and headaches  Hematological: Negative for adenopathy  Psychiatric/Behavioral: Negative for sleep disturbance  The patient is not nervous/anxious          Historical Information   Past Medical History:   Diagnosis Date    Angina pectoris (HCC)     Anxiety     C  difficile diarrhea     CAD (coronary artery disease)     Cardiac disease     Colon polyp     COVID-19 04/2020    Depression     Diverticulitis of colon 11/2019    GERD (gastroesophageal reflux disease)     History of colon polyps     History of hiatal hernia     Hx of bleeding disorder     hemorrhoids    Hyperlipidemia     Hypertension     Irregular heart beat     gets tachycardia    Shortness of breath     related to heart     Past Surgical History:   Procedure Laterality Date    CARDIAC ELECTROPHYSIOLOGY MAPPING AND ABLATION      CARDIAC SURGERY      CARDIAC SURGERY      CATARACT EXTRACTION Bilateral     CHOLECYSTECTOMY  1987    COLON SURGERY      repair of colon    COLON SURGERY  2018    COLONOSCOPY  2009    Tubulovillous adenoma    COLONOSCOPY N/A 6/18/2018    Procedure: COLONOSCOPY;  Surgeon: Dallas Graf DO;  Location: [facility];   Service: Gastroenterology    COLONOSCOPY  2011    2 cm right colon polyp and 1 5 cm mid right colon polyps tubular adenomas    COLONOSCOPY W/ CONTROL OF HEMORRHAGE      CORONARY ANGIOPLASTY WITH STENT PLACEMENT      reports two blockages not able to be stented    CORONARY ARTERY BYPASS GRAFT  2008    3 vessels    HERNIA REPAIR      hiatal hernia    HYSTERECTOMY      partial not due to malignancy    LAPAROTOMY N/A 9/26/2018    Procedure: LAPAROTOMY EXPLORATORY WITH  RIGHT HEMICOLECTOMY;  Surgeon: Dallas Graf DO;  Location: [facility];  Service: Gastroenterology    LAPAROTOMY N/A 9/26/2018    Procedure: LAPAROTOMY EXPLORATORY WITH HEMICOLECTOMY;  Surgeon: Kb Ram DO;  Location: [facility];  Service: General    OK COLONOSCOPY FLX DX W/COLLJ [facility] WHEN PFRMD N/A 9/26/2018    Procedure: COLONOSCOPY;  Surgeon: Dallas Graf DO;  [facility]: [facility];  Service: Gastroenterology    OK HEMORRHOIDECTOMY,INT/EXT,1 COLUMN/GROUP N/A 7/25/2019    Procedure: HEMORRHOIDECTOMY EXCISION;  Surgeon: Dung Block MD;  Location: [facility] OR;  Service: General    OK SIGMOIDOSCOPY FLX DX W/COLLJ SPEC BR/WA IF PFRMD N/A 6/21/2018 Procedure: SIGMOIDOSCOPY FLEXIBLE;  Surgeon: Dada Schroeder DO;  [facility]; Service: Gastroenterology    [place] SURG DIAGNOSTIC EXAM, ANORECTAL N/A 7/25/2019    Procedure: EXAM UNDER ANESTHESIA (EUA); Surgeon: Shira Chung MD;  Location: [address] [place] [address] OR;  Service: General     Family History   Problem Relation Age of Onset    Heart disease Mother     Cirrhosis Father     Cancer Father     Lung cancer Brother     No Known Problems Sister     No Known Problems Daughter     No Known Problems Son     No Known Problems Paternal Aunt        Occupational History: retired,     Social History: former smoker 1/2 ppd x 30 years, quit 20 years ago, no exotic animal exposures    Meds/Allergies     Current Outpatient Medications:     acetaminophen (TYLENOL) 650 mg CR tablet, Take 1 tablet (650 mg total) by mouth every 8 (eight) hours as needed for mild pain or moderate pain, Disp: 15 tablet, Rfl: 0    ALPRAZolam (XANAX) 0 25 mg tablet, Take 0 25 mg by mouth 3 (three) times a day as needed for anxiety  , Disp: , Rfl:     apixaban (ELIQUIS) 5 mg, Take 1 tablet (5 mg total) by mouth 2 (two) times a day, Disp: 60 tablet, Rfl: 3    atorvastatin (LIPITOR) 20 mg tablet, Take 20 mg by mouth daily after dinner   , Disp: , Rfl:     calcium carbonate (OS-FERN) 600 MG tablet, Take 600 mg by mouth 2 (two) times a day with meals, Disp: , Rfl:     cyanocobalamin (VITAMIN B-12) 100 mcg tablet, Take by mouth daily, Disp: , Rfl:     ergocalciferol (ERGOCALCIFEROL) 75222 UNITS capsule, Take 50,000 Units by mouth once a week   Takes on Wednesdays, Disp: , Rfl:     esomeprazole (NexIUM) 40 MG capsule, Take 20 mg by mouth Daily , Disp: , Rfl:     folic acid (FOLVITE) 1 mg tablet, Take by mouth daily, Disp: , Rfl:     furosemide (LASIX) 20 mg tablet, Take 1 tablet (20 mg total) by mouth 2 (two) times a day Hold until diarrhea resolved (Patient taking differently: Take 40 mg by mouth daily Hold until diarrhea resolved), Disp: , Rfl: 0    metolazone (ZAROXOLYN) 2 5 mg tablet, TAKE ONE TABLET BY MOUTH ONCE A WEEK OR  AS  DIRECTED  FOR  WEIGHT  GAIN, Disp: 20 tablet, Rfl: 5    metoprolol tartrate (LOPRESSOR) 50 mg tablet, take 1 tablet by mouth every 12 hours, Disp: 180 tablet, Rfl: 3    Misc  Devices (SPRAY BOTTLE/PLASTIC 120ML) MISC, by Does not apply route 3 (three) times a day Use to cleanse area 3 times daily or as needed with bowel movements  , Disp: 1 each, Rfl: 0    Multiple Vitamins-Minerals (PRESERVISION AREDS) CAPS, Take 1 capsule by mouth daily  , Disp: , Rfl:     nitroglycerin (NITROSTAT) 0 4 mg SL tablet, Place 1 tablet (0 4 mg total) under the tongue every 5 (five) minutes as needed for chest pain (times three for chest pain  If no relief after second tablet, call 911 ), Disp: 25 tablet, Rfl: 2    Omega-3 Fatty Acids (FISH OIL PO), Take 1,000 mg by mouth daily  , Disp: , Rfl:     potassium chloride (K-DUR,KLOR-CON) 20 mEq tablet, take 1 tablet by mouth once daily, Disp: 90 tablet, Rfl: 3    albuterol (Ventolin HFA) 90 mcg/act inhaler, Inhale 2 puffs every 6 (six) hours as needed for wheezing or shortness of breath, Disp: 18 g, Rfl: 1    lisinopril (ZESTRIL) 5 mg tablet, TAKE 1 TABLET EVERY DAY BY ORAL ROUTE, Disp: , Rfl:   Allergies   Allergen Reactions    Codeine GI Intolerance    Lansoprazole Other (See Comments)     GI Upset, dania protonix    Morphine Nausea Only    Morphine And Related GI Intolerance       Vitals: Blood pressure (!) 173/98, pulse 98, temperature (!) 97 3 °F (36 3 °C), temperature source Tympanic, height 5' 5" (1 651 m), weight 80 5 kg (177 lb 6 4 oz), SpO2 95 %, not currently breastfeeding , Body mass index is 29 52 kg/m²  Oxygen Therapy  SpO2: 95 %  Oxygen Therapy: None (Room air)    Physical Exam  Physical Exam  Vitals reviewed  Constitutional:       General: She is not in acute distress  Appearance: Normal appearance  She is well-developed and normal weight   She is not ill-appearing, toxic-appearing or diaphoretic  HENT:      Head: Normocephalic and atraumatic  Right Ear: External ear normal       Left Ear: External ear normal       Nose: Nose normal  No congestion  Mouth/Throat:      Mouth: Mucous membranes are moist       Pharynx: Oropharynx is clear  No oropharyngeal exudate  Eyes:      General: No scleral icterus  Right eye: No discharge  Left eye: No discharge  Conjunctiva/sclera: Conjunctivae normal       Pupils: Pupils are equal, round, and reactive to light  Neck:      Vascular: No JVD  Trachea: No tracheal deviation  Cardiovascular:      Rate and Rhythm: Normal rate and regular rhythm  Heart sounds: Normal heart sounds  No murmur heard  No gallop  Pulmonary:      Effort: Pulmonary effort is normal  No respiratory distress  Breath sounds: Normal breath sounds  No stridor  No wheezing, rhonchi or rales  Abdominal:      General: Bowel sounds are normal  There is no distension  Palpations: Abdomen is soft  Tenderness: There is no abdominal tenderness  There is no guarding or rebound  Musculoskeletal:         General: No deformity  Right lower leg: No edema  Left lower leg: No edema  Lymphadenopathy:      Cervical: No cervical adenopathy  Skin:     General: Skin is warm and dry  Coloration: Skin is not jaundiced  Findings: No erythema or rash  Neurological:      General: No focal deficit present  Mental Status: She is alert and oriented to person, place, and time  Mental status is at baseline  Psychiatric:         Mood and Affect: Mood normal          Behavior: Behavior normal          Thought Content: Thought content normal          Labs: I have personally reviewed pertinent lab results    Lab Results   Component Value Date    WBC 7 12 10/30/2021    HGB 11 8 10/30/2021    HCT 37 4 10/30/2021    HCT 37 4 10/30/2021    MCV 98 10/30/2021     (L) 10/30/2021     Lab Results   Component Value Date    GLUCOSE 235 (H) 2018    CALCIUM 9 4 10/30/2021     2015    K 4 0 10/30/2021    CO2 28 10/30/2021     10/30/2021    BUN 18 10/30/2021    CREATININE 0 96 10/30/2021     No results found for: IGE  Lab Results   Component Value Date    ALT 20 10/30/2021    AST 17 10/30/2021    ALKPHOS 125 (H) 10/30/2021    BILITOT 0 52 2015       Imaging and other studies: I have personally reviewed pertinent reports  and I have personally reviewed pertinent films in PACS  CT chest 2020 - mild background emphysematous changes, mild residual scarring R>L, no honeycomb changes, no effusions, no sig mediastinal adenopathy    Pulmonary function testin/2008 - Ratio 73%, FVC 1 79L (66%), FEV1 1 31L (67%) post BD FEV1 reduced 3%, TLC 88%, %, DLCO 53% - moderate restrictive airflow defect, no BD response, normal lung volumes, moderately reduced diffusion    Ambulation Testing  3/9/2022 - 6MWT - total walk distance 120 meters, initial SpO2 96%, rupert 92%, conclusion 93%, initial HR 83bpm, maximla HR 123bpm    EKG, Pathology, and Other Studies: I have personally reviewed pertinent reports  Myocardial perfusion scan 2022 - EF 67%, no perfusion defects    TTE 2022 - EF 05%, grade I diastolic dysfunction, mod dilated RV, mod MR, RVSP 52mmHg    Eric Mclaughlin DO, [facility]

## 2022-03-09 NOTE — PATIENT INSTRUCTIONS
· Use albuterol inhaler 1-2 puffs every 6 hours as needed for shortness of breath  · Get chest x-ray and breathing tests completed  · Attempt light regular exercise such as walking with partner

## 2022-03-10 LAB
FOLATE BLD-MCNC: >620 NG/ML
FOLATE RBC-MCNC: >1727 NG/ML
HCT VFR BLD AUTO: 35.9 % (ref 34–46.6)

## 2022-03-14 NOTE — RESULT ENCOUNTER NOTE
Kyle Fowler,     Can you please call patient and advise there is no new findings on her CXR      Thanks,  Suze Garcia, DO

## 2022-03-15 ENCOUNTER — HOSPITAL ENCOUNTER (OUTPATIENT)
Dept: PULMONOLOGY | Facility: HOSPITAL | Age: 82
Discharge: HOME | End: 2022-03-15
Attending: INTERNAL MEDICINE
Payer: MEDICARE

## 2022-03-15 DIAGNOSIS — R06.02 SHORTNESS OF BREATH: ICD-10-CM

## 2022-03-15 PROCEDURE — 94727 GAS DIL/WSHOT DETER LNG VOL: CPT

## 2022-03-15 PROCEDURE — 94060 EVALUATION OF WHEEZING: CPT | Performed by: INTERNAL MEDICINE

## 2022-03-15 PROCEDURE — 94760 N-INVAS EAR/PLS OXIMETRY 1: CPT

## 2022-03-15 PROCEDURE — 94729 DIFFUSING CAPACITY: CPT

## 2022-03-15 PROCEDURE — 94729 DIFFUSING CAPACITY: CPT | Performed by: INTERNAL MEDICINE

## 2022-03-15 PROCEDURE — 94060 EVALUATION OF WHEEZING: CPT

## 2022-03-15 PROCEDURE — 94726 PLETHYSMOGRAPHY LUNG VOLUMES: CPT | Performed by: INTERNAL MEDICINE

## 2022-03-15 RX ORDER — ALBUTEROL SULFATE 2.5 MG/3ML
2.5 SOLUTION RESPIRATORY (INHALATION) ONCE AS NEEDED
Status: COMPLETED | OUTPATIENT
Start: 2022-03-15 | End: 2022-03-15

## 2022-03-15 RX ADMIN — ALBUTEROL SULFATE 2.5 MG: 2.5 SOLUTION RESPIRATORY (INHALATION) at 15:11

## 2022-07-27 ENCOUNTER — OFFICE VISIT (OUTPATIENT)
Dept: CARDIOLOGY CLINIC | Facility: HOSPITAL | Age: 82
End: 2022-07-27
Payer: MEDICARE

## 2022-07-27 VITALS
DIASTOLIC BLOOD PRESSURE: 70 MMHG | HEART RATE: 81 BPM | WEIGHT: 177.4 LBS | BODY MASS INDEX: 29.56 KG/M2 | HEIGHT: 65 IN | SYSTOLIC BLOOD PRESSURE: 130 MMHG

## 2022-07-27 DIAGNOSIS — I10 ESSENTIAL HYPERTENSION: ICD-10-CM

## 2022-07-27 DIAGNOSIS — I48.3 TYPICAL ATRIAL FLUTTER (HCC): ICD-10-CM

## 2022-07-27 DIAGNOSIS — I50.32 CHRONIC DIASTOLIC CONGESTIVE HEART FAILURE (HCC): Primary | ICD-10-CM

## 2022-07-27 DIAGNOSIS — I25.119 CORONARY ARTERY DISEASE INVOLVING NATIVE CORONARY ARTERY OF NATIVE HEART WITH ANGINA PECTORIS (HCC): ICD-10-CM

## 2022-07-27 DIAGNOSIS — Z95.1 HX OF CABG: ICD-10-CM

## 2022-07-27 PROCEDURE — 99214 OFFICE O/P EST MOD 30 MIN: CPT | Performed by: INTERNAL MEDICINE

## 2022-07-27 PROCEDURE — 93000 ELECTROCARDIOGRAM COMPLETE: CPT | Performed by: INTERNAL MEDICINE

## 2022-07-27 RX ORDER — GABAPENTIN 100 MG/1
100 CAPSULE ORAL 3 TIMES DAILY
COMMUNITY

## 2022-07-27 NOTE — PROGRESS NOTES
Cardiology Follow Up    Jese Perez  1940  [id number]  [facility] LIAM White [address]  [phone]    1  Chronic diastolic congestive heart failure (HCC)  POCT ECG   2  Coronary artery disease involving native coronary artery of native heart with angina pectoris (HCC)  POCT ECG   3  Essential hypertension     4  Typical atrial flutter ([address] )     5  Hx of CABG           Discussion/Summary: All of her assessed cardiac problems are stable  I have reviewed her medications and made no changes  No cardiac testing is ordered  RTRO 9 months  Interval History: She has not had any cardiac problems since her last OV  Her BOB is worse over the past several months  Recent PFTs should severe decrease in diffusion capacity  Her weight is down 2 lbs to 177  ECHO and nuclear stress test in 1/2022 - normal EF, no ischemia  /70    She does notice occasional wheeze  She has CAD with remote CABG and chronic diastolic CHF which has been stable            Patient Active Problem List   Diagnosis    Diverticulosis    Hypoalbuminemia    Essential hypertension    Hypercholesterolemia    Elevated MCV    Typical atrial flutter (HCC)    Back pain    History of shingles    Incomplete right bundle branch block    Hx of CABG    Thoracic radiculopathy    Pyuria    Microscopic hematuria    Shortness of breath    Diverticulosis of large intestine with hemorrhage    Nausea, vomiting and diarrhea    Colonic mass    Tubulovillous adenoma of colon    Anemia    Hypokalemia    Hypoxia    Pleural effusion    Acute cystitis without hematuria    History of Clostridioides difficile colitis    On continuous oral anticoagulation    Encounter for pre-operative cardiovascular clearance    Emphysema of lung (HCC)    Chronic diastolic congestive heart failure (HCC)    Mixed anxiety and depressive disorder    Secondary pulmonary hypertension    Diverticulitis    Diarrhea of presumed infectious origin    C  difficile colitis    Coronary artery disease involving native coronary artery of native heart with angina pectoris ([facility] )    Pneumonia due to COVID-19 virus    Acute respiratory failure with hypoxia (HCC)    Abnormal PFT     Past Medical History:   Diagnosis Date    Angina pectoris ([facility] )     Anxiety     C  difficile diarrhea     CAD (coronary artery disease)     Cardiac disease     Colon polyp     COVID-19 04/2020    Depression     Diverticulitis of colon 11/2019    GERD (gastroesophageal reflux disease)     History of colon polyps     History of hiatal hernia     Hx of bleeding disorder     hemorrhoids    Hyperlipidemia     Hypertension     Irregular heart beat     gets tachycardia    Shortness of breath     related to heart     Social History     Socioeconomic History    Marital status:       Spouse name: Not on file    Number of children: Not on file    Years of education: Not on file    Highest education level: Not on file   Occupational History    Not on file   Tobacco Use    Smoking status: Former Smoker     Packs/day: 0 50     Years: 30 00     Pack years: 15 00    Smokeless tobacco: Never Used    Tobacco comment: quit 15 yrs ago   Vaping Use    Vaping Use: Never used   Substance and Sexual Activity    Alcohol use: Never     Alcohol/week: 0 0 standard drinks    Drug use: No    Sexual activity: Not on file   Other Topics Concern    Not on file   Social History Narrative    Not on file     Social Determinants of Health     Financial Resource Strain: Not on file   Food Insecurity: Not on file   Transportation Needs: Not on file   Physical Activity: Not on file   Stress: Not on file   Social Connections: Not on file   Intimate Partner Violence: Not on file   Housing Stability: Not on file      Family History   Problem Relation Age of Onset    Heart disease Mother     Cirrhosis Father     Cancer Father     Lung cancer Brother     No Known Problems Sister     No Known Problems Daughter     No Known Problems Son     No Known Problems Paternal Aunt      Past Surgical History:   Procedure Laterality Date    CARDIAC ELECTROPHYSIOLOGY MAPPING AND ABLATION      CARDIAC SURGERY      CARDIAC SURGERY      CATARACT EXTRACTION Bilateral     CHOLECYSTECTOMY  1987    COLON SURGERY      repair of colon    COLON SURGERY  2018    COLONOSCOPY  2009    Tubulovillous adenoma    COLONOSCOPY N/A 6/18/2018    Procedure: COLONOSCOPY;  Surgeon: Lieutenant Kelly DO;  [facility]; Service: Gastroenterology    COLONOSCOPY  2011    2 cm right colon polyp and 1 5 cm mid right colon polyps tubular adenomas    COLONOSCOPY W/ CONTROL OF HEMORRHAGE      CORONARY ANGIOPLASTY WITH STENT PLACEMENT      reports two blockages not able to be stented    CORONARY ARTERY BYPASS GRAFT  2008    3 vessels    HERNIA REPAIR      hiatal hernia    HYSTERECTOMY      partial not due to malignancy    LAPAROTOMY N/A 9/26/2018    Procedure: LAPAROTOMY EXPLORATORY WITH  RIGHT HEMICOLECTOMY;  Surgeon: Lieutenant Kelly DO;  Location: [facility];  Service: Gastroenterology    LAPAROTOMY N/A 9/26/2018    Procedure: LAPAROTOMY EXPLORATORY WITH HEMICOLECTOMY;  Surgeon: Luke Bolden DO;  Location: [facility];  Service: General    IL COLONOSCOPY FLX DX W/COLLJ SPEC WHEN PFRMD N/A 9/26/2018    Procedure: COLONOSCOPY;  Surgeon: Lieutenant Kelly DO;  [facility]: [facility];  Service: Gastroenterology    IL HEMORRHOIDECTOMY,INT/EXT,1 COLUMN/GROUP N/A 7/25/2019    Procedure: HEMORRHOIDECTOMY EXCISION;  Surgeon: Damien Azul MD;  [facility] MAIN OR;  Service: General    IL SIGMOIDOSCOPY FLX DX W/COLLJ SPEC BR/WA IF PFRMD N/A 6/21/2018    Procedure: Hassel Livers FLEXIBLE;  Surgeon: Lieutenant Kelly DO;  [facility];   Service: Gastroenterology    IL SURG DIAGNOSTIC EXAM, ANORECTAL N/A 7/25/2019    Procedure: EXAM UNDER ANESTHESIA (EUA); Surgeon: Truman Estrada MD;  Location: [facility] MAIN OR;  Service: General       Current Outpatient Medications:     acetaminophen (TYLENOL) 650 mg CR tablet, Take 1 tablet (650 mg total) by mouth every 8 (eight) hours as needed for mild pain or moderate pain, Disp: 15 tablet, Rfl: 0    ALPRAZolam (XANAX) 0 25 mg tablet, Take 0 25 mg by mouth 3 (three) times a day as needed for anxiety  , Disp: , Rfl:     apixaban (ELIQUIS) 5 mg, Take 1 tablet (5 mg total) by mouth 2 (two) times a day, Disp: 60 tablet, Rfl: 3    atorvastatin (LIPITOR) 20 mg tablet, Take 20 mg by mouth daily after dinner   , Disp: , Rfl:     calcium carbonate (OS-FERN) 600 MG tablet, Take 600 mg by mouth 2 (two) times a day with meals, Disp: , Rfl:     cyanocobalamin (VITAMIN B-12) 100 mcg tablet, Take by mouth daily, Disp: , Rfl:     ergocalciferol (ERGOCALCIFEROL) 70679 UNITS capsule, Take 50,000 Units by mouth once a week  Takes on Wednesdays, Disp: , Rfl:     esomeprazole (NexIUM) 40 MG capsule, Take 20 mg by mouth Daily , Disp: , Rfl:     folic acid (FOLVITE) 1 mg tablet, Take by mouth daily, Disp: , Rfl:     furosemide (LASIX) 20 mg tablet, Take 1 tablet (20 mg total) by mouth 2 (two) times a day Hold until diarrhea resolved (Patient taking differently: Take 40 mg by mouth daily Hold until diarrhea resolved), Disp: , Rfl: 0    gabapentin (NEURONTIN) 100 mg capsule, Take 100 mg by mouth 3 (three) times a day, Disp: , Rfl:     lisinopril (ZESTRIL) 5 mg tablet, TAKE 1 TABLET EVERY DAY BY ORAL ROUTE, Disp: , Rfl:     metoprolol tartrate (LOPRESSOR) 50 mg tablet, take 1 tablet by mouth every 12 hours, Disp: 180 tablet, Rfl: 3    Misc  Devices (SPRAY BOTTLE/PLASTIC 120ML) MISC, by Does not apply route 3 (three) times a day Use to cleanse area 3 times daily or as needed with bowel movements  , Disp: 1 each, Rfl: 0    nitroglycerin (NITROSTAT) 0 4 mg SL tablet, Place 1 tablet (0 4 mg total) under the tongue every 5 (five) minutes as needed for chest pain (times three for chest pain  If no relief after second tablet, call 911 ), Disp: 25 tablet, Rfl: 2    Omega-3 Fatty Acids (FISH OIL PO), Take 1,000 mg by mouth daily  , Disp: , Rfl:     potassium chloride (K-DUR,KLOR-CON) 20 mEq tablet, take 1 tablet by mouth once daily, Disp: 90 tablet, Rfl: 3    metolazone (ZAROXOLYN) 2 5 mg tablet, TAKE ONE TABLET BY MOUTH ONCE A WEEK OR  AS  DIRECTED  FOR  WEIGHT  GAIN (Patient not taking: Reported on 7/27/2022), Disp: 20 tablet, Rfl: 5    Multiple Vitamins-Minerals (PRESERVISION AREDS) CAPS, Take 1 capsule by mouth daily   (Patient not taking: Reported on 7/27/2022), Disp: , Rfl:   Allergies   Allergen Reactions    Codeine GI Intolerance    Lansoprazole Other (See Comments)     GI Upset, dania protonix    Morphine Nausea Only    Morphine And Related GI Intolerance     Vitals:    07/27/22 1508   BP: 130/70   Pulse: 81   Weight: 80 5 kg (177 lb 6 4 oz)   Height: 5' 5" (1 651 m)     Weight (last 2 days)     Date/Time Weight    07/27/22 1508 80 5 (177 4)         Blood pressure 130/70, pulse 81, height 5' 5" (1 651 m), weight 80 5 kg (177 lb 6 4 oz), not currently breastfeeding , Body mass index is 29 52 kg/m²      Labs:  Appointment on 03/09/2022   Component Date Value    Sodium 03/09/2022 140     Potassium 03/09/2022 3 5     Chloride 03/09/2022 103     CO2 03/09/2022 28     ANION GAP 03/09/2022 9     BUN 03/09/2022 16     Creatinine 03/09/2022 0 93     Glucose, Fasting 03/09/2022 106 (A)    Calcium 03/09/2022 9 0     AST 03/09/2022 15     ALT 03/09/2022 17     Alkaline Phosphatase 03/09/2022 122 (A)    Total Protein 03/09/2022 7 6     Albumin 03/09/2022 3 7     Total Bilirubin 03/09/2022 0 61     eGFR 03/09/2022 57     Cholesterol 03/09/2022 147     Triglycerides 03/09/2022 191 (A)    HDL, Direct 03/09/2022 66     LDL Calculated 03/09/2022 43     Non-HDL-Chol (CHOL-HDL) 03/09/2022 81     TSH 3RD GENERATON 03/09/2022 2 593     Free T4 03/09/2022 1 14     NT-proBNP 03/09/2022 512 (A)    Vit D, 25-Hydroxy 03/09/2022 55 2     WBC 03/09/2022 7 93     RBC 03/09/2022 3 92     Hemoglobin 03/09/2022 12 2     Hematocrit 03/09/2022 38 0     MCV 03/09/2022 97     MCH 03/09/2022 31 1     MCHC 03/09/2022 32 1     RDW 03/09/2022 13 2     Platelets 24/43/3005 155     MPV 03/09/2022 9 7     Ferritin 03/09/2022 39     Vitamin B-12 03/09/2022 3,179 (A)    Folate 03/09/2022 >20 0 (A)    Retic Ct Abs 03/09/2022 82,300     Retic Ct Pct 03/09/2022 2 10 (A)    Iron 03/09/2022 60     RBC Folate 03/09/2022 >1727     Folate, Hemolysate 03/09/2022 >620 0     HCT 03/09/2022 35 9     Hemoglobin A1C 03/09/2022 5 4     EAG 03/09/2022 108     TIBC 03/09/2022 346      Imaging: No results found  Review of Systems:  Review of Systems   Constitutional: Negative for diaphoresis, fatigue, fever and unexpected weight change  HENT: Negative  Respiratory: Positive for shortness of breath  Negative for cough and wheezing  Cardiovascular: Negative for chest pain, palpitations and leg swelling  Gastrointestinal: Negative for abdominal pain, diarrhea and nausea  Musculoskeletal: Negative for gait problem and myalgias  Skin: Negative for rash  Neurological: Negative for dizziness and numbness  Psychiatric/Behavioral: Negative  Physical Exam:  Physical Exam  Constitutional:       Appearance: She is well-developed  HENT:      Head: Normocephalic and atraumatic  Eyes:      Pupils: Pupils are equal, round, and reactive to light  Neck:      Vascular: No JVD  Cardiovascular:      Rate and Rhythm: Regular rhythm  Pulses: Normal pulses  Carotid pulses are 2+ on the right side and 2+ on the left side  Heart sounds: S1 normal and S2 normal    Pulmonary:      Effort: Pulmonary effort is normal       Breath sounds: Normal breath sounds  No wheezing or rales  Abdominal:      General: Bowel sounds are normal       Palpations: Abdomen is soft  Tenderness: There is no abdominal tenderness  Musculoskeletal:         General: No tenderness  Normal range of motion  Cervical back: Normal range of motion and neck supple  Skin:     General: Skin is warm  Neurological:      Mental Status: She is alert and oriented to person, place, and time  Cranial Nerves: No cranial nerve deficit  Deep Tendon Reflexes: Reflexes are normal and symmetric

## 2022-09-10 ENCOUNTER — APPOINTMENT (OUTPATIENT)
Dept: LAB | Facility: HOSPITAL | Age: 82
End: 2022-09-10
Attending: INTERNAL MEDICINE
Payer: MEDICARE

## 2022-09-10 DIAGNOSIS — N39.0 URINARY TRACT INFECTION WITHOUT HEMATURIA, SITE UNSPECIFIED: ICD-10-CM

## 2022-09-10 DIAGNOSIS — I10 HYPERTENSION, UNSPECIFIED TYPE: ICD-10-CM

## 2022-09-10 DIAGNOSIS — R60.9 EDEMA, UNSPECIFIED TYPE: ICD-10-CM

## 2022-09-10 DIAGNOSIS — I50.9 HEART FAILURE, UNSPECIFIED HF CHRONICITY, UNSPECIFIED HEART FAILURE TYPE (HCC): ICD-10-CM

## 2022-09-10 DIAGNOSIS — E03.9 HYPOTHYROIDISM, ADULT: ICD-10-CM

## 2022-09-10 DIAGNOSIS — D64.9 ANEMIA, UNSPECIFIED TYPE: ICD-10-CM

## 2022-09-10 DIAGNOSIS — Z79.899 ENCOUNTER FOR LONG-TERM (CURRENT) USE OF OTHER MEDICATIONS: ICD-10-CM

## 2022-09-10 DIAGNOSIS — E55.9 VITAMIN D DEFICIENCY: ICD-10-CM

## 2022-09-10 DIAGNOSIS — E78.5 HYPERLIPIDEMIA, UNSPECIFIED HYPERLIPIDEMIA TYPE: ICD-10-CM

## 2022-09-10 DIAGNOSIS — R73.9 HYPERGLYCEMIA: ICD-10-CM

## 2022-09-10 LAB
25(OH)D3 SERPL-MCNC: 56.4 NG/ML (ref 30–100)
ALBUMIN SERPL BCP-MCNC: 3.7 G/DL (ref 3.5–5)
ALP SERPL-CCNC: 110 U/L (ref 46–116)
ALT SERPL W P-5'-P-CCNC: 20 U/L (ref 12–78)
ANION GAP SERPL CALCULATED.3IONS-SCNC: 10 MMOL/L (ref 4–13)
AST SERPL W P-5'-P-CCNC: 14 U/L (ref 5–45)
BILIRUB SERPL-MCNC: 0.51 MG/DL (ref 0.2–1)
BUN SERPL-MCNC: 22 MG/DL (ref 5–25)
CALCIUM SERPL-MCNC: 9.4 MG/DL (ref 8.3–10.1)
CHLORIDE SERPL-SCNC: 102 MMOL/L (ref 96–108)
CHOLEST SERPL-MCNC: 162 MG/DL
CO2 SERPL-SCNC: 29 MMOL/L (ref 21–32)
CREAT SERPL-MCNC: 1.04 MG/DL (ref 0.6–1.3)
ERYTHROCYTE [DISTWIDTH] IN BLOOD BY AUTOMATED COUNT: 14 % (ref 11.6–15.1)
EST. AVERAGE GLUCOSE BLD GHB EST-MCNC: 108 MG/DL
FERRITIN SERPL-MCNC: 27 NG/ML (ref 8–388)
GFR SERPL CREATININE-BSD FRML MDRD: 49 ML/MIN/1.73SQ M
GLUCOSE P FAST SERPL-MCNC: 104 MG/DL (ref 65–99)
HBA1C MFR BLD: 5.4 %
HCT VFR BLD AUTO: 37.2 % (ref 34.8–46.1)
HDLC SERPL-MCNC: 64 MG/DL
HGB BLD-MCNC: 12 G/DL (ref 11.5–15.4)
IRON SERPL-MCNC: 69 UG/DL (ref 50–170)
LDLC SERPL CALC-MCNC: 58 MG/DL (ref 0–100)
MCH RBC QN AUTO: 31.7 PG (ref 26.8–34.3)
MCHC RBC AUTO-ENTMCNC: 32.3 G/DL (ref 31.4–37.4)
MCV RBC AUTO: 98 FL (ref 82–98)
NONHDLC SERPL-MCNC: 98 MG/DL
NT-PROBNP SERPL-MCNC: 386 PG/ML
PLATELET # BLD AUTO: 158 THOUSANDS/UL (ref 149–390)
PMV BLD AUTO: 9.3 FL (ref 8.9–12.7)
POTASSIUM SERPL-SCNC: 4.2 MMOL/L (ref 3.5–5.3)
PROT SERPL-MCNC: 7.5 G/DL (ref 6.4–8.4)
RBC # BLD AUTO: 3.79 MILLION/UL (ref 3.81–5.12)
SODIUM SERPL-SCNC: 141 MMOL/L (ref 135–147)
T4 FREE SERPL-MCNC: 0.97 NG/DL (ref 0.76–1.46)
TIBC SERPL-MCNC: 333 UG/DL (ref 250–450)
TRIGL SERPL-MCNC: 202 MG/DL
TSH SERPL DL<=0.05 MIU/L-ACNC: 3.35 UIU/ML (ref 0.45–4.5)
WBC # BLD AUTO: 6.38 THOUSAND/UL (ref 4.31–10.16)

## 2022-09-10 PROCEDURE — 83550 IRON BINDING TEST: CPT

## 2022-09-10 PROCEDURE — 85027 COMPLETE CBC AUTOMATED: CPT

## 2022-09-10 PROCEDURE — 82306 VITAMIN D 25 HYDROXY: CPT

## 2022-09-10 PROCEDURE — 82728 ASSAY OF FERRITIN: CPT

## 2022-09-10 PROCEDURE — 80053 COMPREHEN METABOLIC PANEL: CPT

## 2022-09-10 PROCEDURE — 87086 URINE CULTURE/COLONY COUNT: CPT

## 2022-09-10 PROCEDURE — 80061 LIPID PANEL: CPT

## 2022-09-10 PROCEDURE — 84443 ASSAY THYROID STIM HORMONE: CPT

## 2022-09-10 PROCEDURE — 83540 ASSAY OF IRON: CPT

## 2022-09-10 PROCEDURE — 83036 HEMOGLOBIN GLYCOSYLATED A1C: CPT

## 2022-09-10 PROCEDURE — 83880 ASSAY OF NATRIURETIC PEPTIDE: CPT

## 2022-09-10 PROCEDURE — 36415 COLL VENOUS BLD VENIPUNCTURE: CPT

## 2022-09-10 PROCEDURE — 84439 ASSAY OF FREE THYROXINE: CPT

## 2022-09-10 PROCEDURE — 82747 ASSAY OF FOLIC ACID RBC: CPT

## 2022-09-11 LAB
FOLATE BLD-MCNC: >620 NG/ML
FOLATE RBC-MCNC: >1615 NG/ML
HCT VFR BLD AUTO: 38.4 % (ref 34–46.6)

## 2022-09-12 LAB — BACTERIA UR CULT: NORMAL

## 2022-10-15 DIAGNOSIS — I25.10 CORONARY ARTERY DISEASE INVOLVING NATIVE CORONARY ARTERY OF NATIVE HEART WITHOUT ANGINA PECTORIS: ICD-10-CM

## 2022-10-17 RX ORDER — POTASSIUM CHLORIDE 20 MEQ/1
TABLET, EXTENDED RELEASE ORAL
Qty: 90 TABLET | Refills: 3 | Status: SHIPPED | OUTPATIENT
Start: 2022-10-17

## 2022-11-29 ENCOUNTER — HOSPITAL ENCOUNTER (EMERGENCY)
Facility: HOSPITAL | Age: 82
Discharge: HOME | End: 2022-11-29
Attending: EMERGENCY MEDICINE

## 2022-11-29 ENCOUNTER — APPOINTMENT (EMERGENCY)
Dept: RADIOLOGY | Facility: HOSPITAL | Age: 82
End: 2022-11-29

## 2022-11-29 VITALS
OXYGEN SATURATION: 93 % | BODY MASS INDEX: 29.53 KG/M2 | DIASTOLIC BLOOD PRESSURE: 74 MMHG | WEIGHT: 177.47 LBS | SYSTOLIC BLOOD PRESSURE: 167 MMHG | TEMPERATURE: 97.5 F | HEART RATE: 91 BPM | RESPIRATION RATE: 19 BRPM

## 2022-11-29 DIAGNOSIS — J06.9 VIRAL UPPER RESPIRATORY ILLNESS: ICD-10-CM

## 2022-11-29 DIAGNOSIS — R05.9 COUGH: ICD-10-CM

## 2022-11-29 DIAGNOSIS — I50.9 CHRONIC CONGESTIVE HEART FAILURE, UNSPECIFIED HEART FAILURE TYPE (HCC): Primary | ICD-10-CM

## 2022-11-29 LAB
ANION GAP SERPL CALCULATED.3IONS-SCNC: 9 MMOL/L (ref 4–13)
BASOPHILS # BLD AUTO: 0.03 THOUSANDS/ÂΜL (ref 0–0.1)
BASOPHILS NFR BLD AUTO: 0 % (ref 0–1)
BUN SERPL-MCNC: 13 MG/DL (ref 5–25)
CALCIUM SERPL-MCNC: 8.6 MG/DL (ref 8.3–10.1)
CARDIAC TROPONIN I PNL SERPL HS: 7 NG/L (ref 8–18)
CHLORIDE SERPL-SCNC: 103 MMOL/L (ref 96–108)
CO2 SERPL-SCNC: 26 MMOL/L (ref 21–32)
CREAT SERPL-MCNC: 1.08 MG/DL (ref 0.6–1.3)
EOSINOPHIL # BLD AUTO: 0.07 THOUSAND/ÂΜL (ref 0–0.61)
EOSINOPHIL NFR BLD AUTO: 1 % (ref 0–6)
ERYTHROCYTE [DISTWIDTH] IN BLOOD BY AUTOMATED COUNT: 13.6 % (ref 11.6–15.1)
FLUAV RNA RESP QL NAA+PROBE: NEGATIVE
FLUBV RNA RESP QL NAA+PROBE: NEGATIVE
GFR SERPL CREATININE-BSD FRML MDRD: 47 ML/MIN/1.73SQ M
GLUCOSE SERPL-MCNC: 119 MG/DL (ref 65–140)
HCT VFR BLD AUTO: 36.8 % (ref 34.8–46.1)
HGB BLD-MCNC: 11.8 G/DL (ref 11.5–15.4)
IMM GRANULOCYTES # BLD AUTO: 0.02 THOUSAND/UL (ref 0–0.2)
IMM GRANULOCYTES NFR BLD AUTO: 0 % (ref 0–2)
LYMPHOCYTES # BLD AUTO: 1.15 THOUSANDS/ÂΜL (ref 0.6–4.47)
LYMPHOCYTES NFR BLD AUTO: 14 % (ref 14–44)
MCH RBC QN AUTO: 31.4 PG (ref 26.8–34.3)
MCHC RBC AUTO-ENTMCNC: 32.1 G/DL (ref 31.4–37.4)
MCV RBC AUTO: 98 FL (ref 82–98)
MONOCYTES # BLD AUTO: 0.75 THOUSAND/ÂΜL (ref 0.17–1.22)
MONOCYTES NFR BLD AUTO: 9 % (ref 4–12)
NEUTROPHILS # BLD AUTO: 6.5 THOUSANDS/ÂΜL (ref 1.85–7.62)
NEUTS SEG NFR BLD AUTO: 76 % (ref 43–75)
NRBC BLD AUTO-RTO: 0 /100 WBCS
NT-PROBNP SERPL-MCNC: 779 PG/ML
PLATELET # BLD AUTO: 143 THOUSANDS/UL (ref 149–390)
PMV BLD AUTO: 9.2 FL (ref 8.9–12.7)
POTASSIUM SERPL-SCNC: 3.8 MMOL/L (ref 3.5–5.3)
RBC # BLD AUTO: 3.76 MILLION/UL (ref 3.81–5.12)
RSV RNA RESP QL NAA+PROBE: NEGATIVE
SARS-COV-2 RNA RESP QL NAA+PROBE: NEGATIVE
SODIUM SERPL-SCNC: 138 MMOL/L (ref 135–147)
WBC # BLD AUTO: 8.52 THOUSAND/UL (ref 4.31–10.16)

## 2022-11-29 RX ORDER — BENZONATATE 100 MG/1
100 CAPSULE ORAL EVERY 8 HOURS
Qty: 21 CAPSULE | Refills: 0 | Status: SHIPPED | OUTPATIENT
Start: 2022-11-29

## 2022-11-29 RX ORDER — IPRATROPIUM BROMIDE AND ALBUTEROL SULFATE 2.5; .5 MG/3ML; MG/3ML
3 SOLUTION RESPIRATORY (INHALATION)
Status: DISCONTINUED | OUTPATIENT
Start: 2022-11-29 | End: 2022-11-29 | Stop reason: HOSPADM

## 2022-11-29 RX ADMIN — IPRATROPIUM BROMIDE AND ALBUTEROL SULFATE 3 ML: 2.5; .5 SOLUTION RESPIRATORY (INHALATION) at 16:52

## 2022-11-29 NOTE — ED PROVIDER NOTES
History  Chief Complaint   Patient presents with   • Shortness of Breath     Sob and cough for 3 days     Brittny Manley is a 79 y o  female with a past medical history of coronary artery disease, HLD, HTN,, emphysema of lung, and CHF presenting to the ED complaining coughing congestion which began 3 days ago  Patient reports that she has had associated low-grade fevers and chills  Patient reports that occasionally after coughing fit she will start to feel short of breath but this resolves quickly on its own  She denies any shortness of breath at rest or with exertion  She denies any chest pain, leg swelling, like pain, shortness breath her rest or with exertion, abdominal pain, nausea, vomiting, diarrhea, headaches, weakness, dizziness, or visual changes  Prior to Admission Medications   Prescriptions Last Dose Informant Patient Reported? Taking? ALPRAZolam (XANAX) 0 25 mg tablet   Yes No   Sig: Take 0 25 mg by mouth 3 (three) times a day as needed for anxiety  Misc  Devices (SPRAY BOTTLE/PLASTIC 120ML) MISC   No No   Sig: by Does not apply route 3 (three) times a day Use to cleanse area 3 times daily or as needed with bowel movements  Multiple Vitamins-Minerals (PRESERVISION AREDS) CAPS   Yes No   Sig: Take 1 capsule by mouth daily     Patient not taking: Reported on 7/27/2022   Omega-3 Fatty Acids (FISH OIL PO)   Yes No   Sig: Take 1,000 mg by mouth daily  acetaminophen (TYLENOL) 650 mg CR tablet   No No   Sig: Take 1 tablet (650 mg total) by mouth every 8 (eight) hours as needed for mild pain or moderate pain   apixaban (ELIQUIS) 5 mg   No No   Sig: Take 1 tablet (5 mg total) by mouth 2 (two) times a day   atorvastatin (LIPITOR) 20 mg tablet   Yes No   Sig: Take 20 mg by mouth daily after dinner       calcium carbonate (OS-FERN) 600 MG tablet   Yes No   Sig: Take 600 mg by mouth 2 (two) times a day with meals   cyanocobalamin (VITAMIN B-12) 100 mcg tablet   Yes No   Sig: Take by mouth daily   ergocalciferol (ERGOCALCIFEROL) 25892 UNITS capsule   Yes No   Sig: Take 50,000 Units by mouth once a week  Takes on Wednesdays   esomeprazole (NexIUM) 40 MG capsule   Yes No   Sig: Take 20 mg by mouth Daily    folic acid (FOLVITE) 1 mg tablet   Yes No   Sig: Take by mouth daily   furosemide (LASIX) 20 mg tablet   No No   Sig: Take 1 tablet (20 mg total) by mouth 2 (two) times a day Hold until diarrhea resolved   Patient taking differently: Take 40 mg by mouth daily Hold until diarrhea resolved   gabapentin (NEURONTIN) 100 mg capsule   Yes No   Sig: Take 100 mg by mouth 3 (three) times a day   lisinopril (ZESTRIL) 5 mg tablet   Yes No   Sig: TAKE 1 TABLET EVERY DAY BY ORAL ROUTE   metolazone (ZAROXOLYN) 2 5 mg tablet   No No   Sig: TAKE ONE TABLET BY MOUTH ONCE A WEEK OR  AS  DIRECTED  FOR  WEIGHT  GAIN   Patient not taking: Reported on 7/27/2022   metoprolol tartrate (LOPRESSOR) 50 mg tablet   No No   Sig: take 1 tablet by mouth every 12 hours   nitroglycerin (NITROSTAT) 0 4 mg SL tablet   No No   Sig: Place 1 tablet (0 4 mg total) under the tongue every 5 (five) minutes as needed for chest pain (times three for chest pain   If no relief after second tablet, call 911 )   potassium chloride (K-DUR,KLOR-CON) 20 mEq tablet   No No   Sig: take 1 tablet by mouth once daily      Facility-Administered Medications: None       Past Medical History:   Diagnosis Date   • Angina pectoris ([facility] [address] )    • Anxiety    • C  difficile diarrhea    • CAD (coronary artery disease)    • Cardiac disease    • Colon polyp    • COVID-19 04/2020   • Depression    • Diverticulitis of colon 11/2019   • GERD (gastroesophageal reflux disease)    • History of colon polyps    • History of hiatal hernia    • Hx of bleeding disorder     hemorrhoids   • Hyperlipidemia    • Hypertension    • Irregular heart beat     gets tachycardia   • Shortness of breath     related to heart       Past Surgical History:   Procedure Laterality Date   • CARDIAC ELECTROPHYSIOLOGY MAPPING AND ABLATION     • CARDIAC SURGERY     • CARDIAC SURGERY     • CATARACT EXTRACTION Bilateral    • CHOLECYSTECTOMY  1987   • COLON SURGERY      repair of colon   • COLON SURGERY  2018   • COLONOSCOPY  2009    Tubulovillous adenoma   • COLONOSCOPY N/A 6/18/2018    Procedure: COLONOSCOPY;  Surgeon: Lorin Mendez DO;  Location: [facility]; Service: Gastroenterology   • COLONOSCOPY  2011    2 cm right colon polyp and 1 5 cm mid right colon polyps tubular adenomas   • COLONOSCOPY W/ CONTROL OF HEMORRHAGE     • CORONARY ANGIOPLASTY WITH STENT PLACEMENT      reports two blockages not able to be stented   • CORONARY ARTERY BYPASS GRAFT  2008    3 vessels   • HERNIA REPAIR      hiatal hernia   • HYSTERECTOMY      partial not due to malignancy   • LAPAROTOMY N/A 9/26/2018    Procedure: LAPAROTOMY EXPLORATORY WITH  RIGHT HEMICOLECTOMY;  Surgeon: Lorin Mendez DO;  Location: [facility];  Service: Gastroenterology   • LAPAROTOMY N/A 9/26/2018    Procedure: LAPAROTOMY EXPLORATORY WITH HEMICOLECTOMY;  Surgeon: Kendal Garrido DO;  Location: [facility];  Service: General   • VA COLONOSCOPY FLX DX W/COLLJ SPEC WHEN PFRMD N/A 9/26/2018    Procedure: COLONOSCOPY;  Surgeon: Lorin Mendez DO;  Location: [facility];  Service: Gastroenterology   • VA HEMORRHOIDECTOMY,INT/EXT,1 COLUMN/GROUP N/A 7/25/2019    Procedure: HEMORRHOIDECTOMY EXCISION;  Surgeon: Zoraida Greene MD;  Location[facility] MAIN OR;  Service: General   • VA SIGMOIDOSCOPY FLX DX W/COLLJ SPEC BR/WA IF PFRMD N/A 6/21/2018    Procedure: Kiara Solis FLEXIBLE;  Surgeon: Lorin Mendez DO;  [facility]; Service: Gastroenterology   • VA SURG DIAGNOSTIC EXAM, ANORECTAL N/A 7/25/2019    Procedure: EXAM UNDER ANESTHESIA (EUA);   Surgeon: Zoraida Greene MD;  Location: [address] MAIN OR;  Service: General       Family History   Problem Relation Age of Onset   • Heart disease Mother    • Cirrhosis Father    • Cancer Father    • Lung cancer Brother • No Known Problems Sister    • No Known Problems Daughter    • No Known Problems Son    • No Known Problems Paternal Aunt      I have reviewed and agree with the history as documented  E-Cigarette/Vaping   • E-Cigarette Use Never User      E-Cigarette/Vaping Substances   • Nicotine No    • THC No    • CBD No    • Flavoring No    • Other No    • Unknown No      Social History     Tobacco Use   • Smoking status: Former     Packs/day: 0 50     Years: 30 00     Pack years: 15 00     Types: Cigarettes   • Smokeless tobacco: Never   • Tobacco comments:     quit 15 yrs ago   Vaping Use   • Vaping Use: Never used   Substance Use Topics   • Alcohol use: Never     Alcohol/week: 0 0 standard drinks   • Drug use: No       Review of Systems   Constitutional: Positive for chills and fever  HENT: Positive for congestion  Negative for ear pain and sore throat  Eyes: Negative for pain and visual disturbance  Respiratory: Positive for cough  Negative for apnea, chest tightness, shortness of breath and wheezing  Cardiovascular: Negative for chest pain, palpitations and leg swelling  Gastrointestinal: Negative for abdominal pain, constipation and vomiting  Genitourinary: Negative for dysuria and hematuria  Musculoskeletal: Negative for arthralgias and back pain  Skin: Negative for color change and rash  Neurological: Negative for dizziness, seizures, syncope and headaches  All other systems reviewed and are negative  Physical Exam  Physical Exam  Vitals and nursing note reviewed  Constitutional:       General: She is not in acute distress  Appearance: She is well-developed  She is not ill-appearing, toxic-appearing or diaphoretic  HENT:      Head: Normocephalic and atraumatic  Mouth/Throat:      Mouth: Mucous membranes are moist       Pharynx: No pharyngeal swelling or oropharyngeal exudate     Eyes:      Conjunctiva/sclera: Conjunctivae normal    Cardiovascular:      Rate and Rhythm: Normal rate and regular rhythm  Heart sounds: No murmur heard  Pulmonary:      Effort: Pulmonary effort is normal  No tachypnea, bradypnea, accessory muscle usage or respiratory distress  Breath sounds: No stridor  Wheezing (Mild, diffuse, expiratory) present  No decreased breath sounds, rhonchi or rales  Abdominal:      General: Bowel sounds are normal  There is no distension  Palpations: Abdomen is soft  There is no mass  Tenderness: There is no abdominal tenderness  Musculoskeletal:         General: No swelling  Cervical back: Neck supple  Right lower leg: No edema  Left lower leg: No edema  Comments: Pt has diffuse tenderness at bilateral extremities from her knees to her ankles  Pain is equal on both sides  Pt reports that she always has this pain and it is due to arthritis at her knees and ankles  There is no calf swelling, redness, or warmth  Skin:     General: Skin is warm and dry  Capillary Refill: Capillary refill takes less than 2 seconds  Coloration: Skin is not pale  Neurological:      Mental Status: She is alert     Psychiatric:         Mood and Affect: Mood normal          Vital Signs  ED Triage Vitals [11/29/22 1619]   Temperature Pulse Respirations Blood Pressure SpO2   97 5 °F (36 4 °C) 99 20 167/74 95 %      Temp Source Heart Rate Source Patient Position - Orthostatic VS BP Location FiO2 (%)   Temporal Monitor Sitting Right arm --      Pain Score       5           Vitals:    11/29/22 1619 11/29/22 1715   BP: 167/74    Pulse: 99 91   Patient Position - Orthostatic VS: Sitting          Visual Acuity      ED Medications  Medications   ipratropium-albuterol (DUO-NEB) 0 5-2 5 mg/3 mL inhalation solution 3 mL (3 mL Nebulization Given 11/29/22 1652)       Diagnostic Studies  Results Reviewed     Procedure Component Value Units Date/Time    FLU/RSV/COVID - if FLU/RSV clinically relevant [[id number]]  (Normal) Collected: 11/29/22 1647    Lab Status: Final result Specimen: Nares from Nose Updated: 11/29/22 1811     SARS-CoV-2 Negative     INFLUENZA A PCR Negative     INFLUENZA B PCR Negative     RSV PCR Negative    Narrative:      FOR PEDIATRIC PATIENTS - copy/paste COVID Guidelines URL to browser: [email or web address]    SARS-CoV-2 assay is a Nucleic Acid Amplification assay intended for the  qualitative detection of nucleic acid from SARS-CoV-2 in nasopharyngeal  swabs  Results are for the presumptive identification of SARS-CoV-2 RNA  Positive results are indicative of infection with SARS-CoV-2, the virus  causing COVID-19, but do not rule out bacterial infection or co-infection  with other viruses  Laboratories within the [place] and its  territories are required to report all positive results to the appropriate  public health authorities  Negative results do not preclude SARS-CoV-2  infection and should not be used as the sole basis for treatment or other  patient management decisions  Negative results must be combined with  clinical observations, patient history, and epidemiological information  This test has not been FDA cleared or approved  This test has been authorized by FDA under an Emergency Use Authorization  (EUA)  This test is only authorized for the duration of time the  declaration that circumstances exist justifying the authorization of the  emergency use of an in vitro diagnostic tests for detection of SARS-CoV-2  virus and/or diagnosis of COVID-19 infection under section 564(b)(1) of  the Act, 21 U  S C  759OPA-7(N)(5), unless the authorization is terminated  or revoked sooner  The test has been validated but independent review by FDA  and CLIA is pending  Test performed using [employer] GeneXpert: This RT-PCR assay targets N2,  a region unique to SARS-CoV-2   A conserved region in the E-gene was chosen  for pan-Sarbecovirus detection which includes SARS-CoV-2  According to CMS-2020-01-R, this platform meets the definition of high-throughput technology      High Sensitivity Troponin I Random [[id number]]  (Abnormal) Collected: 11/29/22 1647    Lab Status: Final result Specimen: Blood from Arm, Right Updated: 11/29/22 1717     HS TnI random 7 ng/L     NT-BNP PRO [[id number]]  (Abnormal) Collected: 11/29/22 1647    Lab Status: Final result Specimen: Blood from Arm, Right Updated: 11/29/22 1716     NT-proBNP 779 pg/mL     Basic metabolic panel [[id number]] Collected: 11/29/22 1647    Lab Status: Final result Specimen: Blood from Arm, Right Updated: 11/29/22 1716     Sodium 138 mmol/L      Potassium 3 8 mmol/L      Chloride 103 mmol/L      CO2 26 mmol/L      ANION GAP 9 mmol/L      BUN 13 mg/dL      Creatinine 1 08 mg/dL      Glucose 119 mg/dL      Calcium 8 6 mg/dL      eGFR 47 ml/min/1 73sq m     Narrative:      [facility] guidelines for Chronic Kidney Disease (CKD):   •  Stage 1 with normal or high GFR (GFR > 90 mL/min/1 73 square meters)  •  Stage 2 Mild CKD (GFR = 60-89 mL/min/1 73 square meters)  •  Stage 3A Moderate CKD (GFR = 45-59 mL/min/1 73 square meters)  •  Stage 3B Moderate CKD (GFR = 30-44 mL/min/1 73 square meters)  •  Stage 4 Severe CKD (GFR = 15-29 mL/min/1 73 square meters)  •  Stage 5 End Stage CKD (GFR <15 mL/min/1 73 square meters)  Note: GFR calculation is accurate only with a steady state creatinine    CBC and differential [[id number]]  (Abnormal) Collected: 11/29/22 1647    Lab Status: Final result Specimen: Blood from Arm, Right Updated: 11/29/22 1653     WBC 8 52 Thousand/uL      RBC 3 76 Million/uL      Hemoglobin 11 8 g/dL      Hematocrit 36 8 %      MCV 98 fL      MCH 31 4 pg      MCHC 32 1 g/dL      RDW 13 6 %      MPV 9 2 fL      Platelets 859 Thousands/uL      nRBC 0 /100 WBCs      Neutrophils Relative 76 %      Immat GRANS % 0 %      Lymphocytes Relative 14 %      Monocytes Relative 9 %      Eosinophils Relative 1 %      Basophils Relative 0 %      Neutrophils Absolute 6 50 Thousands/µL      Immature Grans Absolute 0 02 Thousand/uL      Lymphocytes Absolute 1 15 Thousands/µL      Monocytes Absolute 0 75 Thousand/µL      Eosinophils Absolute 0 07 Thousand/µL      Basophils Absolute 0 03 Thousands/µL                  XR chest portable   ED Interpretation by Artie Meza PA-C (11/29 1838)   No acute obvious cardiopulmonary abnormality when read by me                 Procedures  Procedures         ED Course                                             MDM  Number of Diagnoses or Management Options  Chronic congestive heart failure, unspecified heart failure type [facility]): new and requires workup  Cough: new and requires workup  Viral upper respiratory illness: new and requires workup  Diagnosis management comments: Hu Jennings is a 79 y o  female with a past medical history of coronary artery disease, HLD, HTN,, emphysema of lung, and CHF presenting to the ED complaining coughing congestion which began 3 days ago  On examination patient is well-appearing in no acute distress  Vital signs are within normal limits and patient is not tachycardic or tachypneic  SpO2 is 95% on room air  Lung examination reveals mild diffuse expiratory wheezes  There is diffuse tenderness and below bilateral knees which is equal on both sides  There is no calf swelling or erythema  Patient reports that she has had chronic pain in her lower legs for many years and this pain is not new  Ordered chest x-ray, DuoNeb, EKG, random troponin, CBC, BMP, BNP, and COVID/flu/RSV  After receiving her DuoNeb patient was re-examined by me  Lungs are clear to auscultation there are no wheezes rales or rhonchi  CBC, BMP, and random troponin are unremarkable  EKG reveals normal sinus rhythm  Chest x-ray reveals no obvious acute cardiopulmonary abnormality when read by me  COVID/flu/RSV is negative  BNP is elevated at 779   Discussed with patient that her cough could possibly be multi factorial including a possible viral upper respiratory infection and a congestive heart failure exacerbation  Discussed with patient that considering her stable vital signs, her lack of respiratory distress, her normal EKG, and her low troponin, there is no need for admission at this time  Advised that patient double her Lasix dose for the next 3 days and then return to normal   Advised that patient follow very closely with her PCP within the next day or two  Extensively discussed with patient the importance of following up with her PCP  Advised strict return precautions to the ED including but not limited to development of any chest pain, shortness of breath, or worsening of any symptoms or development of any new symptoms  Patient is understanding and agreeable with plan  Amount and/or Complexity of Data Reviewed  Clinical lab tests: ordered and reviewed  Tests in the radiology section of CPT®: ordered and reviewed  Independent visualization of images, tracings, or specimens: yes    Patient Progress  Patient progress: improved      Disposition  Final diagnoses:   Chronic congestive heart failure, unspecified heart failure type ([facility] )   Cough   Viral upper respiratory illness     Time reflects when diagnosis was documented in both MDM as applicable and the Disposition within this note     Time User Action Codes Description Comment    11/29/2022  6:23 PM Kelton Elkins [I50 [id number]] Chronic congestive heart failure, unspecified heart failure type ([facility] )     11/29/2022  6:23 PM Steph Kinsey [R05 [id number]] Cough     11/29/2022  6:23 PM Kelton Elkins [J06 [id number]] Viral upper respiratory illness       ED Disposition     ED Disposition   Discharge    Condition   Stable    Date/Time   Tue Nov 29, 2022  6:23 PM    Patel Silva discharge to home/self care                 Follow-up Information     Follow up With Specialties Details Why Contact Info Additional Information    Iliana Estrella MD Emergency Medicine, Internal Medicine In 1 day  [address] [id number]  [address] [place] [address] Emergency Department Emergency Medicine  If symptoms worsen Kraen Herrera [phone]  70 [facility] Emergency Department, [address], [address]          Discharge Medication List as of 11/29/2022  6:24 PM      START taking these medications    Details   benzonatate (TESSALON PERLES) 100 mg capsule Take 1 capsule (100 mg total) by mouth every 8 (eight) hours, Starting Tue 11/29/2022, Normal         CONTINUE these medications which have NOT CHANGED    Details   acetaminophen (TYLENOL) 650 mg CR tablet Take 1 tablet (650 mg total) by mouth every 8 (eight) hours as needed for mild pain or moderate pain, Starting Thu 7/25/2019, Print      ALPRAZolam (XANAX) 0 25 mg tablet Take 0 25 mg by mouth 3 (three) times a day as needed for anxiety  , Historical Med      apixaban (ELIQUIS) 5 mg Take 1 tablet (5 mg total) by mouth 2 (two) times a day, Starting Tue 9/24/2019, Until Wed 7/27/2022, Normal      atorvastatin (LIPITOR) 20 mg tablet Take 20 mg by mouth daily after dinner   , Historical Med      calcium carbonate (OS-FERN) 600 MG tablet Take 600 mg by mouth 2 (two) times a day with meals, Historical Med      cyanocobalamin (VITAMIN B-12) 100 mcg tablet Take by mouth daily, Historical Med      ergocalciferol (ERGOCALCIFEROL) 88395 UNITS capsule Take 50,000 Units by mouth once a week   Takes on Wednesdays, Historical Med      esomeprazole (NexIUM) 40 MG capsule Take 20 mg by mouth Daily , Historical Med      folic acid (FOLVITE) 1 mg tablet Take by mouth daily, Historical Med      furosemide (LASIX) 20 mg tablet Take 1 tablet (20 mg total) by mouth 2 (two) times a day Hold until diarrhea resolved, Starting Tue 12/3/2019, No Print      gabapentin (NEURONTIN) 100 mg capsule Take 100 mg by mouth 3 (three) times a day, Historical Med      lisinopril (ZESTRIL) 5 mg tablet TAKE 1 TABLET EVERY DAY BY ORAL ROUTE, Historical Med      metolazone (ZAROXOLYN) 2 5 mg tablet TAKE ONE TABLET BY MOUTH ONCE A WEEK OR  AS  DIRECTED  FOR  WEIGHT  GAIN, Normal      metoprolol tartrate (LOPRESSOR) 50 mg tablet take 1 tablet by mouth every 12 hours, Normal      Misc  Devices (SPRAY BOTTLE/PLASTIC 120ML) MISC by Does not apply route 3 (three) times a day Use to cleanse area 3 times daily or as needed with bowel movements  , Starting Thu 7/25/2019, Normal      Multiple Vitamins-Minerals (PRESERVISION AREDS) CAPS Take 1 capsule by mouth daily  , Historical Med      nitroglycerin (NITROSTAT) 0 4 mg SL tablet Place 1 tablet (0 4 mg total) under the tongue every 5 (five) minutes as needed for chest pain (times three for chest pain  If no relief after second tablet, call 911 ), Starting u 11/21/2019, Print      Omega-3 Fatty Acids (FISH OIL PO) Take 1,000 mg by mouth daily  , Historical Med      potassium chloride (K-DUR,KLOR-CON) 20 mEq tablet take 1 tablet by mouth once daily, Normal             No discharge procedures on file      PDMP Review     None          ED Provider  Electronically Signed by           Ludy Toussaint PA-C  11/29/22 1923

## 2022-11-29 NOTE — ED NOTES
CHANGED PULSEOX SITE, PLETH WASN'T GOOD  O2 SAT 99% AFTER CHANGING SITE        Mirna Zamarripa RN  11/29/22 5143

## 2022-11-30 LAB
ATRIAL RATE: 93 BPM
P AXIS: 58 DEGREES
PR INTERVAL: 148 MS
QRS AXIS: 13 DEGREES
QRSD INTERVAL: 102 MS
QT INTERVAL: 370 MS
QTC INTERVAL: 460 MS
T WAVE AXIS: 26 DEGREES
VENTRICULAR RATE: 93 BPM

## 2022-12-07 NOTE — ASSESSMENT & PLAN NOTE
BNP 5295  Patient expresses fluid excess concern on admission  Xr Chest 1 View Portable"Impression: Small right pleural effusion   Status post CABG "  ECHO completed EF 60%  Resume PTA -lasix 40 mg   Accurate I & O   Daily weight   Respiratory protocol <--- Click to Launch ICDx for PreOp, PostOp and Procedure

## 2022-12-29 ENCOUNTER — HOSPITAL ENCOUNTER (INPATIENT)
Facility: HOSPITAL | Age: 82
LOS: 5 days | Discharge: HOME | End: 2023-01-03
Attending: EMERGENCY MEDICINE | Admitting: INTERNAL MEDICINE

## 2022-12-29 ENCOUNTER — APPOINTMENT (EMERGENCY)
Dept: RADIOLOGY | Facility: HOSPITAL | Age: 82
End: 2022-12-29

## 2022-12-29 ENCOUNTER — APPOINTMENT (EMERGENCY)
Dept: CT IMAGING | Facility: HOSPITAL | Age: 82
End: 2022-12-29

## 2022-12-29 DIAGNOSIS — K76.0 HEPATIC STEATOSIS: ICD-10-CM

## 2022-12-29 DIAGNOSIS — J18.9 MULTIFOCAL PNEUMONIA: ICD-10-CM

## 2022-12-29 DIAGNOSIS — J10.1 INFLUENZA A: ICD-10-CM

## 2022-12-29 DIAGNOSIS — J18.9 BILATERAL PNEUMONIA: ICD-10-CM

## 2022-12-29 DIAGNOSIS — J43.9 EMPHYSEMA OF LUNG (HCC): ICD-10-CM

## 2022-12-29 DIAGNOSIS — R09.02 HYPOXIA: Primary | ICD-10-CM

## 2022-12-29 DIAGNOSIS — J90 PLEURAL EFFUSION: ICD-10-CM

## 2022-12-29 PROBLEM — R79.89 ELEVATED D-DIMER: Status: ACTIVE | Noted: 2022-12-29

## 2022-12-29 PROBLEM — I48.92 PAROXYSMAL ATRIAL FLUTTER (HCC): Status: ACTIVE | Noted: 2022-12-29

## 2022-12-29 PROBLEM — E87.1 HYPONATREMIA: Status: ACTIVE | Noted: 2022-12-29

## 2022-12-29 PROBLEM — A41.9 SEPSIS (HCC): Status: ACTIVE | Noted: 2022-12-29

## 2022-12-29 LAB
2HR DELTA HS TROPONIN: 0 NG/L
ALBUMIN SERPL BCP-MCNC: 3.1 G/DL (ref 3.5–5)
ALP SERPL-CCNC: 106 U/L (ref 46–116)
ALT SERPL W P-5'-P-CCNC: 21 U/L (ref 12–78)
ANION GAP SERPL CALCULATED.3IONS-SCNC: 9 MMOL/L (ref 4–13)
APTT PPP: 48 SECONDS (ref 23–37)
AST SERPL W P-5'-P-CCNC: 20 U/L (ref 5–45)
BASE EX.OXY STD BLDV CALC-SCNC: 58.9 % (ref 60–80)
BASE EXCESS BLDV CALC-SCNC: -2.6 MMOL/L
BASOPHILS # BLD AUTO: 0.02 THOUSANDS/ÂΜL (ref 0–0.1)
BASOPHILS NFR BLD AUTO: 0 % (ref 0–1)
BILIRUB SERPL-MCNC: 1.08 MG/DL (ref 0.2–1)
BUN SERPL-MCNC: 20 MG/DL (ref 5–25)
CALCIUM ALBUM COR SERPL-MCNC: 10 MG/DL (ref 8.3–10.1)
CALCIUM SERPL-MCNC: 9.3 MG/DL (ref 8.3–10.1)
CARDIAC TROPONIN I PNL SERPL HS: 11 NG/L
CARDIAC TROPONIN I PNL SERPL HS: 11 NG/L
CHLORIDE SERPL-SCNC: 101 MMOL/L (ref 96–108)
CO2 SERPL-SCNC: 24 MMOL/L (ref 21–32)
CREAT SERPL-MCNC: 1.21 MG/DL (ref 0.6–1.3)
D DIMER PPP FEU-MCNC: 0.51 UG/ML FEU
EOSINOPHIL # BLD AUTO: 0.01 THOUSAND/ÂΜL (ref 0–0.61)
EOSINOPHIL NFR BLD AUTO: 0 % (ref 0–6)
ERYTHROCYTE [DISTWIDTH] IN BLOOD BY AUTOMATED COUNT: 14.7 % (ref 11.6–15.1)
FLUAV RNA RESP QL NAA+PROBE: POSITIVE
FLUBV RNA RESP QL NAA+PROBE: NEGATIVE
GFR SERPL CREATININE-BSD FRML MDRD: 41 ML/MIN/1.73SQ M
GLUCOSE SERPL-MCNC: 140 MG/DL (ref 65–140)
HCO3 BLDV-SCNC: 21.1 MMOL/L (ref 24–30)
HCT VFR BLD AUTO: 37.1 % (ref 34.8–46.1)
HGB BLD-MCNC: 12 G/DL (ref 11.5–15.4)
IMM GRANULOCYTES # BLD AUTO: 0.06 THOUSAND/UL (ref 0–0.2)
IMM GRANULOCYTES NFR BLD AUTO: 1 % (ref 0–2)
INR PPP: 1.92 (ref 0.84–1.19)
LACTATE SERPL-SCNC: 1.7 MMOL/L (ref 0.5–2)
LYMPHOCYTES # BLD AUTO: 0.6 THOUSANDS/ÂΜL (ref 0.6–4.47)
LYMPHOCYTES NFR BLD AUTO: 5 % (ref 14–44)
MAGNESIUM SERPL-MCNC: 2.2 MG/DL (ref 1.6–2.6)
MCH RBC QN AUTO: 31.3 PG (ref 26.8–34.3)
MCHC RBC AUTO-ENTMCNC: 32.3 G/DL (ref 31.4–37.4)
MCV RBC AUTO: 97 FL (ref 82–98)
MONOCYTES # BLD AUTO: 0.35 THOUSAND/ÂΜL (ref 0.17–1.22)
MONOCYTES NFR BLD AUTO: 3 % (ref 4–12)
NEUTROPHILS # BLD AUTO: 11.95 THOUSANDS/ÂΜL (ref 1.85–7.62)
NEUTS SEG NFR BLD AUTO: 91 % (ref 43–75)
NRBC BLD AUTO-RTO: 0 /100 WBCS
NT-PROBNP SERPL-MCNC: 1639 PG/ML
O2 CT BLDV-SCNC: 10.7 ML/DL
PCO2 BLDV: 33.2 MM HG (ref 42–50)
PH BLDV: 7.42 [PH] (ref 7.3–7.4)
PLATELET # BLD AUTO: 158 THOUSANDS/UL (ref 149–390)
PMV BLD AUTO: 9.4 FL (ref 8.9–12.7)
PO2 BLDV: 30.4 MM HG (ref 35–45)
POTASSIUM SERPL-SCNC: 4 MMOL/L (ref 3.5–5.3)
PROT SERPL-MCNC: 7.9 G/DL (ref 6.4–8.4)
PROTHROMBIN TIME: 22.2 SECONDS (ref 11.6–14.5)
RBC # BLD AUTO: 3.83 MILLION/UL (ref 3.81–5.12)
RSV RNA RESP QL NAA+PROBE: NEGATIVE
SARS-COV-2 RNA RESP QL NAA+PROBE: NEGATIVE
SODIUM SERPL-SCNC: 134 MMOL/L (ref 135–147)
WBC # BLD AUTO: 12.99 THOUSAND/UL (ref 4.31–10.16)

## 2022-12-29 RX ORDER — ATORVASTATIN CALCIUM 10 MG/1
20 TABLET, FILM COATED ORAL
Status: DISCONTINUED | OUTPATIENT
Start: 2022-12-29 | End: 2023-01-03 | Stop reason: HOSPADM

## 2022-12-29 RX ORDER — IPRATROPIUM BROMIDE AND ALBUTEROL SULFATE 2.5; .5 MG/3ML; MG/3ML
3 SOLUTION RESPIRATORY (INHALATION) ONCE
Status: COMPLETED | OUTPATIENT
Start: 2022-12-29 | End: 2022-12-29

## 2022-12-29 RX ORDER — ACETAMINOPHEN 325 MG/1
650 TABLET ORAL EVERY 6 HOURS PRN
Status: DISCONTINUED | OUTPATIENT
Start: 2022-12-29 | End: 2023-01-03 | Stop reason: HOSPADM

## 2022-12-29 RX ORDER — METHYLPREDNISOLONE SODIUM SUCCINATE 40 MG/ML
40 INJECTION, POWDER, LYOPHILIZED, FOR SOLUTION INTRAMUSCULAR; INTRAVENOUS EVERY 12 HOURS SCHEDULED
Status: DISCONTINUED | OUTPATIENT
Start: 2022-12-29 | End: 2023-01-03 | Stop reason: HOSPADM

## 2022-12-29 RX ORDER — BENZONATATE 100 MG/1
100 CAPSULE ORAL 3 TIMES DAILY
Status: DISCONTINUED | OUTPATIENT
Start: 2022-12-29 | End: 2023-01-03 | Stop reason: HOSPADM

## 2022-12-29 RX ORDER — CEFTRIAXONE 1 G/50ML
1000 INJECTION, SOLUTION INTRAVENOUS EVERY 24 HOURS
Status: DISCONTINUED | OUTPATIENT
Start: 2022-12-30 | End: 2023-01-03 | Stop reason: HOSPADM

## 2022-12-29 RX ORDER — METOPROLOL TARTRATE 50 MG/1
50 TABLET, FILM COATED ORAL EVERY 12 HOURS
Status: DISCONTINUED | OUTPATIENT
Start: 2022-12-29 | End: 2023-01-03 | Stop reason: HOSPADM

## 2022-12-29 RX ORDER — ALPRAZOLAM 0.25 MG/1
0.25 TABLET ORAL 3 TIMES DAILY PRN
Status: DISCONTINUED | OUTPATIENT
Start: 2022-12-29 | End: 2023-01-03 | Stop reason: HOSPADM

## 2022-12-29 RX ORDER — METHYLPREDNISOLONE SODIUM SUCCINATE 125 MG/2ML
125 INJECTION, POWDER, LYOPHILIZED, FOR SOLUTION INTRAMUSCULAR; INTRAVENOUS ONCE
Status: COMPLETED | OUTPATIENT
Start: 2022-12-29 | End: 2022-12-29

## 2022-12-29 RX ORDER — FOLIC ACID 1 MG/1
1 TABLET ORAL DAILY
Status: DISCONTINUED | OUTPATIENT
Start: 2022-12-30 | End: 2023-01-03 | Stop reason: HOSPADM

## 2022-12-29 RX ORDER — ONDANSETRON 2 MG/ML
4 INJECTION INTRAMUSCULAR; INTRAVENOUS EVERY 6 HOURS PRN
Status: DISCONTINUED | OUTPATIENT
Start: 2022-12-29 | End: 2023-01-03 | Stop reason: HOSPADM

## 2022-12-29 RX ORDER — CEFTRIAXONE 1 G/50ML
1000 INJECTION, SOLUTION INTRAVENOUS ONCE
Status: COMPLETED | OUTPATIENT
Start: 2022-12-29 | End: 2022-12-29

## 2022-12-29 RX ORDER — OSELTAMIVIR PHOSPHATE 30 MG/1
30 CAPSULE ORAL EVERY 12 HOURS SCHEDULED
Status: COMPLETED | OUTPATIENT
Start: 2022-12-29 | End: 2023-01-03

## 2022-12-29 RX ADMIN — IPRATROPIUM BROMIDE AND ALBUTEROL SULFATE 3 ML: 2.5; .5 SOLUTION RESPIRATORY (INHALATION) at 15:08

## 2022-12-29 RX ADMIN — SODIUM CHLORIDE 500 MG: 0.9 INJECTION, SOLUTION INTRAVENOUS at 18:33

## 2022-12-29 RX ADMIN — OSELTAMIVIR PHOSPHATE 30 MG: 30 CAPSULE ORAL at 20:35

## 2022-12-29 RX ADMIN — METHYLPREDNISOLONE SODIUM SUCCINATE 125 MG: 125 INJECTION, POWDER, FOR SOLUTION INTRAMUSCULAR; INTRAVENOUS at 15:08

## 2022-12-29 RX ADMIN — APIXABAN 5 MG: 5 TABLET, FILM COATED ORAL at 20:35

## 2022-12-29 RX ADMIN — CEFTRIAXONE 1000 MG: 1 INJECTION, SOLUTION INTRAVENOUS at 17:43

## 2022-12-29 RX ADMIN — METHYLPREDNISOLONE SODIUM SUCCINATE 40 MG: 40 INJECTION, POWDER, FOR SOLUTION INTRAMUSCULAR; INTRAVENOUS at 20:35

## 2022-12-29 RX ADMIN — MULTIPLE VITAMINS W/ MINERALS TAB 1 TABLET: TAB at 21:24

## 2022-12-29 RX ADMIN — BENZONATATE 100 MG: 100 CAPSULE ORAL at 20:35

## 2022-12-29 RX ADMIN — METOPROLOL TARTRATE 50 MG: 50 TABLET, FILM COATED ORAL at 21:24

## 2022-12-29 RX ADMIN — ATORVASTATIN CALCIUM 20 MG: 40 TABLET, FILM COATED ORAL at 20:35

## 2022-12-29 NOTE — ASSESSMENT & PLAN NOTE
· Not on any home supplemental oxygen therapy  · Currently requiring 3 lpm of continuous supplemental oxygen  · Will need a home supplemental oxygen evaluation on the day of discharge

## 2022-12-29 NOTE — ED PROVIDER NOTES
History  Chief Complaint   Patient presents with   • Shortness of Breath     Patient presents to the emergency department today for evaluation of shortness of breath and cough  This is an 41-year-old female presents via private vehicle  Found to be hypoxic in the mid 80s on triage placed on 2 L nasal cannula  History of CHF however no history of COPD  Former smoker quit 20 years ago  States she was seen here about 3 to 4 weeks ago was placed on Tessalon Perles follow-up with family doctor was placed on prednisone  She states she initially got better however the coughing and shortness of breath is becoming worse she is producing a large amount of dark-colored mucus  She has had intermittent fevers  Some chest tightness  Denies leg edema  She is on diuretics  Prior to Admission Medications   Prescriptions Last Dose Informant Patient Reported? Taking? ALPRAZolam (XANAX) 0 25 mg tablet   Yes No   Sig: Take 0 25 mg by mouth 3 (three) times a day as needed for anxiety  Misc  Devices (SPRAY BOTTLE/PLASTIC 120ML) MISC   No No   Sig: by Does not apply route 3 (three) times a day Use to cleanse area 3 times daily or as needed with bowel movements  Multiple Vitamins-Minerals (PRESERVISION AREDS) CAPS   Yes No   Sig: Take 1 capsule by mouth daily     Patient not taking: Reported on 7/27/2022   Omega-3 Fatty Acids (FISH OIL PO)   Yes No   Sig: Take 1,000 mg by mouth daily  acetaminophen (TYLENOL) 650 mg CR tablet   No No   Sig: Take 1 tablet (650 mg total) by mouth every 8 (eight) hours as needed for mild pain or moderate pain   apixaban (ELIQUIS) 5 mg   No No   Sig: Take 1 tablet (5 mg total) by mouth 2 (two) times a day   atorvastatin (LIPITOR) 20 mg tablet   Yes No   Sig: Take 20 mg by mouth daily after dinner       benzonatate (TESSALON PERLES) 100 mg capsule   No No   Sig: Take 1 capsule (100 mg total) by mouth every 8 (eight) hours   calcium carbonate (OS-FERN) 600 MG tablet   Yes No   Sig: Take 600 mg by mouth 2 (two) times a day with meals   cyanocobalamin (VITAMIN B-12) 100 mcg tablet   Yes No   Sig: Take by mouth daily   ergocalciferol (ERGOCALCIFEROL) 61026 UNITS capsule   Yes No   Sig: Take 50,000 Units by mouth once a week  Takes on Wednesdays   esomeprazole (NexIUM) 40 MG capsule   Yes No   Sig: Take 20 mg by mouth Daily    folic acid (FOLVITE) 1 mg tablet   Yes No   Sig: Take by mouth daily   furosemide (LASIX) 20 mg tablet   No No   Sig: Take 1 tablet (20 mg total) by mouth 2 (two) times a day Hold until diarrhea resolved   Patient taking differently: Take 40 mg by mouth daily Hold until diarrhea resolved   gabapentin (NEURONTIN) 100 mg capsule   Yes No   Sig: Take 100 mg by mouth 3 (three) times a day   lisinopril (ZESTRIL) 5 mg tablet   Yes No   Sig: TAKE 1 TABLET EVERY DAY BY ORAL ROUTE   metolazone (ZAROXOLYN) 2 5 mg tablet   No No   Sig: TAKE ONE TABLET BY MOUTH ONCE A WEEK OR  AS  DIRECTED  FOR  WEIGHT  GAIN   Patient not taking: Reported on 7/27/2022   metoprolol tartrate (LOPRESSOR) 50 mg tablet   No No   Sig: take 1 tablet by mouth every 12 hours   nitroglycerin (NITROSTAT) 0 4 mg SL tablet   No No   Sig: Place 1 tablet (0 4 mg total) under the tongue every 5 (five) minutes as needed for chest pain (times three for chest pain   If no relief after second tablet, call 911 )   potassium chloride (K-DUR,KLOR-CON) 20 mEq tablet   No No   Sig: take 1 tablet by mouth once daily      Facility-Administered Medications: None       Past Medical History:   Diagnosis Date   • Angina pectoris ([address] )    • Anxiety    • C  difficile diarrhea    • CAD (coronary artery disease)    • Cardiac disease    • Colon polyp    • COVID-19 04/2020   • Depression    • Diverticulitis of colon 11/2019   • GERD (gastroesophageal reflux disease)    • History of colon polyps    • History of hiatal hernia    • Hx of bleeding disorder     hemorrhoids   • Hyperlipidemia    • Hypertension    • Irregular heart beat     gets tachycardia   • Shortness of breath     related to heart       Past Surgical History:   Procedure Laterality Date   • CARDIAC ELECTROPHYSIOLOGY MAPPING AND ABLATION     • CARDIAC SURGERY     • CARDIAC SURGERY     • CATARACT EXTRACTION Bilateral    • CHOLECYSTECTOMY  1987   • COLON SURGERY      repair of colon   • COLON SURGERY  2018   • COLONOSCOPY  2009    Tubulovillous adenoma   • COLONOSCOPY N/A 6/18/2018    Procedure: COLONOSCOPY;  Surgeon: Caitlyn Childs DO;  Location: [facility]; Service: Gastroenterology   • COLONOSCOPY  2011    2 cm right colon polyp and 1 5 cm mid right colon polyps tubular adenomas   • COLONOSCOPY W/ CONTROL OF HEMORRHAGE     • CORONARY ANGIOPLASTY WITH STENT PLACEMENT      reports two blockages not able to be stented   • CORONARY ARTERY BYPASS GRAFT  2008    3 vessels   • HERNIA REPAIR      hiatal hernia   • HYSTERECTOMY      partial not due to malignancy   • LAPAROTOMY N/A 9/26/2018    Procedure: LAPAROTOMY EXPLORATORY WITH  RIGHT HEMICOLECTOMY;  Surgeon: Caitlyn Childs DO;  Location: [facility];  Service: Gastroenterology   • LAPAROTOMY N/A 9/26/2018    Procedure: LAPAROTOMY EXPLORATORY WITH HEMICOLECTOMY;  Surgeon: Jihan Hernandez DO;  Location: [facility];  Service: General   • [facility] ANRCT XM SURG REQ ANES GENERAL SPI/EDRL DX N/A 7/25/2019    Procedure: EXAM UNDER ANESTHESIA (EUA); Surgeon: Joan Loyola MD;  [facility] MAIN OR;  Service: General   • VA COLONOSCOPY FLX DX W/[facility] WHEN PFRMD N/A 9/26/2018    Procedure: COLONOSCOPY;  Surgeon: Caitlyn Childs DO;  Location: [facility];  Service: Gastroenterology   • [facility] HEMORRHOIDECTOMY [facility] COLUMN/GROUP N/A 7/25/2019    Procedure: HEMORRHOIDECTOMY EXCISION;  Surgeon: Joan Loyola MD;  Location: [facility] MAIN OR;  Service: General   • VA SIGMOIDOSCOPY FLX DX W/[facility] SPEC BR/WA IF PFRMD N/A 6/21/2018    Procedure: [place] FLEXIBLE;  Surgeon: Caitlyn Childs DO;  Location: [facility];   Service: Gastroenterology Family History   Problem Relation Age of Onset   • Heart disease Mother    • Cirrhosis Father    • Cancer Father    • Lung cancer Brother    • No Known Problems Sister    • No Known Problems Daughter    • No Known Problems Son    • No Known Problems Paternal Aunt      I have reviewed and agree with the history as documented  E-Cigarette/Vaping   • E-Cigarette Use Never User      E-Cigarette/Vaping Substances   • Nicotine No    • THC No    • CBD No    • Flavoring No    • Other No    • Unknown No      Social History     Tobacco Use   • Smoking status: Former     Packs/day: 0 50     Years: 30 00     Pack years: 15 00     Types: Cigarettes   • Smokeless tobacco: Never   • Tobacco comments:     quit 15 yrs ago   Vaping Use   • Vaping Use: Never used   Substance Use Topics   • Alcohol use: Never     Alcohol/week: 0 0 standard drinks   • Drug use: No       Review of Systems   Constitutional: Negative for chills and fever  HENT: Negative for ear pain and sore throat  Eyes: Negative for pain and visual disturbance  Respiratory: Positive for cough and shortness of breath  Cardiovascular: Positive for chest pain  Negative for palpitations  Gastrointestinal: Negative for abdominal pain and vomiting  Genitourinary: Negative for dysuria and hematuria  Musculoskeletal: Negative for arthralgias and back pain  Skin: Negative for color change and rash  Neurological: Negative for seizures and syncope  All other systems reviewed and are negative  Physical Exam  Physical Exam  Vitals reviewed  Constitutional:       General: She is not in acute distress  Appearance: She is well-developed  She is not ill-appearing or diaphoretic  HENT:      Right Ear: External ear normal  No swelling  Tympanic membrane is not bulging  Left Ear: External ear normal  No swelling  Tympanic membrane is not bulging  Nose: Nose normal       Mouth/Throat:      Pharynx: No oropharyngeal exudate     Eyes: General: Lids are normal       Conjunctiva/sclera: Conjunctivae normal       Pupils: Pupils are equal, round, and reactive to light  Neck:      Thyroid: No thyromegaly  Vascular: No JVD  Trachea: No tracheal deviation  Cardiovascular:      Rate and Rhythm: Normal rate and regular rhythm  Pulses: Normal pulses  Heart sounds: Normal heart sounds  No murmur heard  No friction rub  No gallop  Pulmonary:      Effort: Pulmonary effort is normal  No respiratory distress  Breath sounds: No stridor  Wheezing and rhonchi present  No rales  Chest:      Chest wall: No tenderness  Abdominal:      General: Bowel sounds are normal  There is no distension  Palpations: Abdomen is soft  There is no mass  Tenderness: There is no abdominal tenderness  There is no guarding or rebound  Negative signs include Parekh's sign  Hernia: No hernia is present  Musculoskeletal:         General: No tenderness  Normal range of motion  Cervical back: Normal range of motion and neck supple  No edema  Normal range of motion  Right lower leg: No tenderness  No edema  Left lower leg: No tenderness  No edema  Lymphadenopathy:      Cervical: No cervical adenopathy  Skin:     General: Skin is warm and dry  Capillary Refill: Capillary refill takes less than 2 seconds  Coloration: Skin is not pale  Findings: No erythema or rash  Neurological:      Mental Status: She is alert and oriented to person, place, and time  GCS: GCS eye subscore is 4  GCS verbal subscore is 5  GCS motor subscore is 6  Cranial Nerves: No cranial nerve deficit  Sensory: No sensory deficit  Deep Tendon Reflexes: Reflexes are normal and symmetric     Psychiatric:         Speech: Speech normal          Behavior: Behavior normal          Vital Signs  ED Triage Vitals [12/29/22 1351]   Temp Pulse Respirations Blood Pressure SpO2   -- (!) 108 22 133/65 (!) 86 %      Temp src Heart Rate Source Patient Position - Orthostatic VS BP Location FiO2 (%)   -- Monitor Sitting Right arm --      Pain Score       3           Vitals:    12/29/22 1351 12/29/22 1530   BP: 133/65 128/60   Pulse: (!) 108 98   Patient Position - Orthostatic VS: Sitting Sitting         Visual Acuity      ED Medications  Medications   cefTRIAXone (ROCEPHIN) IVPB (premix in dextrose) 1,000 mg 50 mL (has no administration in time range)   azithromycin (ZITHROMAX) 500 mg in sodium chloride 0 9% 250mL IVPB 500 mg (has no administration in time range)   ipratropium-albuterol (DUO-NEB) 0 5-2 5 mg/3 mL inhalation solution 3 mL (3 mL Nebulization Given 12/29/22 1508)   methylPREDNISolone sodium succinate (Solu-MEDROL) injection 125 mg (125 mg Intravenous Given 12/29/22 1508)       Diagnostic Studies  Results Reviewed     Procedure Component Value Units Date/Time    Comprehensive metabolic panel [[id number]]  (Abnormal) Collected: 12/29/22 1418    Lab Status: Final result Specimen: Blood from Arm, Right Updated: 12/29/22 1605     Sodium 134 mmol/L      Potassium 4 0 mmol/L      Chloride 101 mmol/L      CO2 24 mmol/L      ANION GAP 9 mmol/L      BUN 20 mg/dL      Creatinine 1 21 mg/dL      Glucose 140 mg/dL      Calcium 9 3 mg/dL      Corrected Calcium 10 0 mg/dL      AST 20 U/L      ALT 21 U/L      Alkaline Phosphatase 106 U/L      Total Protein 7 9 g/dL      Albumin 3 1 g/dL      Total Bilirubin 1 08 mg/dL      eGFR 41 ml/min/1 73sq m     Narrative:      [place] guidelines for Chronic Kidney Disease (CKD):   •  Stage 1 with normal or high GFR (GFR > 90 mL/min/1 73 square meters)  •  Stage 2 Mild CKD (GFR = 60-89 mL/min/1 73 square meters)  •  Stage 3A Moderate CKD (GFR = 45-59 mL/min/1 73 square meters)  •  Stage 3B Moderate CKD (GFR = 30-44 mL/min/1 73 square meters)  •  Stage 4 Severe CKD (GFR = 15-29 mL/min/1 73 square meters)  •  Stage 5 End Stage CKD (GFR <15 mL/min/1 73 square meters)  Note: GFR calculation is accurate only with a steady state creatinine    HS Troponin I 4hr [[id number]]     Lab Status: No result Specimen: Blood     NT-BNP PRO [[id number]]  (Abnormal) Collected: 12/29/22 1418    Lab Status: Final result Specimen: Blood from Arm, Right Updated: 12/29/22 1551     NT-proBNP 1,639 pg/mL     D-Dimer [[id number]]  (Abnormal) Collected: 12/29/22 1507    Lab Status: Final result Specimen: Blood from Arm, Right Updated: 12/29/22 1549     D-Dimer, Quant 0 51 ug/ml FEU     Narrative: In the evaluation for possible pulmonary embolism, in the appropriate (Well's Score of 4 or less) patient, the age adjusted d-dimer cutoff for this patient can be calculated as:    Age x 0 01 (in ug/mL) for Age-adjusted D-dimer exclusion threshold for a patient over 50 years  HS Troponin I 2hr [[id number]]  (Normal) Collected: 12/29/22 1507    Lab Status: Final result Specimen: Blood from Arm, Right Updated: 12/29/22 1544     hs TnI 2hr 11 ng/L      Delta 2hr hsTnI 0 ng/L     Magnesium [[id number]]  (Normal) Collected: 12/29/22 1507    Lab Status: Final result Specimen: Blood from Arm, Right Updated: 12/29/22 1536     Magnesium 2 2 mg/dL     Blood culture #1 [[id number]] Collected: 12/29/22 1507    Lab Status: In process Specimen: Blood from Arm, Right Updated: 12/29/22 1514    Blood culture #2 [[id number]] Collected: 12/29/22 1507    Lab Status: In process Specimen: Blood from Arm, Left Updated: 12/29/22 1514    FLU/RSV/COVID - if FLU/RSV clinically relevant [[id number]]  (Abnormal) Collected: 12/29/22 1418    Lab Status: Final result Specimen: Nares from Nose Updated: 12/29/22 1504     SARS-CoV-2 Negative     INFLUENZA A PCR Positive     INFLUENZA B PCR Negative     RSV PCR Negative    Narrative:      FOR PEDIATRIC PATIENTS - copy/paste COVID Guidelines URL to browser: [email or web address]  [email or web address]    SARS-CoV-2 assay is a Nucleic Acid Amplification assay intended for the  qualitative detection of nucleic acid from SARS-CoV-2 in nasopharyngeal  swabs  Results are for the presumptive identification of SARS-CoV-2 RNA  Positive results are indicative of infection with SARS-CoV-2, the virus  causing COVID-19, but do not rule out bacterial infection or co-infection  with other viruses  Laboratories within the [place] and its  territories are required to report all positive results to the appropriate  public health authorities  Negative results do not preclude SARS-CoV-2  infection and should not be used as the sole basis for treatment or other  patient management decisions  Negative results must be combined with  clinical observations, patient history, and epidemiological information  This test has not been FDA cleared or approved  This test has been authorized by FDA under an Emergency Use Authorization  (EUA)  This test is only authorized for the duration of time the  declaration that circumstances exist justifying the authorization of the  emergency use of an in vitro diagnostic tests for detection of SARS-CoV-2  virus and/or diagnosis of COVID-19 infection under section 564(b)(1) of  the Act, 21 U  S C  828HUL-0(Z)(8), unless the authorization is terminated  or revoked sooner  The test has been validated but independent review by FDA  and CLIA is pending  Test performed using [employer] GeneXpert: This RT-PCR assay targets N2,  a region unique to SARS-CoV-2  A conserved region in the E-gene was chosen  for pan-Sarbecovirus detection which includes SARS-CoV-2  According to CMS-2020-01-R, this platform meets the definition of high-throughput technology      HS Troponin 0hr (reflex protocol) [[id number]]  (Normal) Collected: 12/29/22 1418    Lab Status: Final result Specimen: Blood from Arm, Right Updated: 12/29/22 1449     hs TnI 0hr 11 ng/L     Lactic acid, plasma [[id number]]  (Normal) Collected: 12/29/22 1418    Lab Status: Final result Specimen: Blood from Arm, Right Updated: 12/29/22 1445     LACTIC ACID 1 7 mmol/L     Narrative:      Result may be elevated if tourniquet was used during collection  Protime-INR [[id number]]  (Abnormal) Collected: 12/29/22 1418    Lab Status: Final result Specimen: Blood from Arm, Right Updated: 12/29/22 1437     Protime 22 2 seconds      INR 1 92    APTT [[id number]]  (Abnormal) Collected: 12/29/22 1418    Lab Status: Final result Specimen: Blood from Arm, Right Updated: 12/29/22 1437     PTT 48 seconds     CBC and differential [[id number]]  (Abnormal) Collected: 12/29/22 1418    Lab Status: Final result Specimen: Blood from Arm, Right Updated: 12/29/22 1435     WBC 12 99 Thousand/uL      RBC 3 83 Million/uL      Hemoglobin 12 0 g/dL      Hematocrit 37 1 %      MCV 97 fL      MCH 31 3 pg      MCHC 32 3 g/dL      RDW 14 7 %      MPV 9 4 fL      Platelets 270 Thousands/uL      nRBC 0 /100 WBCs      Neutrophils Relative 91 %      Immat GRANS % 1 %      Lymphocytes Relative 5 %      Monocytes Relative 3 %      Eosinophils Relative 0 %      Basophils Relative 0 %      Neutrophils Absolute 11 95 Thousands/µL      Immature Grans Absolute 0 06 Thousand/uL      Lymphocytes Absolute 0 60 Thousands/µL      Monocytes Absolute 0 35 Thousand/µL      Eosinophils Absolute 0 01 Thousand/µL      Basophils Absolute 0 02 Thousands/µL     Narrative: This is an appended report  These results have been appended to a previously verified report  Blood gas, venous [[id number]]  (Abnormal) Collected: 12/29/22 1418    Lab Status: Final result Specimen: Blood from Arm, Right Updated: 12/29/22 1427     pH, Lopez 7 420     pCO2, Lopez 33 2 mm Hg      pO2, Lopez 30 4 mm Hg      HCO3, Lopez 21 1 mmol/L      Base Excess, Lopez -2 6 mmol/L      O2 Content, Lopez 10 7 ml/dL      O2 HGB, VENOUS 58 9 %                  CT chest without contrast   Final Result by Prasanth Betancourt MD (12/29 1637)      Background emphysema with multilobar pneumonia        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  One year follow-up suggested  Enlarged fatty liver  Workstation performed: [id number]         XR chest 1 view portable   Final Result by Bruce Mendieta MD (12/29 1544)      No acute cardiopulmonary disease  Workstation performed: [id number]                    Procedures  ECG 12 Lead Documentation Only    Date/Time: 12/29/2022 2:42 PM  Performed by: Robbie Rodriguez PA-C  Authorized by: Robbie Rodriguez PA-C     Indications / Diagnosis:  Shortness of breath chest tightness  ECG reviewed by me, the ED Provider: yes    Patient location:  ED  Previous ECG:     Comparison to cardiac monitor: Yes    Interpretation:     Interpretation: normal    Rate:     ECG rate:  102    ECG rate assessment: tachycardic    Rhythm:     Rhythm: sinus tachycardia    Ectopy:     Ectopy: none    QRS:     QRS axis:  Normal    QRS intervals:  Normal  Conduction:     Conduction: normal    ST segments:     ST segments:  Normal             ED Course  ED Course as of 12/29/22 1703   Thu Dec 29, 2022   1508 INFLU A PCR(!): Positive   35 Age rule out dimer   1557 FINDINGS:     Mild cardiomegaly, CABG      The lungs are clear  No pneumothorax or pleural effusion      Osseous structures appear within normal limits for patient age      IMPRESSION:     No acute cardiopulmonary disease       1647 IMPRESSION:     Background emphysema with multilobar pneumonia      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    One year follow-up suggested      Enlarged fatty liver       1701 TT sent to [facility]             HEART Risk Score    Flowsheet Row Most Recent Value   Heart Score Risk Calculator    History 0 Filed at: 12/29/2022 1443   ECG 0 Filed at: 12/29/2022 1443   Age 2 Filed at: 12/29/2022 1443   Risk Factors 2 Filed at: 12/29/2022 1443   Troponin 0 Filed at: 12/29/2022 1443   HEART Score 4 Filed at: 12/29/2022 1443 MDM    Disposition  Final diagnoses:   Hypoxia   Influenza A   Bilateral pneumonia     Time reflects when diagnosis was documented in both MDM as applicable and the Disposition within this note     Time User Action Codes Description Comment    12/29/2022  3:08 PM Sivakumar Rodriguez Add [R09 02] Hypoxia     12/29/2022  3:08 PM Sarah MARTINEZ Add [J10 1] Influenza A     12/29/2022  4:48 PM Sarah Mata D Add [J18 9] Bilateral pneumonia       ED Disposition     ED Disposition   Admit    Condition   Stable    Date/Time   u Dec 29, 2022  5:02 PM    Comment   Case was discussed with Dr Jori Gaytan and the patient's admission status was agreed to be Admission Status: inpatient status to the service of Dr Jori Gaytan   Follow-up Information    None         Patient's Medications   Discharge Prescriptions    No medications on file       No discharge procedures on file      PDMP Review     None          ED Provider  Electronically Signed by           Cruz Luciano PA-C  12/29/22 3954

## 2022-12-30 LAB
ALBUMIN SERPL BCP-MCNC: 2.5 G/DL (ref 3.5–5)
ALP SERPL-CCNC: 89 U/L (ref 46–116)
ALT SERPL W P-5'-P-CCNC: 18 U/L (ref 12–78)
ANION GAP SERPL CALCULATED.3IONS-SCNC: 8 MMOL/L (ref 4–13)
AST SERPL W P-5'-P-CCNC: 16 U/L (ref 5–45)
ATRIAL RATE: 102 BPM
BACTERIA UR QL AUTO: ABNORMAL /HPF
BASOPHILS # BLD AUTO: 0 THOUSANDS/ÂΜL (ref 0–0.1)
BASOPHILS NFR BLD AUTO: 0 % (ref 0–1)
BILIRUB SERPL-MCNC: 0.46 MG/DL (ref 0.2–1)
BILIRUB UR QL STRIP: NEGATIVE
BUN SERPL-MCNC: 19 MG/DL (ref 5–25)
CALCIUM ALBUM COR SERPL-MCNC: 9.9 MG/DL (ref 8.3–10.1)
CALCIUM SERPL-MCNC: 8.7 MG/DL (ref 8.3–10.1)
CARDIAC TROPONIN I PNL SERPL HS: 7 NG/L (ref 8–18)
CHLORIDE SERPL-SCNC: 104 MMOL/L (ref 96–108)
CLARITY UR: ABNORMAL
CO2 SERPL-SCNC: 24 MMOL/L (ref 21–32)
COLOR UR: YELLOW
CREAT SERPL-MCNC: 0.86 MG/DL (ref 0.6–1.3)
D DIMER PPP FEU-MCNC: 0.48 UG/ML FEU
EOSINOPHIL # BLD AUTO: 0 THOUSAND/ÂΜL (ref 0–0.61)
EOSINOPHIL NFR BLD AUTO: 0 % (ref 0–6)
ERYTHROCYTE [DISTWIDTH] IN BLOOD BY AUTOMATED COUNT: 14.5 % (ref 11.6–15.1)
GFR SERPL CREATININE-BSD FRML MDRD: 62 ML/MIN/1.73SQ M
GLUCOSE SERPL-MCNC: 151 MG/DL (ref 65–140)
GLUCOSE UR STRIP-MCNC: ABNORMAL MG/DL
HCT VFR BLD AUTO: 31.3 % (ref 34.8–46.1)
HGB BLD-MCNC: 10 G/DL (ref 11.5–15.4)
HGB UR QL STRIP.AUTO: NEGATIVE
IMM GRANULOCYTES # BLD AUTO: 0.03 THOUSAND/UL (ref 0–0.2)
IMM GRANULOCYTES NFR BLD AUTO: 1 % (ref 0–2)
KETONES UR STRIP-MCNC: ABNORMAL MG/DL
L PNEUMO1 AG UR QL IA.RAPID: NEGATIVE
LACTATE SERPL-SCNC: 1.1 MMOL/L (ref 0.5–2)
LEUKOCYTE ESTERASE UR QL STRIP: ABNORMAL
LYMPHOCYTES # BLD AUTO: 0.42 THOUSANDS/ÂΜL (ref 0.6–4.47)
LYMPHOCYTES NFR BLD AUTO: 7 % (ref 14–44)
MAGNESIUM SERPL-MCNC: 2.2 MG/DL (ref 1.6–2.6)
MCH RBC QN AUTO: 30.9 PG (ref 26.8–34.3)
MCHC RBC AUTO-ENTMCNC: 31.9 G/DL (ref 31.4–37.4)
MCV RBC AUTO: 97 FL (ref 82–98)
MONOCYTES # BLD AUTO: 0.13 THOUSAND/ÂΜL (ref 0.17–1.22)
MONOCYTES NFR BLD AUTO: 2 % (ref 4–12)
MUCOUS THREADS UR QL AUTO: ABNORMAL
NEUTROPHILS # BLD AUTO: 5.39 THOUSANDS/ÂΜL (ref 1.85–7.62)
NEUTS SEG NFR BLD AUTO: 90 % (ref 43–75)
NITRITE UR QL STRIP: NEGATIVE
NON-SQ EPI CELLS URNS QL MICRO: ABNORMAL /HPF
NRBC BLD AUTO-RTO: 0 /100 WBCS
P AXIS: 76 DEGREES
PH UR STRIP.AUTO: 6 [PH]
PHOSPHATE SERPL-MCNC: 2.4 MG/DL (ref 2.3–4.1)
PLATELET # BLD AUTO: 150 THOUSANDS/UL (ref 149–390)
PMV BLD AUTO: 9.5 FL (ref 8.9–12.7)
POTASSIUM SERPL-SCNC: 4.1 MMOL/L (ref 3.5–5.3)
PR INTERVAL: 146 MS
PROCALCITONIN SERPL-MCNC: 0.38 NG/ML
PROT SERPL-MCNC: 6.6 G/DL (ref 6.4–8.4)
PROT UR STRIP-MCNC: ABNORMAL MG/DL
QRS AXIS: 58 DEGREES
QRSD INTERVAL: 100 MS
QT INTERVAL: 348 MS
QTC INTERVAL: 453 MS
RBC # BLD AUTO: 3.24 MILLION/UL (ref 3.81–5.12)
RBC #/AREA URNS AUTO: ABNORMAL /HPF
S PNEUM AG UR QL: NEGATIVE
SODIUM SERPL-SCNC: 136 MMOL/L (ref 135–147)
SP GR UR STRIP.AUTO: 1.02 (ref 1–1.03)
T WAVE AXIS: 68 DEGREES
UROBILINOGEN UR QL STRIP.AUTO: 0.2 E.U./DL
VENTRICULAR RATE: 102 BPM
WBC # BLD AUTO: 5.97 THOUSAND/UL (ref 4.31–10.16)
WBC #/AREA URNS AUTO: ABNORMAL /HPF

## 2022-12-30 RX ORDER — FUROSEMIDE 20 MG/1
20 TABLET ORAL DAILY
Status: DISCONTINUED | OUTPATIENT
Start: 2022-12-30 | End: 2023-01-03 | Stop reason: HOSPADM

## 2022-12-30 RX ORDER — VANCOMYCIN HYDROCHLORIDE 125 MG/1
125 CAPSULE ORAL EVERY 12 HOURS SCHEDULED
Status: DISCONTINUED | OUTPATIENT
Start: 2022-12-30 | End: 2023-01-03 | Stop reason: HOSPADM

## 2022-12-30 RX ORDER — LISINOPRIL 5 MG/1
5 TABLET ORAL DAILY
Status: DISCONTINUED | OUTPATIENT
Start: 2022-12-30 | End: 2023-01-03 | Stop reason: HOSPADM

## 2022-12-30 RX ORDER — GUAIFENESIN/DEXTROMETHORPHAN 100-10MG/5
10 SYRUP ORAL EVERY 4 HOURS PRN
Status: DISCONTINUED | OUTPATIENT
Start: 2022-12-30 | End: 2022-12-31

## 2022-12-30 RX ORDER — LEVALBUTEROL 1.25 MG/.5ML
1.25 SOLUTION, CONCENTRATE RESPIRATORY (INHALATION)
Status: DISCONTINUED | OUTPATIENT
Start: 2022-12-30 | End: 2023-01-02

## 2022-12-30 RX ADMIN — FUROSEMIDE 20 MG: 40 TABLET ORAL at 12:03

## 2022-12-30 RX ADMIN — METHYLPREDNISOLONE SODIUM SUCCINATE 40 MG: 40 INJECTION, POWDER, FOR SOLUTION INTRAMUSCULAR; INTRAVENOUS at 20:57

## 2022-12-30 RX ADMIN — IPRATROPIUM BROMIDE 0.5 MG: 0.5 SOLUTION RESPIRATORY (INHALATION) at 14:52

## 2022-12-30 RX ADMIN — VANCOMYCIN HYDROCHLORIDE 125 MG: 125 CAPSULE ORAL at 21:07

## 2022-12-30 RX ADMIN — AZITHROMYCIN 500 MG: 500 INJECTION, POWDER, LYOPHILIZED, FOR SOLUTION INTRAVENOUS at 18:32

## 2022-12-30 RX ADMIN — LEVALBUTEROL HYDROCHLORIDE 1.25 MG: 1.25 SOLUTION, CONCENTRATE RESPIRATORY (INHALATION) at 14:52

## 2022-12-30 RX ADMIN — METOPROLOL TARTRATE 50 MG: 50 TABLET, FILM COATED ORAL at 10:03

## 2022-12-30 RX ADMIN — OSELTAMIVIR PHOSPHATE 30 MG: 30 CAPSULE ORAL at 20:57

## 2022-12-30 RX ADMIN — OSELTAMIVIR PHOSPHATE 30 MG: 30 CAPSULE ORAL at 10:03

## 2022-12-30 RX ADMIN — LEVALBUTEROL HYDROCHLORIDE 1.25 MG: 1.25 SOLUTION, CONCENTRATE RESPIRATORY (INHALATION) at 10:36

## 2022-12-30 RX ADMIN — IPRATROPIUM BROMIDE 0.5 MG: 0.5 SOLUTION RESPIRATORY (INHALATION) at 19:58

## 2022-12-30 RX ADMIN — LEVALBUTEROL HYDROCHLORIDE 1.25 MG: 1.25 SOLUTION, CONCENTRATE RESPIRATORY (INHALATION) at 19:58

## 2022-12-30 RX ADMIN — VITAM B12 100 MCG: 100 TAB at 12:03

## 2022-12-30 RX ADMIN — FOLIC ACID 1 MG: 1 TABLET ORAL at 10:03

## 2022-12-30 RX ADMIN — GUAIFENESIN AND DEXTROMETHORPHAN 10 ML: 100; 10 SYRUP ORAL at 17:28

## 2022-12-30 RX ADMIN — ATORVASTATIN CALCIUM 20 MG: 40 TABLET, FILM COATED ORAL at 17:28

## 2022-12-30 RX ADMIN — IPRATROPIUM BROMIDE 0.5 MG: 0.5 SOLUTION RESPIRATORY (INHALATION) at 10:36

## 2022-12-30 RX ADMIN — APIXABAN 5 MG: 5 TABLET, FILM COATED ORAL at 17:28

## 2022-12-30 RX ADMIN — CEFTRIAXONE 1000 MG: 1 INJECTION, SOLUTION INTRAVENOUS at 17:29

## 2022-12-30 RX ADMIN — BENZONATATE 100 MG: 100 CAPSULE ORAL at 17:28

## 2022-12-30 RX ADMIN — LISINOPRIL 5 MG: 5 TABLET ORAL at 12:03

## 2022-12-30 RX ADMIN — APIXABAN 5 MG: 5 TABLET, FILM COATED ORAL at 10:02

## 2022-12-30 RX ADMIN — BENZONATATE 100 MG: 100 CAPSULE ORAL at 20:57

## 2022-12-30 RX ADMIN — METHYLPREDNISOLONE SODIUM SUCCINATE 40 MG: 40 INJECTION, POWDER, FOR SOLUTION INTRAMUSCULAR; INTRAVENOUS at 09:59

## 2022-12-30 RX ADMIN — MULTIPLE VITAMINS W/ MINERALS TAB 1 TABLET: TAB at 21:00

## 2022-12-30 RX ADMIN — METOPROLOL TARTRATE 50 MG: 50 TABLET, FILM COATED ORAL at 20:57

## 2022-12-30 RX ADMIN — VANCOMYCIN HYDROCHLORIDE 125 MG: 125 CAPSULE ORAL at 10:03

## 2022-12-30 RX ADMIN — BENZONATATE 100 MG: 100 CAPSULE ORAL at 10:03

## 2022-12-30 NOTE — OCCUPATIONAL THERAPY NOTE
Occupational Therapy Evaluation     Patient Name: Jese Perez  [id number] Date: 12/30/2022  Problem List  Principal Problem:    Sepsis [facility])  Active Problems:    Multifocal pneumonia    Hypoxia    Influenza A    Hepatic steatosis    Hyponatremia    Paroxysmal atrial flutter ([facility] [address] )    Elevated d-dimer    Past Medical History  Past Medical History:   Diagnosis Date    Angina pectoris ([facility] [address] )     Anxiety     C  difficile diarrhea     CAD (coronary artery disease)     Cardiac disease     Colon polyp     COVID-19 04/2020    Depression     Diverticulitis of colon 11/2019    GERD (gastroesophageal reflux disease)     History of colon polyps     History of hiatal hernia     Hx of bleeding disorder     hemorrhoids    Hyperlipidemia     Hypertension     Irregular heart beat     gets tachycardia    Shortness of breath     related to heart     Past Surgical History  Past Surgical History:   Procedure Laterality Date    CARDIAC ELECTROPHYSIOLOGY MAPPING AND ABLATION      CARDIAC SURGERY      CARDIAC SURGERY      CATARACT EXTRACTION Bilateral     CHOLECYSTECTOMY  1987    COLON SURGERY      repair of colon    COLON SURGERY  2018    COLONOSCOPY  2009    Tubulovillous adenoma    COLONOSCOPY N/A 6/18/2018    Procedure: COLONOSCOPY;  Surgeon: Johnnie Sanchez DO;  [facility];   Service: Gastroenterology    COLONOSCOPY  2011    2 cm right colon polyp and 1 5 cm mid right colon polyps tubular adenomas    COLONOSCOPY W/ CONTROL OF HEMORRHAGE      CORONARY ANGIOPLASTY WITH STENT PLACEMENT      reports two blockages not able to be stented    CORONARY ARTERY BYPASS GRAFT  2008    3 vessels    HERNIA REPAIR      hiatal hernia    HYSTERECTOMY      partial not due to malignancy    LAPAROTOMY N/A 9/26/2018    Procedure: LAPAROTOMY EXPLORATORY WITH  RIGHT HEMICOLECTOMY;  Surgeon: Johnnie Sanchez DO;  [facility];  Service: Gastroenterology    LAPAROTOMY N/A 9/26/2018    Procedure: LAPAROTOMY EXPLORATORY WITH HEMICOLECTOMY;  Surgeon: Flora Lawrence DO;  [facility]: [facility];  Service: General    CT ANRCT XM SURG REQ ANES GENERAL SPI/EDRL DX N/A 7/25/2019    Procedure: EXAM UNDER ANESTHESIA (EUA); Surgeon: Stacey Busby MD;  Location: [facility] MAIN OR;  Service: General    CT COLONOSCOPY FLX DX W/COLLJ [facility] WHEN PFRMD N/A 9/26/2018    Procedure: COLONOSCOPY;  Surgeon: Rafa Mcwilliams DO;  [facility]: [facility];  Service: Gastroenterology    CT HEMORRHOIDECTOMY [place] 1 COLUMN/GROUP N/A 7/25/2019    Procedure: HEMORRHOIDECTOMY EXCISION;  Surgeon: Stacey Busby MD;  Location: [facility] MAIN OR;  Service: General    CT SIGMOIDOSCOPY FLX DX W/COLLJ SPEC BR/WA IF PFRMD N/A 6/21/2018    Procedure: Sandee Medin FLEXIBLE;  Surgeon: Rafa Mcwilliams DO;  Location:  GI LAB; Service: Gastroenterology           12/30/22 1444   OT Last Visit   OT Visit Date 12/30/22   Note Type   Note type Evaluation   Pain Assessment   Pain Assessment Tool 0-10   Pain Score No Pain   Restrictions/Precautions   Weight Bearing Precautions Per Order No   Other Precautions Contact/isolation;Droplet precautions;Multiple lines;Telemetry;O2;Fall Risk   Home Living   Type of [address] [place] [address] Multi-level;Stairs to enter with rails;Bed/bath upstairs   Bathroom Shower/Tub Tub/shower unit   Bathroom Toilet Standard   Bathroom Equipment Shower chair   Bathroom Accessibility Accessible   Home Equipment Walker;Cane  (transport chair; no AD use at baseline, above equipment was her 's)   Prior Function   Level of [place] Independent with ADLs; Independent with functional mobility; Needs assistance with IADLS   Lives With Alone   Receives Help From Family  (son and daughter check in with pt frequently)   IADLs Family/Friend/Other provides transportation; Family/Friend/Other provides meals  (Pt reports she does not use stove due to visual deficits; son/daughter make her meals and she utilizes microwave to heat them)   Falls in the last 6 months 0   Vocational Retired   Subjective   Subjective "I have a lot at home that was my husbands"   ADL   Where Assessed Edge of bed   LB Dressing Assistance 5  Supervision/Setup   LB Dressing Deficit Don/doff R sock; Don/doff L sock   Toileting Assistance  5  Supervision/Setup   Toileting Deficit Verbal cueing;Steadying; Increased time to complete;Clothing management up;Clothing management down;Perineal hygiene   Additional Comments no AD used, slight unsteadiness noted   Bed Mobility   Supine to Sit 5  Supervision   Additional items HOB elevated; Bedrails; Increased time required   Sit to Supine 5  Supervision   Additional items HOB elevated; Bedrails; Increased time required   Additional Comments Pt utilizing 3 5 L O2 via NC during session, SPO2 remains above 90%, however, pt reports SOB on several occasions during mobility  Transfers   Sit to Stand 5  Supervision   Additional items Increased time required;Verbal cues   Stand to Sit 5  Supervision   Additional items Increased time required;Verbal cues   Additional Comments no AD   Functional Mobility   Functional Mobility   (CGA)   Additional Comments Pt ambualtes within hallway without AD,CGA with 4L O2 via NC, c/o SOB and fatigue resulting in frequent standing rest periods  Pt educated on PLB for maximizing recovery  Pt demo unsteadiness on feet/slight LOB with ability to self correct  Pt frequently reaching to wall for stability   Pt educated on use of RW to increase safety during functional mobility   Additional items   (no AD)   Balance   Static Sitting Good   Dynamic Sitting Fair +   Static Standing Fair   Dynamic Standing Fair   Activity Tolerance   Activity Tolerance Patient limited by fatigue   RUE Assessment   RUE Assessment WFL   LUE Assessment   LUE Assessment WFL   Hand Function   Gross Motor Coordination Functional   Fine Motor Coordination Functional   Sensation   Light Touch No apparent deficits   Sharp/Dull No apparent deficits   Vision-Basic Assessment Current Vision Other (Comment)  (macular degeneration; legally blind L eye)   Psychosocial   Psychosocial (WDL) WDL   Cognition   Overall Cognitive Status WFL   Arousal/Participation Alert; Cooperative   Attention Within functional limits   Orientation Level Oriented X4   Memory Within functional limits   Following Commands Follows all commands and directions without difficulty   Assessment   Limitation Decreased ADL status; Decreased UE strength;Decreased Safe judgement during ADL;Decreased endurance;Decreased high-level ADLs   Prognosis Good   Assessment Pt is a 79 y o  female seen for OT evaluation s/p admit to [facility] on 12/29/2022 w/ Sepsis ([address] )  Comorbidities affecting pt's functional performance at time of assessment include: SOB, Influenza A, hypoxia, bilateral pneumonia  Personal factors affecting pt at time of IE include:steps to enter environment, difficulty performing ADLS and difficulty performing IADLS   Prior to admission, pt was IND for all ADL tasks  Upon evaluation: Pt requires SPV-Min A 2* the following deficits impacting occupational performance: weakness, decreased strength, decreased balance, decreased tolerance and decreased safety awareness  Pt to benefit from continued skilled OT tx while in the hospital to address deficits as defined above and maximize level of functional independence w ADL's and functional mobility  Occupational Performance areas to address include: bathing/shower, toilet hygiene, dressing, health maintenance, functional mobility, community mobility and household maintenance  The patient's raw score on the AM-PAC Daily Activity inpatient short form is 21, standardized score is 44 27, greater than 39 4  Patients at this level are likely to benefit from discharge to home  Please refer to the recommendation of the Occupational Therapist for safe discharge planning     Goals   Short Term Goal #1 Pt will complete all UB/LB bathing and dressing tasks independently with SPO2 >90% and O2 use PRN  Short Term Goal #2 Pt will complete toileting tasks independently, use of AD PRN  Long Term Goal #1 Pt will complete all functional mobility and functional transfers with least restrictive AD, use of O2 PRN with SPO2 > 90% throughout  Long Term Goal #2 Pt will complete B UE strengthening exercises   Plan   Treatment Interventions ADL retraining;UE strengthening/ROM; Functional transfer training; Endurance training; Compensatory technique education; Energy conservation   Goal Expiration Date 01/13/23   OT Frequency 3-5x/wk   Recommendation   OT Discharge Recommendation Home with home health rehabilitation   AM-PAC Daily Activity Inpatient   Lower Body Dressing 3   Bathing 3   Toileting 3   Upper Body Dressing 4   Grooming 4   Eating 4   Daily Activity Raw Score 21   Daily Activity Standardized Score (Calc for Raw Score >=11) 44 27   AM-PAC Applied Cognition Inpatient   Following a Speech/Presentation 4   Understanding Ordinary Conversation 4   Taking Medications 4   Remembering Where Things Are Placed or Put Away 4   Remembering List of 4-5 Errands 4   Taking Care of Complicated Tasks 4   Applied Cognition Raw Score 24   Applied Cognition Standardized Score 62 21     Pt will benefit from continued OT services in order to maximize (I) c ADL performance, FM c least restrictive AD, and improve overall endurance/strength required to complete functional tasks in preparation for d/c  Pt left seated in chair at end of session; all needs within reach; all lines intact    Agnieszka Figures

## 2022-12-30 NOTE — PROGRESS NOTES
[facility] [address] [place]  Progress Note - Reena Campo 1940, 79 y o  female MRN: [id number]  Unit/Bed#: RM10 Encounter: [id number]  Primary Care Provider: Elidia Escoto MD   Date and time admitted to hospital: 12/29/2022  2:08 PM    * Sepsis [facility])  Assessment & Plan  · Sepsis was present on admission and due to Influenza A in addition to a probable superimposed bacterial multifocal pneumonia  · SIRS criteria was met with tachycardia, tachypnea, and a leukocytosis  · The lactic acid level is within normal limits  · Check blood cultures x 2 sets  · Check a urine culture  · Check a Streptococcus pneumoniae urine antigen and Legionella urinary antigen  · Check a MRSA nasal culture  · Treat with tamiflu 30 mg PO every 12 hours for a 5-day course  · Treat with azithromycin 500 mg IV every 24 hours and ceftriaxone 1000 mg IV every 24 hours  · Follow the procalcitonin level    Influenza A  Assessment & Plan  · Please see the assessment and plan for "Sepsis"    Multifocal pneumonia  Assessment & Plan  · Please see the assessment and plan for "Sepsis"    Hypoxia  Assessment & Plan  · Not on any home supplemental oxygen therapy  · Currently requiring 3 lpm of continuous supplemental oxygen  · Will need a home supplemental oxygen evaluation on the day of discharge    Paroxysmal atrial flutter (HCC)  Assessment & Plan  · Currently in sinus rhythm  · Continue anticoagulation with eliquis 5 mg PO BID    Hyponatremia  Assessment & Plan  · Likely hypovolemic hyponatremia    Hepatic steatosis  Assessment & Plan  · Outpatient Gastroenterology evaluation      VTE Pharmacologic Prophylaxis: VTE Score: 8 High Risk (Score >/= 5) - Pharmacological DVT Prophylaxis Ordered: apixaban (Eliquis)  Sequential Compression Devices Ordered  Code Status: Level 1 - Full Code  Discussion with family: Updated  (son) at bedside      Anticipated Length of Stay: Patient will be admitted on an inpatient basis with an anticipated length of stay of greater than 2 midnights secondary to the need for IV antibiotic treatment and with the patient requiring 3 lpm of continuous supplemental oxygen  Total Time for Visit, including Counseling / Coordination of Care: 70 minutes Greater than 50% of this total time spent on direct patient counseling and coordination of care  Chief Complaint:  Shortness of breath    History of Present Illness:  Betty Dodson is a 79 y o  female who presents to the emergency department with the complaint of shortness of breath  Over the last week, the patient has been experiencing shortness of breath, which is worsened with any type of physical activity  The shortness of breath is severe in intensity and constant in nature  Nothing seemed to improve the shortness of breath  Associated symptoms include a productive cough and generalized weakness  In the emergency department, the patient was found to have hypoxia with an oxygen saturation of 86% on room air and is currently requiring 3 lpm of continuous supplemental oxygen  Review of Systems:  Review of Systems:  Per HPI, all other systems have been reviewed and were negative      Past Medical and Surgical History:   Past Medical History:   Diagnosis Date   • Angina pectoris ([facility] [address] )    • Anxiety    • C  difficile diarrhea    • CAD (coronary artery disease)    • Cardiac disease    • Colon polyp    • COVID-19 04/2020   • Depression    • Diverticulitis of colon 11/2019   • GERD (gastroesophageal reflux disease)    • History of colon polyps    • History of hiatal hernia    • Hx of bleeding disorder     hemorrhoids   • Hyperlipidemia    • Hypertension    • Irregular heart beat     gets tachycardia   • Shortness of breath     related to heart       Past Surgical History:   Procedure Laterality Date   • CARDIAC ELECTROPHYSIOLOGY MAPPING AND ABLATION     • CARDIAC SURGERY     • CARDIAC SURGERY     • CATARACT EXTRACTION Bilateral    • CHOLECYSTECTOMY 1987   • COLON SURGERY      repair of colon   • COLON SURGERY  2018   • COLONOSCOPY  2009    Tubulovillous adenoma   • COLONOSCOPY N/A 6/18/2018    Procedure: COLONOSCOPY;  Surgeon: Dallas Graf DO;  [facility]; Service: Gastroenterology   • COLONOSCOPY  2011    2 cm right colon polyp and 1 5 cm mid right colon polyps tubular adenomas   • COLONOSCOPY W/ CONTROL OF HEMORRHAGE     • CORONARY ANGIOPLASTY WITH STENT PLACEMENT      reports two blockages not able to be stented   • CORONARY ARTERY BYPASS GRAFT  2008    3 vessels   • HERNIA REPAIR      hiatal hernia   • HYSTERECTOMY      partial not due to malignancy   • LAPAROTOMY N/A 9/26/2018    Procedure: LAPAROTOMY EXPLORATORY WITH  RIGHT HEMICOLECTOMY;  Surgeon: Dallas Graf DO;  [facility];  Service: Gastroenterology   • LAPAROTOMY N/A 9/26/2018    Procedure: LAPAROTOMY EXPLORATORY WITH HEMICOLECTOMY;  Surgeon: Kb Ram DO;  [facility];  Service: General   • SD ANRCT XM SURG REQ ANES GENERAL SPI/EDRL DX N/A 7/25/2019    Procedure: EXAM UNDER ANESTHESIA (EUA); Surgeon: Dung Block MD;  Location: [address] [place], [place] [address]o [place] MAIN OR;  Service: General   • SD COLONOSCOPY FLX DX W/COLLJ [facility] WHEN PFRMD N/A 9/26/2018    Procedure: COLONOSCOPY;  Surgeon: Dallas Graf DO;  [facility];  Service: Gastroenterology   • SD HEMORRHOIDECTOMY Kathie Amend 1 COLUMN/GROUP N/A 7/25/2019    Procedure: HEMORRHOIDECTOMY EXCISION;  Surgeon: Dung Block MD;  Location: [address] [place], [place] [address]o [place] MAIN OR;  Service: General   • SD SIGMOIDOSCOPY FLX DX W/COLLJ SPEC BR/WA IF PFRMD N/A 6/21/2018    Procedure: Estelle Essex FLEXIBLE;  Surgeon: Dallas Graf DO;  [facility]; Service: Gastroenterology       Meds/Allergies:  Prior to Admission medications    Medication Sig Start Date End Date Taking?  Authorizing Provider   acetaminophen (TYLENOL) 650 mg CR tablet Take 1 tablet (650 mg total) by mouth every 8 (eight) hours as needed for mild pain or moderate pain 7/25/19   Kenyon Gates PA-C   ALPRAZolam (XANAX) 0 25 mg tablet Take 0 25 mg by mouth 3 (three) times a day as needed for anxiety  Historical Provider, MD   apixaban (ELIQUIS) 5 mg Take 1 tablet (5 mg total) by mouth 2 (two) times a day 9/24/19 7/27/22  Jeet Castillo MD   atorvastatin (LIPITOR) 20 mg tablet Take 20 mg by mouth daily after dinner  Historical Provider, MD   benzonatate (TESSALON PERLES) 100 mg capsule Take 1 capsule (100 mg total) by mouth every 8 (eight) hours 11/29/22   Terese Luther PA-C   calcium carbonate (OS-FERN) 600 MG tablet Take 600 mg by mouth 2 (two) times a day with meals    Historical Provider, MD   cyanocobalamin (VITAMIN B-12) 100 mcg tablet Take by mouth daily    Historical Provider, MD   ergocalciferol (ERGOCALCIFEROL) 48866 UNITS capsule Take 50,000 Units by mouth once a week  Takes on Wednesdays    Historical Provider, MD   esomeprazole (NexIUM) 40 MG capsule Take 20 mg by mouth Daily     Historical Provider, MD   folic acid (FOLVITE) 1 mg tablet Take by mouth daily    Historical Provider, MD   furosemide (LASIX) 20 mg tablet Take 1 tablet (20 mg total) by mouth 2 (two) times a day Hold until diarrhea resolved  Patient taking differently: Take 40 mg by mouth daily Hold until diarrhea resolved 12/3/19   Zacarias Mccoy,    gabapentin (NEURONTIN) 100 mg capsule Take 100 mg by mouth 3 (three) times a day    Historical Provider, MD   lisinopril (ZESTRIL) 5 mg tablet TAKE 1 TABLET EVERY DAY BY ORAL ROUTE 12/26/21   Historical Provider, MD   metolazone (ZAROXOLYN) 2 5 mg tablet TAKE ONE TABLET BY MOUTH ONCE A WEEK OR  AS  DIRECTED  FOR  WEIGHT  GAIN  Patient not taking: Reported on 7/27/2022 7/8/18   Carlos Chavez MD   metoprolol tartrate (LOPRESSOR) 50 mg tablet take 1 tablet by mouth every 12 hours 3/1/22   Carlos Chavez MD   Misc   Devices (SPRAY BOTTLE/PLASTIC 120ML) MISC by Does not apply route 3 (three) times a day Use to cleanse area 3 times daily or as needed with bowel movements  7/25/19   Kenyon Gates PA-C   Multiple Vitamins-Minerals (PRESERVISION AREDS) CAPS Take 1 capsule by mouth daily    Patient not taking: Reported on 7/27/2022    Historical Provider, MD   nitroglycerin (NITROSTAT) 0 4 mg SL tablet Place 1 tablet (0 4 mg total) under the tongue every 5 (five) minutes as needed for chest pain (times three for chest pain  If no relief after second tablet, call 911 ) 11/21/19   Irene Arias MD   Omega-3 Fatty Acids (FISH OIL PO) Take 1,000 mg by mouth daily  Historical Provider, MD   potassium chloride (K-DUR,KLOR-CON) 20 mEq tablet take 1 tablet by mouth once daily 10/17/22   Merlin Camara PA-C     I have reviewed home medications with patient personally  Allergies: Allergies   Allergen Reactions   • Codeine GI Intolerance   • Lansoprazole Other (See Comments)     GI Upset, dania protonix   • Morphine Nausea Only   • Morphine And Related GI Intolerance       Social History:  Marital Status:       Substance Use History:   Social History     Substance and Sexual Activity   Alcohol Use Never   • Alcohol/week: 0 0 standard drinks     Social History     Tobacco Use   Smoking Status Former   • Packs/day: 0 50   • Years: 30 00   • Pack years: 15 00   • Types: Cigarettes   Smokeless Tobacco Never   Tobacco Comments    quit 15 yrs ago     Social History     Substance and Sexual Activity   Drug Use No       Family History:  Non-contributory    Physical Exam:     Vitals:   Blood Pressure: 141/63 (12/29/22 1830)  Pulse: 100 (12/29/22 1830)  Respirations: 22 (12/29/22 1830)  Height: 5' 5" (165 1 cm) (12/29/22 1351)  SpO2: 92 % (12/29/22 1830)    Physical Exam   General:  NAD, follows commands  HEENT:  NC/AT, mucous membranes dry  Neck:  Supple, No JVP elevation  CV:  + S1, + S2, Tachycardic, Regular rhythm  Pulm:  Bibasilar crackles with expiratory wheezing bilaterally  Abd:  Soft, Non-tender, Non-distended  Ext:  No clubbing/cyanosis/edema  Skin:  No rashes  Neuro:  Awake, alert, oriented  Psych:  Normal mood and affect      Additional Data:     Lab Results:  Results from last 7 days   Lab Units 12/29/22  1418   WBC Thousand/uL 12 99*   HEMOGLOBIN g/dL 12 0   HEMATOCRIT % 37 1   PLATELETS Thousands/uL 158   NEUTROS PCT % 91*   LYMPHS PCT % 5*   MONOS PCT % 3*   EOS PCT % 0     Results from last 7 days   Lab Units 12/29/22  1418   SODIUM mmol/L 134*   POTASSIUM mmol/L 4 0   CHLORIDE mmol/L 101   CO2 mmol/L 24   BUN mg/dL 20   CREATININE mg/dL 1 21   ANION GAP mmol/L 9   CALCIUM mg/dL 9 3   ALBUMIN g/dL 3 1*   TOTAL BILIRUBIN mg/dL 1 08*   ALK PHOS U/L 106   ALT U/L 21   AST U/L 20   GLUCOSE RANDOM mg/dL 140     Results from last 7 days   Lab Units 12/29/22  1418   INR  1 92*             Results from last 7 days   Lab Units 12/29/22  1418   LACTIC ACID mmol/L 1 7       Lines/Drains:  Invasive Devices     Peripheral Intravenous Line  Duration           Peripheral IV 12/29/22 Right Antecubital <1 day                    Imaging: Reviewed radiology reports from this admission including: chest xray and chest CT scan  CT chest without contrast   Final Result by Jeff Becerra MD (12/29 4026)      Background emphysema with multilobar pneumonia  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  One year follow-up suggested  Enlarged fatty liver  Workstation performed: [id number]         XR chest 1 view portable   Final Result by Caitlin Mello MD (12/29 2017)      No acute cardiopulmonary disease  Workstation performed: [id number]             EKG and Other Studies Reviewed on Admission:   · EKG:  ECG 12 lead  Status:  In process     MUSE LINK     View MUSE Strips  Study Result    Narrative & Impression   Sinus tachycardia  Possible Left atrial enlargement  Incomplete right bundle branch block  ST & T wave abnormality, consider anterior ischemia  Abnormal ECG  When compared with ECG of 29-NOV-2022 16:33,  No significant change was found     e    ** Please Note: This note has been constructed using a voice recognition system   **

## 2022-12-30 NOTE — H&P
[address] [facility] [address] [facility] and [address] 1940, 79 y o  female MRN: [id number]  Unit/Bed#: RM10 Encounter: [id number]  Primary Care Provider: Vesta Kemp MD   Date and time admitted to hospital: 12/29/2022  2:08 PM    * Sepsis [facility])  Assessment & Plan  · Sepsis was present on admission and due to Influenza A in addition to a probable superimposed bacterial multifocal pneumonia  · SIRS criteria was met with tachycardia, tachypnea, and a leukocytosis  · The lactic acid level is within normal limits  · Check blood cultures x 2 sets  · Check a urine culture  · Check a Streptococcus pneumoniae urine antigen and Legionella urinary antigen  · Check a MRSA nasal culture  · Treat with tamiflu 30 mg PO every 12 hours for a 5-day course  · Treat with azithromycin 500 mg IV every 24 hours and ceftriaxone 1000 mg IV every 24 hours  · Follow the procalcitonin level    Influenza A  Assessment & Plan  · Please see the assessment and plan for "Sepsis"    Multifocal pneumonia  Assessment & Plan  · Please see the assessment and plan for "Sepsis"    Hypoxia  Assessment & Plan  · Not on any home supplemental oxygen therapy  · Currently requiring 3 lpm of continuous supplemental oxygen  · Will need a home supplemental oxygen evaluation on the day of discharge    Paroxysmal atrial flutter (HCC)  Assessment & Plan  · Currently in sinus rhythm  · Continue anticoagulation with eliquis 5 mg PO BID    Hyponatremia  Assessment & Plan  · Likely hypovolemic hyponatremia    Hepatic steatosis  Assessment & Plan  · Outpatient Gastroenterology evaluation      VTE Pharmacologic Prophylaxis: VTE Score: 8 High Risk (Score >/= 5) - Pharmacological DVT Prophylaxis Ordered: apixaban (Eliquis)  Sequential Compression Devices Ordered  Code Status: Level 1 - Full Code  Discussion with family: Updated  (son) at bedside      Anticipated Length of Stay: Patient will be admitted on an inpatient basis with an anticipated length of stay of greater than 2 midnights secondary to the need for IV antibiotic treatment and with the patient requiring 3 lpm of continuous supplemental oxygen  Total Time for Visit, including Counseling / Coordination of Care: 70 minutes Greater than 50% of this total time spent on direct patient counseling and coordination of care  Chief Complaint:  Shortness of breath    History of Present Illness:  Antonio Burrell is a 79 y o  female who presents to the emergency department with the complaint of shortness of breath  Over the last week, the patient has been experiencing shortness of breath, which is worsened with any type of physical activity  The shortness of breath is severe in intensity and constant in nature  Nothing seemed to improve the shortness of breath  Associated symptoms include a productive cough and generalized weakness  In the emergency department, the patient was found to have hypoxia with an oxygen saturation of 86% on room air and is currently requiring 3 lpm of continuous supplemental oxygen  Review of Systems:  Review of Systems:  Per HPI, all other systems have been reviewed and were negative      Past Medical and Surgical History:   Past Medical History:   Diagnosis Date   • Angina pectoris ([facility] [address] )    • Anxiety    • C  difficile diarrhea    • CAD (coronary artery disease)    • Cardiac disease    • Colon polyp    • COVID-19 04/2020   • Depression    • Diverticulitis of colon 11/2019   • GERD (gastroesophageal reflux disease)    • History of colon polyps    • History of hiatal hernia    • Hx of bleeding disorder     hemorrhoids   • Hyperlipidemia    • Hypertension    • Irregular heart beat     gets tachycardia   • Shortness of breath     related to heart       Past Surgical History:   Procedure Laterality Date   • CARDIAC ELECTROPHYSIOLOGY MAPPING AND ABLATION     • CARDIAC SURGERY     • CARDIAC SURGERY     • CATARACT EXTRACTION Bilateral    • CHOLECYSTECTOMY  1987   • COLON SURGERY      repair of colon   • COLON SURGERY  2018   • COLONOSCOPY  2009    Tubulovillous adenoma   • COLONOSCOPY N/A 6/18/2018    Procedure: COLONOSCOPY;  Surgeon: Celeste Griffin DO;  [facility]; Service: Gastroenterology   • COLONOSCOPY  2011    2 cm right colon polyp and 1 5 cm mid right colon polyps tubular adenomas   • COLONOSCOPY W/ CONTROL OF HEMORRHAGE     • CORONARY ANGIOPLASTY WITH STENT PLACEMENT      reports two blockages not able to be stented   • CORONARY ARTERY BYPASS GRAFT  2008    3 vessels   • HERNIA REPAIR      hiatal hernia   • HYSTERECTOMY      partial not due to malignancy   • LAPAROTOMY N/A 9/26/2018    Procedure: LAPAROTOMY EXPLORATORY WITH  RIGHT HEMICOLECTOMY;  Surgeon: Celeste Griffin DO;  [facility];  Service: Gastroenterology   • LAPAROTOMY N/A 9/26/2018    Procedure: LAPAROTOMY EXPLORATORY WITH HEMICOLECTOMY;  Surgeon: Flex Pedraza DO;  [facility];  Service: General   • CA ANRCT XM SURG REQ ANES GENERAL SPI/EDRL DX N/A 7/25/2019    Procedure: EXAM UNDER ANESTHESIA (EUA); Surgeon: Bina Manuel MD;  Location: [facility] MAIN OR;  Service: General   • CA COLONOSCOPY FLX DX W/COLLJ [facility] WHEN PFRMD N/A 9/26/2018    Procedure: COLONOSCOPY;  Surgeon: Celeste Griffin DO;  [facility];  Service: Gastroenterology   • CA HEMORRHOIDECTOMY Thomas Mabry 1 COLUMN/GROUP N/A 7/25/2019    Procedure: HEMORRHOIDECTOMY EXCISION;  Surgeon: Bina Manuel MD;  Location: [facility] MAIN OR;  Service: General   • CA SIGMOIDOSCOPY FLX DX W/COLL SPEC BR/WA IF PFRMD N/A 6/21/2018    Procedure: Hernandez Reason FLEXIBLE;  Surgeon: Celeste Griffin DO;  [facility]; Service: Gastroenterology       Meds/Allergies:  Prior to Admission medications    Medication Sig Start Date End Date Taking?  Authorizing Provider   acetaminophen (TYLENOL) 650 mg CR tablet Take 1 tablet (650 mg total) by mouth every 8 (eight) hours as needed for mild pain or moderate pain 7/25/19   Kenyon Gates PA-C   ALPRAZolam (XANAX) 0 25 mg tablet Take 0 25 mg by mouth 3 (three) times a day as needed for anxiety  Historical Provider, MD   apixaban (ELIQUIS) 5 mg Take 1 tablet (5 mg total) by mouth 2 (two) times a day 9/24/19 7/27/22  Liss Botello MD   atorvastatin (LIPITOR) 20 mg tablet Take 20 mg by mouth daily after dinner  Historical Provider, MD   benzonatate (TESSALON PERLES) 100 mg capsule Take 1 capsule (100 mg total) by mouth every 8 (eight) hours 11/29/22   Colin Wise PA-C   calcium carbonate (OS-FERN) 600 MG tablet Take 600 mg by mouth 2 (two) times a day with meals    Historical Provider, MD   cyanocobalamin (VITAMIN B-12) 100 mcg tablet Take by mouth daily    Historical Provider, MD   ergocalciferol (ERGOCALCIFEROL) 99031 UNITS capsule Take 50,000 Units by mouth once a week  Takes on Wednesdays    Historical Provider, MD   esomeprazole (NexIUM) 40 MG capsule Take 20 mg by mouth Daily     Historical Provider, MD   folic acid (FOLVITE) 1 mg tablet Take by mouth daily    Historical Provider, MD   furosemide (LASIX) 20 mg tablet Take 1 tablet (20 mg total) by mouth 2 (two) times a day Hold until diarrhea resolved  Patient taking differently: Take 40 mg by mouth daily Hold until diarrhea resolved 12/3/19   Jackson Donnelly DO   gabapentin (NEURONTIN) 100 mg capsule Take 100 mg by mouth 3 (three) times a day    Historical Provider, MD   lisinopril (ZESTRIL) 5 mg tablet TAKE 1 TABLET EVERY DAY BY ORAL ROUTE 12/26/21   Historical Provider, MD   metolazone (ZAROXOLYN) 2 5 mg tablet TAKE ONE TABLET BY MOUTH ONCE A WEEK OR  AS  DIRECTED  FOR  WEIGHT  GAIN  Patient not taking: Reported on 7/27/2022 7/8/18   Ant Wray MD   metoprolol tartrate (LOPRESSOR) 50 mg tablet take 1 tablet by mouth every 12 hours 3/1/22   Ant Wray MD   Misc   Devices (SPRAY BOTTLE/PLASTIC 120ML) MISC by Does not apply route 3 (three) times a day Use to cleanse area 3 times daily or as needed with bowel movements  7/25/19   Kenyon Gates PA-C   Multiple Vitamins-Minerals (PRESERVISION AREDS) CAPS Take 1 capsule by mouth daily    Patient not taking: Reported on 7/27/2022    Historical Provider, MD   nitroglycerin (NITROSTAT) 0 4 mg SL tablet Place 1 tablet (0 4 mg total) under the tongue every 5 (five) minutes as needed for chest pain (times three for chest pain  If no relief after second tablet, call 911 ) 11/21/19   Milo Mansfield MD   Omega-3 Fatty Acids (FISH OIL PO) Take 1,000 mg by mouth daily  Historical Provider, MD   potassium chloride (K-DUR,KLOR-CON) 20 mEq tablet take 1 tablet by mouth once daily 10/17/22   Yazan Quiroz PA-C     I have reviewed home medications with patient personally  Allergies: Allergies   Allergen Reactions   • Codeine GI Intolerance   • Lansoprazole Other (See Comments)     GI Upset, dania protonix   • Morphine Nausea Only   • Morphine And Related GI Intolerance       Social History:  Marital Status:       Substance Use History:   Social History     Substance and Sexual Activity   Alcohol Use Never   • Alcohol/week: 0 0 standard drinks     Social History     Tobacco Use   Smoking Status Former   • Packs/day: 0 50   • Years: 30 00   • Pack years: 15 00   • Types: Cigarettes   Smokeless Tobacco Never   Tobacco Comments    quit 15 yrs ago     Social History     Substance and Sexual Activity   Drug Use No       Family History:  Non-contributory    Physical Exam:     Vitals:   Blood Pressure: 141/63 (12/29/22 1830)  Pulse: 100 (12/29/22 1830)  Respirations: 22 (12/29/22 1830)  Height: 5' 5" (165 1 cm) (12/29/22 1351)  SpO2: 92 % (12/29/22 1830)    Physical Exam   General:  NAD, follows commands  HEENT:  NC/AT, mucous membranes dry  Neck:  Supple, No JVP elevation  CV:  + S1, + S2, Tachycardic, Regular rhythm  Pulm:  Bibasilar crackles with expiratory wheezing bilaterally  Abd:  Soft, Non-tender, Non-distended  Ext:  No clubbing/cyanosis/edema  Skin:  No rashes  Neuro:  Awake, alert, oriented  Psych:  Normal mood and affect      Additional Data:     Lab Results:  Results from last 7 days   Lab Units 12/29/22  1418   WBC Thousand/uL 12 99*   HEMOGLOBIN g/dL 12 0   HEMATOCRIT % 37 1   PLATELETS Thousands/uL 158   NEUTROS PCT % 91*   LYMPHS PCT % 5*   MONOS PCT % 3*   EOS PCT % 0     Results from last 7 days   Lab Units 12/29/22  1418   SODIUM mmol/L 134*   POTASSIUM mmol/L 4 0   CHLORIDE mmol/L 101   CO2 mmol/L 24   BUN mg/dL 20   CREATININE mg/dL 1 21   ANION GAP mmol/L 9   CALCIUM mg/dL 9 3   ALBUMIN g/dL 3 1*   TOTAL BILIRUBIN mg/dL 1 08*   ALK PHOS U/L 106   ALT U/L 21   AST U/L 20   GLUCOSE RANDOM mg/dL 140     Results from last 7 days   Lab Units 12/29/22  1418   INR  1 92*             Results from last 7 days   Lab Units 12/29/22  1418   LACTIC ACID mmol/L 1 7       Lines/Drains:  Invasive Devices     Peripheral Intravenous Line  Duration           Peripheral IV 12/29/22 Right Antecubital <1 day                    Imaging: Reviewed radiology reports from this admission including: chest xray and chest CT scan  CT chest without contrast   Final Result by Eliezer Grajeda MD (12/29 1637)      Background emphysema with multilobar pneumonia  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  One year follow-up suggested  Enlarged fatty liver  Workstation performed: [id number]         XR chest 1 view portable   Final Result by Angy Joe MD (12/29 2533)      No acute cardiopulmonary disease  Workstation performed: [id number]             EKG and Other Studies Reviewed on Admission:   · EKG:  ECG 12 lead  Status:  In process     MUSE LINK     View MUSE Strips  Study Result    Narrative & Impression   Sinus tachycardia  Possible Left atrial enlargement  Incomplete right bundle branch block  ST & T wave abnormality, consider anterior ischemia  Abnormal ECG  When compared with ECG of 29-NOV-2022 16:33,  No significant change was found     e    ** Please Note: This note has been constructed using a voice recognition system   **

## 2022-12-30 NOTE — CONSULTS
Consultation - Pulmonary Medicine   John Finley 79 y o  female MRN: [id number]  Unit/Bed#: JK89 Encounter: [id number]      Assessment:  Acute hypoxic respiratory failure  Influenza A with secondary bacterial pneumonia  Restrictive lung disease at baseline with some chronic dyspnea and emphysema  Coronary artery disease    Plan:   From pulmonary standpoint I agree with current plan to treat multifocal pneumonia with Rocephin and Zithromax, continue Tamiflu  Patient has emphysema with some wheezing on exam at baseline agree with IV steroids today would quickly de-escalate if possible, continue Xopenex and Atrovent 3 times daily patient's been having chronic dyspnea despite no COPD on x-ray she does have significant emphysema and smoking history would empirically start Anoro 1 puff daily for her  History of Present Illness   Physician Requesting Consult: Jennie Oshea DO  Reason for Consult / Principal Problem: Shortness of breath    HPI: John Finley is a 79y o  year old female who presents with acute illness and shortness of breath  Patient reports low-grade temperatures body aches cough and shortness of breath at home  She is able to expectorate some mucus  She is not been feeling well for 2 years since bhargavi COVID in 2020 has had persistent shortness of breath since then but is worsened over the past week  She is a remote smoking history and a history of coronary artery disease status post CABG and stent placement back in 2008 and 9 respectively    Inpatient consult to Pulmonology  Consult performed by: Miki Simpson DO  Consult ordered by: Jennie Oshea DO          Review of Systems   Constitutional: Positive for activity change, fatigue and fever  Respiratory: Positive for cough, chest tightness and shortness of breath  Musculoskeletal: Positive for myalgias  All other systems reviewed and are negative        Historical Information   Past Medical History: Diagnosis Date   • Angina pectoris ([facility] [address] )    • Anxiety    • C  difficile diarrhea    • CAD (coronary artery disease)    • Cardiac disease    • Colon polyp    • COVID-19 04/2020   • Depression    • Diverticulitis of colon 11/2019   • GERD (gastroesophageal reflux disease)    • History of colon polyps    • History of hiatal hernia    • Hx of bleeding disorder     hemorrhoids   • Hyperlipidemia    • Hypertension    • Irregular heart beat     gets tachycardia   • Shortness of breath     related to heart     Past Surgical History:   Procedure Laterality Date   • CARDIAC ELECTROPHYSIOLOGY MAPPING AND ABLATION     • CARDIAC SURGERY     • CARDIAC SURGERY     • CATARACT EXTRACTION Bilateral    • CHOLECYSTECTOMY  1987   • COLON SURGERY      repair of colon   • COLON SURGERY  2018   • COLONOSCOPY  2009    Tubulovillous adenoma   • COLONOSCOPY N/A 6/18/2018    Procedure: COLONOSCOPY;  Surgeon: Kimberly Welch DO;  [facility]; Service: Gastroenterology   • COLONOSCOPY  2011    2 cm right colon polyp and 1 5 cm mid right colon polyps tubular adenomas   • COLONOSCOPY W/ CONTROL OF HEMORRHAGE     • CORONARY ANGIOPLASTY WITH STENT PLACEMENT      reports two blockages not able to be stented   • CORONARY ARTERY BYPASS GRAFT  2008    3 vessels   • HERNIA REPAIR      hiatal hernia   • HYSTERECTOMY      partial not due to malignancy   • LAPAROTOMY N/A 9/26/2018    Procedure: LAPAROTOMY EXPLORATORY WITH  RIGHT HEMICOLECTOMY;  Surgeon: Kimberly Welch DO;  [facility];  Service: Gastroenterology   • LAPAROTOMY N/A 9/26/2018    Procedure: LAPAROTOMY EXPLORATORY WITH HEMICOLECTOMY;  Surgeon: Kamila Hernandez DO;  [facility];  Service: General   • MT ANRCT XM SURG REQ ANES GENERAL SPI/EDRL DX N/A 7/25/2019    Procedure: EXAM UNDER ANESTHESIA (EUA);   Surgeon: Mamie Gowers, MD;  Location: [facility] MAIN OR;  Service: General   • MT COLONOSCOPY FLX DX W/COLLJ SPEC WHEN PFRMD N/A 9/26/2018    Procedure: COLONOSCOPY; Surgeon: Kimberly Welch DO;  [facility];  Service: Gastroenterology   • AL HEMORRHOIDECTOMY Miki Tesfaye 1 COLUMN/GROUP N/A 7/25/2019    Procedure: HEMORRHOIDECTOMY EXCISION;  Surgeon: Mamie Gowers, MD;  Location: [address] [place] [address] MAIN OR;  Service: General   • AL SIGMOIDOSCOPY FLX DX W/COLLJ SPEC BR/WA IF PFRMD N/A 6/21/2018    Procedure: Zaael Sequin FLEXIBLE;  Surgeon: Kimberly Welch DO;  Location:  GI LAB; Service: Gastroenterology     Social History   Social History     Substance and Sexual Activity   Alcohol Use Never   • Alcohol/week: 0 0 standard drinks     Social History     Substance and Sexual Activity   Drug Use No     E-Cigarette/Vaping   • E-Cigarette Use Never User      E-Cigarette/Vaping Substances   • Nicotine No    • THC No    • CBD No    • Flavoring No    • Other No    • Unknown No      Social History     Tobacco Use   Smoking Status Former   • Packs/day: 0 50   • Years: 30 00   • Pack years: 15 00   • Types: Cigarettes   Smokeless Tobacco Never   Tobacco Comments    quit 15 yrs ago     Occupational History: None applicable    Family History: non-contributory    Meds/Allergies   all current active meds have been reviewed    Allergies   Allergen Reactions   • Codeine GI Intolerance   • Lansoprazole Other (See Comments)     GI Upset, dania protonix   • Morphine Nausea Only   • Morphine And Related GI Intolerance       Objective   Vitals: Blood pressure 118/64, pulse 90, temperature (!) 97 °F (36 1 °C), resp  rate 20, height 5' 5" (1 651 m), SpO2 93 %, not currently breastfeeding  ,Body mass index is 29 53 kg/m²  Intake/Output Summary (Last 24 hours) at 12/30/2022 1038  Last data filed at 12/29/2022 1813  Gross per 24 hour   Intake 50 ml   Output --   Net 50 ml     Invasive Devices     Peripheral Intravenous Line  Duration           Peripheral IV 12/29/22 Right Antecubital <1 day                Physical Exam  Constitutional:       Appearance: She is well-developed  She is ill-appearing  HENT:      Head: Normocephalic and atraumatic  Eyes:      Pupils: Pupils are equal, round, and reactive to light  Cardiovascular:      Rate and Rhythm: Normal rate and regular rhythm  Heart sounds: No murmur heard  Pulmonary:      Effort: Pulmonary effort is normal  No respiratory distress  Breath sounds: Normal breath sounds  No wheezing or rales  Abdominal:      Palpations: Abdomen is soft  Musculoskeletal:      Cervical back: Normal range of motion and neck supple  Skin:     General: Skin is warm and dry  Neurological:      Mental Status: She is alert and oriented to person, place, and time           Lab Results:   ABG: No results found for: PHART, LOP7STN, PO2ART, NFH5ATB, A1FXEGWU, BEART, SOURCE, BNP: No results found for: BNP, CBC:   Lab Results   Component Value Date    WBC 5 97 12/30/2022    HGB 10 0 (L) 12/30/2022    HCT 31 3 (L) 12/30/2022    MCV 97 12/30/2022     12/30/2022    MCH 30 9 12/30/2022    MCHC 31 9 12/30/2022    RDW 14 5 12/30/2022    MPV 9 5 12/30/2022    NRBC 0 12/30/2022     Imaging Studies: I have personally reviewed pertinent films in PACS  EKG, Pathology, and Other Studies: I have personally reviewed pertinent films in PACS  VTE Prophylaxis: Reason for no pharmacologic prophylaxis On Eliquis    Code Status: Level 1 - Full Code  Advance Directive and Living Will:      Power of :    POLST:      None

## 2022-12-30 NOTE — PLAN OF CARE
Problem: PAIN - ADULT  Goal: Verbalizes/displays adequate comfort level or baseline comfort level  Description: Interventions:  - Encourage patient to monitor pain and request assistance  - Assess pain using appropriate pain scale  - Administer analgesics based on type and severity of pain and evaluate response  - Implement non-pharmacological measures as appropriate and evaluate response  - Consider cultural and social influences on pain and pain management  - Notify physician/advanced practitioner if interventions unsuccessful or patient reports new pain  Outcome: Progressing     Problem: INFECTION - ADULT  Goal: Absence or prevention of progression during hospitalization  Description: INTERVENTIONS:  - Assess and monitor for signs and symptoms of infection  - Monitor lab/diagnostic results  - Monitor all insertion sites, i e  indwelling lines, tubes, and drains  - Monitor endotracheal if appropriate and nasal secretions for changes in amount and color  - [place] appropriate cooling/warming therapies per order  - Administer medications as ordered  - Instruct and encourage patient and family to use good hand hygiene technique  - Identify and instruct in appropriate isolation precautions for identified infection/condition  Outcome: Progressing     Problem: SAFETY ADULT  Goal: Patient will remain free of falls  Description: INTERVENTIONS:  - Educate patient/family on patient safety including physical limitations  - Instruct patient to call for assistance with activity   - Consult OT/PT to assist with strengthening/mobility   - Keep Call bell within reach  - Keep bed low and locked with side rails adjusted as appropriate  - Keep care items and personal belongings within reach  - Initiate and maintain comfort rounds  - Make Fall Risk Sign visible to staff  - Offer Toileting every 2 Hours, in advance of need  - Initiate/Maintain bed/chair alarm  - Obtain necessary fall risk management equipment: alarms  - Apply yellow socks and bracelet for high fall risk patients  - Consider moving patient to room near nurses station  Outcome: Progressing  Goal: Maintain or return to baseline ADL function  Description: INTERVENTIONS:  -  Assess patient's ability to carry out ADLs; assess patient's baseline for ADL function and identify physical deficits which impact ability to perform ADLs (bathing, care of mouth/teeth, toileting, grooming, dressing, etc )  - Assess/evaluate cause of self-care deficits   - Assess range of motion  - Assess patient's mobility; develop plan if impaired  - Assess patient's need for assistive devices and provide as appropriate  - Encourage maximum independence but intervene and supervise when necessary  - Involve family in performance of ADLs  - Assess for home care needs following discharge   - Consider OT consult to assist with ADL evaluation and planning for discharge  - Provide patient education as appropriate  Outcome: Progressing  Goal: Maintains/Returns to pre admission functional level  Description: INTERVENTIONS:  - Perform BMAT or MOVE assessment daily    - Set and communicate daily mobility goal to care team and patient/family/caregiver  - Collaborate with rehabilitation services on mobility goals if consulted  - Perform Range of Motion 3 times a day  - Reposition patient every 3 hours    - Dangle patient 3 times a day  - Stand patient 3 times a day  - Ambulate patient 3 times a day  - Out of bed to chair 3 times a day   - Out of bed for meals 3 times a day  - Out of bed for toileting  - Record patient progress and toleration of activity level   Outcome: Progressing     Problem: DISCHARGE PLANNING  Goal: Discharge to home or other facility with appropriate resources  Description: INTERVENTIONS:  - Identify barriers to discharge w/patient and caregiver  - Arrange for needed discharge resources and transportation as appropriate  - Identify discharge learning needs (meds, wound care, etc )  - Arrange for interpretive services to assist at discharge as needed  - Refer to Case Management Department for coordinating discharge planning if the patient needs post-hospital services based on physician/advanced practitioner order or complex needs related to functional status, cognitive ability, or social support system  Outcome: Progressing     Problem: Knowledge Deficit  Goal: Patient/family/caregiver demonstrates understanding of disease process, treatment plan, medications, and discharge instructions  Description: Complete learning assessment and assess knowledge base    Interventions:  - Provide teaching at level of understanding  - Provide teaching via preferred learning methods  Outcome: Progressing     Problem: RESPIRATORY - ADULT  Goal: Achieves optimal ventilation and oxygenation  Description: INTERVENTIONS:  - Assess for changes in respiratory status  - Assess for changes in mentation and behavior  - Position to facilitate oxygenation and minimize respiratory effort  - Oxygen administered by appropriate delivery if ordered  - Initiate smoking cessation education as indicated  - Encourage broncho-pulmonary hygiene including cough, deep breathe, Incentive Spirometry  - Assess the need for suctioning and aspirate as needed  - Assess and instruct to report SOB or any respiratory difficulty  - Respiratory Therapy support as indicated  Outcome: Progressing     Problem: METABOLIC, FLUID AND ELECTROLYTES - ADULT  Goal: Electrolytes maintained within normal limits  Description: INTERVENTIONS:  - Monitor labs and assess patient for signs and symptoms of electrolyte imbalances  - Administer electrolyte replacement as ordered  - Monitor response to electrolyte replacements, including repeat lab results as appropriate  - Instruct patient on fluid and nutrition as appropriate  Outcome: Progressing  Goal: Fluid balance maintained  Description: INTERVENTIONS:  - Monitor labs   - Monitor I/O and WT  - Instruct patient on fluid and nutrition as appropriate  - Assess for signs & symptoms of volume excess or deficit  Outcome: Progressing  Goal: Glucose maintained within target range  Description: INTERVENTIONS:  - Monitor Blood Glucose as ordered  - Assess for signs and symptoms of hyperglycemia and hypoglycemia  - Administer ordered medications to maintain glucose within target range  - Assess nutritional intake and initiate nutrition service referral as needed  Outcome: Progressing

## 2022-12-30 NOTE — PLAN OF CARE
Problem: OCCUPATIONAL THERAPY ADULT  Goal: Performs self-care activities at highest level of function for planned discharge setting  See evaluation for individualized goals  Description: Treatment Interventions: ADL retraining, UE strengthening/ROM, Functional transfer training, Endurance training, Compensatory technique education, Energy conservation          See flowsheet documentation for full assessment, interventions and recommendations  Outcome: Progressing  Note: Limitation: Decreased ADL status, Decreased UE strength, Decreased Safe judgement during ADL, Decreased endurance, Decreased high-level ADLs  Prognosis: Good  Assessment: Pt is a 79 y o  female seen for OT evaluation s/p admit to [facility] on 12/29/2022 w/ Sepsis ([facility] [address] )  Comorbidities affecting pt's functional performance at time of assessment include: SOB, Influenza A, hypoxia, bilateral pneumonia  Personal factors affecting pt at time of IE include:steps to enter environment, difficulty performing ADLS and difficulty performing IADLS   Prior to admission, pt was IND for all ADL tasks  Upon evaluation: Pt requires SPV-Min A 2* the following deficits impacting occupational performance: weakness, decreased strength, decreased balance, decreased tolerance and decreased safety awareness  Pt to benefit from continued skilled OT tx while in the hospital to address deficits as defined above and maximize level of functional independence w ADL's and functional mobility  Occupational Performance areas to address include: bathing/shower, toilet hygiene, dressing, health maintenance, functional mobility, community mobility and household maintenance  The patient's raw score on the AM-PAC Daily Activity inpatient short form is 21, standardized score is 44 27, greater than 39 4  Patients at this level are likely to benefit from discharge to home  Please refer to the recommendation of the Occupational Therapist for safe discharge planning       OT Discharge Recommendation: Home with home health rehabilitation

## 2022-12-30 NOTE — ASSESSMENT & PLAN NOTE
· Sepsis was present on admission and due to Influenza A in addition to a probable superimposed bacterial multifocal pneumonia  · SIRS criteria was met with tachycardia, tachypnea, and a leukocytosis  · The lactic acid level is within normal limits    · Check blood cultures x 2 sets  · Check a urine culture  · Check a Streptococcus pneumoniae urine antigen and Legionella urinary antigen  · Check a MRSA nasal culture  · Treat with tamiflu 30 mg PO every 12 hours for a 5-day course  · Treat with azithromycin 500 mg IV every 24 hours and ceftriaxone 1000 mg IV every 24 hours  · Follow the procalcitonin level

## 2022-12-30 NOTE — PROGRESS NOTES
[facility] [address] [place]  Progress Note - Cecil King 1940, 79 y o  female MRN: [id number]  Unit/Bed#: LI31 Encounter: [id number]  Primary Care Provider: Kenisha Xavier MD   Date and time admitted to hospital: 12/29/2022  2:08 PM    * Sepsis [facility])  Assessment & Plan  · Sepsis was present on admission and due to Influenza A in addition to a probable superimposed bacterial multifocal pneumonia  · SIRS criteria was met with tachycardia, tachypnea, and a leukocytosis  · The lactic acid level is within normal limits  · Checked blood cultures x 2 sets, which are pending  · Checked a urine culture, which is pending  · Checked a Streptococcus pneumoniae urine antigen and Legionella urinary antigen, which are both pending  · Checked a MRSA nasal culture, which is pending  · Continue tamiflu 30 mg PO every 12 hours to complete a 5-day course  · Continue azithromycin 500 mg IV every 24 hours (day #2) and ceftriaxone 1000 mg IV every 24 hours (day #2)  · Continue methylprednisolone 40 mg IV every 12 hours  · I appreciate Pulmonology's input    · Follow the procalcitonin level    Influenza A  Assessment & Plan  · Please see the assessment and plan for "Sepsis"    Multifocal pneumonia  Assessment & Plan  · Please see the assessment and plan for "Sepsis"    Hypoxia  Assessment & Plan  · Not on any home supplemental oxygen therapy  · Currently requiring 3 lpm of continuous supplemental oxygen  · Will need a home supplemental oxygen evaluation on the day of discharge    Elevated d-dimer  Assessment & Plan  · Now normalized  · On eliquis 5 mg PO BID for PAF    Paroxysmal atrial flutter (HCC)  Assessment & Plan  · Currently in sinus rhythm  · Continue anticoagulation with eliquis 5 mg PO BID    Hyponatremia  Assessment & Plan  · Likely hypovolemic hyponatremia  · Now resolved    Hepatic steatosis  Assessment & Plan  · Outpatient Gastroenterology evaluation      VTE Pharmacologic Prophylaxis: VTE Score: 8 High Risk (Score >/= 5) - Pharmacological DVT Prophylaxis Ordered: apixaban (Eliquis)  Sequential Compression Devices Ordered  Patient Centered Rounds: I performed bedside rounds with nursing staff today  Discussions with Specialists or Other Care Team Provider: I discussed the case with Pulmonology  Education and Discussions with Family / Patient: Updated  (niece) at bedside  Time Spent for Care: 30 minutes  More than 50% of total time spent on counseling and coordination of care as described above  Current Length of Stay: 1 day(s)  Current Patient Status: Inpatient   Certification Statement: The patient will continue to require additional inpatient hospital stay due to the need for IV antibiotic treatment, for IV methylprednisolone treatment, and with the patient requiring continuous supplemental oxygen to maintain adequat oxygen saturation levels  Discharge Plan: Anticipate discharge in 24-48 hrs to home with home services  Code Status: Level 1 - Full Code    Subjective: The patient was seen and examined  The patient is doing better  The shortness of breath is improving  No chest pain  No abdominal pain  No nausea or vomiting  Objective:     Vitals:   Temp (24hrs), Av °F (36 1 °C), Min:97 °F (36 1 °C), Max:97 °F (36 1 °C)    Temp:  [97 °F (36 1 °C)] 97 °F (36 1 °C)  HR:  [] 80  Resp:  [16-22] 20  BP: (115-141)/(59-65) 115/59  SpO2:  [86 %-94 %] 94 %  Body mass index is 29 53 kg/m²  Input and Output Summary (last 24 hours):      Intake/Output Summary (Last 24 hours) at 2022 1336  Last data filed at 2022 1224  Gross per 24 hour   Intake 350 ml   Output 150 ml   Net 200 ml       Physical Exam:   Physical Exam   General:  NAD, follows commands  HEENT:  NC/AT, mucous membranes moist  Neck:  Supple, No JVP elevation  CV:  + S1, + S2, Intermittent tachycardia, Regular rhythm  Pulm:  Expiratory wheezing bilaterally  Abd:  Soft, Non-tender, Non-distended  Ext:  No clubbing/cyanosis/edema  Skin:  No rashes  Neuro:  Awake, alert, oriented  Psych:  Normal mood and affect      Additional Data:    Labs:  Results from last 7 days   Lab Units 12/30/22  0619   WBC Thousand/uL 5 97   HEMOGLOBIN g/dL 10 0*   HEMATOCRIT % 31 3*   PLATELETS Thousands/uL 150   NEUTROS PCT % 90*   LYMPHS PCT % 7*   MONOS PCT % 2*   EOS PCT % 0     Results from last 7 days   Lab Units 12/30/22  0619   SODIUM mmol/L 136   POTASSIUM mmol/L 4 1   CHLORIDE mmol/L 104   CO2 mmol/L 24   BUN mg/dL 19   CREATININE mg/dL 0 86   ANION GAP mmol/L 8   CALCIUM mg/dL 8 7   ALBUMIN g/dL 2 5*   TOTAL BILIRUBIN mg/dL 0 46   ALK PHOS U/L 89   ALT U/L 18   AST U/L 16   GLUCOSE RANDOM mg/dL 151*     Results from last 7 days   Lab Units 12/29/22  1418   INR  1 92*             Results from last 7 days   Lab Units 12/30/22  0619 12/29/22  1418   LACTIC ACID mmol/L 1 1 1 7   PROCALCITONIN ng/ml 0 38*  --        Lines/Drains:  Invasive Devices     Peripheral Intravenous Line  Duration           Peripheral IV 12/29/22 Right Antecubital <1 day                      Imaging: No pertinent imaging reviewed  Recent Cultures (last 7 days):   Results from last 7 days   Lab Units 12/29/22  1507   BLOOD CULTURE  Received in Microbiology Lab  Culture in Progress  Received in Microbiology Lab  Culture in Progress         Last 24 Hours Medication List:   Current Facility-Administered Medications   Medication Dose Route Frequency Provider Last Rate   • acetaminophen  650 mg Oral Q6H PRN Arti Taylorr, DO     • ALPRAZolam  0 25 mg Oral TID PRN Arti Taylorr, DO     • apixaban  5 mg Oral BID Metliv Taylorr, DO     • atorvastatin  20 mg Oral After [place] M Kimberlee, DO     • azithromycin  500 mg Intravenous Q24H Metliv Taylorr, DO     • benzonatate  100 mg Oral TID Metliv Taylorr, DO     • cefTRIAXone  1,000 mg Intravenous Q24H Metta Naomi Taylorr, DO     • cyanocobalamin  100 mcg Oral Daily Arthur Mohan, DO     • folic acid  1 mg Oral Daily Arthur Mohan, DO     • furosemide  20 mg Oral Daily Arthur Mohan, DO     • ipratropium  0 5 mg Nebulization TID Arthur Mohan, DO     • levalbuterol  1 25 mg Nebulization TID Arthur Mohan, DO     • lisinopril  5 mg Oral Daily Arthur Mohan, DO     • methylPREDNISolone sodium succinate  40 mg Intravenous Q12H [facility] Arthur Mohan, DO     • metoprolol tartrate  50 mg Oral Q12H Armando Liriano MD     • multivitamin-minerals  1 tablet Oral HS Armando Liriano MD     • ondansetron  4 mg Intravenous Q6H PRN Arthur Mohan DO     • oseltamivir  30 mg Oral Q12H [facility] Marleen Elisabeth Mohan DO     • vancomycin  125 mg Oral Q12H [facility] Kimmy Shafer DO          Today, Patient Was Seen By: Kimmy Shafer DO    **Please Note: This note may have been constructed using a voice recognition system  **

## 2022-12-30 NOTE — PLAN OF CARE
Problem: PAIN - ADULT  Goal: Verbalizes/displays adequate comfort level or baseline comfort level  Description: Interventions:  - Encourage patient to monitor pain and request assistance  - Assess pain using appropriate pain scale  - Administer analgesics based on type and severity of pain and evaluate response  - Implement non-pharmacological measures as appropriate and evaluate response  - Consider cultural and social influences on pain and pain management  - Notify physician/advanced practitioner if interventions unsuccessful or patient reports new pain  Outcome: Progressing     Problem: INFECTION - ADULT  Goal: Absence or prevention of progression during hospitalization  Description: INTERVENTIONS:  - Assess and monitor for signs and symptoms of infection  - Monitor lab/diagnostic results  - Monitor all insertion sites, i e  indwelling lines, tubes, and drains  - Monitor endotracheal if appropriate and nasal secretions for changes in amount and color  - [place] appropriate cooling/warming therapies per order  - Administer medications as ordered  - Instruct and encourage patient and family to use good hand hygiene technique  - Identify and instruct in appropriate isolation precautions for identified infection/condition  Outcome: Progressing     Problem: SAFETY ADULT  Goal: Patient will remain free of falls  Description: INTERVENTIONS:  - Educate patient/family on patient safety including physical limitations  - Instruct patient to call for assistance with activity   - Consult OT/PT to assist with strengthening/mobility   - Keep Call bell within reach  - Keep bed low and locked with side rails adjusted as appropriate  - Keep care items and personal belongings within reach  - Initiate and maintain comfort rounds  - Make Fall Risk Sign visible to staff  - Offer Toileting every 2 Hours, in advance of need  - Initiate/Maintain bed alarm  - Obtain necessary fall risk management equipment  - Apply yellow socks and bracelet for high fall risk patients  - Consider moving patient to room near nurses station  Outcome: Progressing  Goal: Maintain or return to baseline ADL function  Description: INTERVENTIONS:  -  Assess patient's ability to carry out ADLs; assess patient's baseline for ADL function and identify physical deficits which impact ability to perform ADLs (bathing, care of mouth/teeth, toileting, grooming, dressing, etc )  - Assess/evaluate cause of self-care deficits   - Assess range of motion  - Assess patient's mobility; develop plan if impaired  - Assess patient's need for assistive devices and provide as appropriate  - Encourage maximum independence but intervene and supervise when necessary  - Involve family in performance of ADLs  - Assess for home care needs following discharge   - Consider OT consult to assist with ADL evaluation and planning for discharge  - Provide patient education as appropriate  Outcome: Progressing  Goal: Maintains/Returns to pre admission functional level  Description: INTERVENTIONS:  - Perform BMAT or MOVE assessment daily    - Set and communicate daily mobility goal to care team and patient/family/caregiver  - Collaborate with rehabilitation services on mobility goals if consulted  - Perform Range of Motion 3 times a day  - Reposition patient every 2 hours    - Dangle patient 3 times a day  - Stand patient 3 times a day  - Ambulate patient 3 times a day  - Out of bed to chair 3 times a day   - Out of bed for meals 3 times a day  - Out of bed for toileting  - Record patient progress and toleration of activity level   Outcome: Progressing     Problem: DISCHARGE PLANNING  Goal: Discharge to home or other facility with appropriate resources  Description: INTERVENTIONS:  - Identify barriers to discharge w/patient and caregiver  - Arrange for needed discharge resources and transportation as appropriate  - Identify discharge learning needs (meds, wound care, etc )  - Arrange for interpretive services to assist at discharge as needed  - Refer to Case Management Department for coordinating discharge planning if the patient needs post-hospital services based on physician/advanced practitioner order or complex needs related to functional status, cognitive ability, or social support system  Outcome: Progressing     Problem: Knowledge Deficit  Goal: Patient/family/caregiver demonstrates understanding of disease process, treatment plan, medications, and discharge instructions  Description: Complete learning assessment and assess knowledge base    Interventions:  - Provide teaching at level of understanding  - Provide teaching via preferred learning methods  Outcome: Progressing     Problem: RESPIRATORY - ADULT  Goal: Achieves optimal ventilation and oxygenation  Description: INTERVENTIONS:  - Assess for changes in respiratory status  - Assess for changes in mentation and behavior  - Position to facilitate oxygenation and minimize respiratory effort  - Oxygen administered by appropriate delivery if ordered  - Initiate smoking cessation education as indicated  - Encourage broncho-pulmonary hygiene including cough, deep breathe, Incentive Spirometry  - Assess the need for suctioning and aspirate as needed  - Assess and instruct to report SOB or any respiratory difficulty  - Respiratory Therapy support as indicated  Outcome: Progressing     Problem: METABOLIC, FLUID AND ELECTROLYTES - ADULT  Goal: Electrolytes maintained within normal limits  Description: INTERVENTIONS:  - Monitor labs and assess patient for signs and symptoms of electrolyte imbalances  - Administer electrolyte replacement as ordered  - Monitor response to electrolyte replacements, including repeat lab results as appropriate  - Instruct patient on fluid and nutrition as appropriate  Outcome: Progressing  Goal: Fluid balance maintained  Description: INTERVENTIONS:  - Monitor labs   - Monitor I/O and WT  - Instruct patient on fluid and nutrition as appropriate  - Assess for signs & symptoms of volume excess or deficit  Outcome: Progressing  Goal: Glucose maintained within target range  Description: INTERVENTIONS:  - Monitor Blood Glucose as ordered  - Assess for signs and symptoms of hyperglycemia and hypoglycemia  - Administer ordered medications to maintain glucose within target range  - Assess nutritional intake and initiate nutrition service referral as needed  Outcome: Progressing

## 2022-12-30 NOTE — ASSESSMENT & PLAN NOTE
· Sepsis was present on admission and due to Influenza A in addition to a probable superimposed bacterial multifocal pneumonia  · SIRS criteria was met with tachycardia, tachypnea, and a leukocytosis  · The lactic acid level is within normal limits  · Checked blood cultures x 2 sets, which are pending  · Checked a urine culture, which is pending  · Checked a Streptococcus pneumoniae urine antigen and Legionella urinary antigen, which are both pending  · Checked a MRSA nasal culture, which is pending  · Continue tamiflu 30 mg PO every 12 hours to complete a 5-day course  · Continue azithromycin 500 mg IV every 24 hours (day #2) and ceftriaxone 1000 mg IV every 24 hours (day #2)  · Continue methylprednisolone 40 mg IV every 12 hours  · I appreciate Pulmonology's input    ·   · Follow the procalcitonin level

## 2022-12-31 PROBLEM — R73.9 HYPERGLYCEMIA: Status: ACTIVE | Noted: 2022-12-31

## 2022-12-31 LAB
ALBUMIN SERPL BCP-MCNC: 2.6 G/DL (ref 3.5–5)
ALP SERPL-CCNC: 88 U/L (ref 46–116)
ALT SERPL W P-5'-P-CCNC: 18 U/L (ref 12–78)
ANION GAP SERPL CALCULATED.3IONS-SCNC: 10 MMOL/L (ref 4–13)
AST SERPL W P-5'-P-CCNC: 22 U/L (ref 5–45)
BACTERIA UR CULT: NORMAL
BASOPHILS # BLD AUTO: 0.01 THOUSANDS/ÂΜL (ref 0–0.1)
BASOPHILS NFR BLD AUTO: 0 % (ref 0–1)
BILIRUB SERPL-MCNC: 0.37 MG/DL (ref 0.2–1)
BUN SERPL-MCNC: 29 MG/DL (ref 5–25)
CALCIUM ALBUM COR SERPL-MCNC: 10 MG/DL (ref 8.3–10.1)
CALCIUM SERPL-MCNC: 8.9 MG/DL (ref 8.3–10.1)
CHLORIDE SERPL-SCNC: 103 MMOL/L (ref 96–108)
CO2 SERPL-SCNC: 23 MMOL/L (ref 21–32)
CREAT SERPL-MCNC: 1.23 MG/DL (ref 0.6–1.3)
D DIMER PPP FEU-MCNC: 0.37 UG/ML FEU
EOSINOPHIL # BLD AUTO: 0 THOUSAND/ÂΜL (ref 0–0.61)
EOSINOPHIL NFR BLD AUTO: 0 % (ref 0–6)
ERYTHROCYTE [DISTWIDTH] IN BLOOD BY AUTOMATED COUNT: 14.5 % (ref 11.6–15.1)
GFR SERPL CREATININE-BSD FRML MDRD: 40 ML/MIN/1.73SQ M
GLUCOSE SERPL-MCNC: 141 MG/DL (ref 65–140)
HCT VFR BLD AUTO: 31.9 % (ref 34.8–46.1)
HGB BLD-MCNC: 10.3 G/DL (ref 11.5–15.4)
LYMPHOCYTES # BLD AUTO: 0.6 THOUSANDS/ÂΜL (ref 0.6–4.47)
LYMPHOCYTES NFR BLD AUTO: 5 % (ref 14–44)
MAGNESIUM SERPL-MCNC: 2.2 MG/DL (ref 1.6–2.6)
MCH RBC QN AUTO: 30.7 PG (ref 26.8–34.3)
MCHC RBC AUTO-ENTMCNC: 32.3 G/DL (ref 31.4–37.4)
MCV RBC AUTO: 95 FL (ref 82–98)
MONOCYTES # BLD AUTO: 0.32 THOUSAND/ÂΜL (ref 0.17–1.22)
MONOCYTES NFR BLD AUTO: 3 % (ref 4–12)
MRSA NOSE QL CULT: NORMAL
NEUTROPHILS # BLD AUTO: 12.03 THOUSANDS/ÂΜL (ref 1.85–7.62)
NEUTS SEG NFR BLD AUTO: 92 % (ref 43–75)
PHOSPHATE SERPL-MCNC: 3 MG/DL (ref 2.3–4.1)
PLATELET # BLD AUTO: 187 THOUSANDS/UL (ref 149–390)
PMV BLD AUTO: 9.5 FL (ref 8.9–12.7)
POTASSIUM SERPL-SCNC: 4.3 MMOL/L (ref 3.5–5.3)
PROCALCITONIN SERPL-MCNC: 0.29 NG/ML
PROT SERPL-MCNC: 6.7 G/DL (ref 6.4–8.4)
RBC # BLD AUTO: 3.35 MILLION/UL (ref 3.81–5.12)
SODIUM SERPL-SCNC: 136 MMOL/L (ref 135–147)
WBC # BLD AUTO: 13.04 THOUSAND/UL (ref 4.31–10.16)

## 2022-12-31 RX ORDER — HYDROCODONE POLISTIREX AND CHLORPHENIRAMINE POLISTIREX 10; 8 MG/5ML; MG/5ML
5 SUSPENSION, EXTENDED RELEASE ORAL EVERY 12 HOURS PRN
Status: DISCONTINUED | OUTPATIENT
Start: 2022-12-31 | End: 2023-01-01

## 2022-12-31 RX ORDER — AZITHROMYCIN 250 MG/1
500 TABLET, FILM COATED ORAL EVERY 24 HOURS
Status: DISCONTINUED | OUTPATIENT
Start: 2022-12-31 | End: 2023-01-03 | Stop reason: HOSPADM

## 2022-12-31 RX ORDER — HYDROCODONE POLISTIREX AND CHLORPHENIRAMINE POLISTIREX 10; 8 MG/5ML; MG/5ML
5 SUSPENSION, EXTENDED RELEASE ORAL ONCE
Status: COMPLETED | OUTPATIENT
Start: 2022-12-31 | End: 2022-12-31

## 2022-12-31 RX ADMIN — BENZONATATE 100 MG: 100 CAPSULE ORAL at 15:59

## 2022-12-31 RX ADMIN — HYDROCODONE POLISTIREX AND CHLORPHENIRAMINE POLISTIREX 5 ML: 10; 8 SUSPENSION, EXTENDED RELEASE ORAL at 22:00

## 2022-12-31 RX ADMIN — MULTIPLE VITAMINS W/ MINERALS TAB 1 TABLET: TAB at 21:53

## 2022-12-31 RX ADMIN — BENZONATATE 100 MG: 100 CAPSULE ORAL at 21:53

## 2022-12-31 RX ADMIN — OSELTAMIVIR PHOSPHATE 30 MG: 30 CAPSULE ORAL at 21:53

## 2022-12-31 RX ADMIN — CEFTRIAXONE 1000 MG: 1 INJECTION, SOLUTION INTRAVENOUS at 17:30

## 2022-12-31 RX ADMIN — FUROSEMIDE 20 MG: 40 TABLET ORAL at 08:08

## 2022-12-31 RX ADMIN — METOPROLOL TARTRATE 50 MG: 50 TABLET, FILM COATED ORAL at 21:53

## 2022-12-31 RX ADMIN — BENZONATATE 100 MG: 100 CAPSULE ORAL at 08:07

## 2022-12-31 RX ADMIN — IPRATROPIUM BROMIDE 0.5 MG: 0.5 SOLUTION RESPIRATORY (INHALATION) at 07:37

## 2022-12-31 RX ADMIN — GUAIFENESIN AND DEXTROMETHORPHAN 10 ML: 100; 10 SYRUP ORAL at 00:40

## 2022-12-31 RX ADMIN — LEVALBUTEROL HYDROCHLORIDE 1.25 MG: 1.25 SOLUTION, CONCENTRATE RESPIRATORY (INHALATION) at 07:37

## 2022-12-31 RX ADMIN — VANCOMYCIN HYDROCHLORIDE 125 MG: 125 CAPSULE ORAL at 22:00

## 2022-12-31 RX ADMIN — LEVALBUTEROL HYDROCHLORIDE 1.25 MG: 1.25 SOLUTION, CONCENTRATE RESPIRATORY (INHALATION) at 19:32

## 2022-12-31 RX ADMIN — METHYLPREDNISOLONE SODIUM SUCCINATE 40 MG: 40 INJECTION, POWDER, FOR SOLUTION INTRAMUSCULAR; INTRAVENOUS at 21:54

## 2022-12-31 RX ADMIN — METHYLPREDNISOLONE SODIUM SUCCINATE 40 MG: 40 INJECTION, POWDER, FOR SOLUTION INTRAMUSCULAR; INTRAVENOUS at 08:49

## 2022-12-31 RX ADMIN — APIXABAN 5 MG: 5 TABLET, FILM COATED ORAL at 17:30

## 2022-12-31 RX ADMIN — METOPROLOL TARTRATE 50 MG: 50 TABLET, FILM COATED ORAL at 08:07

## 2022-12-31 RX ADMIN — IPRATROPIUM BROMIDE 0.5 MG: 0.5 SOLUTION RESPIRATORY (INHALATION) at 19:32

## 2022-12-31 RX ADMIN — APIXABAN 5 MG: 5 TABLET, FILM COATED ORAL at 08:08

## 2022-12-31 RX ADMIN — LEVALBUTEROL HYDROCHLORIDE 1.25 MG: 1.25 SOLUTION, CONCENTRATE RESPIRATORY (INHALATION) at 14:08

## 2022-12-31 RX ADMIN — ATORVASTATIN CALCIUM 20 MG: 40 TABLET, FILM COATED ORAL at 17:30

## 2022-12-31 RX ADMIN — AZITHROMYCIN MONOHYDRATE 500 MG: 250 TABLET ORAL at 17:30

## 2022-12-31 RX ADMIN — IPRATROPIUM BROMIDE 0.5 MG: 0.5 SOLUTION RESPIRATORY (INHALATION) at 14:08

## 2022-12-31 RX ADMIN — LISINOPRIL 5 MG: 5 TABLET ORAL at 08:07

## 2022-12-31 RX ADMIN — GUAIFENESIN AND DEXTROMETHORPHAN 10 ML: 100; 10 SYRUP ORAL at 05:17

## 2022-12-31 RX ADMIN — VANCOMYCIN HYDROCHLORIDE 125 MG: 125 CAPSULE ORAL at 10:14

## 2022-12-31 RX ADMIN — FOLIC ACID 1 MG: 1 TABLET ORAL at 08:08

## 2022-12-31 RX ADMIN — ALPRAZOLAM 0.25 MG: 0.25 TABLET ORAL at 00:40

## 2022-12-31 RX ADMIN — VITAM B12 100 MCG: 100 TAB at 08:07

## 2022-12-31 RX ADMIN — HYDROCODONE POLISTIREX AND CHLORPHENIRAMINE POLISTIREX 5 ML: 10; 8 SUSPENSION, EXTENDED RELEASE ORAL at 11:18

## 2022-12-31 RX ADMIN — ACETAMINOPHEN 650 MG: 325 TABLET, FILM COATED ORAL at 21:53

## 2022-12-31 RX ADMIN — OSELTAMIVIR PHOSPHATE 30 MG: 30 CAPSULE ORAL at 08:07

## 2022-12-31 NOTE — PLAN OF CARE
Problem: PAIN - ADULT  Goal: Verbalizes/displays adequate comfort level or baseline comfort level  Description: Interventions:  - Encourage patient to monitor pain and request assistance  - Assess pain using appropriate pain scale  - Administer analgesics based on type and severity of pain and evaluate response  - Implement non-pharmacological measures as appropriate and evaluate response  - Consider cultural and social influences on pain and pain management  - Notify physician/advanced practitioner if interventions unsuccessful or patient reports new pain  Outcome: Progressing     Problem: INFECTION - ADULT  Goal: Absence or prevention of progression during hospitalization  Description: INTERVENTIONS:  - Assess and monitor for signs and symptoms of infection  - Monitor lab/diagnostic results  - Monitor all insertion sites, i e  indwelling lines, tubes, and drains  - Monitor endotracheal if appropriate and nasal secretions for changes in amount and color  - [place] appropriate cooling/warming therapies per order  - Administer medications as ordered  - Instruct and encourage patient and family to use good hand hygiene technique  - Identify and instruct in appropriate isolation precautions for identified infection/condition  Outcome: Progressing     Problem: SAFETY ADULT  Goal: Patient will remain free of falls  Description: INTERVENTIONS:  - Educate patient/family on patient safety including physical limitations  - Instruct patient to call for assistance with activity   - Consult OT/PT to assist with strengthening/mobility   - Keep Call bell within reach  - Keep bed low and locked with side rails adjusted as appropriate  - Keep care items and personal belongings within reach  - Initiate and maintain comfort rounds  - Make Fall Risk Sign visible to staff  - Offer Toileting every  Hours, in advance of need  - Initiate/Maintain alarm  - Obtain necessary fall risk management equipment:   - Apply yellow socks and bracelet for high fall risk patients  - Consider moving patient to room near nurses station  Outcome: Progressing  Goal: Maintain or return to baseline ADL function  Description: INTERVENTIONS:  -  Assess patient's ability to carry out ADLs; assess patient's baseline for ADL function and identify physical deficits which impact ability to perform ADLs (bathing, care of mouth/teeth, toileting, grooming, dressing, etc )  - Assess/evaluate cause of self-care deficits   - Assess range of motion  - Assess patient's mobility; develop plan if impaired  - Assess patient's need for assistive devices and provide as appropriate  - Encourage maximum independence but intervene and supervise when necessary  - Involve family in performance of ADLs  - Assess for home care needs following discharge   - Consider OT consult to assist with ADL evaluation and planning for discharge  - Provide patient education as appropriate  Outcome: Progressing  Goal: Maintains/Returns to pre admission functional level  Description: INTERVENTIONS:  - Perform BMAT or MOVE assessment daily    - Set and communicate daily mobility goal to care team and patient/family/caregiver  - Collaborate with rehabilitation services on mobility goals if consulted  - Perform Range of Motion  times a day  - Reposition patient every  hours    - Dangle patient  times a day  - Stand patient  times a day  - Ambulate patient  times a day  - Out of bed to chair  times a day   - Out of bed for meals times a day  - Out of bed for toileting  - Record patient progress and toleration of activity level   Outcome: Progressing     Problem: DISCHARGE PLANNING  Goal: Discharge to home or other facility with appropriate resources  Description: INTERVENTIONS:  - Identify barriers to discharge w/patient and caregiver  - Arrange for needed discharge resources and transportation as appropriate  - Identify discharge learning needs (meds, wound care, etc )  - Arrange for interpretive services to assist at discharge as needed  - Refer to Case Management Department for coordinating discharge planning if the patient needs post-hospital services based on physician/advanced practitioner order or complex needs related to functional status, cognitive ability, or social support system  Outcome: Progressing     Problem: Knowledge Deficit  Goal: Patient/family/caregiver demonstrates understanding of disease process, treatment plan, medications, and discharge instructions  Description: Complete learning assessment and assess knowledge base    Interventions:  - Provide teaching at level of understanding  - Provide teaching via preferred learning methods  Outcome: Progressing     Problem: RESPIRATORY - ADULT  Goal: Achieves optimal ventilation and oxygenation  Description: INTERVENTIONS:  - Assess for changes in respiratory status  - Assess for changes in mentation and behavior  - Position to facilitate oxygenation and minimize respiratory effort  - Oxygen administered by appropriate delivery if ordered  - Initiate smoking cessation education as indicated  - Encourage broncho-pulmonary hygiene including cough, deep breathe, Incentive Spirometry  - Assess the need for suctioning and aspirate as needed  - Assess and instruct to report SOB or any respiratory difficulty  - Respiratory Therapy support as indicated  Outcome: Progressing     Problem: METABOLIC, FLUID AND ELECTROLYTES - ADULT  Goal: Electrolytes maintained within normal limits  Description: INTERVENTIONS:  - Monitor labs and assess patient for signs and symptoms of electrolyte imbalances  - Administer electrolyte replacement as ordered  - Monitor response to electrolyte replacements, including repeat lab results as appropriate  - Instruct patient on fluid and nutrition as appropriate  Outcome: Progressing  Goal: Fluid balance maintained  Description: INTERVENTIONS:  - Monitor labs   - Monitor I/O and WT  - Instruct patient on fluid and nutrition as appropriate  - Assess for signs & symptoms of volume excess or deficit  Outcome: Progressing  Goal: Glucose maintained within target range  Description: INTERVENTIONS:  - Monitor Blood Glucose as ordered  - Assess for signs and symptoms of hyperglycemia and hypoglycemia  - Administer ordered medications to maintain glucose within target range  - Assess nutritional intake and initiate nutrition service referral as needed  Outcome: Progressing     Problem: MOBILITY - ADULT  Goal: Maintain or return to baseline ADL function  Description: INTERVENTIONS:  -  Assess patient's ability to carry out ADLs; assess patient's baseline for ADL function and identify physical deficits which impact ability to perform ADLs (bathing, care of mouth/teeth, toileting, grooming, dressing, etc )  - Assess/evaluate cause of self-care deficits   - Assess range of motion  - Assess patient's mobility; develop plan if impaired  - Assess patient's need for assistive devices and provide as appropriate  - Encourage maximum independence but intervene and supervise when necessary  - Involve family in performance of ADLs  - Assess for home care needs following discharge   - Consider OT consult to assist with ADL evaluation and planning for discharge  - Provide patient education as appropriate  Outcome: Progressing  Goal: Maintains/Returns to pre admission functional level  Description: INTERVENTIONS:  - Perform BMAT or MOVE assessment daily    - Set and communicate daily mobility goal to care team and patient/family/caregiver  - Collaborate with rehabilitation services on mobility goals if consulted  - Perform Range of Motion  times a day  - Reposition patient every  hours    - Dangle patient  times a day  - Stand patient  times a day  - Ambulate patient  times a day  - Out of bed to chair  times a day   - Out of bed for meals  times a day  - Out of bed for toileting  - Record patient progress and toleration of activity level   Outcome: Progressing     Problem: Potential for Falls  Goal: Patient will remain free of falls  Description: INTERVENTIONS:  - Educate patient/family on patient safety including physical limitations  - Instruct patient to call for assistance with activity   - Consult OT/PT to assist with strengthening/mobility   - Keep Call bell within reach  - Keep bed low and locked with side rails adjusted as appropriate  - Keep care items and personal belongings within reach  - Initiate and maintain comfort rounds  - Make Fall Risk Sign visible to staff  - Offer Toileting every  Hours, in advance of need  - Initiate/Maintain alarm  - Obtain necessary fall risk management equipment:  - Apply yellow socks and bracelet for high fall risk patients  - Consider moving patient to room near nurses station  Outcome: Progressing

## 2022-12-31 NOTE — CASE MANAGEMENT
Case Management Assessment & Discharge Planning Note    Patient name Sosa Kimbrough  Location [id number] MRN [id number]  : 1940 Date 2022       Current Admission Date: 2022  Current Admission Diagnosis:Sepsis [facility])   Patient Active Problem List    Diagnosis Date Noted   • Hyperglycemia 2022   • Influenza A 2022   • Hepatic steatosis 2022   • Sepsis ([address] ) 2022   • Hyponatremia 2022   • Paroxysmal atrial flutter ([address] ) 2022   • Elevated d-dimer 2022   • Abnormal PFT 2022   • Acute respiratory failure with hypoxia ([facility] ) 2020   • Pneumonia due to COVID-19 virus 2020   • Coronary artery disease involving native coronary artery of native heart with angina pectoris ([facility] ) 2020   • C  difficile colitis 2019   • Diverticulitis 2019   • Diarrhea of presumed infectious origin 2019   • On continuous oral anticoagulation 2019   • Encounter for pre-operative cardiovascular clearance 2019   • Emphysema of lung ([facility] ) 2019   • History of Clostridioides difficile colitis 10/13/2018   • Acute cystitis without hematuria 10/12/2018   • Anemia 10/03/2018   • Hypokalemia 10/03/2018   • Hypoxia 10/03/2018   • Pleural effusion 10/03/2018   • Tubulovillous adenoma of colon 2018   • Colonic mass 2018   • Nausea, vomiting and diarrhea 2018   • Diverticulosis of large intestine with hemorrhage 2018   • Shortness of breath 2018   • Chronic diastolic congestive heart failure ([address] ) 2017   • Mixed anxiety and depressive disorder 2017   • Secondary pulmonary hypertension 2017   • Diverticulosis 2017   • Hypoalbuminemia 2017   • Elevated MCV 2017   • Back pain 2017   • History of shingles 2017   • Incomplete right bundle branch block 2017   • Hx of CABG 2017   • Thoracic radiculopathy 2017   • Pyuria 2017   • Microscopic hematuria 01/02/2017   • Typical atrial flutter ([address] ) 12/14/2016   • Multifocal pneumonia 09/18/2016   • Essential hypertension 01/03/2014   • Hypercholesterolemia 01/03/2014      LOS (days): 2  Geometric Mean LOS (GMLOS) (days): 5 00  Days to GMLOS:2 9     OBJECTIVE:    Risk of Unplanned Readmission Score: 25 64         Current admission status: Inpatient       Preferred Pharmacy:   [address] [place] [address], [address] [place] [address] [place] [address]  Phone: [phone] Fax: [phone]    Primary Care Provider: Carol See MD    Primary Insurance: MEDICARE  Secondary Insurance: BLUE CROSS    ASSESSMENT:  Makenzie Borrero Proxies    There are no active Health Care Proxies on file  Readmission Root Cause  30 Day Readmission: No    Patient Information  Admitted from[de-identified] Home  Mental Status: Alert  During Assessment patient was accompanied by: Not accompanied during assessment  Assessment information provided by[de-identified] Patient  Primary Caregiver: Self  Support Systems: Daughter, Son  [place] of Residence: [facility] do you live in?:  [facility] entry access options  Select all that apply : Stairs  Number of steps to enter home  : 1  Do the steps have railings?: No  Type of Current Residence: [address] [place] home  Upon entering residence, is there a bedroom on the main floor (no further steps)?: No  A bedroom is located on the following floor levels of residence (select all that apply):: 2nd Floor  Upon entering residence, is there a bathroom on the main floor (no further steps)?: No  Indicate which floors of current residence have a bathroom (select all the apply):: 2nd Floor  Number of steps to 2nd floor from main floor: One Flight  In the last 12 months, was there a time when you were not able to pay the mortgage or rent on time?: No  In the last 12 months, how many places have you lived?: 1  In the last 12 months, was there a time when you did not have a steady place to sleep or slept in a shelter (including now)?: No  Homeless/housing insecurity resource given?: N/A  Living Arrangements: Lives Alone  Is patient a ?: No    Activities of Daily Living Prior to Admission  Functional Status: Independent  Completes ADLs independently?: Yes  Ambulates independently?: Yes (Pt ambulates indepedently with no device  )  Does patient use assisted devices?: No  Does patient currently own DME?: Yes  What DME does the patient currently own?: [employer]  Does patient have a history of Outpatient Therapy (PT/OT)?: Yes  Does the patient have a history of Short-Term Rehab?: No  Does patient have a history of HHC?: Yes ([facility] had [facility] in [facility] past)  Does patient currently have [address]?: No         Patient Information Continued  Income Source: Pension/[employer]  Within the past 12 months, you worried that your food would run out before you got the money to buy more : Never true  Within the past 12 months, the food you bought just didn't last and you didn't have money to get more : Never true  Food insecurity resource given?: N/A  Does patient receive dialysis treatments?: No  Does patient have a history of substance abuse?: No  Does patient have a history of Mental Health Diagnosis?: Yes (Pt has a history of anxiety and depression )  Is patient receiving treatment for mental health?: No  Patient declined treatment information  (Pt's PCP follows and orders meds for )  Has patient received inpatient treatment related to mental health in the last 2 years?: No         Means of Transportation  [facility] Family transport (Pt's daughter and son live close by and drive to [employer]   they will drive her home from the hospital)  In the past 12 months, has lack of transportation kept you from medical appointments or from getting medications?: No  In the past 12 months, has lack of transportation kept you from meetings, work, or from getting things needed for daily living?: No  Was application for public transport provided?: N/A        DISCHARGE DETAILS:    Discharge planning discussed with[de-identified] Pt  Freedom of Choice: Yes     CM contacted family/caregiver?: No- see comments (Pt is alert and oriented x3 )      Pt's daughter and son live close by and check on patient frequently Daughter prepares meals for patient   Pt is currently requiring 02 and does not have at home  I will continue to follow for any Case Management needs

## 2022-12-31 NOTE — PROGRESS NOTES
The azithromycin has / have been converted to Oral per Marilyn Ellsworth IV-to-PO Auto-Conversion Protocol for Adults as approved by the Pharmacy and Therapeutics Committee  The patient met all eligible criteria:  3 Age = 25years old   2) Received at least one dose of the IV form   3) Receiving at least one other scheduled oral/enteral medication   4) Tolerating an oral/enteral diet   and did not have any exclusions:   1) Critical care patient   2) Active GI bleed (IF assessing H2RAs or PPIs)   3) Continuous tube feeding (IF assessing cipro, doxycycline, levofloxacin, minocycline, rifampin, or voriconazole)   4) Receiving PO vancomycin (IF assessing metronidazole)   5) Persistent nausea and/or vomiting   6) Ileus or gastrointestinal obstruction   7) Tanika/nasogastric tube set for continuous suction   8) Specific order not to automatically convert to PO (in the order's comments or if discussed in the most recent Infectious Disease or primary team's progress notes)

## 2022-12-31 NOTE — PROGRESS NOTES
[facility] [address]  Progress Note - Betty Dodson 1940, 79 y o  female MRN: [id number]  Unit/Bed#: 423-01 Encounter: [id number]  Primary Care Provider: Jacob Pedersen MD   Date and time admitted to hospital: 12/29/2022  2:08 PM    * Sepsis [facility])  Assessment & Plan  · Sepsis was present on admission and due to Influenza A in addition to a probable superimposed bacterial multifocal pneumonia  · SIRS criteria was met with tachycardia, tachypnea, and a leukocytosis  · The lactic acid level is within normal limits  · Checked blood cultures x 2 sets, which are no growth for 24 hours x 2 sets  · Checked a urine culture, which is no growth  · Checked a Streptococcus pneumoniae urine antigen and Legionella urinary antigen, which are both pending  · Checked a MRSA nasal culture, which is pending  · Continue tamiflu 30 mg PO every 12 hours to complete a 5-day course  · Continue azithromycin 500 mg IV every 24 hours (day #3) and ceftriaxone 1000 mg IV every 24 hours (day #3)  · Continue methylprednisolone 40 mg IV every 12 hours  · I appreciate Pulmonology's input    · Follow the procalcitonin level    Influenza A  Assessment & Plan  · Please see the assessment and plan for "Sepsis"    Multifocal pneumonia  Assessment & Plan  · Please see the assessment and plan for "Sepsis"    Hypoxia  Assessment & Plan  · Not on any home supplemental oxygen therapy  · Currently requiring 3 lpm of continuous supplemental oxygen  · Will need a home supplemental oxygen evaluation on the day of discharge    Hyperglycemia  Assessment & Plan  · Likely due to IV methylprednisolone treatment  · Check a HgbA1c level    Elevated d-dimer  Assessment & Plan  · Now normalized  · On eliquis 5 mg PO BID for PAF    Paroxysmal atrial flutter (HCC)  Assessment & Plan  · Currently in sinus rhythm  · Continue anticoagulation with eliquis 5 mg PO BID    Hyponatremia  Assessment & Plan  · Likely hypovolemic hyponatremia  · Now resolved    Hepatic steatosis  Assessment & Plan  · Outpatient Gastroenterology evaluation    Pyuria  Assessment & Plan  · Negative urine culture    2022 1152 2022 1139 Urine culture [[id number]]   Urine, Clean Catch    Final result [address]  Component Value   Urine Culture No Growth <1000 cfu/mL              VTE Pharmacologic Prophylaxis: VTE Score: 8 High Risk (Score >/= 5) - Pharmacological DVT Prophylaxis Ordered: apixaban (Eliquis)  Sequential Compression Devices Ordered  Patient Centered Rounds: I performed bedside rounds with nursing staff today  Education and Discussions with Family / Patient: Updated  (daughter) at bedside  Time Spent for Care: 30 minutes  More than 50% of total time spent on counseling and coordination of care as described above  Current Length of Stay: 2 day(s)  Current Patient Status: Inpatient   Certification Statement: The patient will continue to require additional inpatient hospital stay due to the need for IV antibiotic treatment, for IV methylprednisolone treatment, and with the patient requiring continuous supplemental oxygen to maintain adequate oxygen saturation levels  Discharge Plan: Anticipate discharge in 48-72 hrs to home  Code Status: Level 1 - Full Code    Subjective: The patient was seen and examined  The patient is doing better  The shortness of breath is improving  The patient continues to experience a productive cough  Objective:     Vitals:   Temp (24hrs), Av 2 °F (36 8 °C), Min:97 6 °F (36 4 °C), Max:98 8 °F (37 1 °C)    Temp:  [97 6 °F (36 4 °C)-98 8 °F (37 1 °C)] 97 6 °F (36 4 °C)  HR:  [] 90  Resp:  [16-18] 18  BP: (126-135)/(63-73) 134/71  SpO2:  [92 %-96 %] 92 %  Body mass index is 28 54 kg/m²  Input and Output Summary (last 24 hours):      Intake/Output Summary (Last 24 hours) at 2022 1525  Last data filed at 2022 1227  Gross per 24 hour   Intake 660 ml Output 300 ml   Net 360 ml       Physical Exam:   Physical Exam   General:  NAD, follows commands  HEENT:  NC/AT, mucous membranes moist  Neck:  Supple, No JVP elevation  CV:  + S1, + S2, RRR  Pulm:  Bibasilar crackles with scattered rhonchi bilaterally  Abd:  Soft, Non-tender, Non-distended  Ext:  No clubbing/cyanosis/edema  Skin:  No rashes  Neuro:  Awake, alert, oriented  Psych:  Normal mood and affect      Additional Data:    Labs:  Results from last 7 days   Lab Units 12/31/22  0520   WBC Thousand/uL 13 04*   HEMOGLOBIN g/dL 10 3*   HEMATOCRIT % 31 9*   PLATELETS Thousands/uL 187   NEUTROS PCT % 92*   LYMPHS PCT % 5*   MONOS PCT % 3*   EOS PCT % 0     Results from last 7 days   Lab Units 12/31/22  0520   SODIUM mmol/L 136   POTASSIUM mmol/L 4 3   CHLORIDE mmol/L 103   CO2 mmol/L 23   BUN mg/dL 29*   CREATININE mg/dL 1 23   ANION GAP mmol/L 10   CALCIUM mg/dL 8 9   ALBUMIN g/dL 2 6*   TOTAL BILIRUBIN mg/dL 0 37   ALK PHOS U/L 88   ALT U/L 18   AST U/L 22   GLUCOSE RANDOM mg/dL 141*     Results from last 7 days   Lab Units 12/29/22  1418   INR  1 92*             Results from last 7 days   Lab Units 12/31/22  0520 12/30/22  0619 12/29/22  1418   LACTIC ACID mmol/L  --  1 1 1 7   PROCALCITONIN ng/ml 0 29* 0 38*  --        Lines/Drains:  Invasive Devices     Peripheral Intravenous Line  Duration           Peripheral IV 12/29/22 Right Antecubital 2 days                      Imaging: No pertinent imaging reviewed  Recent Cultures (last 7 days):   Results from last 7 days   Lab Units 12/30/22  1152 12/30/22  1149 12/29/22  1507   BLOOD CULTURE   --   --  No Growth at 24 hrs  No Growth at 24 hrs     URINE CULTURE  No Growth <1000 cfu/mL  --   --    LEGIONELLA URINARY ANTIGEN   --  Negative  --        Last 24 Hours Medication List:   Current Facility-Administered Medications   Medication Dose Route Frequency Provider Last Rate   • acetaminophen  650 mg Oral Q6H PRN Jorge Mohan DO     • ALPRAZolam  0 25 mg Oral TID PRN Rodney Mohan, DO     • apixaban  5 mg Oral BID Rodneykg Taylorr, DO     • atorvastatin  20 mg Oral After [facility], DO     • azithromycin  500 mg Oral Q24H Rodneykg Taylorr, DO     • benzonatate  100 mg Oral TID Rodney Mohan, DO     • cefTRIAXone  1,000 mg Intravenous Q24H Rodneykg Taylorr, DO 1,000 mg (12/30/22 1729)   • cyanocobalamin  100 mcg Oral Daily Rodney Taylorr, DO     • folic acid  1 mg Oral Daily Rodney Taylorr, DO     • furosemide  20 mg Oral Daily Rodney Mohan, DO     • hydrocodone-chlorpheniramine polistirex  5 mL Oral Q12H PRN Rodney Mohan, DO     • ipratropium  0 5 mg Nebulization TID Rodney Mohan, DO     • levalbuterol  1 25 mg Nebulization TID Rodney Mohan, DO     • lisinopril  5 mg Oral Daily Rodney Mohan, DO     • methylPREDNISolone sodium succinate  40 mg Intravenous Q12H [facility] Rodney Mohan, DO     • metoprolol tartrate  50 mg Oral Q12H Kay Valdez MD     • multivitamin-minerals  1 tablet Oral HS Kay Valdez MD     • ondansetron  4 mg Intravenous Q6H PRN Rodney Mohan, DO     • oseltamivir  30 mg Oral Q12H [facility] Rodney Mohan, DO     • vancomycin  125 mg Oral Q12H [facility] Carlos Donald DO          Today, Patient Was Seen By: Carlos Donald DO    **Please Note: This note may have been constructed using a voice recognition system  **

## 2022-12-31 NOTE — PLAN OF CARE
Problem: PAIN - ADULT  Goal: Verbalizes/displays adequate comfort level or baseline comfort level  Description: Interventions:  - Encourage patient to monitor pain and request assistance  - Assess pain using appropriate pain scale  - Administer analgesics based on type and severity of pain and evaluate response  - Implement non-pharmacological measures as appropriate and evaluate response  - Consider cultural and social influences on pain and pain management  - Notify physician/advanced practitioner if interventions unsuccessful or patient reports new pain  Outcome: Progressing     Problem: INFECTION - ADULT  Goal: Absence or prevention of progression during hospitalization  Description: INTERVENTIONS:  - Assess and monitor for signs and symptoms of infection  - Monitor lab/diagnostic results  - Monitor all insertion sites, i e  indwelling lines, tubes, and drains  - Monitor endotracheal if appropriate and nasal secretions for changes in amount and color  - [place] appropriate cooling/warming therapies per order  - Administer medications as ordered  - Instruct and encourage patient and family to use good hand hygiene technique  - Identify and instruct in appropriate isolation precautions for identified infection/condition  Outcome: Progressing     Problem: SAFETY ADULT  Goal: Patient will remain free of falls  Description: INTERVENTIONS:  - Educate patient/family on patient safety including physical limitations  - Instruct patient to call for assistance with activity   - Consult OT/PT to assist with strengthening/mobility   - Keep Call bell within reach  - Keep bed low and locked with side rails adjusted as appropriate  - Keep care items and personal belongings within reach  - Initiate and maintain comfort rounds  - Make Fall Risk Sign visible to staff  - Offer Toileting every 2 Hours, in advance of need  - Initiate/Maintain bed/chair alarm  - Obtain necessary fall risk management equipment: nonskid socks, call bell   - Apply yellow socks and bracelet for high fall risk patients  - Consider moving patient to room near nurses station  Outcome: Progressing  Goal: Maintain or return to baseline ADL function  Description: INTERVENTIONS:  -  Assess patient's ability to carry out ADLs; assess patient's baseline for ADL function and identify physical deficits which impact ability to perform ADLs (bathing, care of mouth/teeth, toileting, grooming, dressing, etc )  - Assess/evaluate cause of self-care deficits   - Assess range of motion  - Assess patient's mobility; develop plan if impaired  - Assess patient's need for assistive devices and provide as appropriate  - Encourage maximum independence but intervene and supervise when necessary  - Involve family in performance of ADLs  - Assess for home care needs following discharge   - Consider OT consult to assist with ADL evaluation and planning for discharge  - Provide patient education as appropriate  Outcome: Progressing  Goal: Maintains/Returns to pre admission functional level  Description: INTERVENTIONS:  - Perform BMAT or MOVE assessment daily    - Set and communicate daily mobility goal to care team and patient/family/caregiver  - Collaborate with rehabilitation services on mobility goals if consulted  - Perform Range of Motion 3 times a day  - Reposition patient every 2 hours    - Dangle patient 3 times a day  - Stand patient 3 times a day  - Ambulate patient 3 times a day  - Out of bed to chair 3 times a day   - Out of bed for meals 3 times a day  - Out of bed for toileting  - Record patient progress and toleration of activity level   Outcome: Progressing     Problem: DISCHARGE PLANNING  Goal: Discharge to home or other facility with appropriate resources  Description: INTERVENTIONS:  - Identify barriers to discharge w/patient and caregiver  - Arrange for needed discharge resources and transportation as appropriate  - Identify discharge learning needs (meds, wound care, etc )  - Arrange for interpretive services to assist at discharge as needed  - Refer to Case Management Department for coordinating discharge planning if the patient needs post-hospital services based on physician/advanced practitioner order or complex needs related to functional status, cognitive ability, or social support system  Outcome: Progressing     Problem: Knowledge Deficit  Goal: Patient/family/caregiver demonstrates understanding of disease process, treatment plan, medications, and discharge instructions  Description: Complete learning assessment and assess knowledge base    Interventions:  - Provide teaching at level of understanding  - Provide teaching via preferred learning methods  Outcome: Progressing     Problem: RESPIRATORY - ADULT  Goal: Achieves optimal ventilation and oxygenation  Description: INTERVENTIONS:  - Assess for changes in respiratory status  - Assess for changes in mentation and behavior  - Position to facilitate oxygenation and minimize respiratory effort  - Oxygen administered by appropriate delivery if ordered  - Initiate smoking cessation education as indicated  - Encourage broncho-pulmonary hygiene including cough, deep breathe, Incentive Spirometry  - Assess the need for suctioning and aspirate as needed  - Assess and instruct to report SOB or any respiratory difficulty  - Respiratory Therapy support as indicated  Outcome: Progressing     Problem: METABOLIC, FLUID AND ELECTROLYTES - ADULT  Goal: Electrolytes maintained within normal limits  Description: INTERVENTIONS:  - Monitor labs and assess patient for signs and symptoms of electrolyte imbalances  - Administer electrolyte replacement as ordered  - Monitor response to electrolyte replacements, including repeat lab results as appropriate  - Instruct patient on fluid and nutrition as appropriate  Outcome: Progressing  Goal: Fluid balance maintained  Description: INTERVENTIONS:  - Monitor labs   - Monitor I/O and WT  - Instruct patient on fluid and nutrition as appropriate  - Assess for signs & symptoms of volume excess or deficit  Outcome: Progressing  Goal: Glucose maintained within target range  Description: INTERVENTIONS:  - Monitor Blood Glucose as ordered  - Assess for signs and symptoms of hyperglycemia and hypoglycemia  - Administer ordered medications to maintain glucose within target range  - Assess nutritional intake and initiate nutrition service referral as needed  Outcome: Progressing     Problem: MOBILITY - ADULT  Goal: Maintain or return to baseline ADL function  Description: INTERVENTIONS:  -  Assess patient's ability to carry out ADLs; assess patient's baseline for ADL function and identify physical deficits which impact ability to perform ADLs (bathing, care of mouth/teeth, toileting, grooming, dressing, etc )  - Assess/evaluate cause of self-care deficits   - Assess range of motion  - Assess patient's mobility; develop plan if impaired  - Assess patient's need for assistive devices and provide as appropriate  - Encourage maximum independence but intervene and supervise when necessary  - Involve family in performance of ADLs  - Assess for home care needs following discharge   - Consider OT consult to assist with ADL evaluation and planning for discharge  - Provide patient education as appropriate  Outcome: Progressing  Goal: Maintains/Returns to pre admission functional level  Description: INTERVENTIONS:  - Perform BMAT or MOVE assessment daily    - Set and communicate daily mobility goal to care team and patient/family/caregiver  - Collaborate with rehabilitation services on mobility goals if consulted  - Perform Range of Motion 3 times a day  - Reposition patient every 2 hours    - Dangle patient 3 times a day  - Stand patient 3 times a day  - Ambulate patient 3 times a day  - Out of bed to chair 3 times a day   - Out of bed for meals 3 times a day  - Out of bed for toileting  - Record patient progress and toleration of activity level   Outcome: Progressing     Problem: Potential for Falls  Goal: Patient will remain free of falls  Description: INTERVENTIONS:  - Educate patient/family on patient safety including physical limitations  - Instruct patient to call for assistance with activity   - Consult OT/PT to assist with strengthening/mobility   - Keep Call bell within reach  - Keep bed low and locked with side rails adjusted as appropriate  - Keep care items and personal belongings within reach  - Initiate and maintain comfort rounds  - Make Fall Risk Sign visible to staff  - Offer Toileting every 2 Hours, in advance of need  - Initiate/Maintain bed/chair alarm  - Obtain necessary fall risk management equipment: nonskid socks, call bell   - Apply yellow socks and bracelet for high fall risk patients  - Consider moving patient to room near nurses station  Outcome: Progressing

## 2022-12-31 NOTE — RESPIRATORY THERAPY NOTE
12/31/22 0740   Inhalation Therapy Tx   $ Inhalation Therapy Performed Yes   $ Pulse Oximetry Spot Check Charge Completed   SpO2 94 %   Pre-Treatment Pulse 96   Pre-Treatment Respirations 20   Duration 20   Breath Sounds Pre-Treatment Bilateral Diminished;Coarse; Expiratory wheeze   Breath Sounds Post-Treatment Bilateral Diminished;Coarse; Expiratory wheezes   Post-Treatment Pulse 92   Post-Treatment Respirations 20   Delivery Source Air;UDN   Position Semi Nobles's   Treatment Tolerance Tolerated well   Resp Comments patient having productive cough thick joy she is on 2 5 lpm        12/31/22 0740   Inhalation Therapy Tx   $ Inhalation Therapy Performed Yes   $ Pulse Oximetry Spot Check Charge Completed   SpO2 94 %   Pre-Treatment Pulse 96   Pre-Treatment Respirations 20   Duration 20   Breath Sounds Pre-Treatment Bilateral Diminished;Coarse; Expiratory wheeze   Breath Sounds Post-Treatment Bilateral Diminished;Coarse; Expiratory wheezes   Post-Treatment Pulse 92   Post-Treatment Respirations 20   Delivery Source Air;UDN   Position Semi Nobles's   Treatment Tolerance Tolerated well   Resp Comments patient having productive cough thick joy she is on 2 5 lpm

## 2022-12-31 NOTE — ASSESSMENT & PLAN NOTE
· Sepsis was present on admission and due to Influenza A in addition to a probable superimposed bacterial multifocal pneumonia  · SIRS criteria was met with tachycardia, tachypnea, and a leukocytosis  · The lactic acid level is within normal limits  · Checked blood cultures x 2 sets, which are no growth for 24 hours x 2 sets  · Checked a urine culture, which is no growth  · Checked a Streptococcus pneumoniae urine antigen and Legionella urinary antigen, which are both pending  · Checked a MRSA nasal culture, which is pending  · Continue tamiflu 30 mg PO every 12 hours to complete a 5-day course  · Continue azithromycin 500 mg IV every 24 hours (day #3) and ceftriaxone 1000 mg IV every 24 hours (day #3)  · Continue methylprednisolone 40 mg IV every 12 hours  · I appreciate Pulmonology's input    · Follow the procalcitonin level

## 2022-12-31 NOTE — ASSESSMENT & PLAN NOTE
· Negative urine culture    12/30/2022 1152 12/31/2022 1139 Urine culture [[id number]]   Urine, Clean Catch    Final result [id number]  Component Value   Urine Culture No Growth <1000 cfu/mL

## 2023-01-01 LAB
ALBUMIN SERPL BCP-MCNC: 2.6 G/DL (ref 3.5–5)
ALP SERPL-CCNC: 82 U/L (ref 46–116)
ALT SERPL W P-5'-P-CCNC: 22 U/L (ref 12–78)
ANION GAP SERPL CALCULATED.3IONS-SCNC: 10 MMOL/L (ref 4–13)
AST SERPL W P-5'-P-CCNC: 21 U/L (ref 5–45)
BASOPHILS # BLD MANUAL: 0 THOUSAND/UL (ref 0–0.1)
BASOPHILS NFR MAR MANUAL: 0 % (ref 0–1)
BILIRUB SERPL-MCNC: 0.32 MG/DL (ref 0.2–1)
BUN SERPL-MCNC: 31 MG/DL (ref 5–25)
CALCIUM ALBUM COR SERPL-MCNC: 9.9 MG/DL (ref 8.3–10.1)
CALCIUM SERPL-MCNC: 8.8 MG/DL (ref 8.3–10.1)
CHLORIDE SERPL-SCNC: 102 MMOL/L (ref 96–108)
CO2 SERPL-SCNC: 23 MMOL/L (ref 21–32)
CREAT SERPL-MCNC: 1.28 MG/DL (ref 0.6–1.3)
EOSINOPHIL # BLD MANUAL: 0 THOUSAND/UL (ref 0–0.4)
EOSINOPHIL NFR BLD MANUAL: 0 % (ref 0–6)
ERYTHROCYTE [DISTWIDTH] IN BLOOD BY AUTOMATED COUNT: 14.6 % (ref 11.6–15.1)
EST. AVERAGE GLUCOSE BLD GHB EST-MCNC: 117 MG/DL
GFR SERPL CREATININE-BSD FRML MDRD: 38 ML/MIN/1.73SQ M
GLUCOSE SERPL-MCNC: 133 MG/DL (ref 65–140)
HBA1C MFR BLD: 5.7 %
HCT VFR BLD AUTO: 32.2 % (ref 34.8–46.1)
HGB BLD-MCNC: 10.3 G/DL (ref 11.5–15.4)
LYMPHOCYTES # BLD AUTO: 0.1 THOUSAND/UL (ref 0.6–4.47)
LYMPHOCYTES # BLD AUTO: 1 % (ref 14–44)
MAGNESIUM SERPL-MCNC: 2.1 MG/DL (ref 1.6–2.6)
MCH RBC QN AUTO: 30.7 PG (ref 26.8–34.3)
MCHC RBC AUTO-ENTMCNC: 32 G/DL (ref 31.4–37.4)
MCV RBC AUTO: 96 FL (ref 82–98)
MONOCYTES # BLD AUTO: 0.1 THOUSAND/UL (ref 0–1.22)
MONOCYTES NFR BLD: 1 % (ref 4–12)
NEUTROPHILS # BLD MANUAL: 10.23 THOUSAND/UL (ref 1.85–7.62)
NEUTS BAND NFR BLD MANUAL: 1 % (ref 0–8)
NEUTS SEG NFR BLD AUTO: 97 % (ref 43–75)
PHOSPHATE SERPL-MCNC: 3.6 MG/DL (ref 2.3–4.1)
PLATELET # BLD AUTO: 200 THOUSANDS/UL (ref 149–390)
PLATELET BLD QL SMEAR: ADEQUATE
PMV BLD AUTO: 9.4 FL (ref 8.9–12.7)
POTASSIUM SERPL-SCNC: 4.6 MMOL/L (ref 3.5–5.3)
PROCALCITONIN SERPL-MCNC: 0.2 NG/ML
PROT SERPL-MCNC: 6.4 G/DL (ref 6.4–8.4)
RBC # BLD AUTO: 3.35 MILLION/UL (ref 3.81–5.12)
SODIUM SERPL-SCNC: 135 MMOL/L (ref 135–147)
WBC # BLD AUTO: 10.44 THOUSAND/UL (ref 4.31–10.16)

## 2023-01-01 RX ORDER — HYDROCODONE POLISTIREX AND CHLORPHENIRAMINE POLISTIREX 10; 8 MG/5ML; MG/5ML
5 SUSPENSION, EXTENDED RELEASE ORAL EVERY 12 HOURS SCHEDULED
Status: DISCONTINUED | OUTPATIENT
Start: 2023-01-01 | End: 2023-01-03 | Stop reason: HOSPADM

## 2023-01-01 RX ADMIN — OSELTAMIVIR PHOSPHATE 30 MG: 30 CAPSULE ORAL at 20:41

## 2023-01-01 RX ADMIN — IPRATROPIUM BROMIDE 0.5 MG: 0.5 SOLUTION RESPIRATORY (INHALATION) at 14:22

## 2023-01-01 RX ADMIN — METHYLPREDNISOLONE SODIUM SUCCINATE 40 MG: 40 INJECTION, POWDER, FOR SOLUTION INTRAMUSCULAR; INTRAVENOUS at 09:10

## 2023-01-01 RX ADMIN — LEVALBUTEROL HYDROCHLORIDE 1.25 MG: 1.25 SOLUTION, CONCENTRATE RESPIRATORY (INHALATION) at 07:32

## 2023-01-01 RX ADMIN — MULTIPLE VITAMINS W/ MINERALS TAB 1 TABLET: TAB at 21:10

## 2023-01-01 RX ADMIN — VANCOMYCIN HYDROCHLORIDE 125 MG: 125 CAPSULE ORAL at 20:41

## 2023-01-01 RX ADMIN — HYDROCODONE POLISTIREX AND CHLORPHENIRAMINE POLISTIREX 5 ML: 10; 8 SUSPENSION, EXTENDED RELEASE ORAL at 11:54

## 2023-01-01 RX ADMIN — LISINOPRIL 5 MG: 5 TABLET ORAL at 09:10

## 2023-01-01 RX ADMIN — METOPROLOL TARTRATE 50 MG: 50 TABLET, FILM COATED ORAL at 20:41

## 2023-01-01 RX ADMIN — IPRATROPIUM BROMIDE 0.5 MG: 0.5 SOLUTION RESPIRATORY (INHALATION) at 19:51

## 2023-01-01 RX ADMIN — FOLIC ACID 1 MG: 1 TABLET ORAL at 09:10

## 2023-01-01 RX ADMIN — BENZONATATE 100 MG: 100 CAPSULE ORAL at 09:10

## 2023-01-01 RX ADMIN — APIXABAN 5 MG: 5 TABLET, FILM COATED ORAL at 17:16

## 2023-01-01 RX ADMIN — CEFTRIAXONE 1000 MG: 1 INJECTION, SOLUTION INTRAVENOUS at 17:17

## 2023-01-01 RX ADMIN — AZITHROMYCIN MONOHYDRATE 500 MG: 250 TABLET ORAL at 17:16

## 2023-01-01 RX ADMIN — BENZONATATE 100 MG: 100 CAPSULE ORAL at 20:41

## 2023-01-01 RX ADMIN — LEVALBUTEROL HYDROCHLORIDE 1.25 MG: 1.25 SOLUTION, CONCENTRATE RESPIRATORY (INHALATION) at 19:51

## 2023-01-01 RX ADMIN — ATORVASTATIN CALCIUM 20 MG: 40 TABLET, FILM COATED ORAL at 17:16

## 2023-01-01 RX ADMIN — APIXABAN 5 MG: 5 TABLET, FILM COATED ORAL at 09:10

## 2023-01-01 RX ADMIN — VITAM B12 100 MCG: 100 TAB at 09:10

## 2023-01-01 RX ADMIN — METHYLPREDNISOLONE SODIUM SUCCINATE 40 MG: 40 INJECTION, POWDER, FOR SOLUTION INTRAMUSCULAR; INTRAVENOUS at 20:41

## 2023-01-01 RX ADMIN — METOPROLOL TARTRATE 50 MG: 50 TABLET, FILM COATED ORAL at 09:10

## 2023-01-01 RX ADMIN — OSELTAMIVIR PHOSPHATE 30 MG: 30 CAPSULE ORAL at 09:10

## 2023-01-01 RX ADMIN — IPRATROPIUM BROMIDE 0.5 MG: 0.5 SOLUTION RESPIRATORY (INHALATION) at 07:32

## 2023-01-01 RX ADMIN — VANCOMYCIN HYDROCHLORIDE 125 MG: 125 CAPSULE ORAL at 09:11

## 2023-01-01 RX ADMIN — LEVALBUTEROL HYDROCHLORIDE 1.25 MG: 1.25 SOLUTION, CONCENTRATE RESPIRATORY (INHALATION) at 14:22

## 2023-01-01 RX ADMIN — BENZONATATE 100 MG: 100 CAPSULE ORAL at 17:16

## 2023-01-01 RX ADMIN — HYDROCODONE POLISTIREX AND CHLORPHENIRAMINE POLISTIREX 5 ML: 10; 8 SUSPENSION, EXTENDED RELEASE ORAL at 20:41

## 2023-01-01 RX ADMIN — FUROSEMIDE 20 MG: 40 TABLET ORAL at 09:10

## 2023-01-01 NOTE — PHYSICAL THERAPY NOTE
Physical Therapy Evaluation    Patient Name: Antonio Burrell    DSACD'G Date: 1/1/2023     Problem List  Principal Problem:    Sepsis ([facility] [address] )  Active Problems:    Multifocal pneumonia    Pyuria    Hypoxia    Influenza A    Hepatic steatosis    Hyponatremia    Paroxysmal atrial flutter (HCC)    Elevated d-dimer    Hyperglycemia       Past Medical History  Past Medical History:   Diagnosis Date    Angina pectoris ([facility] [address] )     Anxiety     C  difficile diarrhea     CAD (coronary artery disease)     Cardiac disease     Colon polyp     COVID-19 04/2020    Depression     Diverticulitis of colon 11/2019    GERD (gastroesophageal reflux disease)     History of colon polyps     History of hiatal hernia     Hx of bleeding disorder     hemorrhoids    Hyperlipidemia     Hypertension     Irregular heart beat     gets tachycardia    Shortness of breath     related to heart        Past Surgical History  Past Surgical History:   Procedure Laterality Date    CARDIAC ELECTROPHYSIOLOGY MAPPING AND ABLATION      CARDIAC SURGERY      CARDIAC SURGERY      CATARACT EXTRACTION Bilateral     CHOLECYSTECTOMY  1987    COLON SURGERY      repair of colon    COLON SURGERY  2018    COLONOSCOPY  2009    Tubulovillous adenoma    COLONOSCOPY N/A 6/18/2018    Procedure: COLONOSCOPY;  Surgeon: Dada Schroeder DO;  Location: [facility];   Service: Gastroenterology    COLONOSCOPY  2011    2 cm right colon polyp and 1 5 cm mid right colon polyps tubular adenomas    COLONOSCOPY W/ CONTROL OF HEMORRHAGE      CORONARY ANGIOPLASTY WITH STENT PLACEMENT      reports two blockages not able to be stented    CORONARY ARTERY BYPASS GRAFT  2008    3 vessels    HERNIA REPAIR      hiatal hernia    HYSTERECTOMY      partial not due to malignancy    LAPAROTOMY N/A 9/26/2018    Procedure: LAPAROTOMY EXPLORATORY WITH  RIGHT HEMICOLECTOMY;  Surgeon: Dada Schroeder DO;  Location: [facility];  Service: Gastroenterology LAPAROTOMY N/A 9/26/2018    Procedure: LAPAROTOMY EXPLORATORY WITH HEMICOLECTOMY;  Surgeon: Mario Wallace DO;  Location: [facility];  Service: General    NM ANRCT XM SURG REQ ANES GENERAL SPI/EDRL DX N/A 7/25/2019    Procedure: EXAM UNDER ANESTHESIA (EUA); Surgeon: Ary Najjar, MD;  Location: [facility] MAIN OR;  Service: General    NM COLONOSCOPY FLX DX W/COLLJ [facility] WHEN PFRMD N/A 9/26/2018    Procedure: COLONOSCOPY;  Surgeon: Matias Graf DO;  Location: [facility];  Service: Gastroenterology    NM HEMORRHOIDECTOMY Lei Enrique 1 COLUMN/GROUP N/A 7/25/2019    Procedure: HEMORRHOIDECTOMY EXCISION;  Surgeon: Ary Najjar, MD;  Location: [facility] MAIN OR;  Service: General    NM SIGMOIDOSCOPY FLX DX W/COLLJ SPEC BR/WA IF PFRMD N/A 6/21/2018    Procedure: Leandra Caputo FLEXIBLE;  Surgeon: Matias Graf DO;  [facility]; Service: Gastroenterology           01/01/23 1050   PT Last Visit   PT Visit Date 01/01/23   Note Type   Note type Evaluation   Pain Assessment   Pain Assessment Tool 0-10   Pain Score No Pain   Restrictions/Precautions   Weight Bearing Precautions Per Order No   Other Precautions Contact/isolation;Droplet precautions; Fall Risk;Visual impairment   Home Living   Type of Home House   Home Layout Bed/bath upstairs;Multi-level;Stairs to enter with rails  (2 rails on steps to 2nd floor)   Bathroom Shower/Tub Tub/shower unit   Bathroom Toilet Standard   Bathroom Equipment Shower chair   Bathroom Accessibility Accessible   Home Equipment Walker;Cane  (Does not use a device)   Additional Comments Family assists with transportation   Prior Function   Level of [place] Independent with ADLs; Independent with functional mobility   Lives With Alone   Receives Help From Family   IADLs Family/Friend/Other provides transportation; Family/Friend/Other provides meals   Falls in the last 6 months 0   Vocational Retired   General   Family/Caregiver Present No   Cognition   Overall Cognitive Status 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 )   Coordination   Movements are Fluid and Coordinated 1   Bed Mobility   Additional Comments Pt sitting at EOB at start of eval    Transfers   Sit to Stand 5  Supervision   Additional items Increased time required   Stand to Sit 5  Supervision   Additional items Increased time required   Ambulation/Elevation   Gait pattern   (Decreased step length)   Gait Assistance 5  Supervision   Assistive Device None   Distance 150'   Stair Management Assistance 5  Supervision   Stair Management Technique Two rails   Number of Stairs 6   Ambulation/Elevation Additional Comments Pt slow with ascending/descending steps due to vision deficits  During gait, pt also intermittently relying on handrail in nunez due to vision deficits  Balance   Static Sitting Good   Dynamic Sitting Fair +   Static Standing Fair   Dynamic Standing Fair   Activity Tolerance   Activity Tolerance Patient limited by fatigue  (Slight SOB with activity)   Assessment   Prognosis Good   Problem List Decreased strength;Decreased endurance; Impaired balance;Decreased mobility; Impaired vision   Assessment Pt is 79 y o  female seen for PT evaluation on 1/1/23 s/p admit to [facility] on 12/29/22 w/ dx +flu, sepsis, hypoxia  PT consulted to assess pt's functional mobility and d/c needs  Order placed for PT eval and tx    Performed at least 2 patient identifiers during session: Name and wristband  Comorbidities affecting pt's physical performance at time of assessment include: HTN, COVID, CABG, R hemicolectomy  LOF prior to admission was I with household mobility without device   I with completion of 1 flight of steps  Personal factors affecting pt at time of IE include: vision deficits, pt lives alone  Please find objective findings from PT assessment regarding body systems outlined above with impairments and limitations including weakness, impaired balance, decreased endurance, pain, decreased activity tolerance and fall risk, as well as mobility assessment  Pt required close supervision for transfers, mobility, steps due to vision deficits  Pt prefers to hold on to handrail in nunez for addition support during gait  Pt's O2 sats in the 90's at rest   With activity, sats decreased to 86% on room air  Sats returned to low 90's with rest  Pt given incentive spirometer and able to complete 10 repetitions  Will continue to monitor O2 with activity  Pt's clinical presentation is currently unstable/unpredictable seen in pt's presentation of ongoing medical assessment  Given co-morbidities and presented presentation, moderate level eval was completed  Pt to benefit from continued PT tx to address deficits as defined above and maximize level of functional independent mobility and consistency  From PT/mobility standpoint, recommendation at time of d/c would be home with services in order to promote return to PLOF and independence  The patient's AM-PAC Basic Mobility Inpatient Short Form Raw Score is 20  A Raw score of greater than 16 suggests the patient may benefit from discharge to home  Please also refer to the recommendation of the Physical Therapist for safe discharge planning  Barriers to Discharge Decreased caregiver support   Goals   Patient Goals To go home   LTG Expiration Date 01/15/23   Long Term Goal #1 Transfers - mod I   Long Term Goal #2 Gait - 150' without device with mod I; Steps - 1 flight with 2 rails with mod I    Plan   Treatment/Interventions Functional transfer training;LE strengthening/ROM; Elevations; Therapeutic exercise;Patient/family training;Bed mobility;Gait training   PT Frequency 3-5x/wk   Recommendation   PT Discharge Recommendation Home with home health rehabilitation   [address] Mobility Inpatient   Turning in Bed Without Bedrails 4   Lying on Back to Sitting on Edge of Flat Bed 4   Moving Bed to Chair 3   Standing Up From Chair 3   Walk in Room 3   Climb 3-5 Stairs 3   Basic Mobility Inpatient Raw Score 20   Basic Mobility Standardized Score 43 99   Highest Level Of Mobility   JH-HLM Goal 6: Walk 10 steps or more   JH-HLM Achieved 7: Walk 25 feet or more   End of Consult   Patient Position at End of Consult Bed/Chair alarm activated; All needs within reach; Seated edge of bed

## 2023-01-01 NOTE — ASSESSMENT & PLAN NOTE
· Sepsis was present on admission and due to Influenza A in addition to a probable superimposed bacterial multifocal pneumonia  · SIRS criteria was met with tachycardia, tachypnea, and a leukocytosis  · The lactic acid level is within normal limits  · Checked blood cultures x 2 sets, which are no growth for 48 hours x 2 sets  · Checked a urine culture, which is no growth  · Negative Streptococcus pneumoniae urine antigen and negative Legionella urinary antigen  · Checked a MRSA nasal culture, which is negative  · Continue tamiflu 30 mg PO every 12 hours to complete a 5-day course  · Continue azithromycin 500 mg every 24 hours (day #4) and ceftriaxone 1000 mg IV every 24 hours (day #4)  · Continue methylprednisolone 40 mg IV every 12 hours  · I appreciate Pulmonology's input    · Follow the procalcitonin level

## 2023-01-01 NOTE — PLAN OF CARE
Problem: PHYSICAL THERAPY ADULT  Goal: Performs mobility at highest level of function for planned discharge setting  See evaluation for individualized goals  Description:   Note: Prognosis: Good  Problem List: Decreased strength, Decreased endurance, Impaired balance, Decreased mobility, Impaired vision  Assessment: Pt is 79 y o  female seen for PT evaluation on 1/1/23 s/p admit to [address] on 12/29/22 w/ dx +flu, sepsis, hypoxia  PT consulted to assess pt's functional mobility and d/c needs  Order placed for PT eval and tx    Performed at least 2 patient identifiers during session: Name and wristband  Comorbidities affecting pt's physical performance at time of assessment include: HTN, COVID, CABG, R hemicolectomy  LOF prior to admission was I with household mobility without device  I with completion of 1 flight of steps  Personal factors affecting pt at time of IE include: vision deficits, pt lives alone  Please find objective findings from PT assessment regarding body systems outlined above with impairments and limitations including weakness, impaired balance, decreased endurance, pain, decreased activity tolerance and fall risk, as well as mobility assessment  Pt required close supervision for transfers, mobility, steps due to vision deficits  Pt prefers to hold on to handrail in nunez for addition support during gait  Pt's O2 sats in the 90's at rest   With activity, sats decreased to 86% on room air  Sats returned to low 90's with rest  Pt given incentive spirometer and able to complete 10 repetitions  Will continue to monitor O2 with activity  Pt's clinical presentation is currently unstable/unpredictable seen in pt's presentation of ongoing medical assessment  Given co-morbidities and presented presentation, moderate level eval was completed  Pt to benefit from continued PT tx to address deficits as defined above and maximize level of functional independent mobility and consistency   From PT/mobility standpoint, recommendation at time of d/c would be home with services in order to promote return to PLOF and independence  The patient's AM-PAC Basic Mobility Inpatient Short Form Raw Score is 20  A Raw score of greater than 16 suggests the patient may benefit from discharge to home  Please also refer to the recommendation of the Physical Therapist for safe discharge planning  Barriers to Discharge: Decreased caregiver support     PT Discharge Recommendation: Home with home health rehabilitation    See flowsheet documentation for full assessment

## 2023-01-01 NOTE — ASSESSMENT & PLAN NOTE
· Not on any home supplemental oxygen therapy  · Required up to 3 lpm of continuous supplemental oxygen  · Currently requiring 1-2 lpm of continuous supplemental oxygen  · Will need a home supplemental oxygen evaluation on the day of discharge

## 2023-01-01 NOTE — PLAN OF CARE
Problem: PAIN - ADULT  Goal: Verbalizes/displays adequate comfort level or baseline comfort level  Description: Interventions:  - Encourage patient to monitor pain and request assistance  - Assess pain using appropriate pain scale (0-10 pain scale)  - Administer analgesics based on type and severity of pain and evaluate response  - Implement non-pharmacological measures as appropriate and evaluate response  - Consider cultural and social influences on pain and pain management  - Notify physician/advanced practitioner if interventions unsuccessful or patient reports new pain  Outcome: Progressing     Problem: INFECTION - ADULT  Goal: Absence or prevention of progression during hospitalization  Description: INTERVENTIONS:  - Assess and monitor for signs and symptoms of infection  - Monitor lab/diagnostic results  - Monitor all insertion sites, i e  indwelling lines  - Administer medications as ordered  - Instruct and encourage patient and family to use good hand hygiene technique  - Identify and instruct in appropriate isolation precautions for identified infection/condition (contact and droplet precautions)  Outcome: Progressing     Problem: SAFETY ADULT  Goal: Patient will remain free of falls  Description: INTERVENTIONS:  - Educate patient/family on patient safety including physical limitations  - Instruct patient to call for assistance with activity (independent)  - Consult OT/PT to assist with strengthening/mobility   - Keep Call bell within reach  - Keep bed low and locked with side rails adjusted as appropriate  - Keep care items and personal belongings within reach  - Initiate and maintain comfort rounds  - Make Fall Risk Sign visible to staff (moderate fall risk)  - Offer Toileting every 1-2 Hours, in advance of need  - Obtain necessary fall risk management equipment: nonskid footwear  Outcome: Progressing  Goal: Maintain or return to baseline ADL function  Description: INTERVENTIONS:  -  Assess patient's ability to carry out ADLs; (independent after setup)  - Assess/evaluate cause of self-care deficits (visual impairment, fatigue, dyspnea, oxygen)  - Assess range of motion  - Assess patient's mobility; (independent)  - Assess patient's need for assistive devices and provide as appropriate  - Encourage maximum independence but intervene and supervise when necessary  - Involve family in performance of ADLs  - Assess for home care needs following discharge   - Consider OT consult to assist with ADL evaluation and planning for discharge  - Provide patient education as appropriate  Outcome: Progressing  Goal: Maintains/Returns to pre admission functional level  Description: INTERVENTIONS:  - Perform BMAT or MOVE assessment daily    - Set and communicate daily mobility goal to care team and patient/family/caregiver     - Collaborate with rehabilitation services on mobility goals if consulted  - Ambulate patient 3 times a day  - Out of bed to chair 3 times a day   - Out of bed for meals 3 times a day  - Out of bed for toileting  - Record patient progress and toleration of activity level   Outcome: Progressing     Problem: DISCHARGE PLANNING  Goal: Discharge to home or other facility with appropriate resources  Description: INTERVENTIONS:  - Identify barriers to discharge w/patient and caregiver  - Arrange for needed discharge resources and transportation as appropriate  - Identify discharge learning needs (meds, wound care, etc )  - Refer to Case Management Department for coordinating discharge planning if the patient needs post-hospital services based on physician/advanced practitioner order or complex needs related to functional status, cognitive ability, or social support system  Outcome: Progressing     Problem: Knowledge Deficit  Goal: Patient/family/caregiver demonstrates understanding of disease process, treatment plan, medications, and discharge instructions  Description: Complete learning assessment and assess knowledge base  Interventions:  - Provide teaching at level of understanding  - Provide teaching via preferred learning methods  Outcome: Progressing     Problem: RESPIRATORY - ADULT  Goal: Achieves optimal ventilation and oxygenation  Description: INTERVENTIONS:  - Assess for changes in respiratory status  - Assess for changes in mentation and behavior  - Position to facilitate oxygenation and minimize respiratory effort  - Oxygen administered by appropriate delivery if ordered  - Encourage broncho-pulmonary hygiene including cough, deep breathe, Incentive Spirometry  - Assess and instruct to report SOB or any respiratory difficulty  - Respiratory Therapy support as indicated  Outcome: Progressing     Problem: MOBILITY - ADULT  Goal: Maintain or return to baseline ADL function  Description: INTERVENTIONS:  -  Assess patient's ability to carry out ADLs; (independent after setup)  - Assess/evaluate cause of self-care deficits (visual impairment, fatigue, dyspnea, oxygen)  - Assess range of motion  - Assess patient's mobility; (independent)  - Assess patient's need for assistive devices and provide as appropriate  - Encourage maximum independence but intervene and supervise when necessary  - Involve family in performance of ADLs  - Assess for home care needs following discharge   - Consider OT consult to assist with ADL evaluation and planning for discharge  - Provide patient education as appropriate  Outcome: Progressing  Goal: Maintains/Returns to pre admission functional level  Description: INTERVENTIONS:  - Perform BMAT or MOVE assessment daily    - Set and communicate daily mobility goal to care team and patient/family/caregiver     - Collaborate with rehabilitation services on mobility goals if consulted  - Ambulate patient 3 times a day  - Out of bed to chair 3 times a day   - Out of bed for meals 3 times a day  - Out of bed for toileting  - Record patient progress and toleration of activity level   Outcome: Progressing     Problem: Potential for Falls  Goal: Patient will remain free of falls  Description: INTERVENTIONS:  - Educate patient/family on patient safety including physical limitations  - Instruct patient to call for assistance with activity (independent)  - Consult OT/PT to assist with strengthening/mobility   - Keep Call bell within reach  - Keep bed low and locked with side rails adjusted as appropriate  - Keep care items and personal belongings within reach  - Initiate and maintain comfort rounds  - Make Fall Risk Sign visible to staff (moderate fall risk)  - Offer Toileting every 1-2 Hours, in advance of need  - Obtain necessary fall risk management equipment: nonskid footwear  Outcome: Progressing     Problem: METABOLIC, FLUID AND ELECTROLYTES - ADULT  Goal: Electrolytes maintained within normal limits  Description: INTERVENTIONS:  - Monitor labs and assess patient for signs and symptoms of electrolyte imbalances  - Administer electrolyte replacement as ordered  - Monitor response to electrolyte replacements, including repeat lab results as appropriate  - Instruct patient on fluid and nutrition as appropriate  Outcome: Completed  Goal: Fluid balance maintained  Description: INTERVENTIONS:  - Monitor labs   - Monitor I/O and WT  - Instruct patient on fluid and nutrition as appropriate  - Assess for signs & symptoms of volume excess or deficit  Outcome: Completed  Goal: Glucose maintained within target range  Description: INTERVENTIONS:  - Monitor Blood Glucose as ordered  - Assess for signs and symptoms of hyperglycemia and hypoglycemia  - Administer ordered medications to maintain glucose within target range  - Assess nutritional intake and initiate nutrition service referral as needed  Outcome: Completed

## 2023-01-01 NOTE — RESPIRATORY THERAPY NOTE
01/01/23 0734   Inhalation Therapy Tx   $ Inhalation Therapy Performed Yes   $ Pulse Oximetry Spot Check Charge Completed   Pre-Treatment Pulse 89   Pre-Treatment Respirations 20   Duration 20   Breath Sounds Pre-Treatment Bilateral Diminished;Coarse   Breath Sounds Post-Treatment Bilateral Coarse   Post-Treatment Pulse 87   Post-Treatment Respirations 20   Delivery Source Air;UDN   Position Lying left side   Treatment Tolerance Tolerated well   Resp Comments patient is now on 1 lpm she is having a cough on treatment it is npc coarse Kathie

## 2023-01-01 NOTE — ASSESSMENT & PLAN NOTE
· Due to IV methylprednisolone treatment       Latest Reference Range & Units 01/01/23 04:28   Hemoglobin A1C Normal 3 8-5 6%; PreDiabetic 5 7-6 4%;  Diabetic >=6 5%; Glycemic control for adults with diabetes <7 0% % 5 7 (H)   eAG, EST AVG Glucose mg/dl 117   (H): Data is abnormally high

## 2023-01-01 NOTE — ASSESSMENT & PLAN NOTE
· Negative urine culture    12/30/2022 1152 12/31/2022 1139 Urine culture [[id number]]   Urine, Clean Catch    Final result [id number]  Component Value   Urine Culture No Growth <1000 cfu/mL

## 2023-01-01 NOTE — PLAN OF CARE
Problem: PAIN - ADULT  Goal: Verbalizes/displays adequate comfort level or baseline comfort level  Description: Interventions:  - Encourage patient to monitor pain and request assistance  - Assess pain using appropriate pain scale  - Administer analgesics based on type and severity of pain and evaluate response  - Implement non-pharmacological measures as appropriate and evaluate response  - Consider cultural and social influences on pain and pain management  - Notify physician/advanced practitioner if interventions unsuccessful or patient reports new pain  Outcome: Progressing     Problem: INFECTION - ADULT  Goal: Absence or prevention of progression during hospitalization  Description: INTERVENTIONS:  - Assess and monitor for signs and symptoms of infection  - Monitor lab/diagnostic results  - Monitor all insertion sites, i e  indwelling lines, tubes, and drains  - Monitor endotracheal if appropriate and nasal secretions for changes in amount and color  - [place] appropriate cooling/warming therapies per order  - Administer medications as ordered  - Instruct and encourage patient and family to use good hand hygiene technique  - Identify and instruct in appropriate isolation precautions for identified infection/condition  Outcome: Progressing     Problem: SAFETY ADULT  Goal: Patient will remain free of falls  Description: INTERVENTIONS:  - Educate patient/family on patient safety including physical limitations  - Instruct patient to call for assistance with activity   - Consult OT/PT to assist with strengthening/mobility   - Keep Call bell within reach  - Keep bed low and locked with side rails adjusted as appropriate  - Keep care items and personal belongings within reach  - Initiate and maintain comfort rounds  - Make Fall Risk Sign visible to staff  - Offer Toileting every 4 Hours, in advance of need  - Initiate/Maintain bed alarm  - Obtain necessary fall risk management equipment  - Apply yellow socks and bracelet for high fall risk patients  - Consider moving patient to room near nurses station  Outcome: Progressing  Goal: Maintain or return to baseline ADL function  Description: INTERVENTIONS:  -  Assess patient's ability to carry out ADLs; assess patient's baseline for ADL function and identify physical deficits which impact ability to perform ADLs (bathing, care of mouth/teeth, toileting, grooming, dressing, etc )  - Assess/evaluate cause of self-care deficits   - Assess range of motion  - Assess patient's mobility; develop plan if impaired  - Assess patient's need for assistive devices and provide as appropriate  - Encourage maximum independence but intervene and supervise when necessary  - Involve family in performance of ADLs  - Assess for home care needs following discharge   - Consider OT consult to assist with ADL evaluation and planning for discharge  - Provide patient education as appropriate  Outcome: Progressing  Goal: Maintains/Returns to pre admission functional level  Description: INTERVENTIONS:  - Perform BMAT or MOVE assessment daily    - Set and communicate daily mobility goal to care team and patient/family/caregiver     - Collaborate with rehabilitation services on mobility goals if consulted  - Dangle patient 3 times a day  - Stand patient 3 times a day  - Ambulate patient 3 times a day  - Out of bed to chair 3 times a day   - Out of bed for meals 3 times a day  - Out of bed for toileting  - Record patient progress and toleration of activity level   Outcome: Progressing     Problem: DISCHARGE PLANNING  Goal: Discharge to home or other facility with appropriate resources  Description: INTERVENTIONS:  - Identify barriers to discharge w/patient and caregiver  - Arrange for needed discharge resources and transportation as appropriate  - Identify discharge learning needs (meds, wound care, etc )  - Arrange for interpretive services to assist at discharge as needed  - Refer to Case Management Department for coordinating discharge planning if the patient needs post-hospital services based on physician/advanced practitioner order or complex needs related to functional status, cognitive ability, or social support system  Outcome: Progressing     Problem: Knowledge Deficit  Goal: Patient/family/caregiver demonstrates understanding of disease process, treatment plan, medications, and discharge instructions  Description: Complete learning assessment and assess knowledge base    Interventions:  - Provide teaching at level of understanding  - Provide teaching via preferred learning methods  Outcome: Progressing     Problem: RESPIRATORY - ADULT  Goal: Achieves optimal ventilation and oxygenation  Description: INTERVENTIONS:  - Assess for changes in respiratory status  - Assess for changes in mentation and behavior  - Position to facilitate oxygenation and minimize respiratory effort  - Oxygen administered by appropriate delivery if ordered  - Initiate smoking cessation education as indicated  - Encourage broncho-pulmonary hygiene including cough, deep breathe, Incentive Spirometry  - Assess the need for suctioning and aspirate as needed  - Assess and instruct to report SOB or any respiratory difficulty  - Respiratory Therapy support as indicated  Outcome: Progressing     Problem: METABOLIC, FLUID AND ELECTROLYTES - ADULT  Goal: Electrolytes maintained within normal limits  Description: INTERVENTIONS:  - Monitor labs and assess patient for signs and symptoms of electrolyte imbalances  - Administer electrolyte replacement as ordered  - Monitor response to electrolyte replacements, including repeat lab results as appropriate  - Instruct patient on fluid and nutrition as appropriate  Outcome: Progressing  Goal: Fluid balance maintained  Description: INTERVENTIONS:  - Monitor labs   - Monitor I/O and WT  - Instruct patient on fluid and nutrition as appropriate  - Assess for signs & symptoms of volume excess or deficit  Outcome: Progressing  Goal: Glucose maintained within target range  Description: INTERVENTIONS:  - Monitor Blood Glucose as ordered  - Assess for signs and symptoms of hyperglycemia and hypoglycemia  - Administer ordered medications to maintain glucose within target range  - Assess nutritional intake and initiate nutrition service referral as needed  Outcome: Progressing     Problem: MOBILITY - ADULT  Goal: Maintain or return to baseline ADL function  Description: INTERVENTIONS:  -  Assess patient's ability to carry out ADLs; assess patient's baseline for ADL function and identify physical deficits which impact ability to perform ADLs (bathing, care of mouth/teeth, toileting, grooming, dressing, etc )  - Assess/evaluate cause of self-care deficits   - Assess range of motion  - Assess patient's mobility; develop plan if impaired  - Assess patient's need for assistive devices and provide as appropriate  - Encourage maximum independence but intervene and supervise when necessary  - Involve family in performance of ADLs  - Assess for home care needs following discharge   - Consider OT consult to assist with ADL evaluation and planning for discharge  - Provide patient education as appropriate  Outcome: Progressing  Goal: Maintains/Returns to pre admission functional level  Description: INTERVENTIONS:  - Perform BMAT or MOVE assessment daily    - Set and communicate daily mobility goal to care team and patient/family/caregiver     - Collaborate with rehabilitation services on mobility goals if consulted  - Dangle patient 3 times a day  - Stand patient 3 times a day  - Ambulate patient 3 times a day  - Out of bed to chair 3 times a day   - Out of bed for meals 3 times a day  - Out of bed for toileting  - Record patient progress and toleration of activity level   Outcome: Progressing     Problem: Potential for Falls  Goal: Patient will remain free of falls  Description: INTERVENTIONS:  - Educate patient/family on patient safety including physical limitations  - Instruct patient to call for assistance with activity   - Consult OT/PT to assist with strengthening/mobility   - Keep Call bell within reach  - Keep bed low and locked with side rails adjusted as appropriate  - Keep care items and personal belongings within reach  - Initiate and maintain comfort rounds  - Make Fall Risk Sign visible to staff  - Offer Toileting every 4 Hours, in advance of need  - Initiate/Maintain bed alarm  - Obtain necessary fall risk management equipment  - Apply yellow socks and bracelet for high fall risk patients  - Consider moving patient to room near nurses station  Outcome: Progressing

## 2023-01-01 NOTE — PROGRESS NOTES
[address] [facility] [address]  Progress Note - Nanda Felt 1940, 79 y o  female MRN: [id number]  Unit/Bed#: 423-01 Encounter: [id number]  Primary Care Provider: Jimena Phan MD   Date and time admitted to hospital: 12/29/2022  2:08 PM    * Sepsis [facility])  Assessment & Plan  · Sepsis was present on admission and due to Influenza A in addition to a probable superimposed bacterial multifocal pneumonia  · SIRS criteria was met with tachycardia, tachypnea, and a leukocytosis  · The lactic acid level is within normal limits  · Checked blood cultures x 2 sets, which are no growth for 48 hours x 2 sets  · Checked a urine culture, which is no growth  · Negative Streptococcus pneumoniae urine antigen and negative Legionella urinary antigen  · Checked a MRSA nasal culture, which is negative  · Continue tamiflu 30 mg PO every 12 hours to complete a 5-day course  · Continue azithromycin 500 mg every 24 hours (day #4) and ceftriaxone 1000 mg IV every 24 hours (day #4)  · Continue methylprednisolone 40 mg IV every 12 hours  · I appreciate Pulmonology's input  · Follow the procalcitonin level    Influenza A  Assessment & Plan  · Please see the assessment and plan for "Sepsis"    Multifocal pneumonia  Assessment & Plan  · Please see the assessment and plan for "Sepsis"    Hypoxia  Assessment & Plan  · Not on any home supplemental oxygen therapy  · Required up to 3 lpm of continuous supplemental oxygen  · Currently requiring 1-2 lpm of continuous supplemental oxygen  · Will need a home supplemental oxygen evaluation on the day of discharge    Hyperglycemia  Assessment & Plan  · Due to IV methylprednisolone treatment       Latest Reference Range & Units 01/01/23 04:28   Hemoglobin A1C Normal 3 8-5 6%; PreDiabetic 5 7-6 4%;  Diabetic >=6 5%; Glycemic control for adults with diabetes <7 0% % 5 7 (H)   eAG, EST AVG Glucose mg/dl 117   (H): Data is abnormally high    Elevated d-dimer  Assessment & Plan  · Now normalized  · On eliquis 5 mg PO BID for PAF    Paroxysmal atrial flutter (HCC)  Assessment & Plan  · Currently in sinus rhythm  · Continue anticoagulation with eliquis 5 mg PO BID    Hyponatremia  Assessment & Plan  · Likely hypovolemic hyponatremia  · Now resolved    Hepatic steatosis  Assessment & Plan  · Outpatient Gastroenterology evaluation    Pyuria  Assessment & Plan  · Negative urine culture    2022 1152 2022 1139 Urine culture [[id number]]   Urine, Clean Catch    Final result [address]  Component Value   Urine Culture No Growth <1000 cfu/mL                VTE Pharmacologic Prophylaxis: VTE Score: 8 High Risk (Score >/= 5) - Pharmacological DVT Prophylaxis Ordered: apixaban (Eliquis)  Sequential Compression Devices Ordered  Patient Centered Rounds: I performed bedside rounds with nursing staff today  Education and Discussions with Family / Patient: Updated  (daughter) at bedside  Time Spent for Care: 30 minutes  More than 50% of total time spent on counseling and coordination of care as described above  Current Length of Stay: 3 day(s)  Current Patient Status: Inpatient   Certification Statement: The patient will continue to require additional inpatient hospital stay due to the need for IV antibiotic treatment, for IV methylprednisolone treatment, and with the patient requiring continuous supplemental oxygen to maintain adequate oxygen saturation levels  Discharge Plan: Anticipate discharge tomorrow to home  Code Status: Level 1 - Full Code    Subjective: The patient was seen and examined  The patient is doing better  The shortness of breath is improving  The patient continues to experience a cough  No chest pain  No abdominal pain  No nausea or vomiting        Objective:     Vitals:   Temp (24hrs), Av °F (36 7 °C), Min:97 5 °F (36 4 °C), Max:98 3 °F (36 8 °C)    Temp:  [97 5 °F (36 4 °C)-98 3 °F (36 8 °C)] 98 2 °F (36 8 °C)  HR: [101-118] 101  Resp:  [18-19] 18  BP: (133-152)/(71-93) 144/72  SpO2:  [87 %-95 %] 94 %  Body mass index is 28 79 kg/m²  Input and Output Summary (last 24 hours):      Intake/Output Summary (Last 24 hours) at 1/1/2023 1522  Last data filed at 1/1/2023 1424  Gross per 24 hour   Intake 770 ml   Output 500 ml   Net 270 ml       Physical Exam:   Physical Exam   General:  NAD, follows commands  HEENT:  NC/AT, mucous membranes moist  Neck:  Supple, No JVP elevation  CV:  + S1, + S2, Tachycardic, Regular rhythm  Pulm:  Scattered rhonchi bilaterally with bibasilar crackles  Abd:  Soft, Non-tender, Non-distended  Ext:  No clubbing/cyanosis/edema  Skin:  No rashes  Neuro:  Awake, alert, oriented  Psych:  Normal mood and affect      Additional Data:    Labs:  Results from last 7 days   Lab Units 01/01/23 0428 12/31/22  0520   WBC Thousand/uL 10 44* 13 04*   HEMOGLOBIN g/dL 10 3* 10 3*   HEMATOCRIT % 32 2* 31 9*   PLATELETS Thousands/uL 200 187   BANDS PCT % 1  --    NEUTROS PCT %  --  92*   LYMPHS PCT %  --  5*   LYMPHO PCT % 1*  --    MONOS PCT %  --  3*   MONO PCT % 1*  --    EOS PCT % 0 0     Results from last 7 days   Lab Units 01/01/23  0428   SODIUM mmol/L 135   POTASSIUM mmol/L 4 6   CHLORIDE mmol/L 102   CO2 mmol/L 23   BUN mg/dL 31*   CREATININE mg/dL 1 28   ANION GAP mmol/L 10   CALCIUM mg/dL 8 8   ALBUMIN g/dL 2 6*   TOTAL BILIRUBIN mg/dL 0 32   ALK PHOS U/L 82   ALT U/L 22   AST U/L 21   GLUCOSE RANDOM mg/dL 133     Results from last 7 days   Lab Units 12/29/22  1418   INR  1 92*         Results from last 7 days   Lab Units 01/01/23  0428   HEMOGLOBIN A1C % 5 7*     Results from last 7 days   Lab Units 01/01/23  0428 12/31/22  0520 12/30/22  0619 12/29/22  1418   LACTIC ACID mmol/L  --   --  1 1 1 7   PROCALCITONIN ng/ml 0 20 0 29* 0 38*  --        Lines/Drains:  Invasive Devices     Peripheral Intravenous Line  Duration           Peripheral IV 12/29/22 Right Antecubital 3 days                      Imaging: No pertinent imaging reviewed  Recent Cultures (last 7 days):   Results from last 7 days   Lab Units 12/30/22  1152 12/30/22  1149 12/29/22  1507   BLOOD CULTURE   --   --  No Growth at 48 hrs  No Growth at 48 hrs     URINE CULTURE  No Growth <1000 cfu/mL  --   --    LEGIONELLA URINARY ANTIGEN   --  Negative  --        Last 24 Hours Medication List:   Current Facility-Administered Medications   Medication Dose Route Frequency Provider Last Rate   • acetaminophen  650 mg Oral Q6H PRN Jorge Austin [place], DO     • ALPRAZolam  0 25 mg Oral TID PRN Jorge Austin [place], DO     • apixaban  5 mg Oral BID Jorge Austin Kimberlee, DO     • atorvastatin  20 mg Oral After [place] M [place], DO     • azithromycin  500 mg Oral Q24H Jorge Austin Kimberlee, DO     • benzonatate  100 mg Oral TID Jorge Austin Kimberlee, DO     • cefTRIAXone  1,000 mg Intravenous Q24H Jorge Austin Kimberlee, DO Stopped (12/31/22 1800)   • cyanocobalamin  100 mcg Oral Daily Jorge Austin [place], DO     • folic acid  1 mg Oral Daily Jorge Austin [place], DO     • furosemide  20 mg Oral Daily Jorge Austin [place], DO     • hydrocodone-chlorpheniramine polistirex  5 mL Oral Q12H [address] Jorge Austin [place], DO     • ipratropium  0 5 mg Nebulization TID Jorge Austin Kimberlee, DO     • levalbuterol  1 25 mg Nebulization TID Jorge Austin [place], DO     • lisinopril  5 mg Oral Daily Jorge Austin [place], DO     • methylPREDNISolone sodium succinate  40 mg Intravenous Q12H [address] Jorge Austin Kimberlee, DO     • metoprolol tartrate  50 mg Oral Q12H Bakari Luciano MD     • multivitamin-minerals  1 tablet Oral HS Bakari Luciano MD     • ondansetron  4 mg Intravenous Q6H PRN Jorge Austin Kimberlee, DO     • oseltamivir  30 mg Oral Q12H [address] Jorge Austin [place], DO     • vancomycin  125 mg Oral Q12H [address] Mainor De La O DO          Today, Patient Was Seen By: Mainor De La O DO    **Please Note: This note may have been constructed using a voice recognition system  **

## 2023-01-02 ENCOUNTER — APPOINTMENT (INPATIENT)
Dept: RADIOLOGY | Facility: HOSPITAL | Age: 83
End: 2023-01-02

## 2023-01-02 LAB
ALBUMIN SERPL BCP-MCNC: 2.7 G/DL (ref 3.5–5)
ALP SERPL-CCNC: 81 U/L (ref 46–116)
ALT SERPL W P-5'-P-CCNC: 26 U/L (ref 12–78)
ANION GAP SERPL CALCULATED.3IONS-SCNC: 10 MMOL/L (ref 4–13)
AST SERPL W P-5'-P-CCNC: 21 U/L (ref 5–45)
BASOPHILS # BLD AUTO: 0.03 THOUSANDS/ÂΜL (ref 0–0.1)
BASOPHILS NFR BLD AUTO: 0 % (ref 0–1)
BILIRUB SERPL-MCNC: 0.27 MG/DL (ref 0.2–1)
BUN SERPL-MCNC: 31 MG/DL (ref 5–25)
CALCIUM ALBUM COR SERPL-MCNC: 10 MG/DL (ref 8.3–10.1)
CALCIUM SERPL-MCNC: 9 MG/DL (ref 8.3–10.1)
CHLORIDE SERPL-SCNC: 102 MMOL/L (ref 96–108)
CO2 SERPL-SCNC: 22 MMOL/L (ref 21–32)
CREAT SERPL-MCNC: 1.18 MG/DL (ref 0.6–1.3)
EOSINOPHIL # BLD AUTO: 0 THOUSAND/ÂΜL (ref 0–0.61)
EOSINOPHIL NFR BLD AUTO: 0 % (ref 0–6)
ERYTHROCYTE [DISTWIDTH] IN BLOOD BY AUTOMATED COUNT: 14.5 % (ref 11.6–15.1)
GFR SERPL CREATININE-BSD FRML MDRD: 42 ML/MIN/1.73SQ M
GLUCOSE SERPL-MCNC: 148 MG/DL (ref 65–140)
HCT VFR BLD AUTO: 33.1 % (ref 34.8–46.1)
HGB BLD-MCNC: 10.8 G/DL (ref 11.5–15.4)
IMM GRANULOCYTES # BLD AUTO: >0.5 THOUSAND/UL (ref 0–0.2)
IMM GRANULOCYTES NFR BLD AUTO: 5 % (ref 0–2)
LYMPHOCYTES # BLD AUTO: 0.64 THOUSANDS/ÂΜL (ref 0.6–4.47)
LYMPHOCYTES NFR BLD AUTO: 7 % (ref 14–44)
MAGNESIUM SERPL-MCNC: 2.4 MG/DL (ref 1.6–2.6)
MCH RBC QN AUTO: 31.4 PG (ref 26.8–34.3)
MCHC RBC AUTO-ENTMCNC: 32.6 G/DL (ref 31.4–37.4)
MCV RBC AUTO: 96 FL (ref 82–98)
MONOCYTES # BLD AUTO: 0.52 THOUSAND/ÂΜL (ref 0.17–1.22)
MONOCYTES NFR BLD AUTO: 5 % (ref 4–12)
NEUTROPHILS # BLD AUTO: 8.1 THOUSANDS/ÂΜL (ref 1.85–7.62)
NEUTS SEG NFR BLD AUTO: 83 % (ref 43–75)
NRBC BLD AUTO-RTO: 0 /100 WBCS
PHOSPHATE SERPL-MCNC: 3.5 MG/DL (ref 2.3–4.1)
PLATELET # BLD AUTO: 217 THOUSANDS/UL (ref 149–390)
PMV BLD AUTO: 9.2 FL (ref 8.9–12.7)
POTASSIUM SERPL-SCNC: 4.6 MMOL/L (ref 3.5–5.3)
PROCALCITONIN SERPL-MCNC: 0.14 NG/ML
PROT SERPL-MCNC: 6.6 G/DL (ref 6.4–8.4)
RBC # BLD AUTO: 3.44 MILLION/UL (ref 3.81–5.12)
SODIUM SERPL-SCNC: 134 MMOL/L (ref 135–147)
WBC # BLD AUTO: 9.82 THOUSAND/UL (ref 4.31–10.16)

## 2023-01-02 RX ORDER — VANCOMYCIN HYDROCHLORIDE 125 MG/1
CAPSULE ORAL
Status: DISPENSED
Start: 2023-01-02 | End: 2023-01-03

## 2023-01-02 RX ORDER — ALBUTEROL SULFATE 2.5 MG/3ML
2.5 SOLUTION RESPIRATORY (INHALATION) EVERY 4 HOURS PRN
Status: DISCONTINUED | OUTPATIENT
Start: 2023-01-02 | End: 2023-01-03 | Stop reason: HOSPADM

## 2023-01-02 RX ADMIN — CEFTRIAXONE 1000 MG: 1 INJECTION, SOLUTION INTRAVENOUS at 17:14

## 2023-01-02 RX ADMIN — METOPROLOL TARTRATE 50 MG: 50 TABLET, FILM COATED ORAL at 20:52

## 2023-01-02 RX ADMIN — HYDROCODONE POLISTIREX AND CHLORPHENIRAMINE POLISTIREX 5 ML: 10; 8 SUSPENSION, EXTENDED RELEASE ORAL at 20:57

## 2023-01-02 RX ADMIN — VANCOMYCIN HYDROCHLORIDE 125 MG: 125 CAPSULE ORAL at 22:08

## 2023-01-02 RX ADMIN — HYDROCODONE POLISTIREX AND CHLORPHENIRAMINE POLISTIREX 5 ML: 10; 8 SUSPENSION, EXTENDED RELEASE ORAL at 08:58

## 2023-01-02 RX ADMIN — LEVALBUTEROL HYDROCHLORIDE 1.25 MG: 1.25 SOLUTION, CONCENTRATE RESPIRATORY (INHALATION) at 15:19

## 2023-01-02 RX ADMIN — OSELTAMIVIR PHOSPHATE 30 MG: 30 CAPSULE ORAL at 08:58

## 2023-01-02 RX ADMIN — FOLIC ACID 1 MG: 1 TABLET ORAL at 08:58

## 2023-01-02 RX ADMIN — MULTIPLE VITAMINS W/ MINERALS TAB 1 TABLET: TAB at 21:00

## 2023-01-02 RX ADMIN — AZITHROMYCIN MONOHYDRATE 500 MG: 250 TABLET ORAL at 17:13

## 2023-01-02 RX ADMIN — LISINOPRIL 5 MG: 5 TABLET ORAL at 09:00

## 2023-01-02 RX ADMIN — VANCOMYCIN HYDROCHLORIDE 125 MG: 125 CAPSULE ORAL at 08:59

## 2023-01-02 RX ADMIN — FUROSEMIDE 20 MG: 40 TABLET ORAL at 09:00

## 2023-01-02 RX ADMIN — METOPROLOL TARTRATE 50 MG: 50 TABLET, FILM COATED ORAL at 09:00

## 2023-01-02 RX ADMIN — OSELTAMIVIR PHOSPHATE 30 MG: 30 CAPSULE ORAL at 20:51

## 2023-01-02 RX ADMIN — VITAM B12 100 MCG: 100 TAB at 08:58

## 2023-01-02 RX ADMIN — METHYLPREDNISOLONE SODIUM SUCCINATE 40 MG: 40 INJECTION, POWDER, FOR SOLUTION INTRAMUSCULAR; INTRAVENOUS at 20:57

## 2023-01-02 RX ADMIN — ATORVASTATIN CALCIUM 20 MG: 40 TABLET, FILM COATED ORAL at 17:13

## 2023-01-02 RX ADMIN — METHYLPREDNISOLONE SODIUM SUCCINATE 40 MG: 40 INJECTION, POWDER, FOR SOLUTION INTRAMUSCULAR; INTRAVENOUS at 08:58

## 2023-01-02 RX ADMIN — IPRATROPIUM BROMIDE 0.5 MG: 0.5 SOLUTION RESPIRATORY (INHALATION) at 15:19

## 2023-01-02 RX ADMIN — ACETAMINOPHEN 650 MG: 325 TABLET, FILM COATED ORAL at 20:51

## 2023-01-02 RX ADMIN — BENZONATATE 100 MG: 100 CAPSULE ORAL at 17:13

## 2023-01-02 RX ADMIN — APIXABAN 5 MG: 5 TABLET, FILM COATED ORAL at 17:13

## 2023-01-02 RX ADMIN — IPRATROPIUM BROMIDE 0.5 MG: 0.5 SOLUTION RESPIRATORY (INHALATION) at 08:21

## 2023-01-02 RX ADMIN — BENZONATATE 100 MG: 100 CAPSULE ORAL at 20:52

## 2023-01-02 RX ADMIN — LEVALBUTEROL HYDROCHLORIDE 1.25 MG: 1.25 SOLUTION, CONCENTRATE RESPIRATORY (INHALATION) at 08:21

## 2023-01-02 RX ADMIN — BENZONATATE 100 MG: 100 CAPSULE ORAL at 08:58

## 2023-01-02 RX ADMIN — APIXABAN 5 MG: 5 TABLET, FILM COATED ORAL at 08:58

## 2023-01-02 NOTE — CASE MANAGEMENT
Case Management Discharge Planning Note    Patient name Mike Long  Location [address] [id number] MRN [id number]  : 1940 Date 2023       Current Admission Date: 2022  Current Admission Diagnosis:Sepsis [facility])   Patient Active Problem List    Diagnosis Date Noted   • Hyperglycemia 2022   • Influenza A 2022   • Hepatic steatosis 2022   • Sepsis ([address] ) 2022   • Hyponatremia 2022   • Paroxysmal atrial flutter ([address] ) 2022   • Elevated d-dimer 2022   • Abnormal PFT 2022   • Acute respiratory failure with hypoxia ([facility] ) 2020   • Pneumonia due to COVID-19 virus 2020   • Coronary artery disease involving native coronary artery of native heart with angina pectoris ([facility] [address] ) 2020   • C  difficile colitis 2019   • Diverticulitis 2019   • Diarrhea of presumed infectious origin 2019   • On continuous oral anticoagulation 2019   • Encounter for pre-operative cardiovascular clearance 2019   • Emphysema of lung ([facility] [address] ) 2019   • History of Clostridioides difficile colitis 10/13/2018   • Acute cystitis without hematuria 10/12/2018   • Anemia 10/03/2018   • Hypokalemia 10/03/2018   • Hypoxia 10/03/2018   • Pleural effusion 10/03/2018   • Tubulovillous adenoma of colon 2018   • Colonic mass 2018   • Nausea, vomiting and diarrhea 2018   • Diverticulosis of large intestine with hemorrhage 2018   • Shortness of breath 2018   • Chronic diastolic congestive heart failure ([address] ) 2017   • Mixed anxiety and depressive disorder 2017   • Secondary pulmonary hypertension 2017   • Diverticulosis 2017   • Hypoalbuminemia 2017   • Elevated MCV 2017   • Back pain 2017   • History of shingles 2017   • Incomplete right bundle branch block 2017   • Hx of CABG 2017   • Thoracic radiculopathy 2017   • Pyuria 2017   • Microscopic hematuria 01/02/2017   • Typical atrial flutter ([address] ) 12/14/2016   • Multifocal pneumonia 09/18/2016   • Essential hypertension 01/03/2014   • Hypercholesterolemia 01/03/2014      LOS (days): 4  Geometric Mean LOS (GMLOS) (days): 5 00  Days to GMLOS:1 2     OBJECTIVE:  Risk of Unplanned Readmission Score: 23 79         Current admission status: Inpatient   Preferred Pharmacy:   [facility] Farhana Mahan, [address] [place] [id number]  Phone: [phone] Fax: [phone]    Primary Care Provider: Pierre Worrell MD    Primary Insurance: MEDICARE  Secondary Insurance: BLUE CROSS    DISCHARGE DETAILS:     I was notified by respiratory therapist that patient is requiring 02 at 2 liters  As per physician patient is not medically ready for discharge today I will continue to follow and arrange for 02 when she is medically ready

## 2023-01-02 NOTE — PROGRESS NOTES
[address] [facility] [address]  Progress Note - [employer] 1940, 79 y o  female MRN: [id number]  Unit/Bed#: 423-01 Encounter: [id number]  Primary Care Provider: Stephanie Worthy MD   Date and time admitted to hospital: 12/29/2022  2:08 PM    * Sepsis [facility])  Assessment & Plan  · Sepsis was present on admission and due to Influenza A in addition to a probable superimposed bacterial multifocal pneumonia  · SIRS criteria was met with tachycardia, tachypnea, and a leukocytosis  · The lactic acid level is within normal limits  · Checked blood cultures x 2 sets, which are no growth for 72 hours x 2 sets  · Checked a urine culture, which is no growth  · Negative Streptococcus pneumoniae urine antigen and negative Legionella urinary antigen  · Checked a MRSA nasal culture, which is negative  · Continue tamiflu 30 mg PO every 12 hours to complete a 5-day course  · Continue azithromycin 500 mg every 24 hours (day #5) and ceftriaxone 1000 mg IV every 24 hours (day #5) to complete a 7-day antibiotic course  · Continue methylprednisolone 40 mg IV every 12 hours  · I appreciate Pulmonology's input  · Follow the procalcitonin level  · Check a repeat portable chest x-ray today    Influenza A  Assessment & Plan  · Please see the assessment and plan for "Sepsis"    Multifocal pneumonia  Assessment & Plan  · Please see the assessment and plan for "Sepsis"    Hypoxia  Assessment & Plan  · Not on any home supplemental oxygen therapy  · Required up to 3 lpm of continuous supplemental oxygen  · Currently requiring 2 lpm of continuous supplemental oxygen  · Will need a home supplemental oxygen evaluation on the day of discharge    Hyperglycemia  Assessment & Plan  · Due to IV methylprednisolone treatment       Latest Reference Range & Units 01/01/23 04:28   Hemoglobin A1C Normal 3 8-5 6%; PreDiabetic 5 7-6 4%;  Diabetic >=6 5%; Glycemic control for adults with diabetes <7 0% % 5 7 (H)   eAG, EST AVG Glucose mg/dl 117 (H): Data is abnormally high    Elevated d-dimer  Assessment & Plan  · Now normalized  · On eliquis 5 mg PO BID for PAF    Paroxysmal atrial flutter (HCC)  Assessment & Plan  · Currently in sinus rhythm  · Continue anticoagulation with eliquis 5 mg PO BID    Hyponatremia  Assessment & Plan  · Likely hypovolemic hyponatremia  · Now resolved    Hepatic steatosis  Assessment & Plan  · Outpatient Gastroenterology evaluation      VTE Pharmacologic Prophylaxis: VTE Score: 8 High Risk (Score >/= 5) - Pharmacological DVT Prophylaxis Ordered: apixaban (Eliquis)  Sequential Compression Devices Ordered  Patient Centered Rounds: I performed bedside rounds with nursing staff today  Time Spent for Care: 30 minutes  More than 50% of total time spent on counseling and coordination of care as described above  Current Length of Stay: 4 day(s)  Current Patient Status: Inpatient   Certification Statement: The patient will continue to require additional inpatient hospital stay due to the need for IV antibiotic treatment, for IV methylprednisolone treatment, and with the patient requiring continuous supplemental oxygen to maintain adequate oxygen saturation levels  Discharge Plan: Anticipate discharge in 24-48 hrs to home  Code Status: Level 1 - Full Code    Subjective: The patient was seen and examined  The patient continues to experience shortness of breath with any type of physical activity  She continues to experience a productive cough  Objective:     Vitals:   Temp (24hrs), Av 5 °F (36 9 °C), Min:98 3 °F (36 8 °C), Max:99 °F (37 2 °C)    Temp:  [98 3 °F (36 8 °C)-99 °F (37 2 °C)] 99 °F (37 2 °C)  HR:  [] 91  Resp:  [13-18] 13  BP: (135-159)/() 142/73  SpO2:  [88 %-95 %] 94 %  Body mass index is 28 96 kg/m²  Input and Output Summary (last 24 hours):      Intake/Output Summary (Last 24 hours) at 2023 1528  Last data filed at 2023 1239  Gross per 24 hour   Intake 970 ml   Output -- Net 970 ml       Physical Exam:   Physical Exam   General:  NAD, follows commands  HEENT:  NC/AT, mucous membranes moist  Neck:  Supple, No JVP elevation  CV:  + S1, + S2, Intermittent tachycardia, Regular rhythm  Pulm:  Expiratory wheezing bilaterally with bibasilar crackles  Abd:  Soft, Non-tender, Non-distended  Ext:  No clubbing/cyanosis/edema  Skin:  No rashes  Neuro:  Awake, alert, oriented  Psych:  Normal mood and affect      Additional Data:    Labs:  Results from last 7 days   Lab Units 01/02/23  0524 01/01/23  0428   WBC Thousand/uL 9 82 10 44*   HEMOGLOBIN g/dL 10 8* 10 3*   HEMATOCRIT % 33 1* 32 2*   PLATELETS Thousands/uL 217 200   BANDS PCT %  --  1   NEUTROS PCT % 83*  --    LYMPHS PCT % 7*  --    LYMPHO PCT %  --  1*   MONOS PCT % 5  --    MONO PCT %  --  1*   EOS PCT % 0 0     Results from last 7 days   Lab Units 01/02/23  0524   SODIUM mmol/L 134*   POTASSIUM mmol/L 4 6   CHLORIDE mmol/L 102   CO2 mmol/L 22   BUN mg/dL 31*   CREATININE mg/dL 1 18   ANION GAP mmol/L 10   CALCIUM mg/dL 9 0   ALBUMIN g/dL 2 7*   TOTAL BILIRUBIN mg/dL 0 27   ALK PHOS U/L 81   ALT U/L 26   AST U/L 21   GLUCOSE RANDOM mg/dL 148*     Results from last 7 days   Lab Units 12/29/22  1418   INR  1 92*         Results from last 7 days   Lab Units 01/01/23  0428   HEMOGLOBIN A1C % 5 7*     Results from last 7 days   Lab Units 01/02/23  0524 01/01/23  0428 12/31/22  0520 12/30/22  0619 12/29/22  1418   LACTIC ACID mmol/L  --   --   --  1 1 1 7   PROCALCITONIN ng/ml 0 14 0 20 0 29* 0 38*  --        Lines/Drains:  Invasive Devices     Peripheral Intravenous Line  Duration           Peripheral IV 12/29/22 Right Antecubital 4 days                      Imaging: No pertinent imaging reviewed  Recent Cultures (last 7 days):   Results from last 7 days   Lab Units 12/30/22  1152 12/30/22  1149 12/29/22  1507   BLOOD CULTURE   --   --  No Growth at 72 hrs  No Growth at 72 hrs     URINE CULTURE  No Growth <1000 cfu/mL  --   -- LEGIONELLA URINARY ANTIGEN   --  Negative  --        Last 24 Hours Medication List:   Current Facility-Administered Medications   Medication Dose Route Frequency Provider Last Rate   • acetaminophen  650 mg Oral Q6H [facility]N [facility]     • ALPRAZolam  0 25 mg Oral TID PRN [facility], DO     • apixaban  5 mg Oral BID [facility], DO     • atorvastatin  20 mg Oral After [place]  Kimberlee, DO     • azithromycin  500 mg Oral Q24H [facility], DO     • benzonatate  100 mg Oral TID [facility], DO     • cefTRIAXone  1,000 mg Intravenous Q24H [facility] Kimberlee, DO Stopped (01/01/23 3815)   • cyanocobalamin  100 mcg Oral Daily [facility], [facility]     • folic acid  1 mg Oral Daily [facility], DO     • furosemide  20 mg Oral Daily [facility], DO     • hydrocodone-chlorpheniramine polistirex  5 mL Oral Q12H [address] [facility], DO     • ipratropium  0 5 mg Nebulization TID [facility], DO     • levalbuterol  1 25 mg Nebulization TID [facility], DO     • lisinopril  5 mg Oral Daily [facility], DO     • methylPREDNISolone sodium succinate  40 mg Intravenous Q12H [address] [facility], DO     • metoprolol tartrate  50 mg Oral Q12H Deisi Mercado MD     • multivitamin-minerals  1 tablet Oral HS Deisi Mercado MD     • ondansetron  4 mg Intravenous Q6H PRN [facility] Kimberlee, DO     • oseltamivir  30 mg Oral Q12H [address] [facility], DO     • vancomycin  125 mg Oral Q12H [address] Antonieta Jerez DO          Today, Patient Was Seen By: Antonieta Jerez DO    **Please Note: This note may have been constructed using a voice recognition system  **

## 2023-01-02 NOTE — ASSESSMENT & PLAN NOTE
· Not on any home supplemental oxygen therapy  · Required up to 3 lpm of continuous supplemental oxygen  · Currently requiring 2 lpm of continuous supplemental oxygen  · Will need a home supplemental oxygen evaluation on the day of discharge

## 2023-01-02 NOTE — ASSESSMENT & PLAN NOTE
· Negative urine culture    12/30/2022 1152 12/31/2022 1139 Urine culture [[id number]]   Urine, Clean Catch    Final result [address]  Component Value   Urine Culture No Growth <1000 cfu/mL

## 2023-01-02 NOTE — RESPIRATORY THERAPY NOTE
Home Oxygen Qualifying Test     Patient name: Devora Perry        : 1940   Date of Test:  2023  Diagnosis: sepsis   Home Oxygen Test:    **Medicare Guidelines require item(s) 1-5 on all ambulatory patients or 1 and 2 on non-ambulatory patients  1  Baseline SPO2 on Room Air at rest 92 %   a  If <= 88% on Room Air add O2 via NC to obtain SpO2 >=88%  If LPM needed, document LPM  needed to reach =>88%    2  SPO2 during exertion on Room Air 86  %  a  During exertion monitor SPO2  If SPO2 increases >=89%, do not add supplemental oxygen    3  SPO2 on Oxygen at Rest 95 % at 2 LPM    4  SPO2 during exertion on Oxygen 90 % at 2 LPM    5  Test performed during exertion activity  Walking, total ft 470     [x]  Supplemental Home Oxygen is indicated  []  Client does not qualify for home oxygen  Respiratory Additional Notes- pt ambulated with 2 pulse ox's for accuracy  Pt ambulated to approx 60 ft and pulse dropped to 86% with  needing rest,  pt placed on 2 l/m  Pt continued on 2 l/m nc 410 ft back to room pulse ox 90  %   At about 1/2 way coco pt given walker due to pt loosing balance, pt refused it at first when starting out but then took walker when suggestied     Gayatri Coronel, RT

## 2023-01-02 NOTE — PLAN OF CARE
Problem: PAIN - ADULT  Goal: Verbalizes/displays adequate comfort level or baseline comfort level  Description: Interventions:  - Encourage patient to monitor pain and request assistance  - Assess pain using appropriate pain scale (0-10 pain scale)  - Administer analgesics based on type and severity of pain and evaluate response  - Implement non-pharmacological measures as appropriate and evaluate response  - Consider cultural and social influences on pain and pain management  - Notify physician/advanced practitioner if interventions unsuccessful or patient reports new pain  Outcome: Progressing     Problem: INFECTION - ADULT  Goal: Absence or prevention of progression during hospitalization  Description: INTERVENTIONS:  - Assess and monitor for signs and symptoms of infection  - Monitor lab/diagnostic results  - Monitor all insertion sites, i e  indwelling lines  - Administer medications as ordered  - Instruct and encourage patient and family to use good hand hygiene technique  - Identify and instruct in appropriate isolation precautions for identified infection/condition (contact and droplet precautions)  Outcome: Progressing     Problem: SAFETY ADULT  Goal: Patient will remain free of falls  Description: INTERVENTIONS:  - Educate patient/family on patient safety including physical limitations  - Instruct patient to call for assistance with activity (independent)  - Consult OT/PT to assist with strengthening/mobility   - Keep Call bell within reach  - Keep bed low and locked with side rails adjusted as appropriate  - Keep care items and personal belongings within reach  - Initiate and maintain comfort rounds  - Make Fall Risk Sign visible to staff (moderate fall risk)  - Offer Toileting every 1-2 Hours, in advance of need  - Obtain necessary fall risk management equipment: nonskid footwear  Outcome: Progressing  Goal: Maintain or return to baseline ADL function  Description: INTERVENTIONS:  -  Assess patient's ability to carry out ADLs; (independent after setup)  - Assess/evaluate cause of self-care deficits (visual impairment, fatigue, dyspnea, oxygen)  - Assess range of motion  - Assess patient's mobility; (independent)  - Assess patient's need for assistive devices and provide as appropriate  - Encourage maximum independence but intervene and supervise when necessary  - Involve family in performance of ADLs  - Assess for home care needs following discharge   - Consider OT consult to assist with ADL evaluation and planning for discharge  - Provide patient education as appropriate  Outcome: Progressing  Goal: Maintains/Returns to pre admission functional level  Description: INTERVENTIONS:  - Perform BMAT or MOVE assessment daily    - Set and communicate daily mobility goal to care team and patient/family/caregiver     - Collaborate with rehabilitation services on mobility goals if consulted  - Ambulate patient 3 times a day  - Out of bed to chair 3 times a day   - Out of bed for meals 3 times a day  - Out of bed for toileting  - Record patient progress and toleration of activity level   Outcome: Progressing     Problem: DISCHARGE PLANNING  Goal: Discharge to home or other facility with appropriate resources  Description: INTERVENTIONS:  - Identify barriers to discharge w/patient and caregiver  - Arrange for needed discharge resources and transportation as appropriate  - Identify discharge learning needs (meds, wound care, etc )  - Refer to Case Management Department for coordinating discharge planning if the patient needs post-hospital services based on physician/advanced practitioner order or complex needs related to functional status, cognitive ability, or social support system  Outcome: Progressing     Problem: Knowledge Deficit  Goal: Patient/family/caregiver demonstrates understanding of disease process, treatment plan, medications, and discharge instructions  Description: Complete learning assessment and assess knowledge base  Interventions:  - Provide teaching at level of understanding  - Provide teaching via preferred learning methods  Outcome: Progressing     Problem: RESPIRATORY - ADULT  Goal: Achieves optimal ventilation and oxygenation  Description: INTERVENTIONS:  - Assess for changes in respiratory status  - Assess for changes in mentation and behavior  - Position to facilitate oxygenation and minimize respiratory effort  - Oxygen administered by appropriate delivery if ordered  - Encourage broncho-pulmonary hygiene including cough, deep breathe, Incentive Spirometry  - Assess and instruct to report SOB or any respiratory difficulty  - Respiratory Therapy support as indicated  Outcome: Progressing     Problem: MOBILITY - ADULT  Goal: Maintain or return to baseline ADL function  Description: INTERVENTIONS:  -  Assess patient's ability to carry out ADLs; (independent after setup)  - Assess/evaluate cause of self-care deficits (visual impairment, fatigue, dyspnea, oxygen)  - Assess range of motion  - Assess patient's mobility; (independent)  - Assess patient's need for assistive devices and provide as appropriate  - Encourage maximum independence but intervene and supervise when necessary  - Involve family in performance of ADLs  - Assess for home care needs following discharge   - Consider OT consult to assist with ADL evaluation and planning for discharge  - Provide patient education as appropriate  Outcome: Progressing  Goal: Maintains/Returns to pre admission functional level  Description: INTERVENTIONS:  - Perform BMAT or MOVE assessment daily    - Set and communicate daily mobility goal to care team and patient/family/caregiver     - Collaborate with rehabilitation services on mobility goals if consulted  - Ambulate patient 3 times a day  - Out of bed to chair 3 times a day   - Out of bed for meals 3 times a day  - Out of bed for toileting  - Record patient progress and toleration of activity level   Outcome: Progressing     Problem: Potential for Falls  Goal: Patient will remain free of falls  Description: INTERVENTIONS:  - Educate patient/family on patient safety including physical limitations  - Instruct patient to call for assistance with activity (independent)  - Consult OT/PT to assist with strengthening/mobility   - Keep Call bell within reach  - Keep bed low and locked with side rails adjusted as appropriate  - Keep care items and personal belongings within reach  - Initiate and maintain comfort rounds  - Make Fall Risk Sign visible to staff (moderate fall risk)  - Offer Toileting every 1-2 Hours, in advance of need  - Obtain necessary fall risk management equipment: nonskid footwear  Outcome: Progressing

## 2023-01-02 NOTE — ASSESSMENT & PLAN NOTE
· Sepsis was present on admission and due to Influenza A in addition to a probable superimposed bacterial multifocal pneumonia  · SIRS criteria was met with tachycardia, tachypnea, and a leukocytosis  · The lactic acid level is within normal limits  · Checked blood cultures x 2 sets, which are no growth for 72 hours x 2 sets  · Checked a urine culture, which is no growth  · Negative Streptococcus pneumoniae urine antigen and negative Legionella urinary antigen  · Checked a MRSA nasal culture, which is negative  · Continue tamiflu 30 mg PO every 12 hours to complete a 5-day course  · Continue azithromycin 500 mg every 24 hours (day #5) and ceftriaxone 1000 mg IV every 24 hours (day #5) to complete a 7-day antibiotic course  · Continue methylprednisolone 40 mg IV every 12 hours  · I appreciate Pulmonology's input    · Follow the procalcitonin level  · Check a repeat portable chest x-ray today

## 2023-01-03 VITALS
OXYGEN SATURATION: 94 % | WEIGHT: 173.28 LBS | HEIGHT: 65 IN | HEART RATE: 87 BPM | DIASTOLIC BLOOD PRESSURE: 83 MMHG | TEMPERATURE: 98.5 F | SYSTOLIC BLOOD PRESSURE: 145 MMHG | BODY MASS INDEX: 28.87 KG/M2 | RESPIRATION RATE: 18 BRPM

## 2023-01-03 LAB
ALBUMIN SERPL BCP-MCNC: 2.7 G/DL (ref 3.5–5)
ALP SERPL-CCNC: 76 U/L (ref 46–116)
ALT SERPL W P-5'-P-CCNC: 30 U/L (ref 12–78)
ANION GAP SERPL CALCULATED.3IONS-SCNC: 8 MMOL/L (ref 4–13)
AST SERPL W P-5'-P-CCNC: 19 U/L (ref 5–45)
BACTERIA BLD CULT: NORMAL
BACTERIA BLD CULT: NORMAL
BASOPHILS # BLD AUTO: 0.03 THOUSANDS/ÂΜL (ref 0–0.1)
BASOPHILS NFR BLD AUTO: 0 % (ref 0–1)
BILIRUB SERPL-MCNC: 0.4 MG/DL (ref 0.2–1)
BUN SERPL-MCNC: 34 MG/DL (ref 5–25)
CALCIUM ALBUM COR SERPL-MCNC: 10 MG/DL (ref 8.3–10.1)
CALCIUM SERPL-MCNC: 9 MG/DL (ref 8.3–10.1)
CHLORIDE SERPL-SCNC: 101 MMOL/L (ref 96–108)
CO2 SERPL-SCNC: 25 MMOL/L (ref 21–32)
CREAT SERPL-MCNC: 1.15 MG/DL (ref 0.6–1.3)
EOSINOPHIL # BLD AUTO: 0 THOUSAND/ÂΜL (ref 0–0.61)
EOSINOPHIL NFR BLD AUTO: 0 % (ref 0–6)
ERYTHROCYTE [DISTWIDTH] IN BLOOD BY AUTOMATED COUNT: 14.3 % (ref 11.6–15.1)
GFR SERPL CREATININE-BSD FRML MDRD: 44 ML/MIN/1.73SQ M
GLUCOSE SERPL-MCNC: 148 MG/DL (ref 65–140)
HCT VFR BLD AUTO: 34.1 % (ref 34.8–46.1)
HGB BLD-MCNC: 10.9 G/DL (ref 11.5–15.4)
IMM GRANULOCYTES # BLD AUTO: >0.5 THOUSAND/UL (ref 0–0.2)
IMM GRANULOCYTES NFR BLD AUTO: 8 % (ref 0–2)
LYMPHOCYTES # BLD AUTO: 0.68 THOUSANDS/ÂΜL (ref 0.6–4.47)
LYMPHOCYTES NFR BLD AUTO: 7 % (ref 14–44)
MAGNESIUM SERPL-MCNC: 2.2 MG/DL (ref 1.6–2.6)
MCH RBC QN AUTO: 30.4 PG (ref 26.8–34.3)
MCHC RBC AUTO-ENTMCNC: 32 G/DL (ref 31.4–37.4)
MCV RBC AUTO: 95 FL (ref 82–98)
MONOCYTES # BLD AUTO: 0.45 THOUSAND/ÂΜL (ref 0.17–1.22)
MONOCYTES NFR BLD AUTO: 5 % (ref 4–12)
NEUTROPHILS # BLD AUTO: 8.1 THOUSANDS/ÂΜL (ref 1.85–7.62)
NEUTS SEG NFR BLD AUTO: 80 % (ref 43–75)
NRBC BLD AUTO-RTO: 0 /100 WBCS
PHOSPHATE SERPL-MCNC: 4 MG/DL (ref 2.3–4.1)
PLATELET # BLD AUTO: 231 THOUSANDS/UL (ref 149–390)
PMV BLD AUTO: 8.9 FL (ref 8.9–12.7)
POTASSIUM SERPL-SCNC: 5 MMOL/L (ref 3.5–5.3)
PROCALCITONIN SERPL-MCNC: 0.12 NG/ML
PROT SERPL-MCNC: 6.4 G/DL (ref 6.4–8.4)
RBC # BLD AUTO: 3.58 MILLION/UL (ref 3.81–5.12)
SODIUM SERPL-SCNC: 134 MMOL/L (ref 135–147)
WBC # BLD AUTO: 10.09 THOUSAND/UL (ref 4.31–10.16)

## 2023-01-03 RX ORDER — PREDNISONE 10 MG/1
TABLET ORAL
Qty: 20 TABLET | Refills: 0 | Status: SHIPPED | OUTPATIENT
Start: 2023-01-03 | End: 2023-01-11

## 2023-01-03 RX ORDER — CEPHALEXIN 500 MG/1
500 CAPSULE ORAL EVERY 6 HOURS SCHEDULED
Qty: 8 CAPSULE | Refills: 0 | Status: SHIPPED | OUTPATIENT
Start: 2023-01-03 | End: 2023-01-05

## 2023-01-03 RX ORDER — VANCOMYCIN HYDROCHLORIDE 125 MG/1
125 CAPSULE ORAL EVERY 12 HOURS SCHEDULED
Qty: 10 CAPSULE | Refills: 0 | Status: SHIPPED | OUTPATIENT
Start: 2023-01-03 | End: 2023-01-08

## 2023-01-03 RX ORDER — FUROSEMIDE 20 MG/1
20 TABLET ORAL DAILY
Qty: 30 TABLET | Refills: 0 | Status: SHIPPED | OUTPATIENT
Start: 2023-01-04

## 2023-01-03 RX ADMIN — FOLIC ACID 1 MG: 1 TABLET ORAL at 08:57

## 2023-01-03 RX ADMIN — VITAM B12 100 MCG: 100 TAB at 08:57

## 2023-01-03 RX ADMIN — METOPROLOL TARTRATE 50 MG: 50 TABLET, FILM COATED ORAL at 08:57

## 2023-01-03 RX ADMIN — METHYLPREDNISOLONE SODIUM SUCCINATE 40 MG: 40 INJECTION, POWDER, FOR SOLUTION INTRAMUSCULAR; INTRAVENOUS at 08:58

## 2023-01-03 RX ADMIN — APIXABAN 5 MG: 5 TABLET, FILM COATED ORAL at 08:57

## 2023-01-03 RX ADMIN — BENZONATATE 100 MG: 100 CAPSULE ORAL at 08:57

## 2023-01-03 RX ADMIN — LISINOPRIL 5 MG: 5 TABLET ORAL at 08:57

## 2023-01-03 RX ADMIN — FUROSEMIDE 20 MG: 40 TABLET ORAL at 08:57

## 2023-01-03 RX ADMIN — HYDROCODONE POLISTIREX AND CHLORPHENIRAMINE POLISTIREX 5 ML: 10; 8 SUSPENSION, EXTENDED RELEASE ORAL at 08:57

## 2023-01-03 RX ADMIN — VANCOMYCIN HYDROCHLORIDE 125 MG: 125 CAPSULE ORAL at 10:11

## 2023-01-03 RX ADMIN — OSELTAMIVIR PHOSPHATE 30 MG: 30 CAPSULE ORAL at 08:57

## 2023-01-03 RX ADMIN — ACETAMINOPHEN 650 MG: 325 TABLET, FILM COATED ORAL at 08:58

## 2023-01-03 NOTE — ASSESSMENT & PLAN NOTE
· Not on any home supplemental oxygen therapy  · Required up to 3 lpm of continuous supplemental oxygen  · Currently requiring 2 lpm of continuous supplemental oxygen  · Will need home oxygen

## 2023-01-03 NOTE — NURSING NOTE
Pt left before receiving home O2  Reached out to CM about status of O2 tank and encouraged pt to wait for O2, pt declined  Provider made aware  Pt ambulated off the floor independently accompanied by granddaughter

## 2023-01-03 NOTE — CASE MANAGEMENT
Case Management Discharge Planning Note    Patient name Cristy Mcarthur  Location [address] [id number] MRN [id number]  : 1940 Date 1/3/2023       Current Admission Date: 2022  Current Admission Diagnosis:Sepsis [facility])   Patient Active Problem List    Diagnosis Date Noted   • Hyperglycemia 2022   • Influenza A 2022   • Hepatic steatosis 2022   • Sepsis ([address] ) 2022   • Hyponatremia 2022   • Paroxysmal atrial flutter ([address] ) 2022   • Elevated d-dimer 2022   • Abnormal PFT 2022   • Acute respiratory failure with hypoxia ([facility] ) 2020   • Pneumonia due to COVID-19 virus 2020   • Coronary artery disease involving native coronary artery of native heart with angina pectoris ([facility] ) 2020   • C  difficile colitis 2019   • Diverticulitis 2019   • Diarrhea of presumed infectious origin 2019   • On continuous oral anticoagulation 2019   • Encounter for pre-operative cardiovascular clearance 2019   • Emphysema of lung ([facility] ) 2019   • History of Clostridioides difficile colitis 10/13/2018   • Acute cystitis without hematuria 10/12/2018   • Anemia 10/03/2018   • Hypokalemia 10/03/2018   • Hypoxia 10/03/2018   • Pleural effusion 10/03/2018   • Tubulovillous adenoma of colon 2018   • Colonic mass 2018   • Nausea, vomiting and diarrhea 2018   • Diverticulosis of large intestine with hemorrhage 2018   • Shortness of breath 2018   • Chronic diastolic congestive heart failure ([address] ) 2017   • Mixed anxiety and depressive disorder 2017   • Secondary pulmonary hypertension 2017   • Diverticulosis 2017   • Hypoalbuminemia 2017   • Elevated MCV 2017   • Back pain 2017   • History of shingles 2017   • Incomplete right bundle branch block 2017   • Hx of CABG 2017   • Thoracic radiculopathy 2017   • Microscopic hematuria 2017   • Typical atrial flutter ([facility] [address] ) 12/14/2016   • Multifocal pneumonia 09/18/2016   • Essential hypertension 01/03/2014   • Hypercholesterolemia 01/03/2014      LOS (days): 5  Geometric Mean LOS (GMLOS) (days): 5 00  Days to GMLOS:0 1     OBJECTIVE:  Risk of Unplanned Readmission Score: 24 57         Current admission status: Inpatient   Preferred Pharmacy:   [address] [place] [address], [address] [place] [address] [place] [address]  Phone: [phone] Fax: [phone]    Primary Care Provider: Beata Aldridge MD    Primary Insurance: MEDICARE  Secondary Insurance: BLUE CROSS    DISCHARGE DETAILS:    Discharge planning discussed with[de-identified] Pt  Freedom of Choice: Yes     CM contacted family/caregiver?: No- see comments (Pt is alert and oriented x3)  Were Treatment Team discharge recommendations reviewed with patient/caregiver?: Yes  Did patient/caregiver verbalize understanding of patient care needs?: Yes  Were patient/caregiver advised of the risks associated with not following Treatment Team discharge recommendations?: Yes         [address] [place] [address]         Is the patient interested in [address] at discharge?: No (I notified patient that PT is recommending home care but patient does not feel she needs it) I notified [facility] at [address]     DME Referral Provided  Referral made for DME?: Yes  DME referral completed for the following items[de-identified] Portable Oxygen concentrator  DME Supplier Name[de-identified] Yue Perez (Pt would like me to make a referral to [address] because she could get a portable concentrator )     Referral was made to [address] as requested by patient            Treatment Team [facility]ation: [facility]  Discharge Destination Plan[de-identified] Home  Transport at Discharge : Automobile (grand daughter)

## 2023-01-03 NOTE — DISCHARGE SUMMARY
[address] [facility] [address]  Discharge- Meg Montero 1940, 79 y o  female MRN: [id number]  Unit/Bed#: 423-01 Encounter: [id number]  Primary Care Provider: Pierre Worrell MD   Date and time admitted to hospital: 12/29/2022  2:08 PM    * Sepsis [facility])  Assessment & Plan  · Sepsis was present on admission and due to Influenza A in addition to a probable superimposed bacterial multifocal pneumonia  · SIRS criteria was met with tachycardia, tachypnea, and a leukocytosis  · The lactic acid level is within normal limits    · Checked blood cultures x 2 sets, which are no growth for 72 hours x 2 sets  · Checked a urine culture, which is no growth  · Negative Streptococcus pneumoniae urine antigen and negative Legionella urinary antigen  · Checked a MRSA nasal culture, which is negative  · Continue tamiflu 30 mg PO every 12 hours to complete a 5-day course  · Continue azithromycin 500 mg every 24 hours (day #5) and ceftriaxone 1000 mg IV every 24 hours (day #5) to complete a 7-day antibiotic course  · Transition to p o  steroids on discharge    Paroxysmal atrial flutter (HCC)  Assessment & Plan  · Currently in normal sinus  · Continue beta-blocker and Eliquis    Hyponatremia  Assessment & Plan  · Likely hypovolemic hyponatremia  · Now resolved    Hepatic steatosis  Assessment & Plan  · Outpatient follow-up    Influenza A  Assessment & Plan  · Please see the assessment and plan for "Sepsis"    Hypoxia  Assessment & Plan  · Not on any home supplemental oxygen therapy  · Required up to 3 lpm of continuous supplemental oxygen  · Currently requiring 2 lpm of continuous supplemental oxygen  · Will need home oxygen    Multifocal pneumonia  Assessment & Plan  · Please see the assessment and plan for "Sepsis"      Discharging Physician / Practitioner: Dorita Strauss MD  PCP: Pierre Worrell MD  Admission Date:   Admission Orders (From admission, onward)     Ordered        12/29/22 1703  INPATIENT ADMISSION  Once Discharge Date: 01/03/23    Medical Problems     Resolved Problems  Date Reviewed: 1/3/2023   None         Consultations During Hospital Stay:  · none    Procedures Performed:   · none    Significant Findings / Test Results:   XR chest portable    Result Date: 1/2/2023  Impression: Improving multifocal pneumonia  Workstation performed: [id number]     XR chest 1 view portable    Result Date: 12/29/2022  Impression: No acute cardiopulmonary disease  Workstation performed: [id number]     CT chest without contrast    Result Date: 12/29/2022  · Impression: Background emphysema with multilobar pneumonia  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  One year follow-up suggested  Enlarged fatty liver  Workstation performed: [id number]     Incidental Findings:   · none     Test Results Pending at Discharge (will require follow up):   · none     Outpatient Tests Requested:  · none    Complications:  none    Reason for Admission: Acute respiratory failure with hypoxia    Hospital Course:     Lakeisha Sauceda is a 79 y o  female patient who originally presented to the hospital on 12/29/2022 due to acute respiratory failure with hypoxia secondary to COPD exacerbation and pneumonia treated with IV antibiotics with significant improvement and IV steroids transition to oral antibiotics on discharge as well as oral steroids  Did not require oxygen prior to discharge      Please see above list of diagnoses and related plan for additional information       Condition at Discharge: stable     Discharge Day Visit / Exam:     Subjective: Denies chest pain, shortness of breath, cough, fevers, chills  Vitals: Blood Pressure: 145/83 (01/03/23 0734)  Pulse: 87 (01/03/23 0734)  Temperature: 98 5 °F (36 9 °C) (01/03/23 0734)  Temp Source: Oral (01/02/23 2100)  Respirations: 18 (01/03/23 0734)  Height: 5' 5" (165 1 cm) (12/29/22 1351)  Weight - Scale: 78 6 kg (173 lb 4 5 oz) (01/03/23 0553)  SpO2: 94 % (01/03/23 5984)  Exam:   Physical Exam  Constitutional:       General: She is not in acute distress  Appearance: She is well-developed  She is not diaphoretic  HENT:      Head: Normocephalic and atraumatic  Nose: Nose normal       Mouth/Throat:      Pharynx: No oropharyngeal exudate  Eyes:      General: No scleral icterus  Right eye: No discharge  Left eye: No discharge  Conjunctiva/sclera: Conjunctivae normal    Neck:      Thyroid: No thyromegaly  Vascular: No JVD  Cardiovascular:      Rate and Rhythm: Normal rate and regular rhythm  Heart sounds: Normal heart sounds  No murmur heard  No friction rub  No gallop  Pulmonary:      Effort: Pulmonary effort is normal  No respiratory distress  Breath sounds: Normal breath sounds  No wheezing or rales  Chest:      Chest wall: No tenderness  Abdominal:      General: Bowel sounds are normal  There is no distension  Palpations: Abdomen is soft  Tenderness: There is no abdominal tenderness  There is no guarding or rebound  Musculoskeletal:         General: No tenderness or deformity  Normal range of motion  Cervical back: Normal range of motion and neck supple  Skin:     General: Skin is warm and dry  Findings: No erythema or rash  Neurological:      Mental Status: She is alert  Mental status is at baseline  Cranial Nerves: No cranial nerve deficit  Sensory: No sensory deficit  Motor: No abnormal muscle tone  Coordination: Coordination normal          Discussion with Family: pt    Discharge instructions/Information to patient and family:   See after visit summary for information provided to patient and family  Provisions for Follow-Up Care:  See after visit summary for information related to follow-up care and any pertinent home health orders        Disposition:     Home with [facility] (Reminder: Complete face to face encounter)    For Discharges to [facility]:   · Not Applicable to this Patient - Not Applicable to this Patient    Planned Readmission: none     Discharge Statement:  I spent 60 minutes discharging the patient  This time was spent on the day of discharge  I had direct contact with the patient on the day of discharge  Greater than 50% of the total time was spent examining patient, answering all patient questions, arranging and discussing plan of care with patient as well as directly providing post-discharge instructions  Additional time then spent on discharge activities  Discharge Medications:  See after visit summary for reconciled discharge medications provided to patient and family        ** Please Note: This note has been constructed using a voice recognition system **

## 2023-01-03 NOTE — CASE MANAGEMENT
Case Management Discharge Planning Note    Patient name Familia Alvarado  Location [address] [id number] MRN [id number]  : 1940 Date 1/3/2023       Current Admission Date: 2022  Current Admission Diagnosis:Sepsis [facility])   Patient Active Problem List    Diagnosis Date Noted   • Hyperglycemia 2022   • Influenza A 2022   • Hepatic steatosis 2022   • Sepsis ([address] ) 2022   • Hyponatremia 2022   • Paroxysmal atrial flutter ([address] ) 2022   • Elevated d-dimer 2022   • Abnormal PFT 2022   • Acute respiratory failure with hypoxia ([facility] ) 2020   • Pneumonia due to COVID-19 virus 2020   • Coronary artery disease involving native coronary artery of native heart with angina pectoris ([facility] [address] ) 2020   • C  difficile colitis 2019   • Diverticulitis 2019   • Diarrhea of presumed infectious origin 2019   • On continuous oral anticoagulation 2019   • Encounter for pre-operative cardiovascular clearance 2019   • Emphysema of lung ([facility] [address] ) 2019   • History of Clostridioides difficile colitis 10/13/2018   • Acute cystitis without hematuria 10/12/2018   • Anemia 10/03/2018   • Hypokalemia 10/03/2018   • Hypoxia 10/03/2018   • Pleural effusion 10/03/2018   • Tubulovillous adenoma of colon 2018   • Colonic mass 2018   • Nausea, vomiting and diarrhea 2018   • Diverticulosis of large intestine with hemorrhage 2018   • Shortness of breath 2018   • Chronic diastolic congestive heart failure ([address] ) 2017   • Mixed anxiety and depressive disorder 2017   • Secondary pulmonary hypertension 2017   • Diverticulosis 2017   • Hypoalbuminemia 2017   • Elevated MCV 2017   • Back pain 2017   • History of shingles 2017   • Incomplete right bundle branch block 2017   • Hx of CABG 2017   • Thoracic radiculopathy 2017   • Microscopic hematuria 2017   • Typical atrial flutter ([facility] [facility] ) 12/14/2016   • Multifocal pneumonia 09/18/2016   • Essential hypertension 01/03/2014   • Hypercholesterolemia 01/03/2014      LOS (days): 5  Geometric Mean LOS (GMLOS) (days): 5 00  Days to GMLOS:0 1     OBJECTIVE:  Risk of Unplanned Readmission Score: 24 57         Current admission status: Inpatient   Preferred Pharmacy:   [facility]an, [address] [place] [address]  Phone: [phone] Fax: [phone]    Primary Care Provider: Colleen Estrada MD    Primary Insurance: MEDICARE  Secondary Insurance: BLUE CROSS    DISCHARGE DETAILS:    Discharge planning discussed with[de-identified] Pt  Freedom of Choice: Yes     CM contacted family/caregiver?: No- see comments (Pt is alert and oriented x3)  Were Treatment Team discharge recommendations reviewed with patient/caregiver?: Yes  Did patient/caregiver verbalize understanding of patient care needs?: Yes  Were patient/caregiver advised of the risks associated with not following Treatment Team discharge recommendations?: Yes         Requested [address]         Is the patient interested in [address] at discharge?: No (I notified patient that PT is recommending home care but patient does not feel she needs it)    DME Referral Provided  Referral made for DME?: Yes  DME referral completed for the following items[de-identified] Portable Oxygen concentrator  DME Supplier Name[de-identified] Leo Stewart (Pt would like me to make a referral to [facility] [facility] because she could get a portable concentrator )              Treatment Team Recommendation: Home with [address] [employer]  Discharge Destination Plan[de-identified] Home  Transport at Discharge : Automobile (grand daughter)      I was notified by nursing that patient was going to leave hospital without tank because she does not want to wait anymore  I verified with Ct that I could give her a tank  I brought tank up to the floor but patient already left

## 2023-01-03 NOTE — PROGRESS NOTES
Progress Note - Pulmonary   Yasmin Bridges 79 y o  female MRN: [id number]  Unit/Bed#: 423-01 Encounter: [id number]    Assessment/Plan:    1  Acute hypoxic respiratory failure  2  Pneumonia secondary to influenza A with secondary bacterial pneumonia  3  Restrictive lung disease  4  Emphysema    Pulmonary recommendations:    · Maintain oxygen to maintain SPO2 greater than or equal to 88%  Currently on 2 L nasal cannula  Nausea requirement at baseline  · Recommend home O2 eval prior to discharge  · Completed Tamiflu 30 mg p o  every 12 hours, renally dosed  · Continue ceftriaxone, day#6/7  · Continue Xopenex/Atrovent nebs 3 times daily  · Received Solu-Medrol 40 mg IV once today  Start prednisone 40 mg p o  daily tomorrow  Plan to taper by 10 mg every 2 days until completion at time of discharge  · At discharge recommend pulmonary regimen to include an oral Ellipta 1 puff daily and as needed albuterol HFA/nebs  ·  would benefit from repeat PFTs and pulmonary follow-up at discharge as well  · Patient stable from a pulmonary standpoint  We will sign off  Call with questions  Chief Complaint:    "I feel okay "    Subjective:    Patient was seen and examined today  No overnight events reported  Patient states that she is feeling well enough to go home  No acute respiratory complaints  No chest pain  No fevers  Objective:    Vitals: Blood pressure 145/83, pulse 87, temperature 98 5 °F (36 9 °C), resp  rate 18, height 5' 5" (1 651 m), weight 78 6 kg (173 lb 4 5 oz), SpO2 94 %, not currently breastfeeding  2L NC,Body mass index is 28 84 kg/m²  Intake/Output Summary (Last 24 hours) at 1/3/2023 1252  Last data filed at 1/3/2023 1231  Gross per 24 hour   Intake 1080 ml   Output --   Net 1080 ml       Invasive Devices     Peripheral Intravenous Line  Duration           Peripheral IV Dorsal (posterior); Right Forearm -- days                Physical Exam:     Physical Exam  Vitals and nursing note reviewed  Constitutional:       General: She is not in acute distress  Appearance: Normal appearance  HENT:      Head: Normocephalic and atraumatic  Nose: Nose normal       Mouth/Throat:      Mouth: Mucous membranes are moist       Pharynx: Oropharynx is clear  Eyes:      Extraocular Movements: Extraocular movements intact  Conjunctiva/sclera: Conjunctivae normal    Cardiovascular:      Rate and Rhythm: Normal rate and regular rhythm  Heart sounds: No murmur heard  Pulmonary:      Breath sounds: Wheezing present  Musculoskeletal:      Cervical back: Normal range of motion and neck supple  No muscular tenderness  Right lower leg: No edema  Left lower leg: No edema  Skin:     General: Skin is warm and dry  Neurological:      General: No focal deficit present  Mental Status: She is alert  Mental status is at baseline  Psychiatric:         Mood and Affect: Mood normal          Behavior: Behavior normal          Labs: I have personally reviewed pertinent lab results  , ABG: No results found for: PHART, WFP9EQU, PO2ART, EQU2IVG, G3GPRXXY, BEART, SOURCE, BNP: No results found for: BNP, CBC:   Lab Results   Component Value Date    WBC 10 09 01/03/2023    HGB 10 9 (L) 01/03/2023    HCT 34 1 (L) 01/03/2023    MCV 95 01/03/2023     01/03/2023    MCH 30 4 01/03/2023    MCHC 32 0 01/03/2023    RDW 14 3 01/03/2023    MPV 8 9 01/03/2023    NRBC 0 01/03/2023   , CMP:   Lab Results   Component Value Date    SODIUM 134 (L) 01/03/2023    K 5 0 01/03/2023     01/03/2023    CO2 25 01/03/2023    BUN 34 (H) 01/03/2023    CREATININE 1 15 01/03/2023    CALCIUM 9 0 01/03/2023    AST 19 01/03/2023    ALT 30 01/03/2023    ALKPHOS 76 01/03/2023    EGFR 44 01/03/2023   , PT/INR: No results found for: PT, INR, Troponin: No results found for: TROPONINI    Imaging and other studies: I have personally reviewed pertinent reports     and I have personally reviewed pertinent films in PACS

## 2023-01-03 NOTE — ASSESSMENT & PLAN NOTE
· Sepsis was present on admission and due to Influenza A in addition to a probable superimposed bacterial multifocal pneumonia  · SIRS criteria was met with tachycardia, tachypnea, and a leukocytosis  · The lactic acid level is within normal limits    · Checked blood cultures x 2 sets, which are no growth for 72 hours x 2 sets  · Checked a urine culture, which is no growth  · Negative Streptococcus pneumoniae urine antigen and negative Legionella urinary antigen  · Checked a MRSA nasal culture, which is negative  · Continue tamiflu 30 mg PO every 12 hours to complete a 5-day course  · Continue azithromycin 500 mg every 24 hours (day #5) and ceftriaxone 1000 mg IV every 24 hours (day #5) to complete a 7-day antibiotic course  · Transition to p o  steroids on discharge

## 2023-01-04 LAB
DME PARACHUTE DELIVERY DATE ACTUAL: NORMAL
DME PARACHUTE DELIVERY DATE EXPECTED: NORMAL
DME PARACHUTE DELIVERY DATE REQUESTED: NORMAL
DME PARACHUTE ITEM DESCRIPTION: NORMAL
DME PARACHUTE ORDER STATUS: NORMAL
DME PARACHUTE SUPPLIER NAME: NORMAL
DME PARACHUTE SUPPLIER PHONE: NORMAL

## 2023-01-12 ENCOUNTER — TELEPHONE (OUTPATIENT)
Dept: GASTROENTEROLOGY | Facility: CLINIC | Age: 83
End: 2023-01-12

## 2023-01-12 NOTE — TELEPHONE ENCOUNTER
Called patient to arrange appointment as per referral Patient prefers to contact office to arrange appointment as per referral  Sending contact letter

## 2023-01-22 ENCOUNTER — OFFICE VISIT (OUTPATIENT)
Dept: URGENT CARE | Facility: MEDICAL CENTER | Age: 83
End: 2023-01-22

## 2023-01-22 ENCOUNTER — APPOINTMENT (OUTPATIENT)
Dept: RADIOLOGY | Facility: MEDICAL CENTER | Age: 83
End: 2023-01-22

## 2023-01-22 VITALS
BODY MASS INDEX: 27.99 KG/M2 | HEIGHT: 65 IN | OXYGEN SATURATION: 99 % | TEMPERATURE: 97.6 F | DIASTOLIC BLOOD PRESSURE: 72 MMHG | HEART RATE: 97 BPM | RESPIRATION RATE: 20 BRPM | WEIGHT: 168 LBS | SYSTOLIC BLOOD PRESSURE: 118 MMHG

## 2023-01-22 DIAGNOSIS — M54.50 LUMBAR PAIN: ICD-10-CM

## 2023-01-22 DIAGNOSIS — B37.2 CANDIDAL INTERTRIGO: ICD-10-CM

## 2023-01-22 DIAGNOSIS — S39.012A STRAIN OF LUMBAR REGION, INITIAL ENCOUNTER: Primary | ICD-10-CM

## 2023-01-22 RX ORDER — NYSTATIN 100000 [USP'U]/G
POWDER TOPICAL 3 TIMES DAILY
Qty: 15 G | Refills: 0 | Status: SHIPPED | OUTPATIENT
Start: 2023-01-22

## 2023-01-22 RX ORDER — GABAPENTIN 100 MG/1
CAPSULE ORAL
COMMUNITY

## 2023-01-22 NOTE — PATIENT INSTRUCTIONS
Medication as prescribed for fungal infection  Over the counter lidocaine patches  Tylenol every 6 hours as needed  Heat over affected area  Follow up with [facility] if back pain does not resolve  Follow up with PCP in 3-5 days and discuss blood in stool  Proceed to  ER if symptoms worsen  [facility] will typically call patient within 48 hours  You may call [facility]: [phone]

## 2023-01-22 NOTE — PROGRESS NOTES
[address]        NAME: Swathi Martinez is a 79 y o  female  : 1940    MRN: [id number]  DATE: 2023  TIME: 3:50 PM    Assessment and Plan   Strain of lumbar region, initial encounter [S39 012A]  1  Strain of lumbar region, initial encounter  XR spine lumbar minimum 4 views non injury    Ambulatory Referral to [facility]      2  Candidal intertrigo  nystatin (MYCOSTATIN) powder        XR provider read: degenerative changes  No acute fracture or dislocation  CMP 1/3/2023: AST/ALT WNL; eGFR 44    Patient Instructions     Medication as prescribed for fungal infection  Over the counter lidocaine patches  Tylenol every 6 hours as needed  Heat over affected area  Follow up with [facility] if back pain does not resolve  Follow up with PCP in 3-5 days and discuss blood in stool  Proceed to  ER if symptoms worsen  [facility] will typically call patient within 48 hours  You may call [facility]: [phone]  Chief Complaint     Chief Complaint   Patient presents with   • Back Pain     Pt  Was recently in hospital for double pneumonia and flu, pt  Was coughing a lot and she is unsure if she strained her back, pt  Has lower back pain b/l radiating into hip, pt  Treating with gabapentin today with no relief, pt  Takes gabapentin for neuropathy          History of Present Illness       Patient states she was recently hospitalized for influenza and b/l pneumonia  Suspects she "might have pulled something from coughing"  Reports rash beneath b/l breasts  She has been applying cornstarch  Back Pain  This is a new problem  The current episode started in the past 7 days  The pain is present in the lumbar spine  The pain does not radiate  The pain is severe  Exacerbated by: activity; walking; getting out of car  Pertinent negatives include no abdominal pain, chest pain, fever, perianal numbness, tingling or weakness   Treatments tried: gabapentin; ice; heat; tylenol arthritis and tylenol rapid relief; muscle relaxer  Review of Systems   Review of Systems   Constitutional: Negative for chills and fever  Respiratory: Positive for shortness of breath (chronic)  Cardiovascular: Negative for chest pain and palpitations  Gastrointestinal: Positive for blood in stool (unable to say if it is bright red or darker stool) and nausea (secondary to PND)  Negative for abdominal pain and vomiting  Musculoskeletal: Positive for back pain  Negative for gait problem  Skin: Positive for rash  Neurological: Negative for tingling and weakness  Current Medications       Current Outpatient Medications:   •  acetaminophen (TYLENOL) 650 mg CR tablet, Take 1 tablet (650 mg total) by mouth every 8 (eight) hours as needed for mild pain or moderate pain, Disp: 15 tablet, Rfl: 0  •  ALPRAZolam (XANAX) 0 25 mg tablet, Take 0 25 mg by mouth 3 (three) times a day as needed for anxiety  , Disp: , Rfl:   •  apixaban (ELIQUIS) 5 mg, Take 1 tablet (5 mg total) by mouth 2 (two) times a day, Disp: 60 tablet, Rfl: 3  •  atorvastatin (LIPITOR) 20 mg tablet, Take 20 mg by mouth daily after dinner   , Disp: , Rfl:   •  benzonatate (TESSALON PERLES) 100 mg capsule, Take 1 capsule (100 mg total) by mouth every 8 (eight) hours, Disp: 21 capsule, Rfl: 0  •  calcium carbonate (OS-FERN) 600 MG tablet, Take 600 mg by mouth daily, Disp: , Rfl:   •  cyanocobalamin (VITAMIN B-12) 100 mcg tablet, Take by mouth daily, Disp: , Rfl:   •  ergocalciferol (ERGOCALCIFEROL) 84942 UNITS capsule, Take 50,000 Units by mouth once a week   Takes on Wednesdays, Disp: , Rfl:   •  esomeprazole (NexIUM) 40 MG capsule, Take 20 mg by mouth Daily , Disp: , Rfl:   •  folic acid (FOLVITE) 1 mg tablet, Take by mouth daily, Disp: , Rfl:   •  furosemide (LASIX) 20 mg tablet, Take 1 tablet (20 mg total) by mouth daily Do not start before January 4, 2023 , Disp: 30 tablet, Rfl: 0  •  gabapentin (NEURONTIN) 100 mg capsule, gabapentin 100 mg capsule  take 1 capsule by mouth three times a day, Disp: , Rfl:   •  lisinopril (ZESTRIL) 5 mg tablet, TAKE 1 TABLET EVERY DAY BY ORAL ROUTE, Disp: , Rfl:   •  metoprolol tartrate (LOPRESSOR) 50 mg tablet, take 1 tablet by mouth every 12 hours, Disp: 180 tablet, Rfl: 3  •  Misc  Devices (SPRAY BOTTLE/PLASTIC 120ML) MISC, by Does not apply route 3 (three) times a day Use to cleanse area 3 times daily or as needed with bowel movements  , Disp: 1 each, Rfl: 0  •  Multiple Vitamins-Minerals (PRESERVISION AREDS) CAPS, Take 1 capsule by mouth daily, Disp: , Rfl:   •  nitroglycerin (NITROSTAT) 0 4 mg SL tablet, Place 1 tablet (0 4 mg total) under the tongue every 5 (five) minutes as needed for chest pain (times three for chest pain  If no relief after second tablet, call 911 ), Disp: 25 tablet, Rfl: 2  •  nystatin (MYCOSTATIN) powder, Apply topically 3 (three) times a day   Apply for an additional week after symptoms resolve , Disp: 15 g, Rfl: 0  •  Omega-3 Fatty Acids (FISH OIL PO), Take 1,000 mg by mouth daily  , Disp: , Rfl:     Current Allergies     Allergies as of 01/22/2023 - Reviewed 01/22/2023   Allergen Reaction Noted   • Codeine GI Intolerance 01/03/2014   • Lansoprazole Other (See Comments)    • Morphine Nausea Only    • Morphine and related GI Intolerance 01/03/2014            The following portions of the patient's history were reviewed and updated as appropriate: allergies, current medications, past family history, past medical history, past social history, past surgical history and problem list      Past Medical History:   Diagnosis Date   • Angina pectoris ([address] )    • Anxiety    • C  difficile diarrhea    • CAD (coronary artery disease)    • Cardiac disease    • Colon polyp    • COVID-19 04/2020   • Depression    • Diverticulitis of colon 11/2019   • GERD (gastroesophageal reflux disease)    • History of colon polyps    • History of hiatal hernia    • Hx of bleeding disorder     hemorrhoids   • Hyperlipidemia    • Hypertension    • Irregular heart beat     gets tachycardia   • Shortness of breath     related to heart       Past Surgical History:   Procedure Laterality Date   • CARDIAC ELECTROPHYSIOLOGY MAPPING AND ABLATION     • CARDIAC SURGERY     • CARDIAC SURGERY     • CATARACT EXTRACTION Bilateral    • CHOLECYSTECTOMY  1987   • COLON SURGERY      repair of colon   • COLON SURGERY  2018   • COLONOSCOPY  2009    Tubulovillous adenoma   • COLONOSCOPY N/A 6/18/2018    Procedure: COLONOSCOPY;  Surgeon: Rosa Becker DO;  Location: [facility]; Service: Gastroenterology   • COLONOSCOPY  2011    2 cm right colon polyp and 1 5 cm mid right colon polyps tubular adenomas   • COLONOSCOPY W/ CONTROL OF HEMORRHAGE     • CORONARY ANGIOPLASTY WITH STENT PLACEMENT      reports two blockages not able to be stented   • CORONARY ARTERY BYPASS GRAFT  2008    3 vessels   • HERNIA REPAIR      hiatal hernia   • HYSTERECTOMY      partial not due to malignancy   • LAPAROTOMY N/A 9/26/2018    Procedure: LAPAROTOMY EXPLORATORY WITH  RIGHT HEMICOLECTOMY;  Surgeon: Rosa Becker DO;  Location: [facility];  Service: Gastroenterology   • LAPAROTOMY N/A 9/26/2018    Procedure: LAPAROTOMY EXPLORATORY WITH HEMICOLECTOMY;  Surgeon: Louisa Buchanan DO;  Location: [facility];  Service: General   • DC ANRCT XM SURG REQ ANES GENERAL SPI/EDRL DX N/A 7/25/2019    Procedure: EXAM UNDER ANESTHESIA (EUA);   Surgeon: Poonam Ramos MD;  Location: [address] [place] [address] MAIN OR;  Service: General   • DC COLONOSCOPY FLX DX W/COLLJ [facility] WHEN PFRMD N/A 9/26/2018    Procedure: COLONOSCOPY;  Surgeon: Rosa Becker DO;  Location: [facility];  Service: Gastroenterology   • DC HEMORRHOIDECTOMY [address] 1 COLUMN/GROUP N/A 7/25/2019    Procedure: HEMORRHOIDECTOMY EXCISION;  Surgeon: Poonam Ramos MD;  Location: [facility] MAIN OR;  Service: General   • DC SIGMOIDOSCOPY FLX DX W/COLLJ SPEC BR/WA IF PFRMD N/A 6/21/2018 Procedure: SIGMOIDOSCOPY FLEXIBLE;  Surgeon: Meghana King DO;  Location: [facility]; Service: Gastroenterology       Family History   Problem Relation Age of Onset   • Heart disease Mother    • Cirrhosis Father    • Cancer Father    • Lung cancer Brother    • No Known Problems Sister    • No Known Problems Daughter    • No Known Problems Son    • No Known Problems Paternal Aunt          Medications have been verified  Objective   /72   Pulse 97   Temp 97 6 °F (36 4 °C)   Resp 20   Ht 5' 5" (1 651 m)   Wt 76 2 kg (168 lb)   SpO2 99%   BMI 27 96 kg/m²   No LMP recorded  Patient has had a hysterectomy  Physical Exam     Physical Exam  Vitals reviewed  Constitutional:       General: She is not in acute distress  Appearance: She is well-developed  Cardiovascular:      Rate and Rhythm: Normal rate and regular rhythm  Heart sounds: Normal heart sounds  No murmur heard  No friction rub  No gallop  Pulmonary:      Effort: Pulmonary effort is normal  No respiratory distress  Breath sounds: Normal breath sounds  No wheezing or rales  Chest:      Chest wall: No tenderness  Abdominal:      Palpations: Abdomen is soft  Tenderness: There is no abdominal tenderness  Musculoskeletal:      Comments: LE MS 5/5 B/L  B/L hips non-tender to palpation  Skin:     General: Skin is warm  Findings: Rash (erythematous patches with sattelite lesions inferior to b/l breasts) present  No erythema  Neurological:      Mental Status: She is alert and oriented to person, place, and time  Sensory: No sensory deficit  Deep Tendon Reflexes: Reflexes are normal and symmetric  Psychiatric:         Behavior: Behavior normal          Thought Content:  Thought content normal          Judgment: Judgment normal

## 2023-01-23 ENCOUNTER — APPOINTMENT (OUTPATIENT)
Dept: LAB | Facility: HOSPITAL | Age: 83
End: 2023-01-23
Attending: INTERNAL MEDICINE

## 2023-01-23 DIAGNOSIS — R60.9 EDEMA, UNSPECIFIED TYPE: ICD-10-CM

## 2023-01-23 LAB
ALBUMIN SERPL BCP-MCNC: 4.2 G/DL (ref 3.5–5)
ALP SERPL-CCNC: 108 U/L (ref 34–104)
ALT SERPL W P-5'-P-CCNC: 15 U/L (ref 7–52)
ANION GAP SERPL CALCULATED.3IONS-SCNC: 9 MMOL/L (ref 4–13)
AST SERPL W P-5'-P-CCNC: 15 U/L (ref 13–39)
BILIRUB SERPL-MCNC: 0.7 MG/DL (ref 0.2–1)
BNP SERPL-MCNC: 76 PG/ML (ref 0–100)
BUN SERPL-MCNC: 22 MG/DL (ref 5–25)
CALCIUM SERPL-MCNC: 9.6 MG/DL (ref 8.4–10.2)
CHLORIDE SERPL-SCNC: 105 MMOL/L (ref 96–108)
CO2 SERPL-SCNC: 25 MMOL/L (ref 21–32)
CREAT SERPL-MCNC: 0.98 MG/DL (ref 0.6–1.3)
GFR SERPL CREATININE-BSD FRML MDRD: 53 ML/MIN/1.73SQ M
GLUCOSE SERPL-MCNC: 100 MG/DL (ref 65–140)
POTASSIUM SERPL-SCNC: 4.2 MMOL/L (ref 3.5–5.3)
PROT SERPL-MCNC: 7.3 G/DL (ref 6.4–8.4)
SODIUM SERPL-SCNC: 139 MMOL/L (ref 135–147)

## 2023-01-24 ENCOUNTER — TELEPHONE (OUTPATIENT)
Dept: PHYSICAL THERAPY | Facility: OTHER | Age: 83
End: 2023-01-24

## 2023-01-24 LAB
FERRITIN SERPL-MCNC: 84 NG/ML (ref 8–388)
IRON SATN MFR SERPL: 20 % (ref 15–50)
IRON SERPL-MCNC: 67 UG/DL (ref 50–170)
TIBC SERPL-MCNC: 328 UG/DL (ref 250–450)

## 2023-01-24 NOTE — TELEPHONE ENCOUNTER
Call placed to the patient/daughter per [facility] referral     Voice message left for patient to call back  Phone number and hours of business provided  This is the 1st attempt to reach the patient  Will defer per protocol

## 2023-02-01 DIAGNOSIS — I25.10 CORONARY ARTERY DISEASE INVOLVING NATIVE CORONARY ARTERY OF NATIVE HEART WITHOUT ANGINA PECTORIS: ICD-10-CM

## 2023-02-01 RX ORDER — POTASSIUM CHLORIDE 20 MEQ/1
20 TABLET, EXTENDED RELEASE ORAL DAILY
Qty: 90 TABLET | Refills: 0 | OUTPATIENT
Start: 2023-02-01

## 2023-02-03 NOTE — TELEPHONE ENCOUNTER
Message left for patient regarding referral entered for  [facility]    Seen at [facility] for acute LB strain  Contact information, hours of operation and VM instructions left  Nurse requested patient to CB if needed and leave Full Name &  on VM  This is second attempt to contact      Referral deferred for f/u attempt per protocol

## 2023-02-06 DIAGNOSIS — I50.32 CHRONIC DIASTOLIC CONGESTIVE HEART FAILURE (HCC): Primary | ICD-10-CM

## 2023-02-11 ENCOUNTER — APPOINTMENT (OUTPATIENT)
Dept: LAB | Facility: HOSPITAL | Age: 83
End: 2023-02-11
Attending: INTERNAL MEDICINE

## 2023-02-11 LAB
ANION GAP SERPL CALCULATED.3IONS-SCNC: 9 MMOL/L (ref 4–13)
BUN SERPL-MCNC: 20 MG/DL (ref 5–25)
CALCIUM SERPL-MCNC: 9.9 MG/DL (ref 8.4–10.2)
CHLORIDE SERPL-SCNC: 101 MMOL/L (ref 96–108)
CO2 SERPL-SCNC: 27 MMOL/L (ref 21–32)
CREAT SERPL-MCNC: 0.94 MG/DL (ref 0.6–1.3)
GFR SERPL CREATININE-BSD FRML MDRD: 56 ML/MIN/1.73SQ M
GLUCOSE P FAST SERPL-MCNC: 116 MG/DL (ref 65–99)
POTASSIUM SERPL-SCNC: 3.4 MMOL/L (ref 3.5–5.3)
SODIUM SERPL-SCNC: 137 MMOL/L (ref 135–147)

## 2023-02-27 PROBLEM — J10.1 INFLUENZA A: Status: RESOLVED | Noted: 2022-12-29 | Resolved: 2023-02-27

## 2023-03-04 PROBLEM — A41.9 SEPSIS (HCC): Status: RESOLVED | Noted: 2022-12-29 | Resolved: 2023-03-04

## 2023-03-07 DIAGNOSIS — I48.3 TYPICAL ATRIAL FLUTTER (HCC): ICD-10-CM

## 2023-03-07 RX ORDER — METOPROLOL TARTRATE 50 MG/1
TABLET, FILM COATED ORAL
Qty: 180 TABLET | Refills: 3 | Status: SHIPPED | OUTPATIENT
Start: 2023-03-07

## 2023-05-25 ENCOUNTER — APPOINTMENT (OUTPATIENT)
Dept: LAB | Facility: MEDICAL CENTER | Age: 83
End: 2023-05-25
Payer: MEDICARE

## 2023-05-25 DIAGNOSIS — E03.9 HYPOTHYROIDISM, UNSPECIFIED TYPE: ICD-10-CM

## 2023-05-25 DIAGNOSIS — E55.9 VITAMIN D DEFICIENCY: ICD-10-CM

## 2023-05-25 DIAGNOSIS — I50.9 CONGESTIVE HEART FAILURE, UNSPECIFIED HF CHRONICITY, UNSPECIFIED HEART FAILURE TYPE (HCC): ICD-10-CM

## 2023-05-25 DIAGNOSIS — D64.9 ANEMIA, UNSPECIFIED TYPE: ICD-10-CM

## 2023-05-25 DIAGNOSIS — E78.5 HYPERLIPIDEMIA, UNSPECIFIED HYPERLIPIDEMIA TYPE: ICD-10-CM

## 2023-05-25 DIAGNOSIS — I10 HYPERTENSION, UNSPECIFIED TYPE: ICD-10-CM

## 2023-05-25 LAB
25(OH)D3 SERPL-MCNC: 48.9 NG/ML (ref 30–100)
ALBUMIN SERPL BCP-MCNC: 3.8 G/DL (ref 3.5–5)
ALP SERPL-CCNC: 105 U/L (ref 46–116)
ALT SERPL W P-5'-P-CCNC: 21 U/L (ref 12–78)
ANION GAP SERPL CALCULATED.3IONS-SCNC: 4 MMOL/L (ref 4–13)
AST SERPL W P-5'-P-CCNC: 19 U/L (ref 5–45)
BILIRUB SERPL-MCNC: 0.73 MG/DL (ref 0.2–1)
BNP SERPL-MCNC: 76 PG/ML (ref 0–100)
BUN SERPL-MCNC: 20 MG/DL (ref 5–25)
CALCIUM SERPL-MCNC: 9.2 MG/DL (ref 8.3–10.1)
CHLORIDE SERPL-SCNC: 105 MMOL/L (ref 96–108)
CHOLEST SERPL-MCNC: 143 MG/DL
CO2 SERPL-SCNC: 26 MMOL/L (ref 21–32)
CREAT SERPL-MCNC: 0.99 MG/DL (ref 0.6–1.3)
ERYTHROCYTE [DISTWIDTH] IN BLOOD BY AUTOMATED COUNT: 14.7 % (ref 11.6–15.1)
FERRITIN SERPL-MCNC: 19 NG/ML (ref 11–307)
GFR SERPL CREATININE-BSD FRML MDRD: 52 ML/MIN/1.73SQ M
GLUCOSE P FAST SERPL-MCNC: 96 MG/DL (ref 65–99)
HCT VFR BLD AUTO: 34.5 % (ref 34.8–46.1)
HDLC SERPL-MCNC: 62 MG/DL
HGB BLD-MCNC: 11.1 G/DL (ref 11.5–15.4)
IRON SATN MFR SERPL: 13 % (ref 15–50)
IRON SERPL-MCNC: 52 UG/DL (ref 50–170)
LDLC SERPL CALC-MCNC: 51 MG/DL (ref 0–100)
MCH RBC QN AUTO: 29.6 PG (ref 26.8–34.3)
MCHC RBC AUTO-ENTMCNC: 32.2 G/DL (ref 31.4–37.4)
MCV RBC AUTO: 92 FL (ref 82–98)
NONHDLC SERPL-MCNC: 81 MG/DL
PLATELET # BLD AUTO: 163 THOUSANDS/UL (ref 149–390)
PMV BLD AUTO: 9.9 FL (ref 8.9–12.7)
POTASSIUM SERPL-SCNC: 3.8 MMOL/L (ref 3.5–5.3)
PROT SERPL-MCNC: 7.3 G/DL (ref 6.4–8.4)
RBC # BLD AUTO: 3.75 MILLION/UL (ref 3.81–5.12)
SODIUM SERPL-SCNC: 135 MMOL/L (ref 135–147)
T4 FREE SERPL-MCNC: 0.78 NG/DL (ref 0.61–1.12)
TIBC SERPL-MCNC: 394 UG/DL (ref 250–450)
TRIGL SERPL-MCNC: 148 MG/DL
TSH SERPL DL<=0.05 MIU/L-ACNC: 2 UIU/ML (ref 0.45–4.5)
WBC # BLD AUTO: 6.66 THOUSAND/UL (ref 4.31–10.16)

## 2023-05-25 PROCEDURE — 83540 ASSAY OF IRON: CPT

## 2023-05-25 PROCEDURE — 83036 HEMOGLOBIN GLYCOSYLATED A1C: CPT

## 2023-05-25 PROCEDURE — 80053 COMPREHEN METABOLIC PANEL: CPT

## 2023-05-25 PROCEDURE — 36415 COLL VENOUS BLD VENIPUNCTURE: CPT

## 2023-05-25 PROCEDURE — 83550 IRON BINDING TEST: CPT

## 2023-05-25 PROCEDURE — 82728 ASSAY OF FERRITIN: CPT

## 2023-05-25 PROCEDURE — 85027 COMPLETE CBC AUTOMATED: CPT

## 2023-05-25 PROCEDURE — 84439 ASSAY OF FREE THYROXINE: CPT

## 2023-05-25 PROCEDURE — 84443 ASSAY THYROID STIM HORMONE: CPT

## 2023-05-25 PROCEDURE — 82306 VITAMIN D 25 HYDROXY: CPT

## 2023-05-25 PROCEDURE — 80061 LIPID PANEL: CPT

## 2023-05-25 PROCEDURE — 83880 ASSAY OF NATRIURETIC PEPTIDE: CPT

## 2023-05-25 PROCEDURE — 82747 ASSAY OF FOLIC ACID RBC: CPT

## 2023-05-26 LAB
EST. AVERAGE GLUCOSE BLD GHB EST-MCNC: 108 MG/DL
FOLATE BLD-MCNC: >620 NG/ML
FOLATE RBC-MCNC: >1851 NG/ML
HBA1C MFR BLD: 5.4 %
HCT VFR BLD AUTO: 33.5 % (ref 34–46.6)

## 2023-08-30 ENCOUNTER — APPOINTMENT (OUTPATIENT)
Dept: LAB | Facility: HOSPITAL | Age: 83
End: 2023-08-30
Payer: MEDICARE

## 2023-08-30 DIAGNOSIS — N39.0 URINARY TRACT INFECTION WITHOUT HEMATURIA, SITE UNSPECIFIED: ICD-10-CM

## 2023-08-30 PROCEDURE — 87086 URINE CULTURE/COLONY COUNT: CPT

## 2023-08-30 PROCEDURE — 87186 SC STD MICRODIL/AGAR DIL: CPT

## 2023-08-30 PROCEDURE — 87077 CULTURE AEROBIC IDENTIFY: CPT

## 2023-09-01 LAB — BACTERIA UR CULT: ABNORMAL

## 2023-09-11 ENCOUNTER — APPOINTMENT (OUTPATIENT)
Dept: LAB | Facility: HOSPITAL | Age: 83
End: 2023-09-11
Payer: MEDICARE

## 2023-09-11 DIAGNOSIS — N39.0 URINARY TRACT INFECTION WITHOUT HEMATURIA, SITE UNSPECIFIED: ICD-10-CM

## 2023-09-11 PROCEDURE — 87086 URINE CULTURE/COLONY COUNT: CPT

## 2023-09-12 LAB — BACTERIA UR CULT: NORMAL

## 2023-10-15 ENCOUNTER — OFFICE VISIT (OUTPATIENT)
Dept: URGENT CARE | Facility: MEDICAL CENTER | Age: 83
End: 2023-10-15
Payer: MEDICARE

## 2023-10-15 VITALS
OXYGEN SATURATION: 93 % | TEMPERATURE: 98.7 F | HEIGHT: 65 IN | RESPIRATION RATE: 18 BRPM | DIASTOLIC BLOOD PRESSURE: 70 MMHG | WEIGHT: 168 LBS | BODY MASS INDEX: 27.99 KG/M2 | SYSTOLIC BLOOD PRESSURE: 130 MMHG | HEART RATE: 81 BPM

## 2023-10-15 DIAGNOSIS — M54.41 ACUTE RIGHT-SIDED LOW BACK PAIN WITH RIGHT-SIDED SCIATICA: Primary | ICD-10-CM

## 2023-10-15 DIAGNOSIS — M46.1 INFLAMMATION OF RIGHT SACROILIAC JOINT (HCC): ICD-10-CM

## 2023-10-15 PROCEDURE — 99213 OFFICE O/P EST LOW 20 MIN: CPT

## 2023-10-15 PROCEDURE — G0463 HOSPITAL OUTPT CLINIC VISIT: HCPCS

## 2023-10-15 RX ORDER — CYCLOBENZAPRINE HCL 5 MG
5 TABLET ORAL
Qty: 5 TABLET | Refills: 0 | Status: SHIPPED | OUTPATIENT
Start: 2023-10-15

## 2023-10-15 NOTE — PROGRESS NOTES
[facility]        NAME: Annalisa Noble is a 79 y.o. female  : 1940    MRN: [id number]  DATE: October 15, 2023  TIME: 2:14 PM    Assessment and Plan   Acute right-sided low back pain with right-sided sciatica [M54.41]  1. Acute right-sided low back pain with right-sided sciatica  cyclobenzaprine (FLEXERIL) 5 mg tablet    Ambulatory Referral to Physical Therapy      2. Inflammation of right sacroiliac joint (HCC)  cyclobenzaprine (FLEXERIL) 5 mg tablet    Ambulatory Referral to Physical Therapy        We will give Flexeril nightly as needed so patient is able to sleep but otherwise I discussed with patient that long-term management of both conditions is physical therapy. Continue OTC analgesics. Try cold versus warm compress. No red flag signs noted. Patient Instructions       Follow up with PCP in 3-5 days. Proceed to  ER if symptoms worsen. Chief Complaint     Chief Complaint   Patient presents with    Back Pain     Lower back pain that travels down the right leg. States that when she is standing she feels ok. But when laying down it hurts worse          History of Present Illness       HPI  Presents to clinic today due to onset of right lower back pain for 5 days ago with radiation down the right buttock and right posterior thigh. thigh. No trauma or injury. Deneis fever, chills, night sweats, unexpected weight loss. When she is standing she feels better but hurts worse when laying down. She has had trouble sleeping due to the pain. Review of Systems   Review of Systems   Constitutional:  Negative for chills and fever. HENT:  Negative for ear pain and sore throat. Eyes:  Negative for pain and visual disturbance. Respiratory:  Negative for cough, chest tightness and shortness of breath. Cardiovascular:  Negative for chest pain and palpitations. Gastrointestinal:  Negative for abdominal pain, constipation, diarrhea, nausea and vomiting.    Genitourinary:  Negative for dysuria, hematuria and menstrual problem. Musculoskeletal:  Positive for back pain. Negative for arthralgias. Skin:  Negative for color change and rash. Neurological:  Negative for seizures and syncope. Psychiatric/Behavioral:  Negative for dysphoric mood and suicidal ideas. All other systems reviewed and are negative. Current Medications       Current Outpatient Medications:     acetaminophen (TYLENOL) 650 mg CR tablet, Take 1 tablet (650 mg total) by mouth every 8 (eight) hours as needed for mild pain or moderate pain, Disp: 15 tablet, Rfl: 0    ALPRAZolam (XANAX) 0.25 mg tablet, Take 0.25 mg by mouth 3 (three) times a day as needed for anxiety. , Disp: , Rfl:     atorvastatin (LIPITOR) 20 mg tablet, Take 20 mg by mouth daily after dinner.  , Disp: , Rfl:     benzonatate (TESSALON PERLES) 100 mg capsule, Take 1 capsule (100 mg total) by mouth every 8 (eight) hours, Disp: 21 capsule, Rfl: 0    calcium carbonate (OS-FERN) 600 MG tablet, Take 600 mg by mouth daily, Disp: , Rfl:     cyanocobalamin (VITAMIN B-12) 100 mcg tablet, Take by mouth daily, Disp: , Rfl:     cyclobenzaprine (FLEXERIL) 5 mg tablet, Take 1 tablet (5 mg total) by mouth daily at bedtime as needed for muscle spasms, Disp: 5 tablet, Rfl: 0    ergocalciferol (ERGOCALCIFEROL) 98194 UNITS capsule, Take 50,000 Units by mouth once a week. Takes on Wednesdays, Disp: , Rfl:     esomeprazole (NexIUM) 40 MG capsule, Take 20 mg by mouth Daily , Disp: , Rfl:     folic acid (FOLVITE) 1 mg tablet, Take by mouth daily, Disp: , Rfl:     furosemide (LASIX) 20 mg tablet, Take 1 tablet (20 mg total) by mouth daily Do not start before January 4, 2023.  (Patient taking differently: Take 20 mg by mouth 2 (two) times a day), Disp: 30 tablet, Rfl: 0    gabapentin (NEURONTIN) 100 mg capsule, gabapentin 100 mg capsule  take 1 capsule by mouth three times a day, Disp: , Rfl:     lisinopril (ZESTRIL) 5 mg tablet, TAKE 1 TABLET EVERY DAY BY ORAL ROUTE, Disp: , Rfl:     metoprolol tartrate (LOPRESSOR) 50 mg tablet, take 1 tablet by mouth every 12 hours, Disp: 180 tablet, Rfl: 3    Misc. Devices (SPRAY BOTTLE/PLASTIC 120ML) MISC, by Does not apply route 3 (three) times a day Use to cleanse area 3 times daily or as needed with bowel movements. , Disp: 1 each, Rfl: 0    Multiple Vitamins-Minerals (PRESERVISION AREDS) CAPS, Take 1 capsule by mouth daily, Disp: , Rfl:     nitroglycerin (NITROSTAT) 0.4 mg SL tablet, Place 1 tablet (0.4 mg total) under the tongue every 5 (five) minutes as needed for chest pain (times three for chest pain. If no relief after second tablet, call 911.), Disp: 25 tablet, Rfl: 2    nystatin (MYCOSTATIN) powder, Apply topically 3 (three) times a day . Apply for an additional week after symptoms resolve., Disp: 15 g, Rfl: 0    Omega-3 Fatty Acids (FISH OIL PO), Take 1,000 mg by mouth daily. , Disp: , Rfl:     apixaban (ELIQUIS) 5 mg, Take 1 tablet (5 mg total) by mouth 2 (two) times a day, Disp: 60 tablet, Rfl: 3    Current Allergies     Allergies as of 10/15/2023 - Reviewed 10/15/2023   Allergen Reaction Noted    Codeine GI Intolerance 01/03/2014    Lansoprazole Other (See Comments)     Morphine Nausea Only     Morphine and related GI Intolerance 01/03/2014            The following portions of the patient's history were reviewed and updated as appropriate: allergies, current medications, past family history, past medical history, past social history, past surgical history and problem list.     Past Medical History:   Diagnosis Date    Angina pectoris ([address])     Anxiety     C. difficile diarrhea     CAD (coronary artery disease)     Cardiac disease     Colon polyp     COVID-19 04/2020    Depression     Diverticulitis of colon 11/2019    GERD (gastroesophageal reflux disease)     History of colon polyps     History of hiatal hernia     Hx of bleeding disorder     hemorrhoids    Hyperlipidemia     Hypertension     Irregular heart beat     gets tachycardia Shortness of breath     related to heart       Past Surgical History:   Procedure Laterality Date    CARDIAC ELECTROPHYSIOLOGY MAPPING AND ABLATION      CARDIAC SURGERY      CARDIAC SURGERY      CATARACT EXTRACTION Bilateral     CHOLECYSTECTOMY  1987    COLON SURGERY      repair of colon    COLON SURGERY  2018    COLONOSCOPY  2009    Tubulovillous adenoma    COLONOSCOPY N/A 6/18/2018    Procedure: COLONOSCOPY;  Surgeon: Hayley Tan DO;  Location: [facility]; Service: Gastroenterology    COLONOSCOPY  2011    2 cm right colon polyp and 1.5 cm mid right colon polyps tubular adenomas    COLONOSCOPY W/ CONTROL OF HEMORRHAGE      CORONARY ANGIOPLASTY WITH STENT PLACEMENT      reports two blockages not able to be stented    CORONARY ARTERY BYPASS GRAFT  2008    3 vessels    HERNIA REPAIR      hiatal hernia    HYSTERECTOMY      partial not due to malignancy    LAPAROTOMY N/A 9/26/2018    Procedure: LAPAROTOMY EXPLORATORY WITH  RIGHT HEMICOLECTOMY;  Surgeon: Hayley Tan DO;  Location: [facility];  Service: Gastroenterology    LAPAROTOMY N/A 9/26/2018    Procedure: LAPAROTOMY EXPLORATORY WITH HEMICOLECTOMY;  Surgeon: Laisha Lucas DO;  Location: [facility];  Service: General    VA ANRCT XM SURG REQ ANES GENERAL SPI/EDRL DX N/A 7/25/2019    Procedure: EXAM UNDER ANESTHESIA (EUA); Surgeon: Giana Gaspar MD;  Location: [address] [facility];  Service: General    VA COLONOSCOPY FLX DX W/COLLJ [facility] [place] WHEN PFRMD N/A 9/26/2018    Procedure: COLONOSCOPY;  Surgeon: Hayley Tan DO;  Location: [facility];  Service: Gastroenterology    VA HEMORRHOIDECTOMY Marjorie Buckleykg 1 COLUMN/GROUP N/A 7/25/2019    Procedure: HEMORRHOIDECTOMY EXCISION;  Surgeon: Giana Gaspar MD;  Location: [address] [facility];  Service: General    VA SIGMOIDOSCOPY FLX DX W/COLLJ SPEC BR/WA IF PFRMD N/A 6/21/2018    Procedure: Mae Goetz FLEXIBLE;  Surgeon: Hayley Tan DO;  [facility];   Service: Gastroenterology       Family History   Problem Relation Age of Onset    Heart disease Mother     Cirrhosis Father     Cancer Father     Lung cancer Brother     No Known Problems Sister     No Known Problems Daughter     No Known Problems Son     No Known Problems Paternal Aunt          Medications have been verified. Objective   /70   Pulse 81   Temp 98.7 °F (37.1 °C)   Resp 18   Ht 5' 5" (1.651 m)   Wt 76.2 kg (168 lb)   SpO2 93%   BMI 27.96 kg/m²        Physical Exam     Physical Exam  Constitutional:       General: She is not in acute distress. Appearance: Normal appearance. HENT:      Head: Normocephalic and atraumatic. Eyes:      Extraocular Movements: Extraocular movements intact. Conjunctiva/sclera: Conjunctivae normal.   Cardiovascular:      Rate and Rhythm: Normal rate. Pulmonary:      Effort: Pulmonary effort is normal. No respiratory distress. Musculoskeletal:      Lumbar back: No swelling. Positive right straight leg raise test. Negative left straight leg raise test.      Comments: Right SI joint-tender to palpation  Left SVETLANA test-negative for hip and SI pathology  Right SVETLANA test-positive for SI pathology, negative for hip pathology   Skin:     General: Skin is warm and dry. Neurological:      General: No focal deficit present. Mental Status: She is alert and oriented to person, place, and time.    Psychiatric:         Mood and Affect: Mood normal.         Behavior: Behavior normal.

## 2023-12-06 ENCOUNTER — TELEPHONE (OUTPATIENT)
Dept: CARDIOLOGY CLINIC | Facility: HOSPITAL | Age: 83
End: 2023-12-06

## 2024-02-17 ENCOUNTER — APPOINTMENT (OUTPATIENT)
Dept: LAB | Facility: HOSPITAL | Age: 84
End: 2024-02-17
Payer: MEDICARE

## 2024-02-17 DIAGNOSIS — R73.9 HYPERGLYCEMIA: ICD-10-CM

## 2024-02-17 DIAGNOSIS — E03.9 HYPOTHYROIDISM, UNSPECIFIED TYPE: ICD-10-CM

## 2024-02-17 DIAGNOSIS — N39.0 URINARY TRACT INFECTION WITHOUT HEMATURIA, SITE UNSPECIFIED: ICD-10-CM

## 2024-02-17 DIAGNOSIS — E78.5 HYPERLIPIDEMIA, UNSPECIFIED HYPERLIPIDEMIA TYPE: ICD-10-CM

## 2024-02-17 DIAGNOSIS — E55.9 VITAMIN D DEFICIENCY: ICD-10-CM

## 2024-02-17 DIAGNOSIS — I10 HYPERTENSION, UNSPECIFIED TYPE: ICD-10-CM

## 2024-02-17 DIAGNOSIS — Z79.899 ENCOUNTER FOR LONG-TERM (CURRENT) USE OF OTHER MEDICATIONS: ICD-10-CM

## 2024-02-17 DIAGNOSIS — R60.9 EDEMA, UNSPECIFIED TYPE: ICD-10-CM

## 2024-02-17 LAB
25(OH)D3 SERPL-MCNC: 52.6 NG/ML (ref 30–100)
ALBUMIN SERPL BCP-MCNC: 4.3 G/DL (ref 3.5–5)
ALP SERPL-CCNC: 91 U/L (ref 34–104)
ALT SERPL W P-5'-P-CCNC: 9 U/L (ref 7–52)
ANION GAP SERPL CALCULATED.3IONS-SCNC: 10 MMOL/L
AST SERPL W P-5'-P-CCNC: 13 U/L (ref 13–39)
BACTERIA UR QL AUTO: ABNORMAL /HPF
BILIRUB SERPL-MCNC: 0.67 MG/DL (ref 0.2–1)
BILIRUB UR QL STRIP: NEGATIVE
BNP SERPL-MCNC: 113 PG/ML (ref 0–100)
BUN SERPL-MCNC: 21 MG/DL (ref 5–25)
CALCIUM SERPL-MCNC: 9.5 MG/DL (ref 8.4–10.2)
CHLORIDE SERPL-SCNC: 104 MMOL/L (ref 96–108)
CHOLEST SERPL-MCNC: 134 MG/DL
CLARITY UR: ABNORMAL
CO2 SERPL-SCNC: 25 MMOL/L (ref 21–32)
COLOR UR: YELLOW
CREAT SERPL-MCNC: 0.9 MG/DL (ref 0.6–1.3)
ERYTHROCYTE [DISTWIDTH] IN BLOOD BY AUTOMATED COUNT: 15.3 % (ref 11.6–15.1)
EST. AVERAGE GLUCOSE BLD GHB EST-MCNC: 114 MG/DL
GFR SERPL CREATININE-BSD FRML MDRD: 58 ML/MIN/1.73SQ M
GLUCOSE P FAST SERPL-MCNC: 106 MG/DL (ref 65–99)
GLUCOSE UR STRIP-MCNC: NEGATIVE MG/DL
HBA1C MFR BLD: 5.6 %
HCT VFR BLD AUTO: 35.1 % (ref 34.8–46.1)
HDLC SERPL-MCNC: 57 MG/DL
HGB BLD-MCNC: 10.7 G/DL (ref 11.5–15.4)
HGB UR QL STRIP.AUTO: ABNORMAL
KETONES UR STRIP-MCNC: NEGATIVE MG/DL
LDLC SERPL CALC-MCNC: 47 MG/DL (ref 0–100)
LEUKOCYTE ESTERASE UR QL STRIP: ABNORMAL
MCH RBC QN AUTO: 27.2 PG (ref 26.8–34.3)
MCHC RBC AUTO-ENTMCNC: 30.5 G/DL (ref 31.4–37.4)
MCV RBC AUTO: 89 FL (ref 82–98)
NITRITE UR QL STRIP: POSITIVE
NON-SQ EPI CELLS URNS QL MICRO: ABNORMAL /HPF
NONHDLC SERPL-MCNC: 77 MG/DL
PH UR STRIP.AUTO: 6 [PH]
PLATELET # BLD AUTO: 183 THOUSANDS/UL (ref 149–390)
PMV BLD AUTO: 9.5 FL (ref 8.9–12.7)
POTASSIUM SERPL-SCNC: 3.7 MMOL/L (ref 3.5–5.3)
PROT SERPL-MCNC: 7.2 G/DL (ref 6.4–8.4)
PROT UR STRIP-MCNC: ABNORMAL MG/DL
RBC # BLD AUTO: 3.93 MILLION/UL (ref 3.81–5.12)
RBC #/AREA URNS AUTO: ABNORMAL /HPF
SODIUM SERPL-SCNC: 139 MMOL/L (ref 135–147)
SP GR UR STRIP.AUTO: 1.02 (ref 1–1.03)
T4 FREE SERPL-MCNC: 0.88 NG/DL (ref 0.61–1.12)
TRIGL SERPL-MCNC: 152 MG/DL
TSH SERPL DL<=0.05 MIU/L-ACNC: 1.81 UIU/ML (ref 0.45–4.5)
UROBILINOGEN UR QL STRIP.AUTO: 0.2 E.U./DL
WBC # BLD AUTO: 8.67 THOUSAND/UL (ref 4.31–10.16)
WBC #/AREA URNS AUTO: ABNORMAL /HPF

## 2024-02-17 PROCEDURE — 85027 COMPLETE CBC AUTOMATED: CPT

## 2024-02-17 PROCEDURE — 84443 ASSAY THYROID STIM HORMONE: CPT

## 2024-02-17 PROCEDURE — 36415 COLL VENOUS BLD VENIPUNCTURE: CPT

## 2024-02-17 PROCEDURE — 83880 ASSAY OF NATRIURETIC PEPTIDE: CPT

## 2024-02-17 PROCEDURE — 84439 ASSAY OF FREE THYROXINE: CPT

## 2024-02-17 PROCEDURE — 81001 URINALYSIS AUTO W/SCOPE: CPT

## 2024-02-17 PROCEDURE — 80061 LIPID PANEL: CPT

## 2024-02-17 PROCEDURE — 83036 HEMOGLOBIN GLYCOSYLATED A1C: CPT

## 2024-02-17 PROCEDURE — 80053 COMPREHEN METABOLIC PANEL: CPT

## 2024-02-17 PROCEDURE — 82306 VITAMIN D 25 HYDROXY: CPT

## 2024-02-21 PROBLEM — N30.00 ACUTE CYSTITIS WITHOUT HEMATURIA: Status: RESOLVED | Noted: 2018-10-12 | Resolved: 2024-02-21

## 2024-02-21 PROBLEM — U07.1 PNEUMONIA DUE TO COVID-19 VIRUS: Status: RESOLVED | Noted: 2020-04-09 | Resolved: 2024-02-21

## 2024-02-21 PROBLEM — R19.7 DIARRHEA OF PRESUMED INFECTIOUS ORIGIN: Status: RESOLVED | Noted: 2019-11-30 | Resolved: 2024-02-21

## 2024-02-21 PROBLEM — J12.82 PNEUMONIA DUE TO COVID-19 VIRUS: Status: RESOLVED | Noted: 2020-04-09 | Resolved: 2024-02-21

## 2024-03-29 ENCOUNTER — APPOINTMENT (OUTPATIENT)
Dept: LAB | Facility: HOSPITAL | Age: 84
End: 2024-03-29
Attending: INTERNAL MEDICINE
Payer: MEDICARE

## 2024-03-29 ENCOUNTER — OFFICE VISIT (OUTPATIENT)
Dept: CARDIOLOGY CLINIC | Facility: HOSPITAL | Age: 84
End: 2024-03-29
Payer: MEDICARE

## 2024-03-29 VITALS
SYSTOLIC BLOOD PRESSURE: 126 MMHG | DIASTOLIC BLOOD PRESSURE: 72 MMHG | BODY MASS INDEX: 26.49 KG/M2 | HEIGHT: 65 IN | WEIGHT: 159 LBS | HEART RATE: 69 BPM

## 2024-03-29 DIAGNOSIS — N39.0 URINARY TRACT INFECTION WITHOUT HEMATURIA, SITE UNSPECIFIED: ICD-10-CM

## 2024-03-29 DIAGNOSIS — I50.32 CHRONIC DIASTOLIC CONGESTIVE HEART FAILURE (HCC): ICD-10-CM

## 2024-03-29 DIAGNOSIS — J43.2 CENTRILOBULAR EMPHYSEMA (HCC): ICD-10-CM

## 2024-03-29 DIAGNOSIS — I10 ESSENTIAL HYPERTENSION: Primary | ICD-10-CM

## 2024-03-29 DIAGNOSIS — Z95.1 HX OF CABG: ICD-10-CM

## 2024-03-29 DIAGNOSIS — E78.00 HYPERCHOLESTEROLEMIA: ICD-10-CM

## 2024-03-29 DIAGNOSIS — I25.119 CORONARY ARTERY DISEASE INVOLVING NATIVE CORONARY ARTERY OF NATIVE HEART WITH ANGINA PECTORIS (HCC): ICD-10-CM

## 2024-03-29 DIAGNOSIS — I48.92 PAROXYSMAL ATRIAL FLUTTER (HCC): ICD-10-CM

## 2024-03-29 LAB
BACTERIA UR QL AUTO: ABNORMAL /HPF
BILIRUB UR QL STRIP: NEGATIVE
CLARITY UR: CLEAR
COLOR UR: YELLOW
GLUCOSE UR STRIP-MCNC: NEGATIVE MG/DL
HGB UR QL STRIP.AUTO: NEGATIVE
KETONES UR STRIP-MCNC: NEGATIVE MG/DL
LEUKOCYTE ESTERASE UR QL STRIP: ABNORMAL
NITRITE UR QL STRIP: NEGATIVE
NON-SQ EPI CELLS URNS QL MICRO: ABNORMAL /HPF
PH UR STRIP.AUTO: 6 [PH]
PROT UR STRIP-MCNC: ABNORMAL MG/DL
RBC #/AREA URNS AUTO: ABNORMAL /HPF
SP GR UR STRIP.AUTO: >=1.03 (ref 1–1.03)
UROBILINOGEN UR QL STRIP.AUTO: 0.2 E.U./DL
WBC #/AREA URNS AUTO: ABNORMAL /HPF

## 2024-03-29 PROCEDURE — 81001 URINALYSIS AUTO W/SCOPE: CPT

## 2024-03-29 PROCEDURE — 87086 URINE CULTURE/COLONY COUNT: CPT

## 2024-03-29 PROCEDURE — 99214 OFFICE O/P EST MOD 30 MIN: CPT | Performed by: INTERNAL MEDICINE

## 2024-03-30 LAB — BACTERIA UR CULT: NORMAL

## 2024-03-31 NOTE — PROGRESS NOTES
Cardiology Follow Up    Courtney Devine  1940  [id number]  [facility] [address] [id number] [phone]    1. Essential hypertension        2. Coronary artery disease involving native coronary artery of native heart with angina pectoris (HCC)        3. Hx of CABG        4. Paroxysmal atrial flutter (HCC)        5. Hypercholesterolemia        6. Chronic diastolic congestive heart failure (HCC)        7. Centrilobular emphysema (HCC)              Discussion/Summary: All of her assessed cardiac problems are stable. I have reviewed her medications and made no changes. No cardiac testing is ordered.  RTO 1 year.        Interval History: She has not had any cardiac problems since her last office visit . She is active around her house and looking forward to getting outside in the nicer weather.  Her weight I down from 165 to 159 lbs.    She denies CP, significant LE edema. She gets SOB at low to moderate levels of activity.    ECHO 1/2022 - EF 60% , PASP 52 mmHg  Nuclear stress test 1/2022 - no ischemia     She has CAD with remote CABG.    /72       Patient Active Problem List   Diagnosis    Diverticulosis    Hypoalbuminemia    Essential hypertension    Hypercholesterolemia    Elevated MCV    Back pain    History of shingles    Incomplete right bundle branch block    Hx of CABG    Thoracic radiculopathy    Microscopic hematuria    Shortness of breath    Diverticulosis of large intestine with hemorrhage    Nausea, vomiting and diarrhea    Colonic mass    Tubulovillous adenoma of colon    Anemia    Hypokalemia    Hypoxia    Pleural effusion    History of Clostridioides difficile colitis    On continuous oral anticoagulation    Encounter for pre-operative cardiovascular clearance    Emphysema of lung (HCC)    Chronic diastolic congestive heart failure (HCC)    Mixed anxiety and depressive  disorder    Secondary pulmonary hypertension    Diverticulitis    C. difficile colitis    Coronary artery disease involving native coronary artery of native heart with angina pectoris (HCC)    Acute respiratory failure with hypoxia (HCC)    Abnormal PFT    Hepatic steatosis    Hyponatremia    Paroxysmal atrial flutter (HCC)    Elevated d-dimer    Hyperglycemia     Past Medical History:   Diagnosis Date    Angina pectoris (HCC)     Anxiety     C. difficile diarrhea     CAD (coronary artery disease)     Cardiac disease     Colon polyp     COVID-19 04/2020    Depression     Diverticulitis of colon 11/2019    GERD (gastroesophageal reflux disease)     History of colon polyps     History of hiatal hernia     Hx of bleeding disorder     hemorrhoids    Hyperlipidemia     Hypertension     Irregular heart beat     gets tachycardia    Shortness of breath     related to heart     Social History     Socioeconomic History    Marital status:      Spouse name: Not on file    Number of children: Not on file    Years of education: Not on file    Highest education level: Not on file   Occupational History    Not on file   Tobacco Use    Smoking status: Former     Current packs/day: 0.50     Average packs/day: 0.5 packs/day for 30.0 years (15.0 ttl pk-yrs)     Types: Cigarettes    Smokeless tobacco: Never    Tobacco comments:     quit 15 yrs ago   Vaping Use    Vaping status: Never Used   Substance and Sexual Activity    Alcohol use: Never     Alcohol/week: 0.0 standard drinks of alcohol    Drug use: No    Sexual activity: Not on file   Other Topics Concern    Not on file   Social History Narrative    Not on file     Social Determinants of Health     Financial Resource Strain: Not on file   Food Insecurity: No Food Insecurity (12/31/2022)    Hunger Vital Sign     Worried About Running Out of Food in the Last Year: Never true     Ran Out of Food in the Last Year: Never true   Transportation Needs: No Transportation Needs  (12/31/2022)    PRAPARE - Transportation     Lack of Transportation (Medical): No     Lack of Transportation (Non-Medical): No   Physical Activity: Not on file   Stress: Not on file   Social Connections: Not on file   Intimate Partner Violence: Not on file   Housing Stability: Low Risk  (12/31/2022)    Housing Stability Vital Sign     Unable to Pay for Housing in the Last Year: No     Number of Places Lived in the Last Year: 1     Unstable Housing in the Last Year: No      Family History   Problem Relation Age of Onset    Heart disease Mother     Cirrhosis Father     Cancer Father     Lung cancer Brother     No Known Problems Sister     No Known Problems Daughter     No Known Problems Son     No Known Problems Paternal Aunt      Past Surgical History:   Procedure Laterality Date    CARDIAC ELECTROPHYSIOLOGY MAPPING AND ABLATION      CARDIAC SURGERY      CARDIAC SURGERY      CATARACT EXTRACTION Bilateral     CHOLECYSTECTOMY  1987    COLON SURGERY      repair of colon    COLON SURGERY  2018    COLONOSCOPY  2009    Tubulovillous adenoma    COLONOSCOPY N/A 6/18/2018    Procedure: COLONOSCOPY;  Surgeon: Divina Claros DO;  [facility];  Service: Gastroenterology    COLONOSCOPY  2011    2 cm right colon polyp and 1.5 cm mid right colon polyps tubular adenomas    COLONOSCOPY W/ CONTROL OF HEMORRHAGE      CORONARY ANGIOPLASTY WITH STENT PLACEMENT      reports two blockages not able to be stented    CORONARY ARTERY BYPASS GRAFT  2008    3 vessels    HERNIA REPAIR      hiatal hernia    HYSTERECTOMY      partial not due to malignancy    LAPAROTOMY N/A 9/26/2018    Procedure: LAPAROTOMY EXPLORATORY WITH  RIGHT HEMICOLECTOMY;  Surgeon: Divina Claros DO;  [facility];  Service: Gastroenterology    LAPAROTOMY N/A 9/26/2018    Procedure: LAPAROTOMY EXPLORATORY WITH HEMICOLECTOMY;  Surgeon: Eric Owens DO;  [facility];  Service: General    VA ANRCT XM SURG REQ ANES GENERAL SPI/EDRL DX N/A 7/25/2019     Procedure: EXAM UNDER ANESTHESIA (EUA);  Surgeon: Pedro Rajan MD;  Location:  MAIN OR;  Service: General    SC COLONOSCOPY FLX DX W/COLLJ SPEC WHEN PFRMD N/A 9/26/2018    Procedure: COLONOSCOPY;  Surgeon: Divina Claros DO;  [facility]: [facility];  Service: Gastroenterology    SC HEMORRHOIDECTOMY NTRNL & XTRNL 1 COLUMN/GROUP N/A 7/25/2019    Procedure: HEMORRHOIDECTOMY EXCISION;  Surgeon: Pedro Rajan MD;  Location:  MAIN OR;  Service: General    SC SIGMOIDOSCOPY FLX DX W/COLLJ SPEC BR/WA IF PFRMD N/A 6/21/2018    Procedure: SIGMOIDOSCOPY FLEXIBLE;  Surgeon: Divina Claros DO;  Location:  [facility];  Service: Gastroenterology       Current Outpatient Medications:     acetaminophen (TYLENOL) 650 mg CR tablet, Take 1 tablet (650 mg total) by mouth every 8 (eight) hours as needed for mild pain or moderate pain, Disp: 15 tablet, Rfl: 0    ALPRAZolam (XANAX) 0.25 mg tablet, Take 0.25 mg by mouth 3 (three) times a day as needed for anxiety., Disp: , Rfl:     apixaban (ELIQUIS) 5 mg, Take 1 tablet (5 mg total) by mouth 2 (two) times a day, Disp: 60 tablet, Rfl: 3    atorvastatin (LIPITOR) 20 mg tablet, Take 20 mg by mouth daily after dinner.  , Disp: , Rfl:     calcium carbonate (OS-FERN) 600 MG tablet, Take 600 mg by mouth daily, Disp: , Rfl:     cyanocobalamin (VITAMIN B-12) 100 mcg tablet, Take by mouth daily, Disp: , Rfl:     cyclobenzaprine (FLEXERIL) 5 mg tablet, Take 1 tablet (5 mg total) by mouth daily at bedtime as needed for muscle spasms, Disp: 5 tablet, Rfl: 0    ergocalciferol (ERGOCALCIFEROL) 60360 UNITS capsule, Take 50,000 Units by mouth once a week. Takes on Wednesdays, Disp: , Rfl:     esomeprazole (NexIUM) 40 MG capsule, Take 20 mg by mouth Daily , Disp: , Rfl:     folic acid (FOLVITE) 1 mg tablet, Take by mouth daily, Disp: , Rfl:     furosemide (LASIX) 20 mg tablet, Take 1 tablet (20 mg total) by mouth daily Do not start before January 4, 2023. (Patient taking differently: Take 20 mg  "by mouth 2 (two) times a day), Disp: 30 tablet, Rfl: 0    gabapentin (NEURONTIN) 100 mg capsule, As needed, Disp: , Rfl:     lisinopril (ZESTRIL) 5 mg tablet, TAKE 1 TABLET EVERY DAY BY ORAL ROUTE, Disp: , Rfl:     metoprolol tartrate (LOPRESSOR) 50 mg tablet, take 1 tablet by mouth every 12 hours, Disp: 180 tablet, Rfl: 3    Misc. Devices (SPRAY BOTTLE/PLASTIC 120ML) MISC, by Does not apply route 3 (three) times a day Use to cleanse area 3 times daily or as needed with bowel movements., Disp: 1 each, Rfl: 0    nitroglycerin (NITROSTAT) 0.4 mg SL tablet, Place 1 tablet (0.4 mg total) under the tongue every 5 (five) minutes as needed for chest pain (times three for chest pain. If no relief after second tablet, call 911.), Disp: 25 tablet, Rfl: 2    Omega-3 Fatty Acids (FISH OIL PO), Take 1,000 mg by mouth daily., Disp: , Rfl:     benzonatate (TESSALON PERLES) 100 mg capsule, Take 1 capsule (100 mg total) by mouth every 8 (eight) hours (Patient not taking: Reported on 3/29/2024), Disp: 21 capsule, Rfl: 0    Multiple Vitamins-Minerals (PRESERVISION AREDS) CAPS, Take 1 capsule by mouth daily (Patient not taking: Reported on 3/29/2024), Disp: , Rfl:     nystatin (MYCOSTATIN) powder, Apply topically 3 (three) times a day . Apply for an additional week after symptoms resolve. (Patient not taking: Reported on 3/29/2024), Disp: 15 g, Rfl: 0  Allergies   Allergen Reactions    Codeine GI Intolerance    Lansoprazole Other (See Comments)     GI Upset, dania protonix    Morphine Nausea Only    Morphine And Related GI Intolerance     Vitals:    03/29/24 1520   BP: 126/72   Pulse: 69   Weight: 72.1 kg (159 lb)   Height: 5' 5\" (1.651 m)     Weight (last 2 days)       Date/Time Weight    03/29/24 1520 72.1 (159)           Blood pressure 126/72, pulse 69, height 5' 5\" (1.651 m), weight 72.1 kg (159 lb), not currently breastfeeding., Body mass index is 26.46 kg/m².    Labs:  Appointment on 02/17/2024   Component Date Value    Sodium " 02/17/2024 139     Potassium 02/17/2024 3.7     Chloride 02/17/2024 104     CO2 02/17/2024 25     ANION GAP 02/17/2024 10     BUN 02/17/2024 21     Creatinine 02/17/2024 0.90     Glucose, Fasting 02/17/2024 106 (H)     Calcium 02/17/2024 9.5     AST 02/17/2024 13     ALT 02/17/2024 9     Alkaline Phosphatase 02/17/2024 91     Total Protein 02/17/2024 7.2     Albumin 02/17/2024 4.3     Total Bilirubin 02/17/2024 0.67     eGFR 02/17/2024 58     Cholesterol 02/17/2024 134     Triglycerides 02/17/2024 152 (H)     HDL, Direct 02/17/2024 57     LDL Calculated 02/17/2024 47     Non-HDL-Chol (CHOL-HDL) 02/17/2024 77     TSH 3RD GENERATON 02/17/2024 1.810     Free T4 02/17/2024 0.88     BNP 02/17/2024 113 (H)     Hemoglobin A1C 02/17/2024 5.6     EAG 02/17/2024 114     Vit D, 25-Hydroxy 02/17/2024 52.6     Color, UA 02/17/2024 Yellow     Clarity, UA 02/17/2024 Slightly Cloudy     Specific Gravity, UA 02/17/2024 1.020     pH, UA 02/17/2024 6.0     Leukocytes, UA 02/17/2024 Small (A)     Nitrite, UA 02/17/2024 Positive (A)     Protein, UA 02/17/2024 30 (1+) (A)     Glucose, UA 02/17/2024 Negative     Ketones, UA 02/17/2024 Negative     Urobilinogen, UA 02/17/2024 0.2     Bilirubin, UA 02/17/2024 Negative     Occult Blood, UA 02/17/2024 Trace-Intact (A)     RBC, UA 02/17/2024 2-4     WBC, UA 02/17/2024 30-50 (A)     Epithelial Cells 02/17/2024 Occasional     Bacteria, UA 02/17/2024 Innumerable (A)     WBC 02/17/2024 8.67     RBC 02/17/2024 3.93     Hemoglobin 02/17/2024 10.7 (L)     Hematocrit 02/17/2024 35.1     MCV 02/17/2024 89     MCH 02/17/2024 27.2     MCHC 02/17/2024 30.5 (L)     RDW 02/17/2024 15.3 (H)     Platelets 02/17/2024 183     MPV 02/17/2024 9.5      Imaging: No results found.    Review of Systems:  Review of Systems   Constitutional:  Negative for diaphoresis, fatigue, fever and unexpected weight change.   HENT: Negative.     Respiratory:  Positive for shortness of breath. Negative for cough and wheezing.     Cardiovascular:  Negative for chest pain, palpitations and leg swelling.   Gastrointestinal:  Negative for abdominal pain, diarrhea and nausea.   Musculoskeletal:  Negative for gait problem and myalgias.   Skin:  Negative for rash.   Neurological:  Negative for dizziness and numbness.   Psychiatric/Behavioral: Negative.         Physical Exam:  Physical Exam  Constitutional:       Appearance: She is well-developed.   HENT:      Head: Normocephalic and atraumatic.   Eyes:      Pupils: Pupils are equal, round, and reactive to light.   Neck:      Vascular: No JVD.   Cardiovascular:      Rate and Rhythm: Regular rhythm.      Pulses: Normal pulses.           Carotid pulses are 2+ on the right side and 2+ on the left side.     Heart sounds: S1 normal and S2 normal.   Pulmonary:      Effort: Pulmonary effort is normal.      Breath sounds: Normal breath sounds. No wheezing or rales.   Abdominal:      General: Bowel sounds are normal.      Palpations: Abdomen is soft.   Musculoskeletal:         General: No tenderness. Normal range of motion.      Cervical back: Normal range of motion and neck supple.   Skin:     General: Skin is warm.   Neurological:      Mental Status: She is alert and oriented to person, place, and time.      Cranial Nerves: No cranial nerve deficit.      Deep Tendon Reflexes: Reflexes are normal and symmetric.

## 2024-06-13 ENCOUNTER — APPOINTMENT (OUTPATIENT)
Dept: LAB | Facility: MEDICAL CENTER | Age: 84
End: 2024-06-13
Payer: MEDICARE

## 2024-06-13 DIAGNOSIS — N39.0 URINARY TRACT INFECTION WITHOUT HEMATURIA, SITE UNSPECIFIED: ICD-10-CM

## 2024-06-13 DIAGNOSIS — D64.9 ANEMIA, UNSPECIFIED TYPE: ICD-10-CM

## 2024-06-13 LAB
BACTERIA UR QL AUTO: ABNORMAL /HPF
BILIRUB UR QL STRIP: NEGATIVE
CLARITY UR: ABNORMAL
COLOR UR: YELLOW
ERYTHROCYTE [DISTWIDTH] IN BLOOD BY AUTOMATED COUNT: 14.7 % (ref 11.6–15.1)
FERRITIN SERPL-MCNC: 17 NG/ML (ref 11–307)
FOLATE SERPL-MCNC: >22.3 NG/ML
GLUCOSE UR STRIP-MCNC: NEGATIVE MG/DL
HCT VFR BLD AUTO: 36.3 % (ref 34.8–46.1)
HGB BLD-MCNC: 11.5 G/DL (ref 11.5–15.4)
HGB UR QL STRIP.AUTO: ABNORMAL
IRON SATN MFR SERPL: 15 % (ref 15–50)
IRON SERPL-MCNC: 59 UG/DL (ref 50–212)
KETONES UR STRIP-MCNC: NEGATIVE MG/DL
LEUKOCYTE ESTERASE UR QL STRIP: ABNORMAL
MCH RBC QN AUTO: 29.6 PG (ref 26.8–34.3)
MCHC RBC AUTO-ENTMCNC: 31.7 G/DL (ref 31.4–37.4)
MCV RBC AUTO: 94 FL (ref 82–98)
MUCOUS THREADS UR QL AUTO: ABNORMAL
NITRITE UR QL STRIP: POSITIVE
NON-SQ EPI CELLS URNS QL MICRO: ABNORMAL /HPF
PH UR STRIP.AUTO: 6 [PH]
PLATELET # BLD AUTO: 181 THOUSANDS/UL (ref 149–390)
PMV BLD AUTO: 10.1 FL (ref 8.9–12.7)
PROT UR STRIP-MCNC: ABNORMAL MG/DL
RBC # BLD AUTO: 3.88 MILLION/UL (ref 3.81–5.12)
RBC #/AREA URNS AUTO: ABNORMAL /HPF
RETICS # AUTO: ABNORMAL 10*3/UL (ref 14097–95744)
RETICS # CALC: 2.17 % (ref 0.37–1.87)
SP GR UR STRIP.AUTO: 1.02 (ref 1–1.03)
TIBC SERPL-MCNC: 392 UG/DL (ref 250–450)
UIBC SERPL-MCNC: 333 UG/DL (ref 155–355)
UROBILINOGEN UR STRIP-ACNC: <2 MG/DL
VIT B12 SERPL-MCNC: 1211 PG/ML (ref 180–914)
WBC # BLD AUTO: 8.46 THOUSAND/UL (ref 4.31–10.16)
WBC #/AREA URNS AUTO: ABNORMAL /HPF
WBC CLUMPS # UR AUTO: PRESENT /UL

## 2024-06-13 PROCEDURE — 87186 SC STD MICRODIL/AGAR DIL: CPT

## 2024-06-13 PROCEDURE — 82728 ASSAY OF FERRITIN: CPT

## 2024-06-13 PROCEDURE — 81001 URINALYSIS AUTO W/SCOPE: CPT

## 2024-06-13 PROCEDURE — 82746 ASSAY OF FOLIC ACID SERUM: CPT

## 2024-06-13 PROCEDURE — 83540 ASSAY OF IRON: CPT

## 2024-06-13 PROCEDURE — 85045 AUTOMATED RETICULOCYTE COUNT: CPT

## 2024-06-13 PROCEDURE — 82607 VITAMIN B-12: CPT

## 2024-06-13 PROCEDURE — 36415 COLL VENOUS BLD VENIPUNCTURE: CPT

## 2024-06-13 PROCEDURE — 83550 IRON BINDING TEST: CPT

## 2024-06-13 PROCEDURE — 85027 COMPLETE CBC AUTOMATED: CPT

## 2024-06-13 PROCEDURE — 87086 URINE CULTURE/COLONY COUNT: CPT

## 2024-06-13 PROCEDURE — 82747 ASSAY OF FOLIC ACID RBC: CPT

## 2024-06-13 PROCEDURE — 87077 CULTURE AEROBIC IDENTIFY: CPT

## 2024-06-14 LAB
FOLATE BLD-MCNC: >620 NG/ML
FOLATE RBC-MCNC: >1968 NG/ML
HCT VFR BLD AUTO: 31.5 % (ref 34–46.6)

## 2024-06-15 LAB — BACTERIA UR CULT: ABNORMAL

## 2024-07-10 ENCOUNTER — HOSPITAL ENCOUNTER (OUTPATIENT)
Dept: RADIOLOGY | Facility: HOSPITAL | Age: 84
Discharge: HOME | End: 2024-07-10
Attending: INTERNAL MEDICINE
Payer: MEDICARE

## 2024-07-10 DIAGNOSIS — M25.572 LEFT ANKLE PAIN, UNSPECIFIED CHRONICITY: ICD-10-CM

## 2024-07-10 PROCEDURE — 73610 X-RAY EXAM OF ANKLE: CPT

## 2024-10-17 ENCOUNTER — APPOINTMENT (OUTPATIENT)
Dept: LAB | Facility: MEDICAL CENTER | Age: 84
End: 2024-10-17
Payer: MEDICARE

## 2024-10-17 DIAGNOSIS — E55.9 VITAMIN D DEFICIENCY: ICD-10-CM

## 2024-10-17 DIAGNOSIS — E03.9 HYPOTHYROIDISM, UNSPECIFIED TYPE: ICD-10-CM

## 2024-10-17 DIAGNOSIS — I10 HYPERTENSION, UNSPECIFIED TYPE: ICD-10-CM

## 2024-10-17 DIAGNOSIS — D64.9 ANEMIA, UNSPECIFIED TYPE: ICD-10-CM

## 2024-10-17 DIAGNOSIS — E78.5 HYPERLIPIDEMIA, UNSPECIFIED HYPERLIPIDEMIA TYPE: ICD-10-CM

## 2024-10-17 DIAGNOSIS — Z79.899 DRUG THERAPY: ICD-10-CM

## 2024-10-17 DIAGNOSIS — Z79.82 LONG TERM (CURRENT) USE OF ASPIRIN: ICD-10-CM

## 2024-10-17 LAB
25(OH)D3 SERPL-MCNC: 56.5 NG/ML (ref 30–100)
ALBUMIN SERPL BCG-MCNC: 4.1 G/DL (ref 3.5–5)
ALP SERPL-CCNC: 91 U/L (ref 34–104)
ALT SERPL W P-5'-P-CCNC: 8 U/L (ref 7–52)
ANION GAP SERPL CALCULATED.3IONS-SCNC: 9 MMOL/L (ref 4–13)
AST SERPL W P-5'-P-CCNC: 14 U/L (ref 13–39)
BILIRUB SERPL-MCNC: 0.6 MG/DL (ref 0.2–1)
BUN SERPL-MCNC: 20 MG/DL (ref 5–25)
CALCIUM SERPL-MCNC: 9.1 MG/DL (ref 8.4–10.2)
CHLORIDE SERPL-SCNC: 105 MMOL/L (ref 96–108)
CHOLEST SERPL-MCNC: 137 MG/DL
CO2 SERPL-SCNC: 28 MMOL/L (ref 21–32)
CREAT SERPL-MCNC: 1.01 MG/DL (ref 0.6–1.3)
ERYTHROCYTE [DISTWIDTH] IN BLOOD BY AUTOMATED COUNT: 15 % (ref 11.6–15.1)
EST. AVERAGE GLUCOSE BLD GHB EST-MCNC: 111 MG/DL
FERRITIN SERPL-MCNC: 15 NG/ML (ref 11–307)
GFR SERPL CREATININE-BSD FRML MDRD: 50 ML/MIN/1.73SQ M
GLUCOSE P FAST SERPL-MCNC: 107 MG/DL (ref 65–99)
HBA1C MFR BLD: 5.5 %
HCT VFR BLD AUTO: 33.7 % (ref 34.8–46.1)
HDLC SERPL-MCNC: 55 MG/DL
HGB BLD-MCNC: 10.1 G/DL (ref 11.5–15.4)
IRON SATN MFR SERPL: 11 % (ref 15–50)
IRON SERPL-MCNC: 40 UG/DL (ref 50–212)
LDLC SERPL CALC-MCNC: 50 MG/DL (ref 0–100)
MCH RBC QN AUTO: 27 PG (ref 26.8–34.3)
MCHC RBC AUTO-ENTMCNC: 30 G/DL (ref 31.4–37.4)
MCV RBC AUTO: 90 FL (ref 82–98)
NONHDLC SERPL-MCNC: 82 MG/DL
PLATELET # BLD AUTO: 155 THOUSANDS/UL (ref 149–390)
PMV BLD AUTO: 10.1 FL (ref 8.9–12.7)
POTASSIUM SERPL-SCNC: 3.6 MMOL/L (ref 3.5–5.3)
PROT SERPL-MCNC: 7.3 G/DL (ref 6.4–8.4)
RBC # BLD AUTO: 3.74 MILLION/UL (ref 3.81–5.12)
SODIUM SERPL-SCNC: 142 MMOL/L (ref 135–147)
T4 FREE SERPL-MCNC: 0.85 NG/DL (ref 0.61–1.12)
TIBC SERPL-MCNC: 375 UG/DL (ref 250–450)
TRIGL SERPL-MCNC: 159 MG/DL
TSH SERPL DL<=0.05 MIU/L-ACNC: 1.98 UIU/ML (ref 0.45–4.5)
UIBC SERPL-MCNC: 335 UG/DL (ref 155–355)
WBC # BLD AUTO: 6.16 THOUSAND/UL (ref 4.31–10.16)

## 2024-10-17 PROCEDURE — 85027 COMPLETE CBC AUTOMATED: CPT

## 2024-10-17 PROCEDURE — 83540 ASSAY OF IRON: CPT

## 2024-10-17 PROCEDURE — 84443 ASSAY THYROID STIM HORMONE: CPT

## 2024-10-17 PROCEDURE — 83550 IRON BINDING TEST: CPT

## 2024-10-17 PROCEDURE — 84439 ASSAY OF FREE THYROXINE: CPT

## 2024-10-17 PROCEDURE — 83036 HEMOGLOBIN GLYCOSYLATED A1C: CPT

## 2024-10-17 PROCEDURE — 82747 ASSAY OF FOLIC ACID RBC: CPT

## 2024-10-17 PROCEDURE — 80061 LIPID PANEL: CPT

## 2024-10-17 PROCEDURE — 82728 ASSAY OF FERRITIN: CPT

## 2024-10-17 PROCEDURE — 80053 COMPREHEN METABOLIC PANEL: CPT

## 2024-10-17 PROCEDURE — 82306 VITAMIN D 25 HYDROXY: CPT

## 2024-10-17 PROCEDURE — 36415 COLL VENOUS BLD VENIPUNCTURE: CPT

## 2024-10-18 LAB
FOLATE BLD-MCNC: >620 NG/ML
FOLATE RBC-MCNC: >1914 NG/ML
HCT VFR BLD AUTO: 32.4 % (ref 34–46.6)

## 2024-10-25 ENCOUNTER — APPOINTMENT (OUTPATIENT)
Dept: LAB | Facility: MEDICAL CENTER | Age: 84
End: 2024-10-25
Payer: MEDICARE

## 2024-10-25 DIAGNOSIS — N39.0 URINARY TRACT INFECTION WITHOUT HEMATURIA, SITE UNSPECIFIED: ICD-10-CM

## 2024-10-25 LAB
BACTERIA UR QL AUTO: ABNORMAL /HPF
BILIRUB UR QL STRIP: NEGATIVE
CLARITY UR: ABNORMAL
COLOR UR: YELLOW
GLUCOSE UR STRIP-MCNC: NEGATIVE MG/DL
HGB UR QL STRIP.AUTO: NEGATIVE
KETONES UR STRIP-MCNC: NEGATIVE MG/DL
LEUKOCYTE ESTERASE UR QL STRIP: ABNORMAL
MUCOUS THREADS UR QL AUTO: ABNORMAL
NITRITE UR QL STRIP: POSITIVE
NON-SQ EPI CELLS URNS QL MICRO: ABNORMAL /HPF
PH UR STRIP.AUTO: 6 [PH]
PROT UR STRIP-MCNC: ABNORMAL MG/DL
RBC #/AREA URNS AUTO: ABNORMAL /HPF
SP GR UR STRIP.AUTO: 1.03 (ref 1–1.03)
UROBILINOGEN UR STRIP-ACNC: <2 MG/DL
WBC #/AREA URNS AUTO: ABNORMAL /HPF
WBC CLUMPS # UR AUTO: PRESENT /UL

## 2024-10-25 PROCEDURE — 81001 URINALYSIS AUTO W/SCOPE: CPT

## 2024-10-25 PROCEDURE — 87186 SC STD MICRODIL/AGAR DIL: CPT

## 2024-10-25 PROCEDURE — 87077 CULTURE AEROBIC IDENTIFY: CPT

## 2024-10-25 PROCEDURE — 87086 URINE CULTURE/COLONY COUNT: CPT

## 2024-10-27 LAB — BACTERIA UR CULT: ABNORMAL

## 2024-12-28 ENCOUNTER — OFFICE VISIT (OUTPATIENT)
Dept: URGENT CARE | Facility: MEDICAL CENTER | Age: 84
End: 2024-12-28
Payer: MEDICARE

## 2024-12-28 VITALS
SYSTOLIC BLOOD PRESSURE: 108 MMHG | OXYGEN SATURATION: 98 % | WEIGHT: 159 LBS | RESPIRATION RATE: 18 BRPM | HEART RATE: 82 BPM | BODY MASS INDEX: 26.49 KG/M2 | TEMPERATURE: 97.6 F | DIASTOLIC BLOOD PRESSURE: 70 MMHG | HEIGHT: 65 IN

## 2024-12-28 DIAGNOSIS — N30.90 CYSTITIS: Primary | ICD-10-CM

## 2024-12-28 LAB
SL AMB  POCT GLUCOSE, UA: ABNORMAL
SL AMB LEUKOCYTE ESTERASE,UA: ABNORMAL
SL AMB POCT BILIRUBIN,UA: ABNORMAL
SL AMB POCT BLOOD,UA: ABNORMAL
SL AMB POCT CLARITY,UA: ABNORMAL
SL AMB POCT COLOR,UA: ABNORMAL
SL AMB POCT KETONES,UA: ABNORMAL
SL AMB POCT NITRITE,UA: ABNORMAL
SL AMB POCT PH,UA: 6
SL AMB POCT SPECIFIC GRAVITY,UA: 1.02
SL AMB POCT URINE PROTEIN: 30
SL AMB POCT UROBILINOGEN: 0.2

## 2024-12-28 PROCEDURE — 99213 OFFICE O/P EST LOW 20 MIN: CPT | Performed by: PHYSICIAN ASSISTANT

## 2024-12-28 PROCEDURE — G0463 HOSPITAL OUTPT CLINIC VISIT: HCPCS | Performed by: PHYSICIAN ASSISTANT

## 2024-12-28 PROCEDURE — 87077 CULTURE AEROBIC IDENTIFY: CPT | Performed by: PHYSICIAN ASSISTANT

## 2024-12-28 PROCEDURE — 87086 URINE CULTURE/COLONY COUNT: CPT | Performed by: PHYSICIAN ASSISTANT

## 2024-12-28 PROCEDURE — 87186 SC STD MICRODIL/AGAR DIL: CPT | Performed by: PHYSICIAN ASSISTANT

## 2024-12-28 PROCEDURE — 81002 URINALYSIS NONAUTO W/O SCOPE: CPT | Performed by: PHYSICIAN ASSISTANT

## 2024-12-28 RX ORDER — CEPHALEXIN 500 MG/1
500 CAPSULE ORAL EVERY 12 HOURS SCHEDULED
Qty: 14 CAPSULE | Refills: 0 | Status: CANCELLED | OUTPATIENT
Start: 2024-12-28 | End: 2025-01-04

## 2024-12-28 RX ORDER — SULFAMETHOXAZOLE AND TRIMETHOPRIM 800; 160 MG/1; MG/1
1 TABLET ORAL EVERY 12 HOURS SCHEDULED
Qty: 14 TABLET | Refills: 0 | Status: SHIPPED | OUTPATIENT
Start: 2024-12-28 | End: 2025-01-04

## 2024-12-28 RX ORDER — PHENAZOPYRIDINE HYDROCHLORIDE 200 MG/1
200 TABLET, FILM COATED ORAL
Qty: 9 TABLET | Refills: 0 | Status: SHIPPED | OUTPATIENT
Start: 2024-12-28

## 2024-12-28 NOTE — PATIENT INSTRUCTIONS
Medication as prescribed  Drink plenty of water   Cranberry supplements  Follow up with PCP in 3-5 days.  Proceed to  ER if symptoms worsen.    If tests have been performed at [facility], our office will contact you with results if changes need to be made to the care plan discussed with you at the visit.  You can review your full results on [facility] MyChart.    Eat yogurt with live and active cultures and/or take a probiotic at least 3 hours before or after antibiotic dose. Monitor stool for diarrhea and/or blood. If this occurs, contact primary care doctor ASAP.

## 2024-12-28 NOTE — PROGRESS NOTES
[facility]        NAME: Courtney Devine is a 84 y.o. female  : 1940    MRN: [id number]  DATE: 2024  TIME: 11:55 AM    Assessment and Plan   Cystitis [N30.90]  1. Cystitis  POCT urine dip    Urine culture    sulfamethoxazole-trimethoprim (BACTRIM DS) 800-160 mg per tablet    phenazopyridine (PYRIDIUM) 200 mg tablet          Results        Patient Instructions     Assessment & Plan    Bactrim as prescribed   Drink plenty of water   Cranberry supplements  Follow up with PCP in 3-5 days.  Proceed to  ER if symptoms worsen.    If tests have been performed at [facility], our office will contact you with results if changes need to be made to the care plan discussed with you at the visit.  [facility]    Eat yogurt with live and active cultures and/or take a probiotic at least 3 hours before or after antibiotic dose. Monitor stool for diarrhea and/or blood. If this occurs, contact primary care doctor ASAP.       Chief Complaint     Chief Complaint   Patient presents with    Possible UTI     Pt states symptoms began 1wk ago, frequent urination, burning sensation, no meds taken for symptoms          History of Present Illness     History of Present Illness      Urinary Tract Infection   This is a new problem. The current episode started in the past 7 days. The quality of the pain is described as burning. There has been no fever. There is No history of pyelonephritis. Associated symptoms include chills, frequency and urgency. Pertinent negatives include no flank pain, hematuria, nausea or vomiting. She has tried nothing for the symptoms. There is no history of catheterization or a urological procedure.       Review of Systems   Review of Systems   Constitutional:  Positive for chills. Negative for fever.   Gastrointestinal:  Negative for abdominal pain, nausea and vomiting.   Genitourinary:  Positive for dysuria, frequency and urgency. Negative for  flank pain and hematuria.         Current Medications       Current Outpatient Medications:     acetaminophen (TYLENOL) 650 mg CR tablet, Take 1 tablet (650 mg total) by mouth every 8 (eight) hours as needed for mild pain or moderate pain, Disp: 15 tablet, Rfl: 0    ALPRAZolam (XANAX) 0.25 mg tablet, Take 0.25 mg by mouth 3 (three) times a day as needed for anxiety., Disp: , Rfl:     atorvastatin (LIPITOR) 20 mg tablet, Take 20 mg by mouth daily after dinner.  , Disp: , Rfl:     calcium carbonate (OS-FERN) 600 MG tablet, Take 600 mg by mouth daily, Disp: , Rfl:     cyanocobalamin (VITAMIN B-12) 100 mcg tablet, Take by mouth daily, Disp: , Rfl:     cyclobenzaprine (FLEXERIL) 5 mg tablet, Take 1 tablet (5 mg total) by mouth daily at bedtime as needed for muscle spasms, Disp: 5 tablet, Rfl: 0    ergocalciferol (ERGOCALCIFEROL) 46105 UNITS capsule, Take 50,000 Units by mouth once a week. Takes on Wednesdays, Disp: , Rfl:     esomeprazole (NexIUM) 40 MG capsule, Take 20 mg by mouth Daily , Disp: , Rfl:     folic acid (FOLVITE) 1 mg tablet, Take by mouth daily, Disp: , Rfl:     gabapentin (NEURONTIN) 100 mg capsule, As needed, Disp: , Rfl:     lisinopril (ZESTRIL) 5 mg tablet, TAKE 1 TABLET EVERY DAY BY ORAL ROUTE, Disp: , Rfl:     metoprolol tartrate (LOPRESSOR) 50 mg tablet, take 1 tablet by mouth every 12 hours, Disp: 180 tablet, Rfl: 3    Misc. Devices (SPRAY BOTTLE/PLASTIC 120ML) MISC, by Does not apply route 3 (three) times a day Use to cleanse area 3 times daily or as needed with bowel movements., Disp: 1 each, Rfl: 0    nitroglycerin (NITROSTAT) 0.4 mg SL tablet, Place 1 tablet (0.4 mg total) under the tongue every 5 (five) minutes as needed for chest pain (times three for chest pain. If no relief after second tablet, call 911.), Disp: 25 tablet, Rfl: 2    Omega-3 Fatty Acids (FISH OIL PO), Take 1,000 mg by mouth daily., Disp: , Rfl:     phenazopyridine (PYRIDIUM) 200 mg tablet, Take 1 tablet (200 mg total) by  mouth 3 (three) times a day with meals, Disp: 9 tablet, Rfl: 0    sulfamethoxazole-trimethoprim (BACTRIM DS) 800-160 mg per tablet, Take 1 tablet by mouth every 12 (twelve) hours for 7 days, Disp: 14 tablet, Rfl: 0    apixaban (ELIQUIS) 5 mg, Take 1 tablet (5 mg total) by mouth 2 (two) times a day, Disp: 60 tablet, Rfl: 3    benzonatate (TESSALON PERLES) 100 mg capsule, Take 1 capsule (100 mg total) by mouth every 8 (eight) hours (Patient not taking: Reported on 12/28/2024), Disp: 21 capsule, Rfl: 0    furosemide (LASIX) 20 mg tablet, Take 1 tablet (20 mg total) by mouth daily Do not start before January 4, 2023. (Patient not taking: Reported on 12/28/2024), Disp: 30 tablet, Rfl: 0    Multiple Vitamins-Minerals (PRESERVISION AREDS) CAPS, Take 1 capsule by mouth daily (Patient not taking: Reported on 3/29/2024), Disp: , Rfl:     nystatin (MYCOSTATIN) powder, Apply topically 3 (three) times a day . Apply for an additional week after symptoms resolve. (Patient not taking: Reported on 12/28/2024), Disp: 15 g, Rfl: 0    Current Allergies     Allergies as of 12/28/2024 - Reviewed 12/28/2024   Allergen Reaction Noted    Codeine GI Intolerance 01/03/2014    Lansoprazole Other (See Comments)     Morphine Nausea Only     Morphine and codeine GI Intolerance 01/03/2014            The following portions of the patient's history were reviewed and updated as appropriate: allergies, current medications, past family history, past medical history, past social history, past surgical history and problem list.     Past Medical History:   Diagnosis Date    Angina pectoris (HCC)     Anxiety     C. difficile diarrhea     CAD (coronary artery disease)     Cardiac disease     Colon polyp     COVID-19 04/2020    Depression     Diverticulitis of colon 11/2019    GERD (gastroesophageal reflux disease)     History of colon polyps     History of hiatal hernia     Hx of bleeding disorder     hemorrhoids    Hyperlipidemia     Hypertension      Irregular heart beat     gets tachycardia    Shortness of breath     related to heart       Past Surgical History:   Procedure Laterality Date    CARDIAC ELECTROPHYSIOLOGY MAPPING AND ABLATION      CARDIAC SURGERY      CARDIAC SURGERY      CATARACT EXTRACTION Bilateral     CHOLECYSTECTOMY  1987    COLON SURGERY      repair of colon    COLON SURGERY  2018    COLONOSCOPY  2009    Tubulovillous adenoma    COLONOSCOPY N/A 6/18/2018    Procedure: COLONOSCOPY;  Surgeon: Divina Claros DO;  Location: [facility];  Service: Gastroenterology    COLONOSCOPY  2011    2 cm right colon polyp and 1.5 cm mid right colon polyps tubular adenomas    COLONOSCOPY W/ CONTROL OF HEMORRHAGE      CORONARY ANGIOPLASTY WITH STENT PLACEMENT      reports two blockages not able to be stented    CORONARY ARTERY BYPASS GRAFT  2008    3 vessels    HERNIA REPAIR      hiatal hernia    HYSTERECTOMY      partial not due to malignancy    LAPAROTOMY N/A 9/26/2018    Procedure: LAPAROTOMY EXPLORATORY WITH  RIGHT HEMICOLECTOMY;  Surgeon: Divina Claros DO;  Location: [facility];  Service: Gastroenterology    LAPAROTOMY N/A 9/26/2018    Procedure: LAPAROTOMY EXPLORATORY WITH HEMICOLECTOMY;  Surgeon: Eric Owens DO;  Location: [facility];  Service: General    LA ANRCT XM SURG REQ ANES GENERAL SPI/EDRL DX N/A 7/25/2019    Procedure: EXAM UNDER ANESTHESIA (EUA);  Surgeon: Pedro Rajan MD;  Location:  MAIN OR;  Service: General    LA COLONOSCOPY FLX DX W/COLLJ SPEC WHEN PFRMD N/A 9/26/2018    Procedure: COLONOSCOPY;  Surgeon: Divina Claros DO;  Location: [facility];  Service: Gastroenterology    LA HEMORRHOIDECTOMY NTRNL & XTRNL 1 COLUMN/GROUP N/A 7/25/2019    Procedure: HEMORRHOIDECTOMY EXCISION;  Surgeon: Pedro Rajan MD;  Location:  MAIN OR;  Service: General    LA SIGMOIDOSCOPY FLX DX W/COLLJ SPEC BR/WA IF PFRMD N/A 6/21/2018    Procedure: SIGMOIDOSCOPY FLEXIBLE;  Surgeon: Divina Claros DO;  Location: [facility];  Service:  "Gastroenterology       Family History   Problem Relation Age of Onset    Heart disease Mother     Cirrhosis Father     Cancer Father     Lung cancer Brother     No Known Problems Sister     No Known Problems Daughter     No Known Problems Son     No Known Problems Paternal Aunt          Medications have been verified.        Objective   /70   Pulse 82   Temp 97.6 °F (36.4 °C)   Resp 18   Ht 5' 5\" (1.651 m)   Wt 72.1 kg (159 lb)   SpO2 98%   BMI 26.46 kg/m²   No LMP recorded. Patient has had a hysterectomy.       Physical Exam     Physical Exam  Vitals reviewed.   Constitutional:       General: She is not in acute distress.     Appearance: She is well-developed. She is not diaphoretic.   Cardiovascular:      Rate and Rhythm: Normal rate and regular rhythm.      Heart sounds: Normal heart sounds. No murmur heard.     No friction rub. No gallop.   Pulmonary:      Effort: Pulmonary effort is normal. No respiratory distress.      Breath sounds: Normal breath sounds. No wheezing, rhonchi or rales.   Abdominal:      Palpations: Abdomen is soft.      Tenderness: There is no abdominal tenderness.      Comments: Negative CVA tenderness.   Skin:     General: Skin is warm.   Neurological:      Mental Status: She is alert.   Psychiatric:         Behavior: Behavior normal.         Thought Content: Thought content normal.         Judgment: Judgment normal.                     "

## 2024-12-30 ENCOUNTER — RESULTS FOLLOW-UP (OUTPATIENT)
Dept: URGENT CARE | Facility: MEDICAL CENTER | Age: 84
End: 2024-12-30

## 2024-12-31 LAB — BACTERIA UR CULT: ABNORMAL

## 2025-02-12 ENCOUNTER — APPOINTMENT (OUTPATIENT)
Dept: LAB | Facility: MEDICAL CENTER | Age: 85
End: 2025-02-12
Payer: MEDICARE

## 2025-02-12 DIAGNOSIS — R14.2 ERUCTATION: ICD-10-CM

## 2025-02-12 DIAGNOSIS — D64.9 ANEMIA, UNSPECIFIED TYPE: ICD-10-CM

## 2025-02-12 DIAGNOSIS — R19.7 DIARRHEA, UNSPECIFIED TYPE: ICD-10-CM

## 2025-02-12 DIAGNOSIS — R10.9 ABDOMINAL DISCOMFORT: ICD-10-CM

## 2025-02-12 DIAGNOSIS — K43.9 VENTRAL HERNIA WITHOUT OBSTRUCTION OR GANGRENE: ICD-10-CM

## 2025-02-12 PROCEDURE — 86258 DGP ANTIBODY EACH IG CLASS: CPT

## 2025-02-12 PROCEDURE — 86364 TISS TRNSGLTMNASE EA IG CLAS: CPT

## 2025-02-12 PROCEDURE — 86231 EMA EACH IG CLASS: CPT

## 2025-02-12 PROCEDURE — 82784 ASSAY IGA/IGD/IGG/IGM EACH: CPT

## 2025-02-12 PROCEDURE — 36415 COLL VENOUS BLD VENIPUNCTURE: CPT

## 2025-02-13 LAB
ENDOMYSIUM IGA SER QL: NEGATIVE
IGA SERPL-MCNC: 132 MG/DL (ref 66–433)

## 2025-02-16 LAB
GLIADIN IGG SER IA-ACNC: <1.4 U/ML (ref ?–10)
TTG IGA SER IA-ACNC: <0.4 U/ML (ref ?–10)
TTG IGG SER IA-ACNC: <1.7 U/ML (ref ?–10)

## 2025-03-13 ENCOUNTER — APPOINTMENT (OUTPATIENT)
Dept: LAB | Facility: MEDICAL CENTER | Age: 85
End: 2025-03-13
Payer: MEDICARE

## 2025-03-13 DIAGNOSIS — E03.9 HYPOTHYROIDISM, UNSPECIFIED TYPE: ICD-10-CM

## 2025-03-13 DIAGNOSIS — I10 HYPERTENSION, UNSPECIFIED TYPE: ICD-10-CM

## 2025-03-13 DIAGNOSIS — R73.9 HYPERGLYCEMIA: ICD-10-CM

## 2025-03-13 DIAGNOSIS — E55.9 VITAMIN D DEFICIENCY DISEASE: ICD-10-CM

## 2025-03-13 DIAGNOSIS — R39.15 URGENCY OF URINATION: ICD-10-CM

## 2025-03-13 DIAGNOSIS — D64.9 ANEMIA, UNSPECIFIED TYPE: ICD-10-CM

## 2025-03-13 LAB
25(OH)D3 SERPL-MCNC: 53.1 NG/ML (ref 30–100)
ALBUMIN SERPL BCG-MCNC: 4.5 G/DL (ref 3.5–5)
ALP SERPL-CCNC: 87 U/L (ref 34–104)
ALT SERPL W P-5'-P-CCNC: 14 U/L (ref 7–52)
ANION GAP SERPL CALCULATED.3IONS-SCNC: 11 MMOL/L (ref 4–13)
AST SERPL W P-5'-P-CCNC: 20 U/L (ref 13–39)
BACTERIA UR QL AUTO: ABNORMAL /HPF
BILIRUB SERPL-MCNC: 0.72 MG/DL (ref 0.2–1)
BILIRUB UR QL STRIP: NEGATIVE
BUN SERPL-MCNC: 17 MG/DL (ref 5–25)
CALCIUM SERPL-MCNC: 9.4 MG/DL (ref 8.4–10.2)
CHLORIDE SERPL-SCNC: 104 MMOL/L (ref 96–108)
CHOLEST SERPL-MCNC: 154 MG/DL (ref ?–200)
CLARITY UR: ABNORMAL
CO2 SERPL-SCNC: 26 MMOL/L (ref 21–32)
COLOR UR: ABNORMAL
CREAT SERPL-MCNC: 0.87 MG/DL (ref 0.6–1.3)
ERYTHROCYTE [DISTWIDTH] IN BLOOD BY AUTOMATED COUNT: 14.1 % (ref 11.6–15.1)
EST. AVERAGE GLUCOSE BLD GHB EST-MCNC: 111 MG/DL
FERRITIN SERPL-MCNC: 16 NG/ML (ref 11–307)
GFR SERPL CREATININE-BSD FRML MDRD: 60 ML/MIN/1.73SQ M
GLUCOSE P FAST SERPL-MCNC: 104 MG/DL (ref 65–99)
GLUCOSE UR STRIP-MCNC: NEGATIVE MG/DL
HBA1C MFR BLD: 5.5 %
HCT VFR BLD AUTO: 37.2 % (ref 34.8–46.1)
HDLC SERPL-MCNC: 61 MG/DL
HGB BLD-MCNC: 11.8 G/DL (ref 11.5–15.4)
HGB UR QL STRIP.AUTO: NEGATIVE
IRON SATN MFR SERPL: 14 % (ref 15–50)
IRON SERPL-MCNC: 56 UG/DL (ref 50–212)
KETONES UR STRIP-MCNC: NEGATIVE MG/DL
LDLC SERPL CALC-MCNC: 56 MG/DL (ref 0–100)
LEUKOCYTE ESTERASE UR QL STRIP: ABNORMAL
MCH RBC QN AUTO: 30.6 PG (ref 26.8–34.3)
MCHC RBC AUTO-ENTMCNC: 31.7 G/DL (ref 31.4–37.4)
MCV RBC AUTO: 96 FL (ref 82–98)
MUCOUS THREADS UR QL AUTO: ABNORMAL
NITRITE UR QL STRIP: POSITIVE
NON-SQ EPI CELLS URNS QL MICRO: ABNORMAL /HPF
NONHDLC SERPL-MCNC: 93 MG/DL
PH UR STRIP.AUTO: 6 [PH]
PLATELET # BLD AUTO: 155 THOUSANDS/UL (ref 149–390)
PMV BLD AUTO: 9.6 FL (ref 8.9–12.7)
POTASSIUM SERPL-SCNC: 3.9 MMOL/L (ref 3.5–5.3)
PROT SERPL-MCNC: 7.4 G/DL (ref 6.4–8.4)
PROT UR STRIP-MCNC: ABNORMAL MG/DL
RBC # BLD AUTO: 3.86 MILLION/UL (ref 3.81–5.12)
RBC #/AREA URNS AUTO: ABNORMAL /HPF
SODIUM SERPL-SCNC: 141 MMOL/L (ref 135–147)
SP GR UR STRIP.AUTO: 1.02 (ref 1–1.03)
T4 FREE SERPL-MCNC: 0.81 NG/DL (ref 0.61–1.12)
TIBC SERPL-MCNC: 403.2 UG/DL (ref 250–450)
TRANSFERRIN SERPL-MCNC: 288 MG/DL (ref 203–362)
TRIGL SERPL-MCNC: 187 MG/DL (ref ?–150)
TSH SERPL DL<=0.05 MIU/L-ACNC: 1.39 UIU/ML (ref 0.45–4.5)
UIBC SERPL-MCNC: 347 UG/DL (ref 155–355)
UROBILINOGEN UR STRIP-ACNC: <2 MG/DL
WBC # BLD AUTO: 6.89 THOUSAND/UL (ref 4.31–10.16)
WBC #/AREA URNS AUTO: ABNORMAL /HPF

## 2025-03-13 PROCEDURE — 87186 SC STD MICRODIL/AGAR DIL: CPT

## 2025-03-13 PROCEDURE — 83036 HEMOGLOBIN GLYCOSYLATED A1C: CPT

## 2025-03-13 PROCEDURE — 82728 ASSAY OF FERRITIN: CPT

## 2025-03-13 PROCEDURE — 80061 LIPID PANEL: CPT

## 2025-03-13 PROCEDURE — 82306 VITAMIN D 25 HYDROXY: CPT

## 2025-03-13 PROCEDURE — 87086 URINE CULTURE/COLONY COUNT: CPT

## 2025-03-13 PROCEDURE — 87077 CULTURE AEROBIC IDENTIFY: CPT

## 2025-03-13 PROCEDURE — 81001 URINALYSIS AUTO W/SCOPE: CPT

## 2025-03-13 PROCEDURE — 83550 IRON BINDING TEST: CPT

## 2025-03-13 PROCEDURE — 84439 ASSAY OF FREE THYROXINE: CPT

## 2025-03-13 PROCEDURE — 85027 COMPLETE CBC AUTOMATED: CPT

## 2025-03-13 PROCEDURE — 80053 COMPREHEN METABOLIC PANEL: CPT

## 2025-03-13 PROCEDURE — 84443 ASSAY THYROID STIM HORMONE: CPT

## 2025-03-13 PROCEDURE — 83540 ASSAY OF IRON: CPT

## 2025-03-13 PROCEDURE — 36415 COLL VENOUS BLD VENIPUNCTURE: CPT

## 2025-03-15 LAB — BACTERIA UR CULT: ABNORMAL

## 2025-03-16 LAB — BACTERIA UR CULT: ABNORMAL

## 2025-03-27 NOTE — ANESTHESIA POSTPROCEDURE EVALUATION
Post-Op Assessment Note      CV Status:  Stable    Mental Status:  Alert and awake    Hydration Status:  Stable and euvolemic    PONV Controlled:  Controlled    Airway Patency:  Patent and adequate    Post Op Vitals Reviewed: Yes          Staff: CRNA           /86 (06/18/18 1439)    Temp      Pulse 105 (06/18/18 1439)   Resp 20 (06/18/18 1439)    SpO2 98 % (06/18/18 1439) [Coumadin] : the patient is not on Coumadin [Slow Progress] : is progressing slowly [No Sign of Infection] : is showing no signs of infection [5] : 5

## 2025-03-31 ENCOUNTER — HOSPITAL ENCOUNTER (EMERGENCY)
Facility: HOSPITAL | Age: 85
Discharge: HOME | End: 2025-03-31
Attending: EMERGENCY MEDICINE
Payer: MEDICARE

## 2025-03-31 ENCOUNTER — APPOINTMENT (EMERGENCY)
Dept: CT IMAGING | Facility: HOSPITAL | Age: 85
End: 2025-03-31
Payer: MEDICARE

## 2025-03-31 VITALS
HEART RATE: 74 BPM | HEIGHT: 60 IN | BODY MASS INDEX: 31.41 KG/M2 | OXYGEN SATURATION: 99 % | RESPIRATION RATE: 18 BRPM | TEMPERATURE: 98.2 F | SYSTOLIC BLOOD PRESSURE: 157 MMHG | WEIGHT: 160 LBS | DIASTOLIC BLOOD PRESSURE: 68 MMHG

## 2025-03-31 DIAGNOSIS — K57.92 DIVERTICULITIS: Primary | ICD-10-CM

## 2025-03-31 DIAGNOSIS — R93.5 ABNORMAL CT OF THE ABDOMEN: ICD-10-CM

## 2025-03-31 LAB
ALBUMIN SERPL BCG-MCNC: 4.5 G/DL (ref 3.5–5)
ALP SERPL-CCNC: 81 U/L (ref 34–104)
ALT SERPL W P-5'-P-CCNC: 11 U/L (ref 7–52)
ANION GAP SERPL CALCULATED.3IONS-SCNC: 10 MMOL/L (ref 4–13)
AST SERPL W P-5'-P-CCNC: 18 U/L (ref 13–39)
BASOPHILS # BLD AUTO: 0.03 THOUSANDS/ÂΜL (ref 0–0.1)
BASOPHILS NFR BLD AUTO: 0 % (ref 0–1)
BILIRUB SERPL-MCNC: 0.8 MG/DL (ref 0.2–1)
BUN SERPL-MCNC: 18 MG/DL (ref 5–25)
CALCIUM SERPL-MCNC: 9.6 MG/DL (ref 8.4–10.2)
CHLORIDE SERPL-SCNC: 102 MMOL/L (ref 96–108)
CO2 SERPL-SCNC: 29 MMOL/L (ref 21–32)
CREAT SERPL-MCNC: 0.9 MG/DL (ref 0.6–1.3)
EOSINOPHIL # BLD AUTO: 0.07 THOUSAND/ÂΜL (ref 0–0.61)
EOSINOPHIL NFR BLD AUTO: 1 % (ref 0–6)
ERYTHROCYTE [DISTWIDTH] IN BLOOD BY AUTOMATED COUNT: 14.5 % (ref 11.6–15.1)
GFR SERPL CREATININE-BSD FRML MDRD: 58 ML/MIN/1.73SQ M
GLUCOSE SERPL-MCNC: 113 MG/DL (ref 65–140)
HCT VFR BLD AUTO: 38.6 % (ref 34.8–46.1)
HGB BLD-MCNC: 12.2 G/DL (ref 11.5–15.4)
IMM GRANULOCYTES # BLD AUTO: 0.02 THOUSAND/UL (ref 0–0.2)
IMM GRANULOCYTES NFR BLD AUTO: 0 % (ref 0–2)
LACTATE SERPL-SCNC: 1.2 MMOL/L (ref 0.5–2)
LIPASE SERPL-CCNC: 10 U/L (ref 11–82)
LYMPHOCYTES # BLD AUTO: 1.11 THOUSANDS/ÂΜL (ref 0.6–4.47)
LYMPHOCYTES NFR BLD AUTO: 13 % (ref 14–44)
MCH RBC QN AUTO: 31 PG (ref 26.8–34.3)
MCHC RBC AUTO-ENTMCNC: 31.6 G/DL (ref 31.4–37.4)
MCV RBC AUTO: 98 FL (ref 82–98)
MONOCYTES # BLD AUTO: 0.46 THOUSAND/ÂΜL (ref 0.17–1.22)
MONOCYTES NFR BLD AUTO: 6 % (ref 4–12)
NEUTROPHILS # BLD AUTO: 6.75 THOUSANDS/ÂΜL (ref 1.85–7.62)
NEUTS SEG NFR BLD AUTO: 80 % (ref 43–75)
NRBC BLD AUTO-RTO: 0 /100 WBCS
PLATELET # BLD AUTO: 137 THOUSANDS/UL (ref 149–390)
PMV BLD AUTO: 9.5 FL (ref 8.9–12.7)
POTASSIUM SERPL-SCNC: 3.7 MMOL/L (ref 3.5–5.3)
PROT SERPL-MCNC: 7.5 G/DL (ref 6.4–8.4)
RBC # BLD AUTO: 3.94 MILLION/UL (ref 3.81–5.12)
SODIUM SERPL-SCNC: 141 MMOL/L (ref 135–147)
WBC # BLD AUTO: 8.44 THOUSAND/UL (ref 4.31–10.16)

## 2025-03-31 PROCEDURE — 96361 HYDRATE IV INFUSION ADD-ON: CPT

## 2025-03-31 PROCEDURE — 85025 COMPLETE CBC W/AUTO DIFF WBC: CPT | Performed by: EMERGENCY MEDICINE

## 2025-03-31 PROCEDURE — 36415 COLL VENOUS BLD VENIPUNCTURE: CPT | Performed by: EMERGENCY MEDICINE

## 2025-03-31 PROCEDURE — 96365 THER/PROPH/DIAG IV INF INIT: CPT

## 2025-03-31 PROCEDURE — 96375 TX/PRO/DX INJ NEW DRUG ADDON: CPT

## 2025-03-31 PROCEDURE — 74177 CT ABD & PELVIS W/CONTRAST: CPT

## 2025-03-31 PROCEDURE — 99285 EMERGENCY DEPT VISIT HI MDM: CPT | Performed by: EMERGENCY MEDICINE

## 2025-03-31 PROCEDURE — 99284 EMERGENCY DEPT VISIT MOD MDM: CPT

## 2025-03-31 PROCEDURE — 83690 ASSAY OF LIPASE: CPT | Performed by: EMERGENCY MEDICINE

## 2025-03-31 PROCEDURE — 80053 COMPREHEN METABOLIC PANEL: CPT | Performed by: EMERGENCY MEDICINE

## 2025-03-31 PROCEDURE — 83605 ASSAY OF LACTIC ACID: CPT | Performed by: EMERGENCY MEDICINE

## 2025-03-31 RX ORDER — HYDROMORPHONE HCL IN WATER/PF 6 MG/30 ML
0.2 PATIENT CONTROLLED ANALGESIA SYRINGE INTRAVENOUS ONCE
Status: COMPLETED | OUTPATIENT
Start: 2025-03-31 | End: 2025-03-31

## 2025-03-31 RX ORDER — ONDANSETRON 4 MG/1
4 TABLET, ORALLY DISINTEGRATING ORAL EVERY 6 HOURS PRN
Qty: 20 TABLET | Refills: 0 | Status: SHIPPED | OUTPATIENT
Start: 2025-03-31

## 2025-03-31 RX ORDER — ONDANSETRON 2 MG/ML
4 INJECTION INTRAMUSCULAR; INTRAVENOUS ONCE
Status: COMPLETED | OUTPATIENT
Start: 2025-03-31 | End: 2025-03-31

## 2025-03-31 RX ADMIN — HYDROMORPHONE HYDROCHLORIDE 0.2 MG: 0.2 INJECTION, SOLUTION INTRAMUSCULAR; INTRAVENOUS; SUBCUTANEOUS at 17:42

## 2025-03-31 RX ADMIN — IOHEXOL 100 ML: 350 INJECTION, SOLUTION INTRAVENOUS at 18:43

## 2025-03-31 RX ADMIN — AMPICILLIN SODIUM AND SULBACTAM SODIUM 3 G: 2; 1 INJECTION, POWDER, FOR SOLUTION INTRAMUSCULAR; INTRAVENOUS at 20:32

## 2025-03-31 RX ADMIN — SODIUM CHLORIDE 1000 ML: 0.9 INJECTION, SOLUTION INTRAVENOUS at 20:00

## 2025-03-31 RX ADMIN — ONDANSETRON 4 MG: 2 INJECTION INTRAMUSCULAR; INTRAVENOUS at 17:42

## 2025-04-01 NOTE — DISCHARGE INSTRUCTIONS
You had an incidental finding on your CAT scan today, it is recommended you undergo MRI testing to further look at it.

## 2025-04-01 NOTE — ED PROVIDER NOTES
Time reflects when diagnosis was documented in both MDM as applicable and the Disposition within this note       Time User Action Codes Description Comment    3/31/2025  7:59 PM Anabel Chambers Add [K57.92] Diverticulitis     3/31/2025  8:00 PM Anabel Chambers Add [R93.5] Abnormal CT of the abdomen           ED Disposition       ED Disposition   Discharge    Condition   Stable    Date/Time   Mon Mar 31, 2025  7:59 PM    Comment   Courtney Devine discharge to home/self care.                   Assessment & Plan       Medical Decision Making  84-year-old female presents for evaluation of acute onset abdominal pain.  She does have history of diverticulitis, previous colon resection as well as a ventral hernia.  Differential for patient may include bowel obstruction, diverticulitis, complication of diverticulitis, strangulated or incarcerated hernia.  She did report a bowel movement earlier today however constipation may additionally be on the differential.  Will obtain labs as well as abdominal imaging meeting to assess for other etiology of abdominal pain.  Doubt aortic catastrophe at this point or ischemic gut however can reconsider.    Amount and/or Complexity of Data Reviewed  Labs: ordered.  Radiology: ordered. Decision-making details documented in ED Course.    Risk  Prescription drug management.        ED Course as of 03/31/25 2301   Mon Mar 31, 2025   1943 CT abdomen pelvis with contrast  IMPRESSION:     Focally inflamed small bowel diverticula as described.  No free air or adjacent fluid collection.     Fluid distended transverse colon as well as mid to distal small bowel, likely representing a reactive enterocolitis/ileus.  No obstruction.     7 mm cystic-appearing lesion in the pancreatic body.  Postcontrast MRI/MRCP recommended for initial further evaluation.     1943 No SIRS, do not believe pt is septic. Will order abx for diverticulitis.   1957 Patient appears improved from arrival, on my way to  update her on w/u she was coming out of the restroom. States she had a bowel movement mixed with formed and liquid stool. We talked about the diverticulitis and possible ileus. She would like to go home at this time. Given she had a BM, ileus is resolved or there is no ileus; her labs are unremarkable and vitals stable. Will give dose of abx and IVF then d/c home on augmentin for divertic. She should RTED for worsened symptoms, even if this is overnight.        Medications   ondansetron (ZOFRAN) injection 4 mg (4 mg Intravenous Given 3/31/25 1742)   HYDROmorphone HCl (DILAUDID) injection 0.2 mg (0.2 mg Intravenous Given 3/31/25 1742)   iohexol (OMNIPAQUE) 350 MG/ML injection (MULTI-DOSE) 100 mL (100 mL Intravenous Given 3/31/25 1843)   sodium chloride 0.9 % bolus 1,000 mL (0 mL Intravenous Stopped 3/31/25 2112)   ampicillin-sulbactam (UNASYN) 3 g in sodium chloride 0.9 % 100 mL IVPB (0 g Intravenous Stopped 3/31/25 2112)       ED Risk Strat Scores                            SBIRT 20yo+      Flowsheet Row Most Recent Value   Initial Alcohol Screen: US AUDIT-C     1. How often do you have a drink containing alcohol? 0 Filed at: 03/31/2025 1714   2. How many drinks containing alcohol do you have on a typical day you are drinking?  0 Filed at: 03/31/2025 1714   3b. FEMALE Any Age, or MALE 65+: How often do you have 4 or more drinks on one occassion? 0 Filed at: 03/31/2025 1714   Audit-C Score 0 Filed at: 03/31/2025 1714                            History of Present Illness       Chief Complaint   Patient presents with    Abdominal Pain     Abdominal pain started at 2pm, took gas x nothing helping. Right lower abdomen radiates across abdomen to right just below hernia.  Sharp cramping pain. Hx gallbladder sx and appendectomy.        Past Medical History:   Diagnosis Date    Angina pectoris (HCC)     Anxiety     C. difficile diarrhea     CAD (coronary artery disease)     Cardiac disease     Colon polyp     COVID-19  04/2020    Depression     Diverticulitis of colon 11/2019    GERD (gastroesophageal reflux disease)     History of colon polyps     History of hiatal hernia     Hx of bleeding disorder     hemorrhoids    Hyperlipidemia     Hypertension     Irregular heart beat     gets tachycardia    Shortness of breath     related to heart      Past Surgical History:   Procedure Laterality Date    CARDIAC ELECTROPHYSIOLOGY MAPPING AND ABLATION      CARDIAC SURGERY      CARDIAC SURGERY      CATARACT EXTRACTION Bilateral     CHOLECYSTECTOMY  1987    COLON SURGERY      repair of colon    COLON SURGERY  2018    COLONOSCOPY  2009    Tubulovillous adenoma    COLONOSCOPY N/A 6/18/2018    Procedure: COLONOSCOPY;  Surgeon: Divina Claros DO;  Location: [facility];  Service: Gastroenterology    COLONOSCOPY  2011    2 cm right colon polyp and 1.5 cm mid right colon polyps tubular adenomas    COLONOSCOPY W/ CONTROL OF HEMORRHAGE      CORONARY ANGIOPLASTY WITH STENT PLACEMENT      reports two blockages not able to be stented    CORONARY ARTERY BYPASS GRAFT  2008    3 vessels    HERNIA REPAIR      hiatal hernia    HYSTERECTOMY      partial not due to malignancy    LAPAROTOMY N/A 9/26/2018    Procedure: LAPAROTOMY EXPLORATORY WITH  RIGHT HEMICOLECTOMY;  Surgeon: Divina Claros DO;  Location: [facility];  Service: Gastroenterology    LAPAROTOMY N/A 9/26/2018    Procedure: LAPAROTOMY EXPLORATORY WITH HEMICOLECTOMY;  Surgeon: Eric Owens DO;  Location: [facility];  Service: General    OR ANRCT XM SURG REQ ANES GENERAL SPI/EDRL DX N/A 7/25/2019    Procedure: EXAM UNDER ANESTHESIA (EUA);  Surgeon: Pedro Rajan MD;  Location:  MAIN OR;  Service: General    OR COLONOSCOPY FLX DX W/COLLJ SPEC WHEN PFRMD N/A 9/26/2018    Procedure: COLONOSCOPY;  Surgeon: Divina Claros DO;  Location: [facility];  Service: Gastroenterology    OR HEMORRHOIDECTOMY NTRNL & XTRNL 1 COLUMN/GROUP N/A 7/25/2019    Procedure: HEMORRHOIDECTOMY EXCISION;  Surgeon:  Pedro Rajan MD;  Location:  MAIN OR;  Service: General    IA SIGMOIDOSCOPY FLX DX W/COLLJ SPEC BR/WA IF PFRMD N/A 6/21/2018    Procedure: SIGMOIDOSCOPY FLEXIBLE;  Surgeon: Divina Claros DO;  Location:  [facility];  Service: Gastroenterology      Family History   Problem Relation Age of Onset    Heart disease Mother     Cirrhosis Father     Cancer Father     Lung cancer Brother     No Known Problems Sister     No Known Problems Daughter     No Known Problems Son     No Known Problems Paternal Aunt       Social History     Tobacco Use    Smoking status: Former     Current packs/day: 0.50     Average packs/day: 0.5 packs/day for 30.0 years (15.0 ttl pk-yrs)     Types: Cigarettes    Smokeless tobacco: Never    Tobacco comments:     quit 15 yrs ago   Vaping Use    Vaping status: Never Used   Substance Use Topics    Alcohol use: Never     Alcohol/week: 0.0 standard drinks of alcohol    Drug use: No      E-Cigarette/Vaping    E-Cigarette Use Never User       E-Cigarette/Vaping Substances    Nicotine No     THC No     CBD No     Flavoring No     Other No     Unknown No       I have reviewed and agree with the history as documented.     84-year-old female presents with her son for evaluation of abdominal pain.  Few hours prior to arrival patient began with acute onset of abdominal pain that is worsened since onset.  Pain is sharp in nature, she also states it feels bloated and gassy.  She reports nausea without vomiting.  She does at baseline have issues with her bowels fluctuating between constipation and diarrhea.  She did take a Gas-X at home to help with her symptoms so that this did not work for her.  She prior to this was in her usual state of health without any recent fevers or illnesses, eating and drinking per her normal.  She does report some significant history of diverticulitis as well as a colon resection after a peripheral from colonoscopy, she has a ventral wall hernia.        Review of Systems    Constitutional:  Negative for activity change, appetite change and fever.   Respiratory:  Negative for shortness of breath.    Cardiovascular:  Negative for chest pain.   Gastrointestinal:  Positive for abdominal distention, abdominal pain, constipation, diarrhea and nausea. Negative for blood in stool and vomiting.   Genitourinary:  Negative for dysuria.   All other systems reviewed and are negative.          Objective       ED Triage Vitals   Temperature Pulse Blood Pressure Respirations SpO2 Patient Position - Orthostatic VS   03/31/25 1711 03/31/25 1711 03/31/25 1711 03/31/25 1830 03/31/25 1711 03/31/25 1711   98.1 °F (36.7 °C) 85 (!) 196/86 18 92 % Sitting      Temp Source Heart Rate Source BP Location FiO2 (%) Pain Score    03/31/25 1711 03/31/25 1711 03/31/25 1711 -- 03/31/25 1711    Tympanic Monitor Left arm  10 - Worst Possible Pain      Vitals      Date and Time Temp Pulse SpO2 Resp BP Pain Score FACES Pain Rating User   03/31/25 2113 98.2 °F (36.8 °C) 74 99 % 18 157/68 No Pain -- EZ   03/31/25 2000 98.2 °F (36.8 °C) 86 96 % 18 161/67 No Pain -- EZ   03/31/25 1900 98.1 °F (36.7 °C) 79 98 % 18 164/72 No Pain -- EZ   03/31/25 1830 -- 71 95 % 18 145/90 -- -- MB   03/31/25 1757 -- -- -- -- -- 1 -- MB   03/31/25 1742 -- -- -- -- -- 10 - Worst Possible Pain -- MB   03/31/25 1711 98.1 °F (36.7 °C) 85 92 % -- 196/86 10 - Worst Possible Pain -- RJY            Physical Exam  Vitals reviewed.   Constitutional:       General: She is in acute distress (Pain).      Appearance: She is well-developed. She is not ill-appearing, toxic-appearing or diaphoretic.   HENT:      Head: Normocephalic and atraumatic.      Right Ear: External ear normal.      Left Ear: External ear normal.      Nose: Nose normal.      Mouth/Throat:      Mouth: Mucous membranes are moist.   Eyes:      General: No scleral icterus.        Right eye: No discharge.         Left eye: No discharge.   Cardiovascular:      Rate and Rhythm: Normal rate and  regular rhythm.   Pulmonary:      Effort: Pulmonary effort is normal. No respiratory distress.   Abdominal:      General: There is no distension.      Palpations: Abdomen is soft.      Tenderness: There is generalized abdominal tenderness and tenderness in the right lower quadrant. There is no right CVA tenderness, left CVA tenderness, guarding or rebound.      Hernia: A hernia (Ventral wall hernia, does appear soft, no skin discoloration) is present.   Musculoskeletal:         General: No deformity or signs of injury.      Right lower leg: No edema.      Left lower leg: No edema.   Skin:     General: Skin is warm.      Coloration: Skin is not jaundiced or pale.   Neurological:      General: No focal deficit present.      Mental Status: She is alert.         Results Reviewed       Procedure Component Value Units Date/Time    Lactic acid, plasma (w/reflex if result > 2.0) [[id number]]  (Normal) Collected: 03/31/25 1731    Lab Status: Final result Specimen: Blood from Arm, Left Updated: 03/31/25 1817     LACTIC ACID 1.2 mmol/L     Narrative:      Result may be elevated if tourniquet was used during collection.    Comprehensive metabolic panel [[id number]] Collected: 03/31/25 1731    Lab Status: Final result Specimen: Blood from Arm, Left Updated: 03/31/25 1817     Sodium 141 mmol/L      Potassium 3.7 mmol/L      Chloride 102 mmol/L      CO2 29 mmol/L      ANION GAP 10 mmol/L      BUN 18 mg/dL      Creatinine 0.90 mg/dL      Glucose 113 mg/dL      Calcium 9.6 mg/dL      AST 18 U/L      ALT 11 U/L      Alkaline Phosphatase 81 U/L      Total Protein 7.5 g/dL      Albumin 4.5 g/dL      Total Bilirubin 0.80 mg/dL      eGFR 58 ml/min/1.73sq m     Narrative:      [employer] guidelines for Chronic Kidney Disease (CKD):     Stage 1 with normal or high GFR (GFR > 90 mL/min/1.73 square meters)    Stage 2 Mild CKD (GFR = 60-89 mL/min/1.73 square meters)    Stage 3A Moderate CKD (GFR = 45-59 mL/min/1.73  square meters)    Stage 3B Moderate CKD (GFR = 30-44 mL/min/1.73 square meters)    Stage 4 Severe CKD (GFR = 15-29 mL/min/1.73 square meters)    Stage 5 End Stage CKD (GFR <15 mL/min/1.73 square meters)  Note: GFR calculation is accurate only with a steady state creatinine    Lipase [[id number]]  (Abnormal) Collected: 03/31/25 1731    Lab Status: Final result Specimen: Blood from Arm, Left Updated: 03/31/25 1817     Lipase 10 u/L     CBC and differential [[id number]]  (Abnormal) Collected: 03/31/25 1731    Lab Status: Final result Specimen: Blood from Arm, Left Updated: 03/31/25 1757     WBC 8.44 Thousand/uL      RBC 3.94 Million/uL      Hemoglobin 12.2 g/dL      Hematocrit 38.6 %      MCV 98 fL      MCH 31.0 pg      MCHC 31.6 g/dL      RDW 14.5 %      MPV 9.5 fL      Platelets 137 Thousands/uL      nRBC 0 /100 WBCs      Segmented % 80 %      Immature Grans % 0 %      Lymphocytes % 13 %      Monocytes % 6 %      Eosinophils Relative 1 %      Basophils Relative 0 %      Absolute Neutrophils 6.75 Thousands/µL      Absolute Immature Grans 0.02 Thousand/uL      Absolute Lymphocytes 1.11 Thousands/µL      Absolute Monocytes 0.46 Thousand/µL      Eosinophils Absolute 0.07 Thousand/µL      Basophils Absolute 0.03 Thousands/µL     UA w Reflex to Microscopic w Reflex to Culture [[id number]]     Lab Status: No result Specimen: Urine             CT abdomen pelvis with contrast   Final Interpretation by Braeden Florentino MD (03/31 1937)      Focally inflamed small bowel diverticula as described.   No free air or adjacent fluid collection.      Fluid distended transverse colon as well as mid to distal small bowel, likely representing a reactive enterocolitis/ileus.   No obstruction.      7 mm cystic-appearing lesion in the pancreatic body.   Postcontrast MRI/MRCP recommended for initial further evaluation.      The study was marked in EPIC for immediate notification.      Workstation performed: [id number]              Procedures    ED Medication and Procedure Management   Prior to Admission Medications   Prescriptions Last Dose Informant Patient Reported? Taking?   ALPRAZolam (XANAX) 0.25 mg tablet 3/30/2025 Self Yes Yes   Sig: Take 0.25 mg by mouth 3 (three) times a day as needed for anxiety.   Misc. Devices (SPRAY BOTTLE/PLASTIC 120ML) MISC Not Taking Self No No   Sig: by Does not apply route 3 (three) times a day Use to cleanse area 3 times daily or as needed with bowel movements.   Patient not taking: Reported on 3/31/2025   Multiple Vitamins-Minerals (PRESERVISION AREDS) CAPS 3/31/2025 Self Yes Yes   Sig: Take 1 capsule by mouth daily   Omega-3 Fatty Acids (FISH OIL PO) 3/31/2025 Self Yes Yes   Sig: Take 1,000 mg by mouth daily.   acetaminophen (TYLENOL) 650 mg CR tablet 3/30/2025 Self No Yes   Sig: Take 1 tablet (650 mg total) by mouth every 8 (eight) hours as needed for mild pain or moderate pain   apixaban (ELIQUIS) 5 mg 3/31/2025  No Yes   Sig: Take 1 tablet (5 mg total) by mouth 2 (two) times a day   atorvastatin (LIPITOR) 20 mg tablet 3/30/2025 Self Yes Yes   Sig: Take 20 mg by mouth daily after dinner.     benzonatate (TESSALON PERLES) 100 mg capsule Not Taking  No No   Sig: Take 1 capsule (100 mg total) by mouth every 8 (eight) hours   Patient not taking: Reported on 3/29/2024   calcium carbonate (OS-FERN) 600 MG tablet 3/31/2025  Yes Yes   Sig: Take 600 mg by mouth daily   cyanocobalamin (VITAMIN B-12) 100 mcg tablet Past Week  Yes Yes   Sig: Take by mouth daily   cyclobenzaprine (FLEXERIL) 5 mg tablet Past Month  No Yes   Sig: Take 1 tablet (5 mg total) by mouth daily at bedtime as needed for muscle spasms   ergocalciferol (ERGOCALCIFEROL) 85039 UNITS capsule Not Taking Self Yes No   Sig: Take 50,000 Units by mouth once a week. Takes on Wednesdays   Patient not taking: Reported on 3/31/2025   esomeprazole (NexIUM) 40 MG capsule 3/31/2025  Yes Yes   Sig: Take 20 mg by mouth Daily    folic acid (FOLVITE) 1 mg  tablet 3/31/2025  Yes Yes   Sig: Take by mouth daily   furosemide (LASIX) 20 mg tablet 3/31/2025  No Yes   Sig: Take 1 tablet (20 mg total) by mouth daily Do not start before January 4, 2023.   gabapentin (NEURONTIN) 100 mg capsule More than a month  Yes No   Sig: As needed   lisinopril (ZESTRIL) 5 mg tablet 3/30/2025  Yes Yes   Sig: TAKE 1 TABLET EVERY DAY BY ORAL ROUTE   metoprolol tartrate (LOPRESSOR) 50 mg tablet 3/31/2025  No Yes   Sig: take 1 tablet by mouth every 12 hours   nitroglycerin (NITROSTAT) 0.4 mg SL tablet More than a month Self No No   Sig: Place 1 tablet (0.4 mg total) under the tongue every 5 (five) minutes as needed for chest pain (times three for chest pain. If no relief after second tablet, call 911.)   nystatin (MYCOSTATIN) powder Not Taking  No No   Sig: Apply topically 3 (three) times a day . Apply for an additional week after symptoms resolve.   Patient not taking: Reported on 3/29/2024   phenazopyridine (PYRIDIUM) 200 mg tablet Not Taking  No No   Sig: Take 1 tablet (200 mg total) by mouth 3 (three) times a day with meals   Patient not taking: Reported on 3/31/2025      Facility-Administered Medications: None     Discharge Medication List as of 3/31/2025  8:04 PM        START taking these medications    Details   amoxicillin-clavulanate (AUGMENTIN) 875-125 mg per tablet Take 1 tablet by mouth every 12 (twelve) hours for 7 days, Starting Mon 3/31/2025, Until Mon 4/7/2025, Normal      ondansetron (ZOFRAN-ODT) 4 mg disintegrating tablet Take 1 tablet (4 mg total) by mouth every 6 (six) hours as needed for nausea or vomiting, Starting Mon 3/31/2025, Normal           CONTINUE these medications which have NOT CHANGED    Details   acetaminophen (TYLENOL) 650 mg CR tablet Take 1 tablet (650 mg total) by mouth every 8 (eight) hours as needed for mild pain or moderate pain, Starting Thu 7/25/2019, Print      ALPRAZolam (XANAX) 0.25 mg tablet Take 0.25 mg by mouth 3 (three) times a day as needed for  anxiety., Historical Med      apixaban (ELIQUIS) 5 mg Take 1 tablet (5 mg total) by mouth 2 (two) times a day, Starting Tue 9/24/2019, Until Mon 3/31/2025, Normal      atorvastatin (LIPITOR) 20 mg tablet Take 20 mg by mouth daily after dinner.  , Historical Med      calcium carbonate (OS-FERN) 600 MG tablet Take 600 mg by mouth daily, Historical Med      cyanocobalamin (VITAMIN B-12) 100 mcg tablet Take by mouth daily, Historical Med      cyclobenzaprine (FLEXERIL) 5 mg tablet Take 1 tablet (5 mg total) by mouth daily at bedtime as needed for muscle spasms, Starting Sun 10/15/2023, Normal      esomeprazole (NexIUM) 40 MG capsule Take 20 mg by mouth Daily , Historical Med      folic acid (FOLVITE) 1 mg tablet Take by mouth daily, Historical Med      furosemide (LASIX) 20 mg tablet Take 1 tablet (20 mg total) by mouth daily Do not start before January 4, 2023., Starting Wed 1/4/2023, Normal      lisinopril (ZESTRIL) 5 mg tablet TAKE 1 TABLET EVERY DAY BY ORAL ROUTE, Historical Med      metoprolol tartrate (LOPRESSOR) 50 mg tablet take 1 tablet by mouth every 12 hours, Normal      Multiple Vitamins-Minerals (PRESERVISION AREDS) CAPS Take 1 capsule by mouth daily, Historical Med      Omega-3 Fatty Acids (FISH OIL PO) Take 1,000 mg by mouth daily., Historical Med      benzonatate (TESSALON PERLES) 100 mg capsule Take 1 capsule (100 mg total) by mouth every 8 (eight) hours, Starting Tue 11/29/2022, Normal      ergocalciferol (ERGOCALCIFEROL) 28094 UNITS capsule Take 50,000 Units by mouth once a week. Takes on Wednesdays, Historical Med      gabapentin (NEURONTIN) 100 mg capsule As needed, Historical Med      Misc. Devices (SPRAY BOTTLE/PLASTIC 120ML) MISC by Does not apply route 3 (three) times a day Use to cleanse area 3 times daily or as needed with bowel movements., Starting Thu 7/25/2019, Normal      nitroglycerin (NITROSTAT) 0.4 mg SL tablet Place 1 tablet (0.4 mg total) under the tongue every 5 (five) minutes as  needed for chest pain (times three for chest pain. If no relief after second tablet, call 911.), Starting Thu 11/21/2019, Print      nystatin (MYCOSTATIN) powder Apply topically 3 (three) times a day . Apply for an additional week after symptoms resolve., Starting Sun 1/22/2023, Normal      phenazopyridine (PYRIDIUM) 200 mg tablet Take 1 tablet (200 mg total) by mouth 3 (three) times a day with meals, Starting Sat 12/28/2024, Normal           No discharge procedures on file.  ED SEPSIS DOCUMENTATION   Time reflects when diagnosis was documented in both MDM as applicable and the Disposition within this note       Time User Action Codes Description Comment    3/31/2025  7:59 PM Anabel Chambers Add [K57.92] Diverticulitis     3/31/2025  8:00 PM Anabel Chambers Add [R93.5] Abnormal CT of the abdomen                  Anabel Chambers,   03/31/25 3290

## 2025-04-11 ENCOUNTER — TELEPHONE (OUTPATIENT)
Dept: CARDIOLOGY CLINIC | Facility: HOSPITAL | Age: 85
End: 2025-04-11

## 2025-05-12 ENCOUNTER — APPOINTMENT (OUTPATIENT)
Dept: LAB | Facility: MEDICAL CENTER | Age: 85
End: 2025-05-12
Attending: INTERNAL MEDICINE
Payer: MEDICARE

## 2025-05-12 DIAGNOSIS — R39.15 URGENCY OF URINATION: ICD-10-CM

## 2025-05-12 DIAGNOSIS — N39.0 URINARY TRACT INFECTION WITHOUT HEMATURIA, SITE UNSPECIFIED: ICD-10-CM

## 2025-05-12 PROCEDURE — 87086 URINE CULTURE/COLONY COUNT: CPT

## 2025-05-12 PROCEDURE — 87077 CULTURE AEROBIC IDENTIFY: CPT

## 2025-05-12 PROCEDURE — 87186 SC STD MICRODIL/AGAR DIL: CPT

## 2025-05-12 PROCEDURE — 81001 URINALYSIS AUTO W/SCOPE: CPT

## 2025-05-13 LAB
BACTERIA UR QL AUTO: ABNORMAL /HPF
BILIRUB UR QL STRIP: NEGATIVE
CAOX CRY URNS QL MICRO: ABNORMAL /HPF
CLARITY UR: CLEAR
COLOR UR: YELLOW
GLUCOSE UR STRIP-MCNC: NEGATIVE MG/DL
HGB UR QL STRIP.AUTO: NEGATIVE
KETONES UR STRIP-MCNC: NEGATIVE MG/DL
LEUKOCYTE ESTERASE UR QL STRIP: ABNORMAL
MUCOUS THREADS UR QL AUTO: ABNORMAL
NITRITE UR QL STRIP: POSITIVE
NON-SQ EPI CELLS URNS QL MICRO: ABNORMAL /HPF
PH UR STRIP.AUTO: 6 [PH]
PROT UR STRIP-MCNC: ABNORMAL MG/DL
RBC #/AREA URNS AUTO: ABNORMAL /HPF
SP GR UR STRIP.AUTO: 1.03 (ref 1–1.03)
UROBILINOGEN UR STRIP-ACNC: <2 MG/DL
WBC #/AREA URNS AUTO: ABNORMAL /HPF

## 2025-05-14 LAB — BACTERIA UR CULT: ABNORMAL

## 2025-05-15 LAB — BACTERIA UR CULT: ABNORMAL

## 2025-05-30 ENCOUNTER — APPOINTMENT (OUTPATIENT)
Dept: LAB | Facility: HOSPITAL | Age: 85
End: 2025-05-30
Attending: INTERNAL MEDICINE
Payer: MEDICARE

## 2025-05-30 ENCOUNTER — OFFICE VISIT (OUTPATIENT)
Dept: CARDIOLOGY CLINIC | Facility: HOSPITAL | Age: 85
End: 2025-05-30
Payer: MEDICARE

## 2025-05-30 VITALS
DIASTOLIC BLOOD PRESSURE: 78 MMHG | BODY MASS INDEX: 26.62 KG/M2 | WEIGHT: 159.8 LBS | OXYGEN SATURATION: 96 % | HEART RATE: 75 BPM | HEIGHT: 65 IN | SYSTOLIC BLOOD PRESSURE: 118 MMHG

## 2025-05-30 DIAGNOSIS — I10 ESSENTIAL HYPERTENSION: ICD-10-CM

## 2025-05-30 DIAGNOSIS — I50.32 CHRONIC DIASTOLIC CONGESTIVE HEART FAILURE (HCC): ICD-10-CM

## 2025-05-30 DIAGNOSIS — I48.92 PAROXYSMAL ATRIAL FLUTTER (HCC): ICD-10-CM

## 2025-05-30 DIAGNOSIS — Z95.1 HX OF CABG: ICD-10-CM

## 2025-05-30 DIAGNOSIS — I25.119 CORONARY ARTERY DISEASE INVOLVING NATIVE CORONARY ARTERY OF NATIVE HEART WITH ANGINA PECTORIS (HCC): Primary | ICD-10-CM

## 2025-05-30 DIAGNOSIS — E78.00 HYPERCHOLESTEROLEMIA: ICD-10-CM

## 2025-05-30 DIAGNOSIS — N39.0 URINARY TRACT INFECTION WITHOUT HEMATURIA, SITE UNSPECIFIED: ICD-10-CM

## 2025-05-30 LAB
BACTERIA UR QL AUTO: ABNORMAL /HPF
BILIRUB UR QL STRIP: NEGATIVE
CLARITY UR: CLEAR
COLOR UR: YELLOW
GLUCOSE UR STRIP-MCNC: NEGATIVE MG/DL
HGB UR QL STRIP.AUTO: NEGATIVE
KETONES UR STRIP-MCNC: NEGATIVE MG/DL
LEUKOCYTE ESTERASE UR QL STRIP: ABNORMAL
NITRITE UR QL STRIP: NEGATIVE
NON-SQ EPI CELLS URNS QL MICRO: ABNORMAL /HPF
PH UR STRIP.AUTO: 5.5 [PH]
PROT UR STRIP-MCNC: ABNORMAL MG/DL
RBC #/AREA URNS AUTO: ABNORMAL /HPF
SP GR UR STRIP.AUTO: >1.03 (ref 1–1.03)
UROBILINOGEN UR STRIP-ACNC: <2 MG/DL
WBC #/AREA URNS AUTO: ABNORMAL /HPF

## 2025-05-30 PROCEDURE — 81001 URINALYSIS AUTO W/SCOPE: CPT

## 2025-05-30 PROCEDURE — 87086 URINE CULTURE/COLONY COUNT: CPT

## 2025-05-30 PROCEDURE — 99214 OFFICE O/P EST MOD 30 MIN: CPT

## 2025-05-30 PROCEDURE — 93000 ELECTROCARDIOGRAM COMPLETE: CPT

## 2025-05-30 NOTE — PROGRESS NOTES
Cardiology Follow Up    Courtney Devine  1940  [id number]  [facility] [address] [id number] [phone]    1. Coronary artery disease involving native coronary artery of native heart with angina pectoris (HCC)  NM myocardial perfusion spect (rx stress and/or rest)      2. Hx of CABG        3. Paroxysmal atrial flutter (HCC)  POCT ECG      4. Chronic diastolic congestive heart failure (HCC)  Echo complete w/ contrast if indicated      5. Essential hypertension        6. Hypercholesterolemia            Discussion/Summary:  Coronary artery disease:  With prior remote CABG  Pharmacologic stress test January 2022 was negative for ischemia  Echocardiogram January 2022 showed LVEF 60%, PASP 52 mmHg  Tells me several weeks ago she had an episode of chest tightness.  These had been intermittent over the past, would have an episode once every several years and would resolve after taking 1 sublingual nitroglycerin.  Her recent episode, however, did not resolve after 1 nitroglycerin, she took a second 5 minutes later which did resolve her tightness.  This episode was not associated with any other symptoms, and she has not had recurrence.  Continue statin, beta-blocker  Most recent nuclear stress test in January 2022 was negative for ischemia, we will repeat pharmacologic stress test given her recent episode of chest tightness.    Atrial fibrillation:  History of paroxysmal atrial fibrillation  EKG: Normal sinus rhythm with chronic incomplete RBBB 75 bpm  Reports chronic, very rare palpitations which have been unchanged for many years  Anticoagulation: Eliquis 5 mg twice daily  Rate control: Metoprolol tartrate 50 mg twice daily    Chronic HFpEF:  Volume status maintained on Lasix 20 mg twice daily  Most recent echocardiogram January 2022 shows LVEF 60%  Has been dealing with shortness of breath with  minimal exertion such as doing chores around the house, and when lying flat on her back.  These are intermittent, however, not constant  Weight in office 159 lbs, same as prior office visit in March 2024  Most recent BMP 3/31/2025 shows stable kidney function and electrolytes  Continues to follow low sodium diet and fluid restriction  Patient knows to take extra Lasix for weight gain > 3 lbs in 1 day, > 5 lbs in 1 week, or for any worsening symptoms such as shortness of breath and lower extremity edema  Will order updated echocardiogram    Valvular heart disease:  Previous echocardiogram January 2022 shows severe tricuspid regurgitation mild-moderate mitral regurgitation, RVSP 52 mmHg  Will order updated echocardiogram    Hypertension:  BP today in office 118/78  Continue current regimen    Hyperlipidemia:  Lipid profile 3/13/2025: C 154, T 187, H 61, L 56  Continue atorvastatin 20 mg daily    Patient should RTO in 1 year or sooner if necessary.    Interval History:   Courtney Devine is a 84 y.o. female with a history of coronary artery disease with remote CABG, paroxysmal atrial fibrillation, chronic HFpEF, hypertension, hyperlipidemia, centrilobular emphysema who follows with cardiologist Dr. Barker.    Courtney presents to the office today for follow-up.  Last seen by Dr. Barker in March 2024 where she had been feeling well.  Weight was down to 159 pounds, was active around her house, chronically got short of breath with low-moderate activity.  Since her last visit she reports feeling mostly the same.  She is still experiencing the same chronic shortness of breath with low-moderate activity.  She does also endorse some orthopnea.  Denies lower extremity edema, PND.  She has been stable on Lasix 20 mg twice daily, her weight of 159 pounds is the same as it was last March when she was in the office.  Additionally she tells me about an episode of chest tightness she had several weeks ago.  She had not had  these episodes in the past, on average once every several years, all of which would resolve with 1 sublingual nitroglycerin.  This episode, however, required 2 sublingual nitroglycerin to relieve the pain.  She also does report occasional palpitations which have been unchanged over many years.  She still lives alone and cares for herself.  EKG today showing normal sinus rhythm with incomplete right bundle branch block which has been chronic 75 bpm.  Blood pressure and heart rate are controlled.  We will check an updated echocardiogram as well as a pharmacologic stress test, but otherwise we will see her back in 1 year.    Medical Problems       Problem List       Diverticulosis    Hypoalbuminemia    Essential hypertension    Hypercholesterolemia    Elevated MCV    Back pain    History of shingles    Incomplete right bundle branch block    Hx of CABG    Thoracic radiculopathy    Microscopic hematuria    Shortness of breath    Diverticulosis of large intestine with hemorrhage    Overview Signed 6/17/2018 12:14 PM by Eunice Guajardo PA-C   Added automatically from request for surgery [id number]         Nausea, vomiting and diarrhea    Colonic mass    Tubulovillous adenoma of colon    Overview Signed 7/25/2018  2:53 PM by Divina Claros DO   Added automatically from request for surgery [id number]         Anemia    Hypokalemia    Hypoxia    Pleural effusion    History of Clostridioides difficile colitis    On continuous oral anticoagulation    Encounter for pre-operative cardiovascular clearance    Emphysema of lung (HCC)    Chronic diastolic congestive heart failure (HCC)    Wt Readings from Last 3 Encounters:   05/30/25 72.5 kg (159 lb 12.8 oz)   03/31/25 72.6 kg (160 lb)   12/28/24 72.1 kg (159 lb)                 Mixed anxiety and depressive disorder    Secondary pulmonary hypertension    Diverticulitis    C. difficile colitis    Coronary artery disease involving native coronary artery of native heart with angina  pectoris (HCC)    Acute respiratory failure with hypoxia (HCC)    Abnormal PFT    Hepatic steatosis    Hyponatremia    Paroxysmal atrial flutter (HCC)    Elevated d-dimer    Hyperglycemia        Past Medical History[1]  Social History     Socioeconomic History    Marital status:      Spouse name: Not on file    Number of children: Not on file    Years of education: Not on file    Highest education level: Not on file   Occupational History    Not on file   Tobacco Use    Smoking status: Former     Current packs/day: 0.50     Average packs/day: 0.5 packs/day for 30.0 years (15.0 ttl pk-yrs)     Types: Cigarettes    Smokeless tobacco: Never    Tobacco comments:     quit 15 yrs ago   Vaping Use    Vaping status: Never Used   Substance and Sexual Activity    Alcohol use: Never     Alcohol/week: 0.0 standard drinks of alcohol    Drug use: No    Sexual activity: Not Currently   Other Topics Concern    Not on file   Social History Narrative    Not on file     Social Drivers of Health     Financial Resource Strain: Not At Risk (1/25/2025)    Received from [facility]    Financial Insecurity     In the last 12 months did you skip medications to save money?: No     In the last 12 months was there a time when you needed to see a doctor but could not because of cost?: No   Food Insecurity: No Food Insecurity (1/25/2025)    Received from [facility]    Food Insecurity     In the last 12 months did you ever eat less than you felt you should because there wasn't enough money for food?: No   Transportation Needs: No Transportation Needs (1/25/2025)    Received from [facility]    Transportation Needs     In the last 12 months have you ever had to go without healthcare because you didn't have a way to get there?: No   Physical Activity: Not on file   Stress: Not on file   Social Connections: Socially Integrated (1/25/2025)    Received from [facility]     "Social Connection     Do you often feel lonely?: No   Intimate Partner Violence: Not on file   Housing Stability: Not At Risk (1/25/2025)    Received from [facility]    Housing Stability     Are you worried that in the next 2 months you may not have stable housing?: No      Family History[2]  Past Surgical History[3]  Current Medications[4]  Allergies   Allergen Reactions    Codeine GI Intolerance    Lansoprazole Other (See Comments)     GI Upset, adnia protonix    Morphine Nausea Only    Morphine And Codeine GI Intolerance       Labs:     Chemistry        Component Value Date/Time     12/02/2015 1004    K 3.7 03/31/2025 1731    K 3.7 01/27/2025 0257     03/31/2025 1731     01/27/2025 0257    CO2 29 03/31/2025 1731    CO2 27 01/27/2025 0257    BUN 18 03/31/2025 1731    BUN 15 01/27/2025 0257    CREATININE 0.90 03/31/2025 1731    CREATININE 1.13 (H) 01/27/2025 0257        Component Value Date/Time    CALCIUM 9.6 03/31/2025 1731    CALCIUM 9.1 01/27/2025 0257    ALKPHOS 81 03/31/2025 1731    ALKPHOS 79 01/26/2025 0342    AST 18 03/31/2025 1731    AST 17 01/26/2025 0342    ALT 11 03/31/2025 1731    ALT 9 01/26/2025 0342    BILITOT 0.52 12/02/2015 1004            Lab Results   Component Value Date    CHOL 153 04/08/2014     Lab Results   Component Value Date    HDL 61 03/13/2025    HDL 55 10/17/2024    HDL 57 02/17/2024     Lab Results   Component Value Date    LDLCALC 56 03/13/2025    LDLCALC 50 10/17/2024    LDLCALC 47 02/17/2024     Lab Results   Component Value Date    TRIG 187 (H) 03/13/2025    TRIG 159 (H) 10/17/2024    TRIG 152 (H) 02/17/2024     No results found for: \"CHOLHDL\"    Imaging: No results found.    ECG:  Normal sinus rhythm with chronic incomplete RBBB 75 bpm      Review of Systems   Cardiovascular:  Positive for chest pain (Tightness, required 2 sublingual nitroglycerin for resolution), dyspnea on exertion and orthopnea.   All other systems reviewed and are " "negative.      Vitals:    05/30/25 1316   BP: 118/78   Pulse: 75   SpO2: 96%     Vitals:    05/30/25 1316   Weight: 72.5 kg (159 lb 12.8 oz)     Height: 5' 5\" (165.1 cm)   Body mass index is 26.59 kg/m².    Physical Exam  Vitals reviewed.   Constitutional:       General: She is not in acute distress.     Appearance: She is not diaphoretic.   HENT:      Head: Normocephalic and atraumatic.     Eyes:      Pupils: Pupils are equal, round, and reactive to light.     Neck:      Vascular: No carotid bruit.     Cardiovascular:      Rate and Rhythm: Normal rate and regular rhythm.      Pulses: Normal pulses.      Heart sounds: Normal heart sounds. No murmur heard.  Pulmonary:      Effort: Pulmonary effort is normal.      Breath sounds: Normal breath sounds. No wheezing, rhonchi or rales.   Abdominal:      General: There is no distension.      Palpations: Abdomen is soft.      Tenderness: There is no abdominal tenderness.     Musculoskeletal:      Cervical back: Normal range of motion.      Right lower leg: No edema.      Left lower leg: No edema.     Skin:     General: Skin is warm.      Capillary Refill: Capillary refill takes less than 2 seconds.     Neurological:      General: No focal deficit present.      Mental Status: She is alert and oriented to person, place, and time. Mental status is at baseline.      Gait: Gait abnormal (Ambulates with a cane).     Psychiatric:         Behavior: Behavior normal.         Thought Content: Thought content normal.              [1]   Past Medical History:  Diagnosis Date    Angina pectoris (HCC)     Anxiety     C. difficile diarrhea     CAD (coronary artery disease)     Cardiac disease     Colon polyp     COVID-19 04/2020    Depression     Diverticulitis of colon 11/2019    GERD (gastroesophageal reflux disease)     History of colon polyps     History of hiatal hernia     Hx of bleeding disorder     hemorrhoids    Hyperlipidemia     Hypertension     Irregular heart beat     gets " tachycardia    Shortness of breath     related to heart   [2]   Family History  Problem Relation Name Age of Onset    Heart disease Mother      Cirrhosis Father      Cancer Father      Lung cancer Brother      No Known Problems Sister      No Known Problems Daughter      No Known Problems Son      No Known Problems Paternal Aunt     [3]   Past Surgical History:  Procedure Laterality Date    CARDIAC ELECTROPHYSIOLOGY MAPPING AND ABLATION      CARDIAC SURGERY      CARDIAC SURGERY      CATARACT EXTRACTION Bilateral     CHOLECYSTECTOMY  1987    COLON SURGERY      repair of colon    COLON SURGERY  2018    COLONOSCOPY  2009    Tubulovillous adenoma    COLONOSCOPY N/A 6/18/2018    Procedure: COLONOSCOPY;  Surgeon: Divina Claros DO;  Location: [facility];  Service: Gastroenterology    COLONOSCOPY  2011    2 cm right colon polyp and 1.5 cm mid right colon polyps tubular adenomas    COLONOSCOPY W/ CONTROL OF HEMORRHAGE      CORONARY ANGIOPLASTY WITH STENT PLACEMENT      reports two blockages not able to be stented    CORONARY ARTERY BYPASS GRAFT  2008    3 vessels    HERNIA REPAIR      hiatal hernia    HYSTERECTOMY      partial not due to malignancy    LAPAROTOMY N/A 9/26/2018    Procedure: LAPAROTOMY EXPLORATORY WITH  RIGHT HEMICOLECTOMY;  Surgeon: Divina Claros DO;  Location: [facility];  Service: Gastroenterology    LAPAROTOMY N/A 9/26/2018    Procedure: LAPAROTOMY EXPLORATORY WITH HEMICOLECTOMY;  Surgeon: Eric Owens DO;  Location: [facility];  Service: General    MI ANRCT XM SURG REQ ANES GENERAL SPI/EDRL DX N/A 7/25/2019    Procedure: EXAM UNDER ANESTHESIA (EUA);  Surgeon: Pedro Rajan MD;  Location:  MAIN OR;  Service: General    MI COLONOSCOPY FLX DX W/COLLJ SPEC WHEN PFRMD N/A 9/26/2018    Procedure: COLONOSCOPY;  Surgeon: Divina Claros DO;  Location: [facility];  Service: Gastroenterology    MI HEMORRHOIDECTOMY NTRNL & XTRNL 1 COLUMN/GROUP N/A 7/25/2019    Procedure: HEMORRHOIDECTOMY EXCISION;   Surgeon: Pedro Rajan MD;  Location:  MAIN OR;  Service: General    AR SIGMOIDOSCOPY FLX DX W/COLLJ SPEC BR/WA IF PFRMD N/A 6/21/2018    Procedure: SIGMOIDOSCOPY FLEXIBLE;  Surgeon: Divina Claros DO;  Location:  [facility];  Service: Gastroenterology   [4]   Current Outpatient Medications:     acetaminophen (TYLENOL) 650 mg CR tablet, Take 1 tablet (650 mg total) by mouth every 8 (eight) hours as needed for mild pain or moderate pain, Disp: 15 tablet, Rfl: 0    ALPRAZolam (XANAX) 0.25 mg tablet, Take 0.25 mg by mouth as needed in the morning and 0.25 mg as needed at noon and 0.25 mg as needed in the evening for anxiety., Disp: , Rfl:     apixaban (ELIQUIS) 5 mg, Take 1 tablet (5 mg total) by mouth 2 (two) times a day, Disp: 60 tablet, Rfl: 3    atorvastatin (LIPITOR) 20 mg tablet, Take 20 mg by mouth daily after dinner, Disp: , Rfl:     calcium carbonate (OS-FERN) 600 MG tablet, Take 600 mg by mouth in the morning., Disp: , Rfl:     cyanocobalamin (VITAMIN B-12) 100 mcg tablet, Take by mouth in the morning., Disp: , Rfl:     cyclobenzaprine (FLEXERIL) 5 mg tablet, Take 1 tablet (5 mg total) by mouth daily at bedtime as needed for muscle spasms, Disp: 5 tablet, Rfl: 0    esomeprazole (NexIUM) 40 MG capsule, Take 20 mg by mouth in the morning., Disp: , Rfl:     folic acid (FOLVITE) 1 mg tablet, Take by mouth in the morning., Disp: , Rfl:     furosemide (LASIX) 20 mg tablet, Take 1 tablet (20 mg total) by mouth daily Do not start before January 4, 2023., Disp: 30 tablet, Rfl: 0    gabapentin (NEURONTIN) 100 mg capsule, As needed, Disp: , Rfl:     lisinopril (ZESTRIL) 5 mg tablet, , Disp: , Rfl:     metoprolol tartrate (LOPRESSOR) 50 mg tablet, take 1 tablet by mouth every 12 hours, Disp: 180 tablet, Rfl: 3    Multiple Vitamins-Minerals (PRESERVISION AREDS) CAPS, Take 1 capsule by mouth in the morning., Disp: , Rfl:     nitroglycerin (NITROSTAT) 0.4 mg SL tablet, Place 1 tablet (0.4 mg total) under the tongue  every 5 (five) minutes as needed for chest pain (times three for chest pain. If no relief after second tablet, call 911.), Disp: 25 tablet, Rfl: 2    Omega-3 Fatty Acids (FISH OIL PO), Take 1,000 mg by mouth in the morning., Disp: , Rfl:     ondansetron (ZOFRAN-ODT) 4 mg disintegrating tablet, Take 1 tablet (4 mg total) by mouth every 6 (six) hours as needed for nausea or vomiting, Disp: 20 tablet, Rfl: 0    benzonatate (TESSALON PERLES) 100 mg capsule, Take 1 capsule (100 mg total) by mouth every 8 (eight) hours (Patient not taking: Reported on 5/30/2025), Disp: 21 capsule, Rfl: 0    ergocalciferol (ERGOCALCIFEROL) 98395 UNITS capsule, Take 50,000 Units by mouth once a week. Takes on Wednesdays (Patient not taking: Reported on 3/31/2025), Disp: , Rfl:     Misc. Devices (SPRAY BOTTLE/PLASTIC 120ML) MISC, by Does not apply route 3 (three) times a day Use to cleanse area 3 times daily or as needed with bowel movements. (Patient not taking: Reported on 3/31/2025), Disp: 1 each, Rfl: 0    nystatin (MYCOSTATIN) powder, Apply topically 3 (three) times a day . Apply for an additional week after symptoms resolve. (Patient not taking: Reported on 5/30/2025), Disp: 15 g, Rfl: 0    phenazopyridine (PYRIDIUM) 200 mg tablet, Take 1 tablet (200 mg total) by mouth 3 (three) times a day with meals (Patient not taking: Reported on 5/30/2025), Disp: 9 tablet, Rfl: 0

## 2025-05-31 LAB — BACTERIA UR CULT: NORMAL

## 2025-06-13 ENCOUNTER — HOSPITAL ENCOUNTER (OUTPATIENT)
Dept: NON INVASIVE DIAGNOSTICS | Facility: HOSPITAL | Age: 85
Discharge: HOME | End: 2025-06-13
Payer: MEDICARE

## 2025-06-13 VITALS
BODY MASS INDEX: 26.63 KG/M2 | SYSTOLIC BLOOD PRESSURE: 132 MMHG | HEIGHT: 65 IN | WEIGHT: 159.83 LBS | DIASTOLIC BLOOD PRESSURE: 66 MMHG | HEART RATE: 75 BPM

## 2025-06-13 DIAGNOSIS — I50.32 CHRONIC DIASTOLIC CONGESTIVE HEART FAILURE (HCC): ICD-10-CM

## 2025-06-13 LAB
AORTIC ROOT: 3 CM
ASCENDING AORTA: 4.4 CM
BSA FOR ECHO PROCEDURE: 1.8 M2
DOP CALC LVOT AREA: 2.83 CM2
DOP CALC LVOT DIAMETER: 1.9 CM
E WAVE DECELERATION TIME: 259 MS
E/A RATIO: 0.59
FRACTIONAL SHORTENING: 33 (ref 28–44)
INTERVENTRICULAR SEPTUM IN DIASTOLE (PARASTERNAL SHORT AXIS VIEW): 1.2 CM
INTERVENTRICULAR SEPTUM: 1.2 CM (ref 0.6–1.1)
LAAS-AP2: 24.9 CM2
LAAS-AP4: 22.7 CM2
LEFT ATRIUM SIZE: 5.6 CM
LEFT ATRIUM VOLUME (MOD BIPLANE): 73 ML
LEFT ATRIUM VOLUME INDEX (MOD BIPLANE): 40.6 ML/M2
LEFT INTERNAL DIMENSION IN SYSTOLE: 2.8 CM (ref 2.1–4)
LEFT VENTRICULAR INTERNAL DIMENSION IN DIASTOLE: 4.2 CM (ref 3.5–6)
LEFT VENTRICULAR POSTERIOR WALL IN END DIASTOLE: 1.2 CM
LEFT VENTRICULAR STROKE VOLUME: 49 ML
LV EF US.2D.A4C+ESTIMATED: 63 %
LVSV (TEICH): 49 ML
MV E'TISSUE VEL-LAT: 12 CM/S
MV E'TISSUE VEL-SEP: 9 CM/S
MV PEAK A VEL: 1.11 M/S
MV PEAK E VEL: 65 CM/S
MV STENOSIS PRESSURE HALF TIME: 75 MS
MV VALVE AREA P 1/2 METHOD: 2.93
RA PRESSURE ESTIMATED: 3 MMHG
RIGHT ATRIUM AREA SYSTOLE A4C: 37.4 CM2
RIGHT VENTRICLE ID DIMENSION: 5.4 CM
RV PSP: 37 MMHG
SL CV LEFT ATRIUM LENGTH A2C: 6.2 CM
SL CV LV EF: 60
SL CV PED ECHO LEFT VENTRICLE DIASTOLIC VOLUME (MOD BIPLANE) 2D: 78 ML
SL CV PED ECHO LEFT VENTRICLE SYSTOLIC VOLUME (MOD BIPLANE) 2D: 28 ML
TR MAX PG: 34 MMHG
TR PEAK VELOCITY: 2.9 M/S
TRICUSPID ANNULAR PLANE SYSTOLIC EXCURSION: 1.7 CM
TRICUSPID VALVE PEAK REGURGITATION VELOCITY: 2.9 M/S

## 2025-06-13 PROCEDURE — 93306 TTE W/DOPPLER COMPLETE: CPT | Performed by: INTERNAL MEDICINE

## 2025-06-13 PROCEDURE — 93306 TTE W/DOPPLER COMPLETE: CPT

## 2025-06-16 ENCOUNTER — RESULTS FOLLOW-UP (OUTPATIENT)
Dept: CARDIOLOGY CLINIC | Facility: HOSPITAL | Age: 85
End: 2025-06-16

## 2025-06-25 ENCOUNTER — APPOINTMENT (OUTPATIENT)
Dept: LAB | Facility: HOSPITAL | Age: 85
End: 2025-06-25
Payer: MEDICARE

## 2025-06-25 ENCOUNTER — HOSPITAL ENCOUNTER (OUTPATIENT)
Dept: NUCLEAR MEDICINE | Facility: HOSPITAL | Age: 85
Discharge: HOME | End: 2025-06-25
Payer: MEDICARE

## 2025-06-25 DIAGNOSIS — E78.5 HYPERLIPIDEMIA, UNSPECIFIED HYPERLIPIDEMIA TYPE: ICD-10-CM

## 2025-06-25 DIAGNOSIS — R39.15 URGENCY OF URINATION: ICD-10-CM

## 2025-06-25 DIAGNOSIS — E03.9 HYPOTHYROIDISM, ADULT: ICD-10-CM

## 2025-06-25 DIAGNOSIS — D64.9 ANEMIA, UNSPECIFIED TYPE: ICD-10-CM

## 2025-06-25 DIAGNOSIS — I25.119 CORONARY ARTERY DISEASE INVOLVING NATIVE CORONARY ARTERY OF NATIVE HEART WITH ANGINA PECTORIS (HCC): ICD-10-CM

## 2025-06-25 DIAGNOSIS — I50.9 HEART FAILURE, UNSPECIFIED HF CHRONICITY, UNSPECIFIED HEART FAILURE TYPE (HCC): ICD-10-CM

## 2025-06-25 DIAGNOSIS — N17.9 ACUTE RENAL FAILURE, UNSPECIFIED ACUTE RENAL FAILURE TYPE (HCC): ICD-10-CM

## 2025-06-25 DIAGNOSIS — I10 HYPERTENSION, UNSPECIFIED TYPE: ICD-10-CM

## 2025-06-25 DIAGNOSIS — E55.9 VITAMIN D DEFICIENCY: ICD-10-CM

## 2025-06-25 LAB
25(OH)D3 SERPL-MCNC: 47.7 NG/ML (ref 30–100)
ALBUMIN SERPL BCG-MCNC: 4.5 G/DL (ref 3.5–5)
ALP SERPL-CCNC: 83 U/L (ref 34–104)
ALT SERPL W P-5'-P-CCNC: 11 U/L (ref 7–52)
ANION GAP SERPL CALCULATED.3IONS-SCNC: 9 MMOL/L (ref 4–13)
AST SERPL W P-5'-P-CCNC: 17 U/L (ref 13–39)
BACTERIA UR QL AUTO: ABNORMAL /HPF
BILIRUB SERPL-MCNC: 0.77 MG/DL (ref 0.2–1)
BILIRUB UR QL STRIP: NEGATIVE
BNP SERPL-MCNC: 155 PG/ML (ref 0–100)
BUN SERPL-MCNC: 19 MG/DL (ref 5–25)
CALCIUM SERPL-MCNC: 9.6 MG/DL (ref 8.4–10.2)
CHEST PAIN STATEMENT: NORMAL
CHLORIDE SERPL-SCNC: 107 MMOL/L (ref 96–108)
CHOLEST SERPL-MCNC: 148 MG/DL (ref ?–200)
CLARITY UR: ABNORMAL
CO2 SERPL-SCNC: 26 MMOL/L (ref 21–32)
COLOR UR: YELLOW
CREAT SERPL-MCNC: 0.88 MG/DL (ref 0.6–1.3)
ERYTHROCYTE [DISTWIDTH] IN BLOOD BY AUTOMATED COUNT: 14.2 % (ref 11.6–15.1)
FERRITIN SERPL-MCNC: 20 NG/ML (ref 30–307)
GFR SERPL CREATININE-BSD FRML MDRD: 60 ML/MIN/1.73SQ M
GLUCOSE P FAST SERPL-MCNC: 103 MG/DL (ref 65–99)
GLUCOSE UR STRIP-MCNC: NEGATIVE MG/DL
HCT VFR BLD AUTO: 38.2 % (ref 34.8–46.1)
HDLC SERPL-MCNC: 57 MG/DL
HGB BLD-MCNC: 12 G/DL (ref 11.5–15.4)
HGB UR QL STRIP.AUTO: NEGATIVE
IRON SATN MFR SERPL: 14 % (ref 15–50)
IRON SERPL-MCNC: 57 UG/DL (ref 50–212)
KETONES UR STRIP-MCNC: NEGATIVE MG/DL
LDLC SERPL CALC-MCNC: 62 MG/DL (ref 0–100)
LEUKOCYTE ESTERASE UR QL STRIP: ABNORMAL
MAX DIASTOLIC BP: 80 MMHG
MAX HR: 101 BPM
MAX PREDICTED HEART RATE: 135 BPM
MCH RBC QN AUTO: 30.9 PG (ref 26.8–34.3)
MCHC RBC AUTO-ENTMCNC: 31.4 G/DL (ref 31.4–37.4)
MCV RBC AUTO: 99 FL (ref 82–98)
NITRITE UR QL STRIP: POSITIVE
NON-SQ EPI CELLS URNS QL MICRO: ABNORMAL /HPF
NONHDLC SERPL-MCNC: 91 MG/DL
PH UR STRIP.AUTO: 6.5 [PH]
PLATELET # BLD AUTO: 157 THOUSANDS/UL (ref 149–390)
PMV BLD AUTO: 8.9 FL (ref 8.9–12.7)
POTASSIUM SERPL-SCNC: 3.8 MMOL/L (ref 3.5–5.3)
PROT SERPL-MCNC: 7.3 G/DL (ref 6.4–8.4)
PROT UR STRIP-MCNC: ABNORMAL MG/DL
PROTOCOL NAME: NORMAL
RATE PRESSURE PRODUCT: NORMAL
RBC # BLD AUTO: 3.88 MILLION/UL (ref 3.81–5.12)
RBC #/AREA URNS AUTO: ABNORMAL /HPF
REASON FOR TERMINATION: NORMAL
SL CV REST NUCLEAR ISOTOPE DOSE: 10.6 MCI
SL CV STRESS NUCLEAR ISOTOPE DOSE: 32.5 MCI
SODIUM SERPL-SCNC: 142 MMOL/L (ref 135–147)
SP GR UR STRIP.AUTO: 1.02 (ref 1–1.03)
SPECT HRT GATED+EF W RNC IV: 70 %
STRESS ANGINA INDEX: 0
STRESS BASELINE HR: 77 BPM
STRESS PEAK HR: 101 BPM
STRESS POST EXERCISE DUR MIN: 3 MIN
STRESS POST EXERCISE DUR SEC: 0 SEC
STRESS POST PEAK BP: 148 MMHG
STRESS POST PEAK HR: 101 BPM
STRESS POST PEAK SYSTOLIC BP: 148 MMHG
STRESS/REST PERFUSION RATIO: 1.16
T4 FREE SERPL-MCNC: 0.86 NG/DL (ref 0.61–1.12)
TARGET HR FORMULA: NORMAL
TEST INDICATION: NORMAL
TIBC SERPL-MCNC: 399 UG/DL (ref 250–450)
TRANSFERRIN SERPL-MCNC: 285 MG/DL (ref 203–362)
TRIGL SERPL-MCNC: 143 MG/DL (ref ?–150)
TSH SERPL DL<=0.05 MIU/L-ACNC: 1.77 UIU/ML (ref 0.45–4.5)
UIBC SERPL-MCNC: 342 UG/DL (ref 155–355)
UROBILINOGEN UR STRIP-ACNC: <2 MG/DL
WBC # BLD AUTO: 6.13 THOUSAND/UL (ref 4.31–10.16)
WBC #/AREA URNS AUTO: ABNORMAL /HPF

## 2025-06-25 PROCEDURE — 87077 CULTURE AEROBIC IDENTIFY: CPT

## 2025-06-25 PROCEDURE — 36415 COLL VENOUS BLD VENIPUNCTURE: CPT

## 2025-06-25 PROCEDURE — A9502 TC99M TETROFOSMIN: HCPCS

## 2025-06-25 PROCEDURE — 83540 ASSAY OF IRON: CPT

## 2025-06-25 PROCEDURE — 83550 IRON BINDING TEST: CPT

## 2025-06-25 PROCEDURE — 93018 CV STRESS TEST I&R ONLY: CPT | Performed by: INTERNAL MEDICINE

## 2025-06-25 PROCEDURE — 93017 CV STRESS TEST TRACING ONLY: CPT

## 2025-06-25 PROCEDURE — 84439 ASSAY OF FREE THYROXINE: CPT

## 2025-06-25 PROCEDURE — 87086 URINE CULTURE/COLONY COUNT: CPT

## 2025-06-25 PROCEDURE — 85027 COMPLETE CBC AUTOMATED: CPT

## 2025-06-25 PROCEDURE — 87186 SC STD MICRODIL/AGAR DIL: CPT

## 2025-06-25 PROCEDURE — 80053 COMPREHEN METABOLIC PANEL: CPT

## 2025-06-25 PROCEDURE — 82728 ASSAY OF FERRITIN: CPT

## 2025-06-25 PROCEDURE — 82306 VITAMIN D 25 HYDROXY: CPT

## 2025-06-25 PROCEDURE — 78452 HT MUSCLE IMAGE SPECT MULT: CPT | Performed by: INTERNAL MEDICINE

## 2025-06-25 PROCEDURE — 83880 ASSAY OF NATRIURETIC PEPTIDE: CPT

## 2025-06-25 PROCEDURE — 93016 CV STRESS TEST SUPVJ ONLY: CPT | Performed by: INTERNAL MEDICINE

## 2025-06-25 PROCEDURE — 78452 HT MUSCLE IMAGE SPECT MULT: CPT

## 2025-06-25 PROCEDURE — 84443 ASSAY THYROID STIM HORMONE: CPT

## 2025-06-25 PROCEDURE — 81001 URINALYSIS AUTO W/SCOPE: CPT

## 2025-06-25 PROCEDURE — 80061 LIPID PANEL: CPT

## 2025-06-25 RX ORDER — REGADENOSON 0.08 MG/ML
0.4 INJECTION, SOLUTION INTRAVENOUS ONCE
Status: COMPLETED | OUTPATIENT
Start: 2025-06-25 | End: 2025-06-25

## 2025-06-25 RX ADMIN — REGADENOSON 0.4 MG: 0.08 INJECTION, SOLUTION INTRAVENOUS at 11:16

## 2025-06-27 LAB — BACTERIA UR CULT: ABNORMAL

## 2025-08-06 ENCOUNTER — APPOINTMENT (EMERGENCY)
Dept: CT IMAGING | Facility: HOSPITAL | Age: 85
End: 2025-08-06
Payer: MEDICARE

## 2025-08-06 ENCOUNTER — HOSPITAL ENCOUNTER (EMERGENCY)
Facility: HOSPITAL | Age: 85
Discharge: HOME | End: 2025-08-06
Attending: EMERGENCY MEDICINE
Payer: MEDICARE

## (undated) DEVICE — BULB SYRINGE,IRRIGATION WITH PROTECTIVE CAP: Brand: DOVER

## (undated) DEVICE — DRESSING MEPILEX AG BORDER 4 X 8 IN

## (undated) DEVICE — GLOVE INDICATOR PI UNDERGLOVE SZ 7 BLUE

## (undated) DEVICE — GARMENT,MEDLINE,DVT,INT,CALF,FOAM,MED: Brand: MEDLINE

## (undated) DEVICE — SUT VICRYL 3-0 REEL 54 IN J285G

## (undated) DEVICE — THE LARIAT SNARE IS AN ELECTROSURGICAL DEVICE USED TO ENDOSCOPICALLY GRASP, DISSECT, AND TRANSECT TISSUE DURING GASTROINTESTINAL (GI) ENDOSCOPIC PROCEDURES.  THE SNARE CAN BE USED WITH OR WITHOUT THE USE OF MONOPOLAR DIATHERMIC ENERGY.: Brand: LARIAT

## (undated) DEVICE — SURGIFOAM 7 X 12 SPONGE ABS

## (undated) DEVICE — PROXIMATE RELOADABLE LINEAR STAPLER: Brand: PROXIMATE

## (undated) DEVICE — STERILE MAJOR GENERAL PACK: Brand: CARDINAL HEALTH

## (undated) DEVICE — TRAY FOLEY 16FR URIMETER SURESTEP

## (undated) DEVICE — SUT PDS II 1 CTX 36 IN Z371T

## (undated) DEVICE — TRANSPOSAL ULTRAFLEX DUO/QUAD ULTRA CART MANIFOLD

## (undated) DEVICE — SUT VICRYL 2-0 SH 27 IN UNDYED J417H

## (undated) DEVICE — SUT PDS II 0 CT-1 36 IN Z346H

## (undated) DEVICE — TUBING SUCTION 5MM X 12 FT

## (undated) DEVICE — INTENDED FOR TISSUE SEPARATION, AND OTHER PROCEDURES THAT REQUIRE A SHARP SURGICAL BLADE TO PUNCTURE OR CUT.: Brand: BARD-PARKER SAFETY BLADES SIZE 10, STERILE

## (undated) DEVICE — SUT VICRYL 2-0 REEL 54 IN J286G

## (undated) DEVICE — CUP MEDICINE 1OZ 5000/CS 50/PLT: Brand: MEDEGEN MEDICAL PRODUCTS, LLC

## (undated) DEVICE — NEEDLE 18 G X 1 1/2 SAFETY

## (undated) DEVICE — LUBRICANT SURGILUBE TUBE 4 OZ  FLIP TOP

## (undated) DEVICE — SYRINGE 10ML LL

## (undated) DEVICE — FORCEPS BIOPSY ALLIGATOR JAW W/NEEDLE 2.8MM

## (undated) DEVICE — ASTOUND IMPERVIOUS SURGICAL GOWN: Brand: CONVERTORS

## (undated) DEVICE — ENSEAL 20 CM SHAFT, LARGE JAW: Brand: ENSEAL X1

## (undated) DEVICE — REM POLYHESIVE ADULT PATIENT RETURN ELECTRODE: Brand: VALLEYLAB

## (undated) DEVICE — PROXIMATE RELOADABLE LINEAR CUTTER WITH SAFETY LOCK-OUT, 100MM: Brand: PROXIMATE

## (undated) DEVICE — RESOLUTION CLIP 2.8MM X 235CM STR LF DISP

## (undated) DEVICE — GLOVE SRG BIOGEL 7

## (undated) DEVICE — POOLE SUCTION INSTRUMENT WITH REMOVABLE SHEATH AND PREATTACHED 6' (1.8 M) CLEAR PLASTIC TUBING: Brand: POOLE

## (undated) DEVICE — SPONGE STICK WITH PVP-I: Brand: KENDALL

## (undated) DEVICE — POOLE SUCTION HANDLE: Brand: CARDINAL HEALTH

## (undated) DEVICE — POOLE SUCTION HANDLE W/TUBING: Brand: CARDINAL HEALTH

## (undated) DEVICE — SUT VICRYL 0 CT-1 36 IN J946H

## (undated) DEVICE — NEEDLE 25G X 1 1/2

## (undated) DEVICE — 2000CC GUARDIAN II: Brand: GUARDIAN

## (undated) DEVICE — CHLORAPREP HI-LITE 26ML ORANGE

## (undated) DEVICE — Device: Brand: DISPOSABLE ELECTROSURGICAL SNARE

## (undated) DEVICE — INTENDED FOR TISSUE SEPARATION, AND OTHER PROCEDURES THAT REQUIRE A SHARP SURGICAL BLADE TO PUNCTURE OR CUT.: Brand: BARD-PARKER ® CARBON RIB-BACK BLADES

## (undated) DEVICE — PLUMEPEN PRO 10FT

## (undated) DEVICE — MARKER SPOT EX  BOWEL TATTOO SYRINGE

## (undated) DEVICE — SUT VICRYL 3-0 SH 27 IN J416H

## (undated) DEVICE — PROXIMATE LINEAR CUTTER RELOADS (STANDARD)-100MM: Brand: PROXIMATE

## (undated) DEVICE — NEEDLE SCLERO INTERJECT 25G 240MM

## (undated) DEVICE — GLOVE INDICATOR UNDERGLOVE SZ 7.5 GREEN

## (undated) DEVICE — PENCIL ROCKER SWITCH CAUTERY HAND CONTROL

## (undated) DEVICE — BETHLEHEM UNIVERSAL MINOR GEN: Brand: CARDINAL HEALTH

## (undated) DEVICE — DRESSING MEPILEX AG BORDER 4 X 12 IN

## (undated) DEVICE — INTENDED FOR TISSUE SEPARATION, AND OTHER PROCEDURES THAT REQUIRE A SHARP SURGICAL BLADE TO PUNCTURE OR CUT.: Brand: BARD-PARKER SAFETY BLADES SIZE 15, STERILE

## (undated) DEVICE — SUT VICRYL 4-0 SH 27 IN J415H

## (undated) DEVICE — MEDI-VAC YANK SUCT HNDL W/TPRD BULBOUS TIP: Brand: CARDINAL HEALTH